# Patient Record
Sex: MALE | Race: WHITE | NOT HISPANIC OR LATINO | Employment: UNEMPLOYED | URBAN - METROPOLITAN AREA
[De-identification: names, ages, dates, MRNs, and addresses within clinical notes are randomized per-mention and may not be internally consistent; named-entity substitution may affect disease eponyms.]

---

## 2018-05-29 ENCOUNTER — APPOINTMENT (OUTPATIENT)
Dept: RADIOLOGY | Facility: MEDICAL CENTER | Age: 48
DRG: 247 | End: 2018-05-29
Attending: EMERGENCY MEDICINE
Payer: COMMERCIAL

## 2018-05-29 ENCOUNTER — HOSPITAL ENCOUNTER (INPATIENT)
Facility: MEDICAL CENTER | Age: 48
LOS: 2 days | DRG: 247 | End: 2018-05-31
Attending: EMERGENCY MEDICINE | Admitting: HOSPITALIST
Payer: COMMERCIAL

## 2018-05-29 DIAGNOSIS — I21.A9 OTHER TYPE OF MYOCARDIAL INFARCTION (HCC): ICD-10-CM

## 2018-05-29 PROBLEM — I10 ESSENTIAL HYPERTENSION: Status: ACTIVE | Noted: 2018-05-29

## 2018-05-29 PROBLEM — E87.6 HYPOKALEMIA: Status: ACTIVE | Noted: 2018-05-29

## 2018-05-29 PROBLEM — E11.8 TYPE 2 DIABETES MELLITUS WITH COMPLICATION, WITHOUT LONG-TERM CURRENT USE OF INSULIN (HCC): Status: ACTIVE | Noted: 2018-05-29

## 2018-05-29 PROBLEM — I21.4 ACUTE NON-ST ELEVATION MYOCARDIAL INFARCTION (NSTEMI) (HCC): Status: ACTIVE | Noted: 2018-05-29

## 2018-05-29 PROBLEM — Z82.3 FAMILY HISTORY OF STROKE OR TRANSIENT ISCHEMIC ATTACK IN MOTHER: Status: ACTIVE | Noted: 2018-05-29

## 2018-05-29 LAB
ALBUMIN SERPL BCP-MCNC: 4.6 G/DL (ref 3.2–4.9)
ALBUMIN/GLOB SERPL: 1.4 G/DL
ALP SERPL-CCNC: 78 U/L (ref 30–99)
ALT SERPL-CCNC: 34 U/L (ref 2–50)
ANION GAP SERPL CALC-SCNC: 15 MMOL/L (ref 0–11.9)
APTT PPP: 25.4 SEC (ref 24.7–36)
AST SERPL-CCNC: 27 U/L (ref 12–45)
BASOPHILS # BLD AUTO: 0.9 % (ref 0–1.8)
BASOPHILS # BLD: 0.09 K/UL (ref 0–0.12)
BILIRUB SERPL-MCNC: 0.7 MG/DL (ref 0.1–1.5)
BNP SERPL-MCNC: 71 PG/ML (ref 0–100)
BUN SERPL-MCNC: 18 MG/DL (ref 8–22)
CALCIUM SERPL-MCNC: 9.7 MG/DL (ref 8.5–10.5)
CHLORIDE SERPL-SCNC: 98 MMOL/L (ref 96–112)
CO2 SERPL-SCNC: 27 MMOL/L (ref 20–33)
CREAT SERPL-MCNC: 1.11 MG/DL (ref 0.5–1.4)
EKG IMPRESSION: NORMAL
EOSINOPHIL # BLD AUTO: 0.4 K/UL (ref 0–0.51)
EOSINOPHIL NFR BLD: 4.1 % (ref 0–6.9)
ERYTHROCYTE [DISTWIDTH] IN BLOOD BY AUTOMATED COUNT: 44 FL (ref 35.9–50)
EST. AVERAGE GLUCOSE BLD GHB EST-MCNC: 163 MG/DL
GLOBULIN SER CALC-MCNC: 3.4 G/DL (ref 1.9–3.5)
GLUCOSE SERPL-MCNC: 137 MG/DL (ref 65–99)
HBA1C MFR BLD: 7.3 % (ref 0–5.6)
HCT VFR BLD AUTO: 37.6 % (ref 42–52)
HGB BLD-MCNC: 12.9 G/DL (ref 14–18)
IMM GRANULOCYTES # BLD AUTO: 0.02 K/UL (ref 0–0.11)
IMM GRANULOCYTES NFR BLD AUTO: 0.2 % (ref 0–0.9)
INR PPP: 1.03 (ref 0.87–1.13)
LIPASE SERPL-CCNC: 56 U/L (ref 11–82)
LYMPHOCYTES # BLD AUTO: 2.7 K/UL (ref 1–4.8)
LYMPHOCYTES NFR BLD: 27.8 % (ref 22–41)
MCH RBC QN AUTO: 31.8 PG (ref 27–33)
MCHC RBC AUTO-ENTMCNC: 34.3 G/DL (ref 33.7–35.3)
MCV RBC AUTO: 92.6 FL (ref 81.4–97.8)
MONOCYTES # BLD AUTO: 0.82 K/UL (ref 0–0.85)
MONOCYTES NFR BLD AUTO: 8.5 % (ref 0–13.4)
NEUTROPHILS # BLD AUTO: 5.67 K/UL (ref 1.82–7.42)
NEUTROPHILS NFR BLD: 58.5 % (ref 44–72)
NRBC # BLD AUTO: 0 K/UL
NRBC BLD-RTO: 0 /100 WBC
PLATELET # BLD AUTO: 243 K/UL (ref 164–446)
PMV BLD AUTO: 10.6 FL (ref 9–12.9)
POTASSIUM SERPL-SCNC: 3.5 MMOL/L (ref 3.6–5.5)
PROT SERPL-MCNC: 8 G/DL (ref 6–8.2)
PROTHROMBIN TIME: 13.2 SEC (ref 12–14.6)
RBC # BLD AUTO: 4.06 M/UL (ref 4.7–6.1)
SODIUM SERPL-SCNC: 140 MMOL/L (ref 135–145)
TROPONIN I SERPL-MCNC: 4.97 NG/ML (ref 0–0.04)
TROPONIN I SERPL-MCNC: 5.04 NG/ML (ref 0–0.04)
TSH SERPL DL<=0.005 MIU/L-ACNC: 2.4 UIU/ML (ref 0.38–5.33)
WBC # BLD AUTO: 9.7 K/UL (ref 4.8–10.8)

## 2018-05-29 PROCEDURE — 84484 ASSAY OF TROPONIN QUANT: CPT | Mod: 91

## 2018-05-29 PROCEDURE — 71045 X-RAY EXAM CHEST 1 VIEW: CPT

## 2018-05-29 PROCEDURE — A9270 NON-COVERED ITEM OR SERVICE: HCPCS | Performed by: EMERGENCY MEDICINE

## 2018-05-29 PROCEDURE — 84443 ASSAY THYROID STIM HORMONE: CPT

## 2018-05-29 PROCEDURE — 700102 HCHG RX REV CODE 250 W/ 637 OVERRIDE(OP): Performed by: EMERGENCY MEDICINE

## 2018-05-29 PROCEDURE — 83036 HEMOGLOBIN GLYCOSYLATED A1C: CPT

## 2018-05-29 PROCEDURE — 99223 1ST HOSP IP/OBS HIGH 75: CPT | Performed by: HOSPITALIST

## 2018-05-29 PROCEDURE — 85730 THROMBOPLASTIN TIME PARTIAL: CPT

## 2018-05-29 PROCEDURE — 96375 TX/PRO/DX INJ NEW DRUG ADDON: CPT

## 2018-05-29 PROCEDURE — 700111 HCHG RX REV CODE 636 W/ 250 OVERRIDE (IP): Performed by: EMERGENCY MEDICINE

## 2018-05-29 PROCEDURE — A9270 NON-COVERED ITEM OR SERVICE: HCPCS | Performed by: INTERNAL MEDICINE

## 2018-05-29 PROCEDURE — 93005 ELECTROCARDIOGRAM TRACING: CPT

## 2018-05-29 PROCEDURE — 99285 EMERGENCY DEPT VISIT HI MDM: CPT

## 2018-05-29 PROCEDURE — 96365 THER/PROPH/DIAG IV INF INIT: CPT

## 2018-05-29 PROCEDURE — 93005 ELECTROCARDIOGRAM TRACING: CPT | Performed by: EMERGENCY MEDICINE

## 2018-05-29 PROCEDURE — 83880 ASSAY OF NATRIURETIC PEPTIDE: CPT

## 2018-05-29 PROCEDURE — 770020 HCHG ROOM/CARE - TELE (206)

## 2018-05-29 PROCEDURE — 700102 HCHG RX REV CODE 250 W/ 637 OVERRIDE(OP): Performed by: INTERNAL MEDICINE

## 2018-05-29 PROCEDURE — 700105 HCHG RX REV CODE 258: Performed by: HOSPITALIST

## 2018-05-29 PROCEDURE — 82962 GLUCOSE BLOOD TEST: CPT

## 2018-05-29 PROCEDURE — 80053 COMPREHEN METABOLIC PANEL: CPT

## 2018-05-29 PROCEDURE — 85610 PROTHROMBIN TIME: CPT

## 2018-05-29 PROCEDURE — 85025 COMPLETE CBC W/AUTO DIFF WBC: CPT

## 2018-05-29 PROCEDURE — 96366 THER/PROPH/DIAG IV INF ADDON: CPT

## 2018-05-29 PROCEDURE — 83690 ASSAY OF LIPASE: CPT

## 2018-05-29 PROCEDURE — 700102 HCHG RX REV CODE 250 W/ 637 OVERRIDE(OP): Performed by: HOSPITALIST

## 2018-05-29 RX ORDER — BISACODYL 10 MG
10 SUPPOSITORY, RECTAL RECTAL
Status: DISCONTINUED | OUTPATIENT
Start: 2018-05-29 | End: 2018-05-31 | Stop reason: HOSPADM

## 2018-05-29 RX ORDER — LABETALOL HYDROCHLORIDE 5 MG/ML
10 INJECTION, SOLUTION INTRAVENOUS
Status: DISCONTINUED | OUTPATIENT
Start: 2018-05-29 | End: 2018-05-31 | Stop reason: HOSPADM

## 2018-05-29 RX ORDER — BUPROPION HYDROCHLORIDE 150 MG/1
150 TABLET, EXTENDED RELEASE ORAL DAILY
Status: DISCONTINUED | OUTPATIENT
Start: 2018-05-30 | End: 2018-05-31 | Stop reason: HOSPADM

## 2018-05-29 RX ORDER — ONDANSETRON 2 MG/ML
4 INJECTION INTRAMUSCULAR; INTRAVENOUS EVERY 4 HOURS PRN
Status: DISCONTINUED | OUTPATIENT
Start: 2018-05-29 | End: 2018-05-29

## 2018-05-29 RX ORDER — POTASSIUM CHLORIDE 20 MEQ/1
20 TABLET, EXTENDED RELEASE ORAL DAILY
Status: DISCONTINUED | OUTPATIENT
Start: 2018-05-30 | End: 2018-05-31 | Stop reason: HOSPADM

## 2018-05-29 RX ORDER — SODIUM CHLORIDE 9 MG/ML
INJECTION, SOLUTION INTRAVENOUS CONTINUOUS
Status: DISCONTINUED | OUTPATIENT
Start: 2018-05-29 | End: 2018-05-30

## 2018-05-29 RX ORDER — ALLOPURINOL 100 MG/1
100 TABLET ORAL DAILY
Status: DISCONTINUED | OUTPATIENT
Start: 2018-05-30 | End: 2018-05-31 | Stop reason: HOSPADM

## 2018-05-29 RX ORDER — NITROGLYCERIN 0.4 MG/1
0.4 TABLET SUBLINGUAL ONCE
Status: DISCONTINUED | OUTPATIENT
Start: 2018-05-29 | End: 2018-05-29

## 2018-05-29 RX ORDER — POLYETHYLENE GLYCOL 3350 17 G/17G
1 POWDER, FOR SOLUTION ORAL
Status: DISCONTINUED | OUTPATIENT
Start: 2018-05-29 | End: 2018-05-31 | Stop reason: HOSPADM

## 2018-05-29 RX ORDER — ENALAPRILAT 1.25 MG/ML
1.25 INJECTION INTRAVENOUS EVERY 4 HOURS PRN
Status: DISCONTINUED | OUTPATIENT
Start: 2018-05-29 | End: 2018-05-31 | Stop reason: HOSPADM

## 2018-05-29 RX ORDER — SODIUM CHLORIDE 9 MG/ML
INJECTION, SOLUTION INTRAVENOUS CONTINUOUS
Status: DISCONTINUED | OUTPATIENT
Start: 2018-05-29 | End: 2018-05-29

## 2018-05-29 RX ORDER — DEXTROSE MONOHYDRATE 25 G/50ML
25 INJECTION, SOLUTION INTRAVENOUS
Status: DISCONTINUED | OUTPATIENT
Start: 2018-05-29 | End: 2018-05-31 | Stop reason: HOSPADM

## 2018-05-29 RX ORDER — AMOXICILLIN 250 MG
2 CAPSULE ORAL 2 TIMES DAILY
Status: DISCONTINUED | OUTPATIENT
Start: 2018-05-29 | End: 2018-05-31 | Stop reason: HOSPADM

## 2018-05-29 RX ORDER — BUPROPION HYDROCHLORIDE 150 MG/1
150 TABLET, EXTENDED RELEASE ORAL DAILY
COMMUNITY
End: 2018-12-22 | Stop reason: CLARIF

## 2018-05-29 RX ORDER — PROMETHAZINE HYDROCHLORIDE 25 MG/1
12.5-25 TABLET ORAL EVERY 4 HOURS PRN
Status: DISCONTINUED | OUTPATIENT
Start: 2018-05-29 | End: 2018-05-31 | Stop reason: HOSPADM

## 2018-05-29 RX ORDER — ACETAMINOPHEN 325 MG/1
650 TABLET ORAL EVERY 4 HOURS PRN
Status: DISCONTINUED | OUTPATIENT
Start: 2018-05-29 | End: 2018-05-29

## 2018-05-29 RX ORDER — ALLOPURINOL 100 MG/1
100 TABLET ORAL DAILY
Status: ON HOLD | COMMUNITY
End: 2020-10-08

## 2018-05-29 RX ORDER — ONDANSETRON 2 MG/ML
4 INJECTION INTRAMUSCULAR; INTRAVENOUS EVERY 4 HOURS PRN
Status: DISCONTINUED | OUTPATIENT
Start: 2018-05-29 | End: 2018-05-31 | Stop reason: HOSPADM

## 2018-05-29 RX ORDER — ACETAMINOPHEN 325 MG/1
650 TABLET ORAL EVERY 6 HOURS PRN
Status: DISCONTINUED | OUTPATIENT
Start: 2018-05-29 | End: 2018-05-31 | Stop reason: HOSPADM

## 2018-05-29 RX ORDER — LOSARTAN POTASSIUM 50 MG/1
50 TABLET ORAL DAILY
Status: ON HOLD | COMMUNITY
End: 2018-05-31

## 2018-05-29 RX ORDER — ASPIRIN 325 MG
325 TABLET ORAL ONCE
Status: COMPLETED | OUTPATIENT
Start: 2018-05-29 | End: 2018-05-29

## 2018-05-29 RX ORDER — ALPRAZOLAM 0.25 MG/1
0.25 TABLET ORAL 3 TIMES DAILY PRN
Status: DISCONTINUED | OUTPATIENT
Start: 2018-05-29 | End: 2018-05-31 | Stop reason: HOSPADM

## 2018-05-29 RX ORDER — ATORVASTATIN CALCIUM 40 MG/1
40 TABLET, FILM COATED ORAL
Status: DISCONTINUED | OUTPATIENT
Start: 2018-05-29 | End: 2018-05-31 | Stop reason: HOSPADM

## 2018-05-29 RX ORDER — ONDANSETRON 4 MG/1
4 TABLET, ORALLY DISINTEGRATING ORAL EVERY 4 HOURS PRN
Status: DISCONTINUED | OUTPATIENT
Start: 2018-05-29 | End: 2018-05-31 | Stop reason: HOSPADM

## 2018-05-29 RX ORDER — HEPARIN SODIUM 1000 [USP'U]/ML
2200 INJECTION, SOLUTION INTRAVENOUS; SUBCUTANEOUS PRN
Status: DISCONTINUED | OUTPATIENT
Start: 2018-05-29 | End: 2018-05-30

## 2018-05-29 RX ORDER — PROMETHAZINE HYDROCHLORIDE 25 MG/1
12.5-25 SUPPOSITORY RECTAL EVERY 4 HOURS PRN
Status: DISCONTINUED | OUTPATIENT
Start: 2018-05-29 | End: 2018-05-31 | Stop reason: HOSPADM

## 2018-05-29 RX ORDER — NITROGLYCERIN 0.1MG/HR
1 PATCH, TRANSDERMAL 24 HOURS TRANSDERMAL DAILY
Status: DISCONTINUED | OUTPATIENT
Start: 2018-05-29 | End: 2018-05-31 | Stop reason: HOSPADM

## 2018-05-29 RX ORDER — NITROGLYCERIN 0.4 MG/1
0.4 TABLET SUBLINGUAL
Status: DISCONTINUED | OUTPATIENT
Start: 2018-05-29 | End: 2018-05-31 | Stop reason: HOSPADM

## 2018-05-29 RX ORDER — HEPARIN SODIUM 1000 [USP'U]/ML
4000 INJECTION, SOLUTION INTRAVENOUS; SUBCUTANEOUS ONCE
Status: COMPLETED | OUTPATIENT
Start: 2018-05-29 | End: 2018-05-29

## 2018-05-29 RX ADMIN — HEPARIN SODIUM 4000 UNITS: 1000 INJECTION, SOLUTION INTRAVENOUS; SUBCUTANEOUS at 18:15

## 2018-05-29 RX ADMIN — ATORVASTATIN CALCIUM 40 MG: 40 TABLET, FILM COATED ORAL at 20:42

## 2018-05-29 RX ADMIN — METOPROLOL TARTRATE 25 MG: 25 TABLET, FILM COATED ORAL at 20:42

## 2018-05-29 RX ADMIN — NITROGLYCERIN 1 PATCH: 0.1 PATCH TRANSDERMAL at 22:40

## 2018-05-29 RX ADMIN — SODIUM CHLORIDE: 9 INJECTION, SOLUTION INTRAVENOUS at 22:49

## 2018-05-29 RX ADMIN — HEPARIN SODIUM 900 UNITS/HR: 5000 INJECTION, SOLUTION INTRAVENOUS; SUBCUTANEOUS at 18:14

## 2018-05-29 RX ADMIN — ASPIRIN 325 MG: 325 TABLET, FILM COATED ORAL at 18:00

## 2018-05-29 ASSESSMENT — LIFESTYLE VARIABLES
DO YOU DRINK ALCOHOL: NO
SUBSTANCE_ABUSE: 0

## 2018-05-29 ASSESSMENT — ENCOUNTER SYMPTOMS
BRUISES/BLEEDS EASILY: 0
NECK PAIN: 0
PALPITATIONS: 0
CLAUDICATION: 0
DIZZINESS: 0
CONSTIPATION: 0
SHORTNESS OF BREATH: 1
HEMOPTYSIS: 0
DEPRESSION: 0
MYALGIAS: 0
WHEEZING: 0
WEAKNESS: 0
DIAPHORESIS: 0
ABDOMINAL PAIN: 0
CHILLS: 0
COUGH: 0
EYE DISCHARGE: 0
LOSS OF CONSCIOUSNESS: 0
FEVER: 0
EYE PAIN: 0
BACK PAIN: 0
SENSORY CHANGE: 0
SORE THROAT: 0
SPUTUM PRODUCTION: 0
FOCAL WEAKNESS: 0
DIARRHEA: 0
SPEECH CHANGE: 0
HEADACHES: 0
VOMITING: 0
NAUSEA: 0

## 2018-05-29 ASSESSMENT — PAIN SCALES - GENERAL
PAINLEVEL_OUTOF10: 0
PAINLEVEL_OUTOF10: 4

## 2018-05-29 ASSESSMENT — PATIENT HEALTH QUESTIONNAIRE - PHQ9
4. FEELING TIRED OR HAVING LITTLE ENERGY: NEARLY EVERY DAY
CLINICAL INTERPRETATION OF PHQ2 SCORE: 0
SUM OF ALL RESPONSES TO PHQ9 QUESTIONS 1 AND 2: 3
5. POOR APPETITE OR OVEREATING: NEARLY EVERY DAY
2. FEELING DOWN, DEPRESSED, IRRITABLE, OR HOPELESS: NEARLY EVERY DAY
1. LITTLE INTEREST OR PLEASURE IN DOING THINGS: NOT AT ALL
3. TROUBLE FALLING OR STAYING ASLEEP OR SLEEPING TOO MUCH: MORE THAN HALF THE DAYS
8. MOVING OR SPEAKING SO SLOWLY THAT OTHER PEOPLE COULD HAVE NOTICED. OR THE OPPOSITE, BEING SO FIGETY OR RESTLESS THAT YOU HAVE BEEN MOVING AROUND A LOT MORE THAN USUAL: NOT AT ALL
6. FEELING BAD ABOUT YOURSELF - OR THAT YOU ARE A FAILURE OR HAVE LET YOURSELF OR YOUR FAMILY DOWN: NEARLY EVERY DAY
7. TROUBLE CONCENTRATING ON THINGS, SUCH AS READING THE NEWSPAPER OR WATCHING TELEVISION: NOT AT ALL
SUM OF ALL RESPONSES TO PHQ QUESTIONS 1-9: 15
9. THOUGHTS THAT YOU WOULD BE BETTER OFF DEAD, OR OF HURTING YOURSELF: SEVERAL DAYS

## 2018-05-29 NOTE — ED PROVIDER NOTES
"ED Provider Note    Scribed for Maurilio Hogan M.D. by Kelvin Romero. 2018, 4:11 PM.    Primary care provider: Pcp Pt States None  Means of arrival: Walk in  History obtained from: Patient  History limited by: None    CHIEF COMPLAINT  Chief Complaint   Patient presents with   • Chest Pain       HPI  Guerrero Ann is a 47 y.o. male who presents to the Emergency Department complaining of burning chest pain onset two weeks ago. The chest pain was initially intermittent. It has been more constant over the last couple of days. The chest pain is exacerbated with laying down and occurs typically at night. He also notes worsened pain when eating. Patient reports having \"burning sensations\" to his armpits. He notes having episodes of left arm numbness as well. Patient also reports shortness of breath, in which he \"hyperventilates.\" He is sometimes short of breath when walking. He denies any nausea or abdominal pain. Patient takes medications for high blood sugar and hypertension. He notes family history of cardiac issues including MI. His grandfather  from a heart attack. His mother has a history of stroke, with first occurrence at age 47. Patient denies smoking cigarettes. He notes history of marijuana use.       REVIEW OF SYSTEMS  Review of Systems   Constitutional: Negative for chills and fever.   Respiratory: Positive for shortness of breath.    Cardiovascular: Positive for chest pain.   Gastrointestinal: Negative for abdominal pain and nausea.   Neurological:        Positive left arm numbness, \"burning sensations\" in armpits   All other systems reviewed and are negative.  C    PAST MEDICAL HISTORY   has a past medical history of Diabetes (HCC); Hypertension; and Kidney stones.    SURGICAL HISTORY   has a past surgical history that includes other orthopedic surgery.    SOCIAL HISTORY  Social History   Substance Use Topics   • Smoking status: Never Smoker   • Smokeless tobacco: Never Used   • Alcohol " "use Yes      History   Drug Use     Comment: norco, vicaten        FAMILY HISTORY  Family History   Problem Relation Age of Onset   • Stroke Mother 47     had 3 devastating strokes,  56yo stroke complications   • No Known Problems Father      does not know his father.   • Stroke Maternal Grandmother 85   • Stroke Maternal Grandfather 54       CURRENT MEDICATIONS  Home Medications     Reviewed by Toby Alatorre (Pharmacy Tech) on 18 at 1839  Med List Status: Complete   Medication Last Dose Status   allopurinol (ZYLOPRIM) 100 MG Tab 2018 Active   amlodipine (NORVASC) 5 MG Tab 2018 Active   buPROPion SR (WELLBUTRIN-SR) 150 MG TABLET SR 12 HR sustained-release tablet 2018 Active   hydrochlorothiazide (HYDRODIURIL) 25 MG Tab 2018 Active   losartan (COZAAR) 50 MG Tab 2018 Active   metFORMIN (GLUCOPHAGE) 500 MG Tab 2018 Active                ALLERGIES  No Known Allergies    PHYSICAL EXAM  VITAL SIGNS: /81   Pulse 84   Temp 36.7 °C (98 °F)   Resp 16   Ht 1.575 m (5' 2\")   Wt 70.3 kg (155 lb)   SpO2 97%   BMI 28.35 kg/m²   Constitutional: Well developed, Well nourished, No acute distress, Non-toxic appearance.   HENT: Normocephalic, Atraumatic, Bilateral external ears normal, oropharynx is dry, No oral exudates, Nose normal.   Eyes:conjunctiva is normal, there are no signs of exudate.   Neck: Supple, no meningeal signs.  Lymphatic: No lymphadenopathy noted.   Cardiovascular: Regular rate and rhythm without murmurs gallops or rubs.   Thorax & Lungs: No respiratory distress. Breathing comfortably. Lungs are clear to auscultation bilaterally, there are no wheezes no rales. Chest wall is nontender.  Abdomen: Soft, nontender, nondistended. Bowel sounds are present.   Skin: Warm, Dry, No erythema,   Back: No tenderness, No CVA tenderness.  Musculoskeletal: Good range of motion in all major joints. No tenderness to palpation or major deformities noted. Intact distal pulses, no " clubbing, no cyanosis, no edema,   Neurologic: Alert & oriented x 3, Moving all extremities. No gross abnormalities.    Psychiatric: Affect normal, Judgment normal, Mood normal.       LABS  Results for orders placed or performed during the hospital encounter of 05/29/18   CBC with Differential   Result Value Ref Range    WBC 9.7 4.8 - 10.8 K/uL    RBC 4.06 (L) 4.70 - 6.10 M/uL    Hemoglobin 12.9 (L) 14.0 - 18.0 g/dL    Hematocrit 37.6 (L) 42.0 - 52.0 %    MCV 92.6 81.4 - 97.8 fL    MCH 31.8 27.0 - 33.0 pg    MCHC 34.3 33.7 - 35.3 g/dL    RDW 44.0 35.9 - 50.0 fL    Platelet Count 243 164 - 446 K/uL    MPV 10.6 9.0 - 12.9 fL    Neutrophils-Polys 58.50 44.00 - 72.00 %    Lymphocytes 27.80 22.00 - 41.00 %    Monocytes 8.50 0.00 - 13.40 %    Eosinophils 4.10 0.00 - 6.90 %    Basophils 0.90 0.00 - 1.80 %    Immature Granulocytes 0.20 0.00 - 0.90 %    Nucleated RBC 0.00 /100 WBC    Neutrophils (Absolute) 5.67 1.82 - 7.42 K/uL    Lymphs (Absolute) 2.70 1.00 - 4.80 K/uL    Monos (Absolute) 0.82 0.00 - 0.85 K/uL    Eos (Absolute) 0.40 0.00 - 0.51 K/uL    Baso (Absolute) 0.09 0.00 - 0.12 K/uL    Immature Granulocytes (abs) 0.02 0.00 - 0.11 K/uL    NRBC (Absolute) 0.00 K/uL   Complete Metabolic Panel (CMP)   Result Value Ref Range    Sodium 140 135 - 145 mmol/L    Potassium 3.5 (L) 3.6 - 5.5 mmol/L    Chloride 98 96 - 112 mmol/L    Co2 27 20 - 33 mmol/L    Anion Gap 15.0 (H) 0.0 - 11.9    Glucose 137 (H) 65 - 99 mg/dL    Bun 18 8 - 22 mg/dL    Creatinine 1.11 0.50 - 1.40 mg/dL    Calcium 9.7 8.5 - 10.5 mg/dL    AST(SGOT) 27 12 - 45 U/L    ALT(SGPT) 34 2 - 50 U/L    Alkaline Phosphatase 78 30 - 99 U/L    Total Bilirubin 0.7 0.1 - 1.5 mg/dL    Albumin 4.6 3.2 - 4.9 g/dL    Total Protein 8.0 6.0 - 8.2 g/dL    Globulin 3.4 1.9 - 3.5 g/dL    A-G Ratio 1.4 g/dL   Btype Natriuretic Peptide (BNP)   Result Value Ref Range    B Natriuretic Peptide 71 0 - 100 pg/mL   Prothrombin Time (PT/INR)   Result Value Ref Range    PT 13.2 12.0 -  14.6 sec    INR 1.03 0.87 - 1.13   APTT   Result Value Ref Range    APTT 25.4 24.7 - 36.0 sec   Lipase   Result Value Ref Range    Lipase 56 11 - 82 U/L   Troponin STAT   Result Value Ref Range    Troponin I 4.97 (H) 0.00 - 0.04 ng/mL   ESTIMATED GFR   Result Value Ref Range    GFR If African American >60 >60 mL/min/1.73 m 2    GFR If Non African American >60 >60 mL/min/1.73 m 2   TSH (Thyroid Stimulating Hormone)   Result Value Ref Range    TSH 2.400 0.380 - 5.330 uIU/mL   Hemoglobin A1c   Result Value Ref Range    Glycohemoglobin 7.3 (H) 0.0 - 5.6 %    Est Avg Glucose 163 mg/dL   Troponin in four (4) hours   Result Value Ref Range    Troponin I 5.04 (H) 0.00 - 0.04 ng/mL   EKG (ER)   Result Value Ref Range    Report       [facility] Emergency Dept.    Test Date:  2018  Pt Name:    PETER TRONCOSO              Department: ER  MRN:        [id number]                      Room:  Gender:     Male                         Technician: [id number]  :        1970                   Requested By:ER TRIAGE PROTOCOL  Order #:    [id number]                    Reading MD: WHITNEY STRINGER MD    Measurements  Intervals                                Axis  Rate:       85                           P:          53  RI:         132                          QRS:        26  QRSD:       86                           T:          111  QT:         376  QTc:        447    Interpretive Statements  SINUS RHYTHM  PROBABLE LVH WITH SECONDARY REPOL ABNRM  INFERIOR INFARCT, OLD  Compared to ECG 2016 11:05:51  Myocardial infarct finding now present  Sinus tachycardia no longer present  ST (T wave) deviation no longer present    Electronically Signed On 2018 16:13:17 PDT by WHITNEY STRINGER MD        All labs reviewed by me.    EKG  See labs. Interpreted by me.     RADIOLOGY  DX-CHEST-PORTABLE (1 VIEW)   Final Result         1. No acute cardiopulmonary abnormalities are identified.      DX-CHEST-2 VIEWS     (Results Pending)     The radiologist's interpretation of all radiological studies have been reviewed by me.    COURSE & MEDICAL DECISION MAKING  Pertinent Labs & Imaging studies reviewed. (See chart for details)    4:11 PM - Patient seen and examined at bedside. Discussed plan of care which includes labs and imaging tests. Patient understands and agrees. Patient will be treated with  mg. Ordered DX chest, CBC, CMP, BNP, PT/INR, APTT, lipase, troponin stat, EKG to evaluate his symptoms. The differential diagnoses include but are not limited to: acute coronary syndrome, chest wall pain     5:23 PM Patient has troponin of 4.97. Paged cardiology    5:42 PM - I discussed the patient's case and the above findings with Dr. Villegas (cardiologist) who will consult patient.     5:44 PM Patient reevaluated at bedside. Discussed diagnostic test results as seen above. Dr. Villegas (cardiology) is also at bedside consulting the patient.     6:00 PM - I discussed the patient's case and the above findings with hospitalist who will admit the patient.       Decision Making:  Patient presents for evaluation. Per clinically the patient has nonspecific chest pain is been intermittent as above, but more consistent of the last 48 hours. EKG was nonspecific. Did have some Q waves in inferior leads with nonspecific ST elevations. Troponin, however, was elevated. Patient initially was given an aspirin and after the troponin was revealed. We started the patient on heparin drip. At this point. I did speak with Dr. Santos who will consult on the patient. At this point, the patient will be admitted to the hospitalist. Further treatment and care of his acute cardiac syndrome and non-ST elevation MI.    DISPOSITION:  Patient will be admitted to hospitalist in guarded condition.      FINAL IMPRESSION  1. Other type of myocardial infarction (HCC)          IKelvin (Scribe), am scribing for, and in the presence of,  Maurilio Hogan M.D..    Electronically signed by: Kelvin Romero (Scribe), 5/29/2018    I, Maurilio Hogan M.D. personally performed the services described in this documentation, as scribed by Kelvin Romero in my presence, and it is both accurate and complete.    The note accurately reflects work and decisions made by me.  Maurilio Hogan  5/29/2018  11:49 PM

## 2018-05-30 ENCOUNTER — PATIENT OUTREACH (OUTPATIENT)
Dept: HEALTH INFORMATION MANAGEMENT | Facility: OTHER | Age: 48
End: 2018-05-30

## 2018-05-30 ENCOUNTER — APPOINTMENT (OUTPATIENT)
Dept: RADIOLOGY | Facility: MEDICAL CENTER | Age: 48
DRG: 247 | End: 2018-05-30
Attending: INTERNAL MEDICINE
Payer: COMMERCIAL

## 2018-05-30 LAB
ACT BLD: 202 SEC (ref 74–137)
ACT BLD: 714 SEC (ref 74–137)
AMPHET UR QL SCN: NEGATIVE
ANION GAP SERPL CALC-SCNC: 14 MMOL/L (ref 0–11.9)
APTT PPP: 36 SEC (ref 24.7–36)
APTT PPP: 52 SEC (ref 24.7–36)
BARBITURATES UR QL SCN: NEGATIVE
BASOPHILS # BLD AUTO: 0.8 % (ref 0–1.8)
BASOPHILS # BLD: 0.09 K/UL (ref 0–0.12)
BENZODIAZ UR QL SCN: POSITIVE
BUN SERPL-MCNC: 21 MG/DL (ref 8–22)
BZE UR QL SCN: NEGATIVE
CALCIUM SERPL-MCNC: 9.5 MG/DL (ref 8.5–10.5)
CANNABINOIDS UR QL SCN: NEGATIVE
CHLORIDE SERPL-SCNC: 97 MMOL/L (ref 96–112)
CHOLEST SERPL-MCNC: 204 MG/DL (ref 100–199)
CO2 SERPL-SCNC: 28 MMOL/L (ref 20–33)
CREAT SERPL-MCNC: 1.25 MG/DL (ref 0.5–1.4)
EKG IMPRESSION: NORMAL
EKG IMPRESSION: NORMAL
EOSINOPHIL # BLD AUTO: 0.47 K/UL (ref 0–0.51)
EOSINOPHIL NFR BLD: 4.4 % (ref 0–6.9)
ERYTHROCYTE [DISTWIDTH] IN BLOOD BY AUTOMATED COUNT: 44.4 FL (ref 35.9–50)
GLUCOSE BLD-MCNC: 123 MG/DL (ref 65–99)
GLUCOSE BLD-MCNC: 174 MG/DL (ref 65–99)
GLUCOSE BLD-MCNC: 194 MG/DL (ref 65–99)
GLUCOSE BLD-MCNC: 229 MG/DL (ref 65–99)
GLUCOSE SERPL-MCNC: 152 MG/DL (ref 65–99)
HCT VFR BLD AUTO: 37 % (ref 42–52)
HDLC SERPL-MCNC: 36 MG/DL
HGB BLD-MCNC: 12.8 G/DL (ref 14–18)
IMM GRANULOCYTES # BLD AUTO: 0.04 K/UL (ref 0–0.11)
IMM GRANULOCYTES NFR BLD AUTO: 0.4 % (ref 0–0.9)
LDLC SERPL CALC-MCNC: ABNORMAL MG/DL
LYMPHOCYTES # BLD AUTO: 3.82 K/UL (ref 1–4.8)
LYMPHOCYTES NFR BLD: 35.8 % (ref 22–41)
MCH RBC QN AUTO: 31.9 PG (ref 27–33)
MCHC RBC AUTO-ENTMCNC: 34.6 G/DL (ref 33.7–35.3)
MCV RBC AUTO: 92.3 FL (ref 81.4–97.8)
METHADONE UR QL SCN: NEGATIVE
MONOCYTES # BLD AUTO: 0.94 K/UL (ref 0–0.85)
MONOCYTES NFR BLD AUTO: 8.8 % (ref 0–13.4)
NEUTROPHILS # BLD AUTO: 5.32 K/UL (ref 1.82–7.42)
NEUTROPHILS NFR BLD: 49.8 % (ref 44–72)
NRBC # BLD AUTO: 0 K/UL
NRBC BLD-RTO: 0 /100 WBC
OPIATES UR QL SCN: NEGATIVE
OXYCODONE UR QL SCN: NEGATIVE
PCP UR QL SCN: NEGATIVE
PLATELET # BLD AUTO: 245 K/UL (ref 164–446)
PMV BLD AUTO: 10.7 FL (ref 9–12.9)
POTASSIUM SERPL-SCNC: 3.2 MMOL/L (ref 3.6–5.5)
PROPOXYPH UR QL SCN: NEGATIVE
RBC # BLD AUTO: 4.01 M/UL (ref 4.7–6.1)
SODIUM SERPL-SCNC: 139 MMOL/L (ref 135–145)
TRIGL SERPL-MCNC: 524 MG/DL (ref 0–149)
WBC # BLD AUTO: 10.7 K/UL (ref 4.8–10.8)

## 2018-05-30 PROCEDURE — C1887 CATHETER, GUIDING: HCPCS

## 2018-05-30 PROCEDURE — C1769 GUIDE WIRE: HCPCS

## 2018-05-30 PROCEDURE — A9270 NON-COVERED ITEM OR SERVICE: HCPCS | Performed by: HOSPITALIST

## 2018-05-30 PROCEDURE — 700102 HCHG RX REV CODE 250 W/ 637 OVERRIDE(OP): Performed by: INTERNAL MEDICINE

## 2018-05-30 PROCEDURE — 80048 BASIC METABOLIC PNL TOTAL CA: CPT

## 2018-05-30 PROCEDURE — 4A023N7 MEASUREMENT OF CARDIAC SAMPLING AND PRESSURE, LEFT HEART, PERCUTANEOUS APPROACH: ICD-10-PCS | Performed by: INTERNAL MEDICINE

## 2018-05-30 PROCEDURE — 93005 ELECTROCARDIOGRAM TRACING: CPT | Performed by: INTERNAL MEDICINE

## 2018-05-30 PROCEDURE — 85730 THROMBOPLASTIN TIME PARTIAL: CPT | Mod: 91

## 2018-05-30 PROCEDURE — C1874 STENT, COATED/COV W/DEL SYS: HCPCS

## 2018-05-30 PROCEDURE — 85347 COAGULATION TIME ACTIVATED: CPT | Mod: 91

## 2018-05-30 PROCEDURE — C1725 CATH, TRANSLUMIN NON-LASER: HCPCS

## 2018-05-30 PROCEDURE — 700102 HCHG RX REV CODE 250 W/ 637 OVERRIDE(OP)

## 2018-05-30 PROCEDURE — 93571 IV DOP VEL&/PRESS C FLO 1ST: CPT

## 2018-05-30 PROCEDURE — 93005 ELECTROCARDIOGRAM TRACING: CPT | Performed by: HOSPITALIST

## 2018-05-30 PROCEDURE — 93010 ELECTROCARDIOGRAM REPORT: CPT | Performed by: INTERNAL MEDICINE

## 2018-05-30 PROCEDURE — 93458 L HRT ARTERY/VENTRICLE ANGIO: CPT

## 2018-05-30 PROCEDURE — 71046 X-RAY EXAM CHEST 2 VIEWS: CPT

## 2018-05-30 PROCEDURE — B2111ZZ FLUOROSCOPY OF MULTIPLE CORONARY ARTERIES USING LOW OSMOLAR CONTRAST: ICD-10-PCS | Performed by: INTERNAL MEDICINE

## 2018-05-30 PROCEDURE — 027034Z DILATION OF CORONARY ARTERY, ONE ARTERY WITH DRUG-ELUTING INTRALUMINAL DEVICE, PERCUTANEOUS APPROACH: ICD-10-PCS | Performed by: INTERNAL MEDICINE

## 2018-05-30 PROCEDURE — C1894 INTRO/SHEATH, NON-LASER: HCPCS

## 2018-05-30 PROCEDURE — 700117 HCHG RX CONTRAST REV CODE 255: Performed by: INTERNAL MEDICINE

## 2018-05-30 PROCEDURE — 85025 COMPLETE CBC W/AUTO DIFF WBC: CPT

## 2018-05-30 PROCEDURE — A9270 NON-COVERED ITEM OR SERVICE: HCPCS | Performed by: INTERNAL MEDICINE

## 2018-05-30 PROCEDURE — 99232 SBSQ HOSP IP/OBS MODERATE 35: CPT | Performed by: HOSPITALIST

## 2018-05-30 PROCEDURE — 80307 DRUG TEST PRSMV CHEM ANLYZR: CPT

## 2018-05-30 PROCEDURE — 99153 MOD SED SAME PHYS/QHP EA: CPT

## 2018-05-30 PROCEDURE — 700101 HCHG RX REV CODE 250

## 2018-05-30 PROCEDURE — 360979 HCHG DIAGNOSTIC CATH

## 2018-05-30 PROCEDURE — A9270 NON-COVERED ITEM OR SERVICE: HCPCS

## 2018-05-30 PROCEDURE — 770020 HCHG ROOM/CARE - TELE (206)

## 2018-05-30 PROCEDURE — 700111 HCHG RX REV CODE 636 W/ 250 OVERRIDE (IP)

## 2018-05-30 PROCEDURE — 307093 HCHG TR BAND RADIAL

## 2018-05-30 PROCEDURE — 700105 HCHG RX REV CODE 258: Performed by: INTERNAL MEDICINE

## 2018-05-30 PROCEDURE — 304952 HCHG R 2 PADS

## 2018-05-30 PROCEDURE — B2151ZZ FLUOROSCOPY OF LEFT HEART USING LOW OSMOLAR CONTRAST: ICD-10-PCS | Performed by: INTERNAL MEDICINE

## 2018-05-30 PROCEDURE — 80061 LIPID PANEL: CPT

## 2018-05-30 PROCEDURE — 36415 COLL VENOUS BLD VENIPUNCTURE: CPT

## 2018-05-30 PROCEDURE — 99152 MOD SED SAME PHYS/QHP 5/>YRS: CPT

## 2018-05-30 PROCEDURE — 700102 HCHG RX REV CODE 250 W/ 637 OVERRIDE(OP): Performed by: HOSPITALIST

## 2018-05-30 PROCEDURE — 82962 GLUCOSE BLOOD TEST: CPT

## 2018-05-30 PROCEDURE — 4A033BC MEASUREMENT OF ARTERIAL PRESSURE, CORONARY, PERCUTANEOUS APPROACH: ICD-10-PCS | Performed by: INTERNAL MEDICINE

## 2018-05-30 PROCEDURE — 700111 HCHG RX REV CODE 636 W/ 250 OVERRIDE (IP): Performed by: EMERGENCY MEDICINE

## 2018-05-30 PROCEDURE — 700105 HCHG RX REV CODE 258: Performed by: HOSPITALIST

## 2018-05-30 PROCEDURE — C9600 PERC DRUG-EL COR STENT SING: HCPCS | Mod: RC

## 2018-05-30 RX ORDER — PRASUGREL 10 MG/1
60 TABLET, FILM COATED ORAL ONCE
Status: ACTIVE | OUTPATIENT
Start: 2018-05-30 | End: 2018-05-31

## 2018-05-30 RX ORDER — PRASUGREL 10 MG/1
10 TABLET, FILM COATED ORAL DAILY
Status: DISCONTINUED | OUTPATIENT
Start: 2018-05-31 | End: 2018-05-31 | Stop reason: HOSPADM

## 2018-05-30 RX ORDER — VERAPAMIL HYDROCHLORIDE 2.5 MG/ML
INJECTION, SOLUTION INTRAVENOUS
Status: COMPLETED
Start: 2018-05-30 | End: 2018-05-30

## 2018-05-30 RX ORDER — LIDOCAINE HYDROCHLORIDE 20 MG/ML
INJECTION, SOLUTION INFILTRATION; PERINEURAL
Status: COMPLETED
Start: 2018-05-30 | End: 2018-05-30

## 2018-05-30 RX ORDER — SPIRONOLACTONE 25 MG/1
12.5 TABLET ORAL
Status: DISCONTINUED | OUTPATIENT
Start: 2018-05-30 | End: 2018-05-31 | Stop reason: HOSPADM

## 2018-05-30 RX ORDER — HEPARIN SODIUM,PORCINE 1000/ML
VIAL (ML) INJECTION
Status: COMPLETED
Start: 2018-05-30 | End: 2018-05-30

## 2018-05-30 RX ORDER — ADENOSINE 3 MG/ML
INJECTION, SOLUTION INTRAVENOUS
Status: COMPLETED
Start: 2018-05-30 | End: 2018-05-30

## 2018-05-30 RX ORDER — MIDAZOLAM HYDROCHLORIDE 1 MG/ML
INJECTION INTRAMUSCULAR; INTRAVENOUS
Status: COMPLETED
Start: 2018-05-30 | End: 2018-05-30

## 2018-05-30 RX ORDER — SODIUM CHLORIDE 9 MG/ML
INJECTION, SOLUTION INTRAVENOUS CONTINUOUS
Status: DISPENSED | OUTPATIENT
Start: 2018-05-30 | End: 2018-05-30

## 2018-05-30 RX ORDER — PRASUGREL 10 MG/1
TABLET, FILM COATED ORAL
Status: COMPLETED
Start: 2018-05-30 | End: 2018-05-30

## 2018-05-30 RX ADMIN — NITROGLYCERIN 1 PATCH: 0.1 PATCH TRANSDERMAL at 08:06

## 2018-05-30 RX ADMIN — LIDOCAINE HYDROCHLORIDE: 20 INJECTION, SOLUTION INFILTRATION; PERINEURAL at 11:56

## 2018-05-30 RX ADMIN — SODIUM CHLORIDE: 9 INJECTION, SOLUTION INTRAVENOUS at 15:19

## 2018-05-30 RX ADMIN — MIDAZOLAM 1.5 MG: 1 INJECTION INTRAMUSCULAR; INTRAVENOUS at 11:56

## 2018-05-30 RX ADMIN — IOHEXOL 123 ML: 350 INJECTION, SOLUTION INTRAVENOUS at 12:13

## 2018-05-30 RX ADMIN — ADENOSINE 90 MG: 3 INJECTION, SOLUTION INTRAVENOUS at 11:57

## 2018-05-30 RX ADMIN — Medication 60 MG: at 12:13

## 2018-05-30 RX ADMIN — HEPARIN SODIUM 1200 UNITS/HR: 5000 INJECTION, SOLUTION INTRAVENOUS; SUBCUTANEOUS at 09:39

## 2018-05-30 RX ADMIN — FENTANYL CITRATE 100 MCG: 50 INJECTION, SOLUTION INTRAMUSCULAR; INTRAVENOUS at 11:56

## 2018-05-30 RX ADMIN — VERAPAMIL HYDROCHLORIDE 2.5 MG: 2.5 INJECTION, SOLUTION INTRAVENOUS at 11:56

## 2018-05-30 RX ADMIN — HEPARIN SODIUM 2000 UNITS: 200 INJECTION, SOLUTION INTRAVENOUS at 11:57

## 2018-05-30 RX ADMIN — SODIUM CHLORIDE: 9 INJECTION, SOLUTION INTRAVENOUS at 08:10

## 2018-05-30 RX ADMIN — ALLOPURINOL 100 MG: 100 TABLET ORAL at 05:12

## 2018-05-30 RX ADMIN — POTASSIUM CHLORIDE 20 MEQ: 1500 TABLET, EXTENDED RELEASE ORAL at 05:12

## 2018-05-30 RX ADMIN — BUPROPION HYDROCHLORIDE 150 MG: 150 TABLET, EXTENDED RELEASE ORAL at 05:13

## 2018-05-30 RX ADMIN — ASPIRIN 81 MG: 81 TABLET, COATED ORAL at 05:13

## 2018-05-30 RX ADMIN — NITROGLYCERIN 10 ML: 20 INJECTION INTRAVENOUS at 11:53

## 2018-05-30 RX ADMIN — HEPARIN SODIUM 2200 UNITS: 1000 INJECTION, SOLUTION INTRAVENOUS; SUBCUTANEOUS at 09:38

## 2018-05-30 RX ADMIN — SPIRONOLACTONE 12.5 MG: 25 TABLET, FILM COATED ORAL at 15:00

## 2018-05-30 RX ADMIN — HEPARIN SODIUM: 1000 INJECTION, SOLUTION INTRAVENOUS; SUBCUTANEOUS at 11:53

## 2018-05-30 RX ADMIN — METOPROLOL TARTRATE 25 MG: 25 TABLET, FILM COATED ORAL at 05:13

## 2018-05-30 RX ADMIN — HEPARIN SODIUM 2200 UNITS: 1000 INJECTION, SOLUTION INTRAVENOUS; SUBCUTANEOUS at 02:15

## 2018-05-30 RX ADMIN — ATORVASTATIN CALCIUM 40 MG: 40 TABLET, FILM COATED ORAL at 20:37

## 2018-05-30 ASSESSMENT — ENCOUNTER SYMPTOMS
WEIGHT LOSS: 0
DEPRESSION: 0
ORTHOPNEA: 0
HEMOPTYSIS: 0
HEADACHES: 0
PHOTOPHOBIA: 0
MYALGIAS: 0

## 2018-05-30 ASSESSMENT — PAIN SCALES - GENERAL
PAINLEVEL_OUTOF10: 0

## 2018-05-30 NOTE — PROGRESS NOTES
Patient back from cath lab, awake, oriented. Vital signs cycling. Cardiac monitor on. Stat EKG ordered. Hemoband to right radial wrist, no signs of hematoma or bleeding. Will remove band per protocol.

## 2018-05-30 NOTE — PROGRESS NOTES
Patient is recovering from angiogram without complication. Vital signs are at baseline. Right radial cath site is uncomplicated. S/P PCI with CHRISTINE to RCA with diffuse non-obstructive CAD. Plan is for aggressive medical management with DAPT for one year. We will continue to follow alongside you in the care of this patient. Please let us know if you have any questions or concerns.      Will have social work look into cost of Effient therapy. Most likely will be stable for discharge from cardiology standpoint tomorrow.

## 2018-05-30 NOTE — ED NOTES
Report received from Shirley COOK. Assumed care of patient. Pt assessed, A&O x 4. Patient's concerns addressed. Fall precautions in place. POC discussed. Call light within reach. Will continue to monitor.

## 2018-05-30 NOTE — PROGRESS NOTES
Received report from Anjel in the ED at 1885. Placed pt on zoll and brought him to the floor. Pt walked to bed, steady, cardiac monitor placed, call light within reach. No complaints of chest pain or sob. pts girlfriend at bedside. Heparin drip in place.

## 2018-05-30 NOTE — H&P
Hospital Medicine History and Physical    Date of Service  5/29/2018    Chief Complaint  Chief Complaint   Patient presents with   • Chest Pain       History of Presenting Illness  47 y.o. male who presented 5/29/2018 with significant family history of maternal early CVAs, hypertension diabetes mellitus type II for the last 2 years presented to the emergency room with several episodes of chest pain. The patient states that 6 days ago he had an episode of chest tightness and burning sensation to his mid chest while at work that radiated to his left arm elbow stating it was numb.  He states at that time he felt like he was unable to breathe. He took over-the-counter Maalox thinking it was gastroesophageal reflux. However 4 nights ago it returned again with numbness to left elbow again and at that time he had not eaten or drank anythinghe foods and felt that it was not reflux disease possibly his heart. He has just moved to [place] from [facility] relocating. He went to an urgent care today who suggested the patient go to the emergency room for evaluation of this chest pain. Of note he has not had any chest tightness pressure for the last 24 hours. He does not take aspirin daily. He did have a prior episode of chest tightness 2 years ago but never had a stress test he states he was seen in the ER at [facility]. He states his cholesterol levels have always been normal. He has remote history of kidney stones and is on allopurinol for this. He has been seen by Dr. Roberts who started patient on a heparin drip, aspirin, topical nitro, plans to do a cardiac catheterization in the a.m.  He lives with his girlfriend.  Currently on leave from his employment at [employer].    Primary Care Physician  Pcp Pt States None    Consultants  Dr. Roberts    Code Status  Full code    Review of Systems  Review of Systems   Constitutional: Negative for chills, diaphoresis, fever and malaise/fatigue.   HENT: Negative for congestion and sore  throat.    Eyes: Negative for pain and discharge.   Respiratory: Positive for shortness of breath. Negative for cough, hemoptysis, sputum production and wheezing.    Cardiovascular: Positive for chest pain. Negative for palpitations, claudication and leg swelling.   Gastrointestinal: Negative for abdominal pain, constipation, diarrhea, melena, nausea and vomiting.   Genitourinary: Negative for dysuria, frequency and urgency.   Musculoskeletal: Negative for back pain, joint pain, myalgias and neck pain.   Skin: Negative for itching and rash.   Neurological: Negative for dizziness, sensory change, speech change, focal weakness, loss of consciousness, weakness and headaches.   Endo/Heme/Allergies: Does not bruise/bleed easily.   Psychiatric/Behavioral: Negative for depression, substance abuse and suicidal ideas.        Past Medical History  Past Medical History:   Diagnosis Date   • Diabetes (HCC)    • Hypertension    • Kidney stones        Surgical History  Past Surgical History:   Procedure Laterality Date   • OTHER ORTHOPEDIC SURGERY      rt wrist fracture with fixation 34 years ago       Medications  No current facility-administered medications on file prior to encounter.      Current Outpatient Prescriptions on File Prior to Encounter   Medication Sig Dispense Refill   • amlodipine (NORVASC) 5 MG Tab Take 5 mg by mouth every day.     • hydrochlorothiazide (HYDRODIURIL) 25 MG Tab Take 25 mg by mouth every day.         Family History  Family History   Problem Relation Age of Onset   • Stroke Mother 47     had 3 devastating strokes,  54yo stroke complications   • No Known Problems Father      does not know his father.   • Stroke Maternal Grandmother 85   • Stroke Maternal Grandfather 54       Social History  Social History   Substance Use Topics   • Smoking status: Never Smoker   • Smokeless tobacco: Never Used   • Alcohol use Yes       Allergies  No Known Allergies     Physical Exam  Laboratory    Hemodynamics  Temp (24hrs), Av.7 °C (98.1 °F), Min:36.7 °C (98 °F), Max:36.8 °C (98.2 °F)   Temperature: 36.8 °C (98.2 °F)  Pulse  Av.8  Min: 81  Max: 97 Heart Rate (Monitored): 96  Blood Pressure: 140/89, NIBP: 115/69      Respiratory      Respiration: 16, Pulse Oximetry: 94 %             Physical Exam   Constitutional: He is oriented to person, place, and time. He appears well-developed and well-nourished. No distress.   HENT:   Head: Normocephalic and atraumatic.   Mouth/Throat: Oropharynx is clear and moist. No oropharyngeal exudate.   Eyes: Conjunctivae and EOM are normal. Pupils are equal, round, and reactive to light. Right eye exhibits no discharge. Left eye exhibits no discharge. No scleral icterus.   Neck: Normal range of motion. Neck supple. No JVD present. No tracheal deviation present. No thyromegaly present.   Cardiovascular: Normal rate, regular rhythm and normal heart sounds.  Exam reveals no gallop and no friction rub.    No murmur heard.  Pulmonary/Chest: Effort normal and breath sounds normal. No respiratory distress. He has no wheezes. He has no rales. He exhibits no tenderness.   Abdominal: Soft. Bowel sounds are normal. He exhibits no distension and no mass. There is no tenderness. There is no rebound and no guarding.   Musculoskeletal: Normal range of motion. He exhibits no edema or tenderness.   Lymphadenopathy:     He has no cervical adenopathy.   Neurological: He is alert and oriented to person, place, and time. No cranial nerve deficit. He exhibits normal muscle tone.   Skin: Skin is warm and dry. No rash noted. He is not diaphoretic. No erythema.   Psychiatric: He has a normal mood and affect. His behavior is normal. Judgment and thought content normal.   Nursing note and vitals reviewed.      Recent Labs      18   1620   WBC  9.7   RBC  4.06*   HEMOGLOBIN  12.9*   HEMATOCRIT  37.6*   MCV  92.6   MCH  31.8   MCHC  34.3   RDW  44.0   PLATELETCT  243   MPV  10.6     Recent  Labs      05/29/18   1620   SODIUM  140   POTASSIUM  3.5*   CHLORIDE  98   CO2  27   GLUCOSE  137*   BUN  18   CREATININE  1.11   CALCIUM  9.7     Recent Labs      05/29/18   1620   ALTSGPT  34   ASTSGOT  27   ALKPHOSPHAT  78   TBILIRUBIN  0.7   LIPASE  56   GLUCOSE  137*     Recent Labs      05/29/18   1620   APTT  25.4   INR  1.03     Recent Labs      05/29/18   1620   BNPBTYPENAT  71         Lab Results   Component Value Date    TROPONINI 5.04 (H) 05/29/2018     Urinalysis:  No results found for: SPECGRAVITY, GLUCOSEUR, KETONES, NITRITE, WBCURINE, RBCURINE, BACTERIA, EPITHELCELL     Imaging  CXR  negative   Assessment/Plan     I anticipate this patient will require at least two midnights for appropriate medical management, necessitating inpatient admission.    Hypokalemia- (present on admission)   Assessment & Plan    Replace with po Kdur.        Family history of stroke or transient ischemic attack in mother- (present on admission)   Assessment & Plan    Significant maternal history of CVAs.        Essential hypertension- (present on admission)   Assessment & Plan    Started on metoprolol 25 mg twice a day.  Due to acute onset of chest pain non-STEMI.  Held home Norvasc 5 mg daily Cozaar and hydrochlorothiazide.  Continue to monitor blood pressure carefully        Type 2 diabetes mellitus with complication, without long-term current use of insulin ([facility])- (present on admission)   Assessment & Plan    Check hemoglobin A1c.  Will hold home metformin.  Diabetic diet, sliding scale insulin with Accu-Cheks.  States diagnosed for 2 years.        Acute non-ST elevation myocardial infarction (NSTEMI) ([facility])- (present on admission)   Assessment & Plan    Elevated troponin,  repeat troponin in a.m.  EKG sinus rhythm rate 85 Q waves seen to 3 in aVF nonspecific ST depressions noted no ST elevations.  Cardiology consult, on heparin IV drip aspirin 81 mg daily Lipitor topical nitro  Cardiac catheterization in a.m.  No prior  history of cardiac disease.            VTE prophylaxis: heparin IV drip.

## 2018-05-30 NOTE — ASSESSMENT & PLAN NOTE
Elevated troponin,  repeat troponin in a.m.  EKG sinus rhythm rate 85 Q waves seen to 3 in aVF nonspecific ST depressions noted no ST elevations.  Cardiology consult, on heparin IV drip aspirin 81 mg daily Lipitor topical nitro  Cardiac catheterization in a.m.  No prior history of cardiac disease.  5/30 s/p cardiac cath, RCA stented.

## 2018-05-30 NOTE — CONSULTS
DATE OF SERVICE:  2018    REQUESTING PHYSICIAN:  Maurilio Hogan MD    REASON FOR CONSULTATION:  Acute coronary syndrome.    INDICATIONS:  The patient is a 47-year-old male with a history of   hypertension, diabetes mellitus who [facility] with   recurrent [facility] and found to have an elevated troponin level.    The patient reports a 2-week history of on and off burning pressure, chest   discomfort with bilateral radiation to both armpits and intermittent dizziness   and shortness of breath which was worse postprandial and minimal exercise.    His symptoms have accelerated getting worse and more frequent over the past 48   hours.  However, he states his last episode of chest discomfort was last   evening.  He contacted his physician today and was told to come to the   emergency room for evaluation.    The patient t states that 2 years ago in 2016, he came [facility] for   an episode of syncope and apparent bloody emesis.  An evaluation was negative   and the patient was discharged home.    The patient denies a history of hyperlipidemia and has never smoked   cigarettes.    Family history is significant for mother suffered 3 strokes starting at age   47,  at age 56.    PAST MEDICAL HISTORY:  ALLERGIES:  No known drug allergies.    MEDICATIONS PRIOR TO ADMISSION:  Enalapril 10 mg daily, HCTZ 25 mg daily,   Zestril 40 mg daily, Norvasc 5 mg daily, promethazine 25 mg suppositories   p.r.n.    PAST SURGICAL HISTORY:  Right wrist volar surface injury 34 years ago.    PAST MEDICAL HISTORY:  See above.  Kidney stones.    SOCIAL HISTORY:  Currently on leave of absence from [employer].  Does not use   illicit drugs or drink alcohol.    FAMILY HISTORY:  As above.    REVIEW OF SYSTEMS:  GENERAL:  No recent fevers, flu, or infection.  RESPIRATORY:  Denies lung disease or asthma, DVT or pulmonary emboli.  CARDIAC:  As above.  GASTROINTESTINAL:  No nausea, vomiting, diarrhea or  gastroesophageal reflux   disease.  GENITOURINARY:  As above.  NEUROLOGIC:  No stroke or seizures.  ENDOCRINE:  No thyroid disease.  MUSCULOSKELETAL:  As above.  EYES:  No acute visual disturbances.  SKIN:  No rashes.    PHYSICAL EXAMINATION:  VITAL SIGNS:  Blood pressure 109/74, pulse 84, temperature 98.  GENERAL:  Alert, pleasant, oriented, no respiratory distress, emotionally   distressed about this current situation.  HEENT:  Extraocular movements are intact.  Nonicteric sclerae.  Oropharynx,   dry mucous membranes.  NECK:  Normal jugular venous pressure.  1+ carotid pulses.  No appreciable   bruits.  CHEST:  Symmetrical.  LUNGS:  No wheezes, rales, or rhonchi.  CARDIAC:  Regular rate and rhythm with no murmurs, gallops, or rubs.  ABDOMEN:  Soft, nontender, no hepatosplenomegaly, audible bruits, or palpable   pulsatile masses.  EXTREMITIES:  No clubbing, cyanosis, or edema.  Pulses 1+ radial, femoral, and   pedal pulses bilaterally symmetrical.  SKIN:  Warm and dry.  NEUROLOGIC:  Cranial nerves grossly intact.  Motor function symmetrical.  No   lateralizing findings.    LABORATORY DATA:  On 05/29/2018, WBC 9700, hemoglobin 12.9, platelets 243,000.    Sodium 140, potassium is 3.5, BUN 18, creatinine 1.1, glucose 137 and   troponin 4.97.  BNP 71.  INR 1.0.      Chest x-ray, 5/29/2018, normal.    ECG, 05/29/2018, sinus rhythm, rate 85, Q waves consistent with   previous inferior myocardial infarction, nonspecific lateral ST-T wave   abnormalities.  Reviewed by myself and compared to the 2016 EKG.    ASSESSMENT:  1.  Non-ST elevation myocardial infarction.  2.  Hypertension.  3.  Diabetes mellitus.    RECOMMENDATION:  1.  Currently, the patient is chest pain free and states he has been chest   pain free since 24 hours ago since yesterday evening.  2.  Comprehensive medical therapy.  The patient will receive aspirin, be started   on intravenous heparin therapy, with the addition of metoprolol, atorvastatin and  topical nitrates.  3. The serious nature of the patient's current cardiac condition has been explained to him in detail.  I recommended proceeding with a cardiac catheterization for post MI risk stratification   and explained the procedure, reason for the procedure, potential therapeutic scenarios and   potential risks.  He understood these issues and is agreeable to proceed.       ____________________________________     MD BECKY CASTELLANOS / JONAH    DD:  05/29/2018 18:18:13  DT:  05/29/2018 19:05:28    D#:  [id number]  Job#:  [id number]

## 2018-05-30 NOTE — ED NOTES
Sherice from Lab called with critical result of Troponin of 4.97 at 1719. Critical lab result read back to Sherice.   Dr. Hogan notified of critical lab result at 1719.  Critical lab result read back by Dr. Hogan.

## 2018-05-30 NOTE — PROGRESS NOTES
Care of patient assumed after report. Patient awake, alert and oriented. Girlfriend at bedside. Patient has been NPO since midnight. Heparin gtt rate verified. Call light in reach, fall precautions in place. Discussed plan of care with patient. Will continue to monitor.

## 2018-05-30 NOTE — CARE PLAN
Problem: Safety  Goal: Will remain free from injury  Patient steady on feet. Treaded slipper socks on. Bed in low/locked position. Call light within patient's reach.

## 2018-05-30 NOTE — PROCEDURES
REFERRING PHYSICIAN: Dr. Santana    PREOPERATIVE DIAGNOSIS:  1.  Non-ST elevation myocardial infarction  2.  Hypertension  3.  Diabetes mellitus    POSTOPERATIVE DIAGNOSIS:  1.  100% occluded RCA with brisk left to right collaterals  2.  Diffuse nonobstructive CAD otherwise.  Several 50% stenoses in small vessels.    3.  LVEF 48% preintervention  4.  FFR LAD 0.91, intervention deferred  5.  Successful PCI RCA with placement of a 3.5 x 20 mm Synergy CHRISTINE postdilated to 3.8 mm with an excellent angiographic result.      PROCEDURE PERFORMED:  Selective coronary angiography of the native vessels  Left heart catheterization  Left ventriculogram  PCI of RCA CHRISTINE  FFR LAD    DESCRIPTION OF PROCEDURE:  The risks and benefits of cardiac catheterization as well as the procedure itself, rationale and appropriateness were discussed with the patient today. Complications including but not limited to death, stroke, MI, urgent bypass surgery, contrast nephropathy, vascular complications, bleeding and infection were explained to the patient. The potential outcomes associated with the procedure (possible PCI, possible CABG, possible medical Rx only) were also discussed at length. The patient agreed to proceed.    The patient was transported to the catheterization laboratory in the fasting state. The right radial area and right groin were prepped and draped in the usual fashion. Right radial area was entered with a single through and through puncture and a 6F glide sheath was placed. An intra-arterial cocktail of heparin, verapamil and nitroglycerine was administered. Over a wire, a 5F Sanrda catheter was passed to the aortic root and used to engage the right and left coronaries without difficulty. Contrast was administered and multiple images obtained. This catheter was then used to cross the aortic valve for LHC and contrast was administered at 8cc/s for 24cc's for left ventriculogram.     FRACTIONAL FLOW RESERVE (LAD)  The  diagnostic catheter was exchanged for an appropriate guiding catheter. Further heparin was administered to achieve an adequate ACT. The pressure wire was zeroed in the group, passed just distal to the tip of the aorta and normalized. Following this it was navigated across the area of interest in the LAD. Contrast was cleared from the catheter, intracoronary nitroglycerin was administered followed by intravenous adenosine at 140 mcg/kg/m for 2 minutes to achieve maximum hyperemia. The FFR of this artery was 0.91 and intervention was deferred.    DESCRIPTION OF PCI:  The decision was made to intervene on the culprit coronary artery.  Further heparin was administered to achieve a therapeutic ACT. The diagnostic catheter was exchanged over a wire for an 6 Martiniquais JR4 guide seated appropriately. A BMW 0.014 floppy tip wire was navigated across the culprit stenosis with the assistance of a 2.5 mm balloon for backup. A 2.5 x 15 mm complaint balloon was used to predilate the lesion, followed by a 3.0 and then 3.5 mm complaint balloons.. A 3.5 x 20 mm Synergy CHRISTINE was then positioned and deployed at high pressure. Following this a 3.75 mm noncompliant balloon was used to post dilate the stent. There was an excellent angiographic result with SONI-3 flow and no residual stenosis in the stented segment. All catheters and guidewires were removed and a TR band was applied to achieve patent hemostasis. Patient left the cath lab in stable condition.      Moderate sedation directly monitored by me during the case while supervising the administration of the sedation medication by an independent trained RN to assist in the monitoring of the patient's level of consciousness and physiological status. I, the supervising physician was present the entire time from beginning of medication administration until the end of the procedure from 11:18 AM until 12:12 PM. For detailed administration records please see the moderate sedation  documentation in the median tab.      FINDINGS:  I. HEMODYNAMICS:    Ao: 99/74 mmHg   LEDP: 24 mmHg   Gradient on LV pullback: No    II. LEFT VENTRICULOGRAM:   LVEF GARCIA PROJECTION: 48 %     III. CORONARY ANGIOGRAPHY:  Left Main: Large bifurcating no CAD.  Left Anterior Descending: Moderate size with 50% proximal stenosis at the takeoff of the sidebranch.  There is diffuse nonobstructive disease distally.  FFR across this lesion is normal at 0.91 and intervention is deferred.  There are brisk distal septal and epicardial apical collaterals to the RCA which is a very large dominant vessel.  Left Circumflex: Small to moderate size giving rise to an obtuse marginal.  Nondominant.  The distal circumflex bifurcates and a less than 2 mm vessel and has a 50% stenosis at the bifurcation.  Right Coronary: 100% occluded in its proximal portion with brisk left to right collaterals.  During the interventional attempt it was easily crossed with excellent dilatation.  Post intervention there is 0% residual stenosis in this arterial territory.    COMPLICATIONS: none apparent    CONCLUSIONS:  1.  100% occluded RCA with brisk left to right collaterals  2.  Diffuse nonobstructive CAD otherwise.  Several 50% stenoses in small vessels.    3.  LVEF 48% preintervention  4.  FFR LAD 0.91, intervention deferred  5.  Successful PCI RCA with placement of a 3.5 x 20 mm Synergy CHRISTINE postdilated to 3.8 mm with an excellent angiographic result.      RECOMMENDATIONS:  DA PT  Aggressive medical therapy

## 2018-05-31 VITALS
SYSTOLIC BLOOD PRESSURE: 107 MMHG | RESPIRATION RATE: 18 BRPM | WEIGHT: 154.98 LBS | OXYGEN SATURATION: 96 % | BODY MASS INDEX: 28.52 KG/M2 | HEIGHT: 62 IN | DIASTOLIC BLOOD PRESSURE: 79 MMHG | TEMPERATURE: 96.7 F | HEART RATE: 80 BPM

## 2018-05-31 LAB
ANION GAP SERPL CALC-SCNC: 5 MMOL/L (ref 0–11.9)
BASOPHILS # BLD AUTO: 0.9 % (ref 0–1.8)
BASOPHILS # BLD: 0.08 K/UL (ref 0–0.12)
BUN SERPL-MCNC: 15 MG/DL (ref 8–22)
CALCIUM SERPL-MCNC: 9.3 MG/DL (ref 8.5–10.5)
CHLORIDE SERPL-SCNC: 107 MMOL/L (ref 96–112)
CO2 SERPL-SCNC: 23 MMOL/L (ref 20–33)
CREAT SERPL-MCNC: 1.13 MG/DL (ref 0.5–1.4)
EOSINOPHIL # BLD AUTO: 0.47 K/UL (ref 0–0.51)
EOSINOPHIL NFR BLD: 5.3 % (ref 0–6.9)
ERYTHROCYTE [DISTWIDTH] IN BLOOD BY AUTOMATED COUNT: 45.1 FL (ref 35.9–50)
GLUCOSE SERPL-MCNC: 160 MG/DL (ref 65–99)
HCT VFR BLD AUTO: 36.2 % (ref 42–52)
HGB BLD-MCNC: 12.1 G/DL (ref 14–18)
IMM GRANULOCYTES # BLD AUTO: 0.01 K/UL (ref 0–0.11)
IMM GRANULOCYTES NFR BLD AUTO: 0.1 % (ref 0–0.9)
LYMPHOCYTES # BLD AUTO: 2.85 K/UL (ref 1–4.8)
LYMPHOCYTES NFR BLD: 32 % (ref 22–41)
MCH RBC QN AUTO: 31.3 PG (ref 27–33)
MCHC RBC AUTO-ENTMCNC: 33.4 G/DL (ref 33.7–35.3)
MCV RBC AUTO: 93.8 FL (ref 81.4–97.8)
MONOCYTES # BLD AUTO: 0.86 K/UL (ref 0–0.85)
MONOCYTES NFR BLD AUTO: 9.6 % (ref 0–13.4)
NEUTROPHILS # BLD AUTO: 4.65 K/UL (ref 1.82–7.42)
NEUTROPHILS NFR BLD: 52.1 % (ref 44–72)
NRBC # BLD AUTO: 0 K/UL
NRBC BLD-RTO: 0 /100 WBC
PLATELET # BLD AUTO: 214 K/UL (ref 164–446)
PMV BLD AUTO: 10.6 FL (ref 9–12.9)
POTASSIUM SERPL-SCNC: 3.7 MMOL/L (ref 3.6–5.5)
RBC # BLD AUTO: 3.86 M/UL (ref 4.7–6.1)
SODIUM SERPL-SCNC: 135 MMOL/L (ref 135–145)
WBC # BLD AUTO: 8.9 K/UL (ref 4.8–10.8)

## 2018-05-31 PROCEDURE — 80048 BASIC METABOLIC PNL TOTAL CA: CPT

## 2018-05-31 PROCEDURE — A9270 NON-COVERED ITEM OR SERVICE: HCPCS | Performed by: INTERNAL MEDICINE

## 2018-05-31 PROCEDURE — A9270 NON-COVERED ITEM OR SERVICE: HCPCS | Performed by: HOSPITALIST

## 2018-05-31 PROCEDURE — G8980 MOBILITY D/C STATUS: HCPCS | Mod: CI

## 2018-05-31 PROCEDURE — 700102 HCHG RX REV CODE 250 W/ 637 OVERRIDE(OP): Performed by: INTERNAL MEDICINE

## 2018-05-31 PROCEDURE — 99239 HOSP IP/OBS DSCHRG MGMT >30: CPT | Performed by: HOSPITALIST

## 2018-05-31 PROCEDURE — 36415 COLL VENOUS BLD VENIPUNCTURE: CPT

## 2018-05-31 PROCEDURE — 97161 PT EVAL LOW COMPLEX 20 MIN: CPT

## 2018-05-31 PROCEDURE — 700102 HCHG RX REV CODE 250 W/ 637 OVERRIDE(OP): Performed by: HOSPITALIST

## 2018-05-31 PROCEDURE — 85025 COMPLETE CBC W/AUTO DIFF WBC: CPT

## 2018-05-31 PROCEDURE — G8979 MOBILITY GOAL STATUS: HCPCS | Mod: CI

## 2018-05-31 PROCEDURE — G8978 MOBILITY CURRENT STATUS: HCPCS | Mod: CI

## 2018-05-31 RX ORDER — ASPIRIN 81 MG/1
81 TABLET ORAL DAILY
Qty: 30 TAB | Refills: 0 | Status: SHIPPED | OUTPATIENT
Start: 2018-06-01 | End: 2018-05-31

## 2018-05-31 RX ORDER — SPIRONOLACTONE 25 MG/1
12.5 TABLET ORAL DAILY
Qty: 30 TAB | Refills: 0 | Status: SHIPPED | OUTPATIENT
Start: 2018-06-01 | End: 2018-05-31

## 2018-05-31 RX ORDER — PRASUGREL 10 MG/1
10 TABLET, FILM COATED ORAL DAILY
Qty: 30 TAB | Refills: 0 | Status: SHIPPED | OUTPATIENT
Start: 2018-06-01 | End: 2018-05-31

## 2018-05-31 RX ORDER — ATORVASTATIN CALCIUM 40 MG/1
40 TABLET, FILM COATED ORAL
Qty: 30 TAB | Refills: 0 | Status: SHIPPED | OUTPATIENT
Start: 2018-05-31 | End: 2018-05-31

## 2018-05-31 RX ORDER — ATORVASTATIN CALCIUM 40 MG/1
40 TABLET, FILM COATED ORAL
Qty: 30 TAB | Refills: 0 | Status: SHIPPED | OUTPATIENT
Start: 2018-05-31 | End: 2018-06-01 | Stop reason: SDUPTHER

## 2018-05-31 RX ORDER — PRASUGREL 10 MG/1
10 TABLET, FILM COATED ORAL DAILY
Qty: 30 TAB | Refills: 0 | Status: SHIPPED | OUTPATIENT
Start: 2018-06-01 | End: 2018-06-01 | Stop reason: SDUPTHER

## 2018-05-31 RX ORDER — ASPIRIN 81 MG/1
81 TABLET ORAL DAILY
Qty: 30 TAB | Refills: 0 | Status: SHIPPED | OUTPATIENT
Start: 2018-06-01 | End: 2018-12-22 | Stop reason: CLARIF

## 2018-05-31 RX ADMIN — SPIRONOLACTONE 12.5 MG: 25 TABLET, FILM COATED ORAL at 05:54

## 2018-05-31 RX ADMIN — POTASSIUM CHLORIDE 20 MEQ: 1500 TABLET, EXTENDED RELEASE ORAL at 05:56

## 2018-05-31 RX ADMIN — NITROGLYCERIN 1 PATCH: 0.1 PATCH TRANSDERMAL at 05:54

## 2018-05-31 RX ADMIN — METOPROLOL TARTRATE 25 MG: 25 TABLET, FILM COATED ORAL at 05:55

## 2018-05-31 RX ADMIN — ASPIRIN 81 MG: 81 TABLET, COATED ORAL at 05:55

## 2018-05-31 RX ADMIN — PRASUGREL 10 MG: 10 TABLET, FILM COATED ORAL at 05:57

## 2018-05-31 RX ADMIN — BUPROPION HYDROCHLORIDE 150 MG: 150 TABLET, EXTENDED RELEASE ORAL at 05:55

## 2018-05-31 RX ADMIN — ALLOPURINOL 100 MG: 100 TABLET ORAL at 05:55

## 2018-05-31 ASSESSMENT — ENCOUNTER SYMPTOMS
PND: 0
PALPITATIONS: 0
WEAKNESS: 0
ORTHOPNEA: 0
CLAUDICATION: 0
SHORTNESS OF BREATH: 0
DIZZINESS: 0
WEIGHT LOSS: 0

## 2018-05-31 ASSESSMENT — COGNITIVE AND FUNCTIONAL STATUS - GENERAL
SUGGESTED CMS G CODE MODIFIER MOBILITY: CH
MOBILITY SCORE: 24

## 2018-05-31 ASSESSMENT — PAIN SCALES - GENERAL
PAINLEVEL_OUTOF10: 0
PAINLEVEL_OUTOF10: 0

## 2018-05-31 ASSESSMENT — GAIT ASSESSMENTS
GAIT LEVEL OF ASSIST: SUPERVISED
DISTANCE (FEET): 300

## 2018-05-31 NOTE — DISCHARGE INSTRUCTIONS
Discharge Instructions    Discharged to home by car with relative. Discharged via wheelchair, hospital escort: Yes.  Special equipment needed: Not Applicable    Be sure to schedule a follow-up appointment with your primary care doctor or any specialists as instructed.     Discharge Plan:   Diet Plan: Discussed  Activity Level: Discussed  Confirmed Follow up Appointment: Appointment Scheduled  Confirmed Symptoms Management: Discussed  Medication Reconciliation Updated: Yes  Influenza Vaccine Indication: Not indicated: Previously immunized this influenza season and > 8 years of age    I understand that a diet low in cholesterol, fat, and sodium is recommended for good health. Unless I have been given specific instructions below for another diet, I accept this instruction as my diet prescription.     Special Instructions: Diagnosis:  Acute Coronary Syndrome (ACS) is a diagnosis that encompasses cardiac-related chest pain and heart attack. ACS occurs when the blood flow to the heart muscle is severely reduced or cut off completely due to a slow process called atherosclerosis.  Atherosclerosis is a disease in which the coronary arteries become narrow from a buildup of fat, cholesterol, and other substances that combine to form plaque. If the plaque breaks, a blood clot will form and block the blood flow to the heart muscle. This lack of blood flow can cause damage or death to the heart muscle which is called a heart attack or Myocardial Infarction (MI). There are two different types of MIs:  ST Elevation Myocardial Infarction or STEMI (the most severe type of heart attack) and Non-ST Elevation Myocardial Infarction or NSTEMI.    Treatment Plan:  · Cardiac Diet  - Low fat, low salt, low cholesterol   · Cardiac Rehab  - Your doctor has ordered you a referral to [facility].  Call [phone] to schedule an appointment.  · Attend my follow-up appointment with my Cardiologist.  · Take my medications as prescribed by my  doctor  · Exercise daily    Medications:  Certain medications are used to treat ACS.  Remember to always take medications as prescribed and never stop talking medications unless told by your doctor.    You have been prescribed the following medicatons:    Aspirin - Aspirin is used as a blood thinning medication and you will require this medication indefinitely.  Anti-platelet/blood thinner - Your Anti-platelet/Blood thinning medication is called Effient, and is used in combination with aspirin to prevent clots from forming in your heart and/or around your stent.  Your doctor will determine how long you need to be on this medicine.  Beta-Blocker - Beta-Blocker Metoprolol is used to lower blood pressure and heart rate, and/or helps your heart heal after a heart attack.  Statin - Statin atorvastatin is used to lower cholesterol.  · Is patient discharged on Warfarin / Coumadin?   No     Radial Site Care  Introduction  Refer to this sheet in the next few weeks. These instructions provide you with information about caring for yourself after your procedure. Your health care provider may also give you more specific instructions. Your treatment has been planned according to current medical practices, but problems sometimes occur. Call your health care provider if you have any problems or questions after your procedure.  What can I expect after the procedure?  After your procedure, it is typical to have the following:  · Bruising at the radial site that usually fades within 1-2 weeks.  · Blood collecting in the tissue (hematoma) that may be painful to the touch. It should usually decrease in size and tenderness within 1-2 weeks.  Follow these instructions at home:  · Take medicines only as directed by your health care provider.  · You may shower 24-48 hours after the procedure or as directed by your health care provider. Remove the bandage (dressing) and gently wash the site with plain soap and water. Pat the area dry with a  clean towel. Do not rub the site, because this may cause bleeding.  · Do not take baths, swim, or use a hot tub until your health care provider approves.  · Check your insertion site every day for redness, swelling, or drainage.  · Do not apply powder or lotion to the site.  · Do not flex or bend the affected arm for 24 hours or as directed by your health care provider.  · Do not push or pull heavy objects with the affected arm for 24 hours or as directed by your health care provider.  · Do not lift over 10 lb (4.5 kg) for 5 days after your procedure or as directed by your health care provider.  · Ask your health care provider when it is okay to:  ¨ Return to work or school.  ¨ Resume usual physical activities or sports.  ¨ Resume sexual activity.  · Do not drive home if you are discharged the same day as the procedure. Have someone else drive you.  · You may drive 24 hours after the procedure unless otherwise instructed by your health care provider.  · Do not operate machinery or power tools for 24 hours after the procedure.  · If your procedure was done as an outpatient procedure, which means that you went home the same day as your procedure, a responsible adult should be with you for the first 24 hours after you arrive home.  · Keep all follow-up visits as directed by your health care provider. This is important.  Contact a health care provider if:  · You have a fever.  · You have chills.  · You have increased bleeding from the radial site. Hold pressure on the site.  Get help right away if:  · You have unusual pain at the radial site.  · You have redness, warmth, or swelling at the radial site.  · You have drainage (other than a small amount of blood on the dressing) from the radial site.  · The radial site is bleeding, and the bleeding does not stop after 30 minutes of holding steady pressure on the site.  · Your arm or hand becomes pale, cool, tingly, or numb.  This information is not intended to replace  advice given to you by your health care provider. Make sure you discuss any questions you have with your health care provider.  Document Released: 01/20/2012 Document Revised: 05/25/2017 Document Reviewed: 07/06/2015  © 2017 [employer]      Depression / Suicide Risk    As you are discharged from this [facility], it is important to learn how to keep safe from harming yourself.    Recognize the warning signs:  · Abrupt changes in personality, positive or negative- including increase in energy   · Giving away possessions  · Change in eating patterns- significant weight changes-  positive or negative  · Change in sleeping patterns- unable to sleep or sleeping all the time   · Unwillingness or inability to communicate  · Depression  · Unusual sadness, discouragement and loneliness  · Talk of wanting to die  · Neglect of personal appearance   · Rebelliousness- reckless behavior  · Withdrawal from people/activities they love  · Confusion- inability to concentrate     If you or a loved one observes any of these behaviors or has concerns about self-harm, here's what you can do:  · Talk about it- your feelings and reasons for harming yourself  · Remove any means that you might use to hurt yourself (examples: pills, rope, extension cords, firearm)  · Get professional help from the community (Mental Health, Substance Abuse, psychological counseling)  · Do not be alone:Call your Safe Contact- someone whom you trust who will be there for you.  · Call your local CRISIS HOTLINE 478-5955 or 366-281-1980  · Call your local Children's Mobile Crisis Response Team [place] (160) 255-2853 or www.[employer]  · Call the toll free National Suicide Prevention Hotlines   · National Suicide Prevention Lifeline 177-635-WRUW (6109)  · National Hope Line Network 800-SUICIDE (417-9071)

## 2018-05-31 NOTE — PROGRESS NOTES
Received report from previous shift RN at bedside. Patient AO x4 with no complaint of pain or discomfort. Will continue to monitor

## 2018-05-31 NOTE — PROGRESS NOTES
[facility] Medicine Daily Progress Note    Date of Service  5/30/2018    Chief Complaint  47 y.o. male admitted 5/29/2018 with chest pain      Interval Problem Update  Patient is s/p cardiac cath, RCA stented with CHRISTINE, no chest pain, continue monitoring if stable will dc in am.     Consultants/Specialty  cardio    Disposition  Home when stable.     Review of Systems  Review of Systems   Constitutional: Negative for weight loss.   HENT: Negative for ear pain.    Eyes: Negative for photophobia.   Respiratory: Negative for hemoptysis.    Cardiovascular: Negative for orthopnea.   Gastrointestinal: Negative for melena.   Genitourinary: Negative for urgency.   Musculoskeletal: Negative for myalgias.   Neurological: Negative for headaches.   Psychiatric/Behavioral: Negative for depression.        Physical Exam  Blood Pressure: (!) 96/72   Temperature: 36.5 °C (97.7 °F)   Pulse: 62   Respiration: 18   Pulse Oximetry: 97 %     Physical Exam   Constitutional: He is oriented to person, place, and time. No distress.   HENT:   Head: Normocephalic.   Mouth/Throat: No oropharyngeal exudate.   Eyes: Conjunctivae are normal. No scleral icterus.   Neck: Normal range of motion. Neck supple. No JVD present.   Cardiovascular: Normal rate, regular rhythm and normal heart sounds.    Pulmonary/Chest: Effort normal and breath sounds normal. No respiratory distress. He has no wheezes.   Abdominal: Soft. Bowel sounds are normal. He exhibits no distension. There is no tenderness.   Musculoskeletal: Normal range of motion. He exhibits no tenderness.   Neurological: He is alert and oriented to person, place, and time. He exhibits normal muscle tone.   Skin: No erythema.   Psychiatric: He has a normal mood and affect.   Nursing note and vitals reviewed.      Fluids    Intake/Output Summary (Last 24 hours) at 05/30/18 6912  Last data filed at 05/30/18 1400   Gross per 24 hour   Intake              240 ml   Output             1400 ml   Net             -1160 ml       Laboratory  Recent Labs      05/29/18   1620  05/30/18   0107   WBC  9.7  10.7   RBC  4.06*  4.01*   HEMOGLOBIN  12.9*  12.8*   HEMATOCRIT  37.6*  37.0*   MCV  92.6  92.3   MCH  31.8  31.9   MCHC  34.3  34.6   RDW  44.0  44.4   PLATELETCT  243  245   MPV  10.6  10.7     Recent Labs      05/29/18   1620  05/30/18   0107   SODIUM  140  139   POTASSIUM  3.5*  3.2*   CHLORIDE  98  97   CO2  27  28   GLUCOSE  137*  152*   BUN  18  21   CREATININE  1.11  1.25   CALCIUM  9.7  9.5     Recent Labs      05/29/18   1620  05/30/18   0107  05/30/18   0800   APTT  25.4  36.0  52.0*   INR  1.03   --    --      Recent Labs      05/29/18   1620   BNPBTYPENAT  71     Recent Labs      05/30/18   0107   TRIGLYCERIDE  524*   HDL  36*   LDL  see below       Imaging  DX-CHEST-2 VIEWS   Final Result      No pulmonary consolidation.      DX-CHEST-PORTABLE (1 VIEW)   Final Result         1. No acute cardiopulmonary abnormalities are identified.           Assessment/Plan  Hypokalemia- (present on admission)   Assessment & Plan    Replacing          Family history of stroke or transient ischemic attack in mother- (present on admission)   Assessment & Plan    Significant maternal history of CVAs        Essential hypertension- (present on admission)   Assessment & Plan    Holding amlodipine and hctz for now due to low bp.         Type 2 diabetes mellitus with complication, without long-term current use of insulin ([facility])- (present on admission)   Assessment & Plan    a1c 7.3, continue ssi        Acute non-ST elevation myocardial infarction (NSTEMI) ([facility])- (present on admission)   Assessment & Plan    Elevated troponin,  repeat troponin in a.m.  EKG sinus rhythm rate 85 Q waves seen to 3 in aVF nonspecific ST depressions noted no ST elevations.  Cardiology consult, on heparin IV drip aspirin 81 mg daily Lipitor topical nitro  Cardiac catheterization in a.m.  No prior history of cardiac disease.  5/30 s/p cardiac cath, RCA stented.

## 2018-05-31 NOTE — DISCHARGE PLANNING
Anticipated Discharge Disposition: Home with prescriptions.    Action: This RN CM called the patient's pharmacy in [facility] to determine if he has a copay for the prasugrel (Effient) and there is no copay.    Barriers to Discharge: None.    Plan: Discharge home and  prescriptions at the [facility] in [place], [place].

## 2018-05-31 NOTE — PROGRESS NOTES
Care of patient assumed after report. Patient resting in bed with eyes closed, no distress noted. Cardiac monitor on. Right radial cath site soft, dressing clean/dry/intact.  Call light in reach, fall precautions in place. Will continue to monitor.

## 2018-05-31 NOTE — DISCHARGE SUMMARY
Discharge Summary    CHIEF COMPLAINT ON ADMISSION  Chief Complaint   Patient presents with   • Chest Pain       Reason for Admission  Chest Pain     Admission Date  5/29/2018    CODE STATUS  Full Code    HPI & HOSPITAL COURSE  Please see original H&P and consult note for specific information,patient was admitted due to NSTEMI, cardio was consulted and patient was started on heparin drip, patient next day went for cardiac cath had stent placed to RCA patient is pain free,, patient will continue on metoprolol, effient/asa, hold amilodipine, losartan and hctz for now since bp is stable and had hypokalemia with hctz patient will f/u with cardio and with PCP, patient expressed understanding of his dc plan and agreed patient is hemodynamically stable, patient alert and oriented tolerating diet.            Therefore, he is discharged in good and stable condition to home with close outpatient follow-up.    The patient met 2-midnight criteria for an inpatient stay at the time of discharge.    Discharge Date      FOLLOW UP ITEMS POST DISCHARGE  PCP  cardio    DISCHARGE DIAGNOSES  Active Problems:    Acute non-ST elevation myocardial infarction (NSTEMI) ([facility]) POA: Yes    Type 2 diabetes mellitus with complication, without long-term current use of insulin ([facility]) POA: Yes    Essential hypertension POA: Yes    Family history of stroke or transient ischemic attack in mother POA: Yes    Hypokalemia POA: Yes  Resolved Problems:    * No resolved hospital problems. *      FOLLOW UP  No future appointments.  MARI OLIVAS  [address], [place], [place] [id number] [phone]  On 6/5/2018  Please check in at 1:30 pm for your appointment thank you     Cardiology  [place], [place]    Please discuss establishing with a Cardiologist with your Primary Care Provider in the [place] area thank you       MEDICATIONS ON DISCHARGE     Medication List      START taking these medications      Instructions   aspirin 81 MG EC tablet  Start taking on:   6/1/2018   Take 1 Tab by mouth every day.  Dose:  81 mg     atorvastatin 40 MG Tabs  Commonly known as:  LIPITOR   Take 1 Tab by mouth every bedtime.  Dose:  40 mg     metoprolol 25 MG Tabs  Commonly known as:  LOPRESSOR   Take 1 Tab by mouth 2 Times a Day.  Dose:  25 mg     prasugrel 10 MG Tabs  Start taking on:  6/1/2018  Commonly known as:  EFFIENT   Take 1 Tab by mouth every day.  Dose:  10 mg        CONTINUE taking these medications      Instructions   allopurinol 100 MG Tabs  Commonly known as:  ZYLOPRIM   Take 100 mg by mouth every day.  Dose:  100 mg     buPROPion  MG Tb12 sustained-release tablet  Commonly known as:  WELLBUTRIN-SR   Take 150 mg by mouth every day.  Dose:  150 mg     metFORMIN 500 MG Tabs  Commonly known as:  GLUCOPHAGE   Take 1,000 mg by mouth 2 times a day, with meals.  Dose:  1000 mg        STOP taking these medications    amLODIPine 5 MG Tabs  Commonly known as:  NORVASC     hydroCHLOROthiazide 25 MG Tabs  Commonly known as:  HYDRODIURIL     losartan 50 MG Tabs  Commonly known as:  COZAAR            Allergies  No Known Allergies    DIET  Orders Placed This Encounter   Procedures   • Diet Order     Standing Status:   Standing     Number of Occurrences:   1     Order Specific Question:   Diet:     Answer:   Cardiac [6]       ACTIVITY  As tolerated.  Weight bearing as tolerated  Per post cath protocol    CONSULTATIONS  cardio    PROCEDURES  Cardiac cath.     LABORATORY  Lab Results   Component Value Date    SODIUM 135 05/31/2018    POTASSIUM 3.7 05/31/2018    CHLORIDE 107 05/31/2018    CO2 23 05/31/2018    GLUCOSE 160 (H) 05/31/2018    BUN 15 05/31/2018    CREATININE 1.13 05/31/2018        Lab Results   Component Value Date    WBC 8.9 05/31/2018    HEMOGLOBIN 12.1 (L) 05/31/2018    HEMATOCRIT 36.2 (L) 05/31/2018    PLATELETCT 214 05/31/2018        Total time of the discharge process exceeds 33 minutes.

## 2018-05-31 NOTE — CARE PLAN
Problem: Safety  Goal: Will remain free from injury  Outcome: PROGRESSING AS EXPECTED  Discussed with patient regarding safety protocol in place

## 2018-05-31 NOTE — THERAPY
Physical Therapy Evaluation completed. Pt presents s/p NSTEMI. Pt was educated re phase I cardiac rehab. Pt was given handouts, and concepts were reviewed including walking program progression, talk test, RPE scale and importance of staying on prescribed meds. Pt was encouraged to participate in phase II cardiac rehab when contacted in 4-6 weeks. HR goal during exercise was reviewed with patient (112). No further inpt PT needs. -Ileana Evans, PT

## 2018-05-31 NOTE — PROGRESS NOTES
Cardiology Progress Note               Author: Meera Hicks Date & Time created: 2018  11:45 AM     Interval History:  47 year old male admitted on 18 with chest pain and elevated troponin levels.    Past medical history significant for DM, HTN and kidney stones.     Chief Complaint:  Chest pain    Consulted for chest pain with elevated troponin level.     : Denies chest pain. No significant issues overnight per nursing. Patient anxious about cost of Effient.     Monitor: SR 67-91 with no ectopy    Labs:  RBC 3.86  Gluc 160    Review of Systems   Constitutional: Negative for malaise/fatigue and weight loss.   Respiratory: Negative for shortness of breath.    Cardiovascular: Negative for chest pain, palpitations, orthopnea, claudication, leg swelling and PND.   Neurological: Negative for dizziness and weakness.   All other systems reviewed and are negative.      Physical Exam   Constitutional: He is oriented to person, place, and time. He appears well-developed and well-nourished.   HENT:   Head: Normocephalic.   Eyes: EOM are normal.   Neck: No JVD present.   Cardiovascular: Normal rate, regular rhythm, normal heart sounds and intact distal pulses.    Pulmonary/Chest: Effort normal and breath sounds normal.   Abdominal: Soft. Bowel sounds are normal.   Musculoskeletal: Normal range of motion. He exhibits no edema.   Neurological: He is alert and oriented to person, place, and time.   Skin: Skin is warm and dry.   Right radial cath site uncomplicated.    Psychiatric: He has a normal mood and affect. His behavior is normal.   Nursing note and vitals reviewed.      Hemodynamics:  Temp (24hrs), Av.4 °C (97.6 °F), Min:35.9 °C (96.7 °F), Max:36.8 °C (98.2 °F)  Temperature: 35.9 °C (96.7 °F)  Pulse  Av.3  Min: 62  Max: 97   Blood Pressure: 107/79     Respiratory:    Respiration: 18, Pulse Oximetry: 96 %           Fluids:     Weight: 70.3 kg (154 lb 15.7 oz)  GI/Nutrition:  Orders Placed  This Encounter   Procedures   • Diet Order     Standing Status:   Standing     Number of Occurrences:   1     Order Specific Question:   Diet:     Answer:   Cardiac [6]     Lab Results:  Recent Labs      05/29/18   1620 05/30/18   0107  05/31/18   0441   WBC  9.7  10.7  8.9   RBC  4.06*  4.01*  3.86*   HEMOGLOBIN  12.9*  12.8*  12.1*   HEMATOCRIT  37.6*  37.0*  36.2*   MCV  92.6  92.3  93.8   MCH  31.8  31.9  31.3   MCHC  34.3  34.6  33.4*   RDW  44.0  44.4  45.1   PLATELETCT  243  245  214   MPV  10.6  10.7  10.6     Recent Labs      05/29/18   1620  05/30/18   0107  05/31/18   0441   SODIUM  140  139  135   POTASSIUM  3.5*  3.2*  3.7   CHLORIDE  98  97  107   CO2  27  28  23   GLUCOSE  137*  152*  160*   BUN  18  21  15   CREATININE  1.11  1.25  1.13   CALCIUM  9.7  9.5  9.3     Recent Labs      05/29/18   1620  05/30/18   0107  05/30/18   0800   APTT  25.4  36.0  52.0*   INR  1.03   --    --      Recent Labs      05/29/18   1620   BNPBTYPENAT  71     Recent Labs      05/29/18   1620  05/29/18   2058   TROPONINI  4.97*  5.04*   BNPBTYPENAT  71   --      Recent Labs      05/30/18   0107   TRIGLYCERIDE  524*   HDL  36*   LDL  see below     Coronary Angiogram 5/30/18:  PREOPERATIVE DIAGNOSIS:  1.  Non-ST elevation myocardial infarction  2.  Hypertension  3.  Diabetes mellitus     POSTOPERATIVE DIAGNOSIS:  1.  100% occluded RCA with brisk left to right collaterals  2.  Diffuse nonobstructive CAD otherwise.  Several 50% stenoses in small vessels.    3.  LVEF 48% preintervention  4.  FFR LAD 0.91, intervention deferred  5.  Successful PCI RCA with placement of a 3.5 x 20 mm Synergy CHRISTINE postdilated to 3.8 mm with an excellent angiographic result.        PROCEDURE PERFORMED:  Selective coronary angiography of the native vessels  Left heart catheterization  Left ventriculogram  PCI of RCA CHRISTINE  FFR LAD       Medical Decision Making, by Problem:  Active Hospital Problems    Diagnosis   • Acute non-ST elevation myocardial  infarction (NSTEMI) (HCC) [I21.4]   • Type 2 diabetes mellitus with complication, without long-term current use of insulin (HCC) [E11.8]   • Essential hypertension [I10]   • Family history of stroke or transient ischemic attack in mother [Z82.3]   • Hypokalemia [E87.6]       Plan:   NSTEMI:   -S/P angiogram on 5/30/18, showing diffuse CAD with PCI to the RCA with CHRISTINE.   -Denies chest pain, no ectopy on monitor  -DAPT (ASA 81 mg daily and Effient 10 mg daily)  -Continue lopressor 25 mg BID.    HTN: Stable.  -Continue Aldactone 12.5 mg daily and BB as above.     HLD:  -Unable to calculate LDL d/t TG of 524.  -Continue Atorvastatin 40 mg daily.     Patient is feeling well. No chest pain or ectopy overnight. Social work looking into the cost of Effient. Okay from cardiology standpoint for patient to be discharged. Patient will follow up with PCP in [place] and work on establishing a cardiologist in [place]. Please let us know if you have any questions or concerns.     Quality-Core Measures   Reviewed items::  EKG reviewed, Labs reviewed, Medications reviewed and Radiology images reviewed

## 2018-05-31 NOTE — CARE PLAN
Problem: Communication  Goal: The ability to communicate needs accurately and effectively will improve  Patient aware of plan of care for today. Whiteboard updated.     Problem: Safety  Goal: Will remain free from injury    Intervention: Provide assistance with mobility  Patient steady on feet, ambulating independently. Denies having any chest pain or shortness of breath at this time.

## 2018-05-31 NOTE — PROGRESS NOTES
Discharge instructions given to patient at bedside, verbalizes understanding and states plans for follow-up with PCP and cardiology. New and home medication review, post-discharge activity level and worsening of symptoms needing follow-up care discussed. Telemetry monitor/IV cathlon removed. Post-cath site care discussed.   All belongings accounted for, all questions answered at this time.   Patient waiting for social work to find out cost of Effient prescription before patient leaves.

## 2018-05-31 NOTE — CARE PLAN
Problem: Fluid Volume:  Goal: Will maintain balanced intake and output  Outcome: PROGRESSING AS EXPECTED  Discuss with patient regarding IV fluid

## 2018-06-01 ENCOUNTER — TELEPHONE (OUTPATIENT)
Dept: CARDIOLOGY | Facility: MEDICAL CENTER | Age: 48
End: 2018-06-01

## 2018-06-01 RX ORDER — PRASUGREL 10 MG/1
10 TABLET, FILM COATED ORAL DAILY
Qty: 30 TAB | Refills: 0 | Status: SHIPPED | OUTPATIENT
Start: 2018-06-01 | End: 2018-06-01 | Stop reason: SDUPTHER

## 2018-06-01 RX ORDER — ATORVASTATIN CALCIUM 40 MG/1
40 TABLET, FILM COATED ORAL
Qty: 30 TAB | Refills: 0 | Status: SHIPPED | OUTPATIENT
Start: 2018-06-01 | End: 2018-06-01 | Stop reason: SDUPTHER

## 2018-06-01 RX ORDER — ATORVASTATIN CALCIUM 40 MG/1
40 TABLET, FILM COATED ORAL
Qty: 30 TAB | Refills: 0 | Status: SHIPPED | OUTPATIENT
Start: 2018-06-01 | End: 2018-12-22 | Stop reason: CLARIF

## 2018-06-01 RX ORDER — PRASUGREL 10 MG/1
10 TABLET, FILM COATED ORAL DAILY
Qty: 30 TAB | Refills: 0 | Status: SHIPPED
Start: 2018-06-01 | End: 2018-06-01 | Stop reason: SDUPTHER

## 2018-06-01 RX ORDER — ATORVASTATIN CALCIUM 40 MG/1
40 TABLET, FILM COATED ORAL
Qty: 30 TAB | Refills: 0 | Status: SHIPPED
Start: 2018-06-01 | End: 2018-06-01 | Stop reason: SDUPTHER

## 2018-06-01 RX ORDER — PRASUGREL 10 MG/1
10 TABLET, FILM COATED ORAL DAILY
Qty: 30 TAB | Refills: 0 | Status: SHIPPED | OUTPATIENT
Start: 2018-06-01 | End: 2018-12-22 | Stop reason: CLARIF

## 2018-06-01 NOTE — TELEPHONE ENCOUNTER
patient calling about prescriptions he received in the hospital   Received: Today   Message Contents   CONNOR Manzo       Patient was last seen by Meera in the hospital. He said prescriptions were called into pharmacy in [facility] but they were out of the medication. He would also like to speak to you about getting samples for Praluent. He can be reached at [phone].      s/w pt who states he has no questions about Praluent (this was an error) but needs his medications sent to [place] in [facility] as they do not have the meds he needs at [facility]. Called [facility] in [place] and they will not have Effient available until Monday. Called pt to discuss and LM to call back.     patient is returning your call   Received: Today   Message Contents   Blanca JCONNOR Duran       Patient is returning your call. He said he can't answer his phone as he is driving to [place] from [place]. He is asking if you can call or fax [facility] [place], and call and leave the information on his phone. Ph. #[phone].      [facility] [phone] [facility] in [facility] and can give pt a 30day supply this evening. Prescriptions sent electronically and faxed as requested.   Called pt and left detailed message notifying him of pharmacy location, address and phone number where prescriptions were sent. .

## 2018-12-10 ENCOUNTER — NON-PROVIDER VISIT (OUTPATIENT)
Dept: URGENT CARE | Facility: PHYSICIAN GROUP | Age: 48
End: 2018-12-10

## 2018-12-10 DIAGNOSIS — Z02.1 PRE-EMPLOYMENT DRUG SCREENING: ICD-10-CM

## 2018-12-10 LAB
AMP AMPHETAMINE: NORMAL
COC COCAINE: NORMAL
INT CON NEG: NORMAL
INT CON POS: NORMAL
MET METHAMPHETAMINES: NORMAL
OPI OPIATES: NORMAL
PCP PHENCYCLIDINE: NORMAL
POC DRUG COMMENT 753798-OCCUPATIONAL HEALTH: NEGATIVE
THC: NORMAL

## 2018-12-10 PROCEDURE — 80305 DRUG TEST PRSMV DIR OPT OBS: CPT | Performed by: FAMILY MEDICINE

## 2018-12-21 ENCOUNTER — HOSPITAL ENCOUNTER (OUTPATIENT)
Facility: MEDICAL CENTER | Age: 48
End: 2018-12-22
Attending: EMERGENCY MEDICINE | Admitting: INTERNAL MEDICINE
Payer: COMMERCIAL

## 2018-12-21 ENCOUNTER — APPOINTMENT (OUTPATIENT)
Dept: RADIOLOGY | Facility: MEDICAL CENTER | Age: 48
End: 2018-12-21
Attending: EMERGENCY MEDICINE
Payer: COMMERCIAL

## 2018-12-21 LAB
ANION GAP SERPL CALC-SCNC: 13 MMOL/L (ref 0–11.9)
BASOPHILS # BLD AUTO: 1.1 % (ref 0–1.8)
BASOPHILS # BLD: 0.15 K/UL (ref 0–0.12)
BNP SERPL-MCNC: 11 PG/ML (ref 0–100)
BUN SERPL-MCNC: 16 MG/DL (ref 8–22)
CALCIUM SERPL-MCNC: 9.5 MG/DL (ref 8.5–10.5)
CHLORIDE SERPL-SCNC: 106 MMOL/L (ref 96–112)
CO2 SERPL-SCNC: 22 MMOL/L (ref 20–33)
CREAT SERPL-MCNC: 1.32 MG/DL (ref 0.5–1.4)
EKG IMPRESSION: NORMAL
EOSINOPHIL # BLD AUTO: 0.51 K/UL (ref 0–0.51)
EOSINOPHIL NFR BLD: 3.9 % (ref 0–6.9)
ERYTHROCYTE [DISTWIDTH] IN BLOOD BY AUTOMATED COUNT: 47.7 FL (ref 35.9–50)
GLUCOSE SERPL-MCNC: 196 MG/DL (ref 65–99)
HCT VFR BLD AUTO: 42.2 % (ref 42–52)
HGB BLD-MCNC: 14.4 G/DL (ref 14–18)
IMM GRANULOCYTES # BLD AUTO: 0.02 K/UL (ref 0–0.11)
IMM GRANULOCYTES NFR BLD AUTO: 0.2 % (ref 0–0.9)
LYMPHOCYTES # BLD AUTO: 3.12 K/UL (ref 1–4.8)
LYMPHOCYTES NFR BLD: 23.6 % (ref 22–41)
MCH RBC QN AUTO: 32 PG (ref 27–33)
MCHC RBC AUTO-ENTMCNC: 34.1 G/DL (ref 33.7–35.3)
MCV RBC AUTO: 93.8 FL (ref 81.4–97.8)
MONOCYTES # BLD AUTO: 0.97 K/UL (ref 0–0.85)
MONOCYTES NFR BLD AUTO: 7.3 % (ref 0–13.4)
NEUTROPHILS # BLD AUTO: 8.43 K/UL (ref 1.82–7.42)
NEUTROPHILS NFR BLD: 63.9 % (ref 44–72)
NRBC # BLD AUTO: 0 K/UL
NRBC BLD-RTO: 0 /100 WBC
PLATELET # BLD AUTO: 256 K/UL (ref 164–446)
PMV BLD AUTO: 10.6 FL (ref 9–12.9)
POTASSIUM SERPL-SCNC: 3.9 MMOL/L (ref 3.6–5.5)
RBC # BLD AUTO: 4.5 M/UL (ref 4.7–6.1)
SODIUM SERPL-SCNC: 141 MMOL/L (ref 135–145)
TROPONIN I SERPL-MCNC: 0.02 NG/ML (ref 0–0.04)
WBC # BLD AUTO: 13.2 K/UL (ref 4.8–10.8)

## 2018-12-21 PROCEDURE — 93005 ELECTROCARDIOGRAM TRACING: CPT | Performed by: EMERGENCY MEDICINE

## 2018-12-21 PROCEDURE — 93005 ELECTROCARDIOGRAM TRACING: CPT

## 2018-12-21 PROCEDURE — 84484 ASSAY OF TROPONIN QUANT: CPT

## 2018-12-21 PROCEDURE — 94760 N-INVAS EAR/PLS OXIMETRY 1: CPT

## 2018-12-21 PROCEDURE — 85025 COMPLETE CBC W/AUTO DIFF WBC: CPT

## 2018-12-21 PROCEDURE — 83880 ASSAY OF NATRIURETIC PEPTIDE: CPT

## 2018-12-21 PROCEDURE — 36415 COLL VENOUS BLD VENIPUNCTURE: CPT

## 2018-12-21 PROCEDURE — A9270 NON-COVERED ITEM OR SERVICE: HCPCS | Performed by: EMERGENCY MEDICINE

## 2018-12-21 PROCEDURE — 71046 X-RAY EXAM CHEST 2 VIEWS: CPT

## 2018-12-21 PROCEDURE — 700102 HCHG RX REV CODE 250 W/ 637 OVERRIDE(OP): Performed by: EMERGENCY MEDICINE

## 2018-12-21 PROCEDURE — 80048 BASIC METABOLIC PNL TOTAL CA: CPT

## 2018-12-21 PROCEDURE — 99285 EMERGENCY DEPT VISIT HI MDM: CPT

## 2018-12-21 RX ORDER — ASPIRIN 81 MG/1
324 TABLET, CHEWABLE ORAL ONCE
Status: COMPLETED | OUTPATIENT
Start: 2018-12-21 | End: 2018-12-21

## 2018-12-21 RX ORDER — OLANZAPINE 2.5 MG/1
2.5 TABLET, FILM COATED ORAL NIGHTLY
COMMUNITY
End: 2019-04-25 | Stop reason: CLARIF

## 2018-12-21 RX ADMIN — ASPIRIN 324 MG: 81 TABLET, CHEWABLE ORAL at 22:33

## 2018-12-21 ASSESSMENT — PAIN DESCRIPTION - DESCRIPTORS: DESCRIPTORS: BURNING;ACHING

## 2018-12-22 ENCOUNTER — APPOINTMENT (OUTPATIENT)
Dept: RADIOLOGY | Facility: MEDICAL CENTER | Age: 48
End: 2018-12-22
Attending: INTERNAL MEDICINE
Payer: COMMERCIAL

## 2018-12-22 ENCOUNTER — PATIENT OUTREACH (OUTPATIENT)
Dept: HEALTH INFORMATION MANAGEMENT | Facility: OTHER | Age: 48
End: 2018-12-22

## 2018-12-22 VITALS
WEIGHT: 149.91 LBS | RESPIRATION RATE: 18 BRPM | TEMPERATURE: 98.8 F | SYSTOLIC BLOOD PRESSURE: 165 MMHG | HEIGHT: 62 IN | HEART RATE: 77 BPM | DIASTOLIC BLOOD PRESSURE: 95 MMHG | BODY MASS INDEX: 27.59 KG/M2 | OXYGEN SATURATION: 97 %

## 2018-12-22 PROBLEM — R07.9 CHEST PAIN: Status: ACTIVE | Noted: 2018-12-22

## 2018-12-22 PROBLEM — I25.10 CAD (CORONARY ARTERY DISEASE): Status: ACTIVE | Noted: 2018-12-22

## 2018-12-22 PROBLEM — D72.823 LEUKEMOID REACTION: Status: ACTIVE | Noted: 2018-12-22

## 2018-12-22 PROBLEM — R07.9 CHEST PAIN: Status: RESOLVED | Noted: 2018-12-22 | Resolved: 2018-12-22

## 2018-12-22 PROBLEM — D72.823 LEUKEMOID REACTION: Status: RESOLVED | Noted: 2018-12-22 | Resolved: 2018-12-22

## 2018-12-22 LAB
ALBUMIN SERPL BCP-MCNC: 4.2 G/DL (ref 3.2–4.9)
ALBUMIN/GLOB SERPL: 1.3 G/DL
ALP SERPL-CCNC: 90 U/L (ref 30–99)
ALT SERPL-CCNC: 20 U/L (ref 2–50)
ANION GAP SERPL CALC-SCNC: 10 MMOL/L (ref 0–11.9)
ANION GAP SERPL CALC-SCNC: 9 MMOL/L (ref 0–11.9)
APAP SERPL-MCNC: <10 UG/ML (ref 10–30)
AST SERPL-CCNC: 21 U/L (ref 12–45)
BASOPHILS # BLD AUTO: 1.3 % (ref 0–1.8)
BASOPHILS # BLD: 0.14 K/UL (ref 0–0.12)
BILIRUB SERPL-MCNC: 0.5 MG/DL (ref 0.1–1.5)
BUN SERPL-MCNC: 15 MG/DL (ref 8–22)
BUN SERPL-MCNC: 17 MG/DL (ref 8–22)
CALCIUM SERPL-MCNC: 9.3 MG/DL (ref 8.5–10.5)
CALCIUM SERPL-MCNC: 9.4 MG/DL (ref 8.5–10.5)
CHLORIDE SERPL-SCNC: 108 MMOL/L (ref 96–112)
CHLORIDE SERPL-SCNC: 108 MMOL/L (ref 96–112)
CHOLEST SERPL-MCNC: 190 MG/DL (ref 100–199)
CO2 SERPL-SCNC: 21 MMOL/L (ref 20–33)
CO2 SERPL-SCNC: 22 MMOL/L (ref 20–33)
CREAT SERPL-MCNC: 1.02 MG/DL (ref 0.5–1.4)
CREAT SERPL-MCNC: 1.15 MG/DL (ref 0.5–1.4)
EKG IMPRESSION: NORMAL
EOSINOPHIL # BLD AUTO: 0.79 K/UL (ref 0–0.51)
EOSINOPHIL NFR BLD: 7.6 % (ref 0–6.9)
ERYTHROCYTE [DISTWIDTH] IN BLOOD BY AUTOMATED COUNT: 47.5 FL (ref 35.9–50)
EST. AVERAGE GLUCOSE BLD GHB EST-MCNC: 169 MG/DL
GLOBULIN SER CALC-MCNC: 3.3 G/DL (ref 1.9–3.5)
GLUCOSE BLD-MCNC: 151 MG/DL (ref 65–99)
GLUCOSE SERPL-MCNC: 148 MG/DL (ref 65–99)
GLUCOSE SERPL-MCNC: 167 MG/DL (ref 65–99)
HBA1C MFR BLD: 7.5 % (ref 0–5.6)
HCT VFR BLD AUTO: 42.6 % (ref 42–52)
HDLC SERPL-MCNC: 36 MG/DL
HGB BLD-MCNC: 14.4 G/DL (ref 14–18)
IMM GRANULOCYTES # BLD AUTO: 0.02 K/UL (ref 0–0.11)
IMM GRANULOCYTES NFR BLD AUTO: 0.2 % (ref 0–0.9)
LDLC SERPL CALC-MCNC: 111 MG/DL
LYMPHOCYTES # BLD AUTO: 3.12 K/UL (ref 1–4.8)
LYMPHOCYTES NFR BLD: 30 % (ref 22–41)
MCH RBC QN AUTO: 31.8 PG (ref 27–33)
MCHC RBC AUTO-ENTMCNC: 33.8 G/DL (ref 33.7–35.3)
MCV RBC AUTO: 94 FL (ref 81.4–97.8)
MONOCYTES # BLD AUTO: 0.91 K/UL (ref 0–0.85)
MONOCYTES NFR BLD AUTO: 8.8 % (ref 0–13.4)
NEUTROPHILS # BLD AUTO: 5.41 K/UL (ref 1.82–7.42)
NEUTROPHILS NFR BLD: 52.1 % (ref 44–72)
NRBC # BLD AUTO: 0 K/UL
NRBC BLD-RTO: 0 /100 WBC
PLATELET # BLD AUTO: 255 K/UL (ref 164–446)
PMV BLD AUTO: 10.5 FL (ref 9–12.9)
POTASSIUM SERPL-SCNC: 3.8 MMOL/L (ref 3.6–5.5)
POTASSIUM SERPL-SCNC: 3.9 MMOL/L (ref 3.6–5.5)
PROT SERPL-MCNC: 7.5 G/DL (ref 6–8.2)
RBC # BLD AUTO: 4.53 M/UL (ref 4.7–6.1)
SODIUM SERPL-SCNC: 138 MMOL/L (ref 135–145)
SODIUM SERPL-SCNC: 140 MMOL/L (ref 135–145)
TRIGL SERPL-MCNC: 217 MG/DL (ref 0–149)
TROPONIN I SERPL-MCNC: 0.02 NG/ML (ref 0–0.04)
TROPONIN I SERPL-MCNC: 0.02 NG/ML (ref 0–0.04)
TSH SERPL DL<=0.005 MIU/L-ACNC: 1.46 UIU/ML (ref 0.38–5.33)
WBC # BLD AUTO: 10.4 K/UL (ref 4.8–10.8)

## 2018-12-22 PROCEDURE — G0378 HOSPITAL OBSERVATION PER HR: HCPCS

## 2018-12-22 PROCEDURE — 80053 COMPREHEN METABOLIC PANEL: CPT

## 2018-12-22 PROCEDURE — 80048 BASIC METABOLIC PNL TOTAL CA: CPT

## 2018-12-22 PROCEDURE — A9270 NON-COVERED ITEM OR SERVICE: HCPCS | Performed by: INTERNAL MEDICINE

## 2018-12-22 PROCEDURE — 80307 DRUG TEST PRSMV CHEM ANLYZR: CPT

## 2018-12-22 PROCEDURE — 700102 HCHG RX REV CODE 250 W/ 637 OVERRIDE(OP): Performed by: INTERNAL MEDICINE

## 2018-12-22 PROCEDURE — 83036 HEMOGLOBIN GLYCOSYLATED A1C: CPT

## 2018-12-22 PROCEDURE — 93005 ELECTROCARDIOGRAM TRACING: CPT | Performed by: INTERNAL MEDICINE

## 2018-12-22 PROCEDURE — 700102 HCHG RX REV CODE 250 W/ 637 OVERRIDE(OP): Performed by: NURSE PRACTITIONER

## 2018-12-22 PROCEDURE — 84443 ASSAY THYROID STIM HORMONE: CPT

## 2018-12-22 PROCEDURE — 96374 THER/PROPH/DIAG INJ IV PUSH: CPT

## 2018-12-22 PROCEDURE — 99236 HOSP IP/OBS SAME DATE HI 85: CPT | Performed by: INTERNAL MEDICINE

## 2018-12-22 PROCEDURE — 82962 GLUCOSE BLOOD TEST: CPT

## 2018-12-22 PROCEDURE — 36415 COLL VENOUS BLD VENIPUNCTURE: CPT

## 2018-12-22 PROCEDURE — A9270 NON-COVERED ITEM OR SERVICE: HCPCS | Performed by: NURSE PRACTITIONER

## 2018-12-22 PROCEDURE — 84484 ASSAY OF TROPONIN QUANT: CPT

## 2018-12-22 PROCEDURE — 700111 HCHG RX REV CODE 636 W/ 250 OVERRIDE (IP): Performed by: HOSPITALIST

## 2018-12-22 PROCEDURE — 80061 LIPID PANEL: CPT

## 2018-12-22 PROCEDURE — 85025 COMPLETE CBC W/AUTO DIFF WBC: CPT

## 2018-12-22 PROCEDURE — A9502 TC99M TETROFOSMIN: HCPCS

## 2018-12-22 PROCEDURE — 700111 HCHG RX REV CODE 636 W/ 250 OVERRIDE (IP)

## 2018-12-22 RX ORDER — DEXTROSE MONOHYDRATE 25 G/50ML
25 INJECTION, SOLUTION INTRAVENOUS
Status: DISCONTINUED | OUTPATIENT
Start: 2018-12-22 | End: 2018-12-22 | Stop reason: HOSPADM

## 2018-12-22 RX ORDER — LISINOPRIL 10 MG/1
10 TABLET ORAL
Status: DISCONTINUED | OUTPATIENT
Start: 2018-12-22 | End: 2018-12-22 | Stop reason: HOSPADM

## 2018-12-22 RX ORDER — BUPROPION HYDROCHLORIDE 150 MG/1
150 TABLET, EXTENDED RELEASE ORAL 2 TIMES DAILY
Status: DISCONTINUED | OUTPATIENT
Start: 2018-12-22 | End: 2018-12-22 | Stop reason: HOSPADM

## 2018-12-22 RX ORDER — REGADENOSON 0.08 MG/ML
INJECTION, SOLUTION INTRAVENOUS
Status: COMPLETED
Start: 2018-12-22 | End: 2018-12-22

## 2018-12-22 RX ORDER — LISINOPRIL 10 MG/1
10 TABLET ORAL DAILY
Qty: 30 TAB | Refills: 1 | Status: SHIPPED | OUTPATIENT
Start: 2018-12-23 | End: 2019-06-26 | Stop reason: SDUPTHER

## 2018-12-22 RX ORDER — PRASUGREL 10 MG/1
10 TABLET, FILM COATED ORAL DAILY
COMMUNITY
End: 2019-06-26

## 2018-12-22 RX ORDER — ATORVASTATIN CALCIUM 40 MG/1
40 TABLET, FILM COATED ORAL NIGHTLY
Status: DISCONTINUED | OUTPATIENT
Start: 2018-12-22 | End: 2018-12-22

## 2018-12-22 RX ORDER — POLYETHYLENE GLYCOL 3350 17 G/17G
1 POWDER, FOR SOLUTION ORAL
Status: DISCONTINUED | OUTPATIENT
Start: 2018-12-22 | End: 2018-12-22 | Stop reason: HOSPADM

## 2018-12-22 RX ORDER — ONDANSETRON 2 MG/ML
4 INJECTION INTRAMUSCULAR; INTRAVENOUS EVERY 4 HOURS PRN
Status: DISCONTINUED | OUTPATIENT
Start: 2018-12-22 | End: 2018-12-22 | Stop reason: HOSPADM

## 2018-12-22 RX ORDER — ATORVASTATIN CALCIUM 40 MG/1
40 TABLET, FILM COATED ORAL NIGHTLY
Status: ON HOLD | COMMUNITY
End: 2018-12-22

## 2018-12-22 RX ORDER — PRASUGREL 10 MG/1
10 TABLET, FILM COATED ORAL DAILY
Status: DISCONTINUED | OUTPATIENT
Start: 2018-12-22 | End: 2018-12-22 | Stop reason: HOSPADM

## 2018-12-22 RX ORDER — ATORVASTATIN CALCIUM 80 MG/1
80 TABLET, FILM COATED ORAL NIGHTLY
Status: DISCONTINUED | OUTPATIENT
Start: 2018-12-22 | End: 2018-12-22 | Stop reason: HOSPADM

## 2018-12-22 RX ORDER — BISACODYL 10 MG
10 SUPPOSITORY, RECTAL RECTAL
Status: DISCONTINUED | OUTPATIENT
Start: 2018-12-22 | End: 2018-12-22 | Stop reason: HOSPADM

## 2018-12-22 RX ORDER — ONDANSETRON 4 MG/1
4 TABLET, ORALLY DISINTEGRATING ORAL EVERY 4 HOURS PRN
Status: DISCONTINUED | OUTPATIENT
Start: 2018-12-22 | End: 2018-12-22 | Stop reason: HOSPADM

## 2018-12-22 RX ORDER — AMOXICILLIN 250 MG
2 CAPSULE ORAL 2 TIMES DAILY
Status: DISCONTINUED | OUTPATIENT
Start: 2018-12-22 | End: 2018-12-22 | Stop reason: HOSPADM

## 2018-12-22 RX ORDER — METOPROLOL SUCCINATE 25 MG/1
25 TABLET, EXTENDED RELEASE ORAL ONCE
Status: COMPLETED | OUTPATIENT
Start: 2018-12-22 | End: 2018-12-22

## 2018-12-22 RX ORDER — NITROGLYCERIN 0.4 MG/1
0.4 TABLET SUBLINGUAL
Status: DISCONTINUED | OUTPATIENT
Start: 2018-12-22 | End: 2018-12-22 | Stop reason: HOSPADM

## 2018-12-22 RX ORDER — HYDRALAZINE HYDROCHLORIDE 20 MG/ML
10 INJECTION INTRAMUSCULAR; INTRAVENOUS ONCE
Status: COMPLETED | OUTPATIENT
Start: 2018-12-22 | End: 2018-12-22

## 2018-12-22 RX ORDER — HYDROCODONE BITARTRATE AND ACETAMINOPHEN 10; 325 MG/1; MG/1
1 TABLET ORAL
COMMUNITY
End: 2019-04-25

## 2018-12-22 RX ORDER — BUPROPION HYDROCHLORIDE 300 MG/1
300 TABLET ORAL DAILY
Status: ON HOLD | COMMUNITY
End: 2020-10-08

## 2018-12-22 RX ORDER — ATORVASTATIN CALCIUM 40 MG/1
80 TABLET, FILM COATED ORAL
Qty: 30 TAB | Refills: 1 | Status: ON HOLD | OUTPATIENT
Start: 2018-12-22 | End: 2020-10-08

## 2018-12-22 RX ORDER — ACETAMINOPHEN 325 MG/1
650 TABLET ORAL EVERY 6 HOURS PRN
Status: DISCONTINUED | OUTPATIENT
Start: 2018-12-22 | End: 2018-12-22 | Stop reason: HOSPADM

## 2018-12-22 RX ORDER — OLANZAPINE 2.5 MG/1
2.5 TABLET, FILM COATED ORAL NIGHTLY
Status: DISCONTINUED | OUTPATIENT
Start: 2018-12-22 | End: 2018-12-22 | Stop reason: HOSPADM

## 2018-12-22 RX ADMIN — METOPROLOL TARTRATE 25 MG: 25 TABLET, FILM COATED ORAL at 05:09

## 2018-12-22 RX ADMIN — METOPROLOL SUCCINATE 25 MG: 25 TABLET, EXTENDED RELEASE ORAL at 12:39

## 2018-12-22 RX ADMIN — REGADENOSON 0.4 MG: 0.08 INJECTION, SOLUTION INTRAVENOUS at 09:57

## 2018-12-22 RX ADMIN — PRASUGREL 10 MG: 10 TABLET, FILM COATED ORAL at 06:48

## 2018-12-22 RX ADMIN — BUPROPION HYDROCHLORIDE 150 MG: 150 TABLET, EXTENDED RELEASE ORAL at 06:48

## 2018-12-22 RX ADMIN — LISINOPRIL 10 MG: 10 TABLET ORAL at 11:15

## 2018-12-22 RX ADMIN — HYDRALAZINE HYDROCHLORIDE 10 MG: 20 INJECTION INTRAMUSCULAR; INTRAVENOUS at 05:43

## 2018-12-22 ASSESSMENT — ENCOUNTER SYMPTOMS
SEIZURES: 0
DIAPHORESIS: 0
VOMITING: 0
COUGH: 0
DIARRHEA: 0
FLANK PAIN: 0
BLURRED VISION: 0
FEVER: 0
BACK PAIN: 0
WHEEZING: 0
HEADACHES: 0
SORE THROAT: 0
NECK PAIN: 0
PALPITATIONS: 0
MYALGIAS: 0
FOCAL WEAKNESS: 0
SHORTNESS OF BREATH: 1
CHILLS: 0
BRUISES/BLEEDS EASILY: 0
NAUSEA: 1
ABDOMINAL PAIN: 0
SPUTUM PRODUCTION: 0
DIZZINESS: 0
BLOOD IN STOOL: 0

## 2018-12-22 ASSESSMENT — COPD QUESTIONNAIRES
COPD SCREENING SCORE: 0
DO YOU EVER COUGH UP ANY MUCUS OR PHLEGM?: NO/ONLY WITH OCCASIONAL COLDS OR INFECTIONS
DURING THE PAST 4 WEEKS HOW MUCH DID YOU FEEL SHORT OF BREATH: NONE/LITTLE OF THE TIME
HAVE YOU SMOKED AT LEAST 100 CIGARETTES IN YOUR ENTIRE LIFE: NO/DON'T KNOW

## 2018-12-22 ASSESSMENT — PAIN SCALES - GENERAL
PAINLEVEL_OUTOF10: 0
PAINLEVEL_OUTOF10: 4

## 2018-12-22 ASSESSMENT — PATIENT HEALTH QUESTIONNAIRE - PHQ9
7. TROUBLE CONCENTRATING ON THINGS, SUCH AS READING THE NEWSPAPER OR WATCHING TELEVISION: MORE THAN HALF THE DAYS
6. FEELING BAD ABOUT YOURSELF - OR THAT YOU ARE A FAILURE OR HAVE LET YOURSELF OR YOUR FAMILY DOWN: MORE THAN HALF THE DAYS
3. TROUBLE FALLING OR STAYING ASLEEP OR SLEEPING TOO MUCH: NEARLY EVERY DAY
5. POOR APPETITE OR OVEREATING: NOT AT ALL
SUM OF ALL RESPONSES TO PHQ9 QUESTIONS 1 AND 2: 6
8. MOVING OR SPEAKING SO SLOWLY THAT OTHER PEOPLE COULD HAVE NOTICED. OR THE OPPOSITE, BEING SO FIGETY OR RESTLESS THAT YOU HAVE BEEN MOVING AROUND A LOT MORE THAN USUAL: MORE THAN HALF THE DAYS
9. THOUGHTS THAT YOU WOULD BE BETTER OFF DEAD, OR OF HURTING YOURSELF: MORE THAN HALF THE DAYS
SUM OF ALL RESPONSES TO PHQ QUESTIONS 1-9: 20
2. FEELING DOWN, DEPRESSED, IRRITABLE, OR HOPELESS: NEARLY EVERY DAY
4. FEELING TIRED OR HAVING LITTLE ENERGY: NEARLY EVERY DAY
1. LITTLE INTEREST OR PLEASURE IN DOING THINGS: NEARLY EVERY DAY

## 2018-12-22 ASSESSMENT — LIFESTYLE VARIABLES
ON A TYPICAL DAY WHEN YOU DRINK ALCOHOL HOW MANY DRINKS DO YOU HAVE: 4
ALCOHOL_USE: YES
EVER_SMOKED: NEVER
EVER HAD A DRINK FIRST THING IN THE MORNING TO STEADY YOUR NERVES TO GET RID OF A HANGOVER: NO
HOW MANY TIMES IN THE PAST YEAR HAVE YOU HAD 5 OR MORE DRINKS IN A DAY: 0
EVER FELT BAD OR GUILTY ABOUT YOUR DRINKING: NO
CONSUMPTION TOTAL: NEGATIVE
AVERAGE NUMBER OF DAYS PER WEEK YOU HAVE A DRINK CONTAINING ALCOHOL: 2
HAVE PEOPLE ANNOYED YOU BY CRITICIZING YOUR DRINKING: NO
HAVE YOU EVER FELT YOU SHOULD CUT DOWN ON YOUR DRINKING: NO
TOTAL SCORE: 0
EVER_SMOKED: NEVER

## 2018-12-22 NOTE — ED PROVIDER NOTES
ED Provider Note    CHIEF COMPLAINT  Chief Complaint   Patient presents with   • Chest Pain       HPI  Guerrero Ann is a 48 y.o. male who presents to the emergency department chief complaint of chest pain.  The patient states he has had central chest pressure as well as shortness of breath over the last 2-3 weeks which has been worsening.  He states that at the first it was with exertion only but now is getting it with rest as well.  He states that he is getting the chest pain more frequently and earlier today around 6 PM he was working and felt severe pressure-like chest discomfort with associated shortness of breath and nausea.  He denies that the pain radiated anywhere.  Patient has a history of diabetes hypertension as well as 2 MIs and a significant family history of early cardiovascular disease.  The patient has not been compliant with his blood thinner which according to our records his Effient because he says it gives him nosebleeds the last time he took it was 3-4 days ago.  He denies any leg swelling he denies any recent histories cough or hemoptysis      Last stent placed by otto in may of this year he was supposed to follow up with marva    REVIEW OF SYSTEMS  Positives as above. Pertinent negatives include vomiting diaphoresis leg swelling and hemoptysis  All other review of systems are negative    PAST MEDICAL HISTORY   has a past medical history of Diabetes (HCC); Hypertension; Kidney stones; and MI, old.    SOCIAL HISTORY  Social History     Social History Main Topics   • Smoking status: Never Smoker   • Smokeless tobacco: Never Used   • Alcohol use Yes      Comment: 1 times per week   • Drug use: No   • Sexual activity: Not on file       SURGICAL HISTORY   has a past surgical history that includes other orthopedic surgery and stent placement (2017).    CURRENT MEDICATIONS  Home Medications     Reviewed by Elida Ornelas R.N. (Registered Nurse) on 12/21/18 at 4837  Med List Status:  Complete   Medication Last Dose Status   allopurinol (ZYLOPRIM) 100 MG Tab 12/20/2018 Active   aspirin 81 MG EC tablet 12/21/2018 Active   atorvastatin (LIPITOR) 40 MG Tab 12/21/2018 Active   buPROPion SR (WELLBUTRIN-SR) 150 MG TABLET SR 12 HR sustained-release tablet 12/21/2018 Active   metFORMIN (GLUCOPHAGE) 500 MG Tab 12/21/2018 Active   metoprolol (LOPRESSOR) 25 MG Tab 12/20/2018 Active   OLANZapine (ZYPREXA) 2.5 MG Tab 12/21/2018 Active   prasugrel (EFFIENT) 10 MG Tab 12/21/2018 Active                ALLERGIES  No Known Allergies    PHYSICAL EXAM  VITAL SIGNS: BP (!) 175/112   Pulse 97   Temp 36.7 °C (98 °F) (Temporal)   Resp 18   Ht 1.524 m (5')   Wt 59.4 kg (130 lb 15.3 oz)   SpO2 98%   BMI 25.58 kg/m²    Pulse ox interpretation: I interpret this pulse ox as normal.  Constitutional: Alert in no apparent distress.  HENT: Normocephalic atraumatic, MMM  Eyes: PER, Conjunctiva normal, Non-icteric.   Neck: Normal range of motion, No tenderness, Supple, No stridor.   Cardiovascular: Regular rate and rhythm, no murmurs.   Thorax & Lungs: Normal breath sounds, No respiratory distress, No wheezing, No chest tenderness.   Abdomen: Bowel sounds normal, Soft, No tenderness, No pulsatile masses. No peritoneal signs.  Skin: Warm, Dry, No erythema, No rash.   Back: No bony tenderness, No CVA tenderness.   Extremities: Intact distal pulses, No edema, No tenderness, No cyanosis  Musculoskeletal: Good range of motion in all major joints. No tenderness to palpation or major deformities noted.   Neurologic: Alert and oriented x3, No focal deficits noted.       DIFFERENTIAL DIAGNOSIS AND WORK UP PLAN    This is a 48 y.o. male who presents with chest pain shortness of breath worsening over the last few weeks likely consistent with unstable angina or at the very least restenosis or clotting of his stents due to noncompliance with his blood thinning medications.  Will evaluate for N STEMI low concern for PE is cardiac  pathology is much more likely.  Will perform laboratory analysis including a troponin chest x-ray treat the patient with a full aspirin and reassess    DIAGNOSTIC STUDIES / PROCEDURES    EKG  12- Lead EKG; interpreted by myself - Joseluis  Normal sinus rhythm with a rate of 98 bpm.   Normal axis.  Normal intervals.   No ST elevation or depression  T wave inversions in the inferior leads as well as Q waves in the inferior leads  No widening of QRS complex   Good R wave progression   Unchanged from prior EKG   Clinical Impression: Prior inferior MI with T wave inversions no ST elevations or ST depression      LABS  Pertinent Lab Findings  CBC with mildly elevated white blood cell count otherwise normal BMP with a glucose 196 without evidence of acidosis troponin negative BNP negative acetaminophen and CMP ordered because the patient's been taking a large amount of Excedrin as well as over-the-counter Norco as per      RADIOLOGY  DX-CHEST-2 VIEWS   Final Result         1.  No acute cardiopulmonary disease.      NM-CARDIAC STRESS TEST    (Results Pending)     The radiologist's interpretation of all radiological studies have been reviewed by me.      COURSE & MEDICAL DECISION MAKING  Pertinent Labs & Imaging studies reviewed. (See chart for details)    11:48 PM  I reassessed patient the bedside he is been resting comfortably we discussed that with his concerning symptoms for unstable angina that she be observed with cardiac function testing and to figure out what type of blood thinner would work best for him.  He understands and agrees with the plan    11:59 PM  Spoke w Dr Harden for admission    Heart score - 5    DISPOSITION:  Patient will be admitted to Dr Harden in guarded condition.      FINAL IMPRESSION  1. Chest pain  2. Dyspnea on exertion       Electronically signed by: Zeinab Huggins, 12/21/2018 10:04 PM    This dictation has been created using voice recognition software and/or scribes. The accuracy of the  dictation is limited by the abilities of the software and the expertise of the scribes. I expect there may be some errors of grammar and possibly content. I made every attempt to manually correct the errors within my dictation. However, errors related to voice recognition software and/or scribes may still exist and should be interpreted within the appropriate context.

## 2018-12-22 NOTE — ED TRIAGE NOTES
Patient to ED with complaints of chest burning pain that radiates into left arm, into back and into neck. Reports aching in back and neck, burning in chest. Started two weeks ago. Worse with activity. +SOB. +Dizziness. Has gotten worse today. Feels similar to previous MI. Rates pain 10/10. EKG completed in triage.

## 2018-12-22 NOTE — ASSESSMENT & PLAN NOTE
Uncontrolled with hyperglycemia  Start on insulin sliding scale with serial Accu-Checks  Check hemoglobin A1c  Hypoglycemic protocol in place

## 2018-12-22 NOTE — DISCHARGE INSTRUCTIONS
Discharge Instructions    Discharged to home by car with self. Discharged via wheelchair, hospital escort: Yes.  Special equipment needed: Not Applicable    Be sure to schedule a follow-up appointment with your primary care doctor or any specialists as instructed.     Discharge Plan:   Diet Plan: Discussed  Activity Level: Discussed  Confirmed Follow up Appointment: Appointment Scheduled  Confirmed Symptoms Management: Discussed  Medication Reconciliation Updated: Yes  Influenza Vaccine Indication: Patient Refuses    I understand that a diet low in cholesterol, fat, and sodium is recommended for good health. Unless I have been given specific instructions below for another diet, I accept this instruction as my diet prescription.   Other diet: heart healthy diet    Special Instructions: None    · Is patient discharged on Warfarin / Coumadin?   No     Chest Pain, Nonspecific  It is often hard to give a specific diagnosis for the cause of chest pain. There is always a chance that your pain could be related to something serious, like a heart attack or a blood clot in the lungs. You need to follow up with your caregiver for further evaluation. More lab tests or other studies such as X-rays, electrocardiography, stress testing, or cardiac imaging may be needed to find the cause of your pain.  Most of the time, nonspecific chest pain improves within 2 to 3 days with rest and mild pain medicine. For the next few days, avoid physical exertion or activities that bring on pain. Do not smoke. Avoid drinking alcohol. Call your caregiver for routine follow-up as advised.   SEEK IMMEDIATE MEDICAL CARE IF:  · You develop increased chest pain or pain that radiates to the arm, neck, jaw, back, or abdomen.   · You develop shortness of breath, increased coughing, or you start coughing up blood.   · You have severe back or abdominal pain, nausea, or vomiting.   · You develop severe weakness, fainting, fever, or chills.   Document  Released: 12/18/2006 Document Revised: 03/11/2013 Document Reviewed: 06/06/2008  ExitCare® Patient Information ©2013 [email or web address].  Depression / Suicide Risk    As you are discharged from this [facility] facility, it is important to learn how to keep safe from harming yourself.    Recognize the warning signs:  · Abrupt changes in personality, positive or negative- including increase in energy   · Giving away possessions  · Change in eating patterns- significant weight changes-  positive or negative  · Change in sleeping patterns- unable to sleep or sleeping all the time   · Unwillingness or inability to communicate  · Depression  · Unusual sadness, discouragement and loneliness  · Talk of wanting to die  · Neglect of personal appearance   · Rebelliousness- reckless behavior  · Withdrawal from people/activities they love  · Confusion- inability to concentrate     If you or a loved one observes any of these behaviors or has concerns about self-harm, here's what you can do:  · Talk about it- your feelings and reasons for harming yourself  · Remove any means that you might use to hurt yourself (examples: pills, rope, extension cords, firearm)  · Get professional help from the community (Mental Health, Substance Abuse, psychological counseling)  · Do not be alone:Call your Safe Contact- someone whom you trust who will be there for you.  · Call your local CRISIS HOTLINE 027-5535 or 657-215-3440  · Call your local Children's Mobile Crisis Response Team [place] (211) 976-5474 or www.[employer]  · Call the toll free National Suicide Prevention Hotlines   · National Suicide Prevention Lifeline 315-910-UQBN (7462)  · National Hope Line Network 800-SUICIDE (713-8864)

## 2018-12-22 NOTE — H&P
Hospital Medicine History & Physical Note    Date of Service  12/22/2018    Primary Care Physician  No primary care provider on file.    Consultants  None    Code Status  Full code    Chief Complaint  Chest pain    History of Presenting Illness  48 y.o. male with a past medical history of hypertension, type 2 diabetes mellitus, CAD status post stent placement who presented 12/21/2018 with chest pain for the past 2 weeks.  Patient reported exertional chest pain with shortness of breath which has been progressively worsening and now is present at rest.  He reports substernal chest pain with no radiation.  He also reports associated nausea.  Denies any fevers, chills, diaphoresis or lightheadedness.    Patient was admitted in June 2018 with an STEMI and had cardiac catheterization with stent placement to the RCA. Patient states that he has been skipping his Effient because of intermittent epistaxis. His last dose of Effient was 4 days ago.    EKG interpreted by me reveals sinus rhythm with Q waves and T wave inversions in inferior leads.  T wave inversions in V6.  Chest x-ray interpreted by me reveals no acute cardiopulmonary process    Review of Systems  Review of Systems   Constitutional: Negative for chills, diaphoresis and fever.   HENT: Negative for hearing loss and sore throat.    Eyes: Negative for blurred vision.   Respiratory: Positive for shortness of breath. Negative for cough, sputum production and wheezing.    Cardiovascular: Positive for chest pain. Negative for palpitations and leg swelling.   Gastrointestinal: Positive for nausea. Negative for abdominal pain, blood in stool, diarrhea and vomiting.   Genitourinary: Negative for dysuria, flank pain and urgency.   Musculoskeletal: Negative for back pain, joint pain, myalgias and neck pain.   Skin: Negative for rash.   Neurological: Negative for dizziness, focal weakness, seizures and headaches.   Endo/Heme/Allergies: Does not bruise/bleed easily.    Psychiatric/Behavioral: Negative for suicidal ideas.   All other systems reviewed and are negative.      Past Medical History   has a past medical history of Diabetes (HCC); Hypertension; Kidney stones; and MI, old.    Surgical History   has a past surgical history that includes other orthopedic surgery and stent placement (2017).     Family History  family history includes No Known Problems in his father; Stroke (age of onset: 47) in his mother; Stroke (age of onset: 54) in his maternal grandfather; Stroke (age of onset: 85) in his maternal grandmother.     Social History   reports that he has never smoked. He has never used smokeless tobacco. He reports that he drinks alcohol. He reports that he does not use drugs.    Allergies  No Known Allergies    Medications  Prior to Admission Medications   Prescriptions Last Dose Informant Patient Reported? Taking?   Aspirin-Acetaminophen-Caffeine 500-325-65 MG Pack 12/22/2018 at AM Patient Yes Yes   Sig: Take 3 Tabs by mouth every 8 hours as needed (Severe Toothache).   HYDROcodone/acetaminophen (NORCO)  MG Tab 12/21/2018 at PM Patient Yes Yes   Sig: Take 1 Tab by mouth at bedtime as needed for Severe Pain (Severe Toothache).   OLANZapine (ZYPREXA) 2.5 MG Tab 12/22/2018 at PM Patient Yes Yes   Sig: Take 2.5 mg by mouth every evening.   allopurinol (ZYLOPRIM) 100 MG Tab 12/22/2018 at AM Patient Yes No   Sig: Take 100 mg by mouth every day.   aspirin EC (ECOTRIN) 81 MG Tablet Delayed Response 12/22/2018 at AM Patient Yes Yes   Sig: Take 81 mg by mouth every day.   atorvastatin (LIPITOR) 40 MG Tab 12/21/2018 at PM Patient Yes Yes   Sig: Take 40 mg by mouth every evening.   buPROPion (WELLBUTRIN XL) 300 MG XL tablet 12/22/2018 at AM Patient Yes Yes   Sig: Take 300 mg by mouth every morning.   metFORMIN (GLUCOPHAGE) 500 MG Tab 12/22/2018 at AM Patient Yes No   Sig: Take 2,000 mg by mouth 2 Times a Day.   metoprolol (LOPRESSOR) 25 MG Tab 12/22/2018 at AM Patient Yes Yes    Sig: Take 25 mg by mouth every day.   prasugrel (EFFIENT) 10 MG Tab 12/22/2018 at AM Patient Yes Yes   Sig: Take 10 mg by mouth every day.      Facility-Administered Medications: None       Physical Exam  Temp:  [36.7 °C (98 °F)] 36.7 °C (98 °F)  Pulse:  [] 88  Resp:  [18] 18  BP: (175)/(112) 175/112    Physical Exam   Constitutional: He is oriented to person, place, and time. He appears well-developed and well-nourished. No distress.   HENT:   Head: Normocephalic and atraumatic.   Mouth/Throat: Oropharynx is clear and moist.   Eyes: Pupils are equal, round, and reactive to light. Conjunctivae are normal. No scleral icterus.   Neck: Normal range of motion. Neck supple.   Cardiovascular: Normal rate, regular rhythm and normal heart sounds.    Pulmonary/Chest: Effort normal and breath sounds normal. No respiratory distress. He has no wheezes. He has no rales.   Abdominal: Soft. Bowel sounds are normal. He exhibits no distension. There is no tenderness. There is no rebound.   Musculoskeletal: Normal range of motion. He exhibits no edema or tenderness.   Lymphadenopathy:     He has no cervical adenopathy.   Neurological: He is alert and oriented to person, place, and time. No cranial nerve deficit. Coordination normal.   Skin: Skin is warm. No rash noted.   Psychiatric: He has a normal mood and affect. His behavior is normal.   Nursing note and vitals reviewed.      Laboratory:  Recent Labs      12/21/18 2205   WBC  13.2*   RBC  4.50*   HEMOGLOBIN  14.4   HEMATOCRIT  42.2   MCV  93.8   MCH  32.0   MCHC  34.1   RDW  47.7   PLATELETCT  256   MPV  10.6     Recent Labs      12/21/18 2205   SODIUM  141   POTASSIUM  3.9   CHLORIDE  106   CO2  22   GLUCOSE  196*   BUN  16   CREATININE  1.32   CALCIUM  9.5     Recent Labs      12/21/18 2205   GLUCOSE  196*         Recent Labs      12/21/18 2205   BNPBTYPENAT  11         Recent Labs      12/21/18 2205   TROPONINI  0.02       Urinalysis:    No results found      Imaging:  DX-CHEST-2 VIEWS   Final Result         1.  No acute cardiopulmonary disease.      NM-CARDIAC STRESS TEST    (Results Pending)         Assessment/Plan:  I anticipate this patient will require at least two midnights for appropriate medical management, necessitating inpatient admission.    Chest pain- (present on admission)   Assessment & Plan    Rule out ACS  Continuous cardiac monitoring with serial EKG and troponin  Nuclear medicine cardiac stress test in the morning if troponin remains negative  Patient has been given full dose of aspirin  Check lipid panel, TSH and hemoglobin A1c  Nitro when necessary for chest pain       Leukemoid reaction- (present on admission)   Assessment & Plan    Likely reactive, does not appear to be septic  Monitor CBC and vital     CAD (coronary artery disease)- (present on admission)   Assessment & Plan    Continue aspirin, Effient and Lipitor     Essential hypertension- (present on admission)   Assessment & Plan    Continue metoprolol     Type 2 diabetes mellitus with complication, without long-term current use of insulin (HCC)- (present on admission)   Assessment & Plan    Uncontrolled with hyperglycemia  Start on insulin sliding scale with serial Accu-Checks  Check hemoglobin A1c  Hypoglycemic protocol in place             VTE prophylaxis: Heparin

## 2018-12-22 NOTE — ED NOTES
Med Rec Updated and Complete per Pt at bedside with list (returned)  Allergies Reviewed  No PO ABX last 30 days    Pt reports he has gone through 3 bottle of Excedrin Max Strength in two weeks due to tooth pain, Pharmacist notified.     Pt reports he takes Norco 10-325mg given to him from a friend/family nightly so he can sleep due to tooth pain.

## 2019-04-25 ENCOUNTER — APPOINTMENT (OUTPATIENT)
Dept: RADIOLOGY | Facility: MEDICAL CENTER | Age: 49
End: 2019-04-25
Attending: EMERGENCY MEDICINE
Payer: COMMERCIAL

## 2019-04-25 ENCOUNTER — HOSPITAL ENCOUNTER (OUTPATIENT)
Facility: MEDICAL CENTER | Age: 49
End: 2019-04-25
Attending: EMERGENCY MEDICINE | Admitting: HOSPITALIST
Payer: COMMERCIAL

## 2019-04-25 VITALS
RESPIRATION RATE: 18 BRPM | HEIGHT: 62 IN | OXYGEN SATURATION: 98 % | HEART RATE: 63 BPM | WEIGHT: 154.76 LBS | DIASTOLIC BLOOD PRESSURE: 94 MMHG | BODY MASS INDEX: 28.48 KG/M2 | SYSTOLIC BLOOD PRESSURE: 152 MMHG | TEMPERATURE: 97.8 F

## 2019-04-25 DIAGNOSIS — I16.1 HYPERTENSIVE EMERGENCY: ICD-10-CM

## 2019-04-25 PROBLEM — R07.9 CHEST PAIN: Status: RESOLVED | Noted: 2018-12-22 | Resolved: 2019-04-25

## 2019-04-25 LAB
ALBUMIN SERPL BCP-MCNC: 4.7 G/DL (ref 3.2–4.9)
ALBUMIN/GLOB SERPL: 1.3 G/DL
ALP SERPL-CCNC: 80 U/L (ref 30–99)
ALT SERPL-CCNC: 23 U/L (ref 2–50)
ANION GAP SERPL CALC-SCNC: 12 MMOL/L (ref 0–11.9)
APTT PPP: 27.5 SEC (ref 24.7–36)
AST SERPL-CCNC: 29 U/L (ref 12–45)
BASOPHILS # BLD AUTO: 1.5 % (ref 0–1.8)
BASOPHILS # BLD: 0.14 K/UL (ref 0–0.12)
BILIRUB SERPL-MCNC: 0.5 MG/DL (ref 0.1–1.5)
BUN SERPL-MCNC: 12 MG/DL (ref 8–22)
CALCIUM SERPL-MCNC: 9.1 MG/DL (ref 8.4–10.2)
CHLORIDE SERPL-SCNC: 104 MMOL/L (ref 96–112)
CO2 SERPL-SCNC: 21 MMOL/L (ref 20–33)
CREAT SERPL-MCNC: 0.92 MG/DL (ref 0.5–1.4)
EKG IMPRESSION: NORMAL
EOSINOPHIL # BLD AUTO: 0.91 K/UL (ref 0–0.51)
EOSINOPHIL NFR BLD: 9.5 % (ref 0–6.9)
ERYTHROCYTE [DISTWIDTH] IN BLOOD BY AUTOMATED COUNT: 42.4 FL (ref 35.9–50)
GLOBULIN SER CALC-MCNC: 3.6 G/DL (ref 1.9–3.5)
GLUCOSE BLD-MCNC: 127 MG/DL (ref 65–99)
GLUCOSE SERPL-MCNC: 146 MG/DL (ref 65–99)
HCT VFR BLD AUTO: 39.8 % (ref 42–52)
HGB BLD-MCNC: 13.8 G/DL (ref 14–18)
IMM GRANULOCYTES # BLD AUTO: 0.02 K/UL (ref 0–0.11)
IMM GRANULOCYTES NFR BLD AUTO: 0.2 % (ref 0–0.9)
INR PPP: 1 (ref 0.87–1.13)
LYMPHOCYTES # BLD AUTO: 2.45 K/UL (ref 1–4.8)
LYMPHOCYTES NFR BLD: 25.5 % (ref 22–41)
MCH RBC QN AUTO: 31.8 PG (ref 27–33)
MCHC RBC AUTO-ENTMCNC: 34.7 G/DL (ref 33.7–35.3)
MCV RBC AUTO: 91.7 FL (ref 81.4–97.8)
MONOCYTES # BLD AUTO: 0.72 K/UL (ref 0–0.85)
MONOCYTES NFR BLD AUTO: 7.5 % (ref 0–13.4)
NEUTROPHILS # BLD AUTO: 5.38 K/UL (ref 1.82–7.42)
NEUTROPHILS NFR BLD: 55.8 % (ref 44–72)
NRBC # BLD AUTO: 0 K/UL
NRBC BLD-RTO: 0 /100 WBC
PLATELET # BLD AUTO: 249 K/UL (ref 164–446)
PMV BLD AUTO: 10.7 FL (ref 9–12.9)
POTASSIUM SERPL-SCNC: 3.4 MMOL/L (ref 3.6–5.5)
PROT SERPL-MCNC: 8.3 G/DL (ref 6–8.2)
PROTHROMBIN TIME: 13.1 SEC (ref 12–14.6)
RBC # BLD AUTO: 4.34 M/UL (ref 4.7–6.1)
SODIUM SERPL-SCNC: 137 MMOL/L (ref 135–145)
TROPONIN I SERPL-MCNC: 0.02 NG/ML (ref 0–0.04)
TROPONIN I SERPL-MCNC: 0.03 NG/ML (ref 0–0.04)
WBC # BLD AUTO: 9.6 K/UL (ref 4.8–10.8)

## 2019-04-25 PROCEDURE — 80053 COMPREHEN METABOLIC PANEL: CPT

## 2019-04-25 PROCEDURE — 84484 ASSAY OF TROPONIN QUANT: CPT | Mod: 91

## 2019-04-25 PROCEDURE — 96374 THER/PROPH/DIAG INJ IV PUSH: CPT

## 2019-04-25 PROCEDURE — 99285 EMERGENCY DEPT VISIT HI MDM: CPT

## 2019-04-25 PROCEDURE — 85025 COMPLETE CBC W/AUTO DIFF WBC: CPT

## 2019-04-25 PROCEDURE — A9270 NON-COVERED ITEM OR SERVICE: HCPCS | Performed by: EMERGENCY MEDICINE

## 2019-04-25 PROCEDURE — 700102 HCHG RX REV CODE 250 W/ 637 OVERRIDE(OP): Performed by: HOSPITALIST

## 2019-04-25 PROCEDURE — 85730 THROMBOPLASTIN TIME PARTIAL: CPT

## 2019-04-25 PROCEDURE — 93005 ELECTROCARDIOGRAM TRACING: CPT

## 2019-04-25 PROCEDURE — 700101 HCHG RX REV CODE 250: Performed by: EMERGENCY MEDICINE

## 2019-04-25 PROCEDURE — A9270 NON-COVERED ITEM OR SERVICE: HCPCS | Performed by: HOSPITALIST

## 2019-04-25 PROCEDURE — 99204 OFFICE O/P NEW MOD 45 MIN: CPT | Performed by: INTERNAL MEDICINE

## 2019-04-25 PROCEDURE — G0378 HOSPITAL OBSERVATION PER HR: HCPCS

## 2019-04-25 PROCEDURE — 304561 HCHG STAT O2

## 2019-04-25 PROCEDURE — 85610 PROTHROMBIN TIME: CPT

## 2019-04-25 PROCEDURE — 93017 CV STRESS TEST TRACING ONLY: CPT | Performed by: HOSPITALIST

## 2019-04-25 PROCEDURE — 99219 PR INITIAL OBSERVATION CARE,LEVL II: CPT | Performed by: HOSPITALIST

## 2019-04-25 PROCEDURE — 700102 HCHG RX REV CODE 250 W/ 637 OVERRIDE(OP): Performed by: EMERGENCY MEDICINE

## 2019-04-25 PROCEDURE — 93005 ELECTROCARDIOGRAM TRACING: CPT | Performed by: EMERGENCY MEDICINE

## 2019-04-25 PROCEDURE — 71045 X-RAY EXAM CHEST 1 VIEW: CPT

## 2019-04-25 PROCEDURE — 82962 GLUCOSE BLOOD TEST: CPT

## 2019-04-25 PROCEDURE — 36415 COLL VENOUS BLD VENIPUNCTURE: CPT

## 2019-04-25 RX ORDER — DEXTROSE MONOHYDRATE 25 G/50ML
25 INJECTION, SOLUTION INTRAVENOUS
Status: DISCONTINUED | OUTPATIENT
Start: 2019-04-25 | End: 2019-04-25 | Stop reason: HOSPADM

## 2019-04-25 RX ORDER — BISACODYL 10 MG
10 SUPPOSITORY, RECTAL RECTAL
Status: DISCONTINUED | OUTPATIENT
Start: 2019-04-25 | End: 2019-04-25 | Stop reason: HOSPADM

## 2019-04-25 RX ORDER — OXYCODONE HYDROCHLORIDE 5 MG/1
5 TABLET ORAL
Status: DISCONTINUED | OUTPATIENT
Start: 2019-04-25 | End: 2019-04-25 | Stop reason: HOSPADM

## 2019-04-25 RX ORDER — LABETALOL HYDROCHLORIDE 5 MG/ML
20 INJECTION, SOLUTION INTRAVENOUS ONCE
Status: COMPLETED | OUTPATIENT
Start: 2019-04-25 | End: 2019-04-25

## 2019-04-25 RX ORDER — ASPIRIN 600 MG/1
300 SUPPOSITORY RECTAL DAILY
Status: DISCONTINUED | OUTPATIENT
Start: 2019-04-26 | End: 2019-04-25 | Stop reason: HOSPADM

## 2019-04-25 RX ORDER — ACETAMINOPHEN 325 MG/1
650 TABLET ORAL EVERY 6 HOURS PRN
Status: DISCONTINUED | OUTPATIENT
Start: 2019-04-25 | End: 2019-04-25 | Stop reason: HOSPADM

## 2019-04-25 RX ORDER — ATORVASTATIN CALCIUM 40 MG/1
80 TABLET, FILM COATED ORAL
Status: DISCONTINUED | OUTPATIENT
Start: 2019-04-25 | End: 2019-04-25 | Stop reason: HOSPADM

## 2019-04-25 RX ORDER — AMOXICILLIN 250 MG
2 CAPSULE ORAL 2 TIMES DAILY
Status: DISCONTINUED | OUTPATIENT
Start: 2019-04-25 | End: 2019-04-25 | Stop reason: HOSPADM

## 2019-04-25 RX ORDER — PRASUGREL 10 MG/1
10 TABLET, FILM COATED ORAL EVERY EVENING
Status: DISCONTINUED | OUTPATIENT
Start: 2019-04-25 | End: 2019-04-25 | Stop reason: HOSPADM

## 2019-04-25 RX ORDER — ASPIRIN 81 MG/1
324 TABLET, CHEWABLE ORAL DAILY
Status: DISCONTINUED | OUTPATIENT
Start: 2019-04-26 | End: 2019-04-25 | Stop reason: HOSPADM

## 2019-04-25 RX ORDER — ACETAMINOPHEN 500 MG
1500 TABLET ORAL EVERY 6 HOURS PRN
Status: ON HOLD | COMMUNITY
End: 2020-10-08

## 2019-04-25 RX ORDER — ALLOPURINOL 100 MG/1
100 TABLET ORAL EVERY EVENING
Status: DISCONTINUED | OUTPATIENT
Start: 2019-04-25 | End: 2019-04-25 | Stop reason: HOSPADM

## 2019-04-25 RX ORDER — LISINOPRIL 5 MG/1
10 TABLET ORAL EVERY EVENING
Status: DISCONTINUED | OUTPATIENT
Start: 2019-04-25 | End: 2019-04-25 | Stop reason: HOSPADM

## 2019-04-25 RX ORDER — ASPIRIN 325 MG
325 TABLET ORAL DAILY
Status: DISCONTINUED | OUTPATIENT
Start: 2019-04-26 | End: 2019-04-25 | Stop reason: HOSPADM

## 2019-04-25 RX ORDER — ASPIRIN 81 MG/1
324 TABLET, CHEWABLE ORAL ONCE
Status: COMPLETED | OUTPATIENT
Start: 2019-04-25 | End: 2019-04-25

## 2019-04-25 RX ORDER — NITROGLYCERIN 0.4 MG/1
0.4 TABLET SUBLINGUAL
Status: DISCONTINUED | OUTPATIENT
Start: 2019-04-25 | End: 2019-04-25 | Stop reason: HOSPADM

## 2019-04-25 RX ORDER — OXYCODONE HYDROCHLORIDE 5 MG/1
2.5 TABLET ORAL
Status: DISCONTINUED | OUTPATIENT
Start: 2019-04-25 | End: 2019-04-25 | Stop reason: HOSPADM

## 2019-04-25 RX ORDER — HYDROMORPHONE HYDROCHLORIDE 1 MG/ML
0.25 INJECTION, SOLUTION INTRAMUSCULAR; INTRAVENOUS; SUBCUTANEOUS
Status: DISCONTINUED | OUTPATIENT
Start: 2019-04-25 | End: 2019-04-25 | Stop reason: HOSPADM

## 2019-04-25 RX ORDER — ASPIRIN 600 MG/1
300 SUPPOSITORY RECTAL ONCE
Status: COMPLETED | OUTPATIENT
Start: 2019-04-25 | End: 2019-04-25

## 2019-04-25 RX ORDER — BUPROPION HYDROCHLORIDE 300 MG/1
300 TABLET ORAL DAILY
Status: DISCONTINUED | OUTPATIENT
Start: 2019-04-25 | End: 2019-04-25

## 2019-04-25 RX ORDER — POLYETHYLENE GLYCOL 3350 17 G/17G
1 POWDER, FOR SOLUTION ORAL
Status: DISCONTINUED | OUTPATIENT
Start: 2019-04-25 | End: 2019-04-25 | Stop reason: HOSPADM

## 2019-04-25 RX ORDER — HYDRALAZINE HYDROCHLORIDE 20 MG/ML
10 INJECTION INTRAMUSCULAR; INTRAVENOUS EVERY 4 HOURS PRN
Status: DISCONTINUED | OUTPATIENT
Start: 2019-04-25 | End: 2019-04-25 | Stop reason: HOSPADM

## 2019-04-25 RX ORDER — BUPROPION HYDROCHLORIDE 150 MG/1
150 TABLET, EXTENDED RELEASE ORAL 2 TIMES DAILY
Status: DISCONTINUED | OUTPATIENT
Start: 2019-04-25 | End: 2019-04-25 | Stop reason: HOSPADM

## 2019-04-25 RX ADMIN — LABETALOL HYDROCHLORIDE 20 MG: 5 INJECTION INTRAVENOUS at 10:13

## 2019-04-25 RX ADMIN — METOPROLOL TARTRATE 25 MG: 25 TABLET ORAL at 15:38

## 2019-04-25 RX ADMIN — NITROGLYCERIN 0.4 MG: 0.4 TABLET, ORALLY DISINTEGRATING SUBLINGUAL at 10:07

## 2019-04-25 RX ADMIN — ASPIRIN 81 MG 324 MG: 81 TABLET ORAL at 10:09

## 2019-04-25 RX ADMIN — LISINOPRIL 10 MG: 5 TABLET ORAL at 15:39

## 2019-04-25 ASSESSMENT — PATIENT HEALTH QUESTIONNAIRE - PHQ9
2. FEELING DOWN, DEPRESSED, IRRITABLE, OR HOPELESS: NOT AT ALL
SUM OF ALL RESPONSES TO PHQ9 QUESTIONS 1 AND 2: 0
1. LITTLE INTEREST OR PLEASURE IN DOING THINGS: NOT AT ALL

## 2019-04-25 ASSESSMENT — COGNITIVE AND FUNCTIONAL STATUS - GENERAL
DAILY ACTIVITIY SCORE: 24
SUGGESTED CMS G CODE MODIFIER DAILY ACTIVITY: CH
MOBILITY SCORE: 24
SUGGESTED CMS G CODE MODIFIER MOBILITY: CH

## 2019-04-25 ASSESSMENT — ENCOUNTER SYMPTOMS
CHILLS: 0
STRIDOR: 0
PHOTOPHOBIA: 0
SPUTUM PRODUCTION: 0
EYE PAIN: 0
MYALGIAS: 0
DOUBLE VISION: 0
MEMORY LOSS: 0
SENSORY CHANGE: 0
CONSTIPATION: 0
NAUSEA: 0
DIZZINESS: 0
TREMORS: 0
DEPRESSION: 0
ORTHOPNEA: 0
HEMOPTYSIS: 0
BLURRED VISION: 0
PALPITATIONS: 0
HEARTBURN: 0
BLOOD IN STOOL: 0
SORE THROAT: 0
NERVOUS/ANXIOUS: 1
SPEECH CHANGE: 0
BACK PAIN: 0
NECK PAIN: 0
SHORTNESS OF BREATH: 0
FEVER: 0
CLAUDICATION: 0
VOMITING: 0
WEAKNESS: 0
TINGLING: 0
COUGH: 0
PND: 0
HEADACHES: 0

## 2019-04-25 ASSESSMENT — LIFESTYLE VARIABLES
ALCOHOL_USE: YES
ON A TYPICAL DAY WHEN YOU DRINK ALCOHOL HOW MANY DRINKS DO YOU HAVE: 0
EVER FELT BAD OR GUILTY ABOUT YOUR DRINKING: NO
EVER_SMOKED: NEVER
AVERAGE NUMBER OF DAYS PER WEEK YOU HAVE A DRINK CONTAINING ALCOHOL: 6
HOW MANY TIMES IN THE PAST YEAR HAVE YOU HAD 5 OR MORE DRINKS IN A DAY: 10
HAVE YOU EVER FELT YOU SHOULD CUT DOWN ON YOUR DRINKING: NO
TOTAL SCORE: 0
HAVE PEOPLE ANNOYED YOU BY CRITICIZING YOUR DRINKING: NO
DO YOU DRINK ALCOHOL: NO
EVER HAD A DRINK FIRST THING IN THE MORNING TO STEADY YOUR NERVES TO GET RID OF A HANGOVER: NO
CONSUMPTION TOTAL: POSITIVE
TOTAL SCORE: 0
TOTAL SCORE: 0

## 2019-04-25 ASSESSMENT — PAIN SCALES - WONG BAKER: WONGBAKER_NUMERICALRESPONSE: HURTS A WHOLE LOT

## 2019-04-25 ASSESSMENT — PAIN DESCRIPTION - DESCRIPTORS: DESCRIPTORS: ACHING

## 2019-04-25 NOTE — ASSESSMENT & PLAN NOTE
Initial EKG shows T wave changes inferior leads, no ST elevations  Initial troponin negative x1 we will continue to follow serial troponins if negative we will proceed with a nuclear stress test.  Continue patient's home dose of aspirin, Effient and statin for cardiac protective measures

## 2019-04-25 NOTE — ED NOTES
Pt resting on gurney, pt in no acute distress, pt provided call light, instructed to call if needing any assistance, instructed not to get up by self, gurney in lowest position.  Pt reports he is pain free at this time  Pt medicated as prescribed

## 2019-04-25 NOTE — H&P
Hospital Medicine History & Physical Note    Date of Service  4/25/2019    Primary Care Physician  No primary care provider on file.    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  Patient said the sensation that he had today was similar to when he had his stent placement done in may.  Currently denies the sensation.  Denies having any shortness of breath currently.  At this point patient will be admitted for chest pain work-up, including a stress test. Patient denies fevers/chills, chest pain, shortness of breath or nausea/vommiting.       Review of Systems  Review of Systems   Constitutional: Negative for chills, fever and malaise/fatigue.   HENT: Negative for congestion, hearing loss, sore throat and tinnitus.    Eyes: Negative for blurred vision, double vision, photophobia and pain.   Respiratory: Negative for cough, hemoptysis, sputum production, shortness of breath and stridor.    Cardiovascular: Positive for chest pain. Negative for palpitations, orthopnea, claudication and PND.   Gastrointestinal: Negative for blood in stool, constipation, heartburn, melena, nausea and vomiting.   Genitourinary: Negative for dysuria, frequency and urgency.   Musculoskeletal: Negative for back pain, myalgias and neck pain.   Neurological: Negative for dizziness, tingling, tremors, sensory change, speech  change, weakness and headaches.   Psychiatric/Behavioral: Negative for depression, memory loss and suicidal ideas. The patient is nervous/anxious.        Past Medical History  Past Medical History:   Diagnosis Date   • Diabetes (HCC)    • Hypertension    • Kidney stones    • MI, old        Surgical History   has a past surgical history that includes other orthopedic surgery and stent placement (2017).    Family History  family history includes No Known Problems in his father; Stroke (age of onset: 47) in his mother; Stroke (age of onset: 54) in his maternal grandfather; Stroke (age of onset: 85) in his maternal grandmother.    Social History   reports that he has never smoked. He has never used smokeless tobacco. He reports that he drinks alcohol. He reports that he does not use drugs.    Allergies  No Known Allergies    Medications  Prior to Admission medications    Medication Sig Start Date End Date Taking? Authorizing Provider   acetaminophen (TYLENOL) 500 MG Tab Take 1,500 mg by mouth every 6 hours as needed.   Yes Physician Outpatient   aspirin EC (ECOTRIN) 81 MG Tablet Delayed Response Take 81 mg by mouth every day.    Physician Outpatient   buPROPion (WELLBUTRIN XL) 300 MG XL tablet Take 300 mg by mouth every day.    Physician Outpatient   prasugrel (EFFIENT) 10 MG Tab Take 10 mg by mouth every day.    Physician Outpatient   atorvastatin (LIPITOR) 40 MG Tab Take 2 Tabs by mouth every bedtime. 12/22/18   Nico Cornell, A.P.N.   lisinopril (PRINIVIL) 10 MG Tab Take 1 Tab by mouth every day. 12/23/18   Nico Cornell, A.P.N.   metoprolol (LOPRESSOR) 25 MG Tab Take 1 Tab by mouth 2 times a day. 12/22/18   Nico Cornell, A.P.N.   allopurinol (ZYLOPRIM) 100 MG Tab Take 100 mg by mouth every day.    Physician Outpatient   metFORMIN (GLUCOPHAGE) 500 MG Tab Take 2,000 mg by mouth 2 Times a Day.    Physician Outpatient       Physical Exam  Temp:  [36.3 °C (97.3 °F)] 36.3 °C (97.3 °F)  Pulse:  [63-74] 63  Resp:  [16-23]  23  BP: (216)/(121) 216/121  SpO2:  [96 %] 96 %  Physical Exam   Constitutional: He is oriented to person, place, and time. He appears well-developed and well-nourished. No distress.   HENT:   Head: Normocephalic and atraumatic.   Mouth/Throat: No oropharyngeal exudate.   Eyes: Pupils are equal, round, and reactive to light. Conjunctivae are normal. Right eye exhibits no discharge. No scleral icterus.   Neck: Neck supple. No JVD present. No thyromegaly present.   Cardiovascular: Normal rate and intact distal pulses.    No murmur heard.  Pulses:       Dorsalis pedis pulses are 2+ on the right side, and 2+ on the left side.   Cap refill < 3 s   Pulmonary/Chest: Effort normal and breath sounds normal. No stridor. No respiratory distress. He has no wheezes. He has no rales.   Abdominal: Soft. Bowel sounds are normal. He exhibits no distension. There is no tenderness. There is no rebound.   Musculoskeletal: Normal range of motion. He exhibits no edema.   Neurological: He is alert and oriented to person, place, and time. No cranial nerve deficit.   Skin: Skin is warm and dry. He is not diaphoretic. No erythema.   Psychiatric: He has a normal mood and affect. His behavior is normal. Thought content normal.   Nursing note and vitals reviewed.      Laboratory:  Recent Labs      04/25/19   0911   WBC  9.6   RBC  4.34*   HEMOGLOBIN  13.8*   HEMATOCRIT  39.8*   MCV  91.7   MCH  31.8   MCHC  34.7   RDW  42.4   PLATELETCT  249   MPV  10.7     Recent Labs      04/25/19   0911   SODIUM  137   POTASSIUM  3.4*   CHLORIDE  104   CO2  21   GLUCOSE  146*   BUN  12   CREATININE  0.92   CALCIUM  9.1     Recent Labs      04/25/19   0911   ALTSGPT  23   ASTSGOT  29   ALKPHOSPHAT  80   TBILIRUBIN  0.5   GLUCOSE  146*     Recent Labs      04/25/19   0911   APTT  27.5   INR  1.00     Recent Labs      04/25/19   0911   TROPONINI  0.02       Urinalysis:          Imaging:  DX-CHEST-PORTABLE (1 VIEW)   Final Result      No acute cardiopulmonary  findings.          Assessment/Plan:  I anticipate this patient is appropriate for observation status at this time.    Chest pain- (present on admission)   Assessment & Plan    Initial EKG shows T wave changes inferior leads, no ST elevations  Initial troponin negative x1 we will continue to follow serial troponins if negative we will proceed with a nuclear stress test.  Continue patient's home dose of aspirin, Effient and statin for cardiac protective measures     Hypokalemia- (present on admission)   Assessment & Plan    Potassium supplementation ordered  Expect increase of 0.1 mEq/L per each 10 mEq given  Will recheck after supplementation  Lab Results   Component Value Date/Time    POTASSIUM 3.4 (L) 04/25/2019 09:11 AM     Recheck bmp in the am for changes       Essential hypertension- (present on admission)   Assessment & Plan    Well-controlled, resume home dose of lisinopril     Type 2 diabetes mellitus with complication, without long-term current use of insulin (HCC)- (present on admission)   Assessment & Plan    Last A1c was 7.5  Dose of metformin, will proceed with insulin sliding scale         VTE prophylaxis: Prophylaxis: lovenox

## 2019-04-25 NOTE — CARE PLAN
Problem: Safety  Goal: Will remain free from injury  Outcome: PROGRESSING AS EXPECTED  Reinforced fall risk precautions. Non-skid socks on feet, call light in reach. Independent to the bathroom.      Problem: Venous Thromboembolism (VTW)/Deep Vein Thrombosis (DVT) Prevention:  Goal: Patient will participate in Venous Thrombosis (VTE)/Deep Vein Thrombosis (DVT)Prevention Measures  Outcome: PROGRESSING AS EXPECTED  Lovenox ordered and will be given when returns from stress test    Problem: Pain Management  Goal: Pain level will decrease to patient's comfort goal  Outcome: PROGRESSING AS EXPECTED  Patient c/o numbness/pain in left elbow. Will continue to monitor.

## 2019-04-25 NOTE — ED PROVIDER NOTES
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 male who presents with left arm pain and shortness of breath.  Started while at rest.  Feels similar to previous heart attack.  No diaphoresis, nausea or discrete chest pain.  No tearing pain or radiation of the extremities.  No weakness numbness or headache.  Denies recent illness.  Pain constant since the event started a few hours ago.    REVIEW OF SYSTEMS  As per HPI, otherwise a 10 point review of systems is negative    PAST MEDICAL HISTORY  Past Medical History:   Diagnosis Date   • Diabetes (HCC)    • Hypertension    • Kidney stones    • MI, old        SOCIAL HISTORY  Social History   Substance Use Topics   • Smoking status: Never Smoker   • Smokeless tobacco: Never Used   • Alcohol use Yes      Comment: 1 times per week       SURGICAL HISTORY  Past Surgical History:   Procedure Laterality Date   • STENT PLACEMENT  2017 1 cardiac stent   • OTHER ORTHOPEDIC SURGERY      rt wrist fracture with fixation 34 years ago       CURRENT MEDICATIONS  Home Medications     Reviewed by Toby Tran (Pharmacy Tech) on 04/25/19 at 0956  Med List Status: Complete   Medication Last Dose Status   acetaminophen (TYLENOL) 500 MG Tab 4/24/2019 Active   allopurinol (ZYLOPRIM) 100 MG Tab 4/24/2019 Active   aspirin EC (ECOTRIN) 81 MG Tablet Delayed Response 4/24/2019 Active   atorvastatin (LIPITOR) 40 MG Tab 4/24/2019 Active   buPROPion (WELLBUTRIN XL) 300 MG XL tablet 4/24/2019 Active   lisinopril (PRINIVIL) 10 MG Tab 4/24/2019 Active   metFORMIN (GLUCOPHAGE) 500 MG Tab 4/24/2019 Active   metoprolol (LOPRESSOR) 25 MG Tab 4/24/2019 Active   prasugrel (EFFIENT) 10 MG Tab 4/24/2019 Active                ALLERGIES  No Known Allergies    PHYSICAL EXAM  VITAL SIGNS: BP (!) 216/121   Pulse 66   Temp 36.3 °C  "(97.3 °F) (Temporal)   Resp 14   Ht 1.575 m (5' 2\")   Wt 71 kg (156 lb 8.4 oz)   SpO2 97%   BMI 28.63 kg/m²    Constitutional: Awake and alert  HENT:  Atraumatic, Normocephalic.Oropharynx dry mucus membranes, Nose normal inspection.   Eyes: Normal inspection  Neck: Supple  Cardiovascular: Normal heart rate, Normal rhythm.  Symmetric peripheral pulses.   Thorax & Lungs: No respiratory distress, No wheezing, No rales, No rhonchi, No chest tenderness.   Abdomen: Bowel sounds normal, soft, non-distended, nontender, no mass  Skin: Warm, Dry, No rash.   Back: No tenderness, No CVA tenderness.   Extremities: No clubbing, cyanosis, edema, no Homans or cords   Neurologic: Grossly normal   Psychiatric: Anxious appearing    RADIOLOGY/PROCEDURES  DX-CHEST-PORTABLE (1 VIEW)   Final Result      No acute cardiopulmonary findings.      NM-CARDIAC STRESS TEST    (Results Pending)        Imaging is interpreted by radiologist    Labs:  Results for orders placed or performed during the hospital encounter of 04/25/19   CBC with Differential   Result Value Ref Range    WBC 9.6 4.8 - 10.8 K/uL    RBC 4.34 (L) 4.70 - 6.10 M/uL    Hemoglobin 13.8 (L) 14.0 - 18.0 g/dL    Hematocrit 39.8 (L) 42.0 - 52.0 %    MCV 91.7 81.4 - 97.8 fL    MCH 31.8 27.0 - 33.0 pg    MCHC 34.7 33.7 - 35.3 g/dL    RDW 42.4 35.9 - 50.0 fL    Platelet Count 249 164 - 446 K/uL    MPV 10.7 9.0 - 12.9 fL    Neutrophils-Polys 55.80 44.00 - 72.00 %    Lymphocytes 25.50 22.00 - 41.00 %    Monocytes 7.50 0.00 - 13.40 %    Eosinophils 9.50 (H) 0.00 - 6.90 %    Basophils 1.50 0.00 - 1.80 %    Immature Granulocytes 0.20 0.00 - 0.90 %    Nucleated RBC 0.00 /100 WBC    Neutrophils (Absolute) 5.38 1.82 - 7.42 K/uL    Lymphs (Absolute) 2.45 1.00 - 4.80 K/uL    Monos (Absolute) 0.72 0.00 - 0.85 K/uL    Eos (Absolute) 0.91 (H) 0.00 - 0.51 K/uL    Baso (Absolute) 0.14 (H) 0.00 - 0.12 K/uL    Immature Granulocytes (abs) 0.02 0.00 - 0.11 K/uL    NRBC (Absolute) 0.00 K/uL   Complete " Metabolic Panel (CMP)   Result Value Ref Range    Sodium 137 135 - 145 mmol/L    Potassium 3.4 (L) 3.6 - 5.5 mmol/L    Chloride 104 96 - 112 mmol/L    Co2 21 20 - 33 mmol/L    Anion Gap 12.0 (H) 0.0 - 11.9    Glucose 146 (H) 65 - 99 mg/dL    Bun 12 8 - 22 mg/dL    Creatinine 0.92 0.50 - 1.40 mg/dL    Calcium 9.1 8.4 - 10.2 mg/dL    AST(SGOT) 29 12 - 45 U/L    ALT(SGPT) 23 2 - 50 U/L    Alkaline Phosphatase 80 30 - 99 U/L    Total Bilirubin 0.5 0.1 - 1.5 mg/dL    Albumin 4.7 3.2 - 4.9 g/dL    Total Protein 8.3 (H) 6.0 - 8.2 g/dL    Globulin 3.6 (H) 1.9 - 3.5 g/dL    A-G Ratio 1.3 g/dL   Troponin   Result Value Ref Range    Troponin I 0.02 0.00 - 0.04 ng/mL   ESTIMATED GFR   Result Value Ref Range    GFR If African American >60 >60 mL/min/1.73 m 2    GFR If Non African American >60 >60 mL/min/1.73 m 2   APTT   Result Value Ref Range    APTT 27.5 24.7 - 36.0 sec   PROTHROMBIN TIME (INR)   Result Value Ref Range    PT 13.1 12.0 - 14.6 sec    INR 1.00 0.87 - 1.13   EKG   Result Value Ref Range    Report       [facility] Emergency Dept.    Test Date:  2019  Pt Name:    PETER TRONCOSO              Department: [facility]  MRN:        [id number]                      [facility]  Gender:     Male                         Technician: [id number]  :        1970                   Requested By:ER TRIAGE PROTOCOL  Order #:    [id number]                    Reading MD: JERAMIE WAY MD    Measurements  Intervals                                Axis  Rate:       79                           P:          22  ND:         140                          QRS:        48  QRSD:       93                           T:          -27  QT:         381  QTc:        437    Interpretive Statements  Sinus rhythm  Probable left ventricular hypertrophy  Nonspecific T abnormalities, inferior leads  Partial missing lead(s): V2  Compared to ECG 2018 07:56:49  No significant changes    Electronically Signed On  4- 10:04:38 PDT by JERAMIE WAY MD             COURSE & MEDICAL DECISION MAKING  Patient presents with chest pain.  He is significantly hypertensive.  No tearing pain or back pain.  Symmetric pulses.  He was given labetalol and nitroglycerin.  Also given aspirin.  His chest pain completely went away.  No suggestion of pulmonary embolism.  His chest x-ray was unremarkable.  Data returned as noted above.  Because of risk factors and history he will be admitted to the hospital for further treatment.  His blood pressure improved with treatment.  I consulted Dr. Wu for admission.      FINAL IMPRESSION  1.  Hypertensive emergency   2.  chest pain, rule out acute coronary syndrome      This dictation was created using voice recognition software. The accuracy of the dictation is limited to the abilities of the software.  The nursing notes were reviewed and certain aspects of this information were incorporated into this note.      Electronically signed by: Jeramie Way, 4/25/2019 11:12 AM

## 2019-04-25 NOTE — PROGRESS NOTES
Treadmill only cardiac stress test performed via [facility] Protocol without complications. Patient denied chest pain or cardiac symptoms with exercise. Patient could only tolerate 7minutes 14 seconds of exercise due to leg fatigue. Baseline EKG SR with inverted t-waves. No significant changes noted with exercise.

## 2019-04-25 NOTE — ASSESSMENT & PLAN NOTE
Potassium supplementation ordered  Expect increase of 0.1 mEq/L per each 10 mEq given  Will recheck after supplementation  Lab Results   Component Value Date/Time    POTASSIUM 3.4 (L) 04/25/2019 09:11 AM     Recheck bmp in the am for changes

## 2019-04-25 NOTE — ED TRIAGE NOTES
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

## 2019-04-25 NOTE — CONSULTS
DATE OF SERVICE:  04/25/2019    CHIEF COMPLAINT:  Left elbow pain.    HISTORY OF PRESENT ILLNESS:  Patient is a 48-year-old gentleman with known   coronary artery disease, seen in consultation at the request of Dr. Stanton   for evaluation of his elbow pain and cardiac status.      The patient has a history of known coronary disease.  He presented with chest   pain and bilateral axillary aching in 05/2018 and was seen in consultation.    Troponin at that time was mildly elevated.  Patient was taken to the cath lab   where he was found to have a chronically occluded RCA that was stented with a   3.5x20 mm Synergy drug-eluting stent which was postdilated to 3.8 mm.  He was   also found to have several stenoses of approximately 50% of small vessels and   he was found to have an LAD lesion of approximately 50% that was examined by   fractional flow reserve and found to not be significant.  He has not been seen   in followup in the office since.  The patient was admitted to [facility]   again in 12/2018 with chest pain described as pressure-like sensation and   shortness of breath.  At that time, troponins are negative and a myocardial   perfusion scan showed no evidence of ischemia or infarction.      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  This concerned him and he presented to   the ER where he was evaluated.  His initial troponin is negative.  Patient's   cardiac risk factor profile is positive for family history of premature   coronary artery disease and hyperlipidemia.  No history of smoking or   diabetes.  He has history of hypertension.      MEDICATIONS ON ADMISSION:  Aspirin 81 mg a day, Zyloprim 100 mg a day,   atorvastatin 40 mg a day, Wellbutrin 300 mg a day, lisinopril 10 mg a day,   metformin 500 mg a day, metoprolol 25 mg twice a day, and Effient 10 mg a day.    His compliance with his medical regimen is a bit uncertain.       ALLERGIES:  No known allergies.      FAMILY HISTORY:  Mother had heart attack at a young age as did maternal   grandfather.      SOCIAL HISTORY:  He is not a smoker, works as a .  He does not   exercise.      REVIEW OF SYSTEMS:  NEUROLOGIC:  No history of stroke or TIA.  No history of headache.  PULMONARY:  Patient felt short of breath today.  No wheezes.  CARDIAC:  As above.  GASTROINTESTINAL:  No melena.  No abdominal pain.  PSYCHIATRIC:  Denies excessive anxiety or nervousness.  All other reviewed and   are negative.    PHYSICAL EXAMINATION:  GENERAL:  Patient is resting comfortably, not at all tachypneic.  He is in   fact sleeping.  On the monitor, he has been in sinus rhythm.  VITAL SIGNS:  Blood pressure was initially high.  HEENT:  Pupils are equal, sclerae nonicteric.  EOM intact.  Gaze is conjugate.  NECK:  There is no JVD, no bruit.  LUNGS:  Clear.    CHEST:  Without deformity or pain to palpation.  HEART:  Regular rate and rhythm without S3, without murmur.  ABDOMEN:  Soft and nontender.  No masses, no pain to palpation.  EXTREMITIES:  No edema.  Posterior tibial pulses are 2+.  SKIN:  Warm and dry.    PERTINENT LABORATORY:  Potassium is 3.4, initial troponin is 0.02, glucose   146, hemoglobin is 13.      IMPRESSION:  Left elbow pain.  Patient has isolated left elbow pain and   shortness of breath.  EKG from admission was personally reviewed and   demonstrates sinus rhythm.  There are nonpathologic inferior Q-waves and   nonspecific inferior ST-T changes and lateral ST-T changes.  The EKG is   unchanged from the prior tracing of last December by comparison.  The patient   has isolated left elbow pain, which is atypical for angina and does not   correlate with the symptoms he had when he had his non-Q-wave MI in 12/2018.    At that time, he had bilateral axillary pain and pressure in his chest.    Recommend plain treadmill test as myocardial perfusion scan was performed   approximately 4  months ago and was normal.  If his treadmill test is normal,   he can be safely discharged today to follow up in our office.  Compliance with   medications was strongly encouraged.       ____________________________________     MD SACHI LEAL / JONAH    DD:  04/25/2019 12:54:32  DT:  04/25/2019 13:28:37    D#:  [id number]  Job#:  [id number]

## 2019-04-26 NOTE — PROGRESS NOTES
Spoke with Steffany, hospitalist nurse regarding patient BP and treadmill results. Hospitalist and Cardiology are ok with patient discharging.

## 2019-04-26 NOTE — DISCHARGE INSTRUCTIONS
Please make an appt with Cardiology. To request an appointment with [facility]. Heart Health, please call [phone].  Call your primary care physician and ask for an appt in 1-2 weeks for follow up.   Please get a BP cuff for at home and check your BP tonight and as needed.           Chest Pain, Nonspecific  It is often hard to give a specific diagnosis for the cause of chest pain. There is always a chance that your pain could be related to something serious, like a heart attack or a blood clot in the lungs. You need to follow up with your caregiver for further evaluation. More lab tests or other studies such as X-rays, electrocardiography, stress testing, or cardiac imaging may be needed to find the cause of your pain.  Most of the time, nonspecific chest pain improves within 2 to 3 days with rest and mild pain medicine. For the next few days, avoid physical exertion or activities that bring on pain. Do not smoke. Avoid drinking alcohol. Call your caregiver for routine follow-up as advised.   SEEK IMMEDIATE MEDICAL CARE IF:  · You develop increased chest pain or pain that radiates to the arm, neck, jaw, back, or abdomen.   · You develop shortness of breath, increased coughing, or you start coughing up blood.   · You have severe back or abdominal pain, nausea, or vomiting.   · You develop severe weakness, fainting, fever, or chills.   Document Released: 12/18/2006 Document Revised: 03/11/2013 Document Reviewed: 06/06/2008  ExitCare® Patient Information ©2013 [employer].    Discharge Instructions    Discharged to home by car with relative. Discharged via walking, hospital escort: Yes.  Special equipment needed: Not Applicable    Be sure to schedule a follow-up appointment with your primary care doctor or any specialists as instructed.     Discharge Plan:   Diet Plan: Discussed  Activity Level: Discussed  Confirmed Follow up Appointment: Patient to Call and Schedule  Appointment  Confirmed Symptoms Management: Discussed  Medication Reconciliation Updated: Yes  Pneumococcal Vaccine Administered/Refused: Not given - Patient refused pneumococcal vaccine  Influenza Vaccine Indication: Patient Refuses    I understand that a diet low in cholesterol, fat, and sodium is recommended for good health. Unless I have been given specific instructions below for another diet, I accept this instruction as my diet prescription.   Other diet: diabetic, cardiac (low sodium, low fat)    Special Instructions: None    · Is patient discharged on Warfarin / Coumadin?   No     Depression / Suicide Risk    As you are discharged from this [facility] facility, it is important to learn how to keep safe from harming yourself.    Recognize the warning signs:  · Abrupt changes in personality, positive or negative- including increase in energy   · Giving away possessions  · Change in eating patterns- significant weight changes-  positive or negative  · Change in sleeping patterns- unable to sleep or sleeping all the time   · Unwillingness or inability to communicate  · Depression  · Unusual sadness, discouragement and loneliness  · Talk of wanting to die  · Neglect of personal appearance   · Rebelliousness- reckless behavior  · Withdrawal from people/activities they love  · Confusion- inability to concentrate     If you or a loved one observes any of these behaviors or has concerns about self-harm, here's what you can do:  · Talk about it- your feelings and reasons for harming yourself  · Remove any means that you might use to hurt yourself (examples: pills, rope, extension cords, firearm)  · Get professional help from the community (Mental Health, Substance Abuse, psychological counseling)  · Do not be alone:Call your Safe Contact- someone whom you trust who will be there for you.  · Call your local CRISIS HOTLINE 701-5273 or 340-539-6326  · Call your local Children's Mobile Crisis Response Team [place] (413) 128-2941 or www.[employer].Mobiotics  · Call the toll free National Suicide Prevention Hotlines   · National Suicide Prevention Lifeline 187-329-USBR (6169)  · National [place] Line Network 800-SUICIDE (480-2552)

## 2019-04-26 NOTE — PROGRESS NOTES
Telemetry Shift Summary    Rhythm SR  HR Range 60s  Ectopy none  Measurements .16/.08/.38      Normal Values  Rhythm SR  HR Range    Measurements 0.12-0.20 / 0.06-0.10  / 0.30-0.52

## 2019-04-26 NOTE — PROGRESS NOTES
Discharge order written. IV removed, patient tolerated well. Tele removed and returned to monitor tech. Belongings gathered. Pt states that all personal belongings are in possession. AVS printed, reviewed and copy signed and placed on the chart. Patient has no further questions. No new prescriptions. Patient was given the phone number to make a follow up appt with cardiology as  had already left for the day. Discharged in satisfactory condition home with fiance. Pt off unit via walking, declined staff escort.

## 2019-04-26 NOTE — DISCHARGE SUMMARY
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 male here with left arm pain which she said felt like before he had his stent last year in may. Hence, On admission patient received an EKG and cardiac serial troponin's were followed which was grossly normal, except for non specific T-wave changes on ekg. Thus since patient had a nuclear stress test in dec of 2018, we consulted cardiology. Recommendations were to perform a treadmill stress test. The stress test was Equivocal exercise treadmill test related to resting ECG changes. Given these results I reviewed case with cardiology who recommended outpatient follow up. No additional imaging was recommended. Patient denies fevers/chills, chest pain, shortness of breath or nausea/vommiting.                 Therefore, he is discharged in good and stable condition to home with close outpatient follow-up.    Discharge Date  4/25/2019    FOLLOW UP ITEMS POST DISCHARGE  Cardiology clinic in 2 weeks  PCP in 1 week    DISCHARGE DIAGNOSES  Active Problems:    Type 2 diabetes mellitus with complication, without long-term current use of insulin (HCC) POA: Yes    Essential hypertension POA: Yes    Hypokalemia POA: Yes  Resolved Problems:    Chest pain POA: Yes      FOLLOW UP  No future appointments.  Primary Care    Schedule an appointment as soon as possible for a visit  For 1-2 weeks from now for follow up      MEDICATIONS ON DISCHARGE     Medication List      CONTINUE taking these medications      Instructions   acetaminophen 500 MG Tabs  Commonly known as:  TYLENOL   Take 1,500 mg by mouth every 6 hours as needed.  Dose:  1500 mg     allopurinol 100 MG Tabs  Commonly known as:  ZYLOPRIM  Notes to  patient:  Take tonight   Take 100 mg by mouth every day.  Dose:  100 mg     aspirin EC 81 MG Tbec  Commonly known as:  ECOTRIN  Notes to patient:  You already had this today. Start taking tomorrow.    Take 81 mg by mouth every day.  Dose:  81 mg     atorvastatin 40 MG Tabs  Commonly known as:  LIPITOR  Notes to patient:  Take tonight   Take 2 Tabs by mouth every bedtime.  Dose:  80 mg     lisinopril 10 MG Tabs  Commonly known as:  PRINIVIL  Notes to patient:  You already took this tonight, take next dose tomorrrow.    Take 1 Tab by mouth every day.  Dose:  10 mg     metFORMIN 500 MG Tabs  Commonly known as:  GLUCOPHAGE  Notes to patient:  Take tonight when you get home   Take 2,000 mg by mouth 2 Times a Day.  Dose:  2000 mg     metoprolol 25 MG Tabs  Commonly known as:  LOPRESSOR  Notes to patient:  Check your BP tonight. If it is above 110 Systolic then take your second dose this evening before bed. If it is below hold it and go back to your regular schedule in the morning.    Take 1 Tab by mouth 2 times a day.  Dose:  25 mg     prasugrel 10 MG Tabs  Commonly known as:  EFFIENT  Notes to patient:  Take tonight   Take 10 mg by mouth every day.  Dose:  10 mg     WELLBUTRIN  MG XL tablet  Generic drug:  buPROPion  Notes to patient:  Take tonight   Take 300 mg by mouth every day.  Dose:  300 mg            Allergies  No Known Allergies    DIET  Orders Placed This Encounter   Procedures   • Diet Order Regular     Standing Status:   Standing     Number of Occurrences:   1     Order Specific Question:   Diet:     Answer:   Regular [1]     Order Specific Question:   Miscellaneous modifications:     Answer:   No Decaf, No Caffeine(for test) [11]     Comments:   No caffeine for 12 hours prior to exam (decaf, coffee, cola, tea, chocolate, Excedrin, and Anacin).   • Discontinue Diet Tray     Standing Status:   Standing     Number of Occurrences:   1       ACTIVITY  As tolerated.  Weight bearing as  tolerated    CONSULTATIONS  Cardiology    PROCEDURES  None    LABORATORY  Lab Results   Component Value Date    SODIUM 137 04/25/2019    POTASSIUM 3.4 (L) 04/25/2019    CHLORIDE 104 04/25/2019    CO2 21 04/25/2019    GLUCOSE 146 (H) 04/25/2019    BUN 12 04/25/2019    CREATININE 0.92 04/25/2019        Lab Results   Component Value Date    WBC 9.6 04/25/2019    HEMOGLOBIN 13.8 (L) 04/25/2019    HEMATOCRIT 39.8 (L) 04/25/2019    PLATELETCT 249 04/25/2019        Total time of the discharge process exceeds 35 minutes.

## 2019-04-26 NOTE — CARE PLAN
Problem: Safety  Goal: Will remain free from falls  Outcome: PROGRESSING AS EXPECTED  Reinforced fall risk precautions. Non-skid socks on feet, call light in reach. Independent to the bathroom.      Problem: Knowledge Deficit  Goal: Knowledge of disease process/condition, treatment plan, diagnostic tests, and medications will improve  Outcome: PROGRESSING AS EXPECTED  Discuss POC with Pt. Assess Pt's knowledge of disease process, treatment plan, diagnostic test, labs, and medications; and explain and give information as needed.     Problem: Pain Management  Goal: Pain level will decrease to patient's comfort goal  Outcome: PROGRESSING AS EXPECTED  Pain tolerable. Minor pain/numbness at left elbow

## 2019-06-14 ENCOUNTER — TELEPHONE (OUTPATIENT)
Dept: CARDIOLOGY | Facility: MEDICAL CENTER | Age: 49
End: 2019-06-14

## 2019-06-14 NOTE — TELEPHONE ENCOUNTER
Dr. Inman would like to see pt within the next couple weeks to w/u for heart cath. Very limited availability. AK gave ok for scheduling on 6/26/19 at 08:00am. Called pt at [phone] and left detailed message at personal voicemail asking if this date/time would work and requesting call back.

## 2019-06-14 NOTE — TELEPHONE ENCOUNTER
----- Message from Lindy Johnson, Med Ass't sent at 6/14/2019 10:04 AM PDT -----  Regarding: MD call for TW  Contact: [phone]  Maia from Dr. Vergara's office @ [facility] he would like to speak specifically with TW to discuss a cath procedure for this pt (last seen by RUBENS in hospital). I did advise he is doing procedures today & Dr. Vergara will await his call when he's available.    Dr. Vergara's ph# [phone]

## 2019-06-15 NOTE — TELEPHONE ENCOUNTER
Left 2nd voicemail with pt informing him that the appointment has been made for 6/26/19 at 8:00am to hold the spot, but that if this doesn't work then to please call us back on Monday so that an alternative plan can be made.

## 2019-06-25 ENCOUNTER — TELEPHONE (OUTPATIENT)
Dept: CARDIOLOGY | Facility: MEDICAL CENTER | Age: 49
End: 2019-06-25

## 2019-06-25 NOTE — TELEPHONE ENCOUNTER
----- Message from Lindy Johnson, Med Ass't sent at 6/25/2019  8:35 AM PDT -----  Regarding: heart attack symptoms advice  Contact: [phone]  MANNY    Pt was last seen by RS for a consult & states he is having the same symptoms from his last heart attack & is asking if he should be going to the ER. He has a new pt appt tomorrow tao FARR

## 2019-06-26 ENCOUNTER — OFFICE VISIT (OUTPATIENT)
Dept: CARDIOLOGY | Facility: MEDICAL CENTER | Age: 49
End: 2019-06-26
Payer: COMMERCIAL

## 2019-06-26 ENCOUNTER — TELEPHONE (OUTPATIENT)
Dept: CARDIOLOGY | Facility: MEDICAL CENTER | Age: 49
End: 2019-06-26

## 2019-06-26 VITALS
HEART RATE: 65 BPM | OXYGEN SATURATION: 97 % | SYSTOLIC BLOOD PRESSURE: 192 MMHG | DIASTOLIC BLOOD PRESSURE: 118 MMHG | WEIGHT: 157.3 LBS | BODY MASS INDEX: 28.95 KG/M2 | HEIGHT: 62 IN

## 2019-06-26 DIAGNOSIS — R07.89 OTHER CHEST PAIN: ICD-10-CM

## 2019-06-26 PROCEDURE — 99215 OFFICE O/P EST HI 40 MIN: CPT | Performed by: INTERNAL MEDICINE

## 2019-06-26 RX ORDER — CLOPIDOGREL BISULFATE 75 MG/1
75 TABLET ORAL DAILY
Qty: 30 TAB | Refills: 11 | Status: ON HOLD | OUTPATIENT
Start: 2019-06-26 | End: 2020-10-08

## 2019-06-26 RX ORDER — AMLODIPINE BESYLATE 10 MG/1
10 TABLET ORAL DAILY
Qty: 30 TAB | Refills: 11 | Status: ON HOLD | OUTPATIENT
Start: 2019-06-26 | End: 2020-10-08

## 2019-06-26 RX ORDER — LISINOPRIL 40 MG/1
40 TABLET ORAL DAILY
Qty: 30 TAB | Refills: 11 | Status: ON HOLD | OUTPATIENT
Start: 2019-06-26 | End: 2020-10-08

## 2019-06-26 NOTE — PATIENT INSTRUCTIONS
Increase lisinopril to 40 mg daily  Start amlodipine 10 mg daily for blood pressure  Start plavix in place of prasugrel for better tolerability  Will schedule coronary angiogram

## 2019-06-26 NOTE — PROGRESS NOTES
Cardiology Follow-up Consultation Note    Date of note:    6/26/2019    Primary Care Provider: Pcp Not In Computer    Name:             Guerrero Ann     YOB: 1970  MRN:               [id number]    CC: Chest pain    Patient ID/HPI:   48-year-old male patient with history of coronary artery disease, prior PCI of RCA, being followed by Dr. Huddleston referred here for coronary angiogram for ongoing angina.  He complains of chest pressure with activity.  Feels like pressure sensation middle of the chest with no radiation, occurs with activity like lifting, relieves with rest.  No shortness of breath syncope nausea vomiting.  His blood pressure is always high, she is compliant with his medications.      ROS  All other systems reviewed and negative    Past Medical History:   Diagnosis Date   • Diabetes (HCC)    • Hypertension    • Kidney stones    • MI, old          Past Surgical History:   Procedure Laterality Date   • STENT PLACEMENT  2017 1 cardiac stent   • OTHER ORTHOPEDIC SURGERY      rt wrist fracture with fixation 34 years ago         Current Outpatient Prescriptions   Medication Sig Dispense Refill   • lisinopril (PRINIVIL, ZESTRIL) 40 MG tablet Take 1 Tab by mouth every day. 30 Tab 11   • amLODIPine (NORVASC) 10 MG Tab Take 1 Tab by mouth every day. 30 Tab 11   • clopidogrel (PLAVIX) 75 MG Tab Take 1 Tab by mouth every day. 30 Tab 11   • acetaminophen (TYLENOL) 500 MG Tab Take 1,500 mg by mouth every 6 hours as needed.     • aspirin EC (ECOTRIN) 81 MG Tablet Delayed Response Take 81 mg by mouth every day.     • buPROPion (WELLBUTRIN XL) 300 MG XL tablet Take 300 mg by mouth every day.     • atorvastatin (LIPITOR) 40 MG Tab Take 2 Tabs by mouth every bedtime. 30 Tab 1   • metoprolol (LOPRESSOR) 25 MG Tab Take 1 Tab by mouth 2 times a day. 60 Tab 0   • allopurinol (ZYLOPRIM) 100 MG Tab Take 100 mg by mouth every day.     • metFORMIN (GLUCOPHAGE) 500 MG Tab Take 2,000 mg by mouth 2 Times  "a Day.       No current facility-administered medications for this visit.          No Known Allergies      Family History   Problem Relation Age of Onset   • Stroke Mother 47        had 3 devastating strokes,  56yo stroke complications   • No Known Problems Father         does not know his father.   • Stroke Maternal Grandmother 85   • Stroke Maternal Grandfather 54         Social History     Social History   • Marital status: Single     Spouse name: N/A   • Number of children: N/A   • Years of education: N/A     Occupational History   • Not on file.     Social History Main Topics   • Smoking status: Never Smoker   • Smokeless tobacco: Never Used   • Alcohol use Yes      Comment: 1 times per week   • Drug use: No   • Sexual activity: Not on file     Other Topics Concern   • Not on file     Social History Narrative   • No narrative on file         Physical Exam:  Ambulatory Vitals  BP (!) 192/118 (BP Location: Right arm, Patient Position: Sitting, BP Cuff Size: Adult)   Pulse 65   Ht 1.575 m (5' 2\")   Wt 71.3 kg (157 lb 4.8 oz)   SpO2 97%    Oxygen Therapy:  Pulse Oximetry: 97 %  BP Readings from Last 4 Encounters:   19 (!) 192/118   19 152/94   18 (!) 165/95   18 107/79       Weight/BMI: Body mass index is 28.77 kg/m².  Wt Readings from Last 4 Encounters:   19 71.3 kg (157 lb 4.8 oz)   19 70.2 kg (154 lb 12.2 oz)   18 68 kg (149 lb 14.6 oz)   18 70.3 kg (154 lb 15.7 oz)     General: Well appearing and in no apparent distress  Head: atrumatic  Eyes: No conjunctival pallor   ENT: normal external appearance of nose and ears  Neck: JVD absent, carotid bruits absent  Lungs: respiratory sounds  normal, additional breath sounds absent  Heart: Regular rhythm,   No palpable thrills on palpation, murmurs absent, no rubs,   Lower extremity edema absent.   Pedal pulses normal  Abdomen: soft, non tender, non distended.  Extremities/MSK: no clubbing, no " cyanosis  Neurological: normal orientation, Gait normal   Psychiatric: Appropriate affect, intact judgement and insight  Skin: Warm extremities      Lab Data Review:  Lab Results   Component Value Date/Time    CHOLSTRLTOT 190 12/22/2018 07:47 AM     (H) 12/22/2018 07:47 AM    HDL 36 (A) 12/22/2018 07:47 AM    TRIGLYCERIDE 217 (H) 12/22/2018 07:47 AM       Lab Results   Component Value Date/Time    SODIUM 137 04/25/2019 09:11 AM    POTASSIUM 3.4 (L) 04/25/2019 09:11 AM    CHLORIDE 104 04/25/2019 09:11 AM    CO2 21 04/25/2019 09:11 AM    GLUCOSE 146 (H) 04/25/2019 09:11 AM    BUN 12 04/25/2019 09:11 AM    CREATININE 0.92 04/25/2019 09:11 AM     Lab Results   Component Value Date/Time    ALKPHOSPHAT 80 04/25/2019 09:11 AM    ASTSGOT 29 04/25/2019 09:11 AM    ALTSGPT 23 04/25/2019 09:11 AM    TBILIRUBIN 0.5 04/25/2019 09:11 AM      Lab Results   Component Value Date/Time    WBC 9.6 04/25/2019 09:11 AM       Impression and Plan:  48-year-old male patient with  1.  Chest pain concerning for unstable angina  2.  Hypertension, uncontrolled  3.  Diabetes mellitus  4.  Non-ST elevation MI, prior RCA stent.    -Given his classic description of chest pains, I would recommend coronary angiogram as suggested by his primary cardiologist.  His blood pressure is uncontrolled, continue metoprolol, increase lisinopril to 40 mg, start amlodipine 10 mg daily.    -He complains of nausea, significant bruising with prasugrel, I will switch him to Plavix.    We will refer him back to  for regular follow-up after coronary angiogram.    Eloy PEREIRA  Interventional cardiologist  [facility], [facility]  [address] [address]  Phone: [phone]  Fax: [phone]

## 2019-06-26 NOTE — TELEPHONE ENCOUNTER
Patient is scheduled on 7-5-19 for a C w/poss with Dr. Weinberg. Patient was told to hold metformin day of procedur and 48hrs after. Patient to check in at 10:00 for a 12:00 procedure.H&P was done on 6-26-19 by Dr. FARR. Pre admit is scheduled on 7-3-19 at 2:45.

## 2019-07-03 ENCOUNTER — TELEPHONE (OUTPATIENT)
Dept: CARDIOLOGY | Facility: MEDICAL CENTER | Age: 49
End: 2019-07-03

## 2019-07-03 DIAGNOSIS — Z01.812 PRE-OPERATIVE LABORATORY EXAMINATION: ICD-10-CM

## 2019-07-03 DIAGNOSIS — Z01.811 PRE-OPERATIVE RESPIRATORY EXAMINATION: ICD-10-CM

## 2019-07-03 DIAGNOSIS — Z01.810 PRE-OPERATIVE CARDIOVASCULAR EXAMINATION: ICD-10-CM

## 2019-07-03 LAB
ALBUMIN SERPL BCP-MCNC: 4.3 G/DL (ref 3.2–4.9)
ALBUMIN/GLOB SERPL: 1.3 G/DL
ALP SERPL-CCNC: 86 U/L (ref 30–99)
ALT SERPL-CCNC: 27 U/L (ref 2–50)
ANION GAP SERPL CALC-SCNC: 10 MMOL/L (ref 0–11.9)
APTT PPP: 27.6 SEC (ref 24.7–36)
AST SERPL-CCNC: 25 U/L (ref 12–45)
BASOPHILS # BLD AUTO: 1.5 % (ref 0–1.8)
BASOPHILS # BLD: 0.15 K/UL (ref 0–0.12)
BILIRUB SERPL-MCNC: 0.4 MG/DL (ref 0.1–1.5)
BUN SERPL-MCNC: 15 MG/DL (ref 8–22)
CALCIUM SERPL-MCNC: 9.5 MG/DL (ref 8.5–10.5)
CHLORIDE SERPL-SCNC: 103 MMOL/L (ref 96–112)
CO2 SERPL-SCNC: 22 MMOL/L (ref 20–33)
CREAT SERPL-MCNC: 0.98 MG/DL (ref 0.5–1.4)
EOSINOPHIL # BLD AUTO: 0.79 K/UL (ref 0–0.51)
EOSINOPHIL NFR BLD: 8 % (ref 0–6.9)
ERYTHROCYTE [DISTWIDTH] IN BLOOD BY AUTOMATED COUNT: 46.8 FL (ref 35.9–50)
GLOBULIN SER CALC-MCNC: 3.4 G/DL (ref 1.9–3.5)
GLUCOSE SERPL-MCNC: 166 MG/DL (ref 65–99)
HCT VFR BLD AUTO: 40.3 % (ref 42–52)
HGB BLD-MCNC: 13.6 G/DL (ref 14–18)
IMM GRANULOCYTES # BLD AUTO: 0.03 K/UL (ref 0–0.11)
IMM GRANULOCYTES NFR BLD AUTO: 0.3 % (ref 0–0.9)
INR PPP: 0.97 (ref 0.87–1.13)
LYMPHOCYTES # BLD AUTO: 3.06 K/UL (ref 1–4.8)
LYMPHOCYTES NFR BLD: 30.9 % (ref 22–41)
MCH RBC QN AUTO: 32.5 PG (ref 27–33)
MCHC RBC AUTO-ENTMCNC: 33.7 G/DL (ref 33.7–35.3)
MCV RBC AUTO: 96.4 FL (ref 81.4–97.8)
MONOCYTES # BLD AUTO: 0.85 K/UL (ref 0–0.85)
MONOCYTES NFR BLD AUTO: 8.6 % (ref 0–13.4)
NEUTROPHILS # BLD AUTO: 5.03 K/UL (ref 1.82–7.42)
NEUTROPHILS NFR BLD: 50.7 % (ref 44–72)
NRBC # BLD AUTO: 0 K/UL
NRBC BLD-RTO: 0 /100 WBC
PLATELET # BLD AUTO: 252 K/UL (ref 164–446)
PMV BLD AUTO: 10.9 FL (ref 9–12.9)
POTASSIUM SERPL-SCNC: 3.7 MMOL/L (ref 3.6–5.5)
PROT SERPL-MCNC: 7.7 G/DL (ref 6–8.2)
PROTHROMBIN TIME: 13.1 SEC (ref 12–14.6)
RBC # BLD AUTO: 4.18 M/UL (ref 4.7–6.1)
SODIUM SERPL-SCNC: 135 MMOL/L (ref 135–145)
WBC # BLD AUTO: 9.9 K/UL (ref 4.8–10.8)

## 2019-07-03 PROCEDURE — 93005 ELECTROCARDIOGRAM TRACING: CPT

## 2019-07-03 PROCEDURE — 85610 PROTHROMBIN TIME: CPT

## 2019-07-03 PROCEDURE — 80053 COMPREHEN METABOLIC PANEL: CPT

## 2019-07-03 PROCEDURE — 85025 COMPLETE CBC W/AUTO DIFF WBC: CPT

## 2019-07-03 PROCEDURE — 36415 COLL VENOUS BLD VENIPUNCTURE: CPT

## 2019-07-03 PROCEDURE — 85730 THROMBOPLASTIN TIME PARTIAL: CPT

## 2019-07-03 NOTE — TELEPHONE ENCOUNTER
Notes recorded by BRAULIO Olivares on 7/3/2019 at 4:19 PM PDT  Blood sugar 166, pre-cath, let patient know. Hx of DM noted. SC     Called and left detailed message at personal voicemail notifying of blood sugar and encouraging him to f/u with PCP regarding DM. Encouraged him to call our office with any additional questions.

## 2019-07-05 ENCOUNTER — HOSPITAL ENCOUNTER (OUTPATIENT)
Facility: MEDICAL CENTER | Age: 49
End: 2019-07-05
Attending: INTERNAL MEDICINE | Admitting: INTERNAL MEDICINE
Payer: COMMERCIAL

## 2019-07-05 ENCOUNTER — APPOINTMENT (OUTPATIENT)
Dept: CARDIOLOGY | Facility: MEDICAL CENTER | Age: 49
End: 2019-07-05
Attending: INTERNAL MEDICINE
Payer: COMMERCIAL

## 2019-07-05 VITALS
SYSTOLIC BLOOD PRESSURE: 195 MMHG | HEART RATE: 69 BPM | OXYGEN SATURATION: 97 % | BODY MASS INDEX: 28.36 KG/M2 | HEIGHT: 62 IN | DIASTOLIC BLOOD PRESSURE: 100 MMHG | TEMPERATURE: 98.1 F | RESPIRATION RATE: 16 BRPM | WEIGHT: 154.1 LBS

## 2019-07-05 DIAGNOSIS — R07.89 OTHER CHEST PAIN: ICD-10-CM

## 2019-07-05 LAB — EKG IMPRESSION: NORMAL

## 2019-07-05 PROCEDURE — 93458 L HRT ARTERY/VENTRICLE ANGIO: CPT

## 2019-07-05 PROCEDURE — 700102 HCHG RX REV CODE 250 W/ 637 OVERRIDE(OP)

## 2019-07-05 PROCEDURE — 93010 ELECTROCARDIOGRAM REPORT: CPT | Performed by: INTERNAL MEDICINE

## 2019-07-05 PROCEDURE — A9270 NON-COVERED ITEM OR SERVICE: HCPCS

## 2019-07-05 PROCEDURE — 160002 HCHG RECOVERY MINUTES (STAT)

## 2019-07-05 PROCEDURE — 700101 HCHG RX REV CODE 250

## 2019-07-05 PROCEDURE — 700105 HCHG RX REV CODE 258: Performed by: INTERNAL MEDICINE

## 2019-07-05 PROCEDURE — 99152 MOD SED SAME PHYS/QHP 5/>YRS: CPT | Performed by: INTERNAL MEDICINE

## 2019-07-05 PROCEDURE — 93458 L HRT ARTERY/VENTRICLE ANGIO: CPT | Mod: 26 | Performed by: INTERNAL MEDICINE

## 2019-07-05 PROCEDURE — 700117 HCHG RX CONTRAST REV CODE 255: Performed by: INTERNAL MEDICINE

## 2019-07-05 PROCEDURE — 700111 HCHG RX REV CODE 636 W/ 250 OVERRIDE (IP)

## 2019-07-05 RX ORDER — METOPROLOL TARTRATE 1 MG/ML
INJECTION, SOLUTION INTRAVENOUS
Status: COMPLETED
Start: 2019-07-05 | End: 2019-07-05

## 2019-07-05 RX ORDER — HEPARIN SODIUM,PORCINE 1000/ML
VIAL (ML) INJECTION
Status: COMPLETED
Start: 2019-07-05 | End: 2019-07-05

## 2019-07-05 RX ORDER — MIDAZOLAM HYDROCHLORIDE 1 MG/ML
INJECTION INTRAMUSCULAR; INTRAVENOUS
Status: COMPLETED
Start: 2019-07-05 | End: 2019-07-05

## 2019-07-05 RX ORDER — ASPIRIN 81 MG/1
TABLET, CHEWABLE ORAL
Status: COMPLETED
Start: 2019-07-05 | End: 2019-07-05

## 2019-07-05 RX ORDER — METOPROLOL TARTRATE 1 MG/ML
5 INJECTION, SOLUTION INTRAVENOUS ONCE
Status: COMPLETED | OUTPATIENT
Start: 2019-07-05 | End: 2019-07-05

## 2019-07-05 RX ORDER — VERAPAMIL HYDROCHLORIDE 2.5 MG/ML
INJECTION, SOLUTION INTRAVENOUS
Status: COMPLETED
Start: 2019-07-05 | End: 2019-07-05

## 2019-07-05 RX ORDER — SODIUM CHLORIDE 9 MG/ML
1000 INJECTION, SOLUTION INTRAVENOUS
Status: DISCONTINUED | OUTPATIENT
Start: 2019-07-05 | End: 2019-07-05 | Stop reason: HOSPADM

## 2019-07-05 RX ORDER — LIDOCAINE HYDROCHLORIDE 20 MG/ML
INJECTION, SOLUTION INFILTRATION; PERINEURAL
Status: COMPLETED
Start: 2019-07-05 | End: 2019-07-05

## 2019-07-05 RX ORDER — HYDRALAZINE HYDROCHLORIDE 20 MG/ML
INJECTION INTRAMUSCULAR; INTRAVENOUS
Status: COMPLETED
Start: 2019-07-05 | End: 2019-07-05

## 2019-07-05 RX ADMIN — METOPROLOL TARTRATE 5 MG: 1 INJECTION, SOLUTION INTRAVENOUS at 12:46

## 2019-07-05 RX ADMIN — MIDAZOLAM HYDROCHLORIDE 2 MG: 1 INJECTION, SOLUTION INTRAMUSCULAR; INTRAVENOUS at 12:46

## 2019-07-05 RX ADMIN — LIDOCAINE HYDROCHLORIDE: 20 INJECTION, SOLUTION INFILTRATION; PERINEURAL at 12:26

## 2019-07-05 RX ADMIN — METOPROLOL TARTRATE 5 MG: 1 INJECTION, SOLUTION INTRAVENOUS at 13:05

## 2019-07-05 RX ADMIN — HYDRALAZINE HYDROCHLORIDE 10 MG: 20 INJECTION INTRAMUSCULAR; INTRAVENOUS at 12:46

## 2019-07-05 RX ADMIN — HEPARIN SODIUM: 1000 INJECTION, SOLUTION INTRAVENOUS; SUBCUTANEOUS at 12:26

## 2019-07-05 RX ADMIN — SODIUM CHLORIDE 1000 ML: 9 INJECTION, SOLUTION INTRAVENOUS at 10:45

## 2019-07-05 RX ADMIN — ASPIRIN 81 MG 324 MG: 81 TABLET ORAL at 12:28

## 2019-07-05 RX ADMIN — VERAPAMIL HYDROCHLORIDE 5 MG: 2.5 INJECTION, SOLUTION INTRAVENOUS at 12:27

## 2019-07-05 RX ADMIN — IOHEXOL 75 ML: 350 INJECTION, SOLUTION INTRAVENOUS at 12:55

## 2019-07-05 RX ADMIN — NITROGLYCERIN 10 ML: 20 INJECTION INTRAVENOUS at 12:27

## 2019-07-05 RX ADMIN — HEPARIN SODIUM 2000 UNITS: 200 INJECTION, SOLUTION INTRAVENOUS at 12:27

## 2019-07-05 RX ADMIN — FENTANYL CITRATE 100 MCG: 0.05 INJECTION, SOLUTION INTRAMUSCULAR; INTRAVENOUS at 12:46

## 2019-07-05 NOTE — PROCEDURES
DATE OF SERVICE:  07/05/2019    REFERRING PHYSICIAN:  Eloy Inman MD    PROCEDURES:  1.  Left heart catheterization.  2.  Coronary angiography.  3.  Left ventriculogram.  4.  Monitor conscious sedation.    PREPROCEDURE DIAGNOSES:  1.  Chest pain.  2.  History of prior right coronary artery stent placement.    POSTPROCEDURE DIAGNOSES:  1.  Two-vessel coronary artery disease with small vessel chronic total   occlusion of the mid left anterior descending artery and patent with a distal   small caliber vessel filling via right to left collaterals, patent stents in   the mid right coronary artery.  2.  Normal left ventricular systolic function with ejection fraction of 65%.  3.  Elevated left ventricular end diastolic pressure.    INDICATION:  The patient is a 48-year-old male with past medical history   significant for coronary artery disease with prior stent placement of his   right coronary artery, hypertension and diabetes.  He has been experiencing   chest pain and was scheduled for cardiac catheterization.    DESCRIPTION OF PROCEDURE:  After informed consent was signed by the patient,   the patient was brought to the cardiac catheterization laboratory.  He was   prepped and draped in the usual sterile manner.  Right wrist area was   anesthetized with 2% Xylocaine.  A 6-Citizen of [place] sheath was inserted into the right   radial artery using the modified Seldinger technique.  Intra-arterial   verapamil and nitroglycerin were given.  IV heparin was given.  A 4-Citizen of [place]   pigtail catheter was positioned in the left ventricle.  Left angiography was   performed.  This was exchanged for a 4-Citizen [place] JL 3.5 catheter, which was   positioned in the left main coronary artery.  Coronary angiography was   performed.  This was exchanged for a 4-Citizen of [place] JR4 catheter, which was   positioned in the right coronary artery.  Coronary angiography was performed.    The patient tolerated the procedure well.  At the end of the procedure, all    catheters and sheaths were removed.  Hemoband was placed in the right wrist.    He was transferred to [facility] in stable condition.    HEMODYNAMIC DATA:  Hemodynamic data shows aortic pressures of 180/100 with   mean of 120 mmHg and /10 with LVEDP of 30 mmHg.    AORTIC VALVE:  There is no significant gradient noted.    LEFT VENTRICULOGRAM:  A 10 mL of contrast was delivered for 3 seconds.    Ejection fraction was estimated to be 65%.  There was no segmental wall motion   abnormalities noted.    ANGIOGRAM:  LEFT MAIN CORONARY ARTERY:  Left main coronary artery is a long   moderate-to-large caliber vessel with diffuse luminal irregularities with   10-20%.  LEFT ANTERIOR DESCENDING ARTERY:  Left anterior descending artery is a long,   small-to-moderate caliber vessel, which wraps around the apex.  At the large   long diagonal branch bifurcation, the small left anterior descending artery is   chronically occluded.  Mid to distal vessel of a small caliber left anterior   descending artery branch filled via right to left collaterals.  There is small   caliber bifurcating first diagonal branch with diffuse luminal irregularities   of 20-30%.  There is a long large caliber diagonal branch with a mid   concentric 30-40% stenosis.  This vessel also wraps the apex.  LEFT CIRCUMFLEX ARTERY:  Left circumflex artery is a nondominant moderate   caliber vessel with diffuse luminal irregularities with 20-30%.  There is some   very small caliber first obtuse marginal branch with luminal irregularities.    There is a moderate caliber second bifurcating obtuse marginal branch with   luminal irregularities of 20-30% eccentric stenosis.  The small inferior   branch contains an ostial 50% stenosis.  There is a bifurcating small caliber   posterolateral branch with warm branch containing a distal concentric 70%   stenosis.  RIGHT CORONARY ARTERY:  Right coronary artery is a dominant moderate caliber   vessel with patent stents in the  proximal portion with eccentric 30-40%   in-stent restenosis.  Distally, there is a long large caliber posterior   descending artery with mid concentric 60% stenosis.  At the apex, there are   right to left collaterals.    IMPRESSION:    1.  Two-vessel coronary artery disease with small vessel chronic total   occlusion of the mid left anterior descending artery and patent with a distal   small caliber vessel filling via right to left collaterals, patent stents in   the mid right coronary artery.  2.  Normal left ventricular systolic function with ejection fraction of 65%.  3.  Elevated left ventricular end diastolic pressure.    RECOMMENDATIONS:  Recommend aggressive medical therapy.  His blood pressure   was severely elevated with systolic blood pressure of 180 mmHg.  He was given   IV metoprolol as well as hydralazine.    SEDATION TIME: The patient's sedation was managed by myself with continuous   face to face time with the patient for 15 minutes from 12:25 to 12:40.           ____________________________________     MD LE MIN / JONAH    DD:  07/05/2019 13:03:15  DT:  07/05/2019 15:31:34    D#:  [id number]  Job#:  [id number]

## 2019-07-05 NOTE — DISCHARGE INSTRUCTIONS
ACTIVITY: Rest and take it easy for the first 24 hours.  A responsible adult is recommended to remain with you during that time.  It is normal to feel sleepy.  We encourage you to not do anything that requires balance, judgment or coordination.    MILD FLU-LIKE SYMPTOMS ARE NORMAL. YOU MAY EXPERIENCE GENERALIZED MUSCLE ACHES, THROAT IRRITATION, HEADACHE AND/OR SOME NAUSEA.    FOR 24 HOURS DO NOT:  Drive, operate machinery or run household appliances.  Drink beer or alcoholic beverages.   Make important decisions or sign legal documents.    DIET: To avoid nausea, slowly advance diet as tolerated, avoiding spicy or greasy foods for the first day.  Add more substantial food to your diet according to your physician's instructions.  Babies can be fed formula or breast milk as soon as they are hungry.  INCREASE FLUIDS AND FIBER TO AVOID CONSTIPATION.    SURGICAL DRESSING/BATHING: leave dressing in place for 24 hours, may shower once removed    FOLLOW-UP APPOINTMENT:  A follow-up appointment should be arranged with Dr. Vergara; call to schedule.    You should CALL YOUR PHYSICIAN if you develop:  Fever greater than 101 degrees F.  Pain not relieved by medication, or persistent nausea or vomiting.  Excessive bleeding (blood soaking through dressing) or unexpected drainage from the wound.  Extreme redness or swelling around the incision site, drainage of pus or foul smelling drainage.  Inability to urinate or empty your bladder within 8 hours.  Problems with breathing or chest pain.    You should call 911 if you develop problems with breathing or chest pain.  If you are unable to contact your doctor or surgical center, you should go to the nearest emergency room or urgent care center.  Physician's telephone #: [phone]    If any questions arise, call your doctor.  If your doctor is not available, please feel free to call the Surgical Center at [phone].  The Center is open Monday through Friday from 7AM to 7PM.   You can also call the HEALTH HOTLINE open 24 hours/day, 7 days/week and speak to a nurse at [phone], or toll free at [phone].    A registered nurse may call you a few days after your surgery to see how you are doing after your procedure.    MEDICATIONS: Resume taking daily medication.  Take prescribed pain medication with food.  If no medication is prescribed, you may take non-aspirin pain medication if needed.  PAIN MEDICATION CAN BE VERY CONSTIPATING.  Take a stool softener or laxative such as senokot, pericolace, or milk of magnesia if needed.      If your physician has prescribed pain medication that includes Acetaminophen (Tylenol), do not take additional Acetaminophen (Tylenol) while taking the prescribed medication.    Depression / Suicide Risk    As you are discharged from this [facility] facility, it is important to learn how to keep safe from harming yourself.    Recognize the warning signs:  · Abrupt changes in personality, positive or negative- including increase in energy   · Giving away possessions  · Change in eating patterns- significant weight changes-  positive or negative  · Change in sleeping patterns- unable to sleep or sleeping all the time   · Unwillingness or inability to communicate  · Depression  · Unusual sadness, discouragement and loneliness  · Talk of wanting to die  · Neglect of personal appearance   · Rebelliousness- reckless behavior  · Withdrawal from people/activities they love  · Confusion- inability to concentrate     If you or a loved one observes any of these behaviors or has concerns about self-harm, here's what you can do:  · Talk about it- your feelings and reasons for harming yourself  · Remove any means that you might use to hurt yourself (examples: pills, rope, extension cords, firearm)  · Get professional help from the community (Mental Health, Substance Abuse, psychological counseling)  · Do not be alone:Call your Safe Contact- someone whom you trust  who will be there for you.  · Call your local CRISIS HOTLINE 344-0363 or 734-715-2664  · Call your local Children's Mobile Crisis Response Team [place] (963) 479-6562 or www.[facility] [place]  · Call the toll free National Suicide Prevention Hotlines   · National Suicide Prevention Lifeline 626-230-HOQV (2352)  [facility] 800-SUICIDE (453-6330)          Radial Catherization Discharge Instructions      · Do not subject hand/arm to any forceful movements for 24 hours    i.e. supporting weight when rising from the chair or bed.   · Do not drive a car for 24 hours  · You may remove the dressing tomorrow  · You may shower on the day following your procedure.  Do not take a tub bath or submerge the puncture site in water for 3 days following the procedure.  · No Lifting more than 3-5 pounds with affected wrist for 5 days  · Follow up with Dr. Inman in  2-4 weeks.  · Increase fluids for 2 days post procedure.  · Continue all previous medications unless otherwise instructed.    If bleeding should occur following discharge:  · Sit down and apply firm pressure to site with your fingers for 10 minutes  · If the bleeding stops, continue to sit quietly, keeping your wrist straight for 2 hours.  Notify physician as soon as possible ( [phone])  · If bleeding does not stop after 10 minutes, or if there is a large amount of bleeding or spurting, call 911 immediately.  Do not drive yourself to the hospital.

## 2019-07-05 NOTE — PROGRESS NOTES
1317 Pt arrived to [facility] from cath lab. Report received from RN. Pt denies any pain. Pt sleeping but arousable on calling and states he woke up at 3am and is tired.  Right radial cath site, CDI. TR band in place.    1353 Tolerating food and water.    1418 3ml of air removed from TR band.     1431  Report given to Norma COOK.

## 2019-07-06 NOTE — OR NURSING
1435 Report received from Emma  1445 3mls of air removed from TR band, site CDI and no signs of bleeding  1500 3mls of air removed from TR band, site CDI and no signs of bleeding  1515 3mls of air removed from TR band, site CDI and no signs of bleeding  1530 3mls of air removed from TR band, site CDI and no signs of bleeding  1545 TR band removed from wrist, gauze and tegaderm placed. Site CDI and soft no signs of bleeding.  1600 Discharge instructions provided to pt and s/o. Discussed diet, activity, follow up, medications, and symptom management. Pt and s/o state understanding. Pt and s/o state all questions have been answered. Copy of discharge provided to pt.   1610 Pt walked off unit with volunteer with all belongings without incident.

## 2019-08-06 PROBLEM — R07.89 OTHER CHEST PAIN: Status: RESOLVED | Noted: 2018-12-22 | Resolved: 2019-08-06

## 2019-08-27 NOTE — ASSESSMENT & PLAN NOTE
Patient does have family history of prostate cancer in first-degree relatives    Check screening PSA a1c 7.3, continue ssi

## 2020-09-21 ENCOUNTER — HOSPITAL ENCOUNTER (INPATIENT)
Facility: MEDICAL CENTER | Age: 50
LOS: 17 days | DRG: 233 | End: 2020-10-09
Attending: EMERGENCY MEDICINE | Admitting: INTERNAL MEDICINE
Payer: COMMERCIAL

## 2020-09-21 ENCOUNTER — APPOINTMENT (OUTPATIENT)
Dept: RADIOLOGY | Facility: MEDICAL CENTER | Age: 50
DRG: 233 | End: 2020-09-21
Attending: EMERGENCY MEDICINE
Payer: COMMERCIAL

## 2020-09-21 DIAGNOSIS — I25.84 CORONARY ARTERY DISEASE DUE TO CALCIFIED CORONARY LESION: ICD-10-CM

## 2020-09-21 DIAGNOSIS — Z82.3 FAMILY HISTORY OF STROKE OR TRANSIENT ISCHEMIC ATTACK IN MOTHER: ICD-10-CM

## 2020-09-21 DIAGNOSIS — I21.4 ACUTE NON-ST ELEVATION MYOCARDIAL INFARCTION (NSTEMI) (HCC): ICD-10-CM

## 2020-09-21 DIAGNOSIS — I24.9 ACUTE CORONARY SYNDROMES (HCC): ICD-10-CM

## 2020-09-21 DIAGNOSIS — I21.4 NSTEMI (NON-ST ELEVATED MYOCARDIAL INFARCTION) (HCC): ICD-10-CM

## 2020-09-21 DIAGNOSIS — E11.8 TYPE 2 DIABETES MELLITUS WITH COMPLICATION, WITHOUT LONG-TERM CURRENT USE OF INSULIN (HCC): ICD-10-CM

## 2020-09-21 DIAGNOSIS — R07.9 ACUTE CHEST PAIN: ICD-10-CM

## 2020-09-21 DIAGNOSIS — Z91.199 MEDICALLY NONCOMPLIANT: ICD-10-CM

## 2020-09-21 DIAGNOSIS — I25.10 CORONARY ARTERY DISEASE DUE TO CALCIFIED CORONARY LESION: ICD-10-CM

## 2020-09-21 DIAGNOSIS — R40.0 SOMNOLENCE: ICD-10-CM

## 2020-09-21 DIAGNOSIS — J69.0 ASPIRATION PNEUMONIA OF RIGHT MIDDLE LOBE DUE TO GASTRIC SECRETIONS (HCC): ICD-10-CM

## 2020-09-21 DIAGNOSIS — I63.81 LACUNAR INFARCT, ACUTE (HCC): ICD-10-CM

## 2020-09-21 DIAGNOSIS — I10 ESSENTIAL HYPERTENSION: ICD-10-CM

## 2020-09-21 DIAGNOSIS — A41.9 SEPTIC SHOCK (HCC): ICD-10-CM

## 2020-09-21 DIAGNOSIS — K56.7 ILEUS (HCC): ICD-10-CM

## 2020-09-21 DIAGNOSIS — Z95.1 S/P CABG X 3: ICD-10-CM

## 2020-09-21 DIAGNOSIS — R65.21 SEPTIC SHOCK (HCC): ICD-10-CM

## 2020-09-21 DIAGNOSIS — R79.89 ELEVATED TROPONIN: ICD-10-CM

## 2020-09-21 LAB
ALBUMIN SERPL BCP-MCNC: 4.9 G/DL (ref 3.2–4.9)
ALBUMIN/GLOB SERPL: 1.4 G/DL
ALP SERPL-CCNC: 99 U/L (ref 30–99)
ALT SERPL-CCNC: 20 U/L (ref 2–50)
ANION GAP SERPL CALC-SCNC: 19 MMOL/L (ref 7–16)
AST SERPL-CCNC: 25 U/L (ref 12–45)
BASOPHILS # BLD AUTO: 1.5 % (ref 0–1.8)
BASOPHILS # BLD: 0.14 K/UL (ref 0–0.12)
BILIRUB SERPL-MCNC: 0.2 MG/DL (ref 0.1–1.5)
BUN SERPL-MCNC: 12 MG/DL (ref 8–22)
CALCIUM SERPL-MCNC: 9.6 MG/DL (ref 8.5–10.5)
CHLORIDE SERPL-SCNC: 101 MMOL/L (ref 96–112)
CO2 SERPL-SCNC: 19 MMOL/L (ref 20–33)
CREAT SERPL-MCNC: 0.96 MG/DL (ref 0.5–1.4)
D DIMER PPP IA.FEU-MCNC: <0.27 UG/ML (FEU) (ref 0–0.5)
EOSINOPHIL # BLD AUTO: 0.65 K/UL (ref 0–0.51)
EOSINOPHIL NFR BLD: 6.9 % (ref 0–6.9)
ERYTHROCYTE [DISTWIDTH] IN BLOOD BY AUTOMATED COUNT: 41.3 FL (ref 35.9–50)
GLOBULIN SER CALC-MCNC: 3.4 G/DL (ref 1.9–3.5)
GLUCOSE SERPL-MCNC: 150 MG/DL (ref 65–99)
HCT VFR BLD AUTO: 40.2 % (ref 42–52)
HGB BLD-MCNC: 13.6 G/DL (ref 14–18)
IMM GRANULOCYTES # BLD AUTO: 0.02 K/UL (ref 0–0.11)
IMM GRANULOCYTES NFR BLD AUTO: 0.2 % (ref 0–0.9)
LYMPHOCYTES # BLD AUTO: 2.52 K/UL (ref 1–4.8)
LYMPHOCYTES NFR BLD: 26.8 % (ref 22–41)
MCH RBC QN AUTO: 32.2 PG (ref 27–33)
MCHC RBC AUTO-ENTMCNC: 33.8 G/DL (ref 33.7–35.3)
MCV RBC AUTO: 95.3 FL (ref 81.4–97.8)
MONOCYTES # BLD AUTO: 0.61 K/UL (ref 0–0.85)
MONOCYTES NFR BLD AUTO: 6.5 % (ref 0–13.4)
NEUTROPHILS # BLD AUTO: 5.45 K/UL (ref 1.82–7.42)
NEUTROPHILS NFR BLD: 58.1 % (ref 44–72)
NRBC # BLD AUTO: 0 K/UL
NRBC BLD-RTO: 0 /100 WBC
PLATELET # BLD AUTO: 213 K/UL (ref 164–446)
PMV BLD AUTO: 10.8 FL (ref 9–12.9)
POTASSIUM SERPL-SCNC: 3.6 MMOL/L (ref 3.6–5.5)
PROT SERPL-MCNC: 8.3 G/DL (ref 6–8.2)
RBC # BLD AUTO: 4.22 M/UL (ref 4.7–6.1)
SODIUM SERPL-SCNC: 139 MMOL/L (ref 135–145)
TROPONIN T SERPL-MCNC: 21 NG/L (ref 6–19)
TROPONIN T SERPL-MCNC: 63 NG/L (ref 6–19)
WBC # BLD AUTO: 9.4 K/UL (ref 4.8–10.8)

## 2020-09-21 PROCEDURE — 36415 COLL VENOUS BLD VENIPUNCTURE: CPT

## 2020-09-21 PROCEDURE — 85025 COMPLETE CBC W/AUTO DIFF WBC: CPT

## 2020-09-21 PROCEDURE — 93005 ELECTROCARDIOGRAM TRACING: CPT

## 2020-09-21 PROCEDURE — 85379 FIBRIN DEGRADATION QUANT: CPT

## 2020-09-21 PROCEDURE — 80053 COMPREHEN METABOLIC PANEL: CPT

## 2020-09-21 PROCEDURE — 99285 EMERGENCY DEPT VISIT HI MDM: CPT

## 2020-09-21 PROCEDURE — 71045 X-RAY EXAM CHEST 1 VIEW: CPT

## 2020-09-21 PROCEDURE — A9270 NON-COVERED ITEM OR SERVICE: HCPCS

## 2020-09-21 PROCEDURE — 84484 ASSAY OF TROPONIN QUANT: CPT

## 2020-09-21 PROCEDURE — 93005 ELECTROCARDIOGRAM TRACING: CPT | Performed by: EMERGENCY MEDICINE

## 2020-09-21 PROCEDURE — 99215 OFFICE O/P EST HI 40 MIN: CPT | Performed by: INTERNAL MEDICINE

## 2020-09-21 PROCEDURE — 700102 HCHG RX REV CODE 250 W/ 637 OVERRIDE(OP)

## 2020-09-21 RX ORDER — ASPIRIN 325 MG
325 TABLET ORAL ONCE
Status: COMPLETED | OUTPATIENT
Start: 2020-09-21 | End: 2020-09-21

## 2020-09-21 RX ORDER — LORAZEPAM 2 MG/ML
1 INJECTION INTRAMUSCULAR ONCE
Status: DISCONTINUED | OUTPATIENT
Start: 2020-09-21 | End: 2020-09-22

## 2020-09-21 RX ADMIN — ASPIRIN 325 MG: 325 TABLET, FILM COATED ORAL at 20:32

## 2020-09-21 ASSESSMENT — FIBROSIS 4 INDEX: FIB4 SCORE: 0.92

## 2020-09-22 ENCOUNTER — APPOINTMENT (OUTPATIENT)
Dept: CARDIOLOGY | Facility: MEDICAL CENTER | Age: 50
DRG: 233 | End: 2020-09-22
Attending: INTERNAL MEDICINE
Payer: COMMERCIAL

## 2020-09-22 ENCOUNTER — APPOINTMENT (OUTPATIENT)
Dept: CARDIOLOGY | Facility: MEDICAL CENTER | Age: 50
DRG: 233 | End: 2020-09-22
Attending: NURSE PRACTITIONER
Payer: COMMERCIAL

## 2020-09-22 PROBLEM — I21.4 NSTEMI (NON-ST ELEVATED MYOCARDIAL INFARCTION) (HCC): Status: ACTIVE | Noted: 2020-09-22

## 2020-09-22 LAB
APTT PPP: 28.1 SEC (ref 24.7–36)
GLUCOSE BLD-MCNC: 156 MG/DL (ref 65–99)
GLUCOSE BLD-MCNC: 174 MG/DL (ref 65–99)
GLUCOSE BLD-MCNC: 237 MG/DL (ref 65–99)
INR PPP: 1.02 (ref 0.87–1.13)
LV EJECT FRACT  99904: 60
LV EJECT FRACT MOD 2C 99903: 76.99
LV EJECT FRACT MOD 4C 99902: 54.2
LV EJECT FRACT MOD BP 99901: 67.22
PROTHROMBIN TIME: 13.7 SEC (ref 12–14.6)
TROPONIN T SERPL-MCNC: 91 NG/L (ref 6–19)
TSH SERPL DL<=0.005 MIU/L-ACNC: 3.48 UIU/ML (ref 0.38–5.33)
UFH PPP CHRO-ACNC: 0.19 IU/ML
UFH PPP CHRO-ACNC: 0.6 IU/ML
UFH PPP CHRO-ACNC: <0.1 IU/ML

## 2020-09-22 PROCEDURE — 96365 THER/PROPH/DIAG IV INF INIT: CPT

## 2020-09-22 PROCEDURE — 700117 HCHG RX CONTRAST REV CODE 255: Performed by: INTERNAL MEDICINE

## 2020-09-22 PROCEDURE — 700102 HCHG RX REV CODE 250 W/ 637 OVERRIDE(OP): Performed by: INTERNAL MEDICINE

## 2020-09-22 PROCEDURE — 84443 ASSAY THYROID STIM HORMONE: CPT

## 2020-09-22 PROCEDURE — 96375 TX/PRO/DX INJ NEW DRUG ADDON: CPT

## 2020-09-22 PROCEDURE — A9270 NON-COVERED ITEM OR SERVICE: HCPCS | Performed by: INTERNAL MEDICINE

## 2020-09-22 PROCEDURE — 84484 ASSAY OF TROPONIN QUANT: CPT

## 2020-09-22 PROCEDURE — 80061 LIPID PANEL: CPT

## 2020-09-22 PROCEDURE — 83735 ASSAY OF MAGNESIUM: CPT

## 2020-09-22 PROCEDURE — C9803 HOPD COVID-19 SPEC COLLECT: HCPCS | Performed by: INTERNAL MEDICINE

## 2020-09-22 PROCEDURE — 700111 HCHG RX REV CODE 636 W/ 250 OVERRIDE (IP): Performed by: INTERNAL MEDICINE

## 2020-09-22 PROCEDURE — 93306 TTE W/DOPPLER COMPLETE: CPT

## 2020-09-22 PROCEDURE — 93005 ELECTROCARDIOGRAM TRACING: CPT | Performed by: EMERGENCY MEDICINE

## 2020-09-22 PROCEDURE — B2151ZZ FLUOROSCOPY OF LEFT HEART USING LOW OSMOLAR CONTRAST: ICD-10-PCS | Performed by: INTERNAL MEDICINE

## 2020-09-22 PROCEDURE — 82962 GLUCOSE BLOOD TEST: CPT | Mod: 91

## 2020-09-22 PROCEDURE — 83036 HEMOGLOBIN GLYCOSYLATED A1C: CPT

## 2020-09-22 PROCEDURE — 99233 SBSQ HOSP IP/OBS HIGH 50: CPT | Mod: 25 | Performed by: INTERNAL MEDICINE

## 2020-09-22 PROCEDURE — 700101 HCHG RX REV CODE 250

## 2020-09-22 PROCEDURE — 770020 HCHG ROOM/CARE - TELE (206)

## 2020-09-22 PROCEDURE — 36415 COLL VENOUS BLD VENIPUNCTURE: CPT

## 2020-09-22 PROCEDURE — C1887 CATHETER, GUIDING: HCPCS

## 2020-09-22 PROCEDURE — 99152 MOD SED SAME PHYS/QHP 5/>YRS: CPT | Performed by: INTERNAL MEDICINE

## 2020-09-22 PROCEDURE — 4A023N7 MEASUREMENT OF CARDIAC SAMPLING AND PRESSURE, LEFT HEART, PERCUTANEOUS APPROACH: ICD-10-PCS | Performed by: INTERNAL MEDICINE

## 2020-09-22 PROCEDURE — 93458 L HRT ARTERY/VENTRICLE ANGIO: CPT | Mod: 26 | Performed by: INTERNAL MEDICINE

## 2020-09-22 PROCEDURE — 700111 HCHG RX REV CODE 636 W/ 250 OVERRIDE (IP)

## 2020-09-22 PROCEDURE — 85025 COMPLETE CBC W/AUTO DIFF WBC: CPT

## 2020-09-22 PROCEDURE — 85730 THROMBOPLASTIN TIME PARTIAL: CPT

## 2020-09-22 PROCEDURE — 80048 BASIC METABOLIC PNL TOTAL CA: CPT

## 2020-09-22 PROCEDURE — B2111ZZ FLUOROSCOPY OF MULTIPLE CORONARY ARTERIES USING LOW OSMOLAR CONTRAST: ICD-10-PCS | Performed by: INTERNAL MEDICINE

## 2020-09-22 PROCEDURE — 93306 TTE W/DOPPLER COMPLETE: CPT | Mod: 26 | Performed by: INTERNAL MEDICINE

## 2020-09-22 PROCEDURE — 85610 PROTHROMBIN TIME: CPT

## 2020-09-22 PROCEDURE — 85520 HEPARIN ASSAY: CPT | Mod: 91

## 2020-09-22 PROCEDURE — 93571 IV DOP VEL&/PRESS C FLO 1ST: CPT | Mod: 26,52,LD | Performed by: INTERNAL MEDICINE

## 2020-09-22 PROCEDURE — 93005 ELECTROCARDIOGRAM TRACING: CPT | Performed by: INTERNAL MEDICINE

## 2020-09-22 PROCEDURE — 700105 HCHG RX REV CODE 258: Performed by: INTERNAL MEDICINE

## 2020-09-22 PROCEDURE — 99223 1ST HOSP IP/OBS HIGH 75: CPT | Performed by: INTERNAL MEDICINE

## 2020-09-22 RX ORDER — AMLODIPINE BESYLATE 10 MG/1
10 TABLET ORAL DAILY
Status: DISCONTINUED | OUTPATIENT
Start: 2020-09-22 | End: 2020-09-25

## 2020-09-22 RX ORDER — NITROGLYCERIN 0.4 MG/1
TABLET SUBLINGUAL
Status: DISPENSED
Start: 2020-09-22 | End: 2020-09-22

## 2020-09-22 RX ORDER — MIDAZOLAM HYDROCHLORIDE 1 MG/ML
INJECTION INTRAMUSCULAR; INTRAVENOUS
Status: COMPLETED
Start: 2020-09-22 | End: 2020-09-22

## 2020-09-22 RX ORDER — HEPARIN SODIUM 5000 [USP'U]/100ML
0-30 INJECTION, SOLUTION INTRAVENOUS CONTINUOUS
Status: DISCONTINUED | OUTPATIENT
Start: 2020-09-22 | End: 2020-09-23

## 2020-09-22 RX ORDER — CLOPIDOGREL BISULFATE 75 MG/1
75 TABLET ORAL DAILY
Status: DISCONTINUED | OUTPATIENT
Start: 2020-09-22 | End: 2020-09-23

## 2020-09-22 RX ORDER — HEPARIN SODIUM 1000 [USP'U]/ML
60 INJECTION, SOLUTION INTRAVENOUS; SUBCUTANEOUS ONCE
Status: COMPLETED | OUTPATIENT
Start: 2020-09-22 | End: 2020-09-22

## 2020-09-22 RX ORDER — ONDANSETRON 4 MG/1
4 TABLET, ORALLY DISINTEGRATING ORAL EVERY 4 HOURS PRN
Status: DISCONTINUED | OUTPATIENT
Start: 2020-09-22 | End: 2020-09-25

## 2020-09-22 RX ORDER — ACETAMINOPHEN 325 MG/1
650 TABLET ORAL EVERY 6 HOURS PRN
Status: DISCONTINUED | OUTPATIENT
Start: 2020-09-22 | End: 2020-09-25

## 2020-09-22 RX ORDER — ONDANSETRON 2 MG/ML
4 INJECTION INTRAMUSCULAR; INTRAVENOUS EVERY 4 HOURS PRN
Status: DISCONTINUED | OUTPATIENT
Start: 2020-09-22 | End: 2020-09-25

## 2020-09-22 RX ORDER — LIDOCAINE HYDROCHLORIDE 20 MG/ML
INJECTION, SOLUTION INFILTRATION; PERINEURAL
Status: COMPLETED
Start: 2020-09-22 | End: 2020-09-22

## 2020-09-22 RX ORDER — LIDOCAINE HYDROCHLORIDE AND EPINEPHRINE 10; 10 MG/ML; UG/ML
INJECTION, SOLUTION INFILTRATION; PERINEURAL
Status: DISPENSED
Start: 2020-09-22 | End: 2020-09-23

## 2020-09-22 RX ORDER — HEPARIN SODIUM 200 [USP'U]/100ML
INJECTION, SOLUTION INTRAVENOUS
Status: COMPLETED
Start: 2020-09-22 | End: 2020-09-22

## 2020-09-22 RX ORDER — CARVEDILOL 12.5 MG/1
12.5 TABLET ORAL 2 TIMES DAILY WITH MEALS
Status: DISCONTINUED | OUTPATIENT
Start: 2020-09-22 | End: 2020-09-25

## 2020-09-22 RX ORDER — BISACODYL 10 MG
10 SUPPOSITORY, RECTAL RECTAL
Status: DISCONTINUED | OUTPATIENT
Start: 2020-09-22 | End: 2020-09-25

## 2020-09-22 RX ORDER — AMOXICILLIN 250 MG
2 CAPSULE ORAL 2 TIMES DAILY
Status: DISCONTINUED | OUTPATIENT
Start: 2020-09-22 | End: 2020-09-25

## 2020-09-22 RX ORDER — HEPARIN SODIUM 1000 [USP'U]/ML
80 INJECTION, SOLUTION INTRAVENOUS; SUBCUTANEOUS ONCE
Status: DISCONTINUED | OUTPATIENT
Start: 2020-09-22 | End: 2020-09-22

## 2020-09-22 RX ORDER — CARVEDILOL 6.25 MG/1
6.25 TABLET ORAL 2 TIMES DAILY WITH MEALS
Status: DISCONTINUED | OUTPATIENT
Start: 2020-09-22 | End: 2020-09-22

## 2020-09-22 RX ORDER — CHLORTHALIDONE 25 MG/1
25 TABLET ORAL
Status: DISCONTINUED | OUTPATIENT
Start: 2020-09-22 | End: 2020-09-23

## 2020-09-22 RX ORDER — SODIUM CHLORIDE 9 MG/ML
INJECTION, SOLUTION INTRAVENOUS CONTINUOUS
OUTPATIENT
Start: 2020-09-24

## 2020-09-22 RX ORDER — MORPHINE SULFATE 4 MG/ML
2-4 INJECTION, SOLUTION INTRAMUSCULAR; INTRAVENOUS
Status: DISCONTINUED | OUTPATIENT
Start: 2020-09-22 | End: 2020-09-25

## 2020-09-22 RX ORDER — HEPARIN SODIUM 5000 [USP'U]/100ML
0-30 INJECTION, SOLUTION INTRAVENOUS CONTINUOUS
Status: DISCONTINUED | OUTPATIENT
Start: 2020-09-22 | End: 2020-09-22

## 2020-09-22 RX ORDER — NITROGLYCERIN 0.4 MG/1
0.4 TABLET SUBLINGUAL
Status: DISCONTINUED | OUTPATIENT
Start: 2020-09-22 | End: 2020-09-25

## 2020-09-22 RX ORDER — DEXTROSE MONOHYDRATE 25 G/50ML
50 INJECTION, SOLUTION INTRAVENOUS
Status: DISCONTINUED | OUTPATIENT
Start: 2020-09-22 | End: 2020-09-25

## 2020-09-22 RX ORDER — SODIUM CHLORIDE 9 MG/ML
INJECTION, SOLUTION INTRAVENOUS CONTINUOUS
Status: DISPENSED | OUTPATIENT
Start: 2020-09-22 | End: 2020-09-23

## 2020-09-22 RX ORDER — HEPARIN SODIUM 1000 [USP'U]/ML
30 INJECTION, SOLUTION INTRAVENOUS; SUBCUTANEOUS PRN
Status: DISCONTINUED | OUTPATIENT
Start: 2020-09-22 | End: 2020-09-23

## 2020-09-22 RX ORDER — HEPARIN SODIUM,PORCINE 1000/ML
VIAL (ML) INJECTION
Status: COMPLETED
Start: 2020-09-22 | End: 2020-09-22

## 2020-09-22 RX ORDER — VERAPAMIL HYDROCHLORIDE 2.5 MG/ML
INJECTION, SOLUTION INTRAVENOUS
Status: COMPLETED
Start: 2020-09-22 | End: 2020-09-22

## 2020-09-22 RX ORDER — POLYETHYLENE GLYCOL 3350 17 G/17G
1 POWDER, FOR SOLUTION ORAL
Status: DISCONTINUED | OUTPATIENT
Start: 2020-09-22 | End: 2020-09-25

## 2020-09-22 RX ORDER — ATORVASTATIN CALCIUM 40 MG/1
40 TABLET, FILM COATED ORAL EVERY EVENING
Status: DISCONTINUED | OUTPATIENT
Start: 2020-09-22 | End: 2020-09-23

## 2020-09-22 RX ORDER — HEPARIN SODIUM 1000 [USP'U]/ML
40 INJECTION, SOLUTION INTRAVENOUS; SUBCUTANEOUS PRN
Status: DISCONTINUED | OUTPATIENT
Start: 2020-09-22 | End: 2020-09-22

## 2020-09-22 RX ORDER — LISINOPRIL 20 MG/1
40 TABLET ORAL DAILY
Status: DISCONTINUED | OUTPATIENT
Start: 2020-09-22 | End: 2020-09-25

## 2020-09-22 RX ADMIN — CHLORTHALIDONE 25 MG: 25 TABLET ORAL at 08:31

## 2020-09-22 RX ADMIN — HEPARIN SODIUM 3700 UNITS: 1000 INJECTION, SOLUTION INTRAVENOUS; SUBCUTANEOUS at 03:12

## 2020-09-22 RX ADMIN — HEPARIN SODIUM 12 UNITS/KG/HR: 5000 INJECTION, SOLUTION INTRAVENOUS at 03:12

## 2020-09-22 RX ADMIN — HEPARIN SODIUM: 200 INJECTION, SOLUTION INTRAVENOUS at 15:04

## 2020-09-22 RX ADMIN — LISINOPRIL 40 MG: 20 TABLET ORAL at 05:03

## 2020-09-22 RX ADMIN — VERAPAMIL HYDROCHLORIDE 5 MG: 2.5 INJECTION, SOLUTION INTRAVENOUS at 14:59

## 2020-09-22 RX ADMIN — FENTANYL CITRATE 100 MCG: 50 INJECTION INTRAMUSCULAR; INTRAVENOUS at 15:05

## 2020-09-22 RX ADMIN — HEPARIN SODIUM: 1000 INJECTION, SOLUTION INTRAVENOUS; SUBCUTANEOUS at 14:59

## 2020-09-22 RX ADMIN — NITROGLYCERIN: 20 INJECTION INTRAVENOUS at 15:06

## 2020-09-22 RX ADMIN — CLOPIDOGREL BISULFATE 75 MG: 75 TABLET ORAL at 05:02

## 2020-09-22 RX ADMIN — CARVEDILOL 12.5 MG: 12.5 TABLET, FILM COATED ORAL at 17:50

## 2020-09-22 RX ADMIN — CARVEDILOL 12.5 MG: 12.5 TABLET, FILM COATED ORAL at 08:31

## 2020-09-22 RX ADMIN — MIDAZOLAM HYDROCHLORIDE 0.5 MG: 1 INJECTION, SOLUTION INTRAMUSCULAR; INTRAVENOUS at 15:10

## 2020-09-22 RX ADMIN — METOPROLOL TARTRATE 25 MG: 25 TABLET, FILM COATED ORAL at 04:41

## 2020-09-22 RX ADMIN — IOHEXOL 210 ML: 350 INJECTION, SOLUTION INTRAVENOUS at 15:10

## 2020-09-22 RX ADMIN — ATORVASTATIN CALCIUM 40 MG: 40 TABLET, FILM COATED ORAL at 17:50

## 2020-09-22 RX ADMIN — MIDAZOLAM HYDROCHLORIDE 2 MG: 1 INJECTION, SOLUTION INTRAMUSCULAR; INTRAVENOUS at 15:05

## 2020-09-22 RX ADMIN — AMLODIPINE BESYLATE 10 MG: 10 TABLET ORAL at 05:03

## 2020-09-22 RX ADMIN — LIDOCAINE HYDROCHLORIDE: 20 INJECTION, SOLUTION INFILTRATION; PERINEURAL at 15:00

## 2020-09-22 RX ADMIN — SODIUM CHLORIDE: 9 INJECTION, SOLUTION INTRAVENOUS at 20:05

## 2020-09-22 RX ADMIN — NITROGLYCERIN 0.4 MG: 0.4 TABLET, ORALLY DISINTEGRATING SUBLINGUAL at 01:26

## 2020-09-22 ASSESSMENT — ENCOUNTER SYMPTOMS
GASTROINTESTINAL NEGATIVE: 1
MYALGIAS: 0
RESPIRATORY NEGATIVE: 1
DIZZINESS: 0
FLANK PAIN: 0
VOMITING: 0
MUSCULOSKELETAL NEGATIVE: 1
PALPITATIONS: 0
CONSTITUTIONAL NEGATIVE: 1
SEIZURES: 0
NAUSEA: 0
BACK PAIN: 0
SHORTNESS OF BREATH: 0
SHORTNESS OF BREATH: 1
NECK PAIN: 0
SORE THROAT: 0
HEADACHES: 0
NERVOUS/ANXIOUS: 0
DIARRHEA: 0
BRUISES/BLEEDS EASILY: 0
PSYCHIATRIC NEGATIVE: 1
SPUTUM PRODUCTION: 0
NEUROLOGICAL NEGATIVE: 1
DIAPHORESIS: 0
BLURRED VISION: 0
WEAKNESS: 0
ABDOMINAL PAIN: 0
EYES NEGATIVE: 1
FOCAL WEAKNESS: 0
WHEEZING: 0
COUGH: 0
CARDIOVASCULAR NEGATIVE: 1
BLOOD IN STOOL: 0
FEVER: 0
CHILLS: 0

## 2020-09-22 ASSESSMENT — LIFESTYLE VARIABLES
EVER FELT BAD OR GUILTY ABOUT YOUR DRINKING: NO
TOTAL SCORE: 0
HAVE YOU EVER FELT YOU SHOULD CUT DOWN ON YOUR DRINKING: NO
AVERAGE NUMBER OF DAYS PER WEEK YOU HAVE A DRINK CONTAINING ALCOHOL: 2
HAVE PEOPLE ANNOYED YOU BY CRITICIZING YOUR DRINKING: NO
ALCOHOL_USE: YES
TOTAL SCORE: 0
EVER_SMOKED: NEVER
HOW MANY TIMES IN THE PAST YEAR HAVE YOU HAD 5 OR MORE DRINKS IN A DAY: 50
HAVE PEOPLE ANNOYED YOU BY CRITICIZING YOUR DRINKING: NO
TOTAL SCORE: 0
ON A TYPICAL DAY WHEN YOU DRINK ALCOHOL HOW MANY DRINKS DO YOU HAVE: 4
TOTAL SCORE: 0
ALCOHOL_USE: YES
CONSUMPTION TOTAL: POSITIVE
EVER HAD A DRINK FIRST THING IN THE MORNING TO STEADY YOUR NERVES TO GET RID OF A HANGOVER: NO
CONSUMPTION TOTAL: INCOMPLETE
EVER FELT BAD OR GUILTY ABOUT YOUR DRINKING: NO
TOTAL SCORE: 0
EVER HAD A DRINK FIRST THING IN THE MORNING TO STEADY YOUR NERVES TO GET RID OF A HANGOVER: NO
HAVE YOU EVER FELT YOU SHOULD CUT DOWN ON YOUR DRINKING: NO
DOES PATIENT WANT TO STOP DRINKING: NO
TOTAL SCORE: 0

## 2020-09-22 ASSESSMENT — COGNITIVE AND FUNCTIONAL STATUS - GENERAL
DRESSING REGULAR LOWER BODY CLOTHING: A LITTLE
SUGGESTED CMS G CODE MODIFIER MOBILITY: CJ
DAILY ACTIVITIY SCORE: 22
STANDING UP FROM CHAIR USING ARMS: A LITTLE
MOBILITY SCORE: 22
TOILETING: A LITTLE
MOVING TO AND FROM BED TO CHAIR: A LITTLE
SUGGESTED CMS G CODE MODIFIER DAILY ACTIVITY: CJ

## 2020-09-22 ASSESSMENT — PATIENT HEALTH QUESTIONNAIRE - PHQ9
7. TROUBLE CONCENTRATING ON THINGS, SUCH AS READING THE NEWSPAPER OR WATCHING TELEVISION: NOT AT ALL
4. FEELING TIRED OR HAVING LITTLE ENERGY: NEARLY EVERY DAY
1. LITTLE INTEREST OR PLEASURE IN DOING THINGS: MORE THAN HALF THE DAYS
SUM OF ALL RESPONSES TO PHQ QUESTIONS 1-9: 7
2. FEELING DOWN, DEPRESSED, IRRITABLE, OR HOPELESS: SEVERAL DAYS
8. MOVING OR SPEAKING SO SLOWLY THAT OTHER PEOPLE COULD HAVE NOTICED. OR THE OPPOSITE, BEING SO FIGETY OR RESTLESS THAT YOU HAVE BEEN MOVING AROUND A LOT MORE THAN USUAL: NOT AT ALL
3. TROUBLE FALLING OR STAYING ASLEEP OR SLEEPING TOO MUCH: NOT AT ALL
SUM OF ALL RESPONSES TO PHQ9 QUESTIONS 1 AND 2: 3
5. POOR APPETITE OR OVEREATING: NOT AT ALL
9. THOUGHTS THAT YOU WOULD BE BETTER OFF DEAD, OR OF HURTING YOURSELF: SEVERAL DAYS
6. FEELING BAD ABOUT YOURSELF - OR THAT YOU ARE A FAILURE OR HAVE LET YOURSELF OR YOUR FAMILY DOWN: NOT AL ALL

## 2020-09-22 ASSESSMENT — COPD QUESTIONNAIRES
COPD SCREENING SCORE: 0
DURING THE PAST 4 WEEKS HOW MUCH DID YOU FEEL SHORT OF BREATH: NONE/LITTLE OF THE TIME
HAVE YOU SMOKED AT LEAST 100 CIGARETTES IN YOUR ENTIRE LIFE: NO/DON'T KNOW
DO YOU EVER COUGH UP ANY MUCUS OR PHLEGM?: NO/ONLY WITH OCCASIONAL COLDS OR INFECTIONS

## 2020-09-22 ASSESSMENT — FIBROSIS 4 INDEX
FIB4 SCORE: 1.26
FIB4 SCORE: 1.26

## 2020-09-22 NOTE — CONSULTS
"Reason for Consult:  Asked by Dr Cesar Perera M.D. to see this patient with NSTEMI    CC:   Chief Complaint   Patient presents with   • Chest Pain       HPI:      49 year old man with PMH HTN, HLD, DM2, CAD/PCI presents with typical angina after shoveling rocks at work found NSTEMI. Describes substernal. No associated symptoms. Improved with rest. Prior to this had prior episodes and went for [facility] but states \"blood vessel was too small.\" Claims compliance with medications. Denies toxic social habits but does use alcohol in moderation about 10 weekly whiskey drinks. Follows Dr Huddleston outpatient.     Medications / Drug list prior to admission:  No current facility-administered medications on file prior to encounter.      Current Outpatient Medications on File Prior to Encounter   Medication Sig Dispense Refill   • lisinopril (PRINIVIL, ZESTRIL) 40 MG tablet Take 1 Tab by mouth every day. 30 Tab 11   • amLODIPine (NORVASC) 10 MG Tab Take 1 Tab by mouth every day. 30 Tab 11   • clopidogrel (PLAVIX) 75 MG Tab Take 1 Tab by mouth every day. 30 Tab 11   • acetaminophen (TYLENOL) 500 MG Tab Take 1,500 mg by mouth every 6 hours as needed.     • aspirin EC (ECOTRIN) 81 MG Tablet Delayed Response Take 81 mg by mouth every day.     • buPROPion (WELLBUTRIN XL) 300 MG XL tablet Take 300 mg by mouth every day.     • atorvastatin (LIPITOR) 40 MG Tab Take 2 Tabs by mouth every bedtime. 30 Tab 1   • metoprolol (LOPRESSOR) 25 MG Tab Take 1 Tab by mouth 2 times a day. 60 Tab 0   • allopurinol (ZYLOPRIM) 100 MG Tab Take 100 mg by mouth every day.     • metFORMIN (GLUCOPHAGE) 500 MG Tab Take 2,000 mg by mouth 2 Times a Day.         Current list of administered Medications:    Current Facility-Administered Medications:   •  senna-docusate (PERICOLACE or SENOKOT S) 8.6-50 MG per tablet 2 Tab, 2 Tab, Oral, BID **AND** polyethylene glycol/lytes (MIRALAX) PACKET 1 Packet, 1 Packet, Oral, QDAY PRN **AND** magnesium hydroxide (MILK OF " MAGNESIA) suspension 30 mL, 30 mL, Oral, QDAY PRN **AND** bisacodyl (DULCOLAX) suppository 10 mg, 10 mg, Rectal, QDAY PRN, Alden Harden M.D.  •  Respiratory Therapy Consult, , Nebulization, Continuous RT, Alden Harden M.D.  •  acetaminophen (TYLENOL) tablet 650 mg, 650 mg, Oral, Q6HRS PRN, Alden Harden M.D.  •  [START ON 9/23/2020] aspirin EC (ECOTRIN) tablet 81 mg, 81 mg, Oral, DAILY, Alden Harden M.D.  •  metoprolol (LOPRESSOR) tablet 25 mg, 25 mg, Oral, TWICE DAILY, Alden Harden M.D., 25 mg at 09/22/20 0441  •  atorvastatin (LIPITOR) tablet 40 mg, 40 mg, Oral, Q EVENING, Alden Harden M.D.  •  nitroglycerin (NITROSTAT) tablet 0.4 mg, 0.4 mg, Sublingual, Q5 MIN PRN, Alden Harden M.D., 0.4 mg at 09/22/20 0126  •  morphine (pf) 4 MG/ML injection 2-4 mg, 2-4 mg, Intravenous, Q3HRS PRN, Alden Harden M.D.  •  ondansetron (ZOFRAN) syringe/vial injection 4 mg, 4 mg, Intravenous, Q4HRS PRN, Alden Harden M.D.  •  ondansetron (ZOFRAN ODT) dispertab 4 mg, 4 mg, Oral, Q4HRS PRN, Alden Harden M.D.  •  insulin regular (HumuLIN R,NovoLIN R) injection, 1-6 Units, Subcutaneous, 4X/DAY ACHS **AND** POC Blood Glucose, , , Q AC AND BEDTIME(S) **AND** NOTIFY MD and PharmD, , , Once **AND** glucose 4 g chewable tablet 16 g, 16 g, Oral, Q15 MIN PRN **AND** dextrose 50% (D50W) injection 50 mL, 50 mL, Intravenous, Q15 MIN PRN, Alden Harden M.D.  •  amLODIPine (NORVASC) tablet 10 mg, 10 mg, Oral, DAILY, Alden Harden M.D., 10 mg at 09/22/20 0503  •  clopidogrel (PLAVIX) tablet 75 mg, 75 mg, Oral, DAILY, Alden Harden M.D., 75 mg at 09/22/20 0502  •  lisinopril (PRINIVIL) tablet 40 mg, 40 mg, Oral, DAILY, Alden Harden M.D., 40 mg at 09/22/20 0503  •  NITROGLYCERIN 0.4 MG SL SUBL, , , ,   •  heparin infusion 25,000 units in 500 mL 0.45% NACL, 0-30 Units/kg/hr (Adjusted), Intravenous, Continuous, Alden Harden M.D., Last Rate: 14.8 mL/hr at 09/22/20 0312, 12 Units/kg/hr at 09/22/20 0312  •  heparin injection 1,900 Units, 30 Units/kg (Adjusted),  KAITLYN Camara Omar Ashraf, M.D.    Past Medical History:   Diagnosis Date   • Diabetes (HCC)     oral meds only   • Hypertension    • Kidney stones    • MI, old    • Psychiatric problem     depression and anxiety       Past Surgical History:   Procedure Laterality Date   • STENT PLACEMENT  2017    1 cardiac stent   • OTHER ORTHOPEDIC SURGERY      rt wrist fracture with fixation 34 years ago       Family History   Problem Relation Age of Onset   • Stroke Mother 47        had 3 devastating strokes,  56yo stroke complications   • No Known Problems Father         does not know his father.   • Stroke Maternal Grandmother 85   • Stroke Maternal Grandfather 54     Patient family history was personally reviewed, no pertinent family history to current presentation    Social History     Socioeconomic History   • Marital status: Single     Spouse name: Not on file   • Number of children: Not on file   • Years of education: Not on file   • Highest education level: Not on file   Occupational History   • Not on file   Social Needs   • Financial resource strain: Not on file   • Food insecurity     Worry: Not on file     Inability: Not on file   • Transportation needs     Medical: Not on file     Non-medical: Not on file   Tobacco Use   • Smoking status: Never Smoker   • Smokeless tobacco: Current User     Types: Chew   Substance and Sexual Activity   • Alcohol use: Yes     Comment: 2 per week   • Drug use: No   • Sexual activity: Not on file   Lifestyle   • Physical activity     Days per week: Not on file     Minutes per session: Not on file   • Stress: Not on file   Relationships   • Social connections     Talks on phone: Not on file     Gets together: Not on file     Attends [employer] service: Not on file     Active member of club or organization: Not on file     Attends meetings of clubs or organizations: Not on file     Relationship status: Not on file   • Intimate partner violence     Fear of current or ex partner:  "Not on file     Emotionally abused: Not on file     Physically abused: Not on file     Forced sexual activity: Not on file   Other Topics Concern   • Not on file   Social History Narrative   • Not on file       ALLERGIES:  No Known Allergies    Review of systems:  A complete review of symptoms was reviewed with patient. This is reviewed in H&P and PMH. ALL OTHERS reviewed and negative    Physical exam:  Patient Vitals for the past 24 hrs:   BP Temp Temp src Pulse Resp SpO2 Height Weight   09/22/20 0400 (!) 192/118 36.3 °C (97.3 °F) Temporal 65 18 98 % 1.575 m (5' 2\") 70.7 kg (155 lb 13.8 oz)   09/22/20 0138 -- -- -- 79 19 96 % -- --   09/22/20 0000 155/73 -- -- 85 (!) 21 97 % -- --   09/21/20 2330 (!) 163/97 -- -- 96 (!) 22 97 % -- --   09/21/20 2300 150/90 -- -- 90 (!) 24 95 % -- --   09/21/20 2245 -- -- -- 92 (!) 23 95 % -- --   09/21/20 2230 143/79 -- -- -- -- -- -- --   09/21/20 2200 142/81 -- -- 92 (!) 23 94 % -- --   09/21/20 2130 135/66 -- -- 92 (!) 23 93 % -- --   09/21/20 2100 157/82 -- -- 96 (!) 24 94 % -- --   09/21/20 2033 -- -- -- 93 (!) 22 96 % -- --   09/21/20 2030 (!) 181/118 -- -- 100 -- 97 % -- --   09/21/20 2004 (!) 169/108 -- -- 97 -- 98 % -- --   09/21/20 1922 -- -- -- -- -- -- 1.575 m (5' 2\") 72.6 kg (160 lb)   09/21/20 1915 130/92 37.1 °C (98.8 °F) Oral (!) 111 16 98 % -- --     General: No acute distress.   EYES: no jaundice  HEENT: OP clear   Neck: No bruits No JVD.   CVS:  RRR. S1 + S2. No M/R/G. No edema.  Resp: CTAB. No wheezing or crackles/rhonchi.  Abdomen: Soft, NT, ND,  Skin: Grossly nothing acute no obvious rashes  Neurological: Alert, Moves all extremities, no cranial nerve defects on limited exam  Extremities: Pulse 2+ in b/l LE. No cyanosis.     Data:  Laboratory studies personally reviewed by me:  Recent Results (from the past 24 hour(s))   EKG    Collection Time: 09/21/20  7:20 PM   Result Value Ref Range    Report       [facility] Emergency Dept.    Test " Date:  2020  Pt Name:    PETER TRONCOSO              Department: ER  MRN:        [id number]                      Room:       [facility]  Gender:     Male                         Technician: [id number]  :        1970                   Requested By:ER TRIAGE PROTOCOL  Order #:    [id number]                    Reading MD:    Measurements  Intervals                                Axis  Rate:       111                          P:          71  DE:         143                          QRS:        63  QRSD:       94                           T:          -77  QT:         296  QTc:        402    Interpretive Statements  Sinus tachycardia  Left ventricular hypertrophy  Abnormal T, consider ischemia, inferior leads  Compared to ECG 2019 15:01:50  Possible ischemia now present  Sinus rhythm no longer present  T-wave abnormality still present     CBC with Differential    Collection Time: 20  7:51 PM   Result Value Ref Range    WBC 9.4 4.8 - 10.8 K/uL    RBC 4.22 (L) 4.70 - 6.10 M/uL    Hemoglobin 13.6 (L) 14.0 - 18.0 g/dL    Hematocrit 40.2 (L) 42.0 - 52.0 %    MCV 95.3 81.4 - 97.8 fL    MCH 32.2 27.0 - 33.0 pg    MCHC 33.8 33.7 - 35.3 g/dL    RDW 41.3 35.9 - 50.0 fL    Platelet Count 213 164 - 446 K/uL    MPV 10.8 9.0 - 12.9 fL    Neutrophils-Polys 58.10 44.00 - 72.00 %    Lymphocytes 26.80 22.00 - 41.00 %    Monocytes 6.50 0.00 - 13.40 %    Eosinophils 6.90 0.00 - 6.90 %    Basophils 1.50 0.00 - 1.80 %    Immature Granulocytes 0.20 0.00 - 0.90 %    Nucleated RBC 0.00 /100 WBC    Neutrophils (Absolute) 5.45 1.82 - 7.42 K/uL    Lymphs (Absolute) 2.52 1.00 - 4.80 K/uL    Monos (Absolute) 0.61 0.00 - 0.85 K/uL    Eos (Absolute) 0.65 (H) 0.00 - 0.51 K/uL    Baso (Absolute) 0.14 (H) 0.00 - 0.12 K/uL    Immature Granulocytes (abs) 0.02 0.00 - 0.11 K/uL    NRBC (Absolute) 0.00 K/uL   Complete Metabolic Panel (CMP)    Collection Time: 20  7:51 PM   Result Value Ref Range    Sodium 139 135 - 145 mmol/L    Potassium  3.6 3.6 - 5.5 mmol/L    Chloride 101 96 - 112 mmol/L    Co2 19 (L) 20 - 33 mmol/L    Anion Gap 19.0 (H) 7.0 - 16.0    Glucose 150 (H) 65 - 99 mg/dL    Bun 12 8 - 22 mg/dL    Creatinine 0.96 0.50 - 1.40 mg/dL    Calcium 9.6 8.5 - 10.5 mg/dL    AST(SGOT) 25 12 - 45 U/L    ALT(SGPT) 20 2 - 50 U/L    Alkaline Phosphatase 99 30 - 99 U/L    Total Bilirubin 0.2 0.1 - 1.5 mg/dL    Albumin 4.9 3.2 - 4.9 g/dL    Total Protein 8.3 (H) 6.0 - 8.2 g/dL    Globulin 3.4 1.9 - 3.5 g/dL    A-G Ratio 1.4 g/dL   Troponin    Collection Time: 20  7:51 PM   Result Value Ref Range    Troponin T 21 (H) 6 - 19 ng/L   D-DIMER    Collection Time: 20  7:51 PM   Result Value Ref Range    D-Dimer Screen <0.27 0.00 - 0.50 ug/mL (FEU)   ESTIMATED GFR    Collection Time: 20  7:51 PM   Result Value Ref Range    GFR If African American >60 >60 mL/min/1.73 m 2    GFR If Non African American >60 >60 mL/min/1.73 m 2   TROPONIN    Collection Time: 20 10:15 PM   Result Value Ref Range    Troponin T 63 (H) 6 - 19 ng/L   EKG in four (4) hours    Collection Time: 20  1:07 AM   Result Value Ref Range    Report       [facility] Emergency Dept[facility]    Test Date:  2020  Pt Name:    PETER TRONCOSO              Department: ER  MRN:        [id number]                      Room:        26  Gender:     Male                         Technician: [id number]  :        1970                   Requested By:GERI AMAYA  Order #:    [id number]                    Reading MD:    Measurements  Intervals                                Axis  Rate:       73                           P:          47  NY:         140                          QRS:        51  QRSD:       92                           T:          -75  QT:         376  QTc:        415    Interpretive Statements  SINUS RHYTHM  PROBABLE LVH WITH SECONDARY REPOL ABNRM  PROBABLE INFERIOR INFARCT, AGE INDETERMINATE  Compared to ECG 2020 19:20:28  Myocardial infarct  finding now present  Sinus tachycardia no longer present  T-wave abnormality no longer present  Possible ischemia no longer present     Troponin in two (2) hours    Collection Time: 20  1:09 AM   Result Value Ref Range    Troponin T 91 (H) 6 - 19 ng/L   EKG    Collection Time: 20  1:34 AM   Result Value Ref Range    Report       [facility] Emergency Dept.    Test Date:  2020  Pt Name:    PETER TRONCOSO              Department: ER  MRN:        [id number]                      Room:       [facility]  Gender:     Male                         Technician: [id number]  :        1970                   Requested By:SLADE AMANDA  Order #:    [id number]                    Reading MD:    Measurements  Intervals                                Axis  Rate:       82                           P:          53  GA:         136                          QRS:        48  QRSD:       86                           T:          257  QT:         356  QTc:        416    Interpretive Statements  SINUS RHYTHM  PROBABLE LVH WITH SECONDARY REPOL ABNRM  PROBABLE INFERIOR INFARCT, AGE INDETERMINATE  Compared to ECG 2020 01:23:45  Myocardial infarct finding now present     TSH WITH REFLEX TO FT4    Collection Time: 20  1:51 AM   Result Value Ref Range    TSH 3.480 0.380 - 5.330 uIU/mL   aPTT    Collection Time: 20  1:51 AM   Result Value Ref Range    APTT 28.1 24.7 - 36.0 sec   Prothrombin Time    Collection Time: 20  1:51 AM   Result Value Ref Range    PT 13.7 12.0 - 14.6 sec    INR 1.02 0.87 - 1.13   Heparin Xa (Unfractionated)    Collection Time: 20  1:51 AM   Result Value Ref Range    Heparin Xa (UFH) <0.10 IU/mL       All pertinent features of laboratory and imaging reviewed including primary images where applicable    [facility] 2019  ANGIOGRAM:  LEFT MAIN CORONARY ARTERY:  Left main coronary artery is a long   moderate-to-large caliber vessel with diffuse luminal irregularities with    10-20%.  LEFT ANTERIOR DESCENDING ARTERY:  Left anterior descending artery is a long,   small-to-moderate caliber vessel, which wraps around the apex.  At the large   long diagonal branch bifurcation, the small left anterior descending artery is   chronically occluded.  Mid to distal vessel of a small caliber left anterior   descending artery branch filled via right to left collaterals.  There is small   caliber bifurcating first diagonal branch with diffuse luminal irregularities   of 20-30%.  There is a long large caliber diagonal branch with a mid   concentric 30-40% stenosis.  This vessel also wraps the apex.  LEFT CIRCUMFLEX ARTERY:  Left circumflex artery is a nondominant moderate   caliber vessel with diffuse luminal irregularities with 20-30%.  There is some   very small caliber first obtuse marginal branch with luminal irregularities.    There is a moderate caliber second bifurcating obtuse marginal branch with   luminal irregularities of 20-30% eccentric stenosis.  The small inferior   branch contains an ostial 50% stenosis.  There is a bifurcating small caliber   posterolateral branch with warm branch containing a distal concentric 70%   stenosis.  RIGHT CORONARY ARTERY:  Right coronary artery is a dominant moderate caliber   vessel with patent stents in the proximal portion with eccentric 30-40%   in-stent restenosis.  Distally, there is a long large caliber posterior   descending artery with mid concentric 60% stenosis.  At the apex, there are   right to left collaterals.     IMPRESSION:    1.  Two-vessel coronary artery disease with small vessel chronic total   occlusion of the mid left anterior descending artery and patent with a distal   small caliber vessel filling via right to left collaterals, patent stents in   the mid right coronary artery.  2.  Normal left ventricular systolic function with ejection fraction of 65%.  3.  Elevated left ventricular end diastolic pressure.    [facility] 05/2018  III.  CORONARY ANGIOGRAPHY:  Left Main: Large bifurcating no CAD.  Left Anterior Descending: Moderate size with 50% proximal stenosis at the takeoff of the sidebranch.  There is diffuse nonobstructive disease distally.  FFR across this lesion is normal at 0.91 and intervention is deferred.  There are brisk distal septal and epicardial apical collaterals to the RCA which is a very large dominant vessel.  Left Circumflex: Small to moderate size giving rise to an obtuse marginal.  Nondominant.  The distal circumflex bifurcates and a less than 2 mm vessel and has a 50% stenosis at the bifurcation.  Right Coronary: 100% occluded in its proximal portion with brisk left to right collaterals.  During the interventional attempt it was easily crossed with excellent dilatation.  Post intervention there is 0% residual stenosis in this arterial territory.     COMPLICATIONS: none apparent     CONCLUSIONS:  1.  100% occluded RCA with brisk left to right collaterals  2.  Diffuse nonobstructive CAD otherwise.  Several 50% stenoses in small vessels.    3.  LVEF 48% preintervention  4.  FFR LAD 0.91, intervention deferred  5.  Successful PCI RCA with placement of a 3.5 x 20 mm Synergy CHRISTINE postdilated to 3.8 mm with an excellent angiographic result.    Active Problems:    NSTEMI (non-ST elevated myocardial infarction) ([facility]) POA: Yes    Type 2 diabetes mellitus with complication, without long-term current use of insulin ([facility]) POA: Yes    Essential hypertension POA: Yes    CAD (coronary artery disease) POA: Yes  Resolved Problems:    * No resolved hospital problems. *      Assessment / Plan:  49 year old man with PMH HTN, HLD, DM2, CAD/PCI presents with typical angina after shoveling rocks at work found NSTEMI.     -admit med tele  -check TTE  -agree with dapt 12 months and hep gtt 48h  -continue high intensity statin  -BP control, convert metoprolol to carvedilol and add chlorthalidone  -if still has angina after BP control, add  antianginal  -consider SGLT2 inhib  -in future, may benefit from cabg if still has anginal symptoms    I personally discussed his case with Dr Perera    It is my pleasure to participate in the care of Mr. Ann.  Please do not hesitate to contact me with questions or concerns.    Chuckie Cantu MD  Cardiologist [facility]

## 2020-09-22 NOTE — H&P
Hospital Medicine History & Physical Note    Date of Service  9/22/2020    Primary Care Physician  Hannah Hernadez P.A.-C.    Consultants  None    Code Status  Full Code    Chief Complaint  Chief Complaint   Patient presents with   • Chest Pain       History of Presenting Illness  49 y.o. male with a past medical history of CAD status post stent placement, hypertension, diabetes who presented 9/21/2020 with chest pain.  The patient states he has been having exertional chest pain for the past month however yesterday became worse and persistent.  He reported substernal chest pressure with radiation down his left arm associated with some numbness and tingling of his left hand.  He reported associated shortness of breath.  He denies any fevers, chills or cough.  Patient states that he has not been taking his medications for the past year since he lost his insurance.  He underwent a cardiac catheterization in 2018 and had a stent placed to his RCA.  He underwent a repeat cardiac catheterization in July 2019 which revealed two-vessel coronary artery disease with small vessel chronic total occlusion of the mid left anterior descending artery and patent stents in the mid right coronary artery.  Normal left ventricular systolic function with EF of 65%.  Patient also reports his blood pressure has been uncontrolled.  At this time his chest pain has resolved.  His initial troponin in the ER was 21 which increased to 63.    EKG interpreted by me reveals sinus rhythm with LVH and repolarization changes.  T wave inversions with minimal ST depression noted in inferior leads    Second EKG sinus rhythm with ST elevation in V2 and V3, does not meet criteria given underlying LVH.  Patient remains asymptomatic at this time.  The ER physician discussed the case with cardiology    Chest x-ray interpreted by me reveals no acute cardiopulmonary process    Review of Systems  Review of Systems   Constitutional: Negative for chills,  diaphoresis and fever.   HENT: Negative for hearing loss and sore throat.    Eyes: Negative for blurred vision.   Respiratory: Positive for shortness of breath. Negative for cough, sputum production and wheezing.    Cardiovascular: Positive for chest pain. Negative for palpitations and leg swelling.   Gastrointestinal: Negative for abdominal pain, blood in stool, diarrhea, nausea and vomiting.   Genitourinary: Negative for dysuria, flank pain and urgency.   Musculoskeletal: Negative for back pain, joint pain, myalgias and neck pain.   Skin: Negative for rash.   Neurological: Negative for dizziness, focal weakness, seizures and headaches.   Endo/Heme/Allergies: Does not bruise/bleed easily.   Psychiatric/Behavioral: Negative for suicidal ideas.   All other systems reviewed and are negative.      Past Medical History   has a past medical history of Diabetes (HCC), Hypertension, Kidney stones, MI, old, and Psychiatric problem.    Surgical History   has a past surgical history that includes other orthopedic surgery and stent placement (2017).     Family History  family history includes No Known Problems in his father; Stroke (age of onset: 47) in his mother; Stroke (age of onset: 54) in his maternal grandfather; Stroke (age of onset: 85) in his maternal grandmother.     Social History   reports that he has never smoked. His smokeless tobacco use includes chew. He reports current alcohol use. He reports that he does not use drugs.    Allergies  No Known Allergies    Medications  Prior to Admission Medications   Prescriptions Last Dose Informant Patient Reported? Taking?   acetaminophen (TYLENOL) 500 MG Tab  Patient Yes No   Sig: Take 1,500 mg by mouth every 6 hours as needed.   allopurinol (ZYLOPRIM) 100 MG Tab  Patient Yes No   Sig: Take 100 mg by mouth every day.   amLODIPine (NORVASC) 10 MG Tab   No No   Sig: Take 1 Tab by mouth every day.   aspirin EC (ECOTRIN) 81 MG Tablet Delayed Response  Patient Yes No   Sig:  Take 81 mg by mouth every day.   atorvastatin (LIPITOR) 40 MG Tab  Patient No No   Sig: Take 2 Tabs by mouth every bedtime.   buPROPion (WELLBUTRIN XL) 300 MG XL tablet  Patient Yes No   Sig: Take 300 mg by mouth every day.   clopidogrel (PLAVIX) 75 MG Tab   No No   Sig: Take 1 Tab by mouth every day.   lisinopril (PRINIVIL, ZESTRIL) 40 MG tablet   No No   Sig: Take 1 Tab by mouth every day.   metFORMIN (GLUCOPHAGE) 500 MG Tab  Patient Yes No   Sig: Take 2,000 mg by mouth 2 Times a Day.   metoprolol (LOPRESSOR) 25 MG Tab  Patient No No   Sig: Take 1 Tab by mouth 2 times a day.      Facility-Administered Medications: None       Physical Exam  Temp:  [37.1 °C (98.8 °F)] 37.1 °C (98.8 °F)  Pulse:  [] 85  Resp:  [16-24] 21  BP: (130-181)/() 155/73  SpO2:  [93 %-98 %] 97 %    Physical Exam  Vitals signs and nursing note reviewed.   Constitutional:       General: He is not in acute distress.     Appearance: Normal appearance.   HENT:      Head: Normocephalic and atraumatic.      Nose: Nose normal.      Mouth/Throat:      Mouth: Mucous membranes are moist.   Eyes:      Extraocular Movements: Extraocular movements intact.      Conjunctiva/sclera: Conjunctivae normal.      Pupils: Pupils are equal, round, and reactive to light.   Neck:      Musculoskeletal: Normal range of motion and neck supple.   Cardiovascular:      Rate and Rhythm: Normal rate and regular rhythm.      Pulses: Normal pulses.      Heart sounds: Normal heart sounds.   Pulmonary:      Effort: Pulmonary effort is normal. No respiratory distress.      Breath sounds: Normal breath sounds. No wheezing, rhonchi or rales.   Abdominal:      General: Bowel sounds are normal. There is no distension.      Palpations: Abdomen is soft.      Tenderness: There is no abdominal tenderness.   Musculoskeletal: Normal range of motion.         General: No swelling or tenderness.   Lymphadenopathy:      Cervical: No cervical adenopathy.   Skin:     General: Skin is  warm.      Coloration: Skin is not jaundiced.      Findings: No rash.   Neurological:      General: No focal deficit present.      Mental Status: He is alert and oriented to person, place, and time.      Cranial Nerves: No cranial nerve deficit.      Motor: No weakness.   Psychiatric:         Mood and Affect: Mood normal.         Behavior: Behavior normal.         Laboratory:  Recent Labs     09/21/20 1951   WBC 9.4   RBC 4.22*   HEMOGLOBIN 13.6*   HEMATOCRIT 40.2*   MCV 95.3   MCH 32.2   MCHC 33.8   RDW 41.3   PLATELETCT 213   MPV 10.8     Recent Labs     09/21/20 1951   SODIUM 139   POTASSIUM 3.6   CHLORIDE 101   CO2 19*   GLUCOSE 150*   BUN 12   CREATININE 0.96   CALCIUM 9.6     Recent Labs     09/21/20 1951   ALTSGPT 20   ASTSGOT 25   ALKPHOSPHAT 99   TBILIRUBIN 0.2   GLUCOSE 150*         No results for input(s): NTPROBNP in the last 72 hours.      Recent Labs     09/21/20 1951 09/21/20  2215   TROPONINT 21* 63*       Imaging:  DX-CHEST-PORTABLE (1 VIEW)   Final Result      No acute cardiopulmonary abnormality.      EC-ECHOCARDIOGRAM COMPLETE W/O CONT    (Results Pending)         Assessment/Plan:  I anticipate this patient will require at least two midnights for appropriate medical management, necessitating inpatient admission.    NSTEMI (non-ST elevated myocardial infarction) (HCC)- (present on admission)  Assessment & Plan  Concern for in-stent thrombosis due to noncompliance with aspirin and Plavix for the past year  Patient will be admitted to the telemetry unit with close cardiac monitoring, serial EKG and troponin  Patient has been given full dose of aspirin and is started on IV heparin, monitor APTT  I will start the patient on metoprolol 25 mg twice daily and atorvastatin 40 mg daily  Check 2D echo, lipid panel, TSH and hemoglobin A1c  Nitro and morphine when necessary for chest pain  Currently chest pain-free.  Cardiology was consulted by the ER physician      CAD (coronary artery disease)- (present  on admission)  Assessment & Plan  Started on aspirin, Plavix and Lipitor    Essential hypertension- (present on admission)  Assessment & Plan  Uncontrolled  Restarted on metoprolol, lisinopril and norvasc    Type 2 diabetes mellitus with complication, without long-term current use of insulin (HCC)- (present on admission)  Assessment & Plan  Start on insulin sliding scale with serial Accu-Checks  Check hemoglobin A1c  Hypoglycemic protocol in place

## 2020-09-22 NOTE — PROGRESS NOTES
Patient has a diagnosis of NSTEMI. Notes say angiogram today. No cath report yet.    Ejection Fraction unknown. No HF diagnosis has been given.      Below are the defect free care measures that must be met should patient continue with an ACS diagnosis after angiogram.     Meds to Beds BEDSIDE NURSING RESPONSIBILITIES:    If not already done, please assess patient for Meds to Beds Opt-In which can be found in the admit navigator. Evidence shows that meds to beds improves medication compliance and patient outcomes.      ACS Measures:    1. ASA prescribed at discharge  2. P2Y12 Inhibitor prescribed at discharge; Please note: for ACS patients who are treated medically without PCI and stenting, DAPT has been demonstrated to reduce recurrent CV events. Source: 2013 ACCF/AHA Guideline for the management of STEMI  3. Beta blockade prescribed at discharge, if patient also has HFrEF (EF less than or equal to 40%), this needs to be one of the three evidence based beta blockers: carvedilol, bisoprolol, Toprol XL  4. High intensity statin prescribed at discharge (atorvastatin 40 mg or rosuvastatin 20 mg)  5. ACE-I or ARB prescribed on discharge for LVEF less than 40%  6. Aldosterone blockade prescribed for patients with EF less than 40% AND history of diabetes mellitus OR history of heart failure, heart failure on presentation or in-hospital event  7. Intensive Cardiac Rehab referral order is placed  8. Use the Acute Coronary Syndrome discharge instructions to document that patient has been provided with the contact information for Intensive Cardiac Rehab  9. Evaluation of LV systolic function can be by angiogram, or echo before discharge, can not be a future plan for LVSF assessment  10. Daily documentation in education tab of ACS education  11. Smoking cessation documented if indicated       What if any of the above ACS Measures are contraindicated?    · Request that the discharging provider document the  medication/intervention and the contraindication specifically in a progress note  · For example: “no ACE-I meds due to hypotension” is not enough. It needs to say: “No ACE-I, ARNI, ARB due to hypotension”; “No Beta Blockade due to bradycardia”…

## 2020-09-22 NOTE — ED PROVIDER NOTES
Med noncompED Provider Note    CHIEF COMPLAINT  Chief Complaint   Patient presents with   • Chest Pain       \[facility]\""    Primary care provider: Hannah Hernadez P.A.-C.  Means of arrival: POV/Walk-in  History obtained from: Patient  History limited by: Nothing    Guerrero Dixon Judah Lauro Ann is a 49 y.o. male who presents with chest pain.  Onset several months ago.  Intermittent.  Associated with exertion.  Worsened when he strains himself, alleviated with rest.  He reportedly has a history of coronary artery disease and stents but no recent follow-up with cardiology.  He is not taking any of his prescribed medications for more than a year.  No other associated symptoms.  He specifically denies any cough or fevers or known COVID contacts.  Many prior episodes over the last several months.  Symptoms have persisted and worsened prompting him to come in for emergent evaluation.    REVIEW OF SYSTEMS  Constitutional: Negative for fever or chills.   Eyes: Negative for vision changes or discharge.   Respiratory: Negative for cough or shortness of breath.    Cardiovascular: Positive for intermittent chest pain.  No syncope.  Gastrointestinal: Negative for nausea, vomiting,or  abdominal pain.   Genitourinary: Negative for dysuria or flank pain.   Musculoskeletal: Negative for back pain or joint pain.   Skin: Negative for itching or rash.   Neurological: Negative for sensory or motor changes.   See HPI for further details. All other systems are negative.     PAST MEDICAL HISTORY   has a past medical history of Diabetes (HCC), Hypertension, Kidney stones, MI, old, and Psychiatric problem.    PAST FAMILY HISTORY  Family History   Problem Relation Age of Onset   • Stroke Mother 47        had 3 devastating strokes,  56yo stroke complications   • No Known Problems Father         does not know his father.   • Stroke Maternal Grandmother 85   • Stroke Maternal Grandfather 54       SOCIAL HISTORY  Social History     Tobacco  "Use   • Smoking status: Never Smoker   • Smokeless tobacco: Current User     Types: Chew   Substance and Sexual Activity   • Alcohol use: Yes     Comment: 2 per week   • Drug use: No   • Sexual activity: Not on file       SURGICAL HISTORY   has a past surgical history that includes other orthopedic surgery and stent placement (2017).    CURRENT MEDICATIONS  Not taking any daily medications.    ALLERGIES  No Known Allergies    PHYSICAL EXAM  VITAL SIGNS: /73   Pulse (!) 59   Temp 36.7 °C (98.1 °F) (Temporal)   Resp 16   Ht 1.575 m (5' 2\")   Wt 73 kg (160 lb 15 oz)   SpO2 99%   BMI 29.44 kg/m²    Pulse ox interpretation: On room air, I interpret this pulse ox as normal.  Constitutional: Well-developed, well-nourished. Sitting up in mild distress.   HEENT: Normocephalic, atraumatic. Posterior pharynx clear, mucous membranes moist.  Eyes:  EOMI. Normal sclerae.  Neck: Supple, nontender.  Chest/Pulmonary: Clear to ausculation bilaterally, no wheezes or rhonchi.  Cardiovascular: Tachycardic rate, regular rhythm, no murmur.   Abdomen: Soft, nontender; no rebound, guarding, or masses.  Back: No CVA or midline tenderness.   Musculoskeletal: No deformity or edema.  Neuro: Clear speech, normal coordination, cranial nerves II-XII grossly intact, no focal asymmetry or sensory deficits.   Psych: Anxious appearing but very pleasant and cooperative.  Skin: No rashes, warm and dry.      DIAGNOSTIC STUDIES / PROCEDURES    LABS & EKG  Results for orders placed or performed during the hospital encounter of 09/21/20   EC-ECHOCARDIOGRAM COMPLETE W/O CONT   Result Value Ref Range    Eject.Frac. MOD BP 67.22     Eject.Frac. MOD 4C 54.2     Eject.Frac. MOD 2C 76.99     Left Ventrical Ejection Fraction 60    CBC with Differential   Result Value Ref Range    WBC 9.4 4.8 - 10.8 K/uL    RBC 4.22 (L) 4.70 - 6.10 M/uL    Hemoglobin 13.6 (L) 14.0 - 18.0 g/dL    Hematocrit 40.2 (L) 42.0 - 52.0 %    MCV 95.3 81.4 - 97.8 fL    MCH 32.2 " 27.0 - 33.0 pg    MCHC 33.8 33.7 - 35.3 g/dL    RDW 41.3 35.9 - 50.0 fL    Platelet Count 213 164 - 446 K/uL    MPV 10.8 9.0 - 12.9 fL    Neutrophils-Polys 58.10 44.00 - 72.00 %    Lymphocytes 26.80 22.00 - 41.00 %    Monocytes 6.50 0.00 - 13.40 %    Eosinophils 6.90 0.00 - 6.90 %    Basophils 1.50 0.00 - 1.80 %    Immature Granulocytes 0.20 0.00 - 0.90 %    Nucleated RBC 0.00 /100 WBC    Neutrophils (Absolute) 5.45 1.82 - 7.42 K/uL    Lymphs (Absolute) 2.52 1.00 - 4.80 K/uL    Monos (Absolute) 0.61 0.00 - 0.85 K/uL    Eos (Absolute) 0.65 (H) 0.00 - 0.51 K/uL    Baso (Absolute) 0.14 (H) 0.00 - 0.12 K/uL    Immature Granulocytes (abs) 0.02 0.00 - 0.11 K/uL    NRBC (Absolute) 0.00 K/uL   Complete Metabolic Panel (CMP)   Result Value Ref Range    Sodium 139 135 - 145 mmol/L    Potassium 3.6 3.6 - 5.5 mmol/L    Chloride 101 96 - 112 mmol/L    Co2 19 (L) 20 - 33 mmol/L    Anion Gap 19.0 (H) 7.0 - 16.0    Glucose 150 (H) 65 - 99 mg/dL    Bun 12 8 - 22 mg/dL    Creatinine 0.96 0.50 - 1.40 mg/dL    Calcium 9.6 8.5 - 10.5 mg/dL    AST(SGOT) 25 12 - 45 U/L    ALT(SGPT) 20 2 - 50 U/L    Alkaline Phosphatase 99 30 - 99 U/L    Total Bilirubin 0.2 0.1 - 1.5 mg/dL    Albumin 4.9 3.2 - 4.9 g/dL    Total Protein 8.3 (H) 6.0 - 8.2 g/dL    Globulin 3.4 1.9 - 3.5 g/dL    A-G Ratio 1.4 g/dL   Troponin   Result Value Ref Range    Troponin T 21 (H) 6 - 19 ng/L   D-DIMER   Result Value Ref Range    D-Dimer Screen <0.27 0.00 - 0.50 ug/mL (FEU)   ESTIMATED GFR   Result Value Ref Range    GFR If African American >60 >60 mL/min/1.73 m 2    GFR If Non African American >60 >60 mL/min/1.73 m 2   TROPONIN   Result Value Ref Range    Troponin T 63 (H) 6 - 19 ng/L   Troponin in two (2) hours   Result Value Ref Range    Troponin T 91 (H) 6 - 19 ng/L   HEMOGLOBIN A1C   Result Value Ref Range    Glycohemoglobin 7.9 (H) 0.0 - 5.6 %    Est Avg Glucose 180 mg/dL   TSH WITH REFLEX TO FT4   Result Value Ref Range    TSH 3.480 0.380 - 5.330 uIU/mL   aPTT    Result Value Ref Range    APTT 28.1 24.7 - 36.0 sec   Prothrombin Time   Result Value Ref Range    PT 13.7 12.0 - 14.6 sec    INR 1.02 0.87 - 1.13   Heparin Xa (Unfractionated)   Result Value Ref Range    Heparin Xa (UFH) <0.10 IU/mL   Heparin Xa (Unfractionated)   Result Value Ref Range    Heparin Xa (UFH) 0.19 IU/mL   Heparin Xa (Unfractionated)   Result Value Ref Range    Heparin Xa (UFH) 0.60 IU/mL   Heparin Xa (Unfractionated)   Result Value Ref Range    Heparin Xa (UFH) 0.15 IU/mL   MAGNESIUM   Result Value Ref Range    Magnesium 1.7 1.5 - 2.5 mg/dL   Basic Metabolic Panel (BMP)   Result Value Ref Range    Sodium 140 135 - 145 mmol/L    Potassium 3.5 (L) 3.6 - 5.5 mmol/L    Chloride 102 96 - 112 mmol/L    Co2 21 20 - 33 mmol/L    Glucose 176 (H) 65 - 99 mg/dL    Bun 18 8 - 22 mg/dL    Creatinine 1.30 0.50 - 1.40 mg/dL    Calcium 9.1 8.5 - 10.5 mg/dL    Anion Gap 17.0 (H) 7.0 - 16.0   CBC with Differential   Result Value Ref Range    WBC 9.6 4.8 - 10.8 K/uL    RBC 3.88 (L) 4.70 - 6.10 M/uL    Hemoglobin 12.7 (L) 14.0 - 18.0 g/dL    Hematocrit 37.2 (L) 42.0 - 52.0 %    MCV 95.9 81.4 - 97.8 fL    MCH 32.7 27.0 - 33.0 pg    MCHC 34.1 33.7 - 35.3 g/dL    RDW 42.2 35.9 - 50.0 fL    Platelet Count 212 164 - 446 K/uL    MPV 11.1 9.0 - 12.9 fL    Neutrophils-Polys 51.50 44.00 - 72.00 %    Lymphocytes 32.00 22.00 - 41.00 %    Monocytes 7.50 0.00 - 13.40 %    Eosinophils 7.40 (H) 0.00 - 6.90 %    Basophils 1.20 0.00 - 1.80 %    Immature Granulocytes 0.40 0.00 - 0.90 %    Nucleated RBC 0.00 /100 WBC    Neutrophils (Absolute) 4.95 1.82 - 7.42 K/uL    Lymphs (Absolute) 3.08 1.00 - 4.80 K/uL    Monos (Absolute) 0.72 0.00 - 0.85 K/uL    Eos (Absolute) 0.71 (H) 0.00 - 0.51 K/uL    Baso (Absolute) 0.12 0.00 - 0.12 K/uL    Immature Granulocytes (abs) 0.04 0.00 - 0.11 K/uL    NRBC (Absolute) 0.00 K/uL   Lipid Profile (Tomorrow AM)   Result Value Ref Range    Cholesterol,Tot 210 (H) 100 - 199 mg/dL    Triglycerides 610 (H) 0 -  149 mg/dL    HDL 37 (A) >=40 mg/dL    LDL see below <100 mg/dL   Heparin Xa (Unfractionated)   Result Value Ref Range    Heparin Xa (UFH) 0.16 IU/mL   ESTIMATED GFR   Result Value Ref Range    GFR If African American >60 >60 mL/min/1.73 m 2    GFR If Non African American 59 (A) >60 mL/min/1.73 m 2   Heparin Xa (Unfractionated)   Result Value Ref Range    Heparin Xa (UFH) 0.32 IU/mL   ACCU-CHEK GLUCOSE   Result Value Ref Range    Glucose - Accu-Ck 156 (H) 65 - 99 mg/dL   ACCU-CHEK GLUCOSE   Result Value Ref Range    Glucose - Accu-Ck 237 (H) 65 - 99 mg/dL   ACCU-CHEK GLUCOSE   Result Value Ref Range    Glucose - Accu-Ck 174 (H) 65 - 99 mg/dL   ACCU-CHEK GLUCOSE   Result Value Ref Range    Glucose - Accu-Ck 217 (H) 65 - 99 mg/dL   ACCU-CHEK GLUCOSE   Result Value Ref Range    Glucose - Accu-Ck 289 (H) 65 - 99 mg/dL   EKG   Result Value Ref Range    Report       [facility] Emergency Dept.    Test Date:  2020  Pt Name:    PETER TRONCOSO              Department: ER  MRN:        [id number]                      Room:       T806  Gender:     Male                         Technician: [id number]  :        1970                   Requested By:ER TRIAGE PROTOCOL  Order #:    [id number]                    Reading MD: Santos Mcqueen MD    Measurements  Intervals                                Axis  Rate:       111                          P:          71  VT:         143                          QRS:        63  QRSD:       94                           T:          -77  QT:         296  QTc:        402    Interpretive Statements  Sinus tachycardia  Left ventricular hypertrophy  Abnormal T, consider ischemia, inferior leads  Compared to ECG 2019 15:01:50  Possible ischemia now present  Sinus rhythm no longer present  T-wave abnormality still present  No STEMI  Electronically Signed On 2020 15:47:05 PDT by Santos Mcqueen MD     EKG in four (4) hours   Result Value Ref Range    Report        [facility] Emergency Dept.    Test Date:  2020  Pt Name:    PETER TRONCOSO              Department: ER  MRN:        [id number]                      Room:       T806  Gender:     Male                         Technician: [id number]  :        1970                   Requested By:GERI AMAYA  Order #:    [id number]                    Reading MD: Santos Amanda MD    Measurements  Intervals                                Axis  Rate:       73                           P:          47  NC:         140                          QRS:        51  QRSD:       92                           T:          -75  QT:         376  QTc:        415    Interpretive Statements  SINUS RHYTHM  PROBABLE LVH WITH SECONDARY REPOL ABNRM  PROBABLE INFERIOR INFARCT, AGE INDETERMINATE  Compared to ECG 2020 19:20:28  strain no stemi  Electronically Signed On 2020 15:47:33 PDT by Santos Amanda MD     EKG   Result Value Ref Range    Report       [facility] Emergency Dept.    Test Date:  2020  Pt Name:    PETER TRONCOSO              Department: ER  MRN:        [id number]                      Room:       [facility]  Gender:     Male                         Technician: [id number]  :        1970                   Requested By:SANTOS AMANDA  Order #:    [id number]                    Reading MD: Santos Amanda MD    Measurements  Intervals                                Axis  Rate:       82                           P:          53  NC:         136                          QRS:        48  QRSD:       86                           T:          257  QT:         356  QTc:        416    Interpretive Statements  SINUS RHYTHM  PROBABLE LVH WITH SECONDARY REPOL ABNRM  Compared to ECG 2020 01:23:45  strain no stemi  Electronically Signed On 2020 15:47:17 PDT by Santos Amanda MD         RADIOLOGY  EC-ECHOCARDIOGRAM COMPLETE W/O CONT   Final Result      DX-CHEST-PORTABLE (1 VIEW)   Final  Result      No acute cardiopulmonary abnormality.      CL-LEFT HEART CATHETERIZATION WITH POSSIBLE INTERVENTION    (Results Pending)         COURSE & MEDICAL DECISION MAKING    This is a 49 y.o. male who presents with intermittent exertional chest pain over the last several months.    Differential Diagnosis includes but is not limited to:  ACS, PE, angina, strain, esophageal disease, pneumothorax, anxiety/stress    ED Course:  This is a very pleasant 49-year-old male coming in with exertional chest pain on and off for the last several months.  Reported history of coronary disease but has not been medically compliant for more than a year.  Not taking any antiplatelet medicines nor any other daily medications.  First EKG shows no obvious STEMI, obviously plan cardiac rule out in this patient with a concerning history.  Based on risk factors and age his heart score is 4, first troponin negative but plan delta troponin.  Interestingly, his troponin went from not elevated to intermediate in severity, consulted with hospitalist and cardiology, hospitalist Dr. Cardenas, repeat EKGs show concerning T wave inversions so this is a dynamic EKG, shared my concerns for possible Wellens syndrome no obvious STEMI meriting emergent catheterization but I think the patient merits urgent catheterization probable left main/LAD disease.  Troponin continues to rise, discussed with hospitalist Dr. Harden, he will kindly admit for further work-up and treatment.  I updated on dynamic EKGs, however on repeat exams they are stable, troponin continues to slowly rise, however patient is chest pain-free.  I think he does need urgent catheterization to evaluate his coronary arteries, but since he is pain-free and hemodynamically stable this seems reasonable to wait until the morning, but I discussed with pharmacist and we will initiate heparin bolus and drip.    Upon my evaluation, this patient had a high probability of imminent or  life-threatening deterioration due to acute coronary syndrome/NSTEMI.     I personally provided 35 minutes of total critical care time outside of time spent on separately billable/documented procedures. This required my direct attention, intervention, and management which included the following:  -review of laboratory data  -review of radiology studies  -discussion with consultants: Cardiology, hospitalist, pharmacist  -monitoring for potential decompensation with serial bedside reassessments  -Initiation of heparin bolus and drip      Medications   senna-docusate (PERICOLACE or SENOKOT S) 8.6-50 MG per tablet 2 Tab (2 Tabs Oral Refused 9/23/20 0600)     And   polyethylene glycol/lytes (MIRALAX) PACKET 1 Packet (has no administration in time range)     And   magnesium hydroxide (MILK OF MAGNESIA) suspension 30 mL (has no administration in time range)     And   bisacodyl (DULCOLAX) suppository 10 mg (has no administration in time range)   Respiratory Therapy Consult (has no administration in time range)   acetaminophen (TYLENOL) tablet 650 mg (has no administration in time range)   aspirin EC (ECOTRIN) tablet 81 mg (81 mg Oral Given 9/23/20 0500)   nitroglycerin (NITROSTAT) tablet 0.4 mg (0.4 mg Sublingual Given 9/22/20 0126)   morphine (pf) 4 MG/ML injection 2-4 mg (has no administration in time range)   ondansetron (ZOFRAN) syringe/vial injection 4 mg (has no administration in time range)   ondansetron (ZOFRAN ODT) dispertab 4 mg (has no administration in time range)   insulin regular (HumuLIN R,NovoLIN R) injection (1 Units Subcutaneous Refused 9/23/20 1230)     And   glucose 4 g chewable tablet 16 g (has no administration in time range)     And   dextrose 50% (D50W) injection 50 mL (has no administration in time range)   amLODIPine (NORVASC) tablet 10 mg (10 mg Oral Given 9/23/20 0500)   lisinopril (PRINIVIL) tablet 40 mg (40 mg Oral Given 9/23/20 0500)   NITROGLYCERIN 0.4 MG SL SUBL (has no administration in  time range)   carvedilol (COREG) tablet 12.5 mg ( Oral Canceled Entry 9/23/20 0730)   LIDOCAINE-EPINEPHRINE 1 %-1:793130 INJ SOLN (  Canceled Entry 9/22/20 1445)   NS infusion ( Intravenous Stopped 9/23/20 0247)   atorvastatin (LIPITOR) tablet 80 mg (has no administration in time range)   NS infusion (has no administration in time range)   enoxaparin (LOVENOX) inj 40 mg (has no administration in time range)   aspirin (ASA) tablet 325 mg (325 mg Oral Given 9/21/20 2032)   heparin injection 3,700 Units (3,700 Units Intravenous Given 9/22/20 0312)   LIDOCAINE HCL 2 % INJ SOLN (  Cath Lab 9/22/20 1500)   HEPARIN 1000 UNITS/ML OR USE ONLY (  Cath Lab 9/22/20 1459)   VERAPAMIL HCL 2.5 MG/ML IV SOLN (5 mg  Cath Lab 9/22/20 1459)   HEPARIN (PORCINE) IN NACL 2000-0.9 UNIT/L-% IV SOLN (  Stopped 9/22/20 1529)   NITROGLYCERIN 2 MG IV SOLN (  Cath Lab 9/22/20 1506)   iohexol (OMNIPAQUE) 350 mg/mL (210 mL Intra-arterial Cath Lab 9/22/20 1510)   MIDAZOLAM HCL 2 MG/2ML INJ SOLN (WRAPPED) (2 mg  Cath Lab 9/22/20 1505)   FENTANYL CITRATE (PF) 0.05 MG/ML INJ SOLN (WRAPPED) (100 mcg  Cath Lab 9/22/20 1505)   MIDAZOLAM HCL 2 MG/2ML INJ SOLN (WRAPPED) (0.5 mg  Cath Lab 9/22/20 1510)         FINAL IMPRESSION  1. Acute chest pain    2. Elevated troponin    3. Acute coronary syndromes (HCC)    4. Essential hypertension    5. Medically noncompliant        -ADMIT-       Pertinent Labs & Imaging studies reviewed and verified by myself, as well as nursing notes and the patient's past medical, family, and social histories (See chart for details).    Portions of this record were made with voice recognition software.  Despite my review, spelling/grammar/context errors may still remain.  Interpretation of this chart should be taken in this context.    Electronically signed by Santos Mcqueen M.D. on 9/23/2020 at 3:53 PM.

## 2020-09-22 NOTE — PROGRESS NOTES
Received report from ER. Patient is A &O x4.   Patient arrived with belongings including pants, shirt, neck wrap mask, wallet, keys, cellphone.   Patient oriented to unit and call light, verbalized understanding. Fall precautions in place.

## 2020-09-22 NOTE — ED NOTES
EKG changes noted, ERP and admitting MD aware.  Serial EKG completed, additional troponin drawn.  Nitro administered.  Heparin gtt ordered.

## 2020-09-22 NOTE — DISCHARGE PLANNING
Anticipated Discharge Disposition: home    Action: Patient pending cardiac cath. Discharge needs pending procedure results.    Barriers to Discharge: cath, medical clearance    Plan: Case coordination to f/u with treatment team for discharge planning needs

## 2020-09-22 NOTE — PROGRESS NOTES
2 RN skin check:    2 RN skin check complete  Devices in place: N/A  Skin assessed under devices: N/A  Confirmed ulcers found on patient: N/A  Wound care consult: no  Following interventions in place: pillows and moisturizer    Bilateral elbows dry and blanching

## 2020-09-22 NOTE — ASSESSMENT & PLAN NOTE
Status post CABG x3.  Continue aspirin, metoprolol and atorvastatin.  Restart lisinopril when Cr improves

## 2020-09-22 NOTE — PROGRESS NOTES
Patient was seen and examined bedside.  Full notes to follow in the morning.  See H&P for details.  Cardiology consult appreciated.  Cardiac cath planned for today.  Will follow the result.

## 2020-09-22 NOTE — ASSESSMENT & PLAN NOTE
Cardiology and cardiothoracic surgery following  Patient is status post CABG x3  Continue aspirin, atorvastatin 80 mg, and metoprolol 25 mg.

## 2020-09-22 NOTE — ED TRIAGE NOTES
Patient reports left sided chest pain that radiated into his left arm.  History of MI and stents.  Sometimes short of breath with this.  Pain 5/10.

## 2020-09-22 NOTE — PROGRESS NOTES
Cardiology Follow Up Progress Note    Date of Service  9/22/2020    Attending Physician  Cesar Perera M.D.    Chief Complaint   Non-STEMI, coronary artery disease with prior stent placement.    [facility] Zack Ann is a 49 y.o. male admitted 9/21/2020 with above.    Interim Events  No significant changes noted from cardiac standpoint within the past 24 hours.    Review of Systems  Review of Systems   Constitutional: Negative.  Negative for chills and fever.   HENT: Negative.  Negative for hearing loss.    Eyes: Negative.    Respiratory: Negative.  Negative for cough and shortness of breath.    Cardiovascular: Negative.  Negative for chest pain, palpitations and leg swelling.   Gastrointestinal: Negative.  Negative for abdominal pain, nausea and vomiting.   Genitourinary: Negative.  Negative for dysuria and urgency.   Musculoskeletal: Negative.  Negative for myalgias.   Skin: Negative.  Negative for rash.   Neurological: Negative.  Negative for dizziness, weakness and headaches.   Hematological: Does not bruise/bleed easily.   Psychiatric/Behavioral: Negative.  The patient is not nervous/anxious.        Vital signs in last 24 hours  Temp:  [36.3 °C (97.3 °F)-37.1 °C (98.8 °F)] 36.8 °C (98.2 °F)  Pulse:  [] 65  Resp:  [16-24] 17  BP: (130-192)/() 148/95  SpO2:  [93 %-98 %] 97 %    Physical Exam  Physical Exam  Constitutional:       Appearance: He is well-developed.   HENT:      Head: Normocephalic and atraumatic.   Eyes:      Conjunctiva/sclera: Conjunctivae normal.      Pupils: Pupils are equal, round, and reactive to light.   Neck:      Musculoskeletal: Normal range of motion and neck supple.   Cardiovascular:      Rate and Rhythm: Normal rate and regular rhythm.   Pulmonary:      Effort: Pulmonary effort is normal.      Breath sounds: Normal breath sounds.   Abdominal:      General: Bowel sounds are normal.      Palpations: Abdomen is soft.   Musculoskeletal: Normal range of motion.    Skin:     General: Skin is warm and dry.   Neurological:      Mental Status: He is alert and oriented to person, place, and time.         Lab Review  Lab Results   Component Value Date/Time    WBC 9.4 09/21/2020 07:51 PM    RBC 4.22 (L) 09/21/2020 07:51 PM    HEMOGLOBIN 13.6 (L) 09/21/2020 07:51 PM    HEMATOCRIT 40.2 (L) 09/21/2020 07:51 PM    MCV 95.3 09/21/2020 07:51 PM    MCH 32.2 09/21/2020 07:51 PM    MCHC 33.8 09/21/2020 07:51 PM    MPV 10.8 09/21/2020 07:51 PM      Lab Results   Component Value Date/Time    SODIUM 139 09/21/2020 07:51 PM    POTASSIUM 3.6 09/21/2020 07:51 PM    CHLORIDE 101 09/21/2020 07:51 PM    CO2 19 (L) 09/21/2020 07:51 PM    GLUCOSE 150 (H) 09/21/2020 07:51 PM    BUN 12 09/21/2020 07:51 PM    CREATININE 0.96 09/21/2020 07:51 PM      Lab Results   Component Value Date/Time    ASTSGOT 25 09/21/2020 07:51 PM    ALTSGPT 20 09/21/2020 07:51 PM     Lab Results   Component Value Date/Time    CHOLSTRLTOT 190 12/22/2018 07:47 AM     (H) 12/22/2018 07:47 AM    HDL 36 (A) 12/22/2018 07:47 AM    TRIGLYCERIDE 217 (H) 12/22/2018 07:47 AM    TROPONINT 91 (H) 09/22/2020 01:09 AM       No results for input(s): NTPROBNP in the last 72 hours.  (Above labs reviewed.)       Current Facility-Administered Medications:   •  senna-docusate (PERICOLACE or SENOKOT S) 8.6-50 MG per tablet 2 Tab, 2 Tab, Oral, BID **AND** polyethylene glycol/lytes (MIRALAX) PACKET 1 Packet, 1 Packet, Oral, QDAY PRN **AND** magnesium hydroxide (MILK OF MAGNESIA) suspension 30 mL, 30 mL, Oral, QDAY PRN **AND** bisacodyl (DULCOLAX) suppository 10 mg, 10 mg, Rectal, QDAY PRN, Alden Harden M.D.  •  Respiratory Therapy Consult, , Nebulization, Continuous RT, Alden Harden M.D.  •  acetaminophen (TYLENOL) tablet 650 mg, 650 mg, Oral, Q6HRS PRN, Alden Harden M.D.  •  [START ON 9/23/2020] aspirin EC (ECOTRIN) tablet 81 mg, 81 mg, Oral, DAILY, Alden Harden M.D.  •  atorvastatin (LIPITOR) tablet 40 mg, 40 mg, Oral, Q EVENING, Alden  SUMI Harden  •  nitroglycerin (NITROSTAT) tablet 0.4 mg, 0.4 mg, Sublingual, Q5 MIN PRN, Alden Harden M.D., 0.4 mg at 09/22/20 0126  •  morphine (pf) 4 MG/ML injection 2-4 mg, 2-4 mg, Intravenous, Q3HRS PRN, Alden Harden M.D.  •  ondansetron (ZOFRAN) syringe/vial injection 4 mg, 4 mg, Intravenous, Q4HRS PRN, Alden Harden M.D.  •  ondansetron (ZOFRAN ODT) dispertab 4 mg, 4 mg, Oral, Q4HRS PRN, Alden Harden M.D.  •  insulin regular (HumuLIN R,NovoLIN R) injection, 1-6 Units, Subcutaneous, 4X/DAY ACHS, Stopped at 09/22/20 0700 **AND** POC Blood Glucose, , , Q AC AND BEDTIME(S) **AND** NOTIFY MD and PharmD, , , Once **AND** glucose 4 g chewable tablet 16 g, 16 g, Oral, Q15 MIN PRN **AND** dextrose 50% (D50W) injection 50 mL, 50 mL, Intravenous, Q15 MIN PRN, Alden Harden M.D.  •  amLODIPine (NORVASC) tablet 10 mg, 10 mg, Oral, DAILY, Alden Harden M.D., 10 mg at 09/22/20 0503  •  clopidogrel (PLAVIX) tablet 75 mg, 75 mg, Oral, DAILY, Alden Harden M.D., 75 mg at 09/22/20 0502  •  lisinopril (PRINIVIL) tablet 40 mg, 40 mg, Oral, DAILY, Alden Harden M.D., 40 mg at 09/22/20 0503  •  NITROGLYCERIN 0.4 MG SL SUBL, , , ,   •  heparin infusion 25,000 units in 500 mL 0.45% NACL, 0-30 Units/kg/hr (Adjusted), Intravenous, Continuous, Alden Harden M.D., Last Rate: 14.8 mL/hr at 09/22/20 0723, 12 Units/kg/hr at 09/22/20 0723  •  heparin injection 1,900 Units, 30 Units/kg (Adjusted), Intravenous, PRN, Alden Harden M.D.  •  chlorthalidone (HYGROTON) tablet 25 mg, 25 mg, Oral, Q DAY, Chuckie Cantu M.D., 25 mg at 09/22/20 0831  •  carvedilol (COREG) tablet 12.5 mg, 12.5 mg, Oral, BID WITH MEALS, Akira Weinberg M.D., 12.5 mg at 09/22/20 0831  (Medications reviewed.)    Cardiac Imaging and Procedures Review  CARDIAC STUDIES/PROCEDURES:    CARDIAC CATHETERIZATION CONCLUSIONS (07/05/19)  1.  Two-vessel coronary artery disease with small vessel chronic total   occlusion of the mid left anterior descending artery and patent with a distal    small caliber vessel filling via right to left collaterals, patent stents in   the mid right coronary artery.  2.  Normal left ventricular systolic function with ejection fraction of 65%.  3.  Elevated left ventricular end diastolic pressure.  (study result reviewed)    CARDIAC CATHETERIZATION CONCLUSIONS by Hebert Dill (05/30/20)  1.  100% occluded RCA with brisk left to right collaterals  2.  Diffuse nonobstructive CAD otherwise.  Several 50% stenoses in small vessels.    3.  LVEF 48% preintervention  4.  FFR LAD 0.91, intervention deferred  5.  Successful PCI RCA with placement of a 3.5 x 20 mm Synergy CHRISTINE postdilated to 3.8 mm with an excellent angiographic result.  (study result reviewed)    Assessment/Plan  1. Chest pain, coronary artery disease with prior stent placement: He is reexperiencing chest pain. We will schedule for cardiac catheterization.  He understands the risks and benefits and agrees with plan.  2. Hypertension: Blood pressure has been high.  We will increase carvedilol and reassess the blood pressure.  3. Hyperlipidemia: He is doing well on statin therapy without myalgia symptoms.    Thank you for allowing me to participate in the care of this patient.  I will continue to follow this patient    Please contact me with any questions.    Akira Weinberg M.D.   Cardiologist, [facility]  [phone]

## 2020-09-22 NOTE — ASSESSMENT & PLAN NOTE
Start on insulin sliding scale with serial Accu-Checks  HbA1C 7.9; c/w ISS for now  Hypoglycemic protocol in place

## 2020-09-22 NOTE — PROCEDURES
REFERRING PHYSICIAN: Dr. Weinberg    PREOPERATIVE DIAGNOSIS:  1.  Non-STEMI  2.  Diabetes mellitus  3.  CAD status post prior PCI    POSTOPERATIVE DIAGNOSIS:  1.  Multivessel coronary artery disease  2.  Severe ISR previously placed RCA stents  3.  IFR LAD 0.86 due to a proximal stenosis  4.  LVEF 55%      PROCEDURE PERFORMED:  Selective coronary angiography of the native vessels  Left heart catheterization  Left ventriculogram  IFR LAD  Supervision moderate sedation    DESCRIPTION OF PROCEDURE:  The risks and benefits of cardiac catheterization as well as the procedure itself, rationale and appropriateness were discussed with the patient today. Complications including but not limited to death, stroke, MI, urgent bypass surgery, contrast nephropathy, vascular complications, bleeding and infection were explained to the patient. The potential outcomes associated with the procedure (possible PCI, possible CABG, possible medical Rx only) were also discussed at length. The patient agreed to proceed.    The patient was transported to the catheterization laboratory in the fasting state. The right radial area and right groin were prepped and draped in the usual fashion. Right radial area was entered with a single through and through puncture under direct ultrasound guidance and a 6F glide sheath was placed. An intra-arterial cocktail of heparin, verapamil and nitroglycerine was administered. Over a wire, a 5F Sandra  catheter was passed to the aortic root and used to engage the right and left coronaries without difficulty. Contrast was administered and multiple images obtained. This catheter was then used to cross the aortic valve for LHC and contrast was administered at 8cc/s for 24cc's for left ventriculogram.     INSTANTANEOUS WAVE-FREE RATIO (iFR):  The diagnostic catheter was exchanged for an appropriate guide catheter seated appropriately. The pressure wire was zeroed in the hoop, advanced just distal to the tip of  guide. After meticulous clearing of the catheters and flushing it was normalized. Subsequently he was navigated across the area of interest in the LAD and intracoronary nitroglycerin was administered. The iFR was subsequently recorded and found to be 0.86.  Final angiographic films were completed showing no change. All catheters and guidewires were removed and a TR band was applied to achieve patent hemostasis. Patient left the cath lab in stable condition.      Moderate sedation directly monitored by me during the case while supervising the administration of the sedation medication by an independent trained RN to assist in the monitoring of the patient's level of consciousness and physiological status. I, the supervising physician was present the entire time from beginning of medication administration until the end of the procedure from 1452 until 1522. For detailed administration records please see the moderate sedation documentation in the median tab.      FINDINGS:  I. HEMODYNAMICS:    Ao: 128/87 mmHg   LEDP: 12 mmHg   Gradient on LV pullback: No    II. LEFT VENTRICULOGRAM:   LVEF GARCIA PROJECTION: 55%     III. CORONARY ANGIOGRAPHY:  Left Main: Large bifurcating no CAD  Left Anterior Descending: Large continues in a mixed LAD diagonal distribution supplying multiple septals.  There is a an 80% proximal eccentric stenosis.  IFR across this lesion is 0.86 which is abnormal.  Left Circumflex: Moderate caliber nondominant supplies a large first branching obtuse marginal.  This obtuse marginals notable for a 90% proximal stenosis.  Right Coronary: Large caliber and dominant with a previously placed stent in its proximal and midportion.  There is severe in-stent restenosis of the ostium of 80% and a tandem moderate in-stent restenosis of 50%.  Gives rise to the RPDA and RPL B.  There are PLB is notable for a focal 80% stenosis and the RPDA is notable for a 98% focal stenosis.    COMPLICATIONS: none  apparent    CONCLUSIONS:  1.  Multivessel coronary artery disease  2.  Severe ISR previously placed RCA stents  3.  IFR LAD 0.86 due to a proximal stenosis  4.  LVEF 55%    RECOMMENDATIONS:  Recommend multivessel CABG due to diffuse three-vessel CAD and diabetes mellitus as well as failure of previously placed stents with severe in-stent restenosis in a relatively short timeframe.

## 2020-09-22 NOTE — CARE PLAN
Problem: Fluid Volume:  Goal: Will maintain balanced intake and output  Outcome: PROGRESSING AS EXPECTED     Problem: Communication  Goal: The ability to communicate needs accurately and effectively will improve  Outcome: PROGRESSING AS EXPECTED     Problem: Safety  Goal: Will remain free from injury  Outcome: PROGRESSING AS EXPECTED     Problem: Infection  Goal: Will remain free from infection  Outcome: PROGRESSING AS EXPECTED     Problem: Knowledge Deficit  Goal: Knowledge of disease process/condition, treatment plan, diagnostic tests, and medications will improve  Outcome: PROGRESSING AS EXPECTED  Goal: Knowledge of the prescribed therapeutic regimen will improve  Outcome: PROGRESSING AS EXPECTED

## 2020-09-23 PROBLEM — N17.9 AKI (ACUTE KIDNEY INJURY) (HCC): Status: ACTIVE | Noted: 2020-09-23

## 2020-09-23 LAB
ANION GAP SERPL CALC-SCNC: 17 MMOL/L (ref 7–16)
BASOPHILS # BLD AUTO: 1.2 % (ref 0–1.8)
BASOPHILS # BLD: 0.12 K/UL (ref 0–0.12)
BUN SERPL-MCNC: 18 MG/DL (ref 8–22)
CALCIUM SERPL-MCNC: 9.1 MG/DL (ref 8.5–10.5)
CFT BLD TEG: 18.8 MIN (ref 5–10)
CFT P HPASE BLD TEG: 7.4 MIN (ref 5–10)
CHLORIDE SERPL-SCNC: 102 MMOL/L (ref 96–112)
CHOLEST SERPL-MCNC: 210 MG/DL (ref 100–199)
CLOT ANGLE BLD TEG: 37.6 DEGREES (ref 53–72)
CLOT ANGLE P HPASE BLD TEG: 60.7 DEGREES (ref 53–72)
CLOT INIT P HPASE BLD TEG: 2.2 MIN (ref 1–3)
CLOT LYSIS 30M P MA LENFR BLD TEG: 0 % (ref 0–8)
CLOT LYSIS 30M P MA LENFR BLD TEG: 0 % (ref 0–8)
CO2 SERPL-SCNC: 21 MMOL/L (ref 20–33)
CREAT SERPL-MCNC: 1.3 MG/DL (ref 0.5–1.4)
CT.EXTRINSIC BLD ROTEM: 5.2 MIN (ref 1–3)
EKG IMPRESSION: NORMAL
EOSINOPHIL # BLD AUTO: 0.71 K/UL (ref 0–0.51)
EOSINOPHIL NFR BLD: 7.4 % (ref 0–6.9)
ERYTHROCYTE [DISTWIDTH] IN BLOOD BY AUTOMATED COUNT: 42.2 FL (ref 35.9–50)
EST. AVERAGE GLUCOSE BLD GHB EST-MCNC: 180 MG/DL
GLUCOSE BLD-MCNC: 131 MG/DL (ref 65–99)
GLUCOSE BLD-MCNC: 180 MG/DL (ref 65–99)
GLUCOSE BLD-MCNC: 217 MG/DL (ref 65–99)
GLUCOSE BLD-MCNC: 289 MG/DL (ref 65–99)
GLUCOSE SERPL-MCNC: 176 MG/DL (ref 65–99)
HBA1C MFR BLD: 7.9 % (ref 0–5.6)
HCT VFR BLD AUTO: 37.2 % (ref 42–52)
HDLC SERPL-MCNC: 37 MG/DL
HGB BLD-MCNC: 12.7 G/DL (ref 14–18)
IMM GRANULOCYTES # BLD AUTO: 0.04 K/UL (ref 0–0.11)
IMM GRANULOCYTES NFR BLD AUTO: 0.4 % (ref 0–0.9)
LDLC SERPL CALC-MCNC: ABNORMAL MG/DL
LYMPHOCYTES # BLD AUTO: 3.08 K/UL (ref 1–4.8)
LYMPHOCYTES NFR BLD: 32 % (ref 22–41)
MAGNESIUM SERPL-MCNC: 1.7 MG/DL (ref 1.5–2.5)
MCF BLD TEG: 54.1 MM (ref 50–70)
MCF P HPASE BLD TEG: 64.8 MM (ref 50–70)
MCH RBC QN AUTO: 32.7 PG (ref 27–33)
MCHC RBC AUTO-ENTMCNC: 34.1 G/DL (ref 33.7–35.3)
MCV RBC AUTO: 95.9 FL (ref 81.4–97.8)
MONOCYTES # BLD AUTO: 0.72 K/UL (ref 0–0.85)
MONOCYTES NFR BLD AUTO: 7.5 % (ref 0–13.4)
NEUTROPHILS # BLD AUTO: 4.95 K/UL (ref 1.82–7.42)
NEUTROPHILS NFR BLD: 51.5 % (ref 44–72)
NRBC # BLD AUTO: 0 K/UL
NRBC BLD-RTO: 0 /100 WBC
PA AA BLD-ACNC: 100 %
PA ADP BLD-ACNC: 22.7 %
PLATELET # BLD AUTO: 212 K/UL (ref 164–446)
PMV BLD AUTO: 11.1 FL (ref 9–12.9)
POTASSIUM SERPL-SCNC: 3.5 MMOL/L (ref 3.6–5.5)
RBC # BLD AUTO: 3.88 M/UL (ref 4.7–6.1)
SODIUM SERPL-SCNC: 140 MMOL/L (ref 135–145)
TEG ALGORITHM TGALG: ABNORMAL
TRIGL SERPL-MCNC: 610 MG/DL (ref 0–149)
UFH PPP CHRO-ACNC: 0.15 IU/ML
UFH PPP CHRO-ACNC: 0.16 IU/ML
UFH PPP CHRO-ACNC: 0.32 IU/ML
WBC # BLD AUTO: 9.6 K/UL (ref 4.8–10.8)

## 2020-09-23 PROCEDURE — 770020 HCHG ROOM/CARE - TELE (206)

## 2020-09-23 PROCEDURE — 700102 HCHG RX REV CODE 250 W/ 637 OVERRIDE(OP): Performed by: INTERNAL MEDICINE

## 2020-09-23 PROCEDURE — 85384 FIBRINOGEN ACTIVITY: CPT

## 2020-09-23 PROCEDURE — A9270 NON-COVERED ITEM OR SERVICE: HCPCS | Performed by: NURSE PRACTITIONER

## 2020-09-23 PROCEDURE — 99232 SBSQ HOSP IP/OBS MODERATE 35: CPT | Performed by: STUDENT IN AN ORGANIZED HEALTH CARE EDUCATION/TRAINING PROGRAM

## 2020-09-23 PROCEDURE — 85520 HEPARIN ASSAY: CPT

## 2020-09-23 PROCEDURE — 700102 HCHG RX REV CODE 250 W/ 637 OVERRIDE(OP): Performed by: NURSE PRACTITIONER

## 2020-09-23 PROCEDURE — 82962 GLUCOSE BLOOD TEST: CPT

## 2020-09-23 PROCEDURE — 700111 HCHG RX REV CODE 636 W/ 250 OVERRIDE (IP): Performed by: INTERNAL MEDICINE

## 2020-09-23 PROCEDURE — 700111 HCHG RX REV CODE 636 W/ 250 OVERRIDE (IP): Performed by: STUDENT IN AN ORGANIZED HEALTH CARE EDUCATION/TRAINING PROGRAM

## 2020-09-23 PROCEDURE — A9270 NON-COVERED ITEM OR SERVICE: HCPCS | Performed by: INTERNAL MEDICINE

## 2020-09-23 PROCEDURE — 36415 COLL VENOUS BLD VENIPUNCTURE: CPT

## 2020-09-23 PROCEDURE — 700105 HCHG RX REV CODE 258: Performed by: STUDENT IN AN ORGANIZED HEALTH CARE EDUCATION/TRAINING PROGRAM

## 2020-09-23 PROCEDURE — 85576 BLOOD PLATELET AGGREGATION: CPT

## 2020-09-23 PROCEDURE — 99232 SBSQ HOSP IP/OBS MODERATE 35: CPT | Performed by: INTERNAL MEDICINE

## 2020-09-23 PROCEDURE — 99223 1ST HOSP IP/OBS HIGH 75: CPT | Performed by: THORACIC SURGERY (CARDIOTHORACIC VASCULAR SURGERY)

## 2020-09-23 PROCEDURE — 85347 COAGULATION TIME ACTIVATED: CPT

## 2020-09-23 RX ORDER — ATORVASTATIN CALCIUM 80 MG/1
80 TABLET, FILM COATED ORAL EVERY EVENING
Status: DISCONTINUED | OUTPATIENT
Start: 2020-09-23 | End: 2020-09-25

## 2020-09-23 RX ORDER — SODIUM CHLORIDE 9 MG/ML
INJECTION, SOLUTION INTRAVENOUS CONTINUOUS
Status: DISCONTINUED | OUTPATIENT
Start: 2020-09-23 | End: 2020-09-24

## 2020-09-23 RX ADMIN — CHLORTHALIDONE 25 MG: 25 TABLET ORAL at 05:00

## 2020-09-23 RX ADMIN — AMLODIPINE BESYLATE 10 MG: 10 TABLET ORAL at 05:00

## 2020-09-23 RX ADMIN — CARVEDILOL 12.5 MG: 12.5 TABLET, FILM COATED ORAL at 05:00

## 2020-09-23 RX ADMIN — ASPIRIN 81 MG: 81 TABLET, COATED ORAL at 05:00

## 2020-09-23 RX ADMIN — SODIUM CHLORIDE: 9 INJECTION, SOLUTION INTRAVENOUS at 17:03

## 2020-09-23 RX ADMIN — ATORVASTATIN CALCIUM 80 MG: 80 TABLET, FILM COATED ORAL at 17:02

## 2020-09-23 RX ADMIN — ENOXAPARIN SODIUM 40 MG: 40 INJECTION SUBCUTANEOUS at 17:03

## 2020-09-23 RX ADMIN — CARVEDILOL 12.5 MG: 12.5 TABLET, FILM COATED ORAL at 17:02

## 2020-09-23 RX ADMIN — HEPARIN SODIUM 16 UNITS/KG/HR: 5000 INJECTION, SOLUTION INTRAVENOUS at 11:04

## 2020-09-23 RX ADMIN — LISINOPRIL 40 MG: 20 TABLET ORAL at 05:00

## 2020-09-23 RX ADMIN — HEPARIN SODIUM 1900 UNITS: 1000 INJECTION, SOLUTION INTRAVENOUS; SUBCUTANEOUS at 07:02

## 2020-09-23 RX ADMIN — HEPARIN SODIUM 1900 UNITS: 1000 INJECTION, SOLUTION INTRAVENOUS; SUBCUTANEOUS at 00:29

## 2020-09-23 RX ADMIN — CLOPIDOGREL BISULFATE 75 MG: 75 TABLET ORAL at 05:01

## 2020-09-23 ASSESSMENT — ENCOUNTER SYMPTOMS
WEAKNESS: 0
NECK PAIN: 0
MYALGIAS: 0
NERVOUS/ANXIOUS: 0
VOMITING: 0
PSYCHIATRIC NEGATIVE: 1
COUGH: 0
EYES NEGATIVE: 1
HEARTBURN: 0
BLURRED VISION: 0
HEADACHES: 0
BRUISES/BLEEDS EASILY: 0
NEUROLOGICAL NEGATIVE: 1
CARDIOVASCULAR NEGATIVE: 1
FEVER: 0
NAUSEA: 0
ABDOMINAL PAIN: 0
SORE THROAT: 0
ORTHOPNEA: 0
MUSCULOSKELETAL NEGATIVE: 1
CONSTITUTIONAL NEGATIVE: 1
PND: 0
DEPRESSION: 0
FOCAL WEAKNESS: 0
RESPIRATORY NEGATIVE: 1
DIZZINESS: 0
SHORTNESS OF BREATH: 0
GASTROINTESTINAL NEGATIVE: 1
DOUBLE VISION: 0
CHILLS: 0
PALPITATIONS: 0
SPUTUM PRODUCTION: 0

## 2020-09-23 ASSESSMENT — FIBROSIS 4 INDEX: FIB4 SCORE: 1.27

## 2020-09-23 NOTE — PROGRESS NOTES
Cardiology Follow Up Progress Note    Date of Service  9/23/2020    Attending Physician  Cesar Perera M.D.    Chief Complaint   Non-STEMI, coronary artery disease with prior stent placement, pending coronary artery bypass graft surgery.    [facility]  Guerrero Ann is a 49 y.o. male admitted 9/21/2020 with above.    Interim Events  He underwent cardiac catheterization as below and was evaluated by cardiothoracic surgery.    Review of Systems  Review of Systems   Constitutional: Negative.  Negative for chills and fever.   HENT: Negative.  Negative for hearing loss.    Eyes: Negative.    Respiratory: Negative.  Negative for cough and shortness of breath.    Cardiovascular: Negative.  Negative for chest pain, palpitations and leg swelling.   Gastrointestinal: Negative.  Negative for abdominal pain, nausea and vomiting.   Genitourinary: Negative.  Negative for dysuria and urgency.   Musculoskeletal: Negative.  Negative for myalgias.   Skin: Negative.  Negative for rash.   Neurological: Negative.  Negative for dizziness, weakness and headaches.   Hematological: Does not bruise/bleed easily.   Psychiatric/Behavioral: Negative.  The patient is not nervous/anxious.        Vital signs in last 24 hours  Temp:  [36 °C (96.8 °F)-36.9 °C (98.4 °F)] 36.6 °C (97.9 °F)  Pulse:  [55-87] 57  Resp:  [16-18] 16  BP: (108-148)/(53-95) 139/86  SpO2:  [92 %-98 %] 98 %    Physical Exam  Physical Exam  Constitutional:       Appearance: He is well-developed.   HENT:      Head: Normocephalic and atraumatic.   Eyes:      Conjunctiva/sclera: Conjunctivae normal.      Pupils: Pupils are equal, round, and reactive to light.   Neck:      Musculoskeletal: Normal range of motion and neck supple.   Cardiovascular:      Rate and Rhythm: Normal rate and regular rhythm.   Pulmonary:      Effort: Pulmonary effort is normal.      Breath sounds: Normal breath sounds.   Abdominal:      General: Bowel sounds are normal.      Palpations:  Abdomen is soft.   Musculoskeletal: Normal range of motion.   Skin:     General: Skin is warm and dry.   Neurological:      Mental Status: He is alert and oriented to person, place, and time.         Lab Review  Lab Results   Component Value Date/Time    WBC 9.6 09/22/2020 11:39 PM    RBC 3.88 (L) 09/22/2020 11:39 PM    HEMOGLOBIN 12.7 (L) 09/22/2020 11:39 PM    HEMATOCRIT 37.2 (L) 09/22/2020 11:39 PM    MCV 95.9 09/22/2020 11:39 PM    MCH 32.7 09/22/2020 11:39 PM    MCHC 34.1 09/22/2020 11:39 PM    MPV 11.1 09/22/2020 11:39 PM      Lab Results   Component Value Date/Time    SODIUM 140 09/22/2020 11:39 PM    POTASSIUM 3.5 (L) 09/22/2020 11:39 PM    CHLORIDE 102 09/22/2020 11:39 PM    CO2 21 09/22/2020 11:39 PM    GLUCOSE 176 (H) 09/22/2020 11:39 PM    BUN 18 09/22/2020 11:39 PM    CREATININE 1.30 09/22/2020 11:39 PM      Lab Results   Component Value Date/Time    ASTSGOT 25 09/21/2020 07:51 PM    ALTSGPT 20 09/21/2020 07:51 PM     Lab Results   Component Value Date/Time    CHOLSTRLTOT 210 (H) 09/22/2020 11:39 PM    LDL see below 09/22/2020 11:39 PM    HDL 37 (A) 09/22/2020 11:39 PM    TRIGLYCERIDE 610 (H) 09/22/2020 11:39 PM    TROPONINT 91 (H) 09/22/2020 01:09 AM       No results for input(s): NTPROBNP in the last 72 hours.  (Above labs reviewed.)       Current Facility-Administered Medications:   •  atorvastatin (LIPITOR) tablet 80 mg, 80 mg, Oral, Q EVENING, Tanisha Corrales, A.P.N.  •  senna-docusate (PERICOLACE or SENOKOT S) 8.6-50 MG per tablet 2 Tab, 2 Tab, Oral, BID **AND** polyethylene glycol/lytes (MIRALAX) PACKET 1 Packet, 1 Packet, Oral, QDAY PRN **AND** magnesium hydroxide (MILK OF MAGNESIA) suspension 30 mL, 30 mL, Oral, QDAY PRN **AND** bisacodyl (DULCOLAX) suppository 10 mg, 10 mg, Rectal, QDAY PRN, Alden Harden M.D.  •  Respiratory Therapy Consult, , Nebulization, Continuous RT, Alden Harden M.D.  •  acetaminophen (TYLENOL) tablet 650 mg, 650 mg, Oral, Q6HRS PRN, Alden Harden M.D.  •  aspirin EC  (ECOTRIN) tablet 81 mg, 81 mg, Oral, DAILY, Alden Harden M.D., 81 mg at 09/23/20 0500  •  nitroglycerin (NITROSTAT) tablet 0.4 mg, 0.4 mg, Sublingual, Q5 MIN PRN, Alden Harden M.D., 0.4 mg at 09/22/20 0126  •  morphine (pf) 4 MG/ML injection 2-4 mg, 2-4 mg, Intravenous, Q3HRS PRN, Alden Harden M.D.  •  ondansetron (ZOFRAN) syringe/vial injection 4 mg, 4 mg, Intravenous, Q4HRS PRN, Alden Harden M.D.  •  ondansetron (ZOFRAN ODT) dispertab 4 mg, 4 mg, Oral, Q4HRS PRN, Alden Harden M.D.  •  insulin regular (HumuLIN R,NovoLIN R) injection, 1-6 Units, Subcutaneous, 4X/DAY ACHS, Stopped at 09/22/20 0700 **AND** POC Blood Glucose, , , Q AC AND BEDTIME(S) **AND** NOTIFY MD and PharmD, , , Once **AND** glucose 4 g chewable tablet 16 g, 16 g, Oral, Q15 MIN PRN **AND** dextrose 50% (D50W) injection 50 mL, 50 mL, Intravenous, Q15 MIN PRN, Alden Harden M.D.  •  amLODIPine (NORVASC) tablet 10 mg, 10 mg, Oral, DAILY, Alden Harden M.D., 10 mg at 09/23/20 0500  •  lisinopril (PRINIVIL) tablet 40 mg, 40 mg, Oral, DAILY, Alden Harden M.D., 40 mg at 09/23/20 0500  •  heparin infusion 25,000 units in 500 mL 0.45% NACL, 0-30 Units/kg/hr (Adjusted), Intravenous, Continuous, Alden Harden M.D., Last Rate: 19.8 mL/hr at 09/23/20 0704, 16 Units/kg/hr at 09/23/20 0704  •  heparin injection 1,900 Units, 30 Units/kg (Adjusted), Intravenous, PRN, Alden Harden M.D., 1,900 Units at 09/23/20 0702  •  chlorthalidone (HYGROTON) tablet 25 mg, 25 mg, Oral, Q DAY, Chuckie Cantu M.D., 25 mg at 09/23/20 0500  •  carvedilol (COREG) tablet 12.5 mg, 12.5 mg, Oral, BID WITH MEALS, Akira Weinberg M.D., 12.5 mg at 09/23/20 0500  (Medications reviewed.)    Cardiac Imaging and Procedures Review  CARDIAC STUDIES/PROCEDURES:    CARDIAC CATHETERIZATION CONCLUSIONS by Hebert Dill (09/22/20)  1.  Multivessel coronary artery disease  2.  Severe ISR previously placed RCA stents  3.  IFR LAD 0.86 due to a proximal stenosis  4.  LVEF 55%  (study result  reviewed)     CARDIAC CATHETERIZATION CONCLUSIONS (07/05/19)  1.  Two-vessel coronary artery disease with small vessel chronic total   occlusion of the mid left anterior descending artery and patent with a distal   small caliber vessel filling via right to left collaterals, patent stents in   the mid right coronary artery.  2.  Normal left ventricular systolic function with ejection fraction of 65%.  3.  Elevated left ventricular end diastolic pressure.     CARDIAC CATHETERIZATION CONCLUSIONS by Hebert Dill (05/30/20)  1.  100% occluded RCA with brisk left to right collaterals  2.  Diffuse nonobstructive CAD otherwise.  Several 50% stenoses in small vessels.    3.  LVEF 48% preintervention  4.  FFR LAD 0.91, intervention deferred  5.  Successful PCI RCA with placement of a 3.5 x 20 mm Synergy CHRISTINE postdilated to 3.8 mm with an excellent angiographic result.    ECHOCARDIOGRAM CONCLUSIONS (09/22/20)  No prior study is available for comparison.   Normal left ventricular systolic function.  Left ventricular ejection fraction is visually estimated to be 60%.  Normal regional wall motion.  Moderate concentric left ventricular hypertrophy.  Enlarged right atrium.  Normal left atrial size.  Trace mitral regurgitation.  The aortic valve is not well visualized.  No aortic stenosis.  Right ventricular systolic pressure is estimated to be 25  mmHg.  Normal pericardium without effusion.  (study result reviewed)    EKG performed on (09/22/20) was reviewed: EKG personally interpreted shows sinus rhythm with left ventricular hypertrophy.    Assessment/Plan  1. Coronary artery disease pending coronary bypass graft: He is clinically doing well. Plans per surgery.  2. Hypertension: Blood pressure is well controlled. We will continue with carvedilol and amlodipine.  3. Hyperlipidemia: He is doing well on statin therapy without myalgia symptoms.    Thank you for allowing me to participate in the care of this patient.  I will  continue to follow this patient    Please contact me with any questions.    Akira Weinberg M.D.   Cardiologist, [facility]  [phone]

## 2020-09-23 NOTE — PROGRESS NOTES
Report received from Deloris COOK. Pt care assumed. Assessment performed. Pt AOx4. Pt laying supine in bed. Pt denies pain and no signs of distress. Bed in low, locked position. Pt educated on calling to ambulate. Call light within reach. Treaded socks on pt.  Hourly rounding in place.

## 2020-09-23 NOTE — CONSULTS
REFERRING PHYSICIAN: Hebert Green MD.     CONSULTING PHYSICIAN: Michael Chicas MD, FACS.    CHIEF COMPLAINT: chest pain    HISTORY OF PRESENT ILLNESS: The patient is a 49 y.o. 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  The day of admission it occurred lasted several minutes was relieved with rest.  It was substernal and radiated to his left arm.  He became short of breath.  He went home.  The pain returned and it felt like a boulder on his chest pain 10/10.  No other associated signs or symptoms.  No other alleviating or aggravating factors.  He then brought himself to the ED here at [facility].  A cath revealed progression of his disease.  He was referred for CABG.    PAST MEDICAL HISTORY:   Past Medical History:   Diagnosis Date   • Diabetes (HCC)     oral meds only   • Hypertension    • Kidney stones    • MI, old    • Psychiatric problem     depression and anxiety       PAST SURGICAL HISTORY:   Past Surgical History:   Procedure Laterality Date   • STENT PLACEMENT  2017 cardiac stent   • OTHER ORTHOPEDIC SURGERY      rt wrist fracture with fixation 34 years ago       FAMILY HISTORY:   Family History   Problem Relation Age of Onset   • Stroke Mother 47        had 3 devastating strokes,  54yo stroke complications   • No Known Problems Father         does not know his father.   • Stroke Maternal Grandmother 85   • Stroke Maternal Grandfather 54        SOCIAL HISTORY:   Social History     Socioeconomic History   • Marital status: Single     Spouse name: Not on file   • Number of children: Not on file   • Years of education: Not on file   • Highest education level: Not on file   Occupational History   • Not on file   Social Needs   • Financial resource strain: Not on file   • Food insecurity     Worry: Not on file     Inability: Not on file   • Transportation needs     Medical: Not on file      Non-medical: Not on file   Tobacco Use   • Smoking status: Never Smoker   • Smokeless tobacco: Current User     Types: Chew   Substance and Sexual Activity   • Alcohol use: Yes     Comment: 2 per week   • Drug use: No   • Sexual activity: Not on file   Lifestyle   • Physical activity     Days per week: Not on file     Minutes per session: Not on file   • Stress: Not on file   Relationships   • Social connections     Talks on phone: Not on file     Gets together: Not on file     Attends Jainism service: Not on file     Active member of club or organization: Not on file     Attends meetings of clubs or organizations: Not on file     Relationship status: Not on file   • Intimate partner violence     Fear of current or ex partner: Not on file     Emotionally abused: Not on file     Physically abused: Not on file     Forced sexual activity: Not on file   Other Topics Concern   • Not on file   Social History Narrative   • Not on file       ALLERGIES:   No Known Allergies     CURRENT MEDICATIONS:     Current Facility-Administered Medications:   •  atorvastatin (LIPITOR) tablet 80 mg, 80 mg, Oral, Q EVENING, Tanisha Corrales, A.P.N.  •  senna-docusate (PERICOLACE or SENOKOT S) 8.6-50 MG per tablet 2 Tab, 2 Tab, Oral, BID **AND** polyethylene glycol/lytes (MIRALAX) PACKET 1 Packet, 1 Packet, Oral, QDAY PRN **AND** magnesium hydroxide (MILK OF MAGNESIA) suspension 30 mL, 30 mL, Oral, QDAY PRN **AND** bisacodyl (DULCOLAX) suppository 10 mg, 10 mg, Rectal, QDAY PRN, Alden Harden M.D.  •  Respiratory Therapy Consult, , Nebulization, Continuous RT, Alden Harden M.D.  •  acetaminophen (TYLENOL) tablet 650 mg, 650 mg, Oral, Q6HRS PRN, Alden Harden M.D.  •  aspirin EC (ECOTRIN) tablet 81 mg, 81 mg, Oral, DAILY, Alden Harden M.D., 81 mg at 09/23/20 0500  •  nitroglycerin (NITROSTAT) tablet 0.4 mg, 0.4 mg, Sublingual, Q5 MIN PRN, Alden Harden M.D., 0.4 mg at 09/22/20 0126  •  morphine (pf) 4 MG/ML injection 2-4 mg, 2-4 mg,  Intravenous, Q3HRS PRN, Alden Harden M.D.  •  ondansetron (ZOFRAN) syringe/vial injection 4 mg, 4 mg, Intravenous, Q4HRS PRN, Alden Harden M.D.  •  ondansetron (ZOFRAN ODT) dispertab 4 mg, 4 mg, Oral, Q4HRS PRN, Alden Harden M.D.  •  insulin regular (HumuLIN R,NovoLIN R) injection, 1-6 Units, Subcutaneous, 4X/DAY ACHS, Stopped at 09/22/20 0700 **AND** POC Blood Glucose, , , Q AC AND BEDTIME(S) **AND** NOTIFY MD and PharmD, , , Once **AND** glucose 4 g chewable tablet 16 g, 16 g, Oral, Q15 MIN PRN **AND** dextrose 50% (D50W) injection 50 mL, 50 mL, Intravenous, Q15 MIN PRN, Alden Harden M.D.  •  amLODIPine (NORVASC) tablet 10 mg, 10 mg, Oral, DAILY, Alden Harden M.D., 10 mg at 09/23/20 0500  •  lisinopril (PRINIVIL) tablet 40 mg, 40 mg, Oral, DAILY, Alden Harden M.D., 40 mg at 09/23/20 0500  •  heparin infusion 25,000 units in 500 mL 0.45% NACL, 0-30 Units/kg/hr (Adjusted), Intravenous, Continuous, Alden Harden M.D., Last Rate: 19.8 mL/hr at 09/23/20 0704, 16 Units/kg/hr at 09/23/20 0704  •  heparin injection 1,900 Units, 30 Units/kg (Adjusted), Intravenous, PRN, Alden Harden M.D., 1,900 Units at 09/23/20 0702  •  chlorthalidone (HYGROTON) tablet 25 mg, 25 mg, Oral, Q DAY, Chuckie Cantu M.D., 25 mg at 09/23/20 0500  •  carvedilol (COREG) tablet 12.5 mg, 12.5 mg, Oral, BID WITH MEALS, Akira Weinberg M.D., 12.5 mg at 09/23/20 0500     LABS REVIEWED:  Lab Results   Component Value Date/Time    SODIUM 140 09/22/2020 11:39 PM    POTASSIUM 3.5 (L) 09/22/2020 11:39 PM    CHLORIDE 102 09/22/2020 11:39 PM    CO2 21 09/22/2020 11:39 PM    GLUCOSE 176 (H) 09/22/2020 11:39 PM    BUN 18 09/22/2020 11:39 PM    CREATININE 1.30 09/22/2020 11:39 PM      Lab Results   Component Value Date/Time    PROTHROMBTM 13.7 09/22/2020 01:51 AM    INR 1.02 09/22/2020 01:51 AM      Lab Results   Component Value Date/Time    WBC 9.6 09/22/2020 11:39 PM    RBC 3.88 (L) 09/22/2020 11:39 PM    HEMOGLOBIN 12.7 (L) 09/22/2020 11:39 PM    HEMATOCRIT  37.2 (L) 09/22/2020 11:39 PM    MCV 95.9 09/22/2020 11:39 PM    MCH 32.7 09/22/2020 11:39 PM    MCHC 34.1 09/22/2020 11:39 PM    MPV 11.1 09/22/2020 11:39 PM    NEUTSPOLYS 51.50 09/22/2020 11:39 PM    LYMPHOCYTES 32.00 09/22/2020 11:39 PM    MONOCYTES 7.50 09/22/2020 11:39 PM    EOSINOPHILS 7.40 (H) 09/22/2020 11:39 PM    BASOPHILS 1.20 09/22/2020 11:39 PM        IMAGING REVIEWED AND INTERPRETED:    ECHOCARDIOGRAM:   No prior study is available for comparison.   Normal left ventricular systolic function.  Left ventricular ejection fraction is visually estimated to be 60%.  Normal regional wall motion.  Moderate concentric left ventricular hypertrophy.  Enlarged right atrium.  Normal left atrial size.  Trace mitral regurgitation.  The aortic valve is not well visualized.  No aortic stenosis.  Right ventricular systolic pressure is estimated to be 25  mmHg.  Normal pericardium without effusion.    ANGIOGRAM:   Left Main: Large bifurcating no CAD  Left Anterior Descending: Large continues in a mixed LAD diagonal distribution supplying multiple septals.  There is a an 80% proximal eccentric stenosis.  IFR across this lesion is 0.86 which is abnormal.  Left Circumflex: Moderate caliber nondominant supplies a large first branching obtuse marginal.  This obtuse marginals notable for a 90% proximal stenosis.  Right Coronary: Large caliber and dominant with a previously placed stent in its proximal and midportion.  There is severe in-stent restenosis of the ostium of 80% and a tandem moderate in-stent restenosis of 50%.  Gives rise to the RPDA and RPL B.  There are PLB is notable for a focal 80% stenosis and the RPDA is notable for a 98% focal stenosis.    REVIEW OF SYSTEMS:   Review of Systems   Constitutional: Negative.    HENT: Negative.    Eyes: Negative.    Respiratory: Negative.    Cardiovascular: Positive for chest pain.   Genitourinary: Negative.    Musculoskeletal: Negative.    Neurological: Negative.   "  Endo/Heme/Allergies: Negative.    Psychiatric/Behavioral: Negative.      /86   Pulse (!) 57   Temp 36.6 °C (97.9 °F) (Temporal)   Resp 16   Ht 1.575 m (5' 2\")   Wt 73 kg (160 lb 15 oz)   SpO2 98%   BMI 29.44 kg/m²    PHYSICAL EXAMINATION:   Physical Exam   Constitutional: He is oriented to person, place, and time. No distress.   HENT:   Head: Normocephalic and atraumatic.   Eyes: Pupils are equal, round, and reactive to light.   Neck: Normal range of motion.   Cardiovascular: Normal rate and regular rhythm.   No murmur heard.  Pulmonary/Chest: Effort normal and breath sounds normal.   Abdominal: Soft. Bowel sounds are normal.   Musculoskeletal: Normal range of motion.         General: No edema.   Neurological: He is alert and oriented to person, place, and time.   Skin: Skin is warm and dry.   Psychiatric: Mood, memory, affect and judgment normal.     IMPRESSION:  3 vessel coronary artery disease not amenable to PCI.  Will get TEG and if positive will plan for surgery on Monday.  If negative will plan for surgery on Friday.    PLAN:  I recommend CABG x 3.     The procedure, its risks, benefits, potential complications and alternative treatments were discussed with the patient in detail including the risks should he decide not to undergo my recommended treatment. All of his questions were answered to his satisfaction and he is willing to proceed with the operation. The risks include death, stroke,  infection: to include a rare bacterial infection related to the use of the heart/lung machine, tyshawn-operative myocardial infarction, dysrhythmias, diaphragmatic paralysis, chest wall paresthesia, tracheostomy, kidney or other organ failure, possible return to the operating room for bleeding, bleeding requiring transfusion with its attendant risks including AIDS or hepatitis, dehiscence of surgical incisions, respiratory complications including the need for prolonged ventilator support, Protamine or other " drug reaction, peripheral neuropathy, loss of limb, and miscount of surgical items. The operative mortality risk is approximately 1-2%. The STS mortality risk score is 1% and the morbidity and mortality risk score is 5%. The scores were discussed with patient.    Sincerely,       Michael Chicas MD, FACS.    Michael SLATER MD, SIMBA performed a substantial portion of the EM visit face-to-face with the same patient on the same date of service with GEORGINA Rodriguez. I was personally involved in reviewing and interpreting the films and conducted elements of the history and physical exam. I performed all of the medical decision making for the patient.

## 2020-09-23 NOTE — PROGRESS NOTES
Hospital Medicine Daily Progress Note    Date of Service  9/23/2020    Chief Complaint  49 y.o. male presented with chest pain admitted 9/21/2020 with NSTEMI    Hospital Course    This is a 49 y.o. male with CAD status post stent placement, hypertension, diabetes who presented 9/21/2020 with chest pain.  The patient states he has been having exertional chest pain for the past month; however, became worse and persistent one day prior. He reported substernal chest pressure with radiation down his left arm associated with some numbness and tingling of his left hand and associated shortness of breath.  He denied any fevers, chills or cough. He has not been taking his medications for the past year since he lost his insurance.  He underwent a cardiac catheterization in 2018 and had a stent placed to his RCA.  He underwent a repeat cardiac catheterization in July 2019 which revealed two-vessel coronary artery disease with small vessel chronic total occlusion of the mid left anterior descending artery and patent stents in the mid right coronary artery.  Normal left ventricular systolic function with EF of 65%.  Patient also reported his blood pressure has been uncontrolled.  At the time of evaluation in ED, his chest pain has resolved.  His initial troponin in the ER was 21 which increased to 63.  Patient was placed on heparin GTT     EKG on arrival revealed sinus rhythm with LVH and repolarization changes.  T wave inversions with minimal ST depression noted in inferior leads  Second EKG sinus rhythm with ST elevation in V2 and V3, does not meet criteria given underlying LVH.  Chest x-ray did not show any acute cardiopulmonary process.     9/22: Underwent cardiac cath which showed multivessel coronary artery disease cerebrovascular previously placed RCA stents.        Interval Problem Update  Vitals within normal parameters except HR in 50s  Labs reviewed  noted a jump in creatinine to 1.3  Patient was seen by cardiology  and cardiothoracic surgery.  HbA1c was noted to be elevated 7.9    At bedside, no acute complaint from patient.  He expresses understanding of the need for the procedure.    Consultants/Specialty  Cardiology  Cardiothoracic surgery    Code Status  Full Code    Disposition  TBD, Pending CABG     Review of Systems  Review of Systems   Constitutional: Negative for chills, fever and malaise/fatigue.   HENT: Negative for congestion, sore throat and tinnitus.    Eyes: Negative for blurred vision and double vision.   Respiratory: Negative for cough, sputum production and shortness of breath.    Cardiovascular: Negative for chest pain, palpitations, orthopnea, leg swelling and PND.   Gastrointestinal: Negative for abdominal pain, heartburn, nausea and vomiting.   Genitourinary: Negative for dysuria, frequency and urgency.   Musculoskeletal: Negative for myalgias and neck pain.   Neurological: Negative for dizziness, focal weakness and headaches.   Psychiatric/Behavioral: Negative for depression.        Physical Exam  Temp:  [36 °C (96.8 °F)-36.9 °C (98.4 °F)] 36.7 °C (98.1 °F)  Pulse:  [55-87] 59  Resp:  [16-18] 16  BP: (108-148)/(53-87) 131/73  SpO2:  [92 %-99 %] 99 %    Physical Exam  Vitals signs and nursing note reviewed.   Constitutional:       General: He is not in acute distress.     Appearance: Normal appearance. He is not ill-appearing.   HENT:      Head: Normocephalic and atraumatic.      Mouth/Throat:      Mouth: Mucous membranes are moist.      Pharynx: Oropharynx is clear.   Eyes:      General: No scleral icterus.     Conjunctiva/sclera: Conjunctivae normal.   Cardiovascular:      Rate and Rhythm: Normal rate and regular rhythm.      Pulses: Normal pulses.      Heart sounds: Normal heart sounds.   Pulmonary:      Effort: Pulmonary effort is normal. No respiratory distress.      Breath sounds: Normal breath sounds. No wheezing.   Abdominal:      General: Bowel sounds are normal. There is no distension.       Palpations: Abdomen is soft.      Tenderness: There is no abdominal tenderness.   Musculoskeletal: Normal range of motion.         General: No swelling or tenderness.   Skin:     General: Skin is warm and dry.      Capillary Refill: Capillary refill takes less than 2 seconds.   Neurological:      General: No focal deficit present.      Mental Status: He is alert and oriented to person, place, and time. Mental status is at baseline.   Psychiatric:         Mood and Affect: Mood normal.         Fluids  No intake or output data in the 24 hours ending 09/23/20 1416    Laboratory  Recent Labs     09/21/20 1951 09/22/20  2339   WBC 9.4 9.6   RBC 4.22* 3.88*   HEMOGLOBIN 13.6* 12.7*   HEMATOCRIT 40.2* 37.2*   MCV 95.3 95.9   MCH 32.2 32.7   MCHC 33.8 34.1   RDW 41.3 42.2   PLATELETCT 213 212   MPV 10.8 11.1     Recent Labs     09/21/20 1951 09/22/20  2339   SODIUM 139 140   POTASSIUM 3.6 3.5*   CHLORIDE 101 102   CO2 19* 21   GLUCOSE 150* 176*   BUN 12 18   CREATININE 0.96 1.30   CALCIUM 9.6 9.1     Recent Labs     09/22/20  0151   APTT 28.1   INR 1.02         Recent Labs     09/22/20  2339   TRIGLYCERIDE 610*   HDL 37*   LDL see below       Imaging  EC-ECHOCARDIOGRAM COMPLETE W/O CONT   Final Result   No prior study is available for comparison.   Normal left ventricular systolic function.  Left ventricular ejection fraction is visually estimated to be 60%.  Normal regional wall motion.  Moderate concentric left ventricular hypertrophy.   DX-CHEST-PORTABLE (1 VIEW)   Final Result      No acute cardiopulmonary abnormality.      CL-LEFT HEART CATHETERIZATION WITH POSSIBLE INTERVENTION      CARDIAC CATHETERIZATION CONCLUSIONS by Hebert Dill (09/22/20)  1.  Multivessel coronary artery disease  2.  Severe ISR previously placed RCA stents  3.  IFR LAD 0.86 due to a proximal stenosis  4.  LVEF 55%  (study result reviewed)        Assessment/Plan  * CAD (coronary artery disease)- (present on admission)  Assessment &  Plan  Multivessel disease - will need CABG  Patient was evaluated by cardiothoracic surgery team today.  Plan for CABG in place    NSTEMI (non-ST elevated myocardial infarction) ([facility])- (present on admission)  Assessment & Plan  Cardiology consult appreciated -currently status post cardiac cath with the patient was noted to have multivessel disease  Current plan is for CABG  Continue with statins and nitroglycerin as needed for chest pain      SONNY (acute kidney injury) ([facility])  Assessment & Plan  Unclear if contrast-induced  Will start gentle hydrations    Essential hypertension- (present on admission)  Assessment & Plan  Uncontrolled on arrival  Restarted on carvedilol, lisinopril and norvasc    Type 2 diabetes mellitus with complication, without long-term current use of insulin ([facility])- (present on admission)  Assessment & Plan  Start on insulin sliding scale with serial Accu-Checks  HbA1C 7.9; c/w ISS for now  Hypoglycemic protocol in place           VTE prophylaxis: stop heparin drip and switch to lovenox ppx dosing

## 2020-09-23 NOTE — ASSESSMENT & PLAN NOTE
Cr had improved yesterday to 1.19 now it is 2.37.   Increase normal saline to 125 mL/Hr  Avoid nephrotoxic agents-toradol dc-ed, lisinopril dc-ed for today.  Repeat bmp in am

## 2020-09-23 NOTE — CARE PLAN
Problem: Communication  Goal: The ability to communicate needs accurately and effectively will improve  Outcome: PROGRESSING AS EXPECTED  Intervention: [place] patient and significant other/support system to call light to alert staff of needs  Flowsheets (Taken 9/22/2020 [id number])  Oriented to:: All of the Following : Location of Bathroom, Visiting Policy, Unit Routine, Call Light and Bedside Controls, Bedside Rail Policy, Smoking Policy, Rights and Responsibilities, Bedside Report, and Patient Education Notebook  Note: Pt updated on plan of care. Patient expresses understanding. All questions answered. Call light within reach and educated on how to use it.      Problem: Infection  Goal: Will remain free from infection  Outcome: PROGRESSING AS EXPECTED  Intervention: Assess signs and symptoms of infection  Note: Pt verbalized understanding of the importance of hand hygiene. Pt assessed for signs and symptoms of infection.

## 2020-09-24 ENCOUNTER — APPOINTMENT (OUTPATIENT)
Dept: RADIOLOGY | Facility: MEDICAL CENTER | Age: 50
DRG: 233 | End: 2020-09-24
Attending: CLINICAL NURSE SPECIALIST
Payer: COMMERCIAL

## 2020-09-24 LAB
ABO + RH BLD: NORMAL
ABO GROUP BLD: NORMAL
ALBUMIN SERPL BCP-MCNC: 4.6 G/DL (ref 3.2–4.9)
ALBUMIN/GLOB SERPL: 1.6 G/DL
ALP SERPL-CCNC: 90 U/L (ref 30–99)
ALT SERPL-CCNC: 19 U/L (ref 2–50)
ANION GAP SERPL CALC-SCNC: 11 MMOL/L (ref 7–16)
ANION GAP SERPL CALC-SCNC: 16 MMOL/L (ref 7–16)
APPEARANCE UR: CLEAR
APTT PPP: 28.8 SEC (ref 24.7–36)
AST SERPL-CCNC: 21 U/L (ref 12–45)
BARCODED ABORH UBTYP: 6200
BARCODED ABORH UBTYP: 6200
BARCODED PRD CODE UBPRD: NORMAL
BARCODED PRD CODE UBPRD: NORMAL
BARCODED UNIT NUM UBUNT: NORMAL
BARCODED UNIT NUM UBUNT: NORMAL
BASOPHILS # BLD AUTO: 1.4 % (ref 0–1.8)
BASOPHILS # BLD: 0.1 K/UL (ref 0–0.12)
BILIRUB SERPL-MCNC: 0.4 MG/DL (ref 0.1–1.5)
BILIRUB UR QL STRIP.AUTO: NEGATIVE
BLD GP AB SCN SERPL QL: NORMAL
BUN SERPL-MCNC: 15 MG/DL (ref 8–22)
BUN SERPL-MCNC: 15 MG/DL (ref 8–22)
CALCIUM SERPL-MCNC: 8.8 MG/DL (ref 8.5–10.5)
CALCIUM SERPL-MCNC: 9.6 MG/DL (ref 8.5–10.5)
CHLORIDE SERPL-SCNC: 101 MMOL/L (ref 96–112)
CHLORIDE SERPL-SCNC: 103 MMOL/L (ref 96–112)
CO2 SERPL-SCNC: 22 MMOL/L (ref 20–33)
CO2 SERPL-SCNC: 23 MMOL/L (ref 20–33)
COLOR UR: YELLOW
COMPONENT R 8504R: NORMAL
COMPONENT R 8504R: NORMAL
COVID ORDER STATUS COVID19: NORMAL
CREAT SERPL-MCNC: 0.93 MG/DL (ref 0.5–1.4)
CREAT SERPL-MCNC: 1.02 MG/DL (ref 0.5–1.4)
EOSINOPHIL # BLD AUTO: 0.54 K/UL (ref 0–0.51)
EOSINOPHIL NFR BLD: 7.5 % (ref 0–6.9)
ERYTHROCYTE [DISTWIDTH] IN BLOOD BY AUTOMATED COUNT: 41.5 FL (ref 35.9–50)
GLOBULIN SER CALC-MCNC: 2.9 G/DL (ref 1.9–3.5)
GLUCOSE BLD-MCNC: 145 MG/DL (ref 65–99)
GLUCOSE BLD-MCNC: 162 MG/DL (ref 65–99)
GLUCOSE BLD-MCNC: 193 MG/DL (ref 65–99)
GLUCOSE SERPL-MCNC: 159 MG/DL (ref 65–99)
GLUCOSE SERPL-MCNC: 199 MG/DL (ref 65–99)
GLUCOSE UR STRIP.AUTO-MCNC: NEGATIVE MG/DL
HCT VFR BLD AUTO: 38.6 % (ref 42–52)
HGB BLD-MCNC: 13.3 G/DL (ref 14–18)
IMM GRANULOCYTES # BLD AUTO: 0.02 K/UL (ref 0–0.11)
IMM GRANULOCYTES NFR BLD AUTO: 0.3 % (ref 0–0.9)
INR PPP: 0.98 (ref 0.87–1.13)
KETONES UR STRIP.AUTO-MCNC: NEGATIVE MG/DL
LEUKOCYTE ESTERASE UR QL STRIP.AUTO: NEGATIVE
LYMPHOCYTES # BLD AUTO: 2.36 K/UL (ref 1–4.8)
LYMPHOCYTES NFR BLD: 32.6 % (ref 22–41)
MAGNESIUM SERPL-MCNC: 1.6 MG/DL (ref 1.5–2.5)
MCH RBC QN AUTO: 33 PG (ref 27–33)
MCHC RBC AUTO-ENTMCNC: 34.5 G/DL (ref 33.7–35.3)
MCV RBC AUTO: 95.8 FL (ref 81.4–97.8)
MICRO URNS: NORMAL
MONOCYTES # BLD AUTO: 0.67 K/UL (ref 0–0.85)
MONOCYTES NFR BLD AUTO: 9.3 % (ref 0–13.4)
NEUTROPHILS # BLD AUTO: 3.54 K/UL (ref 1.82–7.42)
NEUTROPHILS NFR BLD: 48.9 % (ref 44–72)
NITRITE UR QL STRIP.AUTO: NEGATIVE
NRBC # BLD AUTO: 0 K/UL
NRBC BLD-RTO: 0 /100 WBC
PH UR STRIP.AUTO: 7 [PH] (ref 5–8)
PLATELET # BLD AUTO: 203 K/UL (ref 164–446)
PMV BLD AUTO: 10.8 FL (ref 9–12.9)
POTASSIUM SERPL-SCNC: 3.7 MMOL/L (ref 3.6–5.5)
POTASSIUM SERPL-SCNC: 3.9 MMOL/L (ref 3.6–5.5)
PRODUCT TYPE UPROD: NORMAL
PRODUCT TYPE UPROD: NORMAL
PROT SERPL-MCNC: 7.5 G/DL (ref 6–8.2)
PROT UR QL STRIP: NEGATIVE MG/DL
PROTHROMBIN TIME: 13.3 SEC (ref 12–14.6)
RBC # BLD AUTO: 4.03 M/UL (ref 4.7–6.1)
RBC UR QL AUTO: NEGATIVE
RH BLD: NORMAL
SARS-COV-2 RNA RESP QL NAA+PROBE: NOTDETECTED
SODIUM SERPL-SCNC: 137 MMOL/L (ref 135–145)
SODIUM SERPL-SCNC: 139 MMOL/L (ref 135–145)
SP GR UR STRIP.AUTO: 1.01
SPECIMEN SOURCE: NORMAL
UNIT STATUS USTAT: NORMAL
UNIT STATUS USTAT: NORMAL
UROBILINOGEN UR STRIP.AUTO-MCNC: 0.2 MG/DL
WBC # BLD AUTO: 7.2 K/UL (ref 4.8–10.8)

## 2020-09-24 PROCEDURE — C9803 HOPD COVID-19 SPEC COLLECT: HCPCS | Performed by: STUDENT IN AN ORGANIZED HEALTH CARE EDUCATION/TRAINING PROGRAM

## 2020-09-24 PROCEDURE — 85730 THROMBOPLASTIN TIME PARTIAL: CPT

## 2020-09-24 PROCEDURE — 85610 PROTHROMBIN TIME: CPT

## 2020-09-24 PROCEDURE — 700102 HCHG RX REV CODE 250 W/ 637 OVERRIDE(OP): Performed by: INTERNAL MEDICINE

## 2020-09-24 PROCEDURE — 86850 RBC ANTIBODY SCREEN: CPT

## 2020-09-24 PROCEDURE — 93970 EXTREMITY STUDY: CPT

## 2020-09-24 PROCEDURE — 99232 SBSQ HOSP IP/OBS MODERATE 35: CPT | Performed by: STUDENT IN AN ORGANIZED HEALTH CARE EDUCATION/TRAINING PROGRAM

## 2020-09-24 PROCEDURE — 700105 HCHG RX REV CODE 258: Performed by: STUDENT IN AN ORGANIZED HEALTH CARE EDUCATION/TRAINING PROGRAM

## 2020-09-24 PROCEDURE — 82962 GLUCOSE BLOOD TEST: CPT | Mod: 91

## 2020-09-24 PROCEDURE — A9270 NON-COVERED ITEM OR SERVICE: HCPCS | Performed by: INTERNAL MEDICINE

## 2020-09-24 PROCEDURE — 700102 HCHG RX REV CODE 250 W/ 637 OVERRIDE(OP): Performed by: NURSE PRACTITIONER

## 2020-09-24 PROCEDURE — 86901 BLOOD TYPING SEROLOGIC RH(D): CPT

## 2020-09-24 PROCEDURE — A9270 NON-COVERED ITEM OR SERVICE: HCPCS | Performed by: NURSE PRACTITIONER

## 2020-09-24 PROCEDURE — 83735 ASSAY OF MAGNESIUM: CPT

## 2020-09-24 PROCEDURE — 93880 EXTRACRANIAL BILAT STUDY: CPT

## 2020-09-24 PROCEDURE — 86900 BLOOD TYPING SEROLOGIC ABO: CPT

## 2020-09-24 PROCEDURE — 80048 BASIC METABOLIC PNL TOTAL CA: CPT

## 2020-09-24 PROCEDURE — A9270 NON-COVERED ITEM OR SERVICE: HCPCS | Performed by: CLINICAL NURSE SPECIALIST

## 2020-09-24 PROCEDURE — 700111 HCHG RX REV CODE 636 W/ 250 OVERRIDE (IP): Performed by: STUDENT IN AN ORGANIZED HEALTH CARE EDUCATION/TRAINING PROGRAM

## 2020-09-24 PROCEDURE — 700102 HCHG RX REV CODE 250 W/ 637 OVERRIDE(OP): Performed by: CLINICAL NURSE SPECIALIST

## 2020-09-24 PROCEDURE — 99232 SBSQ HOSP IP/OBS MODERATE 35: CPT | Performed by: INTERNAL MEDICINE

## 2020-09-24 PROCEDURE — 80053 COMPREHEN METABOLIC PANEL: CPT

## 2020-09-24 PROCEDURE — 85025 COMPLETE CBC W/AUTO DIFF WBC: CPT

## 2020-09-24 PROCEDURE — U0003 INFECTIOUS AGENT DETECTION BY NUCLEIC ACID (DNA OR RNA); SEVERE ACUTE RESPIRATORY SYNDROME CORONAVIRUS 2 (SARS-COV-2) (CORONAVIRUS DISEASE [COVID-19]), AMPLIFIED PROBE TECHNIQUE, MAKING USE OF HIGH THROUGHPUT TECHNOLOGIES AS DESCRIBED BY CMS-2020-01-R: HCPCS

## 2020-09-24 PROCEDURE — 93970 EXTREMITY STUDY: CPT | Mod: 26 | Performed by: INTERNAL MEDICINE

## 2020-09-24 PROCEDURE — 93880 EXTRACRANIAL BILAT STUDY: CPT | Mod: 26 | Performed by: INTERNAL MEDICINE

## 2020-09-24 PROCEDURE — 700101 HCHG RX REV CODE 250: Performed by: CLINICAL NURSE SPECIALIST

## 2020-09-24 PROCEDURE — 770020 HCHG ROOM/CARE - TELE (206)

## 2020-09-24 PROCEDURE — 81003 URINALYSIS AUTO W/O SCOPE: CPT

## 2020-09-24 RX ORDER — NOREPINEPHRINE BITARTRATE 0.03 MG/ML
0-30 INJECTION, SOLUTION INTRAVENOUS CONTINUOUS
Status: DISCONTINUED | OUTPATIENT
Start: 2020-09-25 | End: 2020-09-25

## 2020-09-24 RX ORDER — GABAPENTIN 300 MG/1
300 CAPSULE ORAL ONCE
Status: COMPLETED | OUTPATIENT
Start: 2020-09-24 | End: 2020-09-24

## 2020-09-24 RX ORDER — ACETAMINOPHEN 500 MG
1000 TABLET ORAL ONCE
Status: COMPLETED | OUTPATIENT
Start: 2020-09-24 | End: 2020-09-24

## 2020-09-24 RX ORDER — DEXMEDETOMIDINE HYDROCHLORIDE 4 UG/ML
0-1.5 INJECTION, SOLUTION INTRAVENOUS CONTINUOUS
Status: DISCONTINUED | OUTPATIENT
Start: 2020-09-25 | End: 2020-09-25

## 2020-09-24 RX ORDER — ALPRAZOLAM 0.5 MG/1
0.5 TABLET ORAL ONCE
Status: COMPLETED | OUTPATIENT
Start: 2020-09-24 | End: 2020-09-24

## 2020-09-24 RX ORDER — EPINEPHRINE HCL IN 0.9 % NACL 4MG/250ML
0-.2 PLASTIC BAG, INJECTION (ML) INTRAVENOUS CONTINUOUS
Status: DISCONTINUED | OUTPATIENT
Start: 2020-09-25 | End: 2020-09-25

## 2020-09-24 RX ADMIN — LISINOPRIL 40 MG: 20 TABLET ORAL at 04:49

## 2020-09-24 RX ADMIN — METOPROLOL TARTRATE 12.5 MG: 25 TABLET ORAL at 17:22

## 2020-09-24 RX ADMIN — ALPRAZOLAM 0.5 MG: 0.5 TABLET ORAL at 23:40

## 2020-09-24 RX ADMIN — SODIUM CHLORIDE: 9 INJECTION, SOLUTION INTRAVENOUS at 03:05

## 2020-09-24 RX ADMIN — ACETAMINOPHEN 1000 MG: 500 TABLET ORAL at 17:22

## 2020-09-24 RX ADMIN — ASPIRIN 81 MG: 81 TABLET, COATED ORAL at 04:48

## 2020-09-24 RX ADMIN — CARVEDILOL 12.5 MG: 12.5 TABLET, FILM COATED ORAL at 17:30

## 2020-09-24 RX ADMIN — CARVEDILOL 12.5 MG: 12.5 TABLET, FILM COATED ORAL at 04:49

## 2020-09-24 RX ADMIN — GABAPENTIN 300 MG: 300 CAPSULE ORAL at 17:22

## 2020-09-24 RX ADMIN — AMLODIPINE BESYLATE 10 MG: 10 TABLET ORAL at 04:49

## 2020-09-24 RX ADMIN — ATORVASTATIN CALCIUM 80 MG: 80 TABLET, FILM COATED ORAL at 17:23

## 2020-09-24 RX ADMIN — ENOXAPARIN SODIUM 40 MG: 40 INJECTION SUBCUTANEOUS at 04:50

## 2020-09-24 ASSESSMENT — ENCOUNTER SYMPTOMS
PSYCHIATRIC NEGATIVE: 1
PND: 0
DIZZINESS: 0
VOMITING: 0
NECK PAIN: 0
NEUROLOGICAL NEGATIVE: 1
CONSTITUTIONAL NEGATIVE: 1
BLURRED VISION: 0
WEAKNESS: 0
HEARTBURN: 0
MUSCULOSKELETAL NEGATIVE: 1
DEPRESSION: 0
HEADACHES: 0
SHORTNESS OF BREATH: 0
SORE THROAT: 0
NAUSEA: 0
PALPITATIONS: 0
COUGH: 0
CARDIOVASCULAR NEGATIVE: 1
DOUBLE VISION: 0
MYALGIAS: 0
ABDOMINAL PAIN: 0
BRUISES/BLEEDS EASILY: 0
CHILLS: 0
FOCAL WEAKNESS: 0
FEVER: 0
SPUTUM PRODUCTION: 0
ORTHOPNEA: 0
GASTROINTESTINAL NEGATIVE: 1
EYES NEGATIVE: 1
RESPIRATORY NEGATIVE: 1
NERVOUS/ANXIOUS: 0

## 2020-09-24 NOTE — PROGRESS NOTES
Hospital Medicine Daily Progress Note    Date of Service  9/24/2020    Chief Complaint  49 y.o. male presented with chest pain admitted 9/21/2020 with NSTEMI    Hospital Course    This is a 49 y.o. male with CAD status post stent placement, hypertension, diabetes who presented 9/21/2020 with chest pain.  The patient states he has been having exertional chest pain for the past month; however, became worse and persistent one day prior. He reported substernal chest pressure with radiation down his left arm associated with some numbness and tingling of his left hand and associated shortness of breath.  He denied any fevers, chills or cough. He has not been taking his medications for the past year since he lost his insurance.  He underwent a cardiac catheterization in 2018 and had a stent placed to his RCA.  He underwent a repeat cardiac catheterization in July 2019 which revealed two-vessel coronary artery disease with small vessel chronic total occlusion of the mid left anterior descending artery and patent stents in the mid right coronary artery.  Normal left ventricular systolic function with EF of 65%.  Patient also reported his blood pressure has been uncontrolled.  At the time of evaluation in ED, his chest pain has resolved.  His initial troponin in the ER was 21 which increased to 63.  Patient was placed on heparin GTT     EKG on arrival revealed sinus rhythm with LVH and repolarization changes.  T wave inversions with minimal ST depression noted in inferior leads  Second EKG sinus rhythm with ST elevation in V2 and V3, does not meet criteria given underlying LVH.  Chest x-ray did not show any acute cardiopulmonary process.     9/22: Underwent cardiac cath which showed multivessel coronary artery disease cerebrovascular previously placed RCA stents.    9/23: Pt was evaluated by cardiothoracic surg team and CABD is planned for later in the week. TEG scan done.         Interval Problem Update  Vitals within normal  parameters with HR in 50-60s    At bedside, no acute complain.  He expresses understanding of the need for this cardiac procedure.  Reports tolerating high IP diet and regular bowel movements.    Previously noted elevated CR to 1.3 has now resolved.     Consultants/Specialty  Cardiology  Cardiothoracic surgery    Code Status  Full Code    Disposition  TBD, Pending CABG     Discussed with patient, patient's nurse and with multidisciplinary team during rounds including , pharmacist and charge nurse.      I have performed the physical examination, and reviewed updated ROS and plan today 9/24/2020.  In review of yesterday's note, there are no new changes except as documented above.    Review of Systems  Review of Systems   Constitutional: Negative for chills, fever and malaise/fatigue.   HENT: Negative for congestion, sore throat and tinnitus.    Eyes: Negative for blurred vision and double vision.   Respiratory: Negative for cough, sputum production and shortness of breath.    Cardiovascular: Negative for chest pain, palpitations, orthopnea, leg swelling and PND.   Gastrointestinal: Negative for abdominal pain, heartburn, nausea and vomiting.   Genitourinary: Negative for dysuria, frequency and urgency.   Musculoskeletal: Negative for myalgias and neck pain.   Neurological: Negative for dizziness, focal weakness and headaches.   Psychiatric/Behavioral: Negative for depression.        Physical Exam  Temp:  [36.5 °C (97.7 °F)-36.7 °C (98.1 °F)] 36.5 °C (97.7 °F)  Pulse:  [57-66] 60  Resp:  [16-18] 16  BP: (130-149)/(73-90) 149/88  SpO2:  [93 %-99 %] 96 %    Physical Exam  Vitals signs and nursing note reviewed.   Constitutional:       General: He is not in acute distress.     Appearance: Normal appearance. He is not ill-appearing.   HENT:      Head: Normocephalic and atraumatic.      Mouth/Throat:      Mouth: Mucous membranes are moist.      Pharynx: Oropharynx is clear.   Eyes:      General: No scleral  icterus.     Conjunctiva/sclera: Conjunctivae normal.   Cardiovascular:      Rate and Rhythm: Normal rate and regular rhythm.      Pulses: Normal pulses.      Heart sounds: Normal heart sounds.   Pulmonary:      Effort: Pulmonary effort is normal. No respiratory distress.      Breath sounds: Normal breath sounds. No wheezing.   Abdominal:      General: Bowel sounds are normal. There is no distension.      Palpations: Abdomen is soft.      Tenderness: There is no abdominal tenderness.   Musculoskeletal: Normal range of motion.         General: No swelling or tenderness.   Skin:     General: Skin is warm and dry.      Capillary Refill: Capillary refill takes less than 2 seconds.   Neurological:      General: No focal deficit present.      Mental Status: He is alert and oriented to person, place, and time. Mental status is at baseline.   Psychiatric:         Mood and Affect: Mood normal.         Fluids  No intake or output data in the 24 hours ending 09/24/20 0552    Laboratory  Recent Labs     09/21/20 1951 09/22/20  2339   WBC 9.4 9.6   RBC 4.22* 3.88*   HEMOGLOBIN 13.6* 12.7*   HEMATOCRIT 40.2* 37.2*   MCV 95.3 95.9   MCH 32.2 32.7   MCHC 33.8 34.1   RDW 41.3 42.2   PLATELETCT 213 212   MPV 10.8 11.1     Recent Labs     09/21/20 1951 09/22/20  2339   SODIUM 139 140   POTASSIUM 3.6 3.5*   CHLORIDE 101 102   CO2 19* 21   GLUCOSE 150* 176*   BUN 12 18   CREATININE 0.96 1.30   CALCIUM 9.6 9.1     Recent Labs     09/22/20  0151   APTT 28.1   INR 1.02         Recent Labs     09/22/20  2339   TRIGLYCERIDE 610*   HDL 37*   LDL see below       Imaging  EC-ECHOCARDIOGRAM COMPLETE W/O CONT   Final Result   No prior study is available for comparison.   Normal left ventricular systolic function.  Left ventricular ejection fraction is visually estimated to be 60%.  Normal regional wall motion.  Moderate concentric left ventricular hypertrophy.   DX-CHEST-PORTABLE (1 VIEW)   Final Result      No acute cardiopulmonary  abnormality.      CL-LEFT HEART CATHETERIZATION WITH POSSIBLE INTERVENTION      CARDIAC CATHETERIZATION CONCLUSIONS by Hebert Dill (09/22/20)  1.  Multivessel coronary artery disease  2.  Severe ISR previously placed RCA stents  3.  IFR LAD 0.86 due to a proximal stenosis  4.  LVEF 55%  (study result reviewed)        Assessment/Plan  * CAD (coronary artery disease)- (present on admission)  Assessment & Plan  Multivessel disease - will need CABG  Patient was evaluated by cardiothoracic surgery team - plan for CABG in place    NSTEMI (non-ST elevated myocardial infarction) ([facility])- (present on admission)  Assessment & Plan  Cardiology consult appreciated -currently status post cardiac cath with the patient noted to have multivessel disease  Current plan is for CABG  Continue with statins and nitroglycerin as needed for chest pain      SONNY (acute kidney injury) ([facility])  Assessment & Plan  Unclear if contrast-induced  Started gentle hydration and now resolved    Essential hypertension- (present on admission)  Assessment & Plan  Uncontrolled on arrival  Restarted on carvedilol, lisinopril and norvasc    Type 2 diabetes mellitus with complication, without long-term current use of insulin ([facility])- (present on admission)  Assessment & Plan  Start on insulin sliding scale with serial Accu-Checks  HbA1C 7.9; c/w ISS for now  Hypoglycemic protocol in place           VTE prophylaxis: lovenox ppx dosing

## 2020-09-24 NOTE — PROGRESS NOTES
Cardiology Follow Up Progress Note    Date of Service  9/24/2020    Attending Physician  Cesar Perera M.D.    Chief Complaint   NSTEMI, coronary artery disease with prior stent placement, pending coronary artery bypass graft surgery.    [facility]  Guerrero Ann is a 49 y.o. male admitted 9/21/2020 with above.    Interim Events  No significant changes noted from cardiac standpoint within the past 24 hours.    Review of Systems  Review of Systems   Constitutional: Negative.  Negative for chills and fever.   HENT: Negative.  Negative for hearing loss.    Eyes: Negative.    Respiratory: Negative.  Negative for cough and shortness of breath.    Cardiovascular: Negative.  Negative for chest pain, palpitations and leg swelling.   Gastrointestinal: Negative.  Negative for abdominal pain, nausea and vomiting.   Genitourinary: Negative.  Negative for dysuria and urgency.   Musculoskeletal: Negative.  Negative for myalgias.   Skin: Negative.  Negative for rash.   Neurological: Negative.  Negative for dizziness, weakness and headaches.   Hematological: Does not bruise/bleed easily.   Psychiatric/Behavioral: Negative.  The patient is not nervous/anxious.        Vital signs in last 24 hours  Temp:  [36.3 °C (97.4 °F)-36.7 °C (98.1 °F)] 36.3 °C (97.4 °F)  Pulse:  [57-84] 84  Resp:  [16-18] 16  BP: (130-149)/(73-90) 149/88  SpO2:  [93 %-99 %] 98 %    Physical Exam  Physical Exam  Constitutional:       Appearance: He is well-developed.   HENT:      Head: Normocephalic and atraumatic.   Eyes:      Conjunctiva/sclera: Conjunctivae normal.      Pupils: Pupils are equal, round, and reactive to light.   Neck:      Musculoskeletal: Normal range of motion and neck supple.   Cardiovascular:      Rate and Rhythm: Normal rate and regular rhythm.   Pulmonary:      Effort: Pulmonary effort is normal.      Breath sounds: Normal breath sounds.   Abdominal:      General: Bowel sounds are normal.      Palpations: Abdomen is soft.    Musculoskeletal: Normal range of motion.   Skin:     General: Skin is warm and dry.   Neurological:      Mental Status: He is alert and oriented to person, place, and time.         Lab Review  Lab Results   Component Value Date/Time    WBC 9.6 09/22/2020 11:39 PM    RBC 3.88 (L) 09/22/2020 11:39 PM    HEMOGLOBIN 12.7 (L) 09/22/2020 11:39 PM    HEMATOCRIT 37.2 (L) 09/22/2020 11:39 PM    MCV 95.9 09/22/2020 11:39 PM    MCH 32.7 09/22/2020 11:39 PM    MCHC 34.1 09/22/2020 11:39 PM    MPV 11.1 09/22/2020 11:39 PM      Lab Results   Component Value Date/Time    SODIUM 140 09/22/2020 11:39 PM    POTASSIUM 3.5 (L) 09/22/2020 11:39 PM    CHLORIDE 102 09/22/2020 11:39 PM    CO2 21 09/22/2020 11:39 PM    GLUCOSE 176 (H) 09/22/2020 11:39 PM    BUN 18 09/22/2020 11:39 PM    CREATININE 1.30 09/22/2020 11:39 PM      Lab Results   Component Value Date/Time    ASTSGOT 25 09/21/2020 07:51 PM    ALTSGPT 20 09/21/2020 07:51 PM     Lab Results   Component Value Date/Time    CHOLSTRLTOT 210 (H) 09/22/2020 11:39 PM    LDL see below 09/22/2020 11:39 PM    HDL 37 (A) 09/22/2020 11:39 PM    TRIGLYCERIDE 610 (H) 09/22/2020 11:39 PM    TROPONINT 91 (H) 09/22/2020 01:09 AM       No results for input(s): NTPROBNP in the last 72 hours.  (Above labs reviewed.)       Current Facility-Administered Medications:   •  atorvastatin (LIPITOR) tablet 80 mg, 80 mg, Oral, Q EVENING, Tanisha Corrales, A.P.N., 80 mg at 09/23/20 1702  •  NS infusion, , Intravenous, Continuous, Cesar Perera M.D., Last Rate: 100 mL/hr at 09/24/20 0305  •  enoxaparin (LOVENOX) inj 40 mg, 40 mg, Subcutaneous, DAILY, Cesar Perera M.D., 40 mg at 09/24/20 0450  •  senna-docusate (PERICOLACE or SENOKOT S) 8.6-50 MG per tablet 2 Tab, 2 Tab, Oral, BID **AND** polyethylene glycol/lytes (MIRALAX) PACKET 1 Packet, 1 Packet, Oral, QDAY PRN **AND** magnesium hydroxide (MILK OF MAGNESIA) suspension 30 mL, 30 mL, Oral, QDAY PRN **AND** bisacodyl (DULCOLAX) suppository 10 mg, 10 mg, Rectal,  QDAY PRN, Alden Harden M.D.  •  Respiratory Therapy Consult, , Nebulization, Continuous RT, Alden Harden M.D.  •  acetaminophen (TYLENOL) tablet 650 mg, 650 mg, Oral, Q6HRS PRN, Alden Harden M.D.  •  aspirin EC (ECOTRIN) tablet 81 mg, 81 mg, Oral, DAILY, Alden Harden M.D., 81 mg at 09/24/20 0448  •  nitroglycerin (NITROSTAT) tablet 0.4 mg, 0.4 mg, Sublingual, Q5 MIN PRN, Alden Harden M.D., 0.4 mg at 09/22/20 0126  •  morphine (pf) 4 MG/ML injection 2-4 mg, 2-4 mg, Intravenous, Q3HRS PRN, Alden Harden M.D.  •  ondansetron (ZOFRAN) syringe/vial injection 4 mg, 4 mg, Intravenous, Q4HRS PRN, Alden Harden M.D.  •  ondansetron (ZOFRAN ODT) dispertab 4 mg, 4 mg, Oral, Q4HRS PRN, Alden Harden M.D.  •  insulin regular (HumuLIN R,NovoLIN R) injection, 1-6 Units, Subcutaneous, 4X/DAY ACHS, Stopped at 09/22/20 0700 **AND** POC Blood Glucose, , , Q AC AND BEDTIME(S) **AND** NOTIFY MD and PharmD, , , Once **AND** glucose 4 g chewable tablet 16 g, 16 g, Oral, Q15 MIN PRN **AND** dextrose 50% (D50W) injection 50 mL, 50 mL, Intravenous, Q15 MIN PRN, Alden Harden M.D.  •  amLODIPine (NORVASC) tablet 10 mg, 10 mg, Oral, DAILY, Alden Harden M.D., 10 mg at 09/24/20 0449  •  lisinopril (PRINIVIL) tablet 40 mg, 40 mg, Oral, DAILY, Alden Harden M.D., 40 mg at 09/24/20 0449  •  carvedilol (COREG) tablet 12.5 mg, 12.5 mg, Oral, BID WITH MEALS, Akira Weinberg M.D., 12.5 mg at 09/24/20 0449  (Medications reviewed.)    Cardiac Imaging and Procedures Review  CARDIAC STUDIES/PROCEDURES:     CARDIAC CATHETERIZATION CONCLUSIONS by Hbeert Dill (09/22/20)  1.  Multivessel coronary artery disease  2.  Severe ISR previously placed RCA stents  3.  IFR LAD 0.86 due to a proximal stenosis  4.  LVEF 55%     CARDIAC CATHETERIZATION CONCLUSIONS (07/05/19)  1.  Two-vessel coronary artery disease with small vessel chronic total   occlusion of the mid left anterior descending artery and patent with a distal   small caliber vessel filling via right to  left collaterals, patent stents in   the mid right coronary artery.  2.  Normal left ventricular systolic function with ejection fraction of 65%.  3.  Elevated left ventricular end diastolic pressure.     CARDIAC CATHETERIZATION CONCLUSIONS by Hebert Dill (05/30/20)  1.  100% occluded RCA with brisk left to right collaterals  2.  Diffuse nonobstructive CAD otherwise.  Several 50% stenoses in small vessels.    3.  LVEF 48% preintervention  4.  FFR LAD 0.91, intervention deferred  5.  Successful PCI RCA with placement of a 3.5 x 20 mm Synergy CHRISTINE postdilated to 3.8 mm with an excellent angiographic result.     ECHOCARDIOGRAM CONCLUSIONS (09/22/20)  No prior study is available for comparison.   Normal left ventricular systolic function.  Left ventricular ejection fraction is visually estimated to be 60%.  Normal regional wall motion.  Moderate concentric left ventricular hypertrophy.  Enlarged right atrium.  Normal left atrial size.  Trace mitral regurgitation.  The aortic valve is not well visualized.  No aortic stenosis.  Right ventricular systolic pressure is estimated to be 25  mmHg.  Normal pericardium without effusion.     EKG performed on (09/22/20) EKG personally interpreted shows sinus rhythm with left ventricular hypertrophy.    Assessment/Plan  1. Coronary artery disease pending coronary bypass graft: He is clinically doing well. Plans per surgery.  2. Hypertension: Blood pressure is well controlled. We will continue with carvedilol, lisinopril and amlodipine.  3. Hyperlipidemia: He is doing well on statin therapy without myalgia symptoms.    Thank you for allowing me to participate in the care of this patient.  I will continue to follow this patient    Please contact me with any questions.    Akira Weinberg M.D.   Cardiologist, [facility]  [phone]

## 2020-09-24 NOTE — PROGRESS NOTES
Report received from Thierry COOK. Pt care assumed. Assessment performed. Pt AOx4. Pt laying supine in bed. Pt denies pain and no signs of distress. Bed in low, locked position. Pt educated on calling to ambulate. Call light within reach. Treaded socks on pt.  Hourly rounding in place. Family at bedside

## 2020-09-24 NOTE — PROGRESS NOTES
Bedside report received, bed locked and in low position. Call light within each, hourly rounding in place. No needs at this time.

## 2020-09-24 NOTE — CARE PLAN
Problem: Communication  Goal: The ability to communicate needs accurately and effectively will improve  Outcome: PROGRESSING AS EXPECTED  Intervention: Ione patient and significant other/support system to call light to alert staff of needs  Flowsheets (Taken 9/23/2020 2017)  Oriented to:: All of the Following : Location of Bathroom, Visiting Policy, Unit Routine, Call Light and Bedside Controls, Bedside Rail Policy, Smoking Policy, Rights and Responsibilities, Bedside Report, and Patient Education Notebook  Note: Pt updated on plan of care. Patient expresses understanding. All questions answered. Call light within reach and educated on how to use it.      Problem: Infection  Goal: Will remain free from infection  Outcome: PROGRESSING AS EXPECTED  Intervention: Assess signs and symptoms of infection  Note: Pt verbalized understanding of the importance of hand hygiene. Pt assessed for signs and symptoms of infection.

## 2020-09-25 ENCOUNTER — APPOINTMENT (OUTPATIENT)
Dept: RADIOLOGY | Facility: MEDICAL CENTER | Age: 50
DRG: 233 | End: 2020-09-25
Attending: THORACIC SURGERY (CARDIOTHORACIC VASCULAR SURGERY)
Payer: COMMERCIAL

## 2020-09-25 ENCOUNTER — APPOINTMENT (OUTPATIENT)
Dept: CARDIOLOGY | Facility: MEDICAL CENTER | Age: 50
DRG: 233 | End: 2020-09-25
Attending: THORACIC SURGERY (CARDIOTHORACIC VASCULAR SURGERY)
Payer: COMMERCIAL

## 2020-09-25 ENCOUNTER — ANESTHESIA EVENT (OUTPATIENT)
Dept: SURGERY | Facility: MEDICAL CENTER | Age: 50
DRG: 233 | End: 2020-09-25
Payer: COMMERCIAL

## 2020-09-25 ENCOUNTER — ANESTHESIA (OUTPATIENT)
Dept: SURGERY | Facility: MEDICAL CENTER | Age: 50
DRG: 233 | End: 2020-09-25
Payer: COMMERCIAL

## 2020-09-25 PROBLEM — Z99.11 ENCOUNTER FOR WEANING FROM VENTILATOR (HCC): Status: ACTIVE | Noted: 2020-09-25

## 2020-09-25 LAB
ACT BLD: 103 SEC (ref 74–137)
ACT BLD: 120 SEC (ref 74–137)
ACT BLD: 340 SEC (ref 74–137)
ACT BLD: 489 SEC (ref 74–137)
ACT BLD: 566 SEC (ref 74–137)
ACT BLD: 577 SEC (ref 74–137)
ACTION RANGE TRIGGERED IACRT: NO
ACTION RANGE TRIGGERED IACRT: YES
ACTION RANGE TRIGGERED IACRT: YES
ANION GAP SERPL CALC-SCNC: 17 MMOL/L (ref 7–16)
APTT PPP: 31 SEC (ref 24.7–36)
BARCODED ABORH UBTYP: 6200
BARCODED ABORH UBTYP: 6200
BARCODED PRD CODE UBPRD: NORMAL
BARCODED PRD CODE UBPRD: NORMAL
BARCODED UNIT NUM UBUNT: NORMAL
BARCODED UNIT NUM UBUNT: NORMAL
BASE EXCESS BLDA CALC-SCNC: -1 MMOL/L (ref -4–3)
BASE EXCESS BLDA CALC-SCNC: -3 MMOL/L (ref -4–3)
BASE EXCESS BLDA CALC-SCNC: -3 MMOL/L (ref -4–3)
BASE EXCESS BLDA CALC-SCNC: -4 MMOL/L (ref -4–3)
BASE EXCESS BLDA CALC-SCNC: -5 MMOL/L (ref -4–3)
BASE EXCESS BLDV CALC-SCNC: -3 MMOL/L (ref -4–3)
BASOPHILS # BLD AUTO: 1.3 % (ref 0–1.8)
BASOPHILS # BLD: 0.12 K/UL (ref 0–0.12)
BODY TEMPERATURE: ABNORMAL DEGREES
BUN SERPL-MCNC: 23 MG/DL (ref 8–22)
CA-I BLD ISE-SCNC: 1.04 MMOL/L (ref 1.1–1.3)
CA-I BLD ISE-SCNC: 1.06 MMOL/L (ref 1.1–1.3)
CA-I BLD ISE-SCNC: 1.06 MMOL/L (ref 1.1–1.3)
CA-I BLD ISE-SCNC: 1.08 MMOL/L (ref 1.1–1.3)
CA-I BLD ISE-SCNC: 1.09 MMOL/L (ref 1.1–1.3)
CA-I BLD ISE-SCNC: 1.09 MMOL/L (ref 1.1–1.3)
CA-I BLD ISE-SCNC: 1.25 MMOL/L (ref 1.1–1.3)
CALCIUM SERPL-MCNC: 9.4 MG/DL (ref 8.5–10.5)
CHLORIDE SERPL-SCNC: 101 MMOL/L (ref 96–112)
CO2 BLDA-SCNC: 21 MMOL/L (ref 20–33)
CO2 BLDA-SCNC: 22 MMOL/L (ref 20–33)
CO2 BLDA-SCNC: 23 MMOL/L (ref 20–33)
CO2 BLDA-SCNC: 23 MMOL/L (ref 20–33)
CO2 BLDA-SCNC: 24 MMOL/L (ref 20–33)
CO2 BLDA-SCNC: 26 MMOL/L (ref 20–33)
CO2 BLDA-SCNC: 26 MMOL/L (ref 20–33)
CO2 BLDV-SCNC: 25 MMOL/L (ref 20–33)
CO2 SERPL-SCNC: 19 MMOL/L (ref 20–33)
COMPONENT CT 8504CT: NORMAL
COMPONENT P 8504P: NORMAL
CREAT SERPL-MCNC: 1 MG/DL (ref 0.5–1.4)
EKG IMPRESSION: NORMAL
EOSINOPHIL # BLD AUTO: 0.78 K/UL (ref 0–0.51)
EOSINOPHIL NFR BLD: 8.6 % (ref 0–6.9)
ERYTHROCYTE [DISTWIDTH] IN BLOOD BY AUTOMATED COUNT: 41.3 FL (ref 35.9–50)
ERYTHROCYTE [DISTWIDTH] IN BLOOD BY AUTOMATED COUNT: 43.4 FL (ref 35.9–50)
GLUCOSE BLD-MCNC: 111 MG/DL (ref 65–99)
GLUCOSE BLD-MCNC: 123 MG/DL (ref 65–99)
GLUCOSE BLD-MCNC: 139 MG/DL (ref 65–99)
GLUCOSE BLD-MCNC: 158 MG/DL (ref 65–99)
GLUCOSE BLD-MCNC: 166 MG/DL (ref 65–99)
GLUCOSE BLD-MCNC: 170 MG/DL (ref 65–99)
GLUCOSE BLD-MCNC: 180 MG/DL (ref 65–99)
GLUCOSE BLD-MCNC: 180 MG/DL (ref 65–99)
GLUCOSE BLD-MCNC: 71 MG/DL (ref 65–99)
GLUCOSE BLD-MCNC: 75 MG/DL (ref 65–99)
GLUCOSE BLD-MCNC: 85 MG/DL (ref 65–99)
GLUCOSE BLD-MCNC: 87 MG/DL (ref 65–99)
GLUCOSE BLD-MCNC: 90 MG/DL (ref 65–99)
GLUCOSE SERPL-MCNC: 157 MG/DL (ref 65–99)
HCO3 BLDA-SCNC: 19.9 MMOL/L (ref 17–25)
HCO3 BLDA-SCNC: 21 MMOL/L (ref 17–25)
HCO3 BLDA-SCNC: 21.1 MMOL/L (ref 17–25)
HCO3 BLDA-SCNC: 21.2 MMOL/L (ref 17–25)
HCO3 BLDA-SCNC: 21.8 MMOL/L (ref 17–25)
HCO3 BLDA-SCNC: 22.2 MMOL/L (ref 17–25)
HCO3 BLDA-SCNC: 22.3 MMOL/L (ref 17–25)
HCO3 BLDA-SCNC: 24.3 MMOL/L (ref 17–25)
HCO3 BLDA-SCNC: 24.4 MMOL/L (ref 17–25)
HCO3 BLDV-SCNC: 23.5 MMOL/L (ref 24–28)
HCT VFR BLD AUTO: 25.5 % (ref 42–52)
HCT VFR BLD AUTO: 37.9 % (ref 42–52)
HCT VFR BLD CALC: 24 % (ref 42–52)
HCT VFR BLD CALC: 25 % (ref 42–52)
HCT VFR BLD CALC: 26 % (ref 42–52)
HCT VFR BLD CALC: 26 % (ref 42–52)
HCT VFR BLD CALC: 27 % (ref 42–52)
HCT VFR BLD CALC: 37 % (ref 42–52)
HGB BLD-MCNC: 12.6 G/DL (ref 14–18)
HGB BLD-MCNC: 13.1 G/DL (ref 14–18)
HGB BLD-MCNC: 8.2 G/DL (ref 14–18)
HGB BLD-MCNC: 8.5 G/DL (ref 14–18)
HGB BLD-MCNC: 8.7 G/DL (ref 14–18)
HGB BLD-MCNC: 8.8 G/DL (ref 14–18)
HGB BLD-MCNC: 8.8 G/DL (ref 14–18)
HGB BLD-MCNC: 9.2 G/DL (ref 14–18)
HOROWITZ INDEX BLDA+IHG-RTO: 132 MM[HG]
HOROWITZ INDEX BLDA+IHG-RTO: 150 MM[HG]
HOROWITZ INDEX BLDA+IHG-RTO: 240 MM[HG]
HOROWITZ INDEX BLDA+IHG-RTO: 62 MM[HG]
HOROWITZ INDEX BLDA+IHG-RTO: 72 MM[HG]
IMM GRANULOCYTES # BLD AUTO: 0.03 K/UL (ref 0–0.11)
IMM GRANULOCYTES NFR BLD AUTO: 0.3 % (ref 0–0.9)
INR PPP: 1.34 (ref 0.87–1.13)
INST. QUALIFIED PATIENT IIQPT: YES
LV EJECT FRACT  99904: 60
LV EJECT FRACT MOD 2C 99903: 54.89
LV EJECT FRACT MOD 4C 99902: 63.24
LV EJECT FRACT MOD BP 99901: 58.22
LYMPHOCYTES # BLD AUTO: 2.82 K/UL (ref 1–4.8)
LYMPHOCYTES NFR BLD: 31.2 % (ref 22–41)
MAGNESIUM SERPL-MCNC: 1.9 MG/DL (ref 1.5–2.5)
MAGNESIUM SERPL-MCNC: 2.9 MG/DL (ref 1.5–2.5)
MCH RBC QN AUTO: 32.8 PG (ref 27–33)
MCH RBC QN AUTO: 33.3 PG (ref 27–33)
MCHC RBC AUTO-ENTMCNC: 34.5 G/DL (ref 33.7–35.3)
MCHC RBC AUTO-ENTMCNC: 34.6 G/DL (ref 33.7–35.3)
MCV RBC AUTO: 94.8 FL (ref 81.4–97.8)
MCV RBC AUTO: 96.6 FL (ref 81.4–97.8)
MONOCYTES # BLD AUTO: 0.69 K/UL (ref 0–0.85)
MONOCYTES NFR BLD AUTO: 7.6 % (ref 0–13.4)
NEUTROPHILS # BLD AUTO: 4.61 K/UL (ref 1.82–7.42)
NEUTROPHILS NFR BLD: 51 % (ref 44–72)
NRBC # BLD AUTO: 0 K/UL
NRBC BLD-RTO: 0 /100 WBC
O2/TOTAL GAS SETTING VFR VENT: 100 %
O2/TOTAL GAS SETTING VFR VENT: 40 %
O2/TOTAL GAS SETTING VFR VENT: 70 %
PCO2 BLDA: 36.3 MMHG (ref 26–37)
PCO2 BLDA: 37.4 MMHG (ref 26–37)
PCO2 BLDA: 39.9 MMHG (ref 26–37)
PCO2 BLDA: 40.6 MMHG (ref 26–37)
PCO2 BLDA: 41.2 MMHG (ref 26–37)
PCO2 BLDA: 41.5 MMHG (ref 26–37)
PCO2 BLDA: 42.6 MMHG (ref 26–37)
PCO2 BLDA: 44.5 MMHG (ref 26–37)
PCO2 BLDA: 50.8 MMHG (ref 26–37)
PCO2 BLDV: 47 MMHG (ref 41–51)
PCO2 TEMP ADJ BLDA: 36 MMHG (ref 26–37)
PCO2 TEMP ADJ BLDA: 36.3 MMHG (ref 26–37)
PCO2 TEMP ADJ BLDA: 39.1 MMHG (ref 26–37)
PCO2 TEMP ADJ BLDA: 39.4 MMHG (ref 26–37)
PCO2 TEMP ADJ BLDA: 41 MMHG (ref 26–37)
PH BLDA: 7.29 [PH] (ref 7.4–7.5)
PH BLDA: 7.32 [PH] (ref 7.4–7.5)
PH BLDA: 7.32 [PH] (ref 7.4–7.5)
PH BLDA: 7.33 [PH] (ref 7.4–7.5)
PH BLDA: 7.34 [PH] (ref 7.4–7.5)
PH BLDA: 7.35 [PH] (ref 7.4–7.5)
PH BLDA: 7.36 [PH] (ref 7.4–7.5)
PH BLDV: 7.31 [PH] (ref 7.31–7.45)
PH TEMP ADJ BLDA: 7.33 [PH] (ref 7.4–7.5)
PH TEMP ADJ BLDA: 7.33 [PH] (ref 7.4–7.5)
PH TEMP ADJ BLDA: 7.34 [PH] (ref 7.4–7.5)
PH TEMP ADJ BLDA: 7.35 [PH] (ref 7.4–7.5)
PH TEMP ADJ BLDA: 7.37 [PH] (ref 7.4–7.5)
PLATELET # BLD AUTO: 125 K/UL (ref 164–446)
PLATELET # BLD AUTO: 209 K/UL (ref 164–446)
PLATELET # BLD AUTO: 231 K/UL (ref 164–446)
PMV BLD AUTO: 10.9 FL (ref 9–12.9)
PMV BLD AUTO: 10.9 FL (ref 9–12.9)
PO2 BLDA: 105 MMHG (ref 64–87)
PO2 BLDA: 120 MMHG (ref 64–87)
PO2 BLDA: 132 MMHG (ref 64–87)
PO2 BLDA: 210 MMHG (ref 64–87)
PO2 BLDA: 229 MMHG (ref 64–87)
PO2 BLDA: 275 MMHG (ref 64–87)
PO2 BLDA: 62 MMHG (ref 64–87)
PO2 BLDA: 72 MMHG (ref 64–87)
PO2 BLDA: 96 MMHG (ref 64–87)
PO2 BLDV: 43 MMHG (ref 25–40)
PO2 TEMP ADJ BLDA: 127 MMHG (ref 64–87)
PO2 TEMP ADJ BLDA: 59 MMHG (ref 64–87)
PO2 TEMP ADJ BLDA: 68 MMHG (ref 64–87)
PO2 TEMP ADJ BLDA: 95 MMHG (ref 64–87)
PO2 TEMP ADJ BLDA: 99 MMHG (ref 64–87)
POTASSIUM BLD-SCNC: 3.7 MMOL/L (ref 3.6–5.5)
POTASSIUM BLD-SCNC: 3.8 MMOL/L (ref 3.6–5.5)
POTASSIUM BLD-SCNC: 3.9 MMOL/L (ref 3.6–5.5)
POTASSIUM BLD-SCNC: 4.2 MMOL/L (ref 3.6–5.5)
POTASSIUM BLD-SCNC: 4.6 MMOL/L (ref 3.6–5.5)
POTASSIUM BLD-SCNC: 4.8 MMOL/L (ref 3.6–5.5)
POTASSIUM BLD-SCNC: 5 MMOL/L (ref 3.6–5.5)
POTASSIUM SERPL-SCNC: 3.5 MMOL/L (ref 3.6–5.5)
POTASSIUM SERPL-SCNC: 3.6 MMOL/L (ref 3.6–5.5)
POTASSIUM SERPL-SCNC: 3.9 MMOL/L (ref 3.6–5.5)
POTASSIUM SERPL-SCNC: 4 MMOL/L (ref 3.6–5.5)
PRODUCT TYPE UPROD: NORMAL
PRODUCT TYPE UPROD: NORMAL
PROTHROMBIN TIME: 17 SEC (ref 12–14.6)
RBC # BLD AUTO: 2.61 M/UL (ref 4.7–6.1)
RBC # BLD AUTO: 4 M/UL (ref 4.7–6.1)
SAO2 % BLDA: 100 % (ref 93–99)
SAO2 % BLDA: 91 % (ref 93–99)
SAO2 % BLDA: 93 % (ref 93–99)
SAO2 % BLDA: 97 % (ref 93–99)
SAO2 % BLDA: 98 % (ref 93–99)
SAO2 % BLDA: 98 % (ref 93–99)
SAO2 % BLDA: 99 % (ref 93–99)
SAO2 % BLDV: 74 %
SODIUM BLD-SCNC: 135 MMOL/L (ref 135–145)
SODIUM BLD-SCNC: 137 MMOL/L (ref 135–145)
SODIUM BLD-SCNC: 138 MMOL/L (ref 135–145)
SODIUM BLD-SCNC: 139 MMOL/L (ref 135–145)
SODIUM BLD-SCNC: 140 MMOL/L (ref 135–145)
SODIUM BLD-SCNC: 140 MMOL/L (ref 135–145)
SODIUM BLD-SCNC: 142 MMOL/L (ref 135–145)
SODIUM BLD-SCNC: 142 MMOL/L (ref 135–145)
SODIUM SERPL-SCNC: 137 MMOL/L (ref 135–145)
SPECIMEN DRAWN FROM PATIENT: ABNORMAL
UNIT STATUS USTAT: NORMAL
UNIT STATUS USTAT: NORMAL
WBC # BLD AUTO: 12.7 K/UL (ref 4.8–10.8)
WBC # BLD AUTO: 9.1 K/UL (ref 4.8–10.8)

## 2020-09-25 PROCEDURE — P9034 PLATELETS, PHERESIS: HCPCS

## 2020-09-25 PROCEDURE — 82962 GLUCOSE BLOOD TEST: CPT | Mod: 91

## 2020-09-25 PROCEDURE — 71045 X-RAY EXAM CHEST 1 VIEW: CPT

## 2020-09-25 PROCEDURE — 500312 HCHG CONNECTOR, BLAKE DRAIN 1-WAY: Performed by: THORACIC SURGERY (CARDIOTHORACIC VASCULAR SURGERY)

## 2020-09-25 PROCEDURE — 37799 UNLISTED PX VASCULAR SURGERY: CPT

## 2020-09-25 PROCEDURE — C1894 INTRO/SHEATH, NON-LASER: HCPCS | Performed by: THORACIC SURGERY (CARDIOTHORACIC VASCULAR SURGERY)

## 2020-09-25 PROCEDURE — 94770 HCHG CO2 EXPIRED GAS DETERMINATION: CPT

## 2020-09-25 PROCEDURE — 503001 HCHG PERFUSION: Performed by: THORACIC SURGERY (CARDIOTHORACIC VASCULAR SURGERY)

## 2020-09-25 PROCEDURE — 700105 HCHG RX REV CODE 258: Performed by: CLINICAL NURSE SPECIALIST

## 2020-09-25 PROCEDURE — 33518 CABG ARTERY-VEIN TWO: CPT | Mod: AS | Performed by: CLINICAL NURSE SPECIALIST

## 2020-09-25 PROCEDURE — 500734 HCHG INSERT, STEALTH: Performed by: THORACIC SURGERY (CARDIOTHORACIC VASCULAR SURGERY)

## 2020-09-25 PROCEDURE — 700111 HCHG RX REV CODE 636 W/ 250 OVERRIDE (IP): Performed by: NURSE PRACTITIONER

## 2020-09-25 PROCEDURE — 06BP4ZZ EXCISION OF RIGHT SAPHENOUS VEIN, PERCUTANEOUS ENDOSCOPIC APPROACH: ICD-10-PCS | Performed by: THORACIC SURGERY (CARDIOTHORACIC VASCULAR SURGERY)

## 2020-09-25 PROCEDURE — 85027 COMPLETE CBC AUTOMATED: CPT

## 2020-09-25 PROCEDURE — 82803 BLOOD GASES ANY COMBINATION: CPT | Mod: 91

## 2020-09-25 PROCEDURE — 83735 ASSAY OF MAGNESIUM: CPT

## 2020-09-25 PROCEDURE — 30233M1 TRANSFUSION OF NONAUTOLOGOUS PLASMA CRYOPRECIPITATE INTO PERIPHERAL VEIN, PERCUTANEOUS APPROACH: ICD-10-PCS | Performed by: THORACIC SURGERY (CARDIOTHORACIC VASCULAR SURGERY)

## 2020-09-25 PROCEDURE — 94150 VITAL CAPACITY TEST: CPT

## 2020-09-25 PROCEDURE — 0211093 BYPASS CORONARY ARTERY, TWO ARTERIES FROM CORONARY ARTERY WITH AUTOLOGOUS VENOUS TISSUE, OPEN APPROACH: ICD-10-PCS | Performed by: THORACIC SURGERY (CARDIOTHORACIC VASCULAR SURGERY)

## 2020-09-25 PROCEDURE — 770022 HCHG ROOM/CARE - ICU (200)

## 2020-09-25 PROCEDURE — 700111 HCHG RX REV CODE 636 W/ 250 OVERRIDE (IP): Performed by: ANESTHESIOLOGY

## 2020-09-25 PROCEDURE — 36430 TRANSFUSION BLD/BLD COMPNT: CPT

## 2020-09-25 PROCEDURE — 84295 ASSAY OF SERUM SODIUM: CPT | Mod: 91

## 2020-09-25 PROCEDURE — C1898 LEAD, PMKR, OTHER THAN TRANS: HCPCS | Performed by: THORACIC SURGERY (CARDIOTHORACIC VASCULAR SURGERY)

## 2020-09-25 PROCEDURE — C1725 CATH, TRANSLUMIN NON-LASER: HCPCS | Performed by: THORACIC SURGERY (CARDIOTHORACIC VASCULAR SURGERY)

## 2020-09-25 PROCEDURE — 700111 HCHG RX REV CODE 636 W/ 250 OVERRIDE (IP): Performed by: THORACIC SURGERY (CARDIOTHORACIC VASCULAR SURGERY)

## 2020-09-25 PROCEDURE — 700111 HCHG RX REV CODE 636 W/ 250 OVERRIDE (IP): Performed by: CLINICAL NURSE SPECIALIST

## 2020-09-25 PROCEDURE — 85610 PROTHROMBIN TIME: CPT

## 2020-09-25 PROCEDURE — 160048 HCHG OR STATISTICAL LEVEL 1-5: Performed by: THORACIC SURGERY (CARDIOTHORACIC VASCULAR SURGERY)

## 2020-09-25 PROCEDURE — 33533 CABG ARTERIAL SINGLE: CPT | Performed by: THORACIC SURGERY (CARDIOTHORACIC VASCULAR SURGERY)

## 2020-09-25 PROCEDURE — 30233R1 TRANSFUSION OF NONAUTOLOGOUS PLATELETS INTO PERIPHERAL VEIN, PERCUTANEOUS APPROACH: ICD-10-PCS | Performed by: THORACIC SURGERY (CARDIOTHORACIC VASCULAR SURGERY)

## 2020-09-25 PROCEDURE — 85347 COAGULATION TIME ACTIVATED: CPT | Mod: 91

## 2020-09-25 PROCEDURE — 502240 HCHG MISC OR SUPPLY RC 0272: Performed by: THORACIC SURGERY (CARDIOTHORACIC VASCULAR SURGERY)

## 2020-09-25 PROCEDURE — A9270 NON-COVERED ITEM OR SERVICE: HCPCS | Performed by: CLINICAL NURSE SPECIALIST

## 2020-09-25 PROCEDURE — 85730 THROMBOPLASTIN TIME PARTIAL: CPT

## 2020-09-25 PROCEDURE — 84132 ASSAY OF SERUM POTASSIUM: CPT | Mod: 91

## 2020-09-25 PROCEDURE — 86923 COMPATIBILITY TEST ELECTRIC: CPT

## 2020-09-25 PROCEDURE — 700101 HCHG RX REV CODE 250: Performed by: CLINICAL NURSE SPECIALIST

## 2020-09-25 PROCEDURE — 99291 CRITICAL CARE FIRST HOUR: CPT | Performed by: INTERNAL MEDICINE

## 2020-09-25 PROCEDURE — 700105 HCHG RX REV CODE 258: Performed by: ANESTHESIOLOGY

## 2020-09-25 PROCEDURE — 160009 HCHG ANES TIME/MIN: Performed by: THORACIC SURGERY (CARDIOTHORACIC VASCULAR SURGERY)

## 2020-09-25 PROCEDURE — 700102 HCHG RX REV CODE 250 W/ 637 OVERRIDE(OP): Performed by: CLINICAL NURSE SPECIALIST

## 2020-09-25 PROCEDURE — 160031 HCHG SURGERY MINUTES - 1ST 30 MINS LEVEL 5: Performed by: THORACIC SURGERY (CARDIOTHORACIC VASCULAR SURGERY)

## 2020-09-25 PROCEDURE — 33508 ENDOSCOPIC VEIN HARVEST: CPT | Mod: AS | Performed by: CLINICAL NURSE SPECIALIST

## 2020-09-25 PROCEDURE — C1729 CATH, DRAINAGE: HCPCS | Performed by: THORACIC SURGERY (CARDIOTHORACIC VASCULAR SURGERY)

## 2020-09-25 PROCEDURE — 85049 AUTOMATED PLATELET COUNT: CPT

## 2020-09-25 PROCEDURE — 33518 CABG ARTERY-VEIN TWO: CPT | Performed by: THORACIC SURGERY (CARDIOTHORACIC VASCULAR SURGERY)

## 2020-09-25 PROCEDURE — 85025 COMPLETE CBC W/AUTO DIFF WBC: CPT

## 2020-09-25 PROCEDURE — 700105 HCHG RX REV CODE 258: Performed by: THORACIC SURGERY (CARDIOTHORACIC VASCULAR SURGERY)

## 2020-09-25 PROCEDURE — 700111 HCHG RX REV CODE 636 W/ 250 OVERRIDE (IP)

## 2020-09-25 PROCEDURE — 93005 ELECTROCARDIOGRAM TRACING: CPT | Performed by: CLINICAL NURSE SPECIALIST

## 2020-09-25 PROCEDURE — 80048 BASIC METABOLIC PNL TOTAL CA: CPT

## 2020-09-25 PROCEDURE — 500767 HCHG KIT, ENDOSCOPIC VEIN HARVEST: Performed by: THORACIC SURGERY (CARDIOTHORACIC VASCULAR SURGERY)

## 2020-09-25 PROCEDURE — 500896 HCHG PACK, VASCULAR (FOR ROOM 10): Performed by: THORACIC SURGERY (CARDIOTHORACIC VASCULAR SURGERY)

## 2020-09-25 PROCEDURE — 700102 HCHG RX REV CODE 250 W/ 637 OVERRIDE(OP): Performed by: INTERNAL MEDICINE

## 2020-09-25 PROCEDURE — 82330 ASSAY OF CALCIUM: CPT | Mod: 91

## 2020-09-25 PROCEDURE — 700101 HCHG RX REV CODE 250

## 2020-09-25 PROCEDURE — C1751 CATH, INF, PER/CENT/MIDLINE: HCPCS | Performed by: THORACIC SURGERY (CARDIOTHORACIC VASCULAR SURGERY)

## 2020-09-25 PROCEDURE — 33533 CABG ARTERIAL SINGLE: CPT | Mod: AS | Performed by: CLINICAL NURSE SPECIALIST

## 2020-09-25 PROCEDURE — 85014 HEMATOCRIT: CPT | Mod: 91

## 2020-09-25 PROCEDURE — 503000 HCHG SUTURE, OHS: Performed by: THORACIC SURGERY (CARDIOTHORACIC VASCULAR SURGERY)

## 2020-09-25 PROCEDURE — 02100Z9 BYPASS CORONARY ARTERY, ONE ARTERY FROM LEFT INTERNAL MAMMARY, OPEN APPROACH: ICD-10-PCS | Performed by: THORACIC SURGERY (CARDIOTHORACIC VASCULAR SURGERY)

## 2020-09-25 PROCEDURE — 700101 HCHG RX REV CODE 250: Performed by: ANESTHESIOLOGY

## 2020-09-25 PROCEDURE — C9248 INJ, CLEVIDIPINE BUTYRATE: HCPCS | Performed by: ANESTHESIOLOGY

## 2020-09-25 PROCEDURE — 30233N1 TRANSFUSION OF NONAUTOLOGOUS RED BLOOD CELLS INTO PERIPHERAL VEIN, PERCUTANEOUS APPROACH: ICD-10-PCS | Performed by: THORACIC SURGERY (CARDIOTHORACIC VASCULAR SURGERY)

## 2020-09-25 PROCEDURE — 160042 HCHG SURGERY MINUTES - EA ADDL 1 MIN LEVEL 5: Performed by: THORACIC SURGERY (CARDIOTHORACIC VASCULAR SURGERY)

## 2020-09-25 PROCEDURE — 93010 ELECTROCARDIOGRAM REPORT: CPT | Performed by: INTERNAL MEDICINE

## 2020-09-25 PROCEDURE — P9047 ALBUMIN (HUMAN), 25%, 50ML: HCPCS

## 2020-09-25 PROCEDURE — 700102 HCHG RX REV CODE 250 W/ 637 OVERRIDE(OP): Performed by: ANESTHESIOLOGY

## 2020-09-25 PROCEDURE — 501745 HCHG WIRE, SURGICAL STEEL: Performed by: THORACIC SURGERY (CARDIOTHORACIC VASCULAR SURGERY)

## 2020-09-25 PROCEDURE — 33508 ENDOSCOPIC VEIN HARVEST: CPT | Performed by: THORACIC SURGERY (CARDIOTHORACIC VASCULAR SURGERY)

## 2020-09-25 PROCEDURE — 500112 HCHG BONE WAX: Performed by: THORACIC SURGERY (CARDIOTHORACIC VASCULAR SURGERY)

## 2020-09-25 PROCEDURE — 94002 VENT MGMT INPAT INIT DAY: CPT

## 2020-09-25 PROCEDURE — 93312 ECHO TRANSESOPHAGEAL: CPT

## 2020-09-25 PROCEDURE — P9012 CRYOPRECIPITATE EACH UNIT: HCPCS

## 2020-09-25 PROCEDURE — P9016 RBC LEUKOCYTES REDUCED: HCPCS

## 2020-09-25 PROCEDURE — 5A1221Z PERFORMANCE OF CARDIAC OUTPUT, CONTINUOUS: ICD-10-PCS | Performed by: THORACIC SURGERY (CARDIOTHORACIC VASCULAR SURGERY)

## 2020-09-25 PROCEDURE — 700105 HCHG RX REV CODE 258: Performed by: NURSE PRACTITIONER

## 2020-09-25 RX ORDER — PROCHLORPERAZINE EDISYLATE 5 MG/ML
10 INJECTION INTRAMUSCULAR; INTRAVENOUS EVERY 6 HOURS PRN
Status: DISCONTINUED | OUTPATIENT
Start: 2020-09-25 | End: 2020-10-09 | Stop reason: HOSPADM

## 2020-09-25 RX ORDER — CARVEDILOL 3.12 MG/1
3.12 TABLET ORAL 2 TIMES DAILY WITH MEALS
Status: DISCONTINUED | OUTPATIENT
Start: 2020-09-26 | End: 2020-09-27

## 2020-09-25 RX ORDER — VECURONIUM BROMIDE 1 MG/ML
INJECTION, POWDER, LYOPHILIZED, FOR SOLUTION INTRAVENOUS PRN
Status: DISCONTINUED | OUTPATIENT
Start: 2020-09-25 | End: 2020-09-25

## 2020-09-25 RX ORDER — HEPARIN SODIUM,PORCINE 1000/ML
VIAL (ML) INJECTION PRN
Status: DISCONTINUED | OUTPATIENT
Start: 2020-09-25 | End: 2020-09-25

## 2020-09-25 RX ORDER — SODIUM CHLORIDE, SODIUM GLUCONATE, SODIUM ACETATE, POTASSIUM CHLORIDE AND MAGNESIUM CHLORIDE 526; 502; 368; 37; 30 MG/100ML; MG/100ML; MG/100ML; MG/100ML; MG/100ML
INJECTION, SOLUTION INTRAVENOUS PRN
Status: DISCONTINUED | OUTPATIENT
Start: 2020-09-25 | End: 2020-09-26

## 2020-09-25 RX ORDER — SODIUM CHLORIDE, SODIUM LACTATE, POTASSIUM CHLORIDE, CALCIUM CHLORIDE 600; 310; 30; 20 MG/100ML; MG/100ML; MG/100ML; MG/100ML
INJECTION, SOLUTION INTRAVENOUS
Status: DISCONTINUED | OUTPATIENT
Start: 2020-09-25 | End: 2020-09-25

## 2020-09-25 RX ORDER — MIDAZOLAM HYDROCHLORIDE 1 MG/ML
2 INJECTION INTRAMUSCULAR; INTRAVENOUS
Status: DISCONTINUED | OUTPATIENT
Start: 2020-09-25 | End: 2020-09-26

## 2020-09-25 RX ORDER — ACETAMINOPHEN 325 MG/1
650 TABLET ORAL EVERY 4 HOURS PRN
Status: DISCONTINUED | OUTPATIENT
Start: 2020-09-25 | End: 2020-09-30

## 2020-09-25 RX ORDER — CLOPIDOGREL BISULFATE 75 MG/1
75 TABLET ORAL DAILY
Status: DISCONTINUED | OUTPATIENT
Start: 2020-09-26 | End: 2020-09-30

## 2020-09-25 RX ORDER — ONDANSETRON 2 MG/ML
8 INJECTION INTRAMUSCULAR; INTRAVENOUS EVERY 6 HOURS PRN
Status: DISCONTINUED | OUTPATIENT
Start: 2020-09-25 | End: 2020-10-09 | Stop reason: HOSPADM

## 2020-09-25 RX ORDER — NOREPINEPHRINE BITARTRATE 0.03 MG/ML
0-30 INJECTION, SOLUTION INTRAVENOUS CONTINUOUS
Status: DISCONTINUED | OUTPATIENT
Start: 2020-09-25 | End: 2020-09-26 | Stop reason: ALTCHOICE

## 2020-09-25 RX ORDER — OMEPRAZOLE 20 MG/1
20 CAPSULE, DELAYED RELEASE ORAL DAILY
Status: DISCONTINUED | OUTPATIENT
Start: 2020-09-26 | End: 2020-09-30

## 2020-09-25 RX ORDER — ACETAMINOPHEN 500 MG
1000 TABLET ORAL EVERY 6 HOURS
Status: DISCONTINUED | OUTPATIENT
Start: 2020-09-25 | End: 2020-09-26

## 2020-09-25 RX ORDER — MORPHINE SULFATE 4 MG/ML
4 INJECTION, SOLUTION INTRAMUSCULAR; INTRAVENOUS
Status: DISCONTINUED | OUTPATIENT
Start: 2020-09-25 | End: 2020-09-26

## 2020-09-25 RX ORDER — SODIUM CHLORIDE 9 MG/ML
INJECTION, SOLUTION INTRAVENOUS CONTINUOUS
Status: DISCONTINUED | OUTPATIENT
Start: 2020-09-25 | End: 2020-09-26 | Stop reason: ALTCHOICE

## 2020-09-25 RX ORDER — LISINOPRIL 20 MG/1
40 TABLET ORAL DAILY
Status: DISCONTINUED | OUTPATIENT
Start: 2020-09-27 | End: 2020-09-26

## 2020-09-25 RX ORDER — POTASSIUM CHLORIDE 7.45 MG/ML
10 INJECTION INTRAVENOUS ONCE
Status: COMPLETED | OUTPATIENT
Start: 2020-09-25 | End: 2020-09-25

## 2020-09-25 RX ORDER — SODIUM CHLORIDE 9 MG/ML
INJECTION, SOLUTION INTRAVENOUS
Status: DISCONTINUED
Start: 2020-09-25 | End: 2020-09-25

## 2020-09-25 RX ORDER — BISACODYL 10 MG
10 SUPPOSITORY, RECTAL RECTAL
Status: DISCONTINUED | OUTPATIENT
Start: 2020-09-25 | End: 2020-09-30

## 2020-09-25 RX ORDER — DEXMEDETOMIDINE HYDROCHLORIDE 4 UG/ML
0-1.5 INJECTION, SOLUTION INTRAVENOUS CONTINUOUS
Status: DISCONTINUED | OUTPATIENT
Start: 2020-09-25 | End: 2020-09-26

## 2020-09-25 RX ORDER — SODIUM CHLORIDE, SODIUM GLUCONATE, SODIUM ACETATE, POTASSIUM CHLORIDE AND MAGNESIUM CHLORIDE 526; 502; 368; 37; 30 MG/100ML; MG/100ML; MG/100ML; MG/100ML; MG/100ML
INJECTION, SOLUTION INTRAVENOUS
Status: DISCONTINUED | OUTPATIENT
Start: 2020-09-25 | End: 2020-09-25

## 2020-09-25 RX ORDER — PROTAMINE SULFATE 10 MG/ML
INJECTION, SOLUTION INTRAVENOUS PRN
Status: DISCONTINUED | OUTPATIENT
Start: 2020-09-25 | End: 2020-09-25

## 2020-09-25 RX ORDER — LIDOCAINE HYDROCHLORIDE 20 MG/ML
INJECTION, SOLUTION EPIDURAL; INFILTRATION; INTRACAUDAL; PERINEURAL PRN
Status: DISCONTINUED | OUTPATIENT
Start: 2020-09-25 | End: 2020-09-25

## 2020-09-25 RX ORDER — DIPHENHYDRAMINE HCL 25 MG
25 TABLET ORAL
Status: DISCONTINUED | OUTPATIENT
Start: 2020-09-25 | End: 2020-09-30

## 2020-09-25 RX ORDER — OXYCODONE HYDROCHLORIDE 10 MG/1
10 TABLET ORAL
Status: DISCONTINUED | OUTPATIENT
Start: 2020-09-25 | End: 2020-09-26

## 2020-09-25 RX ORDER — GABAPENTIN 100 MG/1
100 CAPSULE ORAL 2 TIMES DAILY
Status: DISCONTINUED | OUTPATIENT
Start: 2020-10-01 | End: 2020-09-28

## 2020-09-25 RX ORDER — GABAPENTIN 300 MG/1
300 CAPSULE ORAL 2 TIMES DAILY
Status: DISCONTINUED | OUTPATIENT
Start: 2020-09-25 | End: 2020-09-28

## 2020-09-25 RX ORDER — ACETAMINOPHEN 650 MG/1
650 SUPPOSITORY RECTAL EVERY 4 HOURS PRN
Status: DISCONTINUED | OUTPATIENT
Start: 2020-09-25 | End: 2020-09-30

## 2020-09-25 RX ORDER — MAGNESIUM SULFATE 1 G/100ML
1 INJECTION INTRAVENOUS DAILY
Status: COMPLETED | OUTPATIENT
Start: 2020-09-25 | End: 2020-09-27

## 2020-09-25 RX ORDER — DEXTROSE MONOHYDRATE 25 G/50ML
50 INJECTION, SOLUTION INTRAVENOUS PRN
Status: DISPENSED | OUTPATIENT
Start: 2020-09-25 | End: 2020-09-26

## 2020-09-25 RX ORDER — SODIUM CHLORIDE 9 MG/ML
INJECTION, SOLUTION INTRAVENOUS
Status: ACTIVE
Start: 2020-09-25 | End: 2020-09-26

## 2020-09-25 RX ORDER — POTASSIUM CHLORIDE 14.9 MG/ML
20 INJECTION INTRAVENOUS ONCE
Status: COMPLETED | OUTPATIENT
Start: 2020-09-25 | End: 2020-09-25

## 2020-09-25 RX ORDER — PROMETHAZINE HYDROCHLORIDE 25 MG/1
25 SUPPOSITORY RECTAL EVERY 6 HOURS PRN
Status: DISCONTINUED | OUTPATIENT
Start: 2020-09-25 | End: 2020-10-09 | Stop reason: HOSPADM

## 2020-09-25 RX ORDER — NITROGLYCERIN 20 MG/100ML
0-100 INJECTION INTRAVENOUS CONTINUOUS
Status: DISCONTINUED | OUTPATIENT
Start: 2020-09-25 | End: 2020-09-26 | Stop reason: ALTCHOICE

## 2020-09-25 RX ORDER — ALUMINA, MAGNESIA, AND SIMETHICONE 2400; 2400; 240 MG/30ML; MG/30ML; MG/30ML
30 SUSPENSION ORAL EVERY 4 HOURS PRN
Status: DISCONTINUED | OUTPATIENT
Start: 2020-09-25 | End: 2020-09-30

## 2020-09-25 RX ORDER — ROSUVASTATIN CALCIUM 10 MG/1
20 TABLET, COATED ORAL
Status: DISCONTINUED | OUTPATIENT
Start: 2020-09-25 | End: 2020-09-27

## 2020-09-25 RX ORDER — GLYCOPYRROLATE 0.2 MG/ML
INJECTION INTRAMUSCULAR; INTRAVENOUS PRN
Status: DISCONTINUED | OUTPATIENT
Start: 2020-09-25 | End: 2020-09-25

## 2020-09-25 RX ORDER — AMOXICILLIN 250 MG
2 CAPSULE ORAL 2 TIMES DAILY
Status: DISCONTINUED | OUTPATIENT
Start: 2020-09-25 | End: 2020-09-30

## 2020-09-25 RX ORDER — TRAMADOL HYDROCHLORIDE 50 MG/1
50 TABLET ORAL EVERY 4 HOURS PRN
Status: DISCONTINUED | OUTPATIENT
Start: 2020-09-25 | End: 2020-09-30

## 2020-09-25 RX ORDER — PAPAVERINE HYDROCHLORIDE 30 MG/ML
INJECTION INTRAMUSCULAR; INTRAVENOUS
Status: DISCONTINUED | OUTPATIENT
Start: 2020-09-25 | End: 2020-09-25

## 2020-09-25 RX ORDER — EPINEPHRINE HCL IN 0.9 % NACL 4MG/250ML
0-.2 PLASTIC BAG, INJECTION (ML) INTRAVENOUS CONTINUOUS
Status: DISCONTINUED | OUTPATIENT
Start: 2020-09-25 | End: 2020-09-26 | Stop reason: ALTCHOICE

## 2020-09-25 RX ORDER — POLYETHYLENE GLYCOL 3350 17 G/17G
1 POWDER, FOR SOLUTION ORAL DAILY
Status: DISCONTINUED | OUTPATIENT
Start: 2020-09-26 | End: 2020-09-30

## 2020-09-25 RX ORDER — MIDAZOLAM HYDROCHLORIDE 1 MG/ML
INJECTION INTRAMUSCULAR; INTRAVENOUS PRN
Status: DISCONTINUED | OUTPATIENT
Start: 2020-09-25 | End: 2020-09-25

## 2020-09-25 RX ADMIN — SODIUM CHLORIDE, POTASSIUM CHLORIDE, SODIUM LACTATE AND CALCIUM CHLORIDE: 600; 310; 30; 20 INJECTION, SOLUTION INTRAVENOUS at 11:02

## 2020-09-25 RX ADMIN — VANCOMYCIN HYDROCHLORIDE 1000 MG: 1 INJECTION, POWDER, LYOPHILIZED, FOR SOLUTION INTRAVENOUS at 11:00

## 2020-09-25 RX ADMIN — EPINEPHRINE 0.05 MCG/KG/MIN: 1 INJECTION INTRAMUSCULAR; INTRAVENOUS; SUBCUTANEOUS at 11:16

## 2020-09-25 RX ADMIN — EPHEDRINE SULFATE 20 MG: 50 INJECTION, SOLUTION INTRAVENOUS at 11:51

## 2020-09-25 RX ADMIN — ROSUVASTATIN CALCIUM 20 MG: 10 TABLET, FILM COATED ORAL at 20:40

## 2020-09-25 RX ADMIN — CLEVIPIDINE 200 MCG: 0.5 EMULSION INTRAVENOUS at 13:38

## 2020-09-25 RX ADMIN — DEXMEDETOMIDINE HYDROCHLORIDE 2.5 MCG/KG/HR: 100 INJECTION, SOLUTION INTRAVENOUS at 12:12

## 2020-09-25 RX ADMIN — AMINOCAPROIC ACID 7.34 G: 250 INJECTION, SOLUTION INTRAVENOUS at 12:11

## 2020-09-25 RX ADMIN — AMINOCAPROIC ACID 2 G/HR: 250 INJECTION, SOLUTION INTRAVENOUS at 12:50

## 2020-09-25 RX ADMIN — VECURONIUM BROMIDE 6 MG: 10 INJECTION, POWDER, LYOPHILIZED, FOR SOLUTION INTRAVENOUS at 12:17

## 2020-09-25 RX ADMIN — MORPHINE SULFATE 4 MG: 4 INJECTION INTRAVENOUS at 16:41

## 2020-09-25 RX ADMIN — EPHEDRINE SULFATE 20 MG: 50 INJECTION, SOLUTION INTRAVENOUS at 10:46

## 2020-09-25 RX ADMIN — MIDAZOLAM HYDROCHLORIDE 2 MG: 1 INJECTION, SOLUTION INTRAMUSCULAR; INTRAVENOUS at 10:25

## 2020-09-25 RX ADMIN — COAGULATION FACTOR VIIA (RECOMBINANT) 1000 MCG: KIT at 17:28

## 2020-09-25 RX ADMIN — VECURONIUM BROMIDE 14 MG: 10 INJECTION, POWDER, LYOPHILIZED, FOR SOLUTION INTRAVENOUS at 10:37

## 2020-09-25 RX ADMIN — MAGNESIUM SULFATE 1 G: 1 INJECTION INTRAVENOUS at 14:37

## 2020-09-25 RX ADMIN — CLEVIPIDINE 2 MG/HR: 0.5 EMULSION INTRAVENOUS at 14:14

## 2020-09-25 RX ADMIN — GLYCOPYRROLATE 0.4 MG: 0.2 INJECTION INTRAMUSCULAR; INTRAVENOUS at 11:14

## 2020-09-25 RX ADMIN — SODIUM CHLORIDE, SODIUM GLUCONATE, SODIUM ACETATE, POTASSIUM CHLORIDE AND MAGNESIUM CHLORIDE: 526; 502; 368; 37; 30 INJECTION, SOLUTION INTRAVENOUS at 15:09

## 2020-09-25 RX ADMIN — FENTANYL CITRATE 250 MCG: 50 INJECTION, SOLUTION INTRAMUSCULAR; INTRAVENOUS at 11:33

## 2020-09-25 RX ADMIN — LIDOCAINE HYDROCHLORIDE 50 MG: 20 INJECTION, SOLUTION EPIDURAL; INFILTRATION; INTRACAUDAL at 10:37

## 2020-09-25 RX ADMIN — OXYCODONE HYDROCHLORIDE 10 MG: 10 TABLET ORAL at 20:40

## 2020-09-25 RX ADMIN — FENTANYL CITRATE 150 MCG: 50 INJECTION, SOLUTION INTRAMUSCULAR; INTRAVENOUS at 12:20

## 2020-09-25 RX ADMIN — MUPIROCIN 1 APPLICATION: 20 OINTMENT TOPICAL at 17:15

## 2020-09-25 RX ADMIN — SODIUM CHLORIDE, SODIUM GLUCONATE, SODIUM ACETATE, POTASSIUM CHLORIDE AND MAGNESIUM CHLORIDE: 526; 502; 368; 37; 30 INJECTION, SOLUTION INTRAVENOUS at 22:00

## 2020-09-25 RX ADMIN — CLEVIPIDINE 100 MCG: 0.5 EMULSION INTRAVENOUS at 11:34

## 2020-09-25 RX ADMIN — SODIUM CHLORIDE 2 UNITS/HR: 9 INJECTION, SOLUTION INTRAVENOUS at 11:59

## 2020-09-25 RX ADMIN — PROPOFOL 50 MG: 10 INJECTION, EMULSION INTRAVENOUS at 11:33

## 2020-09-25 RX ADMIN — CLEVIPIDINE 200 MCG: 0.5 EMULSION INTRAVENOUS at 13:31

## 2020-09-25 RX ADMIN — FENTANYL CITRATE 100 MCG: 50 INJECTION, SOLUTION INTRAMUSCULAR; INTRAVENOUS at 10:39

## 2020-09-25 RX ADMIN — HEPARIN SODIUM 30000 UNITS: 1000 INJECTION, SOLUTION INTRAVENOUS; SUBCUTANEOUS at 11:56

## 2020-09-25 RX ADMIN — PROPOFOL 50 MG: 10 INJECTION, EMULSION INTRAVENOUS at 10:39

## 2020-09-25 RX ADMIN — LIDOCAINE HYDROCHLORIDE 25 MG: 20 INJECTION, SOLUTION EPIDURAL; INFILTRATION; INTRACAUDAL at 10:39

## 2020-09-25 RX ADMIN — PROTAMINE SULFATE 300 MG: 10 INJECTION, SOLUTION INTRAVENOUS at 13:21

## 2020-09-25 RX ADMIN — POTASSIUM CHLORIDE 20 MEQ: 14.9 INJECTION, SOLUTION INTRAVENOUS at 18:25

## 2020-09-25 RX ADMIN — SODIUM CHLORIDE, SODIUM GLUCONATE, SODIUM ACETATE, POTASSIUM CHLORIDE AND MAGNESIUM CHLORIDE: 526; 502; 368; 37; 30 INJECTION, SOLUTION INTRAVENOUS at 10:17

## 2020-09-25 RX ADMIN — VANCOMYCIN HYDROCHLORIDE 1 G: 500 INJECTION, POWDER, LYOPHILIZED, FOR SOLUTION INTRAVENOUS at 22:26

## 2020-09-25 RX ADMIN — LIDOCAINE HYDROCHLORIDE 25 MG: 20 INJECTION, SOLUTION EPIDURAL; INFILTRATION; INTRACAUDAL at 11:33

## 2020-09-25 RX ADMIN — PROPOFOL 100 MG: 10 INJECTION, EMULSION INTRAVENOUS at 10:37

## 2020-09-25 RX ADMIN — POTASSIUM CHLORIDE 10 MEQ: 7.46 INJECTION, SOLUTION INTRAVENOUS at 14:45

## 2020-09-25 RX ADMIN — CALCIUM CHLORIDE 1000 MG: 100 INJECTION, SOLUTION INTRAVENOUS at 16:54

## 2020-09-25 ASSESSMENT — COGNITIVE AND FUNCTIONAL STATUS - GENERAL
TOILETING: A LOT
MOVING TO AND FROM BED TO CHAIR: A LOT
HELP NEEDED FOR BATHING: A LOT
PERSONAL GROOMING: A LOT
CLIMB 3 TO 5 STEPS WITH RAILING: A LOT
STANDING UP FROM CHAIR USING ARMS: A LOT
SUGGESTED CMS G CODE MODIFIER DAILY ACTIVITY: CH
EATING MEALS: A LOT
WALKING IN HOSPITAL ROOM: A LOT
SUGGESTED CMS G CODE MODIFIER DAILY ACTIVITY: CL
DRESSING REGULAR UPPER BODY CLOTHING: A LOT
MOBILITY SCORE: 12
MOBILITY SCORE: 24
TURNING FROM BACK TO SIDE WHILE IN FLAT BAD: A LOT
DAILY ACTIVITIY SCORE: 12
DAILY ACTIVITIY SCORE: 24
SUGGESTED CMS G CODE MODIFIER MOBILITY: CL
DRESSING REGULAR LOWER BODY CLOTHING: A LOT
MOVING FROM LYING ON BACK TO SITTING ON SIDE OF FLAT BED: A LOT
SUGGESTED CMS G CODE MODIFIER MOBILITY: CH

## 2020-09-25 ASSESSMENT — COPD QUESTIONNAIRES
DURING THE PAST 4 WEEKS HOW MUCH DID YOU FEEL SHORT OF BREATH: NONE/LITTLE OF THE TIME
COPD SCREENING SCORE: 0
DO YOU EVER COUGH UP ANY MUCUS OR PHLEGM?: NO/ONLY WITH OCCASIONAL COLDS OR INFECTIONS
HAVE YOU SMOKED AT LEAST 100 CIGARETTES IN YOUR ENTIRE LIFE: NO/DON'T KNOW

## 2020-09-25 ASSESSMENT — PAIN DESCRIPTION - PAIN TYPE
TYPE: SURGICAL PAIN

## 2020-09-25 ASSESSMENT — FIBROSIS 4 INDEX: FIB4 SCORE: 1.14

## 2020-09-25 ASSESSMENT — PULMONARY FUNCTION TESTS: FVC: 1

## 2020-09-25 NOTE — ANESTHESIA PROCEDURE NOTES
Central Venous Line  Performed by: Avila Kennedy M.D.  Authorized by: Avila Kennedy M.D.     Start Time:  9/25/2020 10:50 AM  End Time:  9/25/2020 11:02 AM  Patient Location:  OR  Indication: central venous access and hemodynamic monitoring        provider hand hygiene performed prior to central venous catheter insertion, all 5 sterile barriers used (gloves, gown, cap, mask, large sterile drape) during central venous catheter insertion and skin prep agent completely dried prior to procedure    Patient Position:  Trendelenburg  Laterality:  Left  Site:  Subclavian  Prep:  Chlorhexidine  Catheter Size:  7 Fr  Catheter Length (cm):  20  Number of Lumens:  Triple lumen  target vein identified, needle advanced into vein and blood aspirated and guidewire advanced into vein    Seldinger Technique?: Yes    Ultrasound-Guided: ultrasound-guided  Image captured, interpreted and electronically stored.  Sterile Gel and Probe Cover Used for Ultrasound?: Yes    Intravenous Verification: verified by ultrasound, venous blood return and chest x-ray pending    all ports aspirated, all ports flushed easily, guidewire was removed intact, biopatch was applied, line was sutured in place and dressing was applied    Events: patient tolerated procedure well with no complications

## 2020-09-25 NOTE — PROGRESS NOTES
Patient transported up to room 613 from OR. Patient placed on all monitors and assessed, drips verified. Report received Dr. Kennedy. Confirmed chest tube to suction.

## 2020-09-25 NOTE — ASSESSMENT & PLAN NOTE
Goal SBP less than 160, transition to goal of 140 for rehab and 130 long-term  Off nicardipine infusion  IV enalapril as needed  Continue lisinopril 10 mg twice daily, outpatient dose was 40/day  Continue metoprolol to 25 twice daily, may need to increase dose tomorrow for still tachycardic/hypertensive  Continue amlodipine

## 2020-09-25 NOTE — ANESTHESIA PROCEDURE NOTES
Arterial Line  Performed by: Avila Kennedy M.D.  Authorized by: Avila Kennedy M.D.     Start Time:  9/25/2020 10:27 AM  End Time:  9/25/2020 10:30 AM  Localization: ultrasound guidance and surface landmarks    Patient Location:  OR  Indication: continuous blood pressure monitoring        Catheter Size:  20 G  Seldinger Technique?: Yes    Laterality:  Left  Site:  Radial artery  Line Secured:  Antimicrobial disc, tape and transparent dressing  Events: patient tolerated procedure well with no complications

## 2020-09-25 NOTE — CONSULTS
Critical Care Consultation    Date of consult: 9/25/2020    Referring Physician  JOAO Yang.    Reason for Consultation  CAD, perioperative critical care management    History of Presenting Illness  49 y.o. male who presented 9/21/2020 with a past medical history significant for type 2 diabetes, hypertension and left ventricular hypertrophy who presented to the emergency department on 9/21 complaining of left-sided chest pain.  He was found to have acute coronary syndrome and was started on a heparin drip and admitted to the hospital with a cardiology consultation.  He underwent cardiac catheterization on 9/22 finding multivessel coronary arterial disease with severe ISR previously placed RCA stents and an ejection fraction of 55%.  Cardiothoracic surgery was consulted and took him to the operating room today for an uncomplicated three-vessel CABG.  He received 290 mL of Cell Saver and had a good urine output throughout the case.  He did not require any pacing and came off the pump without difficulty.  His ejection fraction was between 50 and 60% per anesthesia.  He initially required an epinephrine drip which is currently off and he is on Cleviprex at 2, dexmedetomidine at 0.5, insulin at 4 and on full mechanical ventilatory support upon arrival to the ICU.    Code Status  Full Code    Review of Systems  Review of Systems   Unable to perform ROS: Intubated     Past Medical History   has a past medical history of Diabetes (HCC), Hypertension, Kidney stones, MI, old, and Psychiatric problem.    Surgical History   has a past surgical history that includes other orthopedic surgery and stent placement (2017).    Family History  family history includes No Known Problems in his father; Stroke (age of onset: 47) in his mother; Stroke (age of onset: 54) in his maternal grandfather; Stroke (age of onset: 85) in his maternal grandmother.    Social History   reports that he has never smoked. His smokeless tobacco  use includes chew. He reports current alcohol use. He reports that he does not use drugs.    Medications  Home Medications     Reviewed by Maura Cruz R.N. (Registered Nurse) on 09/25/20 at 0932  Med List Status: Complete   Medication Last Dose Status   acetaminophen (TYLENOL) 500 MG Tab 2000 Active   acetaminophen (TYLENOL) tablet 650 mg  Active   allopurinol (ZYLOPRIM) 100 MG Tab 9/21/2020 Active   aminocaproic acid (AMICAR) 5 g in  mL Infusion  Active   aminocaproic acid (AMICAR) 7.34 g in  mL IV Bolus  Active   amLODIPine (NORVASC) 10 MG Tab  Active   amLODIPine (NORVASC) tablet 10 mg  Active   aspirin EC (ECOTRIN) 81 MG Tablet Delayed Response  Active   aspirin EC (ECOTRIN) tablet 81 mg  Active   atorvastatin (LIPITOR) 40 MG Tab  Active   atorvastatin (LIPITOR) tablet 80 mg  Active   bisacodyl (DULCOLAX) suppository 10 mg  Active   buPROPion (WELLBUTRIN XL) 300 MG XL tablet  Active   Cardiac Surgery Prophylactic Antibiotics per Pharmacy  Active   carvedilol (COREG) tablet 12.5 mg  Active   clopidogrel (PLAVIX) 75 MG Tab  Active   dexmedetomidine (PRECEDEX) 400 mcg/100mL NS premix infusion  Active   dextrose 50% (D50W) injection 50 mL  Active   enoxaparin (LOVENOX) inj 40 mg  Active   EPINEPHrine (Adrenalin) infusion 4 mg/250 mL (premix)  Active   glucose 4 g chewable tablet 16 g  Active   insulin regular (HumuLIN R,NovoLIN R) injection  Active   insulin regular human (HUMULIN/NOVOLIN R) 62.5 Units in  mL infusion per protocol  Active   lidocaine (XYLOCAINE) 1 % injection 0.5 mL  Active   lisinopril (PRINIVIL) tablet 40 mg  Active   lisinopril (PRINIVIL, ZESTRIL) 40 MG tablet  Active   magnesium hydroxide (MILK OF MAGNESIA) suspension 30 mL  Active   metFORMIN (GLUCOPHAGE) 500 MG Tab  Active   metoprolol (LOPRESSOR) 25 MG Tab  Active   metoprolol (LOPRESSOR) tablet 12.5 mg  Active   morphine (pf) 4 MG/ML injection 2-4 mg  Active   nitroglycerin (NITROSTAT) tablet 0.4 mg  Active    norepinephrine (Levophed) infusion 8 mg/250 mL (premix)  Active   ondansetron (ZOFRAN ODT) dispertab 4 mg  Active   ondansetron (ZOFRAN) syringe/vial injection 4 mg  Active   polyethylene glycol/lytes (MIRALAX) PACKET 1 Packet  Active   Respiratory Therapy Consult  Active   senna-docusate (PERICOLACE or SENOKOT S) 8.6-50 MG per tablet 2 Tab  Active   SODIUM CHLORIDE 0.9 % IV SOLN  Active   vancomycin 1000 mg in 250 mL NS IVPB Premix  Active              Current Facility-Administered Medications   Medication Dose Route Frequency Provider Last Rate Last Dose   • [START ON 9/26/2020] carvedilol (COREG) tablet 3.125 mg  3.125 mg Oral BID WITH MEALS Maia Chavira A.P.N.       • [START ON 9/27/2020] lisinopril (PRINIVIL) tablet 40 mg  40 mg Oral DAILY Maia Chavira, A.P.N.       • Respiratory Therapy Consult   Nebulization Continuous RT Maia Chavira A.P.N.       • NS infusion   Intravenous Continuous Maia Chavira A.P.N.       • calcium CHLORIDE 1,000 mg in  mL IVPB  1,000 mg Intravenous Once PRN Maia Chavira A.P.N.       • magnesium sulfate in D5W IVPB premix 1 g  1 g Intravenous DAILY Maia Chavira, A.P.N.       • K+ Scale: Goal of 4.5  1 Each Intravenous Q6HRS Maia Chavira A.P.N.       • Pharmacy Consult Request ...Pain Management Review 1 Each  1 Each Other PHARMACY TO DOSE Maia Chavira, A.P.N.       • acetaminophen (TYLENOL) tablet 1,000 mg  1,000 mg Oral Q6HRS Maia Chavira A.P.N.       • gabapentin (NEURONTIN) capsule 300 mg  300 mg Oral BID Maia Chavira, A.P.N.        Followed by   • [START ON 10/1/2020] gabapentin (NEURONTIN) capsule 100 mg  100 mg Oral BID Maia Chavira, A.P.N.       • senna-docusate (PERICOLACE or SENOKOT S) 8.6-50 MG per tablet 2 Tab  2 Tab Oral BID Maia Chavira A.P.N.        And   • [START ON 9/26/2020] polyethylene glycol/lytes (MIRALAX) PACKET 1 Packet  1 Packet Oral DAILY Maia Chavira, A.P.N.        And   • [START ON 9/27/2020] magnesium hydroxide (MILK  OF MAGNESIA) suspension 30 mL  30 mL Oral DAILY Maia Chavira, A.P.N.        And   • bisacodyl (DULCOLAX) suppository 10 mg  10 mg Rectal QDAY PRN Maia  Cait, A.P.N.       • [START ON 9/26/2020] omeprazole (PRILOSEC) capsule 20 mg  20 mg Oral DAILY Maia  Cait, A.P.N.       • electrolyte-A (PLASMALYTE-A) infusion   Intravenous PRN Maia  Cait, A.P.N.       • [START ON 9/26/2020] enoxaparin (LOVENOX) inj 40 mg  40 mg Subcutaneous DAILY AT 1800 Maia  Cait A.P.N.       • vancomycin (VANCOCIN) 1 g in  mL IVPB  1 g Intravenous Once Maia  Cait, A.P.N.       • mupirocin (BACTROBAN) 2 % ointment 1 Application  1 Application Topical BID Maia  Cait A.P.N.       • [START ON 9/26/2020] aspirin EC (ECOTRIN) tablet 81 mg  81 mg Oral DAILY Maia  Cait, A.P.N.       • [START ON 9/26/2020] clopidogrel (PLAVIX) tablet 75 mg  75 mg Oral DAILY Maia  Cait, A.P.N.       • rosuvastatin (CRESTOR) tablet 20 mg  20 mg Oral QHS Maia  Cait, A.P.N.       • clevidipine (CLEVIPREX) IV emulsion  1-21 mg/hr Intravenous Continuous Maia Chavira, A.P.N.       • nitroglycerin 50 mg in D5W 250 ml infusion  0-100 mcg/min Intravenous Continuous Maia Chavira, A.P.N.   Stopped at 09/25/20 1430   • oxyCODONE immediate release (ROXICODONE) tablet 10 mg  10 mg Oral Q3HRS PRN Maia Chavira, A.P.N.       • fentaNYL (SUBLIMAZE) injection 50 mcg  50 mcg Intravenous Q3HRS PRN Maia  Cait, A.P.N.       • tramadol (ULTRAM) 50 MG tablet 50 mg  50 mg Oral Q4HRS PRN Maia Chavira, A.P.N.       • midazolam (Versed) 2 MG/2ML injection 2 mg  2 mg Intravenous Q HOUR PRN Maia Chavira A.P.N.       • dexmedetomidine (PRECEDEX) 400 mcg/100mL NS premix infusion  0-1.5 mcg/kg/hr Intravenous Continuous Maia Chavira A.P.N.       • sodium bicarbonate 8.4 % injection 50 mEq  50 mEq Intravenous Q HOUR PRN Maia Chavira A.P.N.       • morphine (pf) 4 mg/mL injection 4 mg  4 mg Intravenous Q HOUR PRN Maia Chavira A.P.N.        • ondansetron (ZOFRAN) syringe/vial injection 8 mg  8 mg Intravenous Q6HRS PRN Maia Chavira, A.P.N.        Or   • prochlorperazine (COMPAZINE) injection 10 mg  10 mg Intravenous Q6HRS PRN Maia Chavira, A.P.N.        Or   • promethazine (PHENERGAN) suppository 25 mg  25 mg Rectal Q6HRS PRN Maia Chavira, A.P.N.       • acetaminophen (TYLENOL) tablet 650 mg  650 mg Oral Q4HRS PRN Maia Chavira, A.P.N.        Or   • acetaminophen (TYLENOL) suppository 650 mg  650 mg Rectal Q4HRS PRN Maia Chavira, A.P.N.       • mag hydrox-al hydrox-simeth (MAALOX PLUS ES or MYLANTA DS) suspension 30 mL  30 mL Oral Q4HRS PRN Maia Chavira, A.P.N.       • diphenhydrAMINE (BENADRYL) tablet/capsule 25 mg  25 mg Oral HS PRN - MR X 1 Maia Chavira, A.P.N.       • potassium chloride (KCL) ivpb 10 mEq  10 mEq Intravenous Once Michael Chicas M.D.       • insulin regular human (HUMULIN/NOVOLIN R) 62.5 Units in  mL infusion per protocol  1-6 Units/hr Intravenous Continuous Maia Chavira, A.P.N. 16 mL/hr at 09/25/20 1500 4 Units/hr at 09/25/20 1500    And   • insulin regular (HumuLIN R,NovoLIN R) injection  0-14 Units Intravenous Once Maia Chavira, A.P.N.        And   • insulin regular (HumuLIN R,NovoLIN R) injection  0-10 Units Intravenous PRN Maia Chavira, A.P.N.        And   • dextrose 50% (D50W) injection 50 mL  50 mL Intravenous PRN Maia Chavira A.P.N.       • insulin lispro (HumaLOG) injection  0-20 Units Subcutaneous PRN Maia Chavira, A.P.N.       • EPINEPHrine (Adrenalin) infusion 4 mg/250 mL (premix)  0-0.2 mcg/kg/min Intravenous Continuous Michael Chicas M.D.       • norepinephrine (Levophed) infusion 8 mg/250 mL (premix)  0-30 mcg/min Intravenous Continuous Michael Chicas M.D.           Allergies  No Known Allergies    Vital Signs last 24 hours  Temp:  [36.2 °C (97.2 °F)-37 °C (98.6 °F)] 36.3 °C (97.3 °F)  Pulse:  [52-63] 55  Resp:  [16-18] 16  BP: (124-148)/(65-95) 140/77  SpO2:  [95 %-99 %] 96  %    Physical Exam  Physical Exam  Vitals signs and nursing note reviewed.   Constitutional:       General: He is in acute distress.      Appearance: He is normal weight. He is not toxic-appearing.      Interventions: He is sedated and intubated.   HENT:      Head: Normocephalic and atraumatic.      Right Ear: External ear normal.      Left Ear: External ear normal.      Nose: Nose normal. No congestion.      Mouth/Throat:      Mouth: Mucous membranes are moist.      Pharynx: Oropharynx is clear.      Comments: Endotracheal tube in position  Eyes:      General: No scleral icterus.     Conjunctiva/sclera: Conjunctivae normal.      Pupils: Pupils are equal, round, and reactive to light.      Comments: Pupils are 2 mm and reactive bilaterally   Neck:      Musculoskeletal: Neck supple. No neck rigidity.   Cardiovascular:      Rate and Rhythm: Regular rhythm. Bradycardia present. Occasional extrasystoles are present.     Chest Wall: PMI is displaced.      Pulses: Normal pulses.      Heart sounds: Heart sounds are distant. No murmur.      Comments: Mediastinal incision site is clean/dry/intact with dressing, mediastinal chest tubes x2, no epicardial pacing wires, left subclavian central venous catheter with mild hematoma  Pulmonary:      Effort: No tachypnea or accessory muscle usage. He is intubated.      Breath sounds: Examination of the right-lower field reveals rales. Examination of the left-lower field reveals rales. Rales present. No decreased breath sounds, wheezing or rhonchi.      Comments: % support, PEEP of 8, 100% FiO2, peak inspiratory pressure 10  Abdominal:      General: Bowel sounds are normal. There is no distension.      Palpations: Abdomen is soft.      Tenderness: There is no abdominal tenderness. There is no guarding.   Genitourinary:     Comments: Quispe catheter in place  Musculoskeletal:         General: No tenderness.      Right lower leg: No edema.      Left lower leg: No edema.       Comments: Right lower extremity dressing is clean/dry/intact from harvest site   Skin:     General: Skin is warm and dry.      Capillary Refill: Capillary refill takes less than 2 seconds.      Findings: No rash.   Neurological:      Comments: Sedated, paralyzed postoperatively   Psychiatric:      Comments: Unable to assess given current clinical condition         Fluids    Intake/Output Summary (Last 24 hours) at 9/25/2020 1436  Last data filed at 9/25/2020 1427  Gross per 24 hour   Intake 2784.78 ml   Output 1150 ml   Net 1634.78 ml       Laboratory  Recent Results (from the past 48 hour(s))   ACCU-CHEK GLUCOSE    Collection Time: 09/23/20  5:07 PM   Result Value Ref Range    Glucose - Accu-Ck 131 (H) 65 - 99 mg/dL   ACCU-CHEK GLUCOSE    Collection Time: 09/23/20  8:33 PM   Result Value Ref Range    Glucose - Accu-Ck 180 (H) 65 - 99 mg/dL   Basic Metabolic Panel    Collection Time: 09/24/20  9:41 AM   Result Value Ref Range    Sodium 137 135 - 145 mmol/L    Potassium 3.9 3.6 - 5.5 mmol/L    Chloride 103 96 - 112 mmol/L    Co2 23 20 - 33 mmol/L    Glucose 199 (H) 65 - 99 mg/dL    Bun 15 8 - 22 mg/dL    Creatinine 0.93 0.50 - 1.40 mg/dL    Calcium 8.8 8.5 - 10.5 mg/dL    Anion Gap 11.0 7.0 - 16.0   MAGNESIUM    Collection Time: 09/24/20  9:41 AM   Result Value Ref Range    Magnesium 1.6 1.5 - 2.5 mg/dL   ESTIMATED GFR    Collection Time: 09/24/20  9:41 AM   Result Value Ref Range    GFR If African American >60 >60 mL/min/1.73 m 2    GFR If Non African American >60 >60 mL/min/1.73 m 2   COD (Adult)    Collection Time: 09/24/20  9:41 AM   Result Value Ref Range    ABO Grouping Only A     Rh Grouping Only POS     Antibody Screen-Cod NEG    ACCU-CHEK GLUCOSE    Collection Time: 09/24/20  2:14 PM   Result Value Ref Range    Glucose - Accu-Ck 193 (H) 65 - 99 mg/dL   Comp Metabolic Panel (CMP)    Collection Time: 09/24/20  4:56 PM   Result Value Ref Range    Sodium 139 135 - 145 mmol/L    Potassium 3.7 3.6 - 5.5 mmol/L     Chloride 101 96 - 112 mmol/L    Co2 22 20 - 33 mmol/L    Anion Gap 16.0 7.0 - 16.0    Glucose 159 (H) 65 - 99 mg/dL    Bun 15 8 - 22 mg/dL    Creatinine 1.02 0.50 - 1.40 mg/dL    Calcium 9.6 8.5 - 10.5 mg/dL    AST(SGOT) 21 12 - 45 U/L    ALT(SGPT) 19 2 - 50 U/L    Alkaline Phosphatase 90 30 - 99 U/L    Total Bilirubin 0.4 0.1 - 1.5 mg/dL    Albumin 4.6 3.2 - 4.9 g/dL    Total Protein 7.5 6.0 - 8.2 g/dL    Globulin 2.9 1.9 - 3.5 g/dL    A-G Ratio 1.6 g/dL   CBC with Differential    Collection Time: 09/24/20  4:56 PM   Result Value Ref Range    WBC 7.2 4.8 - 10.8 K/uL    RBC 4.03 (L) 4.70 - 6.10 M/uL    Hemoglobin 13.3 (L) 14.0 - 18.0 g/dL    Hematocrit 38.6 (L) 42.0 - 52.0 %    MCV 95.8 81.4 - 97.8 fL    MCH 33.0 27.0 - 33.0 pg    MCHC 34.5 33.7 - 35.3 g/dL    RDW 41.5 35.9 - 50.0 fL    Platelet Count 203 164 - 446 K/uL    MPV 10.8 9.0 - 12.9 fL    Neutrophils-Polys 48.90 44.00 - 72.00 %    Lymphocytes 32.60 22.00 - 41.00 %    Monocytes 9.30 0.00 - 13.40 %    Eosinophils 7.50 (H) 0.00 - 6.90 %    Basophils 1.40 0.00 - 1.80 %    Immature Granulocytes 0.30 0.00 - 0.90 %    Nucleated RBC 0.00 /100 WBC    Neutrophils (Absolute) 3.54 1.82 - 7.42 K/uL    Lymphs (Absolute) 2.36 1.00 - 4.80 K/uL    Monos (Absolute) 0.67 0.00 - 0.85 K/uL    Eos (Absolute) 0.54 (H) 0.00 - 0.51 K/uL    Baso (Absolute) 0.10 0.00 - 0.12 K/uL    Immature Granulocytes (abs) 0.02 0.00 - 0.11 K/uL    NRBC (Absolute) 0.00 K/uL   Prothrombin time (INR)    Collection Time: 09/24/20  4:56 PM   Result Value Ref Range    PT 13.3 12.0 - 14.6 sec    INR 0.98 0.87 - 1.13   APTT (PTT)    Collection Time: 09/24/20  4:56 PM   Result Value Ref Range    APTT 28.8 24.7 - 36.0 sec   ESTIMATED GFR    Collection Time: 09/24/20  4:56 PM   Result Value Ref Range    GFR If African American >60 >60 mL/min/1.73 m 2    GFR If Non African American >60 >60 mL/min/1.73 m 2   ACCU-CHEK GLUCOSE    Collection Time: 09/24/20  5:27 PM   Result Value Ref Range    Glucose - Accu-Ck  145 (H) 65 - 99 mg/dL   COVID/SARS CoV-2 PCR    Collection Time: 09/24/20  5:32 PM    Specimen: Nasopharyngeal; Respirate   Result Value Ref Range    COVID Order Status Received    SARS-CoV-2, PCR (In-House)    Collection Time: 09/24/20  5:32 PM   Result Value Ref Range    SARS-CoV-2 Source NP Swab     SARS-CoV-2 by PCR NotDetected    Urinalysis    Collection Time: 09/24/20  6:26 PM    Specimen: Urine, Clean Catch   Result Value Ref Range    Color Yellow     Character Clear     Specific Gravity 1.010 <1.035    Ph 7.0 5.0 - 8.0    Glucose Negative Negative mg/dL    Ketones Negative Negative mg/dL    Protein Negative Negative mg/dL    Bilirubin Negative Negative    Urobilinogen, Urine 0.2 Negative    Nitrite Negative Negative    Leukocyte Esterase Negative Negative    Occult Blood Negative Negative    Micro Urine Req see below    ACCU-CHEK GLUCOSE    Collection Time: 09/24/20  8:41 PM   Result Value Ref Range    Glucose - Accu-Ck 162 (H) 65 - 99 mg/dL   ACCU-CHEK GLUCOSE    Collection Time: 09/25/20  1:06 AM   Result Value Ref Range    Glucose - Accu-Ck 166 (H) 65 - 99 mg/dL   CBC WITH DIFFERENTIAL    Collection Time: 09/25/20  5:55 AM   Result Value Ref Range    WBC 9.1 4.8 - 10.8 K/uL    RBC 4.00 (L) 4.70 - 6.10 M/uL    Hemoglobin 13.1 (L) 14.0 - 18.0 g/dL    Hematocrit 37.9 (L) 42.0 - 52.0 %    MCV 94.8 81.4 - 97.8 fL    MCH 32.8 27.0 - 33.0 pg    MCHC 34.6 33.7 - 35.3 g/dL    RDW 41.3 35.9 - 50.0 fL    Platelet Count 209 164 - 446 K/uL    MPV 10.9 9.0 - 12.9 fL    Neutrophils-Polys 51.00 44.00 - 72.00 %    Lymphocytes 31.20 22.00 - 41.00 %    Monocytes 7.60 0.00 - 13.40 %    Eosinophils 8.60 (H) 0.00 - 6.90 %    Basophils 1.30 0.00 - 1.80 %    Immature Granulocytes 0.30 0.00 - 0.90 %    Nucleated RBC 0.00 /100 WBC    Neutrophils (Absolute) 4.61 1.82 - 7.42 K/uL    Lymphs (Absolute) 2.82 1.00 - 4.80 K/uL    Monos (Absolute) 0.69 0.00 - 0.85 K/uL    Eos (Absolute) 0.78 (H) 0.00 - 0.51 K/uL    Baso (Absolute) 0.12  0.00 - 0.12 K/uL    Immature Granulocytes (abs) 0.03 0.00 - 0.11 K/uL    NRBC (Absolute) 0.00 K/uL   Basic Metabolic Panel    Collection Time: 09/25/20  5:55 AM   Result Value Ref Range    Sodium 137 135 - 145 mmol/L    Potassium 3.6 3.6 - 5.5 mmol/L    Chloride 101 96 - 112 mmol/L    Co2 19 (L) 20 - 33 mmol/L    Glucose 157 (H) 65 - 99 mg/dL    Bun 23 (H) 8 - 22 mg/dL    Creatinine 1.00 0.50 - 1.40 mg/dL    Calcium 9.4 8.5 - 10.5 mg/dL    Anion Gap 17.0 (H) 7.0 - 16.0   MAGNESIUM    Collection Time: 09/25/20  5:55 AM   Result Value Ref Range    Magnesium 1.9 1.5 - 2.5 mg/dL   ESTIMATED GFR    Collection Time: 09/25/20  5:55 AM   Result Value Ref Range    GFR If African American >60 >60 mL/min/1.73 m 2    GFR If Non African American >60 >60 mL/min/1.73 m 2   ACCU-CHEK GLUCOSE    Collection Time: 09/25/20  9:33 AM   Result Value Ref Range    Glucose - Accu-Ck 180 (H) 65 - 99 mg/dL   ACCU-CHEK GLUCOSE    Collection Time: 09/25/20 11:21 AM   Result Value Ref Range    Glucose - Accu-Ck 170 (H) 65 - 99 mg/dL   ISTAT ARTERIAL BLOOD GAS    Collection Time: 09/25/20 11:23 AM   Result Value Ref Range    Ph 7.288 (LL) 7.400 - 7.500    Pco2 50.8 (H) 26.0 - 37.0 mmHg    Po2 229 (H) 64 - 87 mmHg    Tco2 26 20 - 33 mmol/L    S02 100 (H) 93 - 99 %    Hco3 24.3 17.0 - 25.0 mmol/L    BE -3 -4 - 3 mmol/L    Body Temp see below degrees    Specimen Arterial     Action Range Triggered YES     Inst. Qualified Patient YES    ISTAT SODIUM    Collection Time: 09/25/20 11:23 AM   Result Value Ref Range    Istat Sodium 140 135 - 145 mmol/L   ISTAT POTASSIUM    Collection Time: 09/25/20 11:23 AM   Result Value Ref Range    Istat Potassium 3.8 3.6 - 5.5 mmol/L   ISTAT IONIZED CA    Collection Time: 09/25/20 11:23 AM   Result Value Ref Range    Istat Ionized Calcium 1.25 1.10 - 1.30 mmol/L   ISTAT HEMATOCRIT AND HEMOGLOBIN    Collection Time: 09/25/20 11:23 AM   Result Value Ref Range    Istat Hematocrit 37 (L) 42 - 52 %    Istat Hemoglobin 12.6  (L) 14.0 - 18.0 g/dL   POC ACT (resulted by nursing)    Collection Time: 09/25/20 11:23 AM   Result Value Ref Range    Istat Activated Clotting Time 120 74 - 137 sec   POC ACT (resulted by nursing)    Collection Time: 09/25/20 12:03 PM   Result Value Ref Range    Istat Activated Clotting Time 566 (H) 74 - 137 sec   ACCU-CHEK GLUCOSE    Collection Time: 09/25/20 12:32 PM   Result Value Ref Range    Glucose - Accu-Ck 180 (H) 65 - 99 mg/dL   ISTAT ARTERIAL BLOOD GAS    Collection Time: 09/25/20 12:33 PM   Result Value Ref Range    Ph 7.347 (L) 7.400 - 7.500    Pco2 44.5 (H) 26.0 - 37.0 mmHg    Po2 275 (H) 64 - 87 mmHg    Tco2 26 20 - 33 mmol/L    S02 100 (H) 93 - 99 %    Hco3 24.4 17.0 - 25.0 mmol/L    BE -1 -4 - 3 mmol/L    Body Temp see below degrees    Specimen Arterial     Action Range Triggered NO     Inst. Qualified Patient YES    ISTAT SODIUM    Collection Time: 09/25/20 12:33 PM   Result Value Ref Range    Istat Sodium 137 135 - 145 mmol/L   ISTAT POTASSIUM    Collection Time: 09/25/20 12:33 PM   Result Value Ref Range    Istat Potassium 4.8 3.6 - 5.5 mmol/L   ISTAT IONIZED CA    Collection Time: 09/25/20 12:33 PM   Result Value Ref Range    Istat Ionized Calcium 1.04 (L) 1.10 - 1.30 mmol/L   ISTAT HEMATOCRIT AND HEMOGLOBIN    Collection Time: 09/25/20 12:33 PM   Result Value Ref Range    Istat Hematocrit 26 (L) 42 - 52 %    Istat Hemoglobin 8.8 (L) 14.0 - 18.0 g/dL   POC ACT (resulted by nursing)    Collection Time: 09/25/20 12:33 PM   Result Value Ref Range    Istat Activated Clotting Time 577 (H) 74 - 137 sec   ISTAT VENOUS BLOOD GAS    Collection Time: 09/25/20 12:40 PM   Result Value Ref Range    Ph 7.308 (L) 7.310 - 7.450    Pco2 47.0 41.0 - 51.0 mmHg    Po2 43 (H) 25 - 40 mmHg    Tco2 25 20 - 33 mmol/L    SO2 74 %    Hco3 23.5 (L) 24.0 - 28.0 mmol/L    BE -3 -4 - 3 mmol/L    Body Temp see below degrees    Specimen Venous     Action Range Triggered YES     Inst. Qualified Patient YES    ISTAT SODIUM     Collection Time: 09/25/20 12:40 PM   Result Value Ref Range    Istat Sodium 138 135 - 145 mmol/L   ISTAT POTASSIUM    Collection Time: 09/25/20 12:40 PM   Result Value Ref Range    Istat Potassium 4.6 3.6 - 5.5 mmol/L   ISTAT IONIZED CA    Collection Time: 09/25/20 12:40 PM   Result Value Ref Range    Istat Ionized Calcium 1.09 (L) 1.10 - 1.30 mmol/L   ISTAT HEMATOCRIT AND HEMOGLOBIN    Collection Time: 09/25/20 12:40 PM   Result Value Ref Range    Istat Hematocrit 26 (L) 42 - 52 %    Istat Hemoglobin 8.8 (L) 14.0 - 18.0 g/dL   ACCU-CHEK GLUCOSE    Collection Time: 09/25/20  1:03 PM   Result Value Ref Range    Glucose - Accu-Ck 158 (H) 65 - 99 mg/dL   POC ACT (resulted by nursing)    Collection Time: 09/25/20  1:04 PM   Result Value Ref Range    Istat Activated Clotting Time 340 (H) 74 - 137 sec   ISTAT ARTERIAL BLOOD GAS    Collection Time: 09/25/20  1:05 PM   Result Value Ref Range    Ph 7.346 (L) 7.400 - 7.500    Pco2 39.9 (H) 26.0 - 37.0 mmHg    Po2 210 (H) 64 - 87 mmHg    Tco2 23 20 - 33 mmol/L    S02 100 (H) 93 - 99 %    Hco3 21.8 17.0 - 25.0 mmol/L    BE -4 -4 - 3 mmol/L    Body Temp see below degrees    Specimen Arterial     Action Range Triggered NO     Inst. Qualified Patient YES    ISTAT SODIUM    Collection Time: 09/25/20  1:05 PM   Result Value Ref Range    Istat Sodium 135 135 - 145 mmol/L   ISTAT POTASSIUM    Collection Time: 09/25/20  1:05 PM   Result Value Ref Range    Istat Potassium 5.0 3.6 - 5.5 mmol/L   ISTAT IONIZED CA    Collection Time: 09/25/20  1:05 PM   Result Value Ref Range    Istat Ionized Calcium 1.06 (L) 1.10 - 1.30 mmol/L   ISTAT HEMATOCRIT AND HEMOGLOBIN    Collection Time: 09/25/20  1:05 PM   Result Value Ref Range    Istat Hematocrit 25 (L) 42 - 52 %    Istat Hemoglobin 8.5 (L) 14.0 - 18.0 g/dL   POC ACT (resulted by nursing)    Collection Time: 09/25/20  1:15 PM   Result Value Ref Range    Istat Activated Clotting Time 489 (H) 74 - 137 sec   ACCU-CHEK GLUCOSE    Collection Time:  20  1:53 PM   Result Value Ref Range    Glucose - Accu-Ck 139 (H) 65 - 99 mg/dL   ISTAT ARTERIAL BLOOD GAS    Collection Time: 20  1:55 PM   Result Value Ref Range    Ph 7.336 (L) 7.400 - 7.500    Pco2 41.5 (H) 26.0 - 37.0 mmHg    Po2 120 (H) 64 - 87 mmHg    Tco2 23 20 - 33 mmol/L    S02 98 93 - 99 %    Hco3 22.2 17.0 - 25.0 mmol/L    BE -3 -4 - 3 mmol/L    Body Temp see below degrees    Specimen Arterial     Action Range Triggered NO     Inst. Qualified Patient YES    ISTAT SODIUM    Collection Time: 20  1:55 PM   Result Value Ref Range    Istat Sodium 139 135 - 145 mmol/L   ISTAT POTASSIUM    Collection Time: 20  1:55 PM   Result Value Ref Range    Istat Potassium 4.2 3.6 - 5.5 mmol/L   ISTAT IONIZED CA    Collection Time: 20  1:55 PM   Result Value Ref Range    Istat Ionized Calcium 1.08 (L) 1.10 - 1.30 mmol/L   ISTAT HEMATOCRIT AND HEMOGLOBIN    Collection Time: 20  1:55 PM   Result Value Ref Range    Istat Hematocrit 27 (L) 42 - 52 %    Istat Hemoglobin 9.2 (L) 14.0 - 18.0 g/dL   POC ACT (resulted by nursing)    Collection Time: 20  1:55 PM   Result Value Ref Range    Istat Activated Clotting Time 103 74 - 137 sec   EKG on arrival to CSU    Collection Time: 20  2:28 PM   Result Value Ref Range    Report       [facility]    Test Date:  2020  Pt Name:    PETER TRONCOSO              Department: 161  MRN:        [id number]                      Room:       13  Gender:     Male                         Technician: [facility]  :        1970                   Requested By:NITHYA FELTON  Order #:    [id number]                    Benjamin MD:    Measurements  Intervals                                Axis  Rate:       80                           P:          116  RI:         156                          QRS:        26  QRSD:       98                           T:  QT:         404  QTc:        466    Interpretive Statements  SINUS RHYTHM  BORDERLINE REPOLARIZATION  ABNORMALITY  Compared to ECG 09/22/2020 01:34:50  No significant changes         Imaging  DX-CHEST-PORTABLE (1 VIEW)   Final Result      1.  Left basilar opacification likely represents atelectasis.      2.  Endotracheal tube tip projects approximately 1.5 cm above the migel.      3.  Left subclavian catheter tip projects over the superior vena cava. No pneumothorax is identified.      US-CAROTID DOPPLER BILAT   Final Result      US-VEIN MAPPING LOWER EXTREMITY BILAT   Final Result      EC-ECHOCARDIOGRAM COMPLETE W/O CONT   Final Result      DX-CHEST-PORTABLE (1 VIEW)   Final Result      No acute cardiopulmonary abnormality.      CL-LEFT HEART CATHETERIZATION WITH POSSIBLE INTERVENTION    (Results Pending)    *Personally reviewed chest x-ray showing postoperative changes with enlarged cardiac silhouette, mild edema, sternotomy wires, endotracheal tube and left subclavian central venous catheter and mediastinal chest tubes in good position   *Personally reviewed EKG showing normal sinus rhythm with rate 80 with lateral T wave inversions in V5 and V6    Assessment/Plan  * CAD (coronary artery disease)- (present on admission)  Assessment & Plan  Status post three-vessel CABG 9/25  Routine postoperative management, monitor chest tube output  Tight blood pressure and glucose control with Cleviprex and insulin drips  Eventual beta-blocker  Antiplatelets, statin  PRN analgesics    Encounter for weaning from ventilator ([facility])  Assessment & Plan  Intubated in OR 9/25  Continue full mechanical ventilatory support using ASV mode  Titrate per protocol keeping SaO2 greater than 92%  RT/O2 protocol  Titrate Precedex drip PRN anxiety  Early sedation vacation, mobilization, pulmonary toiletry    NSTEMI (non-ST elevated myocardial infarction) ([facility])- (present on admission)  Assessment & Plan  Status post PCI finding multivessel disease now status post CABG  Antiplatelets, statin    Essential hypertension- (present on  admission)  Assessment & Plan  Continue to titrate Cleviprex drip for now  Resume Coreg, ACE inhibitor    Type 2 diabetes mellitus with complication, without long-term current use of insulin (HCC)- (present on admission)  Assessment & Plan  Insulin drip for now for tight glycemic control postoperatively follow transition to long and short acting insulin  Diabetic diet monitoring glucose closely    SONNY (acute kidney injury) ([facility])  Assessment & Plan  Improving  Monitor creatinine, urine output, electrolytes closely  Avoid nephrotoxins      Discussed patient condition and risk of morbidity and/or mortality with RN, RT, Pharmacy, Charge nurse / hot rounds, CVS and Anesthesiology.    The patient remains critically ill.  Critical care time = 34 minutes in directly providing and coordinating critical care and extensive data review.  No time overlap and excludes procedures.

## 2020-09-25 NOTE — PROGRESS NOTES
Assumed care of patient. Bedside report, received from Thierry COOK. Updated POC, call light within reach, and fall precautions in place. Bed locked and and in lowest position. Patient instructed to call for assistance before getting out of bed. All questions answered no further needs at this time.

## 2020-09-25 NOTE — ANESTHESIA QCDR
2019 [facility] Clinical Data Registry (for Quality Improvement)     Postoperative nausea/vomiting risk protocol (Adult = 18 yrs and Pediatric 3-17 yrs)- (430 and 463)  General inhalation anesthetic (NOT TIVA) with PONV risk factors: No  Provision of anti-emetic therapy with at least 2 different classes of agents: N/A  Patient DID NOT receive anti-emetic therapy and reason is documented in Medical Record: N/A    Multimodal Pain Management- (477)  Non-emergent surgery AND patient age >= 18: No  Use of Multimodal Pain Management, two or more drugs and/or interventions, NOT including systemic opioids:   Exception: Documented allergy to multiple classes of analgesics:     Smoking Abstinence (404)  Patient is current smoker (cigarette, pipe, e-cig, marijuanna): No  Elective Surgery:   Abstinence instructions provided prior to day of surgery:   Patient abstained from smoking on day of surgery:     Pre-Op Beta-Blocker in Isolated CABG (44)  Isolated CABG AND patient age >= 18: Yes  Beta-blocker admin within 24 hours of surgical incision: Yes  Exception:of medical reason(s) for not administering beta blocker within 24 hours prior to surgical incision (e.g., not  indicated,other medical reason):     PACU assessment of acute postoperative pain prior to Anesthesia Care End- Applies to Patients Age = 18- (ABG7)  Initial PACU pain score is which of the following: Pain not assessed  Patient unable to report pain score: Yes (Patient Unable to Report)    Post-anesthetic transfer of care checklist/protocol to PACU/ICU- (426 and 427)  Upon conclusion of case, patient transferred to which of the following locations: ICU  Use of transfer checklist/protocol: Yes  Exclusion: Service Performed in Patient Hospital Room (and thus did not require transfer): N/A  Unplanned admission to ICU related to anesthesia service up through end of PACU care- (MD51)  Unplanned admission to ICU (not initially anticipated at anesthesia start time):  No

## 2020-09-25 NOTE — ASSESSMENT & PLAN NOTE
Renal function has returned to normal  Continue IV fluid resuscitation as needed  Monitor renal function and urine output  Avoid nephrotoxins and renal dose medications  Renal function continues to improve, creatinine normal today for the first time

## 2020-09-25 NOTE — ANESTHESIA PREPROCEDURE EVALUATION
Relevant Problems   CARDIAC   (+) Acute non-ST elevation myocardial infarction (NSTEMI) ([facility])   (+) CAD (coronary artery disease)   (+) Essential hypertension   (+) NSTEMI (non-ST elevated myocardial infarction) ([facility])         (+) SONNY (acute kidney injury) ([facility])      ENDO   (+) Type 2 diabetes mellitus with complication, without long-term current use of insulin ([facility])       Physical Exam    Airway   Mallampati: II  TM distance: >3 FB  Neck ROM: full       Cardiovascular - normal exam  Rhythm: regular  Rate: normal  (-) murmur     Dental - normal exam        Facial Hair   Pulmonary - normal exam  Breath sounds clear to auscultation     Abdominal    Neurological - normal exam                 Anesthesia Plan    ASA 4   ASA physical status 4 criteria: ongoing cardiac ischemia or unstable angina    Plan - general       Airway plan will be ETT        Induction: intravenous    Postoperative Plan: Postoperative administration of opioids is intended.    Pertinent diagnostic labs and testing reviewed    Informed Consent:    Anesthetic plan and risks discussed with patient.    Use of blood products discussed with: patient whom consented to blood products.

## 2020-09-25 NOTE — ASSESSMENT & PLAN NOTE
Intubated in OR 9/25, weaned on the same day  RT/O2 Protocols  Titrate supplemental FiO2 to maintain SpO2 >92%  IS, ambulation

## 2020-09-25 NOTE — PROGRESS NOTES
Pt. Received from T8 from RN, brought to T613 in wheelchair and on monitor, pt has no complaints at this time; will assess and continue to monitor.

## 2020-09-25 NOTE — PROGRESS NOTES
Patient's chest tube output in first hour 420 mL. LUKAS, Christi Conway, updated. Orders received for 1 platelet, 1 cryo, and 1 IVPB calcium chloride. Orders to update APN if chest tube continues to have output greater than 150mL/ hr.

## 2020-09-25 NOTE — OP REPORT
DATE OF SERVICE:  09/25/2020    PREOPERATIVE DIAGNOSIS:  Coronary artery disease.    POSTOPERATIVE DIAGNOSIS:  Coronary artery disease.    PROCEDURES PERFORMED:  Coronary artery bypass grafting x3, left internal   mammary to left anterior descending, reverse saphenous vein graft to the major   obtuse marginal branch and the right posterior descending, endoscopic   saphenous vein harvesting, extracorporal circulation.    SURGEON:  Michael Chicas MD    FIRST ASSISTANT:  LUKAS Morris    ANESTHESIOLOGIST:  Avila Kennedy MD    ANESTHESIA:  General endotracheal.    COMPLICATIONS:  None.    FINDINGS:  Quality of the saphenous vein excellent, quality of the internal   mammary artery excellent, normal left ventricular function, ascending aorta   without calcification.  Coronary vessels heavily calcified throughout their   length, right posterior descending 2 mm, major obtuse marginal branch 2 mm,   left anterior descending 2 mm.    SUMMARY OF OPERATION:  The patient was brought to the operating room, placed   on the operating table in supine position.  Central line as well as an art   line and transesophageal echo were placed by the anesthesiologist.  After   adequate endotracheal anesthesia, a Quispe catheter with a temperature probe   was placed.  The patient was prepped with Betadine solution, draped in the   usual sterile fashion.  A 2-team approach was utilized.  Greater saphenous   vein was harvested endoscopically using the system from [employer] from the   patient's right leg.  After it was harvested, it was irrigated out with   heparinized saline.  Side branches ligated with Hemoclips and leg was closed   in standard fashion, wrapped with an Ace wrap.  Maia Chavira then assisted me   at the chest for the remainder of the operation.  Standard median sternotomy   incision was made and carried through the subcutaneous tissue using Bovie   electrocautery.  The sternum was scored in the midline, divided with    reciprocating saw.  The internal mammary artery was taken down as a   skeletonized vessel on the left side.  Side branches ligated with Hemoclips   and divided sharply.  The patient was given 4 mg/kg of heparin.  After   adequate circulation time, it was detached distally, found to have pulsatile   flow.  A Hemoclip was placed on its very end and was soaked in a papaverine   solution.  Next, pericardium was opened in the midline, T'd at the diaphragm   as well as at the aorta, and pericardial stay sutures were placed creating a   pericardial well.  The aorta was cannulated with a Sarns 6.5 mm aortic   cannula, 2-stage atrial caval cannula was positioned in right atrial appendage   and cardioplegia needle placed in the ascending aorta.  All lines were   connected, and the patient was placed on full cardiopulmonary bypass without   difficulty and cooled down to 32 degrees.  The aorta was crossclamped and   delivered cold blood cardioplegia in antegrade fashion to arrest the heart.    Total of 900 mL were given.  The heart was packed in slush ice.  The right   posterior descending was opened and an end-to-side anastomosis was made with   running 7-0 Prolene utilizing reverse saphenous vein graft.  Next, the major   obtuse marginal branch was opened and an end-to-side anastomosis was made with   a second segment reverse saphenous vein graft.  Another dose of cardioplegia   was then given.  The left anterior descending was opened in its distal third.    The left internal mammary artery was spatulated and an end-to-side   anastomosis was made with internal mammary to left anterior descending   utilizing a running 7-0 Prolene.  The bulldog was removed.  Flush of red blood   down the distribution of the LAD indicated a patent anastomosis.  Crossclamp   was removed and replaced with a side-biting clamp.  Two 4.5 mm punch holes   were made.  The vein grafts distended to proper length, spatulated, and my   proximal  anastomosis was performed with running 6-0 Prolene.  I de-aired the   grafts by removing side-biting clamp and radiopaque markers were placed at the   proximal anastomosis.  Heart returned to spontaneous sinus rhythm, remained   in a sinus rhythm during the rewarming process.  He was rewarmed to a bladder   temperature of 37 degrees, at which time we successfully weaned him from   cardiopulmonary bypass without difficulty.  The atrial caval cannula was   removed and oversewn with heavy silk ties as well as a pledgeted 2-0 Ethibond.    The patient was given protamine to reverse the heparin.  When the majority   of the protamine was in the aortic cannula, it was removed and that area was   oversewn with double pledgeted 2-0 Ethibond.  Again, I checked my proximal and   distal anastomosis as well as my cannulation sites, found them to be   hemostatic.  Two chest tubes were positioned, brought it inferiorly and   secured to the skin.  The sternum was approximated with #5 sternal wire.    Linea alba, subcutaneous tissue and skin closed in standard fashion.  Final   sponge, needle and instrument counts reported as correct.  The patient was   returned to intensive care unit in stable condition.  Total aortic clamp time   was 32 minutes.  Total pump time was 59 minutes.       ____________________________________     MD RUBÉN SETHI / JONAH    DD:  09/25/2020 13:58:41  DT:  09/25/2020 15:42:27    D#:  [id number]  Job#:  [id number]

## 2020-09-25 NOTE — CARE PLAN
Problem: Pre Op  Goal: Optimal preparation for CABG/Heart Valve surgery  Outcome: PROGRESSING AS EXPECTED  Intervention: Consents obtained for Surgery, Anesthesia and Transfusion/Bloodless Program Participant  Note: Placed in chart  Intervention: Pre op labs per MD order: CBC, CMP, INR, PTT, COD if not done in past 72 hours.  HbA1C if diabetic.  Note: Results available in Results Review    Intervention: Pre op diagnostics per MD order (EKG, CXR, Bilateral carotid doppler study and vein mapping)  Note: Results in chart  Intervention: Baseline assessment documented to include IS volume, weight, bilateral BP and peripheral pulses.  Note: Patient IS 2500. Patient has 2+ bilateral peripheral pulses.   Intervention: NPO at midnight except cardiac medications. (No ASA, coumadin or Plavix)  Note: NPO at midnight  Intervention: Prep with clippers  Note: Patient prepped with clippers  Intervention: Remove dentures, valuables and jewelry  Note: Belongings in patient belongings  Intervention: Determine if patient is taking Beta Blockers  Note: Patient to take metoprolol in am

## 2020-09-25 NOTE — ANESTHESIA TIME REPORT
Anesthesia Start and Stop Event Times     Date Time Event    9/25/2020 1000 Ready for Procedure     1017 Anesthesia Start     1429 Anesthesia Stop        Responsible Staff  09/25/20    Name Role Begin End    Avila Kennedy M.D. Anesth 1017 1429        Preop Diagnosis (Free Text):  Pre-op Diagnosis     3 vessel coronary artery disease         Preop Diagnosis (Codes):    Post op Diagnosis  CAD (coronary artery disease)      Premium Reason  Non-Premium    Comments:

## 2020-09-25 NOTE — ASSESSMENT & PLAN NOTE
Continue sliding scale insulin  Blood sugar increasing as tube feeds advancing, A1c levels almost 8 recently  May need to add Lantus to his regimen, monitor sliding scale insulin use daily and reassess  Hypoglycemia protocols

## 2020-09-25 NOTE — ANESTHESIA PROCEDURE NOTES
Airway    Date/Time: 9/25/2020 10:39 AM  Performed by: Avila Kennedy M.D.  Authorized by: Avila Kennedy M.D.     Location:  OR  Urgency:  Elective  Difficult Airway: No    Indications for Airway Management:  Anesthesia      Spontaneous Ventilation: absent    Sedation Level:  Deep  Preoxygenated: Yes    Patient Position:  Sniffing  Mask Difficulty Assessment:  1 - vent by mask  Final Airway Type:  Endotracheal airway  Final Endotracheal Airway:  ETT  Cuffed: Yes    Technique Used for Successful ETT Placement:  Direct laryngoscopy  Devices/Methods Used in Placement:  Anterior pressure/BURP    Insertion Site:  Oral  Blade Type:  Dodd  Laryngoscope Blade/Videolaryngoscope Blade Size:  3  ETT Size (mm):  8.0  Measured from:  Teeth  ETT to Teeth (cm):  23  Placement Verified by: auscultation and capnometry    Cormack-Lehane Classification:  Grade IIa - partial view of glottis  Number of Attempts at Approach:  1

## 2020-09-25 NOTE — CARE PLAN
Problem: Pre Op  Goal: Optimal preparation for CABG/Heart Valve surgery  Intervention: Pre Op education to patient/significant other. Provide patient [facility] Patient Guideline for Cardiac Surgery (See Pt. Ed.)  Note: Pre-op education given. All questions answered at this time. No packets currently available.

## 2020-09-26 ENCOUNTER — APPOINTMENT (OUTPATIENT)
Dept: RADIOLOGY | Facility: MEDICAL CENTER | Age: 50
DRG: 233 | End: 2020-09-26
Attending: CLINICAL NURSE SPECIALIST
Payer: COMMERCIAL

## 2020-09-26 LAB
ACTION RANGE TRIGGERED IACRT: NO
ACTION RANGE TRIGGERED IACRT: YES
ANION GAP SERPL CALC-SCNC: 17 MMOL/L (ref 7–16)
BASE EXCESS BLDA CALC-SCNC: -8 MMOL/L (ref -4–3)
BASE EXCESS BLDA CALC-SCNC: -9 MMOL/L (ref -4–3)
BODY TEMPERATURE: ABNORMAL DEGREES
BODY TEMPERATURE: ABNORMAL DEGREES
BUN SERPL-MCNC: 17 MG/DL (ref 8–22)
CA-I BLD ISE-SCNC: 1.16 MMOL/L (ref 1.1–1.3)
CALCIUM SERPL-MCNC: 7.7 MG/DL (ref 8.5–10.5)
CHLORIDE SERPL-SCNC: 104 MMOL/L (ref 96–112)
CO2 BLDA-SCNC: 16 MMOL/L (ref 20–33)
CO2 BLDA-SCNC: 17 MMOL/L (ref 20–33)
CO2 SERPL-SCNC: 16 MMOL/L (ref 20–33)
CREAT SERPL-MCNC: 1.06 MG/DL (ref 0.5–1.4)
EKG IMPRESSION: NORMAL
ERYTHROCYTE [DISTWIDTH] IN BLOOD BY AUTOMATED COUNT: 43.3 FL (ref 35.9–50)
GLUCOSE BLD-MCNC: 110 MG/DL (ref 65–99)
GLUCOSE BLD-MCNC: 110 MG/DL (ref 65–99)
GLUCOSE BLD-MCNC: 111 MG/DL (ref 65–99)
GLUCOSE BLD-MCNC: 114 MG/DL (ref 65–99)
GLUCOSE BLD-MCNC: 115 MG/DL (ref 65–99)
GLUCOSE BLD-MCNC: 122 MG/DL (ref 65–99)
GLUCOSE BLD-MCNC: 126 MG/DL (ref 65–99)
GLUCOSE BLD-MCNC: 129 MG/DL (ref 65–99)
GLUCOSE BLD-MCNC: 129 MG/DL (ref 65–99)
GLUCOSE BLD-MCNC: 142 MG/DL (ref 65–99)
GLUCOSE BLD-MCNC: 146 MG/DL (ref 65–99)
GLUCOSE BLD-MCNC: 170 MG/DL (ref 65–99)
GLUCOSE BLD-MCNC: 72 MG/DL (ref 65–99)
GLUCOSE BLD-MCNC: 79 MG/DL (ref 65–99)
GLUCOSE BLD-MCNC: 95 MG/DL (ref 65–99)
GLUCOSE SERPL-MCNC: 130 MG/DL (ref 65–99)
HCO3 BLDA-SCNC: 15.2 MMOL/L (ref 17–25)
HCO3 BLDA-SCNC: 16.3 MMOL/L (ref 17–25)
HCT VFR BLD AUTO: 24.6 % (ref 42–52)
HCT VFR BLD CALC: 25 % (ref 42–52)
HGB BLD-MCNC: 8.5 G/DL (ref 14–18)
HGB BLD-MCNC: 8.5 G/DL (ref 14–18)
HOROWITZ INDEX BLDA+IHG-RTO: 173 MM[HG]
HOROWITZ INDEX BLDA+IHG-RTO: 196 MM[HG]
INST. QUALIFIED PATIENT IIQPT: YES
INST. QUALIFIED PATIENT IIQPT: YES
MAGNESIUM SERPL-MCNC: 2.3 MG/DL (ref 1.5–2.5)
MCH RBC QN AUTO: 33.3 PG (ref 27–33)
MCHC RBC AUTO-ENTMCNC: 34.6 G/DL (ref 33.7–35.3)
MCV RBC AUTO: 96.5 FL (ref 81.4–97.8)
O2/TOTAL GAS SETTING VFR VENT: 25 %
O2/TOTAL GAS SETTING VFR VENT: 45 %
PCO2 BLDA: 26.6 MMHG (ref 26–37)
PCO2 BLDA: 27.6 MMHG (ref 26–37)
PCO2 TEMP ADJ BLDA: 26.9 MMHG (ref 26–37)
PCO2 TEMP ADJ BLDA: 27.8 MMHG (ref 26–37)
PH BLDA: 7.37 [PH] (ref 7.4–7.5)
PH BLDA: 7.38 [PH] (ref 7.4–7.5)
PH TEMP ADJ BLDA: 7.36 [PH] (ref 7.4–7.5)
PH TEMP ADJ BLDA: 7.38 [PH] (ref 7.4–7.5)
PLATELET # BLD AUTO: 191 K/UL (ref 164–446)
PMV BLD AUTO: 11.1 FL (ref 9–12.9)
PO2 BLDA: 49 MMHG (ref 64–87)
PO2 BLDA: 78 MMHG (ref 64–87)
PO2 TEMP ADJ BLDA: 50 MMHG (ref 64–87)
PO2 TEMP ADJ BLDA: 80 MMHG (ref 64–87)
POTASSIUM BLD-SCNC: 4.3 MMOL/L (ref 3.6–5.5)
POTASSIUM SERPL-SCNC: 3.8 MMOL/L (ref 3.6–5.5)
POTASSIUM SERPL-SCNC: 4.5 MMOL/L (ref 3.6–5.5)
RBC # BLD AUTO: 2.55 M/UL (ref 4.7–6.1)
SAO2 % BLDA: 85 % (ref 93–99)
SAO2 % BLDA: 95 % (ref 93–99)
SODIUM BLD-SCNC: 139 MMOL/L (ref 135–145)
SODIUM SERPL-SCNC: 137 MMOL/L (ref 135–145)
SPECIMEN DRAWN FROM PATIENT: ABNORMAL
SPECIMEN DRAWN FROM PATIENT: ABNORMAL
WBC # BLD AUTO: 12.1 K/UL (ref 4.8–10.8)

## 2020-09-26 PROCEDURE — 700102 HCHG RX REV CODE 250 W/ 637 OVERRIDE(OP): Performed by: CLINICAL NURSE SPECIALIST

## 2020-09-26 PROCEDURE — 86923 COMPATIBILITY TEST ELECTRIC: CPT

## 2020-09-26 PROCEDURE — 84132 ASSAY OF SERUM POTASSIUM: CPT | Mod: 91

## 2020-09-26 PROCEDURE — 37799 UNLISTED PX VASCULAR SURGERY: CPT

## 2020-09-26 PROCEDURE — 84295 ASSAY OF SERUM SODIUM: CPT

## 2020-09-26 PROCEDURE — 700105 HCHG RX REV CODE 258: Performed by: NURSE PRACTITIONER

## 2020-09-26 PROCEDURE — 82330 ASSAY OF CALCIUM: CPT

## 2020-09-26 PROCEDURE — 71045 X-RAY EXAM CHEST 1 VIEW: CPT

## 2020-09-26 PROCEDURE — 82962 GLUCOSE BLOOD TEST: CPT

## 2020-09-26 PROCEDURE — 93005 ELECTROCARDIOGRAM TRACING: CPT | Performed by: CLINICAL NURSE SPECIALIST

## 2020-09-26 PROCEDURE — 700111 HCHG RX REV CODE 636 W/ 250 OVERRIDE (IP): Performed by: NURSE PRACTITIONER

## 2020-09-26 PROCEDURE — A9270 NON-COVERED ITEM OR SERVICE: HCPCS | Performed by: NURSE PRACTITIONER

## 2020-09-26 PROCEDURE — 94669 MECHANICAL CHEST WALL OSCILL: CPT

## 2020-09-26 PROCEDURE — 36430 TRANSFUSION BLD/BLD COMPNT: CPT

## 2020-09-26 PROCEDURE — 700101 HCHG RX REV CODE 250: Performed by: CLINICAL NURSE SPECIALIST

## 2020-09-26 PROCEDURE — 700102 HCHG RX REV CODE 250 W/ 637 OVERRIDE(OP): Performed by: NURSE PRACTITIONER

## 2020-09-26 PROCEDURE — A9270 NON-COVERED ITEM OR SERVICE: HCPCS | Performed by: CLINICAL NURSE SPECIALIST

## 2020-09-26 PROCEDURE — 85027 COMPLETE CBC AUTOMATED: CPT

## 2020-09-26 PROCEDURE — 51798 US URINE CAPACITY MEASURE: CPT

## 2020-09-26 PROCEDURE — 93010 ELECTROCARDIOGRAM REPORT: CPT | Performed by: INTERNAL MEDICINE

## 2020-09-26 PROCEDURE — 80048 BASIC METABOLIC PNL TOTAL CA: CPT

## 2020-09-26 PROCEDURE — 99024 POSTOP FOLLOW-UP VISIT: CPT | Performed by: THORACIC SURGERY (CARDIOTHORACIC VASCULAR SURGERY)

## 2020-09-26 PROCEDURE — 700111 HCHG RX REV CODE 636 W/ 250 OVERRIDE (IP): Performed by: CLINICAL NURSE SPECIALIST

## 2020-09-26 PROCEDURE — 83735 ASSAY OF MAGNESIUM: CPT

## 2020-09-26 PROCEDURE — 82803 BLOOD GASES ANY COMBINATION: CPT

## 2020-09-26 PROCEDURE — 85014 HEMATOCRIT: CPT

## 2020-09-26 PROCEDURE — 770020 HCHG ROOM/CARE - TELE (206)

## 2020-09-26 PROCEDURE — P9016 RBC LEUKOCYTES REDUCED: HCPCS

## 2020-09-26 PROCEDURE — 99291 CRITICAL CARE FIRST HOUR: CPT | Performed by: INTERNAL MEDICINE

## 2020-09-26 RX ORDER — HYDROCODONE BITARTRATE AND ACETAMINOPHEN 10; 325 MG/1; MG/1
.5-1 TABLET ORAL EVERY 6 HOURS PRN
Status: DISCONTINUED | OUTPATIENT
Start: 2020-09-26 | End: 2020-09-26

## 2020-09-26 RX ORDER — FUROSEMIDE 10 MG/ML
40 INJECTION INTRAMUSCULAR; INTRAVENOUS DAILY
Status: DISCONTINUED | OUTPATIENT
Start: 2020-09-26 | End: 2020-10-02

## 2020-09-26 RX ORDER — SODIUM CHLORIDE 9 MG/ML
INJECTION, SOLUTION INTRAVENOUS
Status: ACTIVE
Start: 2020-09-26 | End: 2020-09-26

## 2020-09-26 RX ORDER — OXYCODONE HYDROCHLORIDE 5 MG/1
5 TABLET ORAL
Status: DISCONTINUED | OUTPATIENT
Start: 2020-09-26 | End: 2020-09-26

## 2020-09-26 RX ORDER — LISINOPRIL 5 MG/1
5 TABLET ORAL DAILY
Status: DISCONTINUED | OUTPATIENT
Start: 2020-09-27 | End: 2020-09-27

## 2020-09-26 RX ORDER — POTASSIUM CHLORIDE 20 MEQ/1
20 TABLET, EXTENDED RELEASE ORAL DAILY
Status: DISCONTINUED | OUTPATIENT
Start: 2020-09-26 | End: 2020-09-30

## 2020-09-26 RX ORDER — ACETAMINOPHEN 500 MG
1000 TABLET ORAL EVERY 6 HOURS
Status: DISCONTINUED | OUTPATIENT
Start: 2020-09-26 | End: 2020-09-30

## 2020-09-26 RX ORDER — POTASSIUM CHLORIDE 7.45 MG/ML
10 INJECTION INTRAVENOUS ONCE
Status: COMPLETED | OUTPATIENT
Start: 2020-09-26 | End: 2020-09-26

## 2020-09-26 RX ORDER — HYDROCODONE BITARTRATE AND ACETAMINOPHEN 10; 325 MG/1; MG/1
1 TABLET ORAL EVERY 6 HOURS PRN
Status: DISCONTINUED | OUTPATIENT
Start: 2020-09-26 | End: 2020-09-26

## 2020-09-26 RX ORDER — CALCIUM CHLORIDE 100 MG/ML
1 INJECTION INTRAVENOUS; INTRAVENTRICULAR ONCE
Status: DISCONTINUED | OUTPATIENT
Start: 2020-09-26 | End: 2020-09-26

## 2020-09-26 RX ORDER — ACETAMINOPHEN 500 MG
1000 TABLET ORAL EVERY 6 HOURS PRN
Status: DISCONTINUED | OUTPATIENT
Start: 2020-09-26 | End: 2020-09-26

## 2020-09-26 RX ADMIN — ASPIRIN 81 MG: 81 TABLET, COATED ORAL at 05:44

## 2020-09-26 RX ADMIN — OXYCODONE 5 MG: 5 TABLET ORAL at 04:36

## 2020-09-26 RX ADMIN — ACETAMINOPHEN 1000 MG: 500 TABLET ORAL at 05:44

## 2020-09-26 RX ADMIN — DOCUSATE SODIUM 50 MG AND SENNOSIDES 8.6 MG 2 TABLET: 8.6; 5 TABLET, FILM COATED ORAL at 05:45

## 2020-09-26 RX ADMIN — GABAPENTIN 300 MG: 300 CAPSULE ORAL at 05:45

## 2020-09-26 RX ADMIN — CALCIUM CHLORIDE 1000 MG: 100 INJECTION, SOLUTION INTRAVENOUS at 12:31

## 2020-09-26 RX ADMIN — ENOXAPARIN SODIUM 40 MG: 40 INJECTION SUBCUTANEOUS at 18:21

## 2020-09-26 RX ADMIN — ALUMINUM HYDROXIDE, MAGNESIUM HYDROXIDE, AND DIMETHICONE 30 ML: 400; 400; 40 SUSPENSION ORAL at 06:46

## 2020-09-26 RX ADMIN — MUPIROCIN 1 APPLICATION: 20 OINTMENT TOPICAL at 18:21

## 2020-09-26 RX ADMIN — INSULIN HUMAN 6 UNITS: 100 INJECTION, SUSPENSION SUBCUTANEOUS at 22:15

## 2020-09-26 RX ADMIN — TRAMADOL HYDROCHLORIDE 50 MG: 50 TABLET, FILM COATED ORAL at 12:51

## 2020-09-26 RX ADMIN — POLYETHYLENE GLYCOL 3350 1 PACKET: 17 POWDER, FOR SOLUTION ORAL at 05:44

## 2020-09-26 RX ADMIN — MUPIROCIN 1 APPLICATION: 20 OINTMENT TOPICAL at 06:04

## 2020-09-26 RX ADMIN — POTASSIUM CHLORIDE 20 MEQ: 1500 TABLET, EXTENDED RELEASE ORAL at 06:04

## 2020-09-26 RX ADMIN — ACETAMINOPHEN 1000 MG: 500 TABLET ORAL at 00:08

## 2020-09-26 RX ADMIN — OMEPRAZOLE 20 MG: 20 CAPSULE, DELAYED RELEASE ORAL at 05:45

## 2020-09-26 RX ADMIN — DOCUSATE SODIUM 50 MG AND SENNOSIDES 8.6 MG 2 TABLET: 8.6; 5 TABLET, FILM COATED ORAL at 18:00

## 2020-09-26 RX ADMIN — INSULIN HUMAN 6 UNITS: 100 INJECTION, SUSPENSION SUBCUTANEOUS at 10:34

## 2020-09-26 RX ADMIN — MAGNESIUM SULFATE 1 G: 1 INJECTION INTRAVENOUS at 05:45

## 2020-09-26 RX ADMIN — FUROSEMIDE 40 MG: 10 INJECTION, SOLUTION INTRAMUSCULAR; INTRAVENOUS at 06:04

## 2020-09-26 RX ADMIN — POTASSIUM CHLORIDE 10 MEQ: 7.46 INJECTION, SOLUTION INTRAVENOUS at 06:04

## 2020-09-26 RX ADMIN — POTASSIUM CHLORIDE 10 MEQ: 7.46 INJECTION, SOLUTION INTRAVENOUS at 01:06

## 2020-09-26 RX ADMIN — CARVEDILOL 3.12 MG: 3.12 TABLET, FILM COATED ORAL at 18:19

## 2020-09-26 RX ADMIN — GABAPENTIN 300 MG: 300 CAPSULE ORAL at 18:18

## 2020-09-26 RX ADMIN — ROSUVASTATIN CALCIUM 20 MG: 10 TABLET, FILM COATED ORAL at 22:09

## 2020-09-26 RX ADMIN — ACETAMINOPHEN 1000 MG: 500 TABLET ORAL at 22:52

## 2020-09-26 RX ADMIN — CLOPIDOGREL BISULFATE 75 MG: 75 TABLET ORAL at 05:44

## 2020-09-26 RX ADMIN — ACETAMINOPHEN 1000 MG: 500 TABLET ORAL at 18:19

## 2020-09-26 ASSESSMENT — ENCOUNTER SYMPTOMS
SHORTNESS OF BREATH: 1
VOMITING: 0
RESPIRATORY NEGATIVE: 1
EYES NEGATIVE: 1
ABDOMINAL PAIN: 0
DIZZINESS: 0
NEUROLOGICAL NEGATIVE: 1
STRIDOR: 0
SPUTUM PRODUCTION: 0
BLURRED VISION: 0
FOCAL WEAKNESS: 0
CARDIOVASCULAR NEGATIVE: 1
PSYCHIATRIC NEGATIVE: 1
NAUSEA: 0
MUSCULOSKELETAL NEGATIVE: 1
COUGH: 0
CHILLS: 0
FEVER: 0
MYALGIAS: 1
GASTROINTESTINAL NEGATIVE: 1
SENSORY CHANGE: 0

## 2020-09-26 ASSESSMENT — PAIN DESCRIPTION - PAIN TYPE
TYPE: SURGICAL PAIN

## 2020-09-26 ASSESSMENT — FIBROSIS 4 INDEX: FIB4 SCORE: 1.21

## 2020-09-26 NOTE — PROGRESS NOTES
1700- Patient chest tube output 370 mL in one hour. Christi GAYTAN, updated. Orders received for 1 mg Factor VII and Dr. Chicas to come to bedside.    1745- Dr. Chicas at bedside. Updated on iStat H&H- Hgb 8.2 and Hct 24. Orders received for 1 unit RBC's.    1830- Dr. Chicas at bedside. Chest tube had multiple clots. Dr. Chicas suctioned chest tubes.     1845- Patient's leg began to look mottled. Bilateral legs had 1+ pedal pulses, warm, and patient able to feel and wiggle toes. Dr. Gonda at bedside assessing patient. Christi GAYTAN, paged.    1900- Christi GAYTAN, paged back and updated on patient's legs. Will continue to monitor bilateral legs. Night shift nurse aware. APN gave orders okay to extubate patient once parameters have been met. APN verified plasmalyte order is  2 L total.

## 2020-09-26 NOTE — CARE PLAN
Problem: Post Op Day 1 CABG/Heart Valve Replacement  Goal: Optimal care of the post op CABG/heart valve replacement Post Op Day 1  Outcome: PROGRESSING AS EXPECTED  Intervention: Up in chair for all meals  Note: Patient up in chair for breakfast, lunch, and dinner.  Intervention: Ambulate in am if stable. First ambulation is 25 feet. Repeat x 3 as tolerated.  Ambulate again before bed.  Note: Patient only able to stand and march in place x3.  Intervention: Discontinue gomez catheter unless documented reason for continuation  Note: Gomez catheter discontinued per orders.  Intervention: Remove original surgical dressing after 24 hrs, leave open to air unless otherwise specified by physician  Note: ACE wrap removed from EVH site. Prevena in place on midline incision.  Intervention: Graduated elastic stockings  Note: JOSE hose placed on patient.  Intervention: Saline lock IV  Note: Saline lock IV.  Intervention: Transfer to tele status, begin VS q 4 hours  Note: Patient transferred to U-Tele status. Q 4hr VS.  Intervention: After 24th hour post-anesthesia end time, transition patient to Cardiac Surgery SQ Insulin Protocol  Note: Cardiac SQ Insulin Protocol initiated.

## 2020-09-26 NOTE — PROGRESS NOTES
At midnight assessment, patient noted to be more lethargic, upper extremities very rigid, decreased movement in lower extremities from previous assessment. Pt oriented x4, but falling back to sleep between questions. ABG obtained, pa02 low at 49. Updated LUKAS Conway on  hemodynamics and neuro status. Updated that pt had received pain medication at 2040. Plan to recheck ABG in one hour. New orders received.

## 2020-09-26 NOTE — PROGRESS NOTES
"Critical Care Progress Note    Date of admission  9/21/2020    Chief Complaint  49 y.o. male admitted 9/21/2020 with ACS    Hospital Course    \"49 y.o. male who presented 9/21/2020 with a past medical history significant for type 2 diabetes, hypertension and left ventricular hypertrophy who presented to the emergency department on 9/21 complaining of left-sided chest pain.  He was found to have acute coronary syndrome and was started on a heparin drip and admitted to the hospital with a cardiology consultation.  He underwent cardiac catheterization on 9/22 finding multivessel coronary arterial disease with severe ISR previously placed RCA stents and an ejection fraction of 55%.  Cardiothoracic surgery was consulted and took him to the operating room today for an uncomplicated three-vessel CABG.  He received 290 mL of Cell Saver and had a good urine output throughout the case.  He did not require any pacing and came off the pump without difficulty.  His ejection fraction was between 50 and 60% per anesthesia.  He initially required an epinephrine drip which is currently off and he is on Cleviprex at 2, dexmedetomidine at 0.5, insulin at 4 and on full mechanical ventilatory support upon arrival to the ICU.\"      Interval Problem Update  Reviewed last 24 hour events:   - Extubate on POD#0   - Patient refusing to ambulate or do IS   - Neuro: AOx4   - HR: 70s-80s   - SBP: 90s-100s   - GI: beginning to eat today   - UOP: 2.6L/24 hrs   - Quispe: yes   - Tm: 37.6   - Lines: CVC, Art   - PPx: GI PPI, DVT SCDs   -  mL, 4L NC   - CXR (personally reviewed and compared to prior): no new    Review of Systems  Review of Systems   Constitutional: Positive for malaise/fatigue. Negative for chills and fever.   Eyes: Negative for blurred vision.   Respiratory: Positive for shortness of breath. Negative for cough, sputum production and stridor.    Cardiovascular: Positive for chest pain.   Gastrointestinal: Negative for abdominal " pain, nausea and vomiting.   Genitourinary: Negative for dysuria.   Musculoskeletal: Positive for myalgias (left leg).   Skin: Negative for rash.   Neurological: Negative for dizziness, sensory change and focal weakness.        Vital Signs for last 24 hours   Temp:  [36 °C (96.8 °F)-37.5 °C (99.5 °F)] 37 °C (98.6 °F)  Pulse:  [] 80  Resp:  [13-31] 19  BP: ()/(47-82) 90/47  SpO2:  [89 %-100 %] 97 %    Hemodynamic parameters for last 24 hours  CVP:  [0 MM HG-300 MM HG] 10 MM HG    Respiratory Information for the last 24 hours  Vent Mode: ASV  PEEP/CPAP: 8  MAP: 16  Control VTE (exp VT): 470    Physical Exam   Physical Exam  Vitals signs and nursing note reviewed.   Constitutional:       General: He is in acute distress.      Appearance: He is normal weight. He is not toxic-appearing.      Interventions: He is sedated and intubated.   HENT:      Head: Normocephalic and atraumatic.      Right Ear: External ear normal.      Left Ear: External ear normal.      Nose: Nose normal. No congestion.      Mouth/Throat:      Mouth: Mucous membranes are moist.      Pharynx: Oropharynx is clear.   Eyes:      General: No scleral icterus.     Conjunctiva/sclera: Conjunctivae normal.      Pupils: Pupils are equal, round, and reactive to light.      Comments: Pupils are 2 mm and reactive bilaterally   Neck:      Musculoskeletal: Neck supple. No neck rigidity.   Cardiovascular:      Rate and Rhythm: Regular rhythm. Bradycardia present. Occasional extrasystoles are present.     Chest Wall: PMI is displaced.      Pulses: Normal pulses.      Heart sounds: Heart sounds are distant. No murmur.      Comments: Mediastinal incision site is clean/dry/intact with dressing, mediastinal chest tubes x2, no epicardial pacing wires, left subclavian central venous catheter with mild hematoma  Pulmonary:      Effort: No tachypnea or accessory muscle usage. He is intubated.      Breath sounds: Examination of the right-lower field reveals  rales. Examination of the left-lower field reveals rales. Rales present. No decreased breath sounds, wheezing or rhonchi.   Abdominal:      General: Bowel sounds are normal. There is no distension.      Palpations: Abdomen is soft.      Tenderness: There is no abdominal tenderness. There is no guarding.   Genitourinary:     Comments: Quispe catheter in place  Musculoskeletal:         General: No tenderness.      Right lower leg: No edema.      Left lower leg: No edema.      Comments: Right lower extremity dressing is clean/dry/intact from harvest site   Skin:     General: Skin is warm and dry.      Capillary Refill: Capillary refill takes less than 2 seconds.      Findings: No rash.   Neurological:      General: No focal deficit present.      Cranial Nerves: No cranial nerve deficit.      Sensory: No sensory deficit.      Motor: No weakness.   Psychiatric:         Mood and Affect: Affect is blunt.         Cognition and Memory: Cognition and memory normal.         Medications  Current Facility-Administered Medications   Medication Dose Route Frequency Provider Last Rate Last Dose   • SODIUM CHLORIDE 0.9 % IV SOLN            • [START ON 9/27/2020] lisinopril (PRINIVIL) tablet 5 mg  5 mg Oral DAILY Christi Conway, A.P.N.       • potassium chloride SA (Kdur) tablet 20 mEq  20 mEq Oral DAILY Christi Conway A.P.N.   20 mEq at 09/26/20 0604   • furosemide (LASIX) injection 40 mg  40 mg Intravenous DAILY Christi Conway, A.P.N.   40 mg at 09/26/20 0604   • insulin lispro (HumaLOG) injection  2 Units Subcutaneous PRN Christi Conway A.P.N.       • insulin NPH (HumuLIN N,NovoLIN N) injection  6 Units Subcutaneous Q8HRS Christi Conway, A.P.N.        And   • insulin lispro (HumaLOG) injection  4 Units Subcutaneous TID AC Christi Conway, A.P.N.       • insulin lispro (HumaLOG) injection  0-15 Units Subcutaneous ACHS & 0200 Christi Conway, A.P.N.       • HYDROcodone/acetaminophen (NORCO)  MG per tablet 1 Tab  1 Tab  Oral Q6HRS PRN Christi Conway A.P.N.       • calcium CHLORIDE injection 1 g  1 g Intravenous Once Christi Conway A.P.N.       • carvedilol (COREG) tablet 3.125 mg  3.125 mg Oral BID WITH MEALS Maia Chavira A.P.N.   Stopped at 09/26/20 0730   • Respiratory Therapy Consult   Nebulization Continuous RT Maia Chavira A.P.N.       • NS infusion   Intravenous Continuous Maia Chavira A.P.N. 10 mL/hr at 09/25/20 1430     • calcium CHLORIDE 1,000 mg in  mL IVPB  1,000 mg Intravenous Once PRN Maia Chavira A.P.N.       • magnesium sulfate in D5W IVPB premix 1 g  1 g Intravenous DAILY Maia Chavira A.P.N.   Stopped at 09/26/20 0645   • Pharmacy Consult Request ...Pain Management Review 1 Each  1 Each Other PHARMACY TO DOSE Maia Chavira A.P.N.       • gabapentin (NEURONTIN) capsule 300 mg  300 mg Oral BID Maia Chavira A.P.N.   300 mg at 09/26/20 0545    Followed by   • [START ON 10/1/2020] gabapentin (NEURONTIN) capsule 100 mg  100 mg Oral BID Maia Chavira A.P.N.       • senna-docusate (PERICOLACE or SENOKOT S) 8.6-50 MG per tablet 2 Tab  2 Tab Oral BID Maia Chavira A.P.N.   2 Tab at 09/26/20 0545    And   • polyethylene glycol/lytes (MIRALAX) PACKET 1 Packet  1 Packet Oral DAILY Maia Chavira A.P.N.   1 Packet at 09/26/20 0544    And   • [START ON 9/27/2020] magnesium hydroxide (MILK OF MAGNESIA) suspension 30 mL  30 mL Oral DAILY Maia Chavira A.P.N.        And   • bisacodyl (DULCOLAX) suppository 10 mg  10 mg Rectal QDAY PRN Maia Chavira A.P.N.       • omeprazole (PRILOSEC) capsule 20 mg  20 mg Oral DAILY Maia Chavira A.P.N.   20 mg at 09/26/20 0545   • enoxaparin (LOVENOX) inj 40 mg  40 mg Subcutaneous DAILY AT 1800 Maia Chavira, A.P.N.       • mupirocin (BACTROBAN) 2 % ointment 1 Application  1 Application Topical BID Maia Chavira, A.P.N.   1 Application at 09/26/20 0604   • aspirin EC (ECOTRIN) tablet 81 mg  81 mg Oral DAILY Maia Chavira A.P.N.   81 mg at 09/26/20 0544    • clopidogrel (PLAVIX) tablet 75 mg  75 mg Oral DAILY Maia Chavira, A.P.N.   75 mg at 09/26/20 0544   • rosuvastatin (CRESTOR) tablet 20 mg  20 mg Oral QHS Maia Chavira, A.P.N.   20 mg at 09/25/20 2040   • clevidipine (CLEVIPREX) IV emulsion  1-21 mg/hr Intravenous Continuous Maia Chavira, A.P.N.   Stopped at 09/26/20 0514   • nitroglycerin 50 mg in D5W 250 ml infusion  0-100 mcg/min Intravenous Continuous Maia Chavira, A.P.N.   Stopped at 09/25/20 1430   • tramadol (ULTRAM) 50 MG tablet 50 mg  50 mg Oral Q4HRS PRN Maia Chavira, A.P.N.       • ondansetron (ZOFRAN) syringe/vial injection 8 mg  8 mg Intravenous Q6HRS PRN Maia Chavira, A.P.N.        Or   • prochlorperazine (COMPAZINE) injection 10 mg  10 mg Intravenous Q6HRS PRN Maia Chavira, A.P.N.        Or   • promethazine (PHENERGAN) suppository 25 mg  25 mg Rectal Q6HRS PRN Maia Chavira, A.P.N.       • acetaminophen (TYLENOL) tablet 650 mg  650 mg Oral Q4HRS PRN Maia  Cait, A.P.N.        Or   • acetaminophen (TYLENOL) suppository 650 mg  650 mg Rectal Q4HRS PRN Maia Chavira, A.P.N.       • mag hydrox-al hydrox-simeth (MAALOX PLUS ES or MYLANTA DS) suspension 30 mL  30 mL Oral Q4HRS PRN Maia Chavira, A.P.N.   30 mL at 09/26/20 0646   • diphenhydrAMINE (BENADRYL) tablet/capsule 25 mg  25 mg Oral HS PRN - MR X 1 Maia Chavira, A.P.N.       • insulin regular human (HUMULIN/NOVOLIN R) 62.5 Units in  mL infusion per protocol  1-6 Units/hr Intravenous Continuous Maia Chavira, A.P.N. 9 mL/hr at 09/26/20 0924 2.25 Units/hr at 09/26/20 0924    And   • insulin regular (HumuLIN R,NovoLIN R) injection  0-14 Units Intravenous Once Maia Chavira, A.P.N.   Stopped at 09/25/20 1500    And   • insulin regular (HumuLIN R,NovoLIN R) injection  0-10 Units Intravenous PRN Maia Chavira, A.P.N.   1 Units at 09/26/20 0826    And   • dextrose 50% (D50W) injection 50 mL  50 mL Intravenous PRN Maia Chavira, A.P.N.       • EPINEPHrine (Adrenalin)  infusion 4 mg/250 mL (premix)  0-0.2 mcg/kg/min Intravenous Continuous Michael Chicas M.D.   Stopped at 09/25/20 1904   • norepinephrine (Levophed) infusion 8 mg/250 mL (premix)  0-30 mcg/min Intravenous Continuous Michael Chicas M.D.   Stopped at 09/25/20 1500       Fluids    Intake/Output Summary (Last 24 hours) at 9/26/2020 0944  Last data filed at 9/26/2020 0824  Gross per 24 hour   Intake 5609.13 ml   Output 4727 ml   Net 882.13 ml       Laboratory  Recent Labs     09/25/20  1901 09/26/20  0036 09/26/20  0206   ISTATAPH 7.347* 7.381* 7.365*   ISTATAPCO2 36.3 27.6 26.6   ISTATAPO2 96* 49* 78   ISTATATCO2 21 17* 16*   QOARWAR0HYH 97 85* 95   ISTATARTHCO3 19.9 16.3* 15.2*   ISTATARTBE -5* -8* -9*   ISTATTEMP 36.8 C 37.2 C 37.3 C   ISTATFIO2 40 25 45   ISTATSPEC Arterial Arterial Arterial   ISTATAPHTC 7.350* 7.378* 7.360*   DZWGYFVE3LT 95* 50* 80         Recent Labs     09/24/20 1656 09/25/20 0555 09/25/20  1421  09/25/20  2302 09/26/20  0039 09/26/20  0517   SODIUM 139 137  --   --   --  137  --    POTASSIUM 3.7 3.6 3.9   < > 4.0 4.5 3.8   CHLORIDE 101 101  --   --   --  104  --    CO2 22 19*  --   --   --  16*  --    BUN 15 23*  --   --   --  17  --    CREATININE 1.02 1.00  --   --   --  1.06  --    MAGNESIUM  --  1.9 2.9*  --   --  2.3  --    CALCIUM 9.6 9.4  --   --   --  7.7*  --     < > = values in this interval not displayed.     Recent Labs     09/24/20 1656 09/25/20  0555 09/26/20  0039   ALTSGPT 19  --   --    ASTSGOT 21  --   --    ALKPHOSPHAT 90  --   --    TBILIRUBIN 0.4  --   --    GLUCOSE 159* 157* 130*     Recent Labs     09/24/20  1656 09/25/20  0555 09/25/20  2151 09/26/20  0206   WBC 7.2 9.1 12.7* 12.1*   NEUTSPOLYS 48.90 51.00  --   --    LYMPHOCYTES 32.60 31.20  --   --    MONOCYTES 9.30 7.60  --   --    EOSINOPHILS 7.50* 8.60*  --   --    BASOPHILS 1.40 1.30  --   --    ASTSGOT 21  --   --   --    ALTSGPT 19  --   --   --    ALKPHOSPHAT 90  --   --   --    TBILIRUBIN 0.4  --   --   --       Recent Labs     09/24/20  1656 09/25/20  0555 09/25/20  1421 09/25/20  2151 09/26/20  0206   RBC 4.03* 4.00*  --  2.61* 2.55*   HEMOGLOBIN 13.3* 13.1*  --  8.7* 8.5*   HEMATOCRIT 38.6* 37.9*  --  25.5* 24.6*   PLATELETCT 203 209 125* 231 191   PROTHROMBTM 13.3  --  17.0*  --   --    APTT 28.8  --  31.0  --   --    INR 0.98  --  1.34*  --   --        Imaging  X-Ray:  I have personally reviewed the images and compared with prior images.    Assessment/Plan  * CAD (coronary artery disease)- (present on admission)  Assessment & Plan  Status post three-vessel CABG 9/25  Routine postoperative management, monitor chest tube output  Tight blood pressure and glucose control  Restart ACE-I  Antiplatelets, statin  PRN analgesics    Encounter for weaning from ventilator ([facility])  Assessment & Plan  Intubated in OR 9/25, weaned on the same day  RT/O2 Protocols  Titrate supplemental FiO2 to maintain SpO2 >92%  IS, ambulation    NSTEMI (non-ST elevated myocardial infarction) ([facility])- (present on admission)  Assessment & Plan  Status post PCI finding multivessel disease now status post CABG  Antiplatelets, statin    Essential hypertension- (present on admission)  Assessment & Plan  Continue to titrate Cleviprex drip for now  Resume Coreg, ACE inhibitor    Type 2 diabetes mellitus with complication, without long-term current use of insulin ([facility])- (present on admission)  Assessment & Plan  Goal blood glucose 120-180  NPH, sliding scale insulin, accuchecks  hypoglycemia protocol    SONNY (acute kidney injury) ([facility])- (present on admission)  Assessment & Plan  Improving  Monitor creatinine, urine output, electrolytes closely  Avoid nephrotoxins       VTE:  when Ok with surgery  Ulcer: Not Indicated  Lines: Central Line  Ongoing indication addressed and Quispe Catheter  Ongoing indication addressed    I have performed a physical exam and reviewed and updated ROS and Plan today (9/26/2020). In review of yesterday's note (9/25/2020), there are  no changes except as documented above.     Discussed patient condition and risk of morbidity and/or mortality with Family, RN, RT, Pharmacy, Charge nurse / hot rounds, Patient and CVS     The patient remains critically ill.  Critical care time = 31 minutes in directly providing and coordinating critical care and extensive data review.  No time overlap and excludes procedures.

## 2020-09-26 NOTE — CARE PLAN
Problem: Day of surgery post CABG/Heart valve replacement  Goal: Stabilization in immediate post op period  Outcome: PROGRESSING AS EXPECTED  Intervention: VS q 15 min x 4 hours, then q 1 hour. Include temperature immediately upon arrival. Check CO/CI q 2-4 hours and PRN  Note: Q 15 min VS. Temperature 36.1 on arrival to unit.  Intervention: If radial artery used, elevate arm, no BP checks or needle sticks from affected arm, monitor ulnar pulse and capillary refill  Note: No BP or needle sticks on affected art line arm  Intervention: First post op hour labs and diagnostics per MD order  Note: Post op labs completed  Intervention: Serum K q 6 hours x 24 hours.  ABG and CBC prn.  Note: K scale completed Q 6 and replaced per protocol.  Intervention: For FSBS greater than 130, start Post Cardiac Surgery Insulin Drip Protocol  Note: Post cardiac surgery insulin drip protocol initiated  Intervention: FSBS frequency as per Cardiac Surgery Insulin Drip Protocol  Note: Q1 hr FSBS  Intervention: Chest tube to 20 cm suction, record CT drainage with VS  Note: CT to suction. CT drainage recorded  Intervention: For CT drainage > 300 cc in first post op hour and/or 150 cc in subsequent hours: platelets, coag screen, fibrinogen, H&H per order  Note: CT output greater than 300 mL in first hour. APN updated.   Intervention: VAP protocol in place  Note: VAP protocol in place

## 2020-09-26 NOTE — PROGRESS NOTES
Cardiac surgery NP Blanca paged regarding patient's drowsiness this AM after 5mg Oxycodone given, numbness and tingling in right leg, and inability to ambulate. Per PA will DC Ace wrap to right leg and leave open to air.

## 2020-09-26 NOTE — ANESTHESIA POSTPROCEDURE EVALUATION
Patient: Guerrero Ann    Procedure Summary     Date: 09/25/20 Room / Location: [facility] OR 03 / SURGERY [facility]    Anesthesia Start: 1017 Anesthesia Stop: 1429    Procedures:       CABG, WITH ENDOSCOPIC VEIN PROCUREMENT- X3 (Chest)      ECHOCARDIOGRAM, TRANSESOPHAGEAL (Mouth) Diagnosis: (3 vessel coronary artery disease )    Surgeon: Michael Chicas M.D. Responsible Provider: Avila Kennedy M.D.    Anesthesia Type: general ASA Status: 4          Final Anesthesia Type: general  Last vitals  BP   Blood Pressure: 102/63, Arterial BP: 104/54    Temp   37.5 °C (99.5 °F)    Pulse   Pulse: 86   Resp   (!) 23    SpO2   100 %      Anesthesia Post Evaluation    Patient location during evaluation: ICU  Patient participation: complete - patient participated  Level of consciousness: awake and alert    Airway patency: patent  Anesthetic complications: no  Cardiovascular status: hemodynamically stable  Respiratory status: acceptable  Hydration status: euvolemic    PONV: none           Nurse Pain Score: 4 (NPRS)

## 2020-09-26 NOTE — CARE PLAN
Problem: Day of surgery post CABG/Heart valve replacement  Goal: Stabilization in immediate post op period  Outcome: PROGRESSING SLOWER THAN EXPECTED  Intervention: VS q 15 min x 4 hours, then q 1 hour. Include temperature immediately upon arrival. Check CO/CI q 2-4 hours and PRN  Note: VS taken per protocol  Intervention: If radial artery used, elevate arm, no BP checks or needle sticks from affected arm, monitor ulnar pulse and capillary refill  Note: N/A   Intervention: Serum K q 6 hours x 24 hours.  ABG and CBC prn.  Note: K scale in place. PRN ABG and CBC utilized   Intervention: For FSBS greater than 130, start Post Cardiac Surgery Insulin Drip Protocol  Note: Insulin gtt per protocol   Intervention: FSBS frequency as per Cardiac Surgery Insulin Drip Protocol  Note: Taken per protocol   Intervention: Chest tube to 20 cm suction, record CT drainage with VS  Note: Total 1460 from chest tubes, drainage within parameters  Intervention: For CT drainage > 300 cc in first post op hour and/or 150 cc in subsequent hours: platelets, coag screen, fibrinogen, H&H per order  Note: N/A, bleeding resolved   Intervention: Titrate and wean off vasoactive drips per patient's condition and per MD order while maintaining SBP  mmHg per MD order  Note: Weaned off vasoactive drips   Intervention: VAP protocol in place  Note: Extubated per protocol   Intervention: Wean from vent per protocol (see protocol), extubation goal with 2-6 hours post op.  Note: Extubated before six hour eloy   Intervention: IS q 1 hour while awake post extubation  Note: 500-1000  Intervention: Bedrest until extubated and groin lines out  Note: No groin lines, edge of bed, up to cardiac chair   Intervention: Out of bed, dangle 4 hours post extubation  Note: Dangled two hours post extubation   Intervention: Up in chair 4 hours, day of extubation  Note: Up to cardiac chair   Intervention: Maintain all original surgical dressings for 24 hours  Note: ACE  wrap removed from leg, OK per APRN. Prevena in place   Intervention: Clear liquids post extubation, advance as tolerated  Note: Diet ordered   Intervention: Discontinue [place] aicha and arterial line 12-18 hours post op if hemodynamically stable and off vasoactive drips  Note: Not yet tele status   Intervention: A-Fib and DVT prophylaxis per MD order or contraindications documented (refer to DVT/VTE problem on Care Plan)  Note: DVT prophylaxis in place   Intervention: Amiodarone protocol per MD order  Note: N/A

## 2020-09-26 NOTE — PROGRESS NOTES
"Patient is requesting to not have an opiates because he stated he \"was addicted.\" Christi GAYTAN, updated. Orders to d/c narcotics ([place]) and add scheduled Tylenol 1000 mg Q6 hrs.  "

## 2020-09-26 NOTE — CARE PLAN
Problem: Post Op Day 1 CABG/Heart Valve Replacement  Goal: Optimal care of the post op CABG/heart valve replacement Post Op Day 1  Outcome: PROGRESSING AS EXPECTED  Intervention: EKG and CXR completed  Note: AM CXR and EKG complete  Intervention: Daily Weights  Note: Weighed with bed scale as patient was unable to tolerate ambulation to scale.   Intervention: IS q 1 hour while awake and record best IS volume  Note: Instructions and teaching given with IS. Patient pulls 500-1000. Encouragement provided.   Intervention: Graduated elastic stockings  Note: JOSE hose applied.   Intervention: Saline lock IV  Note: Patient has bilateral IVS.   Intervention: If patient is CABG or on home beta-blocker, start Beta-Blocker on POD 1 or POD 2 or contraindication documented  Note: Coreg scheduled at breakfast.

## 2020-09-26 NOTE — PROGRESS NOTES
Pt in normal sinus rhythm, HR 70-90. SBP , off epi gtt. Pt following commands, moving all extremities.     RR: 20  Pinsp:5  PEEP:8  Fi02: 40%  NIF:-34  VC:1000  pH: 7.35  Sp02: 100  PAC02: 36      Total Intubation time: 5 hours 36 minutes  Extubated per protocol to 4L NC at 1950

## 2020-09-26 NOTE — PROGRESS NOTES
Monitor Summary-    Rhythm: SR  Rate: 70-80's  Ectopy: Occasional PVC's  .16/.10/.38    No pacer wires.        12 hour chart check

## 2020-09-27 ENCOUNTER — APPOINTMENT (OUTPATIENT)
Dept: RADIOLOGY | Facility: MEDICAL CENTER | Age: 50
DRG: 233 | End: 2020-09-27
Attending: INTERNAL MEDICINE
Payer: COMMERCIAL

## 2020-09-27 LAB
AMMONIA PLAS-SCNC: 16 UMOL/L (ref 11–45)
AMPHET UR QL SCN: NEGATIVE
ANION GAP SERPL CALC-SCNC: 13 MMOL/L (ref 7–16)
BARBITURATES UR QL SCN: NEGATIVE
BENZODIAZ UR QL SCN: NEGATIVE
BUN SERPL-MCNC: 19 MG/DL (ref 8–22)
BZE UR QL SCN: NEGATIVE
CALCIUM SERPL-MCNC: 8.5 MG/DL (ref 8.5–10.5)
CANNABINOIDS UR QL SCN: NEGATIVE
CHLORIDE SERPL-SCNC: 101 MMOL/L (ref 96–112)
CO2 SERPL-SCNC: 21 MMOL/L (ref 20–33)
CREAT SERPL-MCNC: 1.17 MG/DL (ref 0.5–1.4)
ERYTHROCYTE [DISTWIDTH] IN BLOOD BY AUTOMATED COUNT: 52.2 FL (ref 35.9–50)
FOLATE SERPL-MCNC: 5.7 NG/ML
GLUCOSE BLD-MCNC: 144 MG/DL (ref 65–99)
GLUCOSE BLD-MCNC: 150 MG/DL (ref 65–99)
GLUCOSE BLD-MCNC: 167 MG/DL (ref 65–99)
GLUCOSE BLD-MCNC: 169 MG/DL (ref 65–99)
GLUCOSE BLD-MCNC: 185 MG/DL (ref 65–99)
GLUCOSE BLD-MCNC: 188 MG/DL (ref 65–99)
GLUCOSE BLD-MCNC: 192 MG/DL (ref 65–99)
GLUCOSE BLD-MCNC: 228 MG/DL (ref 65–99)
GLUCOSE SERPL-MCNC: 179 MG/DL (ref 65–99)
HCT VFR BLD AUTO: 23.6 % (ref 42–52)
HGB BLD-MCNC: 8.2 G/DL (ref 14–18)
HIV 1+2 AB+HIV1 P24 AG SERPL QL IA: NORMAL
MCH RBC QN AUTO: 32.9 PG (ref 27–33)
MCHC RBC AUTO-ENTMCNC: 34.7 G/DL (ref 33.7–35.3)
MCV RBC AUTO: 94.8 FL (ref 81.4–97.8)
METHADONE UR QL SCN: NEGATIVE
OPIATES UR QL SCN: NEGATIVE
OXYCODONE UR QL SCN: POSITIVE
PCP UR QL SCN: NEGATIVE
PLATELET # BLD AUTO: 143 K/UL (ref 164–446)
PMV BLD AUTO: 11.2 FL (ref 9–12.9)
POTASSIUM SERPL-SCNC: 4 MMOL/L (ref 3.6–5.5)
PROPOXYPH UR QL SCN: NEGATIVE
RBC # BLD AUTO: 2.49 M/UL (ref 4.7–6.1)
SODIUM SERPL-SCNC: 135 MMOL/L (ref 135–145)
TREPONEMA PALLIDUM IGG+IGM AB [PRESENCE] IN SERUM OR PLASMA BY IMMUNOASSAY: NORMAL
TSH SERPL DL<=0.005 MIU/L-ACNC: 4.9 UIU/ML (ref 0.38–5.33)
VIT B12 SERPL-MCNC: 539 PG/ML (ref 211–911)
WBC # BLD AUTO: 10.2 K/UL (ref 4.8–10.8)

## 2020-09-27 PROCEDURE — 80048 BASIC METABOLIC PNL TOTAL CA: CPT

## 2020-09-27 PROCEDURE — 70498 CT ANGIOGRAPHY NECK: CPT

## 2020-09-27 PROCEDURE — A9270 NON-COVERED ITEM OR SERVICE: HCPCS | Performed by: NURSE PRACTITIONER

## 2020-09-27 PROCEDURE — 82746 ASSAY OF FOLIC ACID SERUM: CPT

## 2020-09-27 PROCEDURE — 700102 HCHG RX REV CODE 250 W/ 637 OVERRIDE(OP): Performed by: NURSE PRACTITIONER

## 2020-09-27 PROCEDURE — 770020 HCHG ROOM/CARE - TELE (206)

## 2020-09-27 PROCEDURE — 81001 URINALYSIS AUTO W/SCOPE: CPT

## 2020-09-27 PROCEDURE — 82962 GLUCOSE BLOOD TEST: CPT | Mod: 91

## 2020-09-27 PROCEDURE — 84425 ASSAY OF VITAMIN B-1: CPT

## 2020-09-27 PROCEDURE — 94669 MECHANICAL CHEST WALL OSCILL: CPT

## 2020-09-27 PROCEDURE — 51798 US URINE CAPACITY MEASURE: CPT

## 2020-09-27 PROCEDURE — G0475 HIV COMBINATION ASSAY: HCPCS

## 2020-09-27 PROCEDURE — 80307 DRUG TEST PRSMV CHEM ANLYZR: CPT

## 2020-09-27 PROCEDURE — 700102 HCHG RX REV CODE 250 W/ 637 OVERRIDE(OP): Performed by: CLINICAL NURSE SPECIALIST

## 2020-09-27 PROCEDURE — 99291 CRITICAL CARE FIRST HOUR: CPT | Performed by: INTERNAL MEDICINE

## 2020-09-27 PROCEDURE — 82140 ASSAY OF AMMONIA: CPT

## 2020-09-27 PROCEDURE — 70496 CT ANGIOGRAPHY HEAD: CPT

## 2020-09-27 PROCEDURE — 99024 POSTOP FOLLOW-UP VISIT: CPT | Performed by: THORACIC SURGERY (CARDIOTHORACIC VASCULAR SURGERY)

## 2020-09-27 PROCEDURE — 700101 HCHG RX REV CODE 250: Performed by: CLINICAL NURSE SPECIALIST

## 2020-09-27 PROCEDURE — 82607 VITAMIN B-12: CPT

## 2020-09-27 PROCEDURE — 99223 1ST HOSP IP/OBS HIGH 75: CPT | Performed by: PSYCHIATRY & NEUROLOGY

## 2020-09-27 PROCEDURE — A9270 NON-COVERED ITEM OR SERVICE: HCPCS | Performed by: CLINICAL NURSE SPECIALIST

## 2020-09-27 PROCEDURE — 97163 PT EVAL HIGH COMPLEX 45 MIN: CPT

## 2020-09-27 PROCEDURE — 700111 HCHG RX REV CODE 636 W/ 250 OVERRIDE (IP): Performed by: NURSE PRACTITIONER

## 2020-09-27 PROCEDURE — 700117 HCHG RX CONTRAST REV CODE 255: Performed by: INTERNAL MEDICINE

## 2020-09-27 PROCEDURE — 85027 COMPLETE CBC AUTOMATED: CPT

## 2020-09-27 PROCEDURE — 700111 HCHG RX REV CODE 636 W/ 250 OVERRIDE (IP): Performed by: CLINICAL NURSE SPECIALIST

## 2020-09-27 PROCEDURE — 86780 TREPONEMA PALLIDUM: CPT

## 2020-09-27 PROCEDURE — 84443 ASSAY THYROID STIM HORMONE: CPT

## 2020-09-27 RX ORDER — ALLOPURINOL 100 MG/1
100 TABLET ORAL DAILY
Status: DISCONTINUED | OUTPATIENT
Start: 2020-09-27 | End: 2020-09-30

## 2020-09-27 RX ORDER — ATORVASTATIN CALCIUM 80 MG/1
80 TABLET, FILM COATED ORAL
Status: DISCONTINUED | OUTPATIENT
Start: 2020-09-27 | End: 2020-09-30

## 2020-09-27 RX ORDER — BUPROPION HYDROCHLORIDE 150 MG/1
150 TABLET, EXTENDED RELEASE ORAL 2 TIMES DAILY
Status: DISCONTINUED | OUTPATIENT
Start: 2020-09-27 | End: 2020-09-30

## 2020-09-27 RX ORDER — TAMSULOSIN HYDROCHLORIDE 0.4 MG/1
0.4 CAPSULE ORAL
Status: DISCONTINUED | OUTPATIENT
Start: 2020-09-27 | End: 2020-09-30

## 2020-09-27 RX ORDER — LISINOPRIL 10 MG/1
10 TABLET ORAL DAILY
Status: DISCONTINUED | OUTPATIENT
Start: 2020-09-27 | End: 2020-09-28

## 2020-09-27 RX ADMIN — MUPIROCIN 1 APPLICATION: 20 OINTMENT TOPICAL at 17:19

## 2020-09-27 RX ADMIN — DOCUSATE SODIUM 50 MG AND SENNOSIDES 8.6 MG 2 TABLET: 8.6; 5 TABLET, FILM COATED ORAL at 06:34

## 2020-09-27 RX ADMIN — ALLOPURINOL 100 MG: 100 TABLET ORAL at 06:34

## 2020-09-27 RX ADMIN — ACETAMINOPHEN 1000 MG: 500 TABLET ORAL at 06:33

## 2020-09-27 RX ADMIN — TAMSULOSIN HYDROCHLORIDE 0.4 MG: 0.4 CAPSULE ORAL at 10:08

## 2020-09-27 RX ADMIN — GABAPENTIN 300 MG: 300 CAPSULE ORAL at 06:33

## 2020-09-27 RX ADMIN — MAGNESIUM SULFATE 1 G: 1 INJECTION INTRAVENOUS at 06:43

## 2020-09-27 RX ADMIN — INSULIN HUMAN 6 UNITS: 100 INJECTION, SUSPENSION SUBCUTANEOUS at 06:35

## 2020-09-27 RX ADMIN — POTASSIUM CHLORIDE 20 MEQ: 1500 TABLET, EXTENDED RELEASE ORAL at 06:34

## 2020-09-27 RX ADMIN — OMEPRAZOLE 20 MG: 20 CAPSULE, DELAYED RELEASE ORAL at 06:34

## 2020-09-27 RX ADMIN — MUPIROCIN 1 APPLICATION: 20 OINTMENT TOPICAL at 06:33

## 2020-09-27 RX ADMIN — LISINOPRIL 10 MG: 10 TABLET ORAL at 06:34

## 2020-09-27 RX ADMIN — MAGNESIUM HYDROXIDE 30 ML: 400 SUSPENSION ORAL at 14:05

## 2020-09-27 RX ADMIN — FUROSEMIDE 40 MG: 10 INJECTION, SOLUTION INTRAMUSCULAR; INTRAVENOUS at 06:33

## 2020-09-27 RX ADMIN — ENOXAPARIN SODIUM 40 MG: 40 INJECTION SUBCUTANEOUS at 17:19

## 2020-09-27 RX ADMIN — ATORVASTATIN CALCIUM 80 MG: 80 TABLET, FILM COATED ORAL at 22:09

## 2020-09-27 RX ADMIN — POLYETHYLENE GLYCOL 3350 1 PACKET: 17 POWDER, FOR SOLUTION ORAL at 06:35

## 2020-09-27 RX ADMIN — METOPROLOL TARTRATE 25 MG: 25 TABLET, FILM COATED ORAL at 06:34

## 2020-09-27 RX ADMIN — ACETAMINOPHEN 1000 MG: 500 TABLET ORAL at 22:35

## 2020-09-27 RX ADMIN — IOHEXOL 80 ML: 350 INJECTION, SOLUTION INTRAVENOUS at 16:13

## 2020-09-27 RX ADMIN — ACETAMINOPHEN 1000 MG: 500 TABLET ORAL at 12:03

## 2020-09-27 RX ADMIN — INSULIN HUMAN 6 UNITS: 100 INJECTION, SUSPENSION SUBCUTANEOUS at 14:08

## 2020-09-27 RX ADMIN — BUPROPION HYDROCHLORIDE 150 MG: 150 TABLET, EXTENDED RELEASE ORAL at 06:34

## 2020-09-27 RX ADMIN — ASPIRIN 81 MG: 81 TABLET, COATED ORAL at 06:34

## 2020-09-27 RX ADMIN — TRAMADOL HYDROCHLORIDE 50 MG: 50 TABLET, FILM COATED ORAL at 07:47

## 2020-09-27 RX ADMIN — METFORMIN HYDROCHLORIDE 2000 MG: 500 TABLET ORAL at 08:33

## 2020-09-27 RX ADMIN — CLOPIDOGREL BISULFATE 75 MG: 75 TABLET ORAL at 06:33

## 2020-09-27 ASSESSMENT — ENCOUNTER SYMPTOMS
RESPIRATORY NEGATIVE: 1
SHORTNESS OF BREATH: 1
SENSORY CHANGE: 0
PSYCHIATRIC NEGATIVE: 1
DIZZINESS: 0
NEUROLOGICAL NEGATIVE: 1
NAUSEA: 0
ABDOMINAL PAIN: 0
BLURRED VISION: 0
SPUTUM PRODUCTION: 0
VOMITING: 0
MYALGIAS: 1
MUSCULOSKELETAL NEGATIVE: 1
FOCAL WEAKNESS: 0
CARDIOVASCULAR NEGATIVE: 1
CHILLS: 0
STRIDOR: 0
COUGH: 0
GASTROINTESTINAL NEGATIVE: 1
EYES NEGATIVE: 1
FEVER: 0

## 2020-09-27 ASSESSMENT — GAIT ASSESSMENTS
ASSISTIVE DEVICE: FRONT WHEEL WALKER
DEVIATION: DECREASED HEEL STRIKE;DECREASED TOE OFF;OTHER (COMMENT)
DISTANCE (FEET): 5
GAIT LEVEL OF ASSIST: MODERATE ASSIST

## 2020-09-27 ASSESSMENT — PAIN DESCRIPTION - PAIN TYPE
TYPE: SURGICAL PAIN

## 2020-09-27 ASSESSMENT — COGNITIVE AND FUNCTIONAL STATUS - GENERAL
SUGGESTED CMS G CODE MODIFIER MOBILITY: CM
MOVING FROM LYING ON BACK TO SITTING ON SIDE OF FLAT BED: UNABLE
MOBILITY SCORE: 8
TURNING FROM BACK TO SIDE WHILE IN FLAT BAD: UNABLE
WALKING IN HOSPITAL ROOM: A LOT
MOVING TO AND FROM BED TO CHAIR: UNABLE
STANDING UP FROM CHAIR USING ARMS: A LOT
CLIMB 3 TO 5 STEPS WITH RAILING: TOTAL

## 2020-09-27 ASSESSMENT — FIBROSIS 4 INDEX: FIB4 SCORE: 1.62

## 2020-09-27 NOTE — PROGRESS NOTES
GEORGINA Delgado paged regarding NIH stroke scale score of 16. Dr. Marcelino already at bedside and orders for STAT CT ordered. Christi returned page and updated on patient status.

## 2020-09-27 NOTE — PROGRESS NOTES
At about 1540 in attempt to mobilize the patient, the pt appeared increasingly withdrawn with right sided weakness and right sided gaze preference. Pt was able to answer orientation questions. Pt was unable to sit up or stand up. S/O stated concern.  Stat ct performed per LUKAS Brito notified. Per neurology negative for stroke. MRI, EEG, and labs ordered.

## 2020-09-27 NOTE — THERAPY
Physical Therapy   Initial Evaluation     Patient Name: Guerrero Ann  Age:  49 y.o., Sex:  male  Medical Record #: [id number]  Today's Date: 9/27/2020     Precautions: Fall Risk, Cardiac Precautions (See Comments), Sternal Precautions (See Comments)(s/p CABG x3)    Assessment  Patient is 49 y.o. male that presented to acute with complaints of L sided chest pain, he underwent cardiac cath which revealed multivessel CAD and he is now s/p 3 vessel CABG. PMHx significant for DM2, HTN, and L ventricular hypertrophy. He presented to PT with impaired arousal and attention, impaired balance and coordination, impaired motor control, functional weakness, and decreased activity tolerance which are limiting his ability to safely perform mobility at [facility]. He required mod to max A for bed mobility and mod A for stand step transfer EOB to chair with FWW. He demonstrated difficulty sequencing and performing motor tasks and required significant verbal and tactile cueing for limb advancement during ambulation. Discussed lethargy with RN, question if medication may be contributing? Will follow while in house.    Plan    Recommend Physical Therapy 3 times per week until therapy goals are met, increase frequency as ability to attend/participate improves for the following treatments:  Bed Mobility, Community Re-integration, Equipment, Gait Training, Manual Therapy, Neuro Re-Education / Balance, Self Care/Home Evaluation, Stair Training, Therapeutic Activities and Therapeutic Exercises    DC Equipment Recommendations: Unable to determine at this time  Discharge Recommendations: Recommend post-acute placement for additional physical therapy services prior to discharge home       Subjective    sleepy     Objective       09/27/20 1150   Prior Living Situation   Prior Services None   Housing / Facility 1 Story House   Steps Into Home 1   Steps In Home 0   Equipment Owned None   Lives with - Patient's Self Care Capacity  Significant Other;Child Less than 18 Years of Age   Comments Patient reported he lives with his girlfriend Miriam and her kids (9 and 12 YO)   Prior Level of Functional Mobility   Bed Mobility Independent   Transfer Status Independent   Ambulation Independent   Distance Ambulation (Feet)   (community)   Assistive Devices Used None   Stairs Independent   Comments Patient worked as  PTA   History of Falls   History of Falls No   Cognition    Cognition / Consciousness X   Speech/ Communication Delayed Responses   Level of Consciousness Lethargic   Ability To Follow Commands 1 Step  (50% of the time and with significant delay)   Safety Awareness Impaired   New Learning Impaired   Attention Impaired   Sequencing Impaired   Initiation Impaired   Comments general delay and flat affect. lethargic   Balance Assessment   Sitting Balance (Static) Trace +   Sitting Balance (Dynamic) Trace   Standing Balance (Static) Poor -   Standing Balance (Dynamic) Trace +   Comments with FWW, required hands on assist for safety, posterior and lateral LOB in standing. posterior and anterior LOB sitting   Gait Analysis   Gait Level Of Assist Moderate Assist   Assistive Device Front Wheel Walker   Distance (Feet) 5   # of Times Distance was Traveled 1   Deviation Decreased Heel Strike;Decreased Toe Off;Other (Comment)  (decreased jessica, step length, poor motor control)   # of Stairs Climbed 0   Bed Mobility    Supine to Sit Moderate Assist   Sit to Supine   (NT, left in chair)   Scooting Maximal Assist   Functional Mobility   Sit to Stand Minimal Assist   Bed, Chair, Wheelchair Transfer Moderate Assist   Transfer Method Stand Step   Patient / Family Goals    Patient / Family Goal #1 none stated   Short Term Goals    Short Term Goal # 1 Patient will move supine<>sitting EOB without bed features with supervision within 6tx in order to get in/out of bed   Short Term Goal # 2 Patient will move sitting<>standing with supervision  within 6tx in order to initiate gait and transfers   Short Term Goal # 3 Patient will ambulate 500ft with supervision within 6tx in order to access environment   Short Term Goal # 4 Patient will verbalize cardiac rehab education including sternal precautions, home walking program, and techniques for activity pacing within 6tx in order to demonstrate understanding.   Anticipated Discharge Equipment and Recommendations   DC Equipment Recommendations Unable to determine at this time   Discharge Recommendations Recommend post-acute placement for additional physical therapy services prior to discharge home

## 2020-09-27 NOTE — PROGRESS NOTES
APRLEO Conway at bed site for assessment. Discussed bladder scan result of > 1 liter, patient voided 200ml, ok to reinsert urinary catheter for retention. Per Man, Ok to DC mediastinal chest tubes. No other needs at this time.

## 2020-09-27 NOTE — CONSULTS
Neurology STROKE CODE H&P  Neurohospitalist Service, [facility]    Referring Physician: SUJATHA Yang    STROKE CODE:   Chief Complaint   Patient presents with   • Chest Pain       To obtain the most accurate data regarding the time called, and time patient seen, refer to the stroke run-sheet and chart.  For time of CT, refer to the radiology report. See A&P below for TPA Decision and door to needle time if and when applicable.    HPI: Guerrero Ann is a 49 y.o. male with history of CAD with stent placement (2018), DM type II, HTN, MI, depression, and anxiety presenting to the hospital on 9/21/2020 for chest pain. Neurology was consulted today via Code Stroke at 15:51 for acute onset expressive aphasia, worsening of right sided weakness, and right gaze preference with concerns of possible stroke. Patient is unable to provide history, so HPI comes from medical staff and family at bedside. Patient is POD 2 after CABG x3 on 9/25/20, and family and RN state he had some right arm weakness since yesterday. Last known well reported 13:00 today, and had acute onset of decreased LOC, right gaze preference, worsening right sided weakness, and aphasia at approximately 15:00. /66 at 15:00 and BG last 192 at 12:10. Currently on ASA, Plavix, and Atorvastatin, but no anticoagulation given recent surgery. Of note, patient has received tramadol 50 mg PRN q4hr over the past two days. Patient taken for STAT CT head wo, CTA head and neck w/wo, and CTP with results and interpretation below.     Review of systems: In addition to what is detailed in the HPI above, (and scanned into the chart if and when applicable), all other systems reviewed and are negative.    Past Medical History:    has a past medical history of Diabetes (HCC), Hypertension, Kidney stones, MI, old, and Psychiatric problem.    FHx:  family history includes No Known Problems in his father; Stroke (age of  onset: 47) in his mother; Stroke (age of onset: 54) in his maternal grandfather; Stroke (age of onset: 85) in his maternal grandmother.    SHx:   reports that he has never smoked. His smokeless tobacco use includes chew. He reports current alcohol use. He reports that he does not use drugs.    Allergies:  No Known Allergies    Medications:    Current Facility-Administered Medications:   •  allopurinol (ZYLOPRIM) tablet 100 mg, 100 mg, Oral, DAILY, Christi Conway, A.P.N., 100 mg at 09/27/20 0634  •  metoprolol (LOPRESSOR) tablet 25 mg, 25 mg, Oral, TWICE DAILY, Christi Conway, A.P.N., Stopped at 09/27/20 1800  •  lisinopril (PRINIVIL) tablet 10 mg, 10 mg, Oral, DAILY, Christi Conway, A.P.N., 10 mg at 09/27/20 0634  •  atorvastatin (LIPITOR) tablet 80 mg, 80 mg, Oral, QHS, Christi Conway, A.P.N.  •  buPROPion SR (WELLBUTRIN-SR) tablet 150 mg, 150 mg, Oral, BID, Christi Conway, A.P.N., Stopped at 09/27/20 1800  •  tamsulosin (FLOMAX) capsule 0.4 mg, 0.4 mg, Oral, AFTER BREAKFAST, Christi Conway, A.P.N., 0.4 mg at 09/27/20 1008  •  potassium chloride SA (Kdur) tablet 20 mEq, 20 mEq, Oral, DAILY, Christi Conway, A.P.N., 20 mEq at 09/27/20 0634  •  furosemide (LASIX) injection 40 mg, 40 mg, Intravenous, DAILY, Christi Cnoway, A.P.N., 40 mg at 09/27/20 0633  •  [DISCONTINUED] insulin NPH (HumuLIN N,NovoLIN N) injection, 6 Units, Subcutaneous, Q8HRS, 6 Units at 09/27/20 1408 **AND** insulin lispro (HumaLOG) injection, 4 Units, Subcutaneous, TID AC, Christi Conway, A.P.N., Stopped at 09/27/20 1700  •  insulin lispro (HumaLOG) injection, 0-15 Units, Subcutaneous, ACHS & 0200, Christi Conway A.P.N., Stopped at 09/27/20 1700  •  acetaminophen (TYLENOL) tablet 1,000 mg, 1,000 mg, Oral, Q6HRS, SHALOM Yang.P.N., Stopped at 09/27/20 1800  •  Respiratory Therapy Consult, , Nebulization, Continuous RT, SHALOM Camacho.P.N.  •  Pharmacy Consult Request ...Pain Management Review 1 Each, 1 Each, Other, PHARMACY TO  DOSE, Maia Chavira, A.P.N.  •  gabapentin (NEURONTIN) capsule 300 mg, 300 mg, Oral, BID, Stopped at 09/27/20 1800 **FOLLOWED BY** [START ON 10/1/2020] gabapentin (NEURONTIN) capsule 100 mg, 100 mg, Oral, BID, Maia Chavira, A.P.N.  •  senna-docusate (PERICOLACE or SENOKOT S) 8.6-50 MG per tablet 2 Tab, 2 Tab, Oral, BID, Stopped at 09/27/20 1800 **AND** polyethylene glycol/lytes (MIRALAX) PACKET 1 Packet, 1 Packet, Oral, DAILY, 1 Packet at 09/27/20 0635 **AND** magnesium hydroxide (MILK OF MAGNESIA) suspension 30 mL, 30 mL, Oral, DAILY, 30 mL at 09/27/20 1405 **AND** bisacodyl (DULCOLAX) suppository 10 mg, 10 mg, Rectal, QDAY PRN, Maia Chavira A.P.N.  •  omeprazole (PRILOSEC) capsule 20 mg, 20 mg, Oral, DAILY, Maia Chavira A.P.N., 20 mg at 09/27/20 0634  •  enoxaparin (LOVENOX) inj 40 mg, 40 mg, Subcutaneous, DAILY AT 1800, Maia Chavira, A.P.N., 40 mg at 09/27/20 1719  •  mupirocin (BACTROBAN) 2 % ointment 1 Application, 1 Application, Topical, BID, Maia Chavira A.P.N., 1 Application at 09/27/20 1719  •  aspirin EC (ECOTRIN) tablet 81 mg, 81 mg, Oral, DAILY, Maia Chavira, A.P.N., 81 mg at 09/27/20 0634  •  clopidogrel (PLAVIX) tablet 75 mg, 75 mg, Oral, DAILY, Maia Chavira, A.P.N., 75 mg at 09/27/20 0633  •  tramadol (ULTRAM) 50 MG tablet 50 mg, 50 mg, Oral, Q4HRS PRN, Maia Chavira A.P.N., 50 mg at 09/27/20 0747  •  ondansetron (ZOFRAN) syringe/vial injection 8 mg, 8 mg, Intravenous, Q6HRS PRN **OR** prochlorperazine (COMPAZINE) injection 10 mg, 10 mg, Intravenous, Q6HRS PRN **OR** promethazine (PHENERGAN) suppository 25 mg, 25 mg, Rectal, Q6HRS PRN, Maia Chavira, A.P.N.  •  acetaminophen (TYLENOL) tablet 650 mg, 650 mg, Oral, Q4HRS PRN **OR** acetaminophen (TYLENOL) suppository 650 mg, 650 mg, Rectal, Q4HRS PRN, Maia Chavira, A.P.N.  •  mag hydrox-al hydrox-simeth (MAALOX PLUS ES or MYLANTA DS) suspension 30 mL, 30 mL, Oral, Q4HRS PRN, Maia Chavira, A.P.N., 30 mL at 09/26/20 0646  •   diphenhydrAMINE (BENADRYL) tablet/capsule 25 mg, 25 mg, Oral, HS PRN - MR X 1, Maia Chavira, A.P.N.    Physical Examination:    Vitals:    09/27/20 1400 09/27/20 1500 09/27/20 1600 09/27/20 1700   BP:  102/66  (!) 93/62   Pulse: 74 73 84 75   Resp: 20 19 15 (!) 24   Temp:       TempSrc:       SpO2: 94% 95% 93% 92%   Weight:       Height:           General: Patient is drowsy and in no acute distress  Eyes: Examination of optic disks not indicated at this time  CV: RRR    NEUROLOGICAL EXAM:     Mental status: Lethargic, awakens to repeated verbal stimuli to attend, oriented to self only, and follows some simple commands with repeated requests.  Speech and language: Speech is mildly dysarthric with delayed responses, no aphasia. The patient is able to name, repeat 1 to 2 words requiring redirection and multiple attempts to attend, and comprehend.  Cranial nerve exam: Pupils are equal, round and reactive to light bilaterally. Visual fields are intact to confrontation. Extraocular muscles are intact with right gaze preference that overcomes midline but cannot bury sclera to left. Face is symmetric. Unable to assess sensation in the face, hearing, palate, tongue, or shoulder shrug, as patient non-contributory.    Motor exam: Strength is 4+/5 LUE, 3/5 to RUE with pronator drift, and 2/5 BLE both distally and proximally. Tone is normal. No abnormal movements were seen on exam.  Sensory exam: Withdraws to noxious stimuli in all 4 extremities.    Deep tendon reflexes:  2+ and symmetric. Toes up-going bilaterally.  Coordination: Normal finger-nose-finger to LUE, unable to complete with RUE or BLE secondary to weakness.   Gait: deferred as patient emergently transported to CT scanner.    NIH Stroke Scale:    1a. Level of Consciousness (Alert, drowsy, etc): 1= Drowsy    1b. LOC Questions (Month, age): 1= Answers one correctly    1c. LOC Commands (Open/close eyes make fist/let go): 0= Obeys both correctly    2.   Best Gaze  (Eyes open - patient follows examiner's finger on face): 1= Partial gaze palay    3.   Visual Fields (introduce visual stimulus/threat to patient's field quadrants): 0= No visual loss    4.   Facial Paresis (Show teeth, raise eyebrows and squeeze eyes shut): 0= Normal     5a. Motor Arm - Left (Elevate arm to 90 degrees if patient is sitting, 45 degrees if  supine): 0= No drift    5b. Motor Arm - Right (Elevate arm to 90 degrees if patient is sitting, 45 degrees if supine): 2= Can't resist gravity    6a. Motor Leg - Left (Elevate leg 30 degrees with patient supine): 2= Can't resist gravity    6b. Motor Leg - Right  (Elevate leg 30 degrees with patient supine): 3= No effort against gravity    7.   Limb Ataxia (Finger-nose, heel down shin): 0= No ataxia    8.   Sensory (Pin prick to face, arm, trunk and leg - compare side to side): 0= Normal    9.  Best Language (Name item, describe a picture and read sentences): 0= No aphasia    10. Dysarthria (Evaluate speech clarity by patient repeating listed words): 1= Mild to moderate slurring    11. Extinction and Inattention (Use information from prior testing to identify neglect or  double simultaneous stimuli testing): 0= No neglect    Total NIH Score: 11    Modified [place] Scale (MRS): 4 = Moderately severe disability; unable to walk without assistance and unable to attend to own bodily needs without assistance      Objective Data:    Labs:  Lab Results   Component Value Date/Time    PROTHROMBTM 17.0 (H) 09/25/2020 02:21 PM    INR 1.34 (H) 09/25/2020 02:21 PM      Lab Results   Component Value Date/Time    WBC 10.2 09/27/2020 02:30 AM    RBC 2.49 (L) 09/27/2020 02:30 AM    HEMOGLOBIN 8.2 (L) 09/27/2020 02:30 AM    HEMATOCRIT 23.6 (L) 09/27/2020 02:30 AM    MCV 94.8 09/27/2020 02:30 AM    MCH 32.9 09/27/2020 02:30 AM    MCHC 34.7 09/27/2020 02:30 AM    MPV 11.2 09/27/2020 02:30 AM    NEUTSPOLYS 51.00 09/25/2020 05:55 AM    LYMPHOCYTES 31.20 09/25/2020 05:55 AM    MONOCYTES  7.60 09/25/2020 05:55 AM    EOSINOPHILS 8.60 (H) 09/25/2020 05:55 AM    BASOPHILS 1.30 09/25/2020 05:55 AM      Lab Results   Component Value Date/Time    SODIUM 135 09/27/2020 02:30 AM    POTASSIUM 4.0 09/27/2020 02:30 AM    CHLORIDE 101 09/27/2020 02:30 AM    CO2 21 09/27/2020 02:30 AM    GLUCOSE 179 (H) 09/27/2020 02:30 AM    BUN 19 09/27/2020 02:30 AM    CREATININE 1.17 09/27/2020 02:30 AM      Lab Results   Component Value Date/Time    CHOLSTRLTOT 210 (H) 09/22/2020 11:39 PM    LDL see below 09/22/2020 11:39 PM    HDL 37 (A) 09/22/2020 11:39 PM    TRIGLYCERIDE 610 (H) 09/22/2020 11:39 PM       Lab Results   Component Value Date/Time    ALKPHOSPHAT 90 09/24/2020 04:56 PM    ASTSGOT 21 09/24/2020 04:56 PM    ALTSGPT 19 09/24/2020 04:56 PM    TBILIRUBIN 0.4 09/24/2020 04:56 PM        Imaging/Testing:    I interpreted and/or reviewed the patient's neuroimaging    CT-CTA NECK WITH & W/O-POST PROCESSING   Final Result      1.  CT angiogram of the neck within normal limits.      2.  Mild bilateral internal carotid artery atherosclerotic plaque without stenosis      3.  Small right pleural effusion      4.  Small left apical pneumothorax      CT-CTA HEAD WITH & W/O-POST PROCESS   Final Result      No thrombosis is seen within the [place].      Bilateral white matter hypodensities can be seen in the setting of bilateral lacunar infarcts, chronic microangiopathic change, demyelination or gliosis. MRI would be more sensitive for further evaluation.      Paranasal sinus disease.      DX-CHEST-PORTABLE (1 VIEW)   Final Result         Interval extubation. No other significant interval change.      EC-MARGOTH W/O CONT   Final Result      DX-CHEST-PORTABLE (1 VIEW)   Final Result      1.  Left basilar opacification likely represents atelectasis.      2.  Endotracheal tube tip projects approximately 1.5 cm above the migel.      3.  Left subclavian catheter tip projects over the superior vena cava. No pneumothorax is  identified.      US-CAROTID DOPPLER BILAT   Final Result      US-VEIN MAPPING LOWER EXTREMITY BILAT   Final Result      EC-ECHOCARDIOGRAM COMPLETE W/O CONT   Final Result      DX-CHEST-PORTABLE (1 VIEW)   Final Result      No acute cardiopulmonary abnormality.      CL-LEFT HEART CATHETERIZATION WITH POSSIBLE INTERVENTION    (Results Pending)   MR-BRAIN-W/O    (Results Pending)       Assessment:    Guerrero Ann is a 49 y.o. male with relevant history of CAD with stent placement (2018), DM type II, HTN, MI, depression, and anxiety presenting to the hospital on 9/21/2020 for chest pain. Neurology was consulted today via Code Stroke at 15:51 for acute onset expressive aphasia, worsening of right sided weakness, and right gaze preference with concerns of possible stroke.  CTA head and neck revealed no large vessel occlusion, mild bilateral ICA atherosclerotic plaque without stenosis, and nonspecific bilateral white matter hypodensities suggestive for possible bilateral lacunar infarcts. NIHSS currently 11 with nonfocal findings of drowsiness, partial gaze palsy, right upper extremity weakness, bilateral lower extremity weakness, and mild dysarthria. As patient is POD 2 after CABG x3, and family and RN state he had some right arm weakness since yesterday he is not a candidate for tPA.  Patient is also not a thrombectomy candidate has no LVO was seen on vessel imaging. Given right gaze preference and right greater than left sided weakness and sparing of the face for deficits, infarction is less likely, but still on differential given vascular risk factors and recent cardiovascular surgery. Differential diagnoses include toxic/metabolic encephalopathy versus seizure versus acute ischemic stroke.    Plan:    -q2h and PRN neuro assessment. VS per nursing/unit protocol. Permissive HTN ok for 24-48 hours, not to exceed SBP > 220, DBP > 105. Then, BP goal < 140/90. Antihypertensives per primary team.    -Obtain MRI Brain wo contrast.   -Obtain routine video EEG  -Toxic/metabolic work-up including UA, UDS, ammonia, thiamine, folate, B12, TSH with reflex to T4, HIV. RPR,  -Telemetry; currently SR. Screen for Afib/arrhythmia.  -TTE 9/25/2020 with EF 60% post bypass LV concentric hypertrophy with severe LVH, but normal left atrium size.    -Continue ASA 81 mg PO q day and Plavix 75mg PO q day  -Agree with Atorvastatin 80 mg PO q HS. Note lipid panel 9/22/20 unable to determine LDL given triglycerides 610.   -Recommend aggressive BG management per primary team. Avoid IVF with Dextrose. BG goal 140-180. Note hemoglobin A1c 7.9 (goal <7).   -PT/OT/SLP eval and treat.  -Discussed plan of care and ddx with family with all questions answered as able.   -All other medical management per ICU/primary team.   -DVT PPX: SCDs.     The evaluation of the patient, and recommended management, was discussed with Dr. Le, Dr. Marcelino, and bedside RNs.     Tim Greene, MSN [facility]  Nurse Practitioner, Neurohospitalist  [facility]  (t) [phone]

## 2020-09-27 NOTE — PROGRESS NOTES
Cardiovascular Surgery Progress Note    Name: Guerrero Ann  MRN: [id number]  : 1970  Admit Date: 2020  7:37 PM  Procedure:  Procedure(s) and Anesthesia Type:     * CABG, WITH ENDOSCOPIC VEIN PROCUREMENT- X3 - General     * ECHOCARDIOGRAM, TRANSESOPHAGEAL - General  2 Day Post-Op    Vitals:  Patient Vitals for the past 8 hrs:   Temp SpO2 O2 Delivery Device O2 (LPM) Pulse Resp BP   20 0000 36.3 °C (97.4 °F) 93 % Silicone Nasal Cannula 4 (!) 104 (!) 27 144/78     Temp (24hrs), Av.9 °C (98.5 °F), Min:36.3 °C (97.3 °F), Max:37.6 °C (99.7 °F)      Respiratory:    Respiration: (!) 27, Pulse Oximetry: 93 %     Chest Tube Drains:          Fluids:    Intake/Output Summary (Last 24 hours) at 2020 0540  Last data filed at 2020 2300  Gross per 24 hour   Intake 1295.57 ml   Output 2105 ml   Net -809.43 ml     Admit weight: Weight: 72.6 kg (160 lb)  Current weight: Weight: 75.1 kg (165 lb 9.1 oz) (20 0500)    Labs:  Recent Labs     20  2151 20  0206 20  0230   WBC 12.7* 12.1* 10.2   RBC 2.61* 2.55* 2.49*   HEMOGLOBIN 8.7* 8.5* 8.2*   HEMATOCRIT 25.5* 24.6* 23.6*   MCV 96.6 96.5 94.8   MCH 33.3* 33.3* 32.9   MCHC 34.5 34.6 34.7   RDW 43.4 43.3 52.2*   PLATELETCT 231 191 143*   MPV 10.9 11.1 11.2     Recent Labs     20  1656 20  0555   NEUTSPOLYS 48.90 51.00   LYMPHOCYTES 32.60 31.20   MONOCYTES 9.30 7.60   EOSINOPHILS 7.50* 8.60*   BASOPHILS 1.40 1.30     Recent Labs     20  0555  20  0039 20  0517 20  0230   SODIUM 137  --  137  --  135   POTASSIUM 3.6   < > 4.5 3.8 4.0   CHLORIDE 101  --  104  --  101   CO2 19*  --  16*  --  21   GLUCOSE 157*  --  130*  --  179*   BUN 23*  --  17  --  19   CREATININE 1.00  --  1.06  --  1.17   CALCIUM 9.4  --  7.7*  --  8.5    < > = values in this interval not displayed.     Recent Labs     20  1656 20  1421   APTT 28.8 31.0   INR 0.98 1.34*       Medications:  •  lisinopril  5 mg     • potassium chloride SA  20 mEq     • furosemide  40 mg     • insulin NPH  6 Units      And   • insulin lispro  4 Units     • insulin lispro  0-15 Units     • acetaminophen  1,000 mg     • carvedilol  3.125 mg     • magnesium sulfate  1 g Stopped (09/26/20 0645)   • Pharmacy Consult Request  1 Each     • gabapentin  300 mg      Followed by   • [START ON 10/1/2020] gabapentin  100 mg     • senna-docusate  2 Tab      And   • polyethylene glycol/lytes  1 Packet      And   • magnesium hydroxide  30 mL     • omeprazole  20 mg     • enoxaparin  40 mg     • mupirocin  1 Application     • aspirin EC  81 mg     • clopidogrel  75 mg     • rosuvastatin  20 mg          Ordered Medications:    ASA - Yes    Plavix - Yes    Post-operative Beta Blockers - Yes    Ace Inhibitor - Yes    Statin - Yes         Exam:   Review of Systems   Constitutional: Positive for malaise/fatigue.   HENT: Negative.    Eyes: Negative.    Respiratory: Negative.    Cardiovascular: Negative.    Gastrointestinal: Negative.    Genitourinary: Negative.    Musculoskeletal: Negative.    Skin: Negative.    Neurological: Negative.    Endo/Heme/Allergies: Negative.    Psychiatric/Behavioral: Negative.        Physical Exam  Constitutional:       General: He is not in acute distress.     Appearance: He is well-developed. He is not diaphoretic.   Neck:      Musculoskeletal: Neck supple.   Cardiovascular:      Rate and Rhythm: Normal rate and regular rhythm.      Heart sounds: Normal heart sounds. No murmur. No friction rub. No gallop.    Pulmonary:      Effort: Pulmonary effort is normal. No respiratory distress.      Breath sounds: Examination of the right-lower field reveals decreased breath sounds. Examination of the left-lower field reveals decreased breath sounds. Decreased breath sounds present. No wheezing or rales.   Abdominal:      General: There is no distension.      Palpations: Abdomen is soft.      Tenderness: There is no abdominal  tenderness.   Skin:     General: Skin is warm and dry.      Comments: Sternum- prevena dressing  SVG sites- c/d/i   Neurological:      Mental Status: He is alert and oriented to person, place, and time.         Quality Measures:   Quality-Core Measures   Reviewed items::  EKG reviewed, Labs reviewed, Medications reviewed and Radiology images reviewed      Assessment/Plan:  POD 1 HDS, SR, Acute blood loss anemia- s/p transfusion- keep mediastinal tubes, gently diurese, d/c gomez  POD 2 HTN- inc lisinopril, SR/ST- change to metoprolol, acute blood loss anemia s/p transfusion- watch, d/c mediastinal tubes, urine retention- replace gomez/start flomax, cont diuresis    Active Hospital Problems    Diagnosis   • Encounter for weaning from ventilator ([facility]) [Z99.11]     Priority: High   • NSTEMI (non-ST elevated myocardial infarction) ([facility]) [I21.4]     Priority: High   • CAD (coronary artery disease) [I25.10]     Priority: High   • Type 2 diabetes mellitus with complication, without long-term current use of insulin ([facility]) [E11.8]     Priority: Medium   • Essential hypertension [I10]     Priority: Medium   • SONNY (acute kidney injury) ([facility]) [N17.9]

## 2020-09-27 NOTE — PROGRESS NOTES
"Critical Care Progress Note    Date of admission  9/21/2020    Chief Complaint  49 y.o. male admitted 9/21/2020 with ACS    Hospital Course    \"49 y.o. male who presented 9/21/2020 with a past medical history significant for type 2 diabetes, hypertension and left ventricular hypertrophy who presented to the emergency department on 9/21 complaining of left-sided chest pain.  He was found to have acute coronary syndrome and was started on a heparin drip and admitted to the hospital with a cardiology consultation.  He underwent cardiac catheterization on 9/22 finding multivessel coronary arterial disease with severe ISR previously placed RCA stents and an ejection fraction of 55%.  Cardiothoracic surgery was consulted and took him to the operating room today for an uncomplicated three-vessel CABG.  He received 290 mL of Cell Saver and had a good urine output throughout the case.  He did not require any pacing and came off the pump without difficulty.  His ejection fraction was between 50 and 60% per anesthesia.  He initially required an epinephrine drip which is currently off and he is on Cleviprex at 2, dexmedetomidine at 0.5, insulin at 4 and on full mechanical ventilatory support upon arrival to the ICU.\"      Interval Problem Update  Reviewed last 24 hour events:   - Did well ovenight   - Neuro: AOx4   - HR: 70s-110s   - SBP: 100s-140s   - GI: tolerating diet   - UOP: 1.8L/24 hrs   - Quispe: yes -retention   - Tm: 37.5   - Lines: CVC   - PPx: GI PPI, DVT SCDs   - 4L NC, 800 mL IS   - CXR (personally reviewed and compared to prior): No new    Updates: call emergently to bedside at 15:40 for right-side weakness and right gaze preference. Initially patient was mute but intermittently following commands appropriately. CT/CTA ordered. Upon return to the patient's room he was conversant and weakness had improved. 1 hour later the patient was talking to S/O and eating dinner with both hands.     Yesterday   - Extubated on " POD#0   - Patient refusing to ambulate or do IS   - Neuro: AOx4   - HR: 70s-80s   - SBP: 90s-100s   - GI: beginning to eat today   - UOP: 2.6L/24 hrs   - Quispe: yes   - Tm: 37.6   - Lines: CVC, Art   - PPx: GI PPI, DVT SCDs   -  mL, 4L NC   - CXR (personally reviewed and compared to prior): no new    Review of Systems  Review of Systems   Constitutional: Positive for malaise/fatigue. Negative for chills and fever.   Eyes: Negative for blurred vision.   Respiratory: Positive for shortness of breath. Negative for cough, sputum production and stridor.    Cardiovascular: Positive for chest pain.   Gastrointestinal: Negative for abdominal pain, nausea and vomiting.   Genitourinary: Negative for dysuria.   Musculoskeletal: Positive for myalgias (left leg).   Skin: Negative for rash.   Neurological: Negative for dizziness, sensory change and focal weakness.        Vital Signs for last 24 hours   Temp:  [35.9 °C (96.6 °F)-37.6 °C (99.7 °F)] (P) 35.9 °C (96.6 °F)  Pulse:  [] (P) 97  Resp:  [16-34] (P) 32  BP: ()/(52-78) (P) 106/66  SpO2:  [91 %-100 %] (P) 96 %    Hemodynamic parameters for last 24 hours       Respiratory Information for the last 24 hours       Physical Exam   Physical Exam  Vitals signs and nursing note reviewed.   Constitutional:       General: He is in acute distress.      Appearance: He is normal weight. He is not toxic-appearing.      Interventions: He is sedated and intubated.   HENT:      Head: Normocephalic and atraumatic.      Right Ear: External ear normal.      Left Ear: External ear normal.      Nose: Nose normal. No congestion.      Mouth/Throat:      Mouth: Mucous membranes are moist.      Pharynx: Oropharynx is clear.   Eyes:      General: No scleral icterus.     Conjunctiva/sclera: Conjunctivae normal.      Pupils: Pupils are equal, round, and reactive to light.      Comments: Pupils are 2 mm and reactive bilaterally   Neck:      Musculoskeletal: Neck supple. No neck  rigidity.   Cardiovascular:      Rate and Rhythm: Regular rhythm. Bradycardia present. Occasional extrasystoles are present.     Chest Wall: PMI is displaced.      Pulses: Normal pulses.      Heart sounds: Heart sounds are distant. No murmur.      Comments: Mediastinal incision site is clean/dry/intact with dressing, mediastinal chest tubes x2, no epicardial pacing wires, left subclavian central venous catheter with mild hematoma  Pulmonary:      Effort: No tachypnea or accessory muscle usage. He is intubated.      Breath sounds: Examination of the right-lower field reveals rales. Examination of the left-lower field reveals rales. Rales present. No decreased breath sounds, wheezing or rhonchi.   Abdominal:      General: Bowel sounds are normal. There is no distension.      Palpations: Abdomen is soft.      Tenderness: There is no abdominal tenderness. There is no guarding.   Genitourinary:     Comments: Quispe catheter in place  Musculoskeletal:         General: No tenderness.      Right lower leg: No edema.      Left lower leg: No edema.      Comments: Right lower extremity dressing is clean/dry/intact from harvest site   Skin:     General: Skin is warm and dry.      Capillary Refill: Capillary refill takes less than 2 seconds.      Findings: No rash.   Neurological:      Motor: Weakness present.      Comments: Right-sided weakness, mute. Right gaze preference         Medications  Current Facility-Administered Medications   Medication Dose Route Frequency Provider Last Rate Last Dose   • allopurinol (ZYLOPRIM) tablet 100 mg  100 mg Oral DAILY Christi Conway, A.P.N.   100 mg at 09/27/20 0634   • metFORMIN (GLUCOPHAGE) tablet 2,000 mg  2,000 mg Oral BID Christi Conway, A.P.N.       • metoprolol (LOPRESSOR) tablet 25 mg  25 mg Oral TWICE DAILY Christi Conway, A.P.N.   25 mg at 09/27/20 0634   • lisinopril (PRINIVIL) tablet 10 mg  10 mg Oral DAILY Christi Conway, A.P.N.   10 mg at 09/27/20 0634   • atorvastatin  (LIPITOR) tablet 80 mg  80 mg Oral QHS Christi Conway A.P.N.       • buPROPion SR (WELLBUTRIN-SR) tablet 150 mg  150 mg Oral BID Christi Conway A.P.N.   150 mg at 09/27/20 0634   • tamsulosin (FLOMAX) capsule 0.4 mg  0.4 mg Oral AFTER BREAKFAST Christi Conway A.P.N.       • potassium chloride SA (Kdur) tablet 20 mEq  20 mEq Oral DAILY Christi Conway A.P.N.   20 mEq at 09/27/20 0634   • furosemide (LASIX) injection 40 mg  40 mg Intravenous DAILY Christi Conway A.P.N.   40 mg at 09/27/20 0633   • insulin NPH (HumuLIN N,NovoLIN N) injection  6 Units Subcutaneous Q8HRS Christi Conway A.P.N.   6 Units at 09/27/20 0635    And   • insulin lispro (HumaLOG) injection  4 Units Subcutaneous TID AC Christi Conway A.P.N.   Stopped at 09/26/20 1200   • insulin lispro (HumaLOG) injection  0-15 Units Subcutaneous ACHS & 0200 Christi Conway A.P.N.   6 Units at 09/27/20 0831   • acetaminophen (TYLENOL) tablet 1,000 mg  1,000 mg Oral Q6HRS Christi Conway A.P.N.   1,000 mg at 09/27/20 0633   • Respiratory Therapy Consult   Nebulization Continuous RT ROSIO CamachoP.N.       • Pharmacy Consult Request ...Pain Management Review 1 Each  1 Each Other PHARMACY TO DOSE SHALOM Camacho.P.N.       • gabapentin (NEURONTIN) capsule 300 mg  300 mg Oral BID Maia Chavira A.P.N.   300 mg at 09/27/20 0633    Followed by   • [START ON 10/1/2020] gabapentin (NEURONTIN) capsule 100 mg  100 mg Oral BID Maia Chavira A.P.N.       • senna-docusate (PERICOLACE or SENOKOT S) 8.6-50 MG per tablet 2 Tab  2 Tab Oral BID Maia Chavira A.P.N.   2 Tab at 09/27/20 0634    And   • polyethylene glycol/lytes (MIRALAX) PACKET 1 Packet  1 Packet Oral DAILY Maia Chavira, A.P.N.   1 Packet at 09/27/20 0635    And   • magnesium hydroxide (MILK OF MAGNESIA) suspension 30 mL  30 mL Oral DAILY Maia Chavira, A.P.N.        And   • bisacodyl (DULCOLAX) suppository 10 mg  10 mg Rectal QDAY PRN Maia Chavira, A.P.N.       • omeprazole (PRILOSEC)  capsule 20 mg  20 mg Oral DAILY Maia Chavira, A.P.N.   20 mg at 09/27/20 0634   • enoxaparin (LOVENOX) inj 40 mg  40 mg Subcutaneous DAILY AT 1800 Maia  Cait, A.P.N.   40 mg at 09/26/20 1821   • mupirocin (BACTROBAN) 2 % ointment 1 Application  1 Application Topical BID Maia  Cait, A.P.N.   1 Application at 09/27/20 0633   • aspirin EC (ECOTRIN) tablet 81 mg  81 mg Oral DAILY Maia  Cait, A.P.N.   81 mg at 09/27/20 0634   • clopidogrel (PLAVIX) tablet 75 mg  75 mg Oral DAILY Maia  Cait, A.P.N.   75 mg at 09/27/20 0633   • tramadol (ULTRAM) 50 MG tablet 50 mg  50 mg Oral Q4HRS PRN Maia  Cait, A.P.N.   50 mg at 09/27/20 0747   • ondansetron (ZOFRAN) syringe/vial injection 8 mg  8 mg Intravenous Q6HRS PRN Maia Chavira, A.P.N.        Or   • prochlorperazine (COMPAZINE) injection 10 mg  10 mg Intravenous Q6HRS PRN Maia  aCit, A.P.N.        Or   • promethazine (PHENERGAN) suppository 25 mg  25 mg Rectal Q6HRS PRN Maia  Cait, A.P.N.       • acetaminophen (TYLENOL) tablet 650 mg  650 mg Oral Q4HRS PRN Maia  Cait, A.P.N.        Or   • acetaminophen (TYLENOL) suppository 650 mg  650 mg Rectal Q4HRS PRN Maia  Cait, A.P.N.       • mag hydrox-al hydrox-simeth (MAALOX PLUS ES or MYLANTA DS) suspension 30 mL  30 mL Oral Q4HRS PRN Maia Chavira, A.P.N.   30 mL at 09/26/20 0646   • diphenhydrAMINE (BENADRYL) tablet/capsule 25 mg  25 mg Oral HS PRN - MR X 1 Maia Chavira, A.P.N.           Fluids    Intake/Output Summary (Last 24 hours) at 9/27/2020 0833  Last data filed at 9/27/2020 0600  Gross per 24 hour   Intake 1216.4 ml   Output 1705 ml   Net -488.6 ml       Laboratory  Recent Labs     09/25/20  1901 09/26/20  0036 09/26/20  0206   ISTATAPH 7.347* 7.381* 7.365*   ISTATAPCO2 36.3 27.6 26.6   ISTATAPO2 96* 49* 78   ISTATATCO2 21 17* 16*   YKALAKY7HUD 97 85* 95   ISTATARTHCO3 19.9 16.3* 15.2*   ISTATARTBE -5* -8* -9*   ISTATTEMP 36.8 C 37.2 C 37.3 C   ISTATFIO2 40 25 45   ISTATSPEC Arterial  Arterial Arterial   ISTATAPHTC 7.350* 7.378* 7.360*   [id number] 95* 50* 80         Recent Labs     09/25/20 0555 09/25/20 1421 09/26/20 0039 09/26/20 0517 09/27/20  0230   SODIUM 137  --   --  137  --  135   POTASSIUM 3.6 3.9   < > 4.5 3.8 4.0   CHLORIDE 101  --   --  104  --  101   CO2 19*  --   --  16*  --  21   BUN 23*  --   --  17  --  19   CREATININE 1.00  --   --  1.06  --  1.17   MAGNESIUM 1.9 2.9*  --  2.3  --   --    CALCIUM 9.4  --   --  7.7*  --  8.5    < > = values in this interval not displayed.     Recent Labs     09/24/20 1656 09/25/20 0555 09/26/20 0039 09/27/20 0230   ALTSGPT 19  --   --   --    ASTSGOT 21  --   --   --    ALKPHOSPHAT 90  --   --   --    TBILIRUBIN 0.4  --   --   --    GLUCOSE 159* 157* 130* 179*     Recent Labs     09/24/20 1656 09/25/20 0555 09/25/20 2151 09/26/20 0206 09/27/20  0230   WBC 7.2 9.1 12.7* 12.1* 10.2   NEUTSPOLYS 48.90 51.00  --   --   --    LYMPHOCYTES 32.60 31.20  --   --   --    MONOCYTES 9.30 7.60  --   --   --    EOSINOPHILS 7.50* 8.60*  --   --   --    BASOPHILS 1.40 1.30  --   --   --    ASTSGOT 21  --   --   --   --    ALTSGPT 19  --   --   --   --    ALKPHOSPHAT 90  --   --   --   --    TBILIRUBIN 0.4  --   --   --   --      Recent Labs     09/24/20 1656 09/25/20 1421 09/25/20 2151 09/26/20 0206 09/27/20  0230   RBC 4.03*   < >  --  2.61* 2.55* 2.49*   HEMOGLOBIN 13.3*   < >  --  8.7* 8.5* 8.2*   HEMATOCRIT 38.6*   < >  --  25.5* 24.6* 23.6*   PLATELETCT 203   < > 125* 231 191 143*   PROTHROMBTM 13.3  --  17.0*  --   --   --    APTT 28.8  --  31.0  --   --   --    INR 0.98  --  1.34*  --   --   --     < > = values in this interval not displayed.       Imaging  X-Ray:  I have personally reviewed the images and compared with prior images.    Assessment/Plan  * CAD (coronary artery disease)- (present on admission)  Assessment & Plan  Status post three-vessel CABG 9/25  Routine postoperative management  Tight blood pressure and glucose  control  Restart ACE-I  Antiplatelets, statin  PRN analgesics  Lasix for forced diuresis    Encounter for weaning from ventilator ([facility])  Assessment & Plan  Intubated in OR 9/25, weaned on the same day  RT/O2 Protocols  Titrate supplemental FiO2 to maintain SpO2 >92%  IS, ambulation    NSTEMI (non-ST elevated myocardial infarction) ([facility])- (present on admission)  Assessment & Plan  Status post PCI finding multivessel disease now status post CABG  Antiplatelets, statin    Essential hypertension- (present on admission)  Assessment & Plan  metoprolol, ACE inhibitor    Type 2 diabetes mellitus with complication, without long-term current use of insulin ([facility])- (present on admission)  Assessment & Plan  Goal blood glucose 120-180  NPH, sliding scale insulin, accuchecks  hypoglycemia protocol    Altered mental status- (present on admission)  Assessment & Plan  CVA seems unlikely culprit given right gaze and right weakness localize to opposite cerebral hemispheres  EEG, MRI ordered  Metabolic eval sent  Neuro checks    SONNY (acute kidney injury) ([facility])- (present on admission)  Assessment & Plan  Improving  Monitor creatinine, urine output, electrolytes closely  Avoid nephrotoxins       VTE:  Lovenox  Ulcer: PPI  Lines: Central Line  Ongoing indication addressed and Quispe Catheter  Ongoing indication addressed    I have performed a physical exam and reviewed and updated ROS and Plan today (9/27/2020). In review of yesterday's note (9/26/2020), there are no changes except as documented above.     Evaluation of acute neuro changes concerning for CVA. This patient is critically ill, at high risk for decompensation leading to worsening vital organ dysfunction and death without critical care interventions.    Discussed patient condition and risk of morbidity and/or mortality with Family, RN, RT, Pharmacy, Charge nurse / hot rounds, Patient and CVS and neurology     The patient remains critically ill.  Critical care time = 38 minutes  in directly providing and coordinating critical care and extensive data review.  No time overlap and excludes procedures.

## 2020-09-28 ENCOUNTER — APPOINTMENT (OUTPATIENT)
Dept: RADIOLOGY | Facility: MEDICAL CENTER | Age: 50
DRG: 233 | End: 2020-09-28
Attending: INTERNAL MEDICINE
Payer: COMMERCIAL

## 2020-09-28 ENCOUNTER — APPOINTMENT (OUTPATIENT)
Dept: RADIOLOGY | Facility: MEDICAL CENTER | Age: 50
DRG: 233 | End: 2020-09-28
Attending: NURSE PRACTITIONER
Payer: COMMERCIAL

## 2020-09-28 PROBLEM — D64.9 NORMOCHROMIC NORMOCYTIC ANEMIA: Status: ACTIVE | Noted: 2020-09-28

## 2020-09-28 PROBLEM — R41.82 ALTERED MENTAL STATUS: Status: ACTIVE | Noted: 2020-09-28

## 2020-09-28 LAB
ANION GAP SERPL CALC-SCNC: 15 MMOL/L (ref 7–16)
APPEARANCE UR: CLEAR
BACTERIA #/AREA URNS HPF: NEGATIVE /HPF
BILIRUB UR QL STRIP.AUTO: NEGATIVE
BUN SERPL-MCNC: 20 MG/DL (ref 8–22)
CA-I BLD ISE-SCNC: 1.2 MMOL/L (ref 1.1–1.3)
CALCIUM SERPL-MCNC: 8.4 MG/DL (ref 8.5–10.5)
CHLORIDE SERPL-SCNC: 98 MMOL/L (ref 96–112)
CO2 SERPL-SCNC: 22 MMOL/L (ref 20–33)
COLOR UR: YELLOW
CREAT SERPL-MCNC: 1.16 MG/DL (ref 0.5–1.4)
EPI CELLS #/AREA URNS HPF: NEGATIVE /HPF
ERYTHROCYTE [DISTWIDTH] IN BLOOD BY AUTOMATED COUNT: 50 FL (ref 35.9–50)
GLUCOSE BLD-MCNC: 172 MG/DL (ref 65–99)
GLUCOSE BLD-MCNC: 186 MG/DL (ref 65–99)
GLUCOSE BLD-MCNC: 192 MG/DL (ref 65–99)
GLUCOSE BLD-MCNC: 198 MG/DL (ref 65–99)
GLUCOSE BLD-MCNC: 223 MG/DL (ref 65–99)
GLUCOSE SERPL-MCNC: 181 MG/DL (ref 65–99)
GLUCOSE UR STRIP.AUTO-MCNC: NEGATIVE MG/DL
HCT VFR BLD AUTO: 22.5 % (ref 42–52)
HGB BLD-MCNC: 7.8 G/DL (ref 14–18)
KETONES UR STRIP.AUTO-MCNC: ABNORMAL MG/DL
LEUKOCYTE ESTERASE UR QL STRIP.AUTO: NEGATIVE
MCH RBC QN AUTO: 33.1 PG (ref 27–33)
MCHC RBC AUTO-ENTMCNC: 34.7 G/DL (ref 33.7–35.3)
MCV RBC AUTO: 95.3 FL (ref 81.4–97.8)
MICRO URNS: ABNORMAL
NITRITE UR QL STRIP.AUTO: NEGATIVE
PH UR STRIP.AUTO: 5 [PH] (ref 5–8)
PLATELET # BLD AUTO: 160 K/UL (ref 164–446)
PMV BLD AUTO: 11.1 FL (ref 9–12.9)
POTASSIUM BLD-SCNC: 3.5 MMOL/L (ref 3.6–5.5)
POTASSIUM SERPL-SCNC: 3.5 MMOL/L (ref 3.6–5.5)
PROT UR QL STRIP: NEGATIVE MG/DL
RBC # BLD AUTO: 2.36 M/UL (ref 4.7–6.1)
RBC # URNS HPF: ABNORMAL /HPF
RBC UR QL AUTO: NEGATIVE
SODIUM SERPL-SCNC: 135 MMOL/L (ref 135–145)
SP GR UR REFRACTOMETRY: >1.05
URATE CRY #/AREA URNS HPF: POSITIVE /HPF
UROBILINOGEN UR STRIP.AUTO-MCNC: 0.2 MG/DL
WBC # BLD AUTO: 12.3 K/UL (ref 4.8–10.8)
WBC #/AREA URNS HPF: ABNORMAL /HPF

## 2020-09-28 PROCEDURE — 70551 MRI BRAIN STEM W/O DYE: CPT

## 2020-09-28 PROCEDURE — 770022 HCHG ROOM/CARE - ICU (200)

## 2020-09-28 PROCEDURE — 80048 BASIC METABOLIC PNL TOTAL CA: CPT

## 2020-09-28 PROCEDURE — 95816 EEG AWAKE AND DROWSY: CPT | Mod: 26 | Performed by: PSYCHIATRY & NEUROLOGY

## 2020-09-28 PROCEDURE — 95816 EEG AWAKE AND DROWSY: CPT | Performed by: PSYCHIATRY & NEUROLOGY

## 2020-09-28 PROCEDURE — 82962 GLUCOSE BLOOD TEST: CPT | Mod: 91

## 2020-09-28 PROCEDURE — 99024 POSTOP FOLLOW-UP VISIT: CPT | Performed by: THORACIC SURGERY (CARDIOTHORACIC VASCULAR SURGERY)

## 2020-09-28 PROCEDURE — 700101 HCHG RX REV CODE 250: Performed by: CLINICAL NURSE SPECIALIST

## 2020-09-28 PROCEDURE — A9270 NON-COVERED ITEM OR SERVICE: HCPCS | Performed by: NURSE PRACTITIONER

## 2020-09-28 PROCEDURE — 99232 SBSQ HOSP IP/OBS MODERATE 35: CPT | Performed by: STUDENT IN AN ORGANIZED HEALTH CARE EDUCATION/TRAINING PROGRAM

## 2020-09-28 PROCEDURE — 94669 MECHANICAL CHEST WALL OSCILL: CPT

## 2020-09-28 PROCEDURE — 97166 OT EVAL MOD COMPLEX 45 MIN: CPT

## 2020-09-28 PROCEDURE — A9270 NON-COVERED ITEM OR SERVICE: HCPCS | Performed by: CLINICAL NURSE SPECIALIST

## 2020-09-28 PROCEDURE — 71045 X-RAY EXAM CHEST 1 VIEW: CPT

## 2020-09-28 PROCEDURE — 700111 HCHG RX REV CODE 636 W/ 250 OVERRIDE (IP): Performed by: CLINICAL NURSE SPECIALIST

## 2020-09-28 PROCEDURE — 4A00X4Z MEASUREMENT OF CENTRAL NERVOUS ELECTRICAL ACTIVITY, EXTERNAL APPROACH: ICD-10-PCS | Performed by: PSYCHIATRY & NEUROLOGY

## 2020-09-28 PROCEDURE — 700102 HCHG RX REV CODE 250 W/ 637 OVERRIDE(OP): Performed by: NURSE PRACTITIONER

## 2020-09-28 PROCEDURE — 700111 HCHG RX REV CODE 636 W/ 250 OVERRIDE (IP): Performed by: INTERNAL MEDICINE

## 2020-09-28 PROCEDURE — 700111 HCHG RX REV CODE 636 W/ 250 OVERRIDE (IP): Performed by: NURSE PRACTITIONER

## 2020-09-28 PROCEDURE — 700102 HCHG RX REV CODE 250 W/ 637 OVERRIDE(OP): Performed by: CLINICAL NURSE SPECIALIST

## 2020-09-28 PROCEDURE — 92610 EVALUATE SWALLOWING FUNCTION: CPT

## 2020-09-28 PROCEDURE — 85027 COMPLETE CBC AUTOMATED: CPT

## 2020-09-28 RX ORDER — LISINOPRIL 5 MG/1
5 TABLET ORAL DAILY
Status: DISCONTINUED | OUTPATIENT
Start: 2020-09-29 | End: 2020-09-29

## 2020-09-28 RX ORDER — POTASSIUM CHLORIDE 14.9 MG/ML
20 INJECTION INTRAVENOUS ONCE
Status: COMPLETED | OUTPATIENT
Start: 2020-09-28 | End: 2020-09-28

## 2020-09-28 RX ORDER — LIDOCAINE 50 MG/G
1 PATCH TOPICAL EVERY 24 HOURS
Status: DISCONTINUED | OUTPATIENT
Start: 2020-09-28 | End: 2020-10-09 | Stop reason: HOSPADM

## 2020-09-28 RX ADMIN — ASPIRIN 81 MG: 81 TABLET, COATED ORAL at 06:33

## 2020-09-28 RX ADMIN — MUPIROCIN 1 APPLICATION: 20 OINTMENT TOPICAL at 06:26

## 2020-09-28 RX ADMIN — MAGNESIUM HYDROXIDE 30 ML: 400 SUSPENSION ORAL at 11:21

## 2020-09-28 RX ADMIN — BUPROPION HYDROCHLORIDE 150 MG: 150 TABLET, EXTENDED RELEASE ORAL at 17:07

## 2020-09-28 RX ADMIN — ACETAMINOPHEN 1000 MG: 500 TABLET ORAL at 06:31

## 2020-09-28 RX ADMIN — ALLOPURINOL 100 MG: 100 TABLET ORAL at 06:31

## 2020-09-28 RX ADMIN — POTASSIUM CHLORIDE 20 MEQ: 14.9 INJECTION, SOLUTION INTRAVENOUS at 08:09

## 2020-09-28 RX ADMIN — MUPIROCIN 1 APPLICATION: 20 OINTMENT TOPICAL at 17:07

## 2020-09-28 RX ADMIN — TRAMADOL HYDROCHLORIDE 50 MG: 50 TABLET, FILM COATED ORAL at 15:36

## 2020-09-28 RX ADMIN — TRAMADOL HYDROCHLORIDE 50 MG: 50 TABLET, FILM COATED ORAL at 01:06

## 2020-09-28 RX ADMIN — CLOPIDOGREL BISULFATE 75 MG: 75 TABLET ORAL at 06:31

## 2020-09-28 RX ADMIN — ATORVASTATIN CALCIUM 80 MG: 80 TABLET, FILM COATED ORAL at 20:51

## 2020-09-28 RX ADMIN — METOPROLOL TARTRATE 25 MG: 25 TABLET, FILM COATED ORAL at 17:07

## 2020-09-28 RX ADMIN — ENOXAPARIN SODIUM 40 MG: 40 INJECTION SUBCUTANEOUS at 17:07

## 2020-09-28 RX ADMIN — TRAMADOL HYDROCHLORIDE 50 MG: 50 TABLET, FILM COATED ORAL at 21:06

## 2020-09-28 RX ADMIN — ACETAMINOPHEN 1000 MG: 500 TABLET ORAL at 11:21

## 2020-09-28 RX ADMIN — LIDOCAINE 1 PATCH: 50 PATCH TOPICAL at 10:08

## 2020-09-28 RX ADMIN — METOPROLOL TARTRATE 25 MG: 25 TABLET, FILM COATED ORAL at 06:31

## 2020-09-28 RX ADMIN — ACETAMINOPHEN 1000 MG: 500 TABLET ORAL at 17:06

## 2020-09-28 RX ADMIN — LISINOPRIL 10 MG: 10 TABLET ORAL at 06:31

## 2020-09-28 RX ADMIN — DOCUSATE SODIUM 50 MG AND SENNOSIDES 8.6 MG 2 TABLET: 8.6; 5 TABLET, FILM COATED ORAL at 17:07

## 2020-09-28 RX ADMIN — FUROSEMIDE 40 MG: 10 INJECTION, SOLUTION INTRAMUSCULAR; INTRAVENOUS at 06:26

## 2020-09-28 ASSESSMENT — ENCOUNTER SYMPTOMS
NEUROLOGICAL NEGATIVE: 1
PSYCHIATRIC NEGATIVE: 1
EYES NEGATIVE: 1
MUSCULOSKELETAL NEGATIVE: 1
CARDIOVASCULAR NEGATIVE: 1
GASTROINTESTINAL NEGATIVE: 1
RESPIRATORY NEGATIVE: 1

## 2020-09-28 ASSESSMENT — PAIN DESCRIPTION - PAIN TYPE
TYPE: SURGICAL PAIN

## 2020-09-28 ASSESSMENT — FIBROSIS 4 INDEX: FIB4 SCORE: 1.445319123384539125

## 2020-09-28 ASSESSMENT — COGNITIVE AND FUNCTIONAL STATUS - GENERAL
HELP NEEDED FOR BATHING: A LOT
DRESSING REGULAR UPPER BODY CLOTHING: A LOT
SUGGESTED CMS G CODE MODIFIER DAILY ACTIVITY: CL
DAILY ACTIVITIY SCORE: 12
EATING MEALS: A LOT
TOILETING: A LOT
PERSONAL GROOMING: A LOT
DRESSING REGULAR LOWER BODY CLOTHING: A LOT

## 2020-09-28 ASSESSMENT — ACTIVITIES OF DAILY LIVING (ADL): TOILETING: UNABLE TO DETERMINE AT THIS TIME

## 2020-09-28 NOTE — CARE PLAN
Problem: Communication  Goal: The ability to communicate needs accurately and effectively will improve  Outcome: PROGRESSING SLOWER THAN EXPECTED  Pt slow to respond  CT negative  MRI and EEG ordered    Problem: Bowel/Gastric:  Goal: Normal bowel function is maintained or improved  Outcome: PROGRESSING SLOWER THAN EXPECTED  Pt abdomen is firm and distended  Bowel protocol followed and medications given as ordered

## 2020-09-28 NOTE — PROCEDURES
ROUTINE ELECTROENCEPHALOGRAM REPORT      Referring provider: Dr. Marcelino.     DOS: 9/28/2020 (total recording of 33 minutes)    INDICATION:  Guerrero Ann 49 y.o. male presenting with seizure.    CURRENT ANTIEPILEPTIC REGIMEN: None.    TECHNIQUE: 30 channel routine electroencephalogram (EEG) was performed in accordance with the international 10-20 system. The study was reviewed in bipolar and referential montages. The recording examined the patient during wakeful and drowsy state(s).     DESCRIPTION OF THE RECORD:  During the wakefulness, the background showed a symmetrical 8 hz alpha activity posteriorly with amplitude of 70 mV.  There was reactivity to eye closure/opening.  A normal anterior-posterior gradient was noted with faster beta frequencies seen anteriorly.  During drowsiness, theta/delta frequencies were seen.    ACTIVATION PROCEDURES:   Intermittent Photic stimulation was performed in a stepwise fashion from 1 to 30 Hz and elicited a normal response (photic driving), most noticeable in the posterior leads.      ICTAL AND/OR INTERICTAL FINDINGS:   No focal or generalized epileptiform activity noted. No regional slowing was seen during this routine study.  No clinical events or seizures were reported or recorded during the study.     EKG: sampling of the EKG recording demonstrated sinus rhythm.       INTERPRETATION:  This is a normal routine EEG recording in the awake and drowsy state(s).  Clinical correlation is recommended.    Note: A normal EEG does not rule out epilepsy.  If the clinical suspicion remains high for seizures, a prolonged recording to capture clinical or subclinical events may be helpful.        Brendan Vazquez MD   Epilepsy and Neurodiagnostics.   Clinical  of Neurology [facility].   Diplomate in Neurology, Epilepsy, and Electrodiagnostic Medicine.   Office: [phone]  Fax: [phone]

## 2020-09-28 NOTE — THERAPY
Occupational Therapy   Initial Evaluation     Patient Name: Guerrero Ann  Age:  49 y.o., Sex:  male  Medical Record #: [id number]  Today's Date: 9/28/2020     Precautions  Precautions: Fall Risk, Swallow Precautions ( See Comments), Cardiac Precautions (See Comments), Sternal Precautions (See Comments)    Assessment  Patient is 49 y.o. male admitted 9/21/20 with chest pain, s/p CABG x3 9/25, code stroke called 9/27. PMHx includes diabetes, HTN, kidney stones, MI, psychiatric problem. Awaiting neurology consult. Pt presents to OT eval with impaired cognition, impaired seated and standing balance, BUE gross/fine motor deficits, R hand  strength deficits, possible AROM deficits in BUE, and noted preferred eye gaze towards right upper quadrant. Pt currently requiring maxA for ADLs, maxA for bed mobility, and modA x2 to stand. Pt also appears anxious/tense at times, however unable to verbalize cause. Pt with very little verbal output throughout eval. Pt follows 1-step simple commands 50-75% of time, and often requires tactile cues for initiation. Pt unable to provide PLOF/home setup. Will continue to work with pt while here to address seated ADLs, ADL transfers, BUE strength/coordination/AROM, balance, and activity tolerance.     Plan    Recommend Occupational Therapy 3 times per week until therapy goals are met for the following treatments:  Adaptive Equipment, Cognitive Skill Development, Manual Therapy Techniques, Neuro Re-Education / Balance, Self Care/Activities of Daily Living, Therapeutic Activities and Therapeutic Exercises.    DC Equipment Recommendations: Unable to determine at this time  Discharge Recommendations: Recommend post-acute placement for additional occupational therapy services prior to discharge home     Subjective    Pt cooperative with OT eval, however had minimal verbal output during session.      Objective       09/28/20 1121   Prior Living Situation   Prior Services  None   Housing / Facility 1 Story House   Steps Into Home 1   Steps In Home 0   Equipment Owned None   Lives with - Patient's Self Care Capacity Significant Other;Child Less than 18 Years of Age   Comments Per PT note/chart review pt lives with SO and children ages 9 & 11. Pt with minimal verbal output and not answering questions regarding home setup/PLOF.   Prior Level of ADL Function   Self Feeding Unable To Determine At This Time   Grooming / Hygiene Unable To Determine At This Time   Bathing Unable To Determine At This Time   Dressing Unable To Determine At This Time   Toileting Unable To Determine At This Time   Prior Level of IADL Function   Medication Management Unable To Determine At This Time   Laundry Unable To Determine At This Time   Kitchen Mobility Unable To Determine At This Time   Finances Unable To Determine At This Time   Home Management Unable To Determine At This Time   Shopping Unable To Determine At This Time   Prior Level Of Mobility Unable to Determine At This Time   Driving / Transportation Unable To Determine At This Time   Occupation (Pre-Hospital Vocational) Unable To Determine At This Time   Leisure Interests Unable To Determine At This Time   Cognition    Cognition / Consciousness X   Speech/ Communication Delayed Responses   Level of Consciousness Alert   Ability To Follow Commands 1 Step  (50-75% of time with significant delay)   Safety Awareness Impaired   New Learning Impaired   Attention Impaired   Sequencing Impaired   Initiation Impaired   Comments flat affect, minimal verbal output, gazing toward upper right corner most of session, follows simple 1-step commands ~75% of time   Active ROM Upper Body   Active ROM Upper Body  X   Comments Unclear due to pt not following commands at all times, able to give thumbs up R hand and make full fist, no thumbs up on left, initiates reaching forward with BUE with tactile cues   Strength Upper Body   Upper Body Strength  X   Right   Impaired   Comments 3/5 R hand , 5/5 left , difficult to complete full MMT due to impaired cognition/command following   Neurological Concerns   Neurological Concerns Yes   Sitting Posture During ADL's Posterior Lean   Standing Posture During ADL's Posterior Lean   Coordination Upper Body   Coordination X   Fine Motor Coordination significant impairment BUE, L hand coordination > R hand   Gross Motor Coordination significant impairment BUE   Comments Able to oppose to MF on L hand, able to oppose to IF on R hand   Balance Assessment   Comments unable to maintain upright seated position EOB without max physical assistance, posterior lean in sitting and standing, maxA x2 for sit to stand at EOB   Bed Mobility    Supine to Sit Maximal Assist   Comments able to initiate bed mobility with L leg with verbal cues   ADL Assessment   Grooming   (declined due to fatigue)   Lower Body Dressing Maximal Assist  (socks)   Toileting   (unable to get to BSC)   Functional Mobility   Sit to Stand Moderate Assist  (x2)   Toilet Transfers Unable to Participate   Mobility supine to EOB, STS x1 with modA x2   Visual Perception   Comments appears to maintain eye gaze towards upper right, difficulty tracking   Activity Tolerance   Comments limited by balance, cognition   Patient / Family Goals   Patient / Family Goal #1 Unable to state   Short Term Goals   Short Term Goal # 1 Pt will complete seated grooming/hygiene with Donny by discharge.   Short Term Goal # 2 Pt will complete UE dressing with Donny by discharge.   Short Term Goal # 3 Pt will complete toilet transfer with modA by discharge.

## 2020-09-28 NOTE — ASSESSMENT & PLAN NOTE
CVA seems unlikely culprit given right gaze and right weakness localize to opposite cerebral hemispheres  EEG, MRI ordered  Metabolic eval sent  Neuro checks

## 2020-09-28 NOTE — PROGRESS NOTES
Hospital Medicine Daily Progress Note    Date of Service  9/28/2020    Chief Complaint  49 y.o. male admitted 9/21/2020 with NSTEMI    Hospital Course    This is a 49 y.o. male with CAD status post stent placement, hypertension, diabetes who presented 9/21/2020 with chest pain.  The patient states he has been having exertional chest pain for the past month; however, became worse and persistent one day prior. He reported substernal chest pressure with radiation down his left arm associated with some numbness and tingling of his left hand and associated shortness of breath.  He denied any fevers, chills or cough. He has not been taking his medications for the past year since he lost his insurance.  He underwent a cardiac catheterization in 2018 and had a stent placed to his RCA.  He underwent a repeat cardiac catheterization in July 2019 which revealed two-vessel coronary artery disease with small vessel chronic total occlusion of the mid left anterior descending artery and patent stents in the mid right coronary artery.  Normal left ventricular systolic function with EF of 65%.  Patient also reported his blood pressure has been uncontrolled.  At the time of evaluation in ED, his chest pain has resolved.  His initial troponin in the ER was 21 which increased to 63.  Patient was placed on heparin GTT     EKG on arrival revealed sinus rhythm with LVH and repolarization changes.  T wave inversions with minimal ST depression noted in inferior leads  Second EKG sinus rhythm with ST elevation in V2 and V3, does not meet criteria given underlying LVH.  Chest x-ray did not show any acute cardiopulmonary process.      9/22: Underwent cardiac cath which showed multivessel coronary artery disease cerebrovascular previously placed RCA stents.     9/25: CABG x3     9/28:  EEG normal      Interval Problem Update  Patient is postop day #3 for CABG x3.  Yesterday patient was found to have acute mental status changes.  Concern for CVA  a code stroke was called however the CT of the brain did not show acute ischemia.  MRI is now pending.  EEG was negative for any seizure activity.  Unsure of the etiology of the patient's neuro changes.  Patient does appear altered on physical examination and only answers a couple questions with yes now and his girlfriend's name but did not participate in for a review of systems to be performed.  T-max 99.8, heart rate 91, respiratory rate 25, BP 97/62.  WBC 12.3.    Consultants/Specialty  Cardiology- Dr Cantu  Cardiothoracic surgery- DR Chicas  Neurology- Dr Le  Critical Care- Dr Gonda    Code Status  Full Code    Disposition  Awaiting clinical course.    Review of Systems  Review of Systems   Unable to perform ROS: Mental status change        Physical Exam  Temp:  [36.2 °C (97.1 °F)-37.7 °C (99.8 °F)] (P) 36.2 °C (97.1 °F)  Pulse:  [] (P) 91  Resp:  [15-34] (P) 25  BP: ()/(62-97) (P) 97/62  SpO2:  [91 %-98 %] 95 %    Physical Exam  Constitutional:       General: He is not in acute distress.     Appearance: Normal appearance.   HENT:      Head: Normocephalic and atraumatic.      Nose: Nose normal.      Mouth/Throat:      Mouth: Mucous membranes are moist.      Pharynx: Oropharynx is clear.   Eyes:      General: No scleral icterus.     Extraocular Movements: Extraocular movements intact.      Pupils: Pupils are equal, round, and reactive to light.   Neck:      Musculoskeletal: Normal range of motion and neck supple. No muscular tenderness.   Cardiovascular:      Rate and Rhythm: Normal rate and regular rhythm.      Pulses: Normal pulses.      Heart sounds: Normal heart sounds. No murmur.   Pulmonary:      Effort: Pulmonary effort is normal. No respiratory distress.      Breath sounds: Normal breath sounds. No wheezing or rales.   Chest:      Comments: Mid chest incision clean dry and intact.  Left subclavian clean dry and intact.  Abdominal:      General: Abdomen is flat. Bowel sounds are normal.  There is no distension.      Palpations: Abdomen is soft.      Tenderness: There is no abdominal tenderness.   Musculoskeletal: Normal range of motion.         General: No swelling or tenderness.   Skin:     Coloration: Skin is not jaundiced.      Findings: No bruising.   Neurological:      General: No focal deficit present.      Mental Status: He is alert. Mental status is at baseline. He is disoriented and confused.      Cranial Nerves: No cranial nerve deficit.   Psychiatric:      Comments: Unable to assess due to patient condition.         Fluids    Intake/Output Summary (Last 24 hours) at 9/28/2020 1539  Last data filed at 9/28/2020 1200  Gross per 24 hour   Intake 800 ml   Output 2425 ml   Net -1625 ml       Laboratory  Recent Labs     09/26/20  0206 09/27/20  0230 09/28/20  0200   WBC 12.1* 10.2 12.3*   RBC 2.55* 2.49* 2.36*   HEMOGLOBIN 8.5* 8.2* 7.8*   HEMATOCRIT 24.6* 23.6* 22.5*   MCV 96.5 94.8 95.3   MCH 33.3* 32.9 33.1*   MCHC 34.6 34.7 34.7   RDW 43.3 52.2* 50.0   PLATELETCT 191 143* 160*   MPV 11.1 11.2 11.1     Recent Labs     09/26/20  0039 09/26/20  0517 09/27/20  0230 09/28/20  0200   SODIUM 137  --  135 135   POTASSIUM 4.5 3.8 4.0 3.5*   CHLORIDE 104  --  101 98   CO2 16*  --  21 22   GLUCOSE 130*  --  179* 181*   BUN 17  --  19 20   CREATININE 1.06  --  1.17 1.16   CALCIUM 7.7*  --  8.5 8.4*                   Imaging  DX-CHEST-PORTABLE (1 VIEW)   Final Result         1.  Pulmonary edema and/or infiltrates are identified, which are stable since the prior exam.   2.  Cardiomegaly      CT-CTA NECK WITH & W/O-POST PROCESSING   Final Result      1.  CT angiogram of the neck within normal limits.      2.  Mild bilateral internal carotid artery atherosclerotic plaque without stenosis      3.  Small right pleural effusion      4.  Small left apical pneumothorax      CT-CTA HEAD WITH & W/O-POST PROCESS   Final Result      No thrombosis is seen within the [place].      Bilateral white matter  hypodensities can be seen in the setting of bilateral lacunar infarcts, chronic microangiopathic change, demyelination or gliosis. MRI would be more sensitive for further evaluation.      Paranasal sinus disease.      DX-CHEST-PORTABLE (1 VIEW)   Final Result         Interval extubation. No other significant interval change.      EC-MARGOTH W/O CONT   Final Result      DX-CHEST-PORTABLE (1 VIEW)   Final Result      1.  Left basilar opacification likely represents atelectasis.      2.  Endotracheal tube tip projects approximately 1.5 cm above the migel.      3.  Left subclavian catheter tip projects over the superior vena cava. No pneumothorax is identified.      US-CAROTID DOPPLER BILAT   Final Result      US-VEIN MAPPING LOWER EXTREMITY BILAT   Final Result      EC-ECHOCARDIOGRAM COMPLETE W/O CONT   Final Result      DX-CHEST-PORTABLE (1 VIEW)   Final Result      No acute cardiopulmonary abnormality.      CL-LEFT HEART CATHETERIZATION WITH POSSIBLE INTERVENTION    (Results Pending)   MR-BRAIN-W/O    (Results Pending)        Assessment/Plan  * NSTEMI (non-ST elevated myocardial infarction) (HCC)- (present on admission)  Assessment & Plan  Cardiology and cardiothoracic surgery following  Patient is status post CABG x3  Continue aspirin, atorvastatin 80 mg, lisinopril 5 mg and metoprolol 25 mg.        Altered mental status- (present on admission)  Assessment & Plan  Postoperatively patient had colopathy metabolic versus seizure versus CVA.    CT head did not show any thrombi.  TSH was normal, HIV and syphilis were negative.  EEG was normal.  Neurology following.  Pending MRI.    CAD (coronary artery disease)- (present on admission)  Assessment & Plan  Status post CABG x3.  Continue aspirin, lisinopril, metoprolol and atorvastatin.    Essential hypertension- (present on admission)  Assessment & Plan  Uncontrolled on arrival  Restarted on carvedilol, lisinopril and norvasc    Type 2 diabetes mellitus with complication,  without long-term current use of insulin (HCC)- (present on admission)  Assessment & Plan  Start on insulin sliding scale with serial Accu-Checks  HbA1C 7.9; c/w ISS for now  Hypoglycemic protocol in place        SONNY (acute kidney injury) (HCC)- (present on admission)  Assessment & Plan  Unclear if contrast-induced  Started gentle hydration and now resolved    Normochromic normocytic anemia  Assessment & Plan  Likely secondary to postoperative blood losses.  Monitor with daily CBC.         VTE prophylaxis: Lovenox 40mg

## 2020-09-28 NOTE — CARE PLAN
Problem: Post Op Day 3 CABG/Heart Valve replacement  Goal: Optimal care of the post op CABG/Heart Valve replacement post op day 3  Outcome: PROGRESSING SLOWER THAN EXPECTED  Intervention: Daily Weights  Note: Daily weight completed on NOC shift   Intervention: Ambulate, increasing the distance each time x 3 and before bed  Note: Patient slow to respond, not following commands appropriately, right sided weakness. Not safe to ambulate at this time.  Intervention: IS q 1 hour while awake and record best IS volume  Note: Attempted to have patient use IS, but d/t neuro status unable to follow commands and use appropriately.   Intervention: Consider removal of gomez, chest tube and pacer wire if not already done  Note: Patient required Gomez reinsertion 9/27 d/t urinary retention. Reassess need on dayshift.

## 2020-09-28 NOTE — PROGRESS NOTES
Cardiac Surgery RN Navigator Daily Rounding Note  Procedure Performed: Procedure(s):  CABG, WITH ENDOSCOPIC VEIN PROCUREMENT- X3  ECHOCARDIOGRAM, TRANSESOPHAGEAL     By  Dr. Chicas  3 Days Post-Op    Pertinent Overnight Events:    [place] in for urinary retention    Neuro consulted-metabolic encephalopathy vs seizure vs CVA.  Permissive HTN    Pending EEG    s/p transfusion H&H down again      Pertinent neuro findings/needs: CT negative; slow to respond, not ambulating. Neuro consulted    Cardiac/hemodynamics:  Temp (24hrs), Av.7 °C (98 °F), Min:35.9 °C (96.6 °F), Max:37.7 °C (99.8 °F)    Heart rhythm: SR to ST  Temp:  [35.9 °C (96.6 °F)-37.7 °C (99.8 °F)] 37.7 °C (99.8 °F)  Pulse:  [] 94  Resp:  [15-34] 28  BP: ()/(51-97) 137/97  SpO2:  [91 %-98 %] 92 %        /Weights:  Admit weight: Weight: 72.6 kg (160 lb)  Current Weight: Weight: 74.7 kg (164 lb 10.9 oz) (20 0600)  Weight change: 1.4 kg (3 lb 1.4 oz)    Intake/Output Summary (Last 24 hours) at 2020 0719  Last data filed at 2020 0600  Gross per 24 hour   Intake 1236.67 ml   Output 3360 ml   Net -2123.33 ml       Adequate urine output: yes    Respiratory:        Pertinent respiratory findings/needs: 2 L NC    GI findings/needs:     Labs:  Recent Labs     20  0206 20  0230 20  0200   WBC 12.1* 10.2 12.3*   RBC 2.55* 2.49* 2.36*   HEMOGLOBIN 8.5* 8.2* 7.8*   HEMATOCRIT 24.6* 23.6* 22.5*   MCV 96.5 94.8 95.3   MCH 33.3* 32.9 33.1*   MCHC 34.6 34.7 34.7   RDW 43.3 52.2* 50.0   PLATELETCT 191 143* 160*   MPV 11.1 11.2 11.1     Recent Labs     20  0039 20  0517 20  0230 20  0200   SODIUM 137  --  135 135   POTASSIUM 4.5 3.8 4.0 3.5*   CHLORIDE 104  --  101 98   CO2 16*  --  21 22   GLUCOSE 130*  --  179* 181*   BUN 17  --  19 20   CREATININE 1.06  --  1.17 1.16   CALCIUM 7.7*  --  8.5 8.4*     INR:   Recent Labs     20  1656 20  1421   INR 0.98 1.34*       Required Cardiac  medications review:  ASA:  Yes  Plavix: Yes  BB: Yes  ACE/ARB: Yes  Statin: yes  Diuretic: yes    LDA's necessity reviewed: YES-gomez in place for retention    Thoughts and considerations for team rounding:    Neurology wanted permissive HTN; lisinopril was increased by Christi yesterday am from 5 to 10 mg, BP's range from low 100's to 140.    Potassium replace-  3.5    SCD's for legs as patient not mobilizing    Speech therapy as bedside RN is holding non essential meds due to neuro fluctuations.  If fails, will need cortrak for food and meds.    Cannot get 2 view CXR as not able to mobilize    Discharge plan:    TBD needs all f/u appointments

## 2020-09-28 NOTE — PROGRESS NOTES
Cardiovascular Surgery Progress Note    Name: Guerrero Ann  MRN: [id number]  : 1970  Admit Date: 2020  7:37 PM  Procedure:  Procedure(s) and Anesthesia Type:     * CABG, WITH ENDOSCOPIC VEIN PROCUREMENT- X3 - General     * ECHOCARDIOGRAM, TRANSESOPHAGEAL - General  3 Day Post-Op    Vitals:  Patient Vitals for the past 8 hrs:   SpO2 O2 Delivery Device O2 (LPM) Pulse Resp BP Weight   20 0631 92 % Room air w/o2 available -- 94 (!) 28 137/97 --   20 0625 91 % Room air w/o2 available -- -- -- -- --   20 0600 -- -- -- -- -- -- 74.7 kg (164 lb 10.9 oz)   20 0400 98 % Silicone Nasal Cannula 2 85 (!)  113/67 --     Temp (24hrs), Av.8 °C (98.3 °F), Min:35.9 °C (96.7 °F), Max:37.7 °C (99.8 °F)      Respiratory:    Respiration: (!) 28, Pulse Oximetry: 92 %     Chest Tube Drains:          Fluids:    Intake/Output Summary (Last 24 hours) at 2020 0852  Last data filed at 2020 0600  Gross per 24 hour   Intake 940 ml   Output 1460 ml   Net -520 ml     Admit weight: Weight: 72.6 kg (160 lb)  Current weight: Weight: 74.7 kg (164 lb 10.9 oz) (20 0600)    Labs:  Recent Labs     20  0206 20  0230 20  0200   WBC 12.1* 10.2 12.3*   RBC 2.55* 2.49* 2.36*   HEMOGLOBIN 8.5* 8.2* 7.8*   HEMATOCRIT 24.6* 23.6* 22.5*   MCV 96.5 94.8 95.3   MCH 33.3* 32.9 33.1*   MCHC 34.6 34.7 34.7   RDW 43.3 52.2* 50.0   PLATELETCT 191 143* 160*   MPV 11.1 11.2 11.1         Recent Labs     20  0039 20  0517 20  0230 20  0200   SODIUM 137  --  135 135   POTASSIUM 4.5 3.8 4.0 3.5*   CHLORIDE 104  --  101 98   CO2 16*  --  21 22   GLUCOSE 130*  --  179* 181*   BUN 17  --  19 20   CREATININE 1.06  --  1.17 1.16   CALCIUM 7.7*  --  8.5 8.4*     Recent Labs     20  1421   APTT 31.0   INR 1.34*       Medications:  • MD Alert...Adult ICU Electrolyte Replacement per Pharmacy       • potassium chloride (KCL-CENTRAL) IV *Administer in ICU only*   20 mEq 20 mEq (09/28/20 0809)   • [START ON 9/29/2020] lisinopril  5 mg     • allopurinol  100 mg     • metoprolol  25 mg     • atorvastatin  80 mg     • buPROPion SR  150 mg     • tamsulosin  0.4 mg     • potassium chloride SA  20 mEq     • furosemide  40 mg     • insulin lispro  4 Units     • insulin lispro  0-15 Units     • acetaminophen  1,000 mg     • Pharmacy Consult Request  1 Each     • senna-docusate  2 Tab      And   • polyethylene glycol/lytes  1 Packet      And   • magnesium hydroxide  30 mL     • omeprazole  20 mg     • enoxaparin  40 mg     • mupirocin  1 Application     • aspirin EC  81 mg     • clopidogrel  75 mg          Ordered Medications:    ASA - Yes    Plavix - Yes    Post-operative Beta Blockers - Yes    Ace Inhibitor - Yes    Statin - Yes         Exam:   Review of Systems   Constitutional: Positive for malaise/fatigue.   HENT: Negative.    Eyes: Negative.    Respiratory: Negative.    Cardiovascular: Negative.    Gastrointestinal: Negative.    Genitourinary: Negative.    Musculoskeletal: Negative.    Skin: Negative.    Neurological: Negative.    Endo/Heme/Allergies: Negative.    Psychiatric/Behavioral: Negative.        Physical Exam  Constitutional:       General: He is not in acute distress.     Appearance: He is well-developed. He is not diaphoretic.   Neck:      Musculoskeletal: Neck supple.   Cardiovascular:      Rate and Rhythm: Normal rate and regular rhythm.      Heart sounds: Normal heart sounds. No murmur. No friction rub. No gallop.    Pulmonary:      Effort: Pulmonary effort is normal. No respiratory distress.      Breath sounds: Examination of the right-lower field reveals decreased breath sounds. Examination of the left-lower field reveals decreased breath sounds. Decreased breath sounds present. No wheezing or rales.   Abdominal:      General: There is no distension.      Palpations: Abdomen is soft.      Tenderness: There is no abdominal tenderness.   Skin:     General: Skin is  warm and dry.      Comments: Sternum- prevena dressing  SVG sites- c/d/i   Neurological:      Mental Status: He is alert and oriented to person, place, and time.         Quality Measures:   Quality-Core Measures   Reviewed items::  EKG reviewed, Labs reviewed, Medications reviewed and Radiology images reviewed      Assessment/Plan:  POD 1 HDS, SR, Acute blood loss anemia- s/p transfusion- keep mediastinal tubes, gently diurese, d/c gomez  POD 2 HTN- inc lisinopril, SR/ST- change to metoprolol, acute blood loss anemia s/p transfusion- watch, d/c mediastinal tubes, urine retention- replace gomez/start flomax, cont diuresis  POD 3  HDS, SR, neuro right  weaker than left gazes to right, wounds CDI, abdomen S/NT, fluid balance negative, wt still up from admit.  Plan:  MRI today, decrease ace for permissive hypertension, dc gabapentin, BB, Statin, IS/ambulate. CPM.    Active Hospital Problems    Diagnosis   • Encounter for weaning from ventilator ([facility]) [Z99.11]     Priority: High   • NSTEMI (non-ST elevated myocardial infarction) ([facility]) [I21.4]     Priority: High   • CAD (coronary artery disease) [I25.10]     Priority: High   • Type 2 diabetes mellitus with complication, without long-term current use of insulin ([facility]) [E11.8]     Priority: Medium   • Essential hypertension [I10]     Priority: Medium   • Altered mental status [R41.82]   • SONNY (acute kidney injury) ([facility]) [N17.9]

## 2020-09-28 NOTE — CARE PLAN
Problem: Post Op Day 3 CABG/Heart Valve replacement  Goal: Optimal care of the post op CABG/Heart Valve replacement post op day 3  Outcome: PROGRESSING SLOWER THAN EXPECTED  Intervention: Shower daily and clean incisions twice daily with soap and water  Note: Pt transferred to shower chair and showered, full assist required  Intervention: Up in chair for all meals  Note: Not completed, pt mobilized full assist to edge of bed x3   Intervention: Ambulate, increasing the distance each time x 3 and before bed  Note: Not completed, pt mobilized full assist to edge of bed x3   Intervention: IS q 1 hour while awake and record best IS volume  Note: , pt not following commands  Intervention: Consider removal of gomez, chest tube and pacer wire if not already done  Note: Gomez in place for retention   Intervention: Case management and  for discharge needs  Note: Pt not discharging at this time, social work following   Intervention: Discharge planning (See discharge barriers/planning problem on care plan)  Note: Pt not appropriate for discharge planning at this time.

## 2020-09-28 NOTE — ASSESSMENT & PLAN NOTE
Patient was found to have a CVA on MRI.    TSH was normal, HIV and syphilis were negative.  EEG was normal.    Neurology following.

## 2020-09-28 NOTE — CARE PLAN
Problem: Post op day 2 CABG/Heart Valve Replacement  Goal: Optimal care of the post op CABG/heart valve replacement post op day 2  9/27/2020 1731 by Betty Hamilton, R.N.  Outcome: PROGRESSING SLOWER THAN EXPECTED  9/27/2020 1731 by Betty Hamilton, R.N.  Outcome: PROGRESSING SLOWER THAN EXPECTED  Intervention: FSBS: when 2 consecutive BS < 130 after post op day 2, discontinue FSBS unless patient is insulin dependent diabetic  Note: Pt diabetic, NPH D/Wes per APRN, sliding scale continued   Intervention: Up in chair for all meals  Note: Pt up to chair for breakfast and lunch  Intervention: Ambulate, increasing the distance each time x 3 and before bed  Note: Pt not ambulating, pt slow to respond and not following commands  Intervention: Stand at sink and wash up with assistance.  Clean incisions twice daily with soap and water.  Note: Prevena in place, EVH sites cleansed with soap and water  Intervention: IS q 1 hour while awake and record best IS volume  Note:   Intervention: Consider pacer wire removal by MD  Note: N/A  Intervention: Consider removal of gomze and chest tube if not already done  Note: Gomez inserted for retention, CTs removed

## 2020-09-28 NOTE — THERAPY
Speech Language Pathology   Initial Assessment     Patient Name: Guerrero Ann  AGE:  49 y.o., SEX:  male  Medical Record #: [id number]  Today's Date: 9/28/2020     Precautions  Precautions: Fall Risk, Cardiac Precautions (See Comments), Sternal Precautions (See Comments), Swallow Precautions ( See Comments)    Assessment    Patient is a 48 y/o M admitted 9/21/20 with chest pain, s/p CABG x3 9/25, code stroke called 9/27. PMHx includes diabetes, HTN, kidney stones, MI, psychiatric problem. CTA head and neck revealed no large vessel occlusion, mild bilateral ICA atherosclerotic plaque without stenosis, and nonspecific bilateral white matter hypodensities suggestive for possible bilateral lacunar infarcts. Differential diagnoses include toxic/metabolic encephalopathy versus seizure versus acute ischemic stroke.    Clinical swallow evaluation was completed at bedside. Pt was alert with delayed or absent responsiveness to questions and commands. R gaze preference noted. Oral mech exam was not formally completed due to impaired cognition. Oral care provided prior to administration of PO. Pt was noted to have intact dentition and tongue did protrude midline. No overt facial weakness/droop identified. Pt was noted to move BUEs up towards face with fists clenched intermittently. Shallow, rapid breathing pattern noted at baseline. RR increased intermittently with pt appearing anxious at times, though he was not able to state why. Pt was presented with thins via tsp (x5), mildly thicks via tsp/cup (2 oz), liquidized (~2 oz) and soft/bite sized solids (~2 oz). Pt did not attempt to feed himself despite encouragement. Oral phase was slow with vertical mastication pattern noted with soft solids. However, given pt's intermittently increasing RR and shallow breathing pattern, he is at risk for choking/aspiration. Pharyngeal phase was notable for timely swallow response, adequate hyolaryngeal excursion to  "palpation, double swallows across intake. Delayed reflexive cough occurred after 5 tsps of thins with facial reddening, drop in O2 sats and increased WOB/RR noted, concerning for aspiration. Vitals and breathing pattern returned to baseline. No other overt s/sx of aspiration occurred. Recommend modifying diet to Pureed Solids (PU4) and Mildly Thick Liquids (MT2) with 1:1 feeding due to impaired cognition, abnormal breathing pattern, and overt s/sx of aspiration with thins. Crush medications in puree. Please hold PO with any overt s/sx of aspiration and/or change in status.     Plan    Pureed Solids (PU4) and Mildly Thick Liquids (MT2) with 1:1 feeding.  Crush medications in puree.   Please hold PO with any overt s/sx of aspiration and/or change in status.     Recommend Speech Therapy 3 times per week until therapy goals are met for the following treatments:  Dysphagia Training and Patient / Family / Caregiver Education.    Discharge Recommendations: (P) Recommend post-acute placement for additional speech therapy services prior to discharge home    Subjective    \"Wrong pipe probably.\"  \"I want to lay back down.\"     Objective       09/28/20 1002   Oral Motor Eval    Is Patient Able to Complete Oral Motor Eval No   Barriers To Oral Feeding   Barriers To Oral Feeding Impaired Cognition / Attention   Pre-Feeding Oral Stimulation Trial Intact   Laryngeal Function   Voice Quality Within Functional Limits   Excursion Upon Swallow Complete   Oral Food Presentation   Single Swallow Mildly Thick (2) - (Nectar Thick)  Within Functional Limits   Serial Swallow Mildly Thick (2) - (Nectar Thick) Within Functional Limits   Single Swallow Thin (0) Moderate  (delayed reflexive coughing, facial reddening)   Liquidised (3) Within Functional Limits   Soft & Bite-Sized (6) - (Dysphagia III) Minimal  (slow oral phase, shallow rapid breathing pattern)   Self Feeding Needs Total Assistance  (pt did not attempt to self feed)   Dysphagia " Strategies / Recommendations   Compensatory Strategies Strict 1:1 Feeding;Head of Bed 90 Degrees During Eating / Drinking;Multiple Swallows;No Straws;Single Sips / Bites   Diet / Liquid Recommendation Puree (4);Mildly Thick (2) - (Nectar Thick)   Medication Administration  Crush all Medications in Puree   Therapy Interventions Dysphagia Therapy By Speech Language Pathologist;One To One Supervision With Nursing   Short Term Goals   Short Term Goal # 1 Patient will consume meals of PU4/MT2 with no s/sx of aspiration given 1:1 feeding.   Session Information   Priority 3  (3x/wk. CABG, code stroke, check MRI EEG. PU4/MT2 1:1)

## 2020-09-28 NOTE — PROGRESS NOTES
Discussed with interdisciplinary team pt's plan of care during rounds. Pt is alert and oriented x 4, however fluctuates ans intermittently follows commands. Pt refusing to participate in mobility. Neuro following, EEG and MRI ordered. No needs at this time.

## 2020-09-28 NOTE — PROGRESS NOTES
This gentleman has no critical care needs.  S/P CABG.  Neurology is following for neuro symptoms.  MRI today.    [facility] will sign off.  Please call if you have any questions.    Karan Norton MD  Pulmonary and Critical Care Medicine

## 2020-09-28 NOTE — PROGRESS NOTES
Discussed plan of care with interdisciplinary team during rounds. Pt is alert and oriented x4, however fluctuates and intermittently follows commands. Pt is refusing to participate to mobility. Neuro following, EEG and MRI ordered. No needs at this time.

## 2020-09-29 ENCOUNTER — APPOINTMENT (OUTPATIENT)
Dept: RADIOLOGY | Facility: MEDICAL CENTER | Age: 50
DRG: 233 | End: 2020-09-29
Attending: STUDENT IN AN ORGANIZED HEALTH CARE EDUCATION/TRAINING PROGRAM
Payer: COMMERCIAL

## 2020-09-29 PROBLEM — I63.9 CARDIOEMBOLIC STROKE (HCC): Status: ACTIVE | Noted: 2020-09-29

## 2020-09-29 PROBLEM — I63.81 LACUNAR INFARCT, ACUTE (HCC): Status: ACTIVE | Noted: 2020-09-29

## 2020-09-29 PROBLEM — R10.9 ABDOMINAL PAIN: Status: ACTIVE | Noted: 2020-09-29

## 2020-09-29 LAB
ANION GAP SERPL CALC-SCNC: 17 MMOL/L (ref 7–16)
BASOPHILS # BLD AUTO: 0.3 % (ref 0–1.8)
BASOPHILS # BLD: 0.04 K/UL (ref 0–0.12)
BUN SERPL-MCNC: 19 MG/DL (ref 8–22)
CALCIUM SERPL-MCNC: 8.8 MG/DL (ref 8.5–10.5)
CHLORIDE SERPL-SCNC: 97 MMOL/L (ref 96–112)
CO2 SERPL-SCNC: 22 MMOL/L (ref 20–33)
CREAT SERPL-MCNC: 0.95 MG/DL (ref 0.5–1.4)
EOSINOPHIL # BLD AUTO: 0.08 K/UL (ref 0–0.51)
EOSINOPHIL NFR BLD: 0.6 % (ref 0–6.9)
ERYTHROCYTE [DISTWIDTH] IN BLOOD BY AUTOMATED COUNT: 47.5 FL (ref 35.9–50)
GLUCOSE BLD-MCNC: 162 MG/DL (ref 65–99)
GLUCOSE BLD-MCNC: 170 MG/DL (ref 65–99)
GLUCOSE BLD-MCNC: 170 MG/DL (ref 65–99)
GLUCOSE BLD-MCNC: 176 MG/DL (ref 65–99)
GLUCOSE BLD-MCNC: 229 MG/DL (ref 65–99)
GLUCOSE SERPL-MCNC: 182 MG/DL (ref 65–99)
HCT VFR BLD AUTO: 24.4 % (ref 42–52)
HGB BLD-MCNC: 8.2 G/DL (ref 14–18)
IMM GRANULOCYTES # BLD AUTO: 0.09 K/UL (ref 0–0.11)
IMM GRANULOCYTES NFR BLD AUTO: 0.6 % (ref 0–0.9)
LYMPHOCYTES # BLD AUTO: 2.04 K/UL (ref 1–4.8)
LYMPHOCYTES NFR BLD: 14.6 % (ref 22–41)
MCH RBC QN AUTO: 32.2 PG (ref 27–33)
MCHC RBC AUTO-ENTMCNC: 33.6 G/DL (ref 33.7–35.3)
MCV RBC AUTO: 95.7 FL (ref 81.4–97.8)
MONOCYTES # BLD AUTO: 1.6 K/UL (ref 0–0.85)
MONOCYTES NFR BLD AUTO: 11.5 % (ref 0–13.4)
NEUTROPHILS # BLD AUTO: 10.1 K/UL (ref 1.82–7.42)
NEUTROPHILS NFR BLD: 72.4 % (ref 44–72)
NRBC # BLD AUTO: 0.03 K/UL
NRBC BLD-RTO: 0.2 /100 WBC
PLATELET # BLD AUTO: 226 K/UL (ref 164–446)
PMV BLD AUTO: 11.2 FL (ref 9–12.9)
POTASSIUM SERPL-SCNC: 3.4 MMOL/L (ref 3.6–5.5)
RBC # BLD AUTO: 2.55 M/UL (ref 4.7–6.1)
SODIUM SERPL-SCNC: 136 MMOL/L (ref 135–145)
WBC # BLD AUTO: 14 K/UL (ref 4.8–10.8)

## 2020-09-29 PROCEDURE — 99232 SBSQ HOSP IP/OBS MODERATE 35: CPT | Performed by: NURSE PRACTITIONER

## 2020-09-29 PROCEDURE — 700102 HCHG RX REV CODE 250 W/ 637 OVERRIDE(OP): Performed by: CLINICAL NURSE SPECIALIST

## 2020-09-29 PROCEDURE — 770020 HCHG ROOM/CARE - TELE (206)

## 2020-09-29 PROCEDURE — 99232 SBSQ HOSP IP/OBS MODERATE 35: CPT | Performed by: STUDENT IN AN ORGANIZED HEALTH CARE EDUCATION/TRAINING PROGRAM

## 2020-09-29 PROCEDURE — 700101 HCHG RX REV CODE 250: Performed by: PHYSICIAN ASSISTANT

## 2020-09-29 PROCEDURE — 700101 HCHG RX REV CODE 250: Performed by: CLINICAL NURSE SPECIALIST

## 2020-09-29 PROCEDURE — A9270 NON-COVERED ITEM OR SERVICE: HCPCS | Performed by: PHYSICIAN ASSISTANT

## 2020-09-29 PROCEDURE — 74018 RADEX ABDOMEN 1 VIEW: CPT

## 2020-09-29 PROCEDURE — 80048 BASIC METABOLIC PNL TOTAL CA: CPT

## 2020-09-29 PROCEDURE — 700111 HCHG RX REV CODE 636 W/ 250 OVERRIDE (IP): Performed by: NURSE PRACTITIONER

## 2020-09-29 PROCEDURE — 700111 HCHG RX REV CODE 636 W/ 250 OVERRIDE (IP): Performed by: PHYSICIAN ASSISTANT

## 2020-09-29 PROCEDURE — 94669 MECHANICAL CHEST WALL OSCILL: CPT

## 2020-09-29 PROCEDURE — 99024 POSTOP FOLLOW-UP VISIT: CPT | Performed by: THORACIC SURGERY (CARDIOTHORACIC VASCULAR SURGERY)

## 2020-09-29 PROCEDURE — 700102 HCHG RX REV CODE 250 W/ 637 OVERRIDE(OP): Performed by: NURSE PRACTITIONER

## 2020-09-29 PROCEDURE — A9270 NON-COVERED ITEM OR SERVICE: HCPCS | Performed by: CLINICAL NURSE SPECIALIST

## 2020-09-29 PROCEDURE — 82962 GLUCOSE BLOOD TEST: CPT | Mod: 91

## 2020-09-29 PROCEDURE — 85025 COMPLETE CBC W/AUTO DIFF WBC: CPT

## 2020-09-29 PROCEDURE — 700111 HCHG RX REV CODE 636 W/ 250 OVERRIDE (IP): Performed by: CLINICAL NURSE SPECIALIST

## 2020-09-29 PROCEDURE — 700102 HCHG RX REV CODE 250 W/ 637 OVERRIDE(OP): Performed by: PHYSICIAN ASSISTANT

## 2020-09-29 PROCEDURE — A9270 NON-COVERED ITEM OR SERVICE: HCPCS | Performed by: NURSE PRACTITIONER

## 2020-09-29 PROCEDURE — L4398 FOOT DROP SPLINT PRE OTS: HCPCS

## 2020-09-29 RX ORDER — KETOROLAC TROMETHAMINE 30 MG/ML
15 INJECTION, SOLUTION INTRAMUSCULAR; INTRAVENOUS EVERY 6 HOURS
Status: DISCONTINUED | OUTPATIENT
Start: 2020-09-29 | End: 2020-10-02

## 2020-09-29 RX ORDER — POTASSIUM CHLORIDE 20 MEQ/1
60 TABLET, EXTENDED RELEASE ORAL ONCE
Status: COMPLETED | OUTPATIENT
Start: 2020-09-29 | End: 2020-09-29

## 2020-09-29 RX ORDER — LABETALOL HYDROCHLORIDE 5 MG/ML
5 INJECTION, SOLUTION INTRAVENOUS ONCE
Status: ACTIVE | OUTPATIENT
Start: 2020-09-29 | End: 2020-09-30

## 2020-09-29 RX ORDER — LISINOPRIL 5 MG/1
5 TABLET ORAL ONCE
Status: ACTIVE | OUTPATIENT
Start: 2020-09-29 | End: 2020-09-30

## 2020-09-29 RX ORDER — LORAZEPAM 2 MG/ML
0.25 INJECTION INTRAMUSCULAR ONCE
Status: COMPLETED | OUTPATIENT
Start: 2020-09-29 | End: 2020-09-29

## 2020-09-29 RX ORDER — LISINOPRIL 10 MG/1
10 TABLET ORAL
Status: DISCONTINUED | OUTPATIENT
Start: 2020-09-23 | End: 2020-09-30

## 2020-09-29 RX ADMIN — KETOROLAC TROMETHAMINE 15 MG: 30 INJECTION, SOLUTION INTRAMUSCULAR at 17:18

## 2020-09-29 RX ADMIN — METOPROLOL TARTRATE 25 MG: 25 TABLET, FILM COATED ORAL at 01:21

## 2020-09-29 RX ADMIN — BISACODYL 10 MG: 10 SUPPOSITORY RECTAL at 17:19

## 2020-09-29 RX ADMIN — POTASSIUM CHLORIDE 60 MEQ: 1500 TABLET, EXTENDED RELEASE ORAL at 11:31

## 2020-09-29 RX ADMIN — ASPIRIN 81 MG: 81 TABLET, COATED ORAL at 06:07

## 2020-09-29 RX ADMIN — ATORVASTATIN CALCIUM 80 MG: 80 TABLET, FILM COATED ORAL at 19:49

## 2020-09-29 RX ADMIN — BUPROPION HYDROCHLORIDE 150 MG: 150 TABLET, EXTENDED RELEASE ORAL at 06:01

## 2020-09-29 RX ADMIN — LISINOPRIL 5 MG: 5 TABLET ORAL at 06:07

## 2020-09-29 RX ADMIN — TAMSULOSIN HYDROCHLORIDE 0.4 MG: 0.4 CAPSULE ORAL at 08:15

## 2020-09-29 RX ADMIN — DOCUSATE SODIUM 50 MG AND SENNOSIDES 8.6 MG 2 TABLET: 8.6; 5 TABLET, FILM COATED ORAL at 17:19

## 2020-09-29 RX ADMIN — ACETAMINOPHEN 1000 MG: 500 TABLET ORAL at 23:04

## 2020-09-29 RX ADMIN — OMEPRAZOLE 20 MG: 20 CAPSULE, DELAYED RELEASE ORAL at 06:07

## 2020-09-29 RX ADMIN — LORAZEPAM 0.26 MG: 2 INJECTION INTRAMUSCULAR; INTRAVENOUS at 02:16

## 2020-09-29 RX ADMIN — TRAMADOL HYDROCHLORIDE 50 MG: 50 TABLET, FILM COATED ORAL at 08:15

## 2020-09-29 RX ADMIN — CLOPIDOGREL BISULFATE 75 MG: 75 TABLET ORAL at 06:07

## 2020-09-29 RX ADMIN — MUPIROCIN 1 APPLICATION: 20 OINTMENT TOPICAL at 17:20

## 2020-09-29 RX ADMIN — MUPIROCIN 1 APPLICATION: 20 OINTMENT TOPICAL at 06:00

## 2020-09-29 RX ADMIN — TRAMADOL HYDROCHLORIDE 50 MG: 50 TABLET, FILM COATED ORAL at 19:49

## 2020-09-29 RX ADMIN — KETOROLAC TROMETHAMINE 15 MG: 30 INJECTION, SOLUTION INTRAMUSCULAR at 11:31

## 2020-09-29 RX ADMIN — POTASSIUM CHLORIDE 20 MEQ: 1500 TABLET, EXTENDED RELEASE ORAL at 06:07

## 2020-09-29 RX ADMIN — ACETAMINOPHEN 1000 MG: 500 TABLET ORAL at 01:07

## 2020-09-29 RX ADMIN — LIDOCAINE 1 PATCH: 50 PATCH TOPICAL at 08:16

## 2020-09-29 RX ADMIN — FUROSEMIDE 40 MG: 10 INJECTION, SOLUTION INTRAMUSCULAR; INTRAVENOUS at 06:00

## 2020-09-29 RX ADMIN — METOPROLOL TARTRATE 25 MG: 25 TABLET, FILM COATED ORAL at 17:19

## 2020-09-29 RX ADMIN — TRAMADOL HYDROCHLORIDE 50 MG: 50 TABLET, FILM COATED ORAL at 02:16

## 2020-09-29 RX ADMIN — DOCUSATE SODIUM 50 MG AND SENNOSIDES 8.6 MG 2 TABLET: 8.6; 5 TABLET, FILM COATED ORAL at 06:07

## 2020-09-29 RX ADMIN — ACETAMINOPHEN 1000 MG: 500 TABLET ORAL at 11:31

## 2020-09-29 RX ADMIN — ACETAMINOPHEN 1000 MG: 500 TABLET ORAL at 17:19

## 2020-09-29 RX ADMIN — METOPROLOL TARTRATE 25 MG: 25 TABLET, FILM COATED ORAL at 06:07

## 2020-09-29 RX ADMIN — ENOXAPARIN SODIUM 40 MG: 40 INJECTION SUBCUTANEOUS at 17:19

## 2020-09-29 RX ADMIN — ACETAMINOPHEN 1000 MG: 500 TABLET ORAL at 06:07

## 2020-09-29 RX ADMIN — ALLOPURINOL 100 MG: 100 TABLET ORAL at 06:07

## 2020-09-29 RX ADMIN — BUPROPION HYDROCHLORIDE 150 MG: 150 TABLET, EXTENDED RELEASE ORAL at 19:49

## 2020-09-29 RX ADMIN — TRAMADOL HYDROCHLORIDE 50 MG: 50 TABLET, FILM COATED ORAL at 15:27

## 2020-09-29 RX ADMIN — KETOROLAC TROMETHAMINE 15 MG: 30 INJECTION, SOLUTION INTRAMUSCULAR at 23:03

## 2020-09-29 ASSESSMENT — PAIN DESCRIPTION - PAIN TYPE
TYPE: SURGICAL PAIN

## 2020-09-29 ASSESSMENT — ENCOUNTER SYMPTOMS
CARDIOVASCULAR NEGATIVE: 1
NEUROLOGICAL NEGATIVE: 1
EYES NEGATIVE: 1
PSYCHIATRIC NEGATIVE: 1
RESPIRATORY NEGATIVE: 1
GASTROINTESTINAL NEGATIVE: 1
MUSCULOSKELETAL NEGATIVE: 1

## 2020-09-29 ASSESSMENT — FIBROSIS 4 INDEX: FIB4 SCORE: 1.02

## 2020-09-29 NOTE — ASSESSMENT & PLAN NOTE
Overnight the patient had an additional episode of abdominal pain.  He was experiencing nausea and vomiting.  He was found to have 2 L residual on the core track was suction.  Abdominal exam this morning is benign.  Though from the chest x-ray can see dilated bowel loops.  CT abdomen with oral contrast ordered  Fleet enema.  N.p.o.

## 2020-09-29 NOTE — CARE PLAN
Problem: Post Op Day 4 CABG/Heart Valve Replacement  Goal: Optimal care of the Post Op CABG/Heart Valve replacement Post Op Day 4  Outcome: PROGRESSING SLOWER THAN EXPECTED  Intervention: Daily Weights  Note: Completed this am.  Intervention: Ambulate, increasing the distance each time x 3 and before bed  Note: Patient not participating in care, and unable to ambulate.   Intervention: IS q 1 hour while awake and record best IS volume  Note: Attempted IS use several times with patient, not able to follow commands or directions for using IS.   Intervention: Consider removal of gomez, chest tube and pacer wire if not already done  Note: Patient still requiring Gomez catheter. Day shift to reassess need.

## 2020-09-29 NOTE — DISCHARGE PLANNING
[facility] Transitional [facility] Coordination     Referral from:  LUKAS Chavira   Facesheet indicates: [facility]   Potential Rehab Diagnosis: Numerous acute/subacute supratentorial and infratentorial infarcts most consistent with cardioembolic etiology. S/p CABGx3       Chart review indicates patient has on going medical management and  therapy needs to possibly meet inpatient rehab facility criteria with the goal of returning to community.    D/C support: Face sheet indicates friend. Will need to verify support to return to the community.      Physiatry consultation per protocol.            Thank you for the referral.

## 2020-09-29 NOTE — PROGRESS NOTES
Cardiac Surgery RN Navigator Daily Rounding Note  Procedure Performed: Procedure(s):  CABG, WITH ENDOSCOPIC VEIN PROCUREMENT- X3  ECHOCARDIOGRAM, TRANSESOPHAGEAL     By  Dr. Chicas  4 Days Post-Op    Pertinent Overnight Events:    EEG was normal, MRI complete-results not available yet    Okay UOP overnight, weight down    Hypertension overnight, 170's to 200's.  Lisinopril dose was decreased yesterday from 10 mg to 5 mg as neurology wrote for permissive HTN    Speech therapy eval done-no TF needed, pureed solids, thick liquids, 1:1 feeding supervision, crush meds    Pertinent neuro findings/needs:A&O intermittent following commands    Cardiac/hemodynamics:  Temp (24hrs), Av.6 °C (97.8 °F), Min:36.1 °C (96.9 °F), Max:37.4 °C (99.3 °F)    Heart rhythm: SR/ST  Temp:  [36.1 °C (96.9 °F)-37.4 °C (99.3 °F)] 36.1 °C (97 °F)  Pulse:  [] 89  Resp:  [21-34] 30  BP: ()/() 139/99  SpO2:  [88 %-99 %] 99 %        /Weights:  Admit weight: Weight: 72.6 kg (160 lb)  Current Weight: Weight: 72.2 kg (159 lb 2.8 oz) (20 0500)  Weight change: -2.5 kg (-5 lb 8.2 oz)    Intake/Output Summary (Last 24 hours) at 2020 0720  Last data filed at 2020 0600  Gross per 24 hour   Intake 650 ml   Output 2180 ml   Net -1530 ml       Adequate urine output: 480 cc    Respiratory:     Pertinent respiratory findings/needs: 2 L NC     GI findings/needs: No BM since the     Labs:  Recent Labs     20  0230 20  0200 20  0230   WBC 10.2 12.3* 14.0*   RBC 2.49* 2.36* 2.55*   HEMOGLOBIN 8.2* 7.8* 8.2*   HEMATOCRIT 23.6* 22.5* 24.4*   MCV 94.8 95.3 95.7   MCH 32.9 33.1* 32.2   MCHC 34.7 34.7 33.6*   RDW 52.2* 50.0 47.5   PLATELETCT 143* 160* 226   MPV 11.2 11.1 11.2     Recent Labs     20  0230 20  0200 20  0230   SODIUM 135 135 136   POTASSIUM 4.0 3.5* 3.4*   CHLORIDE 101 98 97   CO2 21 22 22   GLUCOSE 179* 181* 182*   BUN 19 20 19   CREATININE 1.17 1.16 0.95   CALCIUM 8.5 8.4*  8.8     INR:   Recent Labs     09/25/20  1421   INR 1.34*       Required Cardiac medications review:  ASA:  Yes  Plavix: Yes  BB: Yes  ACE/ARB: Yes  Statin: yes  Diuretic: yes    LDA's necessity reviewed: YES    Thoughts and considerations for team rounding:    Needs bowel protocol escalated-no BM and POD # 4    Extra potassium, K 3.4    Await MRI results    Address HTN meds? (see overnight events)    Discharge plan:    Will need post acute placement per PT/OT/ST

## 2020-09-29 NOTE — PROGRESS NOTES
Hospital Medicine Daily Progress Note    Date of Service  9/29/2020    Chief Complaint  49 y.o. male admitted 9/21/2020 with NSTEMI    Hospital Course    This is a 49 y.o. male with CAD status post stent placement, hypertension, diabetes who presented 9/21/2020 with chest pain.  The patient states he has been having exertional chest pain for the past month; however, became worse and persistent one day prior. He reported substernal chest pressure with radiation down his left arm associated with some numbness and tingling of his left hand and associated shortness of breath.  He denied any fevers, chills or cough. He has not been taking his medications for the past year since he lost his insurance.  He underwent a cardiac catheterization in 2018 and had a stent placed to his RCA.  He underwent a repeat cardiac catheterization in July 2019 which revealed two-vessel coronary artery disease with small vessel chronic total occlusion of the mid left anterior descending artery and patent stents in the mid right coronary artery.  Normal left ventricular systolic function with EF of 65%.  Patient also reported his blood pressure has been uncontrolled.  At the time of evaluation in ED, his chest pain has resolved.  His initial troponin in the ER was 21 which increased to 63.  Patient was placed on heparin GTT     EKG on arrival revealed sinus rhythm with LVH and repolarization changes.  T wave inversions with minimal ST depression noted in inferior leads  Second EKG sinus rhythm with ST elevation in V2 and V3, does not meet criteria given underlying LVH.  Chest x-ray did not show any acute cardiopulmonary process.      9/22: Underwent cardiac cath which showed multivessel coronary artery disease cerebrovascular previously placed RCA stents.     9/25: CABG x3     9/28:  EEG normal      Interval Problem Update  Patient is postop day #4 for CABG x3.  Acute mental status has not changed.  MRI did return showing numerous  acute/subacute infarcts likely cardioembolic in nature.  Neurology has reviewed the MRI they are recommending continuing the aspirin, Plavix and statin.  Physiatry, PT/OT and speech therapy were all consulted.  Awaiting further recommendations.  T-max 99.3, /74.  Goal BP less than 140/90.  Patient this morning showing grimacing and guarding of the abdomen therefore a x-ray of the abdomen was done which shows likely ileus from constipation.  Increasingly laxatives.  His last bowel movement was 4 days ago.    Consultants/Specialty  Cardiology- Dr Cantu  Cardiothoracic surgery- DR Chicas  Neurology- Dr Le  Critical Care- Dr Gonda    Code Status  Full Code    Disposition  Awaiting clinical course.  Anticipate that the patient will likely required postacute care.    Review of Systems  Review of Systems   Unable to perform ROS: Mental status change        Physical Exam  Temp:  [36.1 °C (96.9 °F)-37.4 °C (99.3 °F)] 36.4 °C (97.6 °F)  Pulse:  [] 82  Resp:  [15-38] 38  BP: ()/() 125/74  SpO2:  [90 %-99 %] 91 %    Physical Exam  Constitutional:       General: He is not in acute distress.     Appearance: Normal appearance.   HENT:      Head: Normocephalic and atraumatic.      Nose: Nose normal.      Mouth/Throat:      Mouth: Mucous membranes are moist.      Pharynx: Oropharynx is clear.   Eyes:      General: No scleral icterus.     Extraocular Movements: Extraocular movements intact.      Pupils: Pupils are equal, round, and reactive to light.   Neck:      Musculoskeletal: Normal range of motion and neck supple. No muscular tenderness.   Cardiovascular:      Rate and Rhythm: Normal rate and regular rhythm.      Pulses: Normal pulses.      Heart sounds: Normal heart sounds. No murmur.   Pulmonary:      Effort: Pulmonary effort is normal. No respiratory distress.      Breath sounds: Normal breath sounds. No wheezing or rales.   Chest:      Comments: Mid chest incision clean dry and intact.  Left  subclavian clean dry and intact.  Abdominal:      General: There is distension.      Tenderness: There is abdominal tenderness. There is guarding.   Musculoskeletal: Normal range of motion.         General: No swelling or tenderness.   Skin:     Coloration: Skin is not jaundiced.      Findings: No bruising.   Neurological:      General: No focal deficit present.      Mental Status: He is alert. He is disoriented and confused.      Cranial Nerves: No cranial nerve deficit.      Motor: Weakness present.   Psychiatric:      Comments: Unable to assess due to patient condition.         Fluids    Intake/Output Summary (Last 24 hours) at 9/29/2020 1436  Last data filed at 9/29/2020 0800  Gross per 24 hour   Intake 550 ml   Output 1480 ml   Net -930 ml       Laboratory  Recent Labs     09/27/20 0230 09/28/20  0200 09/29/20  0230   WBC 10.2 12.3* 14.0*   RBC 2.49* 2.36* 2.55*   HEMOGLOBIN 8.2* 7.8* 8.2*   HEMATOCRIT 23.6* 22.5* 24.4*   MCV 94.8 95.3 95.7   MCH 32.9 33.1* 32.2   MCHC 34.7 34.7 33.6*   RDW 52.2* 50.0 47.5   PLATELETCT 143* 160* 226   MPV 11.2 11.1 11.2     Recent Labs     09/27/20  0230 09/28/20  0200 09/29/20  0230   SODIUM 135 135 136   POTASSIUM 4.0 3.5* 3.4*   CHLORIDE 101 98 97   CO2 21 22 22   GLUCOSE 179* 181* 182*   BUN 19 20 19   CREATININE 1.17 1.16 0.95   CALCIUM 8.5 8.4* 8.8                   Imaging  [id number] VIEW   Final Result      Mild to moderate colonic distention may represent ileus.      Moderate amount of colonic stool in the ascending colon.         MR-BRAIN-W/O   Final Result      Numerous acute/subacute supratentorial and infratentorial infarcts most consistent with cardioembolic etiology.      Several chronic lacunar infarcts.      No acute intracranial hemorrhage.      DX-CHEST-PORTABLE (1 VIEW)   Final Result         1.  Pulmonary edema and/or infiltrates are identified, which are stable since the prior exam.   2.  Cardiomegaly      CT-CTA NECK WITH & W/O-POST PROCESSING   Final  Result      1.  CT angiogram of the neck within normal limits.      2.  Mild bilateral internal carotid artery atherosclerotic plaque without stenosis      3.  Small right pleural effusion      4.  Small left apical pneumothorax      CT-CTA HEAD WITH & W/O-POST PROCESS   Final Result      No thrombosis is seen within the [place].      Bilateral white matter hypodensities can be seen in the setting of bilateral lacunar infarcts, chronic microangiopathic change, demyelination or gliosis. MRI would be more sensitive for further evaluation.      Paranasal sinus disease.      DX-CHEST-PORTABLE (1 VIEW)   Final Result         Interval extubation. No other significant interval change.      EC-MARGOTH W/O CONT   Final Result      DX-CHEST-PORTABLE (1 VIEW)   Final Result      1.  Left basilar opacification likely represents atelectasis.      2.  Endotracheal tube tip projects approximately 1.5 cm above the migel.      3.  Left subclavian catheter tip projects over the superior vena cava. No pneumothorax is identified.      US-CAROTID DOPPLER BILAT   Final Result      US-VEIN MAPPING LOWER EXTREMITY BILAT   Final Result      EC-ECHOCARDIOGRAM COMPLETE W/O CONT   Final Result      DX-CHEST-PORTABLE (1 VIEW)   Final Result      No acute cardiopulmonary abnormality.      CL-LEFT HEART CATHETERIZATION WITH POSSIBLE INTERVENTION    (Results Pending)        Assessment/Plan  * NSTEMI (non-ST elevated myocardial infarction) (HCC)- (present on admission)  Assessment & Plan  Cardiology and cardiothoracic surgery following  Patient is status post CABG x3  Continue aspirin, atorvastatin 80 mg, lisinopril 5 mg and metoprolol 25 mg.        Abdominal pain  Assessment & Plan  Patient endorsing abdominal pain this morning via quarantine and guarding on physical examination.  A x-ray of the abdomen was ordered and shows ileus likely secondary to constipation.  Increase PRN laxatives for constipation.    Altered mental status- (present  on admission)  Assessment & Plan  Postoperatively patient had colopathy metabolic versus seizure versus CVA.    CT head did not show any thrombi.  TSH was normal, HIV and syphilis were negative.  EEG was normal.  Neurology following.  Pending MRI.    CAD (coronary artery disease)- (present on admission)  Assessment & Plan  Status post CABG x3.  Continue aspirin, lisinopril, metoprolol and atorvastatin.    Essential hypertension- (present on admission)  Assessment & Plan  Blood pressure goal less than 140/90.  Restarted on carvedilol, lisinopril and norvasc    Type 2 diabetes mellitus with complication, without long-term current use of insulin (HCC)- (present on admission)  Assessment & Plan  Start on insulin sliding scale with serial Accu-Checks  HbA1C 7.9; c/w ISS for now  Hypoglycemic protocol in place        Cardioembolic stroke (HCC)  Assessment & Plan  Postoperatively patient had an acute change in mental status.  A code stroke was called on 9/27.  CTA of the head did not show any obvious lesions.  MRI was done which showed numerous acute/subacute supratentorial and infratentorial infarcts likely cardioembolic also multiple chronic lacunar infarcts noted.    Neurology following  Maintain BP less than 140/90   Continue atorvastatin, aspirin and Plavix.  Physiatry, OT/PT consulted.    SONNY (acute kidney injury) (HCC)- (present on admission)  Assessment & Plan  Unclear if contrast-induced  Started gentle hydration and now resolved    Normochromic normocytic anemia  Assessment & Plan  Likely secondary to postoperative blood losses.  Monitor with daily CBC.       VTE prophylaxis: Lovenox 40mg

## 2020-09-29 NOTE — CONSULTS
Medical chart review completed.     Patient is a 49 y.o. male  with a past medical history of coronary artery disease s/p stents, diabetes, hypertension, not taking any medications, admitted to [facility] on 9/21, with chest pain. He was admitted for acute coronary syndrome/NSTEMI, went to cath lab, found to have multivessel coronary artery disease and in-stent stenosis. He is now s/p CABG x 3 with Dr. Chicas on 9/25. Patient with acute blood loss anemia, s/p transfusion, urinary retention, right sided weakness and gaze preference, NIHSS 11, neurology consulted. CTA negative. Negative EEG. MRI with numerous acute/subacute supratentorial and infratentorial infarctions consistent with cardio-embolic etiology. HgbA1c 7.9, LDL unable to be determined due to elevated Tgs at 610, ECHO EF 60 % s/p CABG, with severe LVH. COVID negative.       Patient with multiple co-morbidities(including but not limited to diabetes with hyperglycemia, hypertension, leukocytosis, anemia, hypokalemia, urinary retention, ); with cognitive deficits and functional deficits in mobility/self-cares, and severe de-conditioning.     Pre-morbidly, this patient lived in a one level home with one step to enter, with his girlfriend and her 2 adolescent children. The patient was evaluated by acute care PT, OT, and SLP and is currently requiring min to max assist for mobility, max assist for ADLs, with significant cognitive and language deficits.     If patient has 24/7 support at discharge, then he would be an excellent candidate for an acute inpatient rehabilitation program with a coordinated program of care at an intensity and frequency not available at a lower level of care.     Note: if the patient continues to progress while waiting for medical clearance, and no longer requires 2 out of 3 therapy services (PT/OT/SLP), then the patient would no longer meet the criteria for acute inpatient rehabilitation.    This recommendation is  substantiated by the patient's current medical condition with intervention and assessment of medical issues requiring an acute level of care for patient's safety and maximum outcome. A coordinated program of care will be provided by an interdisciplinary team including physiatry, hospitalist, PT, OT, SLP, nursing. Rehab goals include improved cognition, language, mobility, self-care management, strength and conditioning/endurance, pain management, bowel and bladder management, mood and affect, and safety with independent home management including caregiver training. Estimated length of stay is approximately 21-25 days. Rehab potential: excellent. Disposition: to pre-morbid independent living setting with supportive care of patient's significant other. We will continue to follow with you in anticipation of discharge to acute inpatient rehabilitation when medically stable to do so at the discretion of his attending physician.     Thank you for allowing us to participate in his care.    Ankita Ng M.D.  Physical Medicine and Rehabilitation

## 2020-09-29 NOTE — ASSESSMENT & PLAN NOTE
Postoperatively patient had an acute change in mental status.  A code stroke was called on 9/27.  CTA of the head did not show any obvious lesions.  MRI was done which showed numerous acute/subacute supratentorial and infratentorial infarcts likely cardioembolic also multiple chronic lacunar infarcts noted.    Neurology following  Maintain BP less than 140/90   Continue atorvastatin, aspirin and Plavix.  Physiatry, OT/PT-recommend postacute placement

## 2020-09-29 NOTE — PROGRESS NOTES
Updated cardiac surgery regarding patient -200s. Per Blanca PIERRE, give 25mg metoprolol once, if SBP remains >150 after metoprolol dose, give 10mg IV hydralazine.

## 2020-09-29 NOTE — PROGRESS NOTES
Cardiovascular Surgery Progress Note    Name: Guerrero Ann  MRN: [id number]  : 1970  Admit Date: 2020  7:37 PM  Procedure:  Procedure(s) and Anesthesia Type:     * CABG, WITH ENDOSCOPIC VEIN PROCUREMENT- X3 - General     * ECHOCARDIOGRAM, TRANSESOPHAGEAL - General  4 Day Post-Op    Vitals:  Patient Vitals for the past 8 hrs:   Temp SpO2 O2 Delivery Device O2 (LPM) Pulse Resp BP Weight   20 0900 -- 97 % -- -- 97 15 (!) 183/78 --   20 0800 36.4 °C (97.6 °F) 94 % Room air w/o2 available -- 91 (!) 22 129/77 --   20 0718 -- 98 % Room air w/o2 available -- 97 18 -- --   20 0602 -- 99 % Room air w/o2 available -- -- -- -- --   20 0600 36.1 °C (97 °F) 98 % Silicone Nasal Cannula 2 89 (!) 30 139/99 --   20 0500 -- -- -- -- -- -- -- 72.2 kg (159 lb 2.8 oz)   20 0300 -- -- -- -- 88 (!) 31 131/75 --   20 0230 -- -- -- -- (!) 101 (!) 34 145/81 --   20 0210 -- -- -- -- -- -- (!) 180/130 --   20 0155 -- -- -- -- -- -- (!) 176/126 --   20 0115 -- -- -- -- -- -- (!) 187/105 --     Temp (24hrs), Av.6 °C (97.8 °F), Min:36.1 °C (96.9 °F), Max:37.4 °C (99.3 °F)      Respiratory:    Respiration: 15, Pulse Oximetry: 97 %     Chest Tube Drains:          Fluids:    Intake/Output Summary (Last 24 hours) at 2020 0907  Last data filed at 2020 0800  Gross per 24 hour   Intake 650 ml   Output 2580 ml   Net -1930 ml     Admit weight: Weight: 72.6 kg (160 lb)  Current weight: Weight: 72.2 kg (159 lb 2.8 oz) (20 0500)    Labs:  Recent Labs     20  0230 20  0200 20  0230   WBC 10.2 12.3* 14.0*   RBC 2.49* 2.36* 2.55*   HEMOGLOBIN 8.2* 7.8* 8.2*   HEMATOCRIT 23.6* 22.5* 24.4*   MCV 94.8 95.3 95.7   MCH 32.9 33.1* 32.2   MCHC 34.7 34.7 33.6*   RDW 52.2* 50.0 47.5   PLATELETCT 143* 160* 226   MPV 11.2 11.1 11.2     Recent Labs     20  0230   NEUTSPOLYS 72.40*   LYMPHOCYTES 14.60*   MONOCYTES 11.50    EOSINOPHILS 0.60   BASOPHILS 0.30     Recent Labs     09/27/20  0230 09/28/20  0200 09/29/20  0230   SODIUM 135 135 136   POTASSIUM 4.0 3.5* 3.4*   CHLORIDE 101 98 97   CO2 21 22 22   GLUCOSE 179* 181* 182*   BUN 19 20 19   CREATININE 1.17 1.16 0.95   CALCIUM 8.5 8.4* 8.8           Medications:  • labetalol  5 mg     • potassium chloride SA  60 mEq     • lisinopril  10 mg     • ketorolac  15 mg     • MD Alert...Adult ICU Electrolyte Replacement per Pharmacy       • lisinopril  5 mg     • lidocaine  1 Patch     • allopurinol  100 mg     • metoprolol  25 mg     • atorvastatin  80 mg     • buPROPion SR  150 mg     • tamsulosin  0.4 mg     • potassium chloride SA  20 mEq     • furosemide  40 mg     • insulin lispro  4 Units     • insulin lispro  0-15 Units     • acetaminophen  1,000 mg     • Pharmacy Consult Request  1 Each     • senna-docusate  2 Tab      And   • polyethylene glycol/lytes  1 Packet      And   • magnesium hydroxide  30 mL     • omeprazole  20 mg     • enoxaparin  40 mg     • mupirocin  1 Application     • aspirin EC  81 mg     • clopidogrel  75 mg          Ordered Medications:    ASA - Yes    Plavix - Yes    Post-operative Beta Blockers - Yes    Ace Inhibitor - Yes    Statin - Yes         Exam:   Review of Systems   Constitutional: Positive for malaise/fatigue.   HENT: Negative.    Eyes: Negative.    Respiratory: Negative.    Cardiovascular: Negative.    Gastrointestinal: Negative.    Genitourinary: Negative.    Musculoskeletal: Negative.    Skin: Negative.    Neurological: Negative.    Endo/Heme/Allergies: Negative.    Psychiatric/Behavioral: Negative.        Physical Exam  Constitutional:       General: He is not in acute distress.     Appearance: He is well-developed. He is not diaphoretic.   Neck:      Musculoskeletal: Neck supple.   Cardiovascular:      Rate and Rhythm: Normal rate and regular rhythm.      Heart sounds: Normal heart sounds. No murmur. No friction rub. No gallop.    Pulmonary:       Effort: Pulmonary effort is normal. No respiratory distress.      Breath sounds: Examination of the right-lower field reveals decreased breath sounds. Examination of the left-lower field reveals decreased breath sounds. Decreased breath sounds present. No wheezing or rales.   Abdominal:      General: There is no distension.      Palpations: Abdomen is soft.      Tenderness: There is no abdominal tenderness.   Skin:     General: Skin is warm and dry.      Comments: Sternum- prevena dressing  SVG sites- c/d/i   Neurological:      Mental Status: He is alert and oriented to person, place, and time.         Quality Measures:   Quality-Core Measures   Reviewed items::  EKG reviewed, Labs reviewed, Medications reviewed and Radiology images reviewed      Assessment/Plan:  POD 1 HDS, SR, Acute blood loss anemia- s/p transfusion- keep mediastinal tubes, gently diurese, d/c gomez  POD 2 HTN- inc lisinopril, SR/ST- change to metoprolol, acute blood loss anemia s/p transfusion- watch, d/c mediastinal tubes, urine retention- replace gomez/start flomax, cont diuresis  POD 3  HDS, SR, neuro right  weaker than left gazes to right, wounds CDI, abdomen S/NT, fluid balance negative, wt still up from admit.  Plan:  MRI today, decrease ace for permissive hypertension, dc gabapentin, BB, Statin, IS/ambulate. CPM.  POD 4  HDS but hypertensive, SR, neuro answers questions but delayed.  Gaze more center.  EEG normal, MRI pending, wounds CDI, abdomen distended, fluid balance negative, wt down.  Plan:  Restart ace, IM getting abdominal study, BB, Statin, IS/ambulate. CPM.    Active Hospital Problems    Diagnosis   • Altered mental status [R41.82]     Priority: High   • Encounter for weaning from ventilator ([facility]) [Z99.11]     Priority: High   • NSTEMI (non-ST elevated myocardial infarction) ([facility]) [I21.4]     Priority: High   • CAD (coronary artery disease) [I25.10]     Priority: Medium   • Type 2 diabetes mellitus with complication,  without long-term current use of insulin (HCC) [E11.8]     Priority: Medium   • Essential hypertension [I10]     Priority: Medium   • Normochromic normocytic anemia [D64.9]     Priority: Low   • SONNY (acute kidney injury) (HCC) [N17.9]

## 2020-09-29 NOTE — PROGRESS NOTES
Chief Complaint   Patient presents with   • Chest Pain     Neurology Progress Note    Brief History of present illness:  49-year old male with PMhx significant for type II DM, Hypertension, CAD/MI s/p stenting (2018), non-compliance with med regimen, who presented to [facility] on [facility]/21/20 for a chief complaint of chest pain; found to have elevated troponin/NSTEMI; had Left heart cath on 9/22; found to have 3 vessel disease; was evaluated by CT surgery and determined to be an appropriate candidate for CABG, which he underwent on 9/25/20.  On POD#2, patient was noted to have RUE/RLE weakness, was poorly interactive. Stroke Alert called; patient not a candidate for IV tPA given recent surgery. Differential diagnoses at time of initial evaluation a toxic/metabolic encephalopathy vs evolving stroke given recent surgery and vascular risk factors.     Interval, 9/29/20:  Patient sitting up in bed; awaken and alert. Follows simple commands bilaterally with repeated stimulation; more briskly on the Left. Minimal verbal output. No events overnight per nursing.     No changes to HPI as was previously documented.     Past medical history:   Past Medical History:   Diagnosis Date   • Diabetes (HCC)     oral meds only   • Hypertension    • Kidney stones    • MI, old    • Psychiatric problem     depression and anxiety       Past surgical history:   Past Surgical History:   Procedure Laterality Date   • MULTIPLE CORONARY ARTERY BYPASS ENDO VEIN HARVEST  9/25/2020    Procedure: CABG, WITH ENDOSCOPIC VEIN PROCUREMENT- X3;  Surgeon: Michael Chicas M.D.;  Location: [facility];  Service: Cardiothoracic   • MARGOTH  9/25/2020    Procedure: ECHOCARDIOGRAM, TRANSESOPHAGEAL;  Surgeon: Michael Chicas M.D.;  Location: [facility];  Service: Cardiothoracic   • STENT PLACEMENT  2017 1 cardiac stent   • OTHER ORTHOPEDIC SURGERY      rt wrist fracture with fixation 34 years ago       Family history:   Family History    Problem Relation Age of Onset   • Stroke Mother 47        had 3 devastating strokes,  54yo stroke complications   • No Known Problems Father         does not know his father.   • Stroke Maternal Grandmother 85   • Stroke Maternal Grandfather 54       Social history:   Social History     Socioeconomic History   • Marital status: Single     Spouse name: Not on file   • Number of children: Not on file   • Years of education: Not on file   • Highest education level: Not on file   Occupational History   • Not on file   Social Needs   • Financial resource strain: Not on file   • Food insecurity     Worry: Not on file     Inability: Not on file   • Transportation needs     Medical: Not on file     Non-medical: Not on file   Tobacco Use   • Smoking status: Never Smoker   • Smokeless tobacco: Current User     Types: Chew   Substance and Sexual Activity   • Alcohol use: Yes     Comment: 2 per week   • Drug use: No   • Sexual activity: Not on file   Lifestyle   • Physical activity     Days per week: Not on file     Minutes per session: Not on file   • Stress: Not on file   Relationships   • Social connections     Talks on phone: Not on file     Gets together: Not on file     Attends Buddhist service: Not on file     Active member of club or organization: Not on file     Attends meetings of clubs or organizations: Not on file     Relationship status: Not on file   • Intimate partner violence     Fear of current or ex partner: Not on file     Emotionally abused: Not on file     Physically abused: Not on file     Forced sexual activity: Not on file   Other Topics Concern   • Not on file   Social History Narrative   • Not on file       Current medications:   Current Facility-Administered Medications   Medication Dose   • labetalol (NORMODYNE/TRANDATE) injection 5 mg  5 mg   • potassium chloride SA (Kdur) tablet 60 mEq  60 mEq   • lisinopril (PRINIVIL) tablet 10 mg  10 mg   • ketorolac (TORADOL) injection 15 mg  15 mg   •  lisinopril (PRINIVIL) tablet 5 mg  5 mg   • MD Alert...ICU Electrolyte Replacement per Pharmacy     • lidocaine (LIDODERM) 5 % 1 Patch  1 Patch   • allopurinol (ZYLOPRIM) tablet 100 mg  100 mg   • metoprolol (LOPRESSOR) tablet 25 mg  25 mg   • atorvastatin (LIPITOR) tablet 80 mg  80 mg   • buPROPion SR (WELLBUTRIN-SR) tablet 150 mg  150 mg   • tamsulosin (FLOMAX) capsule 0.4 mg  0.4 mg   • potassium chloride SA (Kdur) tablet 20 mEq  20 mEq   • furosemide (LASIX) injection 40 mg  40 mg   • insulin lispro (HumaLOG) injection  4 Units   • insulin lispro (HumaLOG) injection  0-15 Units   • acetaminophen (TYLENOL) tablet 1,000 mg  1,000 mg   • Respiratory Therapy Consult     • Pharmacy Consult Request ...Pain Management Review 1 Each  1 Each   • senna-docusate (PERICOLACE or SENOKOT S) 8.6-50 MG per tablet 2 Tab  2 Tab    And   • polyethylene glycol/lytes (MIRALAX) PACKET 1 Packet  1 Packet    And   • magnesium hydroxide (MILK OF MAGNESIA) suspension 30 mL  30 mL    And   • bisacodyl (DULCOLAX) suppository 10 mg  10 mg   • omeprazole (PRILOSEC) capsule 20 mg  20 mg   • enoxaparin (LOVENOX) inj 40 mg  40 mg   • mupirocin (BACTROBAN) 2 % ointment 1 Application  1 Application   • aspirin EC (ECOTRIN) tablet 81 mg  81 mg   • clopidogrel (PLAVIX) tablet 75 mg  75 mg   • tramadol (ULTRAM) 50 MG tablet 50 mg  50 mg   • ondansetron (ZOFRAN) syringe/vial injection 8 mg  8 mg    Or   • prochlorperazine (COMPAZINE) injection 10 mg  10 mg    Or   • promethazine (PHENERGAN) suppository 25 mg  25 mg   • acetaminophen (TYLENOL) tablet 650 mg  650 mg    Or   • acetaminophen (TYLENOL) suppository 650 mg  650 mg   • mag hydrox-al hydrox-simeth (MAALOX PLUS ES or MYLANTA DS) suspension 30 mL  30 mL   • diphenhydrAMINE (BENADRYL) tablet/capsule 25 mg  25 mg       Medication Allergy:  No Known Allergies    Review of systems:   As noted above; otherwise limited       Physical examination:   Vitals:    09/29/20 0718 09/29/20 0800 09/29/20 0900  09/29/20 1005   BP:  129/77 (!) 183/78 (!) 96/66   Pulse: 97 91 97    Resp: 18 (!) 22 15    Temp:  36.4 °C (97.6 °F)     TempSrc:  Temporal     SpO2: 98% 94% 97%    Weight:       Height:         General: Patient in no acute distress, pleasant and cooperative.  HEENT: Normocephalic, no signs of acute trauma.   Neck: supple, no meningeal signs or carotid bruits. There is normal range of motion. No tenderness on exam.   Chest: clear to auscultation. No cough.   CV: RRR, no murmurs.   Skin: no signs of acute rashes or trauma.   Musculoskeletal: joints exhibit full range of motion, without any pain to palpation. There are no signs of joint or muscle swelling. There is no tenderness to deep palpation of muscles.   Psychiatric: No hallucinatory behavior.     NEUROLOGICAL EXAM:   Mental status, orientation: Arousable; poorly interactive.   Speech and language: Minimal verbal output; opens eyes to name, regards examiner, does not name or repeat. Follows simple commands bilaterally, more briskly on the Left.   Cranial nerve exam: Pupils are 3-4 mm bilaterally and equally reactive to light. Visual fields are intact by confrontation/blinks to visual threat bilaterally. 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 There is no nystagmus on primary or secondary gaze. Face appears symmetric. Corneal reflexes intact bilaterally. Tongue is midline and without any signs of tongue biting or fasciculations.  Motor exam: Strength is 5/5 in LUE, 4/5 to RUE. 4+/5 to LLE, 2+/5 to RLE (with some/poor antigravity). Tone is normal. No abnormal movements were seen on exam.   Sensory exam Delayed response to nox stim on the Right.   Deep tendon reflexes:  Plantar responses are flexor. There is no clonus.   Coordination: deferred, patient does not participate.  Gait: Not assessed at this time as patient is currently unable.       NIH Stroke Scale    1a Level of Consciousness 1  1b Orientation Questions 2  1c Response to Commands   2 Gaze  1  3 Visual Fields   4 Facial Movement   5 Motor Function (arm)   a Left   b Right 1  6 Motor Function (leg)   a Left 1  b Right 2  7 Limb Ataxia   8 Sensory 1  9 Language 2  10 Articulation   11 Extinction/Inattention     Score: 11      ANCILLARY DATA REVIEWED:     Lab Data Review:  Recent Results (from the past 24 hour(s))   ACCU-CHEK GLUCOSE    Collection Time: 09/28/20 11:10 AM   Result Value Ref Range    Glucose - Accu-Ck 192 (H) 65 - 99 mg/dL   ACCU-CHEK GLUCOSE    Collection Time: 09/28/20  5:09 PM   Result Value Ref Range    Glucose - Accu-Ck 223 (H) 65 - 99 mg/dL   ACCU-CHEK GLUCOSE    Collection Time: 09/28/20  9:02 PM   Result Value Ref Range    Glucose - Accu-Ck 198 (H) 65 - 99 mg/dL   ACCU-CHEK GLUCOSE    Collection Time: 09/29/20  1:26 AM   Result Value Ref Range    Glucose - Accu-Ck 170 (H) 65 - 99 mg/dL   Basic Metabolic Panel (BMP) Critical Care 0130    Collection Time: 09/29/20  2:30 AM   Result Value Ref Range    Sodium 136 135 - 145 mmol/L    Potassium 3.4 (L) 3.6 - 5.5 mmol/L    Chloride 97 96 - 112 mmol/L    Co2 22 20 - 33 mmol/L    Glucose 182 (H) 65 - 99 mg/dL    Bun 19 8 - 22 mg/dL    Creatinine 0.95 0.50 - 1.40 mg/dL    Calcium 8.8 8.5 - 10.5 mg/dL    Anion Gap 17.0 (H) 7.0 - 16.0   CBC WITH DIFFERENTIAL    Collection Time: 09/29/20  2:30 AM   Result Value Ref Range    WBC 14.0 (H) 4.8 - 10.8 K/uL    RBC 2.55 (L) 4.70 - 6.10 M/uL    Hemoglobin 8.2 (L) 14.0 - 18.0 g/dL    Hematocrit 24.4 (L) 42.0 - 52.0 %    MCV 95.7 81.4 - 97.8 fL    MCH 32.2 27.0 - 33.0 pg    MCHC 33.6 (L) 33.7 - 35.3 g/dL    RDW 47.5 35.9 - 50.0 fL    Platelet Count 226 164 - 446 K/uL    MPV 11.2 9.0 - 12.9 fL    Neutrophils-Polys 72.40 (H) 44.00 - 72.00 %    Lymphocytes 14.60 (L) 22.00 - 41.00 %    Monocytes 11.50 0.00 - 13.40 %    Eosinophils 0.60 0.00 - 6.90 %    Basophils 0.30 0.00 - 1.80 %    Immature Granulocytes 0.60 0.00 - 0.90 %    Nucleated RBC 0.20 /100 WBC    Neutrophils (Absolute) 10.10 (H) 1.82 - 7.42 K/uL     Lymphs (Absolute) 2.04 1.00 - 4.80 K/uL    Monos (Absolute) 1.60 (H) 0.00 - 0.85 K/uL    Eos (Absolute) 0.08 0.00 - 0.51 K/uL    Baso (Absolute) 0.04 0.00 - 0.12 K/uL    Immature Granulocytes (abs) 0.09 0.00 - 0.11 K/uL    NRBC (Absolute) 0.03 K/uL   ESTIMATED GFR    Collection Time: 09/29/20  2:30 AM   Result Value Ref Range    GFR If African American >60 >60 mL/min/1.73 m 2    GFR If Non African American >60 >60 mL/min/1.73 m 2   ACCU-CHEK GLUCOSE    Collection Time: 09/29/20  8:13 AM   Result Value Ref Range    Glucose - Accu-Ck 229 (H) 65 - 99 mg/dL       Labs reviewed by me.       Imaging reviewed by me:     MR-BRAIN-W/O   Final Result      Numerous acute/subacute supratentorial and infratentorial infarcts most consistent with cardioembolic etiology.      Several chronic lacunar infarcts.      No acute intracranial hemorrhage.      DX-CHEST-PORTABLE (1 VIEW)   Final Result         1.  Pulmonary edema and/or infiltrates are identified, which are stable since the prior exam.   2.  Cardiomegaly      CT-CTA NECK WITH & W/O-POST PROCESSING   Final Result      1.  CT angiogram of the neck within normal limits.      2.  Mild bilateral internal carotid artery atherosclerotic plaque without stenosis      3.  Small right pleural effusion      4.  Small left apical pneumothorax      CT-CTA HEAD WITH & W/O-POST PROCESS   Final Result      No thrombosis is seen within the [place].      Bilateral white matter hypodensities can be seen in the setting of bilateral lacunar infarcts, chronic microangiopathic change, demyelination or gliosis. MRI would be more sensitive for further evaluation.      Paranasal sinus disease.      DX-CHEST-PORTABLE (1 VIEW)   Final Result         Interval extubation. No other significant interval change.      EC-MARGOTH W/O CONT   Final Result      DX-CHEST-PORTABLE (1 VIEW)   Final Result      1.  Left basilar opacification likely represents atelectasis.      2.  Endotracheal tube tip  projects approximately 1.5 cm above the migel.      3.  Left subclavian catheter tip projects over the superior vena cava. No pneumothorax is identified.      US-CAROTID DOPPLER BILAT   Final Result      US-VEIN MAPPING LOWER EXTREMITY BILAT   Final Result      EC-ECHOCARDIOGRAM COMPLETE W/O CONT   Final Result      DX-CHEST-PORTABLE (1 VIEW)   Final Result      No acute cardiopulmonary abnormality.      CL-LEFT HEART CATHETERIZATION WITH POSSIBLE INTERVENTION    (Results Pending)   DS-[id number] VIEW    (Results Pending)         Presumed mechanism by TOAST:  __Large Artery Atherosclerosis  __Small Vessel (Lacunar)  _X_Cardioembolic  __Other (Sickle Cell, Vasculitis, Hypercoagulable)  __Unknown      Modified Edison Scale (MRS): 1 = No significant disability, despite symptoms; able to perform all usual duties and activities      ASSESSMENT AND PLAN:  49-year old male with PMhx significant for type II DM, Hypertension, CAD/MI s/p stenting (2018), non-compliance with med regimen, who presented to [facility] on 9/[facility]/20 for a chief complaint of chest pain; found to have elevated troponin/NSTEMI; Left heart cath on 9/22 revealed 3 vessel disease; patient subsequently underwent CABG x 3 on 9/25/20 per Dr. Chicas. On POD#2, patient developed Right sided weakness; Stroke Alert called; not a candidate for tPA given recent surgery. CTA with no LVO.  MRI Brain has revealed numerous areas of acute ischemic infarct about the brain bilaterally, the largest of which involve the Left midbrain/cerebral peduncle and Right frontal lobe. Etiology likely embolic/cardioembolic; less likely/may also consider watershed etiology (as is seen with hypotensive events/hypoperfusion). NIHSS is currently 11.     Recommendations/Plan:     -q4h and PRN neuro assessment. VS per nursing/unit protocol. BP goal < 140/90-- normotension OK given NSTEMI/CABG on 9/25; patient now also >48 hours from time of symptom onset. Antihypertensives per  primary team.   -Telemetry; currently SR. Screen for Afib/arrhythmia (thus far no report of arrhythmia since time of hospital presentation). Note, TTE on arrival here with EF 55%; intraoperative MARGOTH completed as well; EF 60%. Though have relatively low suspicion for etiology/contributing factor such a mural thrombus, could consider follow-up TTE prior to discharge.   -Continue ASA 81 mg PO q day and Plavix 75 mg PO q day (DAPT).   -Continue Atorvastatin 80 mg PO q HS. Note ; LDL unable to be calculated.   -Recommend aggressive BG management per primary team. Avoid IVF with Dextrose. BG goal 140-180. hemoglobin A1c is 7.9. Further diabetes management per primary team.    -PT/OT/SLP eval and treat. Physiatry consult.   -All other medical management per primary team.   -DVT PPX: SCDs.      The plan of care above has been discussed with Dr. Gardner.     KIKE Kaye.  [facility]

## 2020-09-30 ENCOUNTER — APPOINTMENT (OUTPATIENT)
Dept: RADIOLOGY | Facility: MEDICAL CENTER | Age: 50
DRG: 233 | End: 2020-09-30
Attending: CLINICAL NURSE SPECIALIST
Payer: COMMERCIAL

## 2020-09-30 LAB
ANION GAP SERPL CALC-SCNC: 20 MMOL/L (ref 7–16)
BASOPHILS # BLD AUTO: 0.6 % (ref 0–1.8)
BASOPHILS # BLD: 0.07 K/UL (ref 0–0.12)
BUN SERPL-MCNC: 23 MG/DL (ref 8–22)
CALCIUM SERPL-MCNC: 8.7 MG/DL (ref 8.5–10.5)
CHLORIDE SERPL-SCNC: 96 MMOL/L (ref 96–112)
CO2 SERPL-SCNC: 19 MMOL/L (ref 20–33)
CREAT SERPL-MCNC: 1.19 MG/DL (ref 0.5–1.4)
EOSINOPHIL # BLD AUTO: 0.24 K/UL (ref 0–0.51)
EOSINOPHIL NFR BLD: 1.9 % (ref 0–6.9)
ERYTHROCYTE [DISTWIDTH] IN BLOOD BY AUTOMATED COUNT: 49.1 FL (ref 35.9–50)
GLUCOSE BLD-MCNC: 148 MG/DL (ref 65–99)
GLUCOSE BLD-MCNC: 168 MG/DL (ref 65–99)
GLUCOSE BLD-MCNC: 180 MG/DL (ref 65–99)
GLUCOSE BLD-MCNC: 203 MG/DL (ref 65–99)
GLUCOSE SERPL-MCNC: 178 MG/DL (ref 65–99)
HCT VFR BLD AUTO: 24.2 % (ref 42–52)
HGB BLD-MCNC: 8.1 G/DL (ref 14–18)
IMM GRANULOCYTES # BLD AUTO: 0.06 K/UL (ref 0–0.11)
IMM GRANULOCYTES NFR BLD AUTO: 0.5 % (ref 0–0.9)
LYMPHOCYTES # BLD AUTO: 1.98 K/UL (ref 1–4.8)
LYMPHOCYTES NFR BLD: 15.7 % (ref 22–41)
MCH RBC QN AUTO: 32.5 PG (ref 27–33)
MCHC RBC AUTO-ENTMCNC: 33.5 G/DL (ref 33.7–35.3)
MCV RBC AUTO: 97.2 FL (ref 81.4–97.8)
MONOCYTES # BLD AUTO: 1.41 K/UL (ref 0–0.85)
MONOCYTES NFR BLD AUTO: 11.2 % (ref 0–13.4)
NEUTROPHILS # BLD AUTO: 8.83 K/UL (ref 1.82–7.42)
NEUTROPHILS NFR BLD: 70.1 % (ref 44–72)
NRBC # BLD AUTO: 0.03 K/UL
NRBC BLD-RTO: 0.2 /100 WBC
PLATELET # BLD AUTO: 247 K/UL (ref 164–446)
PMV BLD AUTO: 10.7 FL (ref 9–12.9)
POTASSIUM SERPL-SCNC: 3.3 MMOL/L (ref 3.6–5.5)
POTASSIUM SERPL-SCNC: 3.8 MMOL/L (ref 3.6–5.5)
RBC # BLD AUTO: 2.49 M/UL (ref 4.7–6.1)
SODIUM SERPL-SCNC: 135 MMOL/L (ref 135–145)
WBC # BLD AUTO: 12.6 K/UL (ref 4.8–10.8)

## 2020-09-30 PROCEDURE — 700111 HCHG RX REV CODE 636 W/ 250 OVERRIDE (IP): Performed by: CLINICAL NURSE SPECIALIST

## 2020-09-30 PROCEDURE — 92526 ORAL FUNCTION THERAPY: CPT

## 2020-09-30 PROCEDURE — 85025 COMPLETE CBC W/AUTO DIFF WBC: CPT

## 2020-09-30 PROCEDURE — 700102 HCHG RX REV CODE 250 W/ 637 OVERRIDE(OP): Performed by: NURSE PRACTITIONER

## 2020-09-30 PROCEDURE — 770020 HCHG ROOM/CARE - TELE (206)

## 2020-09-30 PROCEDURE — 80048 BASIC METABOLIC PNL TOTAL CA: CPT

## 2020-09-30 PROCEDURE — A9270 NON-COVERED ITEM OR SERVICE: HCPCS | Performed by: CLINICAL NURSE SPECIALIST

## 2020-09-30 PROCEDURE — 700111 HCHG RX REV CODE 636 W/ 250 OVERRIDE (IP): Performed by: PHYSICIAN ASSISTANT

## 2020-09-30 PROCEDURE — A9270 NON-COVERED ITEM OR SERVICE: HCPCS | Performed by: NURSE PRACTITIONER

## 2020-09-30 PROCEDURE — 82962 GLUCOSE BLOOD TEST: CPT | Mod: 91

## 2020-09-30 PROCEDURE — 700101 HCHG RX REV CODE 250: Performed by: CLINICAL NURSE SPECIALIST

## 2020-09-30 PROCEDURE — 700102 HCHG RX REV CODE 250 W/ 637 OVERRIDE(OP): Performed by: CLINICAL NURSE SPECIALIST

## 2020-09-30 PROCEDURE — A9270 NON-COVERED ITEM OR SERVICE: HCPCS | Performed by: STUDENT IN AN ORGANIZED HEALTH CARE EDUCATION/TRAINING PROGRAM

## 2020-09-30 PROCEDURE — 97530 THERAPEUTIC ACTIVITIES: CPT

## 2020-09-30 PROCEDURE — 700111 HCHG RX REV CODE 636 W/ 250 OVERRIDE (IP): Performed by: NURSE PRACTITIONER

## 2020-09-30 PROCEDURE — 84132 ASSAY OF SERUM POTASSIUM: CPT

## 2020-09-30 PROCEDURE — 97112 NEUROMUSCULAR REEDUCATION: CPT

## 2020-09-30 PROCEDURE — 700102 HCHG RX REV CODE 250 W/ 637 OVERRIDE(OP): Performed by: STUDENT IN AN ORGANIZED HEALTH CARE EDUCATION/TRAINING PROGRAM

## 2020-09-30 PROCEDURE — 99232 SBSQ HOSP IP/OBS MODERATE 35: CPT | Performed by: STUDENT IN AN ORGANIZED HEALTH CARE EDUCATION/TRAINING PROGRAM

## 2020-09-30 RX ORDER — POTASSIUM CHLORIDE 20 MEQ/1
40 TABLET, EXTENDED RELEASE ORAL DAILY
Status: DISCONTINUED | OUTPATIENT
Start: 2020-10-01 | End: 2020-10-02

## 2020-09-30 RX ORDER — CLOPIDOGREL BISULFATE 75 MG/1
75 TABLET ORAL DAILY
Status: DISCONTINUED | OUTPATIENT
Start: 2020-10-01 | End: 2020-10-09 | Stop reason: HOSPADM

## 2020-09-30 RX ORDER — POTASSIUM CHLORIDE 20 MEQ/1
40 TABLET, EXTENDED RELEASE ORAL EVERY 6 HOURS
Status: DISCONTINUED | OUTPATIENT
Start: 2020-09-30 | End: 2020-09-30

## 2020-09-30 RX ORDER — CARVEDILOL 12.5 MG/1
12.5 TABLET ORAL 2 TIMES DAILY WITH MEALS
Status: DISCONTINUED | OUTPATIENT
Start: 2020-09-30 | End: 2020-09-30

## 2020-09-30 RX ORDER — POTASSIUM CHLORIDE 29.8 MG/ML
40 INJECTION INTRAVENOUS ONCE
Status: COMPLETED | OUTPATIENT
Start: 2020-09-30 | End: 2020-09-30

## 2020-09-30 RX ORDER — ACETAMINOPHEN 325 MG/1
650 TABLET ORAL EVERY 4 HOURS PRN
Status: DISCONTINUED | OUTPATIENT
Start: 2020-09-30 | End: 2020-10-02

## 2020-09-30 RX ORDER — LISINOPRIL 10 MG/1
10 TABLET ORAL
Status: DISCONTINUED | OUTPATIENT
Start: 2020-10-01 | End: 2020-10-01

## 2020-09-30 RX ORDER — POLYETHYLENE GLYCOL 3350 17 G/17G
1 POWDER, FOR SOLUTION ORAL DAILY
Status: DISCONTINUED | OUTPATIENT
Start: 2020-10-01 | End: 2020-10-09 | Stop reason: HOSPADM

## 2020-09-30 RX ORDER — TRAMADOL HYDROCHLORIDE 50 MG/1
50 TABLET ORAL EVERY 4 HOURS PRN
Status: DISCONTINUED | OUTPATIENT
Start: 2020-09-30 | End: 2020-10-09 | Stop reason: HOSPADM

## 2020-09-30 RX ORDER — DIPHENHYDRAMINE HCL 25 MG
25 TABLET ORAL
Status: DISCONTINUED | OUTPATIENT
Start: 2020-09-30 | End: 2020-10-09 | Stop reason: HOSPADM

## 2020-09-30 RX ORDER — ASPIRIN 81 MG/1
81 TABLET, CHEWABLE ORAL DAILY
Status: DISCONTINUED | OUTPATIENT
Start: 2020-10-01 | End: 2020-10-04

## 2020-09-30 RX ORDER — ALUMINA, MAGNESIA, AND SIMETHICONE 2400; 2400; 240 MG/30ML; MG/30ML; MG/30ML
30 SUSPENSION ORAL EVERY 4 HOURS PRN
Status: DISCONTINUED | OUTPATIENT
Start: 2020-09-30 | End: 2020-10-09 | Stop reason: HOSPADM

## 2020-09-30 RX ORDER — ACETAMINOPHEN 500 MG
1000 TABLET ORAL EVERY 6 HOURS
Status: DISCONTINUED | OUTPATIENT
Start: 2020-09-30 | End: 2020-10-02

## 2020-09-30 RX ORDER — POTASSIUM CHLORIDE 20 MEQ/1
40 TABLET, EXTENDED RELEASE ORAL DAILY
Status: DISCONTINUED | OUTPATIENT
Start: 2020-10-01 | End: 2020-09-30

## 2020-09-30 RX ORDER — ALLOPURINOL 100 MG/1
100 TABLET ORAL DAILY
Status: DISCONTINUED | OUTPATIENT
Start: 2020-10-01 | End: 2020-10-02

## 2020-09-30 RX ORDER — BUPROPION HYDROCHLORIDE 100 MG/1
100 TABLET ORAL 3 TIMES DAILY
Status: DISCONTINUED | OUTPATIENT
Start: 2020-09-30 | End: 2020-10-02

## 2020-09-30 RX ORDER — HYDROCODONE BITARTRATE AND ACETAMINOPHEN 5; 325 MG/1; MG/1
1-2 TABLET ORAL EVERY 6 HOURS PRN
Status: DISCONTINUED | OUTPATIENT
Start: 2020-09-30 | End: 2020-10-09 | Stop reason: HOSPADM

## 2020-09-30 RX ORDER — ATORVASTATIN CALCIUM 80 MG/1
80 TABLET, FILM COATED ORAL
Status: DISCONTINUED | OUTPATIENT
Start: 2020-09-30 | End: 2020-10-09 | Stop reason: HOSPADM

## 2020-09-30 RX ORDER — BISACODYL 10 MG
10 SUPPOSITORY, RECTAL RECTAL
Status: DISCONTINUED | OUTPATIENT
Start: 2020-09-30 | End: 2020-10-09 | Stop reason: HOSPADM

## 2020-09-30 RX ORDER — AMOXICILLIN 250 MG
2 CAPSULE ORAL 2 TIMES DAILY
Status: DISCONTINUED | OUTPATIENT
Start: 2020-09-30 | End: 2020-10-09 | Stop reason: HOSPADM

## 2020-09-30 RX ORDER — CARVEDILOL 12.5 MG/1
12.5 TABLET ORAL 2 TIMES DAILY WITH MEALS
Status: DISCONTINUED | OUTPATIENT
Start: 2020-09-30 | End: 2020-10-01

## 2020-09-30 RX ORDER — ACETAMINOPHEN 650 MG/1
650 SUPPOSITORY RECTAL EVERY 4 HOURS PRN
Status: DISCONTINUED | OUTPATIENT
Start: 2020-09-30 | End: 2020-10-02

## 2020-09-30 RX ORDER — LORAZEPAM 2 MG/ML
0.5 INJECTION INTRAMUSCULAR ONCE
Status: COMPLETED | OUTPATIENT
Start: 2020-09-30 | End: 2020-09-30

## 2020-09-30 RX ADMIN — MUPIROCIN 1 APPLICATION: 20 OINTMENT TOPICAL at 06:12

## 2020-09-30 RX ADMIN — FUROSEMIDE 40 MG: 10 INJECTION, SOLUTION INTRAMUSCULAR; INTRAVENOUS at 06:12

## 2020-09-30 RX ADMIN — LISINOPRIL 10 MG: 10 TABLET ORAL at 06:13

## 2020-09-30 RX ADMIN — ASPIRIN 81 MG: 81 TABLET, COATED ORAL at 06:13

## 2020-09-30 RX ADMIN — ACETAMINOPHEN 1000 MG: 500 TABLET ORAL at 06:12

## 2020-09-30 RX ADMIN — INSULIN LISPRO 2 UNITS: 100 INJECTION, SOLUTION INTRAVENOUS; SUBCUTANEOUS at 23:48

## 2020-09-30 RX ADMIN — FENTANYL CITRATE 25 MCG: 50 INJECTION INTRAMUSCULAR; INTRAVENOUS at 10:13

## 2020-09-30 RX ADMIN — ACETAMINOPHEN 1000 MG: 500 TABLET ORAL at 16:58

## 2020-09-30 RX ADMIN — PROCHLORPERAZINE EDISYLATE 10 MG: 5 INJECTION INTRAMUSCULAR; INTRAVENOUS at 23:15

## 2020-09-30 RX ADMIN — CARVEDILOL 12.5 MG: 12.5 TABLET, FILM COATED ORAL at 16:58

## 2020-09-30 RX ADMIN — KETOROLAC TROMETHAMINE 15 MG: 30 INJECTION, SOLUTION INTRAMUSCULAR at 11:21

## 2020-09-30 RX ADMIN — BUPROPION HYDROCHLORIDE 100 MG: 100 TABLET, FILM COATED ORAL at 16:58

## 2020-09-30 RX ADMIN — POTASSIUM CHLORIDE 20 MEQ: 1500 TABLET, EXTENDED RELEASE ORAL at 06:13

## 2020-09-30 RX ADMIN — TRAMADOL HYDROCHLORIDE 50 MG: 50 TABLET, FILM COATED ORAL at 16:58

## 2020-09-30 RX ADMIN — LIDOCAINE 1 PATCH: 50 PATCH TOPICAL at 08:54

## 2020-09-30 RX ADMIN — ACETAMINOPHEN 1000 MG: 500 TABLET ORAL at 23:15

## 2020-09-30 RX ADMIN — ALLOPURINOL 100 MG: 100 TABLET ORAL at 06:13

## 2020-09-30 RX ADMIN — METOPROLOL TARTRATE 25 MG: 25 TABLET, FILM COATED ORAL at 06:13

## 2020-09-30 RX ADMIN — OMEPRAZOLE 20 MG: 20 CAPSULE, DELAYED RELEASE ORAL at 06:13

## 2020-09-30 RX ADMIN — BUPROPION HYDROCHLORIDE 150 MG: 150 TABLET, EXTENDED RELEASE ORAL at 06:13

## 2020-09-30 RX ADMIN — ENOXAPARIN SODIUM 40 MG: 40 INJECTION SUBCUTANEOUS at 16:55

## 2020-09-30 RX ADMIN — TRAMADOL HYDROCHLORIDE 50 MG: 50 TABLET, FILM COATED ORAL at 02:20

## 2020-09-30 RX ADMIN — INSULIN LISPRO 2 UNITS: 100 INJECTION, SOLUTION INTRAVENOUS; SUBCUTANEOUS at 16:50

## 2020-09-30 RX ADMIN — KETOROLAC TROMETHAMINE 15 MG: 30 INJECTION, SOLUTION INTRAMUSCULAR at 06:12

## 2020-09-30 RX ADMIN — KETOROLAC TROMETHAMINE 15 MG: 30 INJECTION, SOLUTION INTRAMUSCULAR at 16:55

## 2020-09-30 RX ADMIN — DIPHENHYDRAMINE HYDROCHLORIDE 25 MG: 25 TABLET ORAL at 00:12

## 2020-09-30 RX ADMIN — CLOPIDOGREL BISULFATE 75 MG: 75 TABLET ORAL at 06:12

## 2020-09-30 RX ADMIN — DIPHENHYDRAMINE HYDROCHLORIDE 25 MG: 25 TABLET ORAL at 23:15

## 2020-09-30 RX ADMIN — KETOROLAC TROMETHAMINE 15 MG: 30 INJECTION, SOLUTION INTRAMUSCULAR at 23:15

## 2020-09-30 RX ADMIN — ACETAMINOPHEN 1000 MG: 500 TABLET ORAL at 11:18

## 2020-09-30 RX ADMIN — POTASSIUM CHLORIDE 40 MEQ: 29.8 INJECTION, SOLUTION INTRAVENOUS at 08:53

## 2020-09-30 RX ADMIN — LORAZEPAM 0.5 MG: 2 INJECTION INTRAMUSCULAR; INTRAVENOUS at 03:43

## 2020-09-30 RX ADMIN — DOCUSATE SODIUM 50 MG AND SENNOSIDES 8.6 MG 2 TABLET: 8.6; 5 TABLET, FILM COATED ORAL at 16:58

## 2020-09-30 RX ADMIN — DOCUSATE SODIUM 50 MG AND SENNOSIDES 8.6 MG 2 TABLET: 8.6; 5 TABLET, FILM COATED ORAL at 06:35

## 2020-09-30 ASSESSMENT — ENCOUNTER SYMPTOMS
PALPITATIONS: 0
PSYCHIATRIC NEGATIVE: 1
COUGH: 0
BRUISES/BLEEDS EASILY: 0
NAUSEA: 0
RESPIRATORY NEGATIVE: 1
CARDIOVASCULAR NEGATIVE: 1
DIZZINESS: 0
NEUROLOGICAL NEGATIVE: 1
CHILLS: 0
EYE PAIN: 0
FEVER: 0
POLYDIPSIA: 0
NERVOUS/ANXIOUS: 0
WEAKNESS: 1
SORE THROAT: 0
DIARRHEA: 0
MUSCULOSKELETAL NEGATIVE: 1
HEADACHES: 0
WEIGHT LOSS: 0
SHORTNESS OF BREATH: 0
WHEEZING: 0
ABDOMINAL PAIN: 0
BLURRED VISION: 0
SPEECH CHANGE: 1
BACK PAIN: 0
FOCAL WEAKNESS: 1
EYES NEGATIVE: 1
GASTROINTESTINAL NEGATIVE: 1
VOMITING: 0
TINGLING: 0
MYALGIAS: 0
BLOOD IN STOOL: 0
DEPRESSION: 0

## 2020-09-30 ASSESSMENT — PAIN DESCRIPTION - PAIN TYPE
TYPE: SURGICAL PAIN
TYPE: SURGICAL PAIN

## 2020-09-30 ASSESSMENT — COGNITIVE AND FUNCTIONAL STATUS - GENERAL
TURNING FROM BACK TO SIDE WHILE IN FLAT BAD: UNABLE
CLIMB 3 TO 5 STEPS WITH RAILING: TOTAL
MOVING TO AND FROM BED TO CHAIR: UNABLE
STANDING UP FROM CHAIR USING ARMS: TOTAL
MOVING FROM LYING ON BACK TO SITTING ON SIDE OF FLAT BED: UNABLE
SUGGESTED CMS G CODE MODIFIER MOBILITY: CN
WALKING IN HOSPITAL ROOM: TOTAL
MOBILITY SCORE: 6

## 2020-09-30 ASSESSMENT — GAIT ASSESSMENTS: GAIT LEVEL OF ASSIST: UNABLE TO PARTICIPATE

## 2020-09-30 ASSESSMENT — FIBROSIS 4 INDEX: FIB4 SCORE: 0.94

## 2020-09-30 NOTE — PROGRESS NOTES
Paged cardiac surgeons regarding uncontrolled pain, HR reaching 120 -130. Telephone orders received for 0.5mg ativan one time dose.

## 2020-09-30 NOTE — CARE PLAN
Problem: Post Op Day 4 CABG/Heart Valve Replacement  Goal: Optimal care of the Post Op CABG/Heart Valve replacement Post Op Day 4  Outcome: PROGRESSING SLOWER THAN EXPECTED  Intervention: Up in chair for all meals  Note: Pt mobilized to EOB with PT. Pt later mobilized EOB and to cardiac chair x3.  Intervention: Ambulate, increasing the distance each time x 3 and before bed  Note: Pt unable to ambulate, pt max assist to stand  Intervention: IS q 1 hour while awake and record best IS volume  Note: Coaching required  Intervention: Consider removal of gomez, chest tube and pacer wire if not already done  Note: Gomez in place for retention. Chest tube and pacer wires previously removed.  Intervention: Discharge Education  Note: Not applicable at this time. Place to DC to rehab facility

## 2020-09-30 NOTE — PROGRESS NOTES
Hospital Medicine Daily Progress Note    Date of Service  9/30/2020    Chief Complaint  49 y.o. male admitted 9/21/2020 with NSTEMI    Hospital Course    This is a 49 y.o. male with CAD status post stent placement, hypertension, diabetes who presented 9/21/2020 with chest pain.  The patient states he has been having exertional chest pain for the past month; however, became worse and persistent one day prior. He reported substernal chest pressure with radiation down his left arm associated with some numbness and tingling of his left hand and associated shortness of breath.  He denied any fevers, chills or cough. He has not been taking his medications for the past year since he lost his insurance.  He underwent a cardiac catheterization in 2018 and had a stent placed to his RCA.  He underwent a repeat cardiac catheterization in July 2019 which revealed two-vessel coronary artery disease with small vessel chronic total occlusion of the mid left anterior descending artery and patent stents in the mid right coronary artery.  Normal left ventricular systolic function with EF of 65%.  Patient also reported his blood pressure has been uncontrolled.  At the time of evaluation in ED, his chest pain has resolved.  His initial troponin in the ER was 21 which increased to 63.  Patient was placed on heparin GTT     EKG on arrival revealed sinus rhythm with LVH and repolarization changes.  T wave inversions with minimal ST depression noted in inferior leads  Second EKG sinus rhythm with ST elevation in V2 and V3, does not meet criteria given underlying LVH.  Chest x-ray did not show any acute cardiopulmonary process.      9/22: Underwent cardiac cath which showed multivessel coronary artery disease cerebrovascular previously placed RCA stents.     9/25: CABG x3     9/28:  EEG normal    9/29 MRI show numerous infarcts likely cardiogenic with old CVAs      Interval Problem Update  Postop day #5 CABG x3.  Patient is more verbal  today.  He reports he has no complaints today.  He denies fevers, chills, chest pain, abdominal pain, shortness of breath.  PT and OT and speech therapy are recommending postacute placement.  Case management is assisting.   Overnight patient had 2 bowel movements and now has abdominal examination is improved.  Consultants/Specialty  Cardiology- Dr Cantu  Cardiothoracic surgery- DR Chicas  Neurology- Dr Le  Critical Care- Dr Gonda    Code Status  Full Code    Disposition  Awaiting clinical course.  Anticipate that the patient will likely required postacute care.    Review of Systems  Review of Systems   Constitutional: Negative for chills, fever and weight loss.   HENT: Negative for hearing loss, sore throat and tinnitus.    Eyes: Negative for blurred vision and pain.   Respiratory: Negative for cough, shortness of breath and wheezing.    Cardiovascular: Positive for chest pain (incisional). Negative for palpitations and leg swelling.   Gastrointestinal: Negative for abdominal pain, blood in stool, diarrhea, nausea and vomiting.   Genitourinary: Negative for dysuria, frequency and hematuria.   Musculoskeletal: Negative for back pain, joint pain and myalgias.   Skin: Negative for itching and rash.   Neurological: Positive for speech change, focal weakness and weakness. Negative for dizziness, tingling and headaches.   Endo/Heme/Allergies: Negative for polydipsia. Does not bruise/bleed easily.   Psychiatric/Behavioral: Negative for depression. The patient is not nervous/anxious.    All other systems reviewed and are negative.       Physical Exam  Temp:  [36 °C (96.8 °F)-36.3 °C (97.3 °F)] 36 °C (96.8 °F)  Pulse:  [] 104  Resp:  [16-42] 34  BP: (118-187)/() 118/78  SpO2:  [51 %-100 %] 95 %    Physical Exam  Constitutional:       General: He is not in acute distress.     Appearance: Normal appearance.   HENT:      Head: Normocephalic and atraumatic.      Nose: Nose normal.      Mouth/Throat:       Mouth: Mucous membranes are moist.      Pharynx: Oropharynx is clear.   Eyes:      General: No scleral icterus.     Extraocular Movements: Extraocular movements intact.      Pupils: Pupils are equal, round, and reactive to light.   Neck:      Musculoskeletal: Normal range of motion and neck supple. No muscular tenderness.   Cardiovascular:      Rate and Rhythm: Normal rate and regular rhythm.      Pulses: Normal pulses.      Heart sounds: Normal heart sounds. No murmur.   Pulmonary:      Effort: Pulmonary effort is normal. No respiratory distress.      Breath sounds: Normal breath sounds. No wheezing or rales.   Chest:      Comments: Mid chest incision clean dry and intact.  Left subclavian clean dry and intact.  Abdominal:      General: There is distension.      Tenderness: There is no abdominal tenderness. There is no guarding.   Musculoskeletal: Normal range of motion.         General: No swelling or tenderness.   Skin:     Coloration: Skin is not jaundiced.      Findings: No bruising.   Neurological:      General: No focal deficit present.      Mental Status: He is alert. He is disoriented and confused.      Cranial Nerves: No cranial nerve deficit.      Motor: Weakness present.      Comments: RUE 4+/5, LUE 5/5, RLE 2+/5, LLE 4+/5   Psychiatric:      Comments: Unable to assess due to patient condition.         Fluids    Intake/Output Summary (Last 24 hours) at 9/30/2020 1514  Last data filed at 9/30/2020 1400  Gross per 24 hour   Intake 890 ml   Output 1735 ml   Net -845 ml       Laboratory  Recent Labs     09/28/20  0200 09/29/20  0230 09/30/20  0230   WBC 12.3* 14.0* 12.6*   RBC 2.36* 2.55* 2.49*   HEMOGLOBIN 7.8* 8.2* 8.1*   HEMATOCRIT 22.5* 24.4* 24.2*   MCV 95.3 95.7 97.2   MCH 33.1* 32.2 32.5   MCHC 34.7 33.6* 33.5*   RDW 50.0 47.5 49.1   PLATELETCT 160* 226 247   MPV 11.1 11.2 10.7     Recent Labs     09/28/20  0200 09/29/20  0230 09/30/20  0230   SODIUM 135 136 135   POTASSIUM 3.5* 3.4* 3.3*   CHLORIDE  98 97 96   CO2 22 22 19*   GLUCOSE 181* 182* 178*   BUN 20 19 23*   CREATININE 1.16 0.95 1.19   CALCIUM 8.4* 8.8 8.7                   Imaging  DX-ABDOMEN FOR TUBE PLACEMENT   Final Result      Feeding tube tip in expected location the gastric antrum.      Persistent mild gaseous distention of colon suggestive of ileus.      KG-IEXTURR-5 VIEW   Final Result      Mild to moderate colonic distention may represent ileus.      Moderate amount of colonic stool in the ascending colon.         MR-BRAIN-W/O   Final Result      Numerous acute/subacute supratentorial and infratentorial infarcts most consistent with cardioembolic etiology.      Several chronic lacunar infarcts.      No acute intracranial hemorrhage.      DX-CHEST-PORTABLE (1 VIEW)   Final Result         1.  Pulmonary edema and/or infiltrates are identified, which are stable since the prior exam.   2.  Cardiomegaly      CT-CTA NECK WITH & W/O-POST PROCESSING   Final Result      1.  CT angiogram of the neck within normal limits.      2.  Mild bilateral internal carotid artery atherosclerotic plaque without stenosis      3.  Small right pleural effusion      4.  Small left apical pneumothorax      CT-CTA HEAD WITH & W/O-POST PROCESS   Final Result      No thrombosis is seen within the [place].      Bilateral white matter hypodensities can be seen in the setting of bilateral lacunar infarcts, chronic microangiopathic change, demyelination or gliosis. MRI would be more sensitive for further evaluation.      Paranasal sinus disease.      DX-CHEST-PORTABLE (1 VIEW)   Final Result         Interval extubation. No other significant interval change.      EC-MARGOTH W/O CONT   Final Result      DX-CHEST-PORTABLE (1 VIEW)   Final Result      1.  Left basilar opacification likely represents atelectasis.      2.  Endotracheal tube tip projects approximately 1.5 cm above the migel.      3.  Left subclavian catheter tip projects over the superior vena cava. No  pneumothorax is identified.      US-CAROTID DOPPLER BILAT   Final Result      US-VEIN MAPPING LOWER EXTREMITY BILAT   Final Result      EC-ECHOCARDIOGRAM COMPLETE W/O CONT   Final Result      DX-CHEST-PORTABLE (1 VIEW)   Final Result      No acute cardiopulmonary abnormality.      CL-LEFT HEART CATHETERIZATION WITH POSSIBLE INTERVENTION    (Results Pending)        Assessment/Plan  * NSTEMI (non-ST elevated myocardial infarction) (HCC)- (present on admission)  Assessment & Plan  Cardiology and cardiothoracic surgery following  Patient is status post CABG x3  Continue aspirin, atorvastatin 80 mg, lisinopril 5 mg and metoprolol 25 mg.        Abdominal pain  Assessment & Plan  Patient endorsing abdominal pain this morning via quarantine and guarding on physical examination.  A x-ray of the abdomen was ordered and shows ileus likely secondary to constipation.  Increase PRN laxatives for constipation.    Altered mental status- (present on admission)  Assessment & Plan  Patient was found to have a CVA on MRI.    TSH was normal, HIV and syphilis were negative.  EEG was normal.    Neurology following.        CAD (coronary artery disease)- (present on admission)  Assessment & Plan  Status post CABG x3.  Continue aspirin, lisinopril, metoprolol and atorvastatin.    Essential hypertension- (present on admission)  Assessment & Plan  Blood pressure goal less than 140/90.  Restarted on carvedilol, lisinopril and norvasc    Type 2 diabetes mellitus with complication, without long-term current use of insulin (HCC)- (present on admission)  Assessment & Plan  Start on insulin sliding scale with serial Accu-Checks  HbA1C 7.9; c/w ISS for now  Hypoglycemic protocol in place        Cardioembolic stroke (HCC)  Assessment & Plan  Postoperatively patient had an acute change in mental status.  A code stroke was called on 9/27.  CTA of the head did not show any obvious lesions.  MRI was done which showed numerous acute/subacute supratentorial  and infratentorial infarcts likely cardioembolic also multiple chronic lacunar infarcts noted.    Neurology following  Maintain BP less than 140/90   Continue atorvastatin, aspirin and Plavix.  Physiatry, OT/PT-recommend postacute placement    SONNY (acute kidney injury) (HCC)- (present on admission)  Assessment & Plan  Unclear if contrast-induced  Started gentle hydration and now resolved    Normochromic normocytic anemia  Assessment & Plan  Likely secondary to postoperative blood losses.  Monitor with daily CBC.       VTE prophylaxis: Lovenox 40mg

## 2020-09-30 NOTE — DISCHARGE PLANNING
Follow up for rehab spoke with friend Miriam. Guerrero was living with Miriam prior to acute event. Guerrero and Miriam live in a single story home with 2 steps to enter. Miriam is available to support Rajeev' return to the community. Miriam is actively visiting Guerrero at the acute. Miriam is physically able to assist with all transfer and available 24/7. Spoke about insurance prior authorization, length of stay, goal for IRF level of care. Miriam says that she is encouraged by the progress Guerrero has shown.  Will follow.

## 2020-09-30 NOTE — THERAPY
Physical Therapy   Daily Treatment     Patient Name: Guerrero Ann  Age:  49 y.o., Sex:  male  Medical Record #: [id number]  Today's Date: 9/30/2020     Precautions: Fall Risk, Swallow Precautions ( See Comments)(s/p CABG x3, acute CVA)    Assessment  Patient continues to be limited by impaired cognition, impaired motor function, and decreased activity tolerance. He required max A for bed mobility and max A for support sitting EOB. He initiated some anterior weight shifting in sitting with cueing but limited ability to achieve upright in sitting. Will continue to follow.    Plan    Continue current treatment plan.    DC Equipment Recommendations: Unable to determine at this time  Discharge Recommendations: Recommend post-acute placement for additional physical therapy services prior to discharge home     Objective       09/30/20 0957   Cognition    Cognition / Consciousness X   Speech/ Communication Delayed Responses   Orientation Level Not Oriented to Day   Level of Consciousness Lethargic   Ability To Follow Commands 1 Step  (25% of the time)   Safety Awareness Impaired   New Learning Impaired   Attention Impaired   Sequencing Impaired   Initiation Impaired   Comments flat affect, decreased tracking past midline to R, decreased command following on R side   Balance   Sitting Balance (Static) Trace +   Sitting Balance (Dynamic) Trace   Weight Shift Sitting Poor   Comments required max A to maintain sitting at EOB, significant posterior lean   Gait Analysis   Gait Level Of Assist Unable to Participate   Bed Mobility    Supine to Sit Maximal Assist   Sit to Supine Maximal Assist   Scooting Maximal Assist   Comments able to initiate BLE OOB   Functional Mobility   Sit to Stand   (deferred given safety concerns, patient exhibiting extension)   Bed, Chair, Wheelchair Transfer Unable to Participate   Short Term Goals    Short Term Goal # 1 Patient will move supine<>sitting EOB without bed features  with supervision within 6tx in order to get in/out of bed   Goal Outcome # 1 goal not met   Short Term Goal # 2 Patient will move sitting<>standing with supervision within 6tx in order to initiate gait and transfers   Goal Outcome # 2 Goal not met   Short Term Goal # 3 Patient will ambulate 500ft with supervision within 6tx in order to access environment   Goal Outcome # 3 Goal not met   Short Term Goal # 4 Patient will verbalize cardiac rehab education including sternal precautions, home walking program, and techniques for activity pacing within 6tx in order to demonstrate understanding.   Goal Outcome # 4 Goal not met   Anticipated Discharge Equipment and Recommendations   DC Equipment Recommendations Unable to determine at this time   Discharge Recommendations Recommend post-acute placement for additional physical therapy services prior to discharge home

## 2020-09-30 NOTE — PROGRESS NOTES
Cardiovascular Surgery Progress Note    Name: Guerrero Ann  MRN: [id number]  : 1970  Admit Date: 2020  7:37 PM  Procedure:  Procedure(s) and Anesthesia Type:     * CABG, WITH ENDOSCOPIC VEIN PROCUREMENT- X3 - General     * ECHOCARDIOGRAM, TRANSESOPHAGEAL - General  5 Day Post-Op    Vitals:  Patient Vitals for the past 8 hrs:   Temp SpO2 O2 Delivery Device Pulse Resp BP   20 0814 -- -- None - Room Air (!) 107 (!) 33 (!) 176/96   20 0600 36.1 °C (97 °F) 93 % -- (!) 113 (!) 37 158/103     Temp (24hrs), Av.2 °C (97.2 °F), Min:36.1 °C (97 °F), Max:36.4 °C (97.6 °F)    Respiratory:    Respiration: (!) 33, Pulse Oximetry: 93 %     Chest Tube Drains:          Fluids:    Intake/Output Summary (Last 24 hours) at 2020 0911  Last data filed at 2020 0600  Gross per 24 hour   Intake 700 ml   Output 800 ml   Net -100 ml     Admit weight: Weight: 72.6 kg (160 lb)  Current weight: Weight: 72.2 kg (159 lb 2.8 oz) (20 0500)    Labs:  Recent Labs     20  0200 20  0230 20  0230   WBC 12.3* 14.0* 12.6*   RBC 2.36* 2.55* 2.49*   HEMOGLOBIN 7.8* 8.2* 8.1*   HEMATOCRIT 22.5* 24.4* 24.2*   MCV 95.3 95.7 97.2   MCH 33.1* 32.2 32.5   MCHC 34.7 33.6* 33.5*   RDW 50.0 47.5 49.1   PLATELETCT 160* 226 247   MPV 11.1 11.2 10.7     Recent Labs     20  0230 20  0230   NEUTSPOLYS 72.40* 70.10   LYMPHOCYTES 14.60* 15.70*   MONOCYTES 11.50 11.20   EOSINOPHILS 0.60 1.90   BASOPHILS 0.30 0.60     Recent Labs     20  0200 20  0230 20  0230   SODIUM 135 136 135   POTASSIUM 3.5* 3.4* 3.3*   CHLORIDE 98 97 96   CO2 22 22 19*   GLUCOSE 181* 182* 178*   BUN 20 19 23*   CREATININE 1.16 0.95 1.19   CALCIUM 8.4* 8.8 8.7     Medications:  • potassium chloride (KCL-CENTRAL) IV *Administer in ICU only*  40 mEq 40 mEq (20 0853)   • Pharmacy  1 Each     • acetaminophen  1,000 mg     • [START ON 10/1/2020] allopurinol  100 mg     • [START ON  10/1/2020] aspirin  81 mg     • atorvastatin  80 mg     • [START ON 10/1/2020] lisinopril  10 mg     • buPROPion  100 mg     • [START ON 10/1/2020] omeprazole  40 mg     • senna-docusate  2 Tab      And   • [START ON 10/1/2020] polyethylene glycol/lytes  1 Packet      And   • [START ON 10/1/2020] magnesium hydroxide  30 mL     • [START ON 10/1/2020] clopidogrel  75 mg     • [START ON 10/1/2020] potassium chloride SA  40 mEq     • carvedilol  12.5 mg     • ketorolac  15 mg     • lisinopril  5 mg     • MD Alert...Adult ICU Electrolyte Replacement per Pharmacy       • lidocaine  1 Patch     • tamsulosin  0.4 mg     • furosemide  40 mg     • insulin lispro  4 Units     • insulin lispro  0-15 Units     • Pharmacy Consult Request  1 Each     • enoxaparin  40 mg        Ordered Medications:    ASA - Yes    Plavix - Yes    Post-operative Beta Blockers - Yes    Ace Inhibitor - Yes    Statin - Yes    Exam:   Review of Systems   Constitutional: Positive for malaise/fatigue.   HENT: Negative.    Eyes: Negative.    Respiratory: Negative.    Cardiovascular: Negative.    Gastrointestinal: Negative.    Genitourinary: Negative.    Musculoskeletal: Negative.    Skin: Negative.    Neurological: Negative.    Endo/Heme/Allergies: Negative.    Psychiatric/Behavioral: Negative.      Physical Exam  Constitutional:       General: He is not in acute distress.     Appearance: He is well-developed. He is not diaphoretic.   Neck:      Musculoskeletal: Neck supple.   Cardiovascular:      Rate and Rhythm: Normal rate and regular rhythm.      Heart sounds: Normal heart sounds. No murmur. No friction rub. No gallop.    Pulmonary:      Effort: Pulmonary effort is normal. No respiratory distress.      Breath sounds: Examination of the right-lower field reveals decreased breath sounds. Examination of the left-lower field reveals decreased breath sounds. Decreased breath sounds present. No wheezing or rales.   Abdominal:      General: There is no  distension.      Palpations: Abdomen is soft.      Tenderness: There is no abdominal tenderness.   Skin:     General: Skin is warm and dry.      Comments: Sternum- prevena dressing  SVG sites- c/d/i   Neurological:      Mental Status: He is alert and oriented to person, place, and time.       Quality Measures:   Quality-Core Measures   Reviewed items::  EKG reviewed, Labs reviewed, Medications reviewed and Radiology images reviewed    Assessment/Plan:  POD 1 HDS, SR, Acute blood loss anemia- s/p transfusion- keep mediastinal tubes, gently diurese, d/c gomez  POD 2 HTN- inc lisinopril, SR/ST- change to metoprolol, acute blood loss anemia s/p transfusion- watch, d/c mediastinal tubes, urine retention- replace gomez/start flomax, cont diuresis  POD 3  HDS, SR, neuro right  weaker than left gazes to right, wounds CDI, abdomen S/NT, fluid balance negative, wt still up from admit.  Plan:  MRI today, decrease ace for permissive hypertension, dc gabapentin, BB, Statin, IS/ambulate. CPM.  POD 4  HDS but hypertensive, SR, neuro answers questions but delayed.  Gaze more center.  EEG normal, MRI pending, wounds CDI, abdomen distended, fluid balance negative, wt down.  Plan:  Restart ace, IM getting abdominal study, BB, Statin, IS/ambulate. CPM.  POD 5  HDS, ST, neuro unchanged, wounds CDI, abdomen S/NT, fluid balance negative, wt down.  Failed swallow evaluation.  Hypertensive.  Pain issues.  Plan:  core trak, switch BB to coreg, ACE, Statin, add norco, IS/ambulate. CPM.    Active Hospital Problems    Diagnosis   • Abdominal pain [R10.9]     Priority: High   • Altered mental status [R41.82]     Priority: High   • Encounter for weaning from ventilator ([facility]) [Z99.11]     Priority: High   • NSTEMI (non-ST elevated myocardial infarction) ([facility]) [I21.4]     Priority: High   • CAD (coronary artery disease) [I25.10]     Priority: Medium   • Type 2 diabetes mellitus with complication, without long-term current use of insulin ([facility])  [E11.8]     Priority: Medium   • Essential hypertension [I10]     Priority: Medium   • Normochromic normocytic anemia [D64.9]     Priority: Low   • Lacunar infarct, acute (HCC) [I63.81]   • Cardioembolic stroke (HCC) [I63.9]   • SONNY (acute kidney injury) (HCC) [N17.9]

## 2020-09-30 NOTE — PROGRESS NOTES
Cardiac Surgery RN Navigator Daily Rounding Note  Procedure Performed: Procedure(s):  CABG, WITH ENDOSCOPIC VEIN PROCUREMENT- X3  ECHOCARDIOGRAM, TRANSESOPHAGEAL     By  Dr. Chicas  5 Days Post-Op    Pertinent Overnight Events:    Uncontrolled pain with associated tachycardia; ativan given    Speech determined patient should be NPO-nursing to insert cortrak for meds and TF    Weight down, good UOP overnight    Hypertensive overnight; 140's to to 150's mostly    Rehab accepted patient if he has social support / upon discharge from rehab    Pertinent neuro findings/needs: A&O-confirmed CVA    Cardiac/hemodynamics:  Temp (24hrs), Av.2 °C (97.2 °F), Min:36.1 °C (97 °F), Max:36.4 °C (97.6 °F)    Heart rhythm: ST  Temp:  [36.1 °C (97 °F)-36.4 °C (97.6 °F)] 36.1 °C (97 °F)  Pulse:  [] 107  Resp:  [15-38] 33  BP: ()/() 176/96  SpO2:  [51 %-97 %] 93 %        /Weights:  Admit weight: Weight: 72.6 kg (160 lb)  Current Weight: Weight: 72.2 kg (159 lb 2.8 oz) (20 0500)  Weight change:     Intake/Output Summary (Last 24 hours) at 2020 0851  Last data filed at 2020 0600  Gross per 24 hour   Intake 700 ml   Output 800 ml   Net -100 ml       Adequate urine output: 600 cc    Respiratory:        Pertinent respiratory findings/needs: none RA    GI findings/needs: + BM     Labs:  Recent Labs     20  0200 20  0230 20  0230   WBC 12.3* 14.0* 12.6*   RBC 2.36* 2.55* 2.49*   HEMOGLOBIN 7.8* 8.2* 8.1*   HEMATOCRIT 22.5* 24.4* 24.2*   MCV 95.3 95.7 97.2   MCH 33.1* 32.2 32.5   MCHC 34.7 33.6* 33.5*   RDW 50.0 47.5 49.1   PLATELETCT 160* 226 247   MPV 11.1 11.2 10.7     Recent Labs     20  0200 20  0230 20  0230   SODIUM 135 136 135   POTASSIUM 3.5* 3.4* 3.3*   CHLORIDE 98 97 96   CO2 22 22 19*   GLUCOSE 181* 182* 178*   BUN 20 19 23*   CREATININE 1.16 0.95 1.19   CALCIUM 8.4* 8.8 8.7     INR:       Required Cardiac medications review:  ASA:  Yes  Plavix:  Yes  BB: Yes  ACE/ARB: Yes  Statin: yes  Diuretic: yes    LDA's necessity reviewed: YES    Thoughts and considerations for team rounding:    Consider increasing lisinopril again-BP's pretty high overnight    ICU electrolyte replacement order set-potassium was 3.3    Discharge plan:    TBD-accepted to rehab pending 24/7 social support upon discharge from rehab-will investigate family support situation.

## 2020-09-30 NOTE — PROGRESS NOTES
"RN attempting to retake BP when patient asked \"what is it with you guys\". Explained to patient that if his BP is high it must be treated but that we shouldnt treat a falsely high BP if he is moving too much during the cuff inflation. Patient had answered all orientation questions correctly. BP read 175/106, will try retaking in 15 minutes as benadryl was just administered.  "

## 2020-09-30 NOTE — PREADMISSION SCREENING NOTE
"Updated Pre-Screen Assessment     Name: Guerrero Ann  MRN: [id number]  : 1970    Medical Status/ Changes: /90   Pulse (!) 108   Temp 36.7 °C (98 °F) (Temporal)   Resp (!) 21   Ht 1.575 m (5' 2\")   Wt 71.9 kg (158 lb 8.2 oz)   SpO2 92%   BMI 28.99 kg/m²      Results for GUERRERO ANN (MRN [id number]) as of 10/1/2020 13:07   Ref. Range 10/1/2020 05:48   WBC Latest Ref Range: 4.8 - 10.8 K/uL 8.5   RBC Latest Ref Range: 4.70 - 6.10 M/uL 2.68 (L)   Hemoglobin Latest Ref Range: 14.0 - 18.0 g/dL 8.5 (L)   Hematocrit Latest Ref Range: 42.0 - 52.0 % 26.2 (L)   MCV Latest Ref Range: 81.4 - 97.8 fL 97.8   MCH Latest Ref Range: 27.0 - 33.0 pg 31.7   MCHC Latest Ref Range: 33.7 - 35.3 g/dL 32.4 (L)   RDW Latest Ref Range: 35.9 - 50.0 fL 50.2 (H)   Platelet Count Latest Ref Range: 164 - 446 K/uL 317   MPV Latest Ref Range: 9.0 - 12.9 fL 10.9   Neutrophils-Polys Latest Ref Range: 44.00 - 72.00 % 61.70   Neutrophils (Absolute) Latest Ref Range: 1.82 - 7.42 K/uL 5.54   Bands-Stabs Latest Ref Range: 0.00 - 10.00 % 3.50   Lymphocytes Latest Ref Range: 22.00 - 41.00 % 17.40 (L)   Lymphs (Absolute) Latest Ref Range: 1.00 - 4.80 K/uL 1.48   Monocytes Latest Ref Range: 0.00 - 13.40 % 14.80 (H)   Monos (Absolute) Latest Ref Range: 0.00 - 0.85 K/uL 1.26 (H)   Eosinophils Latest Ref Range: 0.00 - 6.90 % 1.70   Eos (Absolute) Latest Ref Range: 0.00 - 0.51 K/uL 0.14   Basophils Latest Ref Range: 0.00 - 1.80 % 0.90   Baso (Absolute) Latest Ref Range: 0.00 - 0.12 K/uL 0.08   Nucleated RBC Latest Units: /100 WBC 0.50   NRBC (Absolute) Latest Units: K/uL 0.04   Plt Estimation Unknown Normal   RBC Morphology Unknown Present   Hypochromia Unknown 1+   Anisocytosis Unknown 1+   Microcytosis Unknown 1+   Polychromia Unknown 2+   Peripheral Smear Review Unknown see below   Manual Diff Status Unknown PERFORMED   Sodium Latest Ref Range: 135 - 145 mmol/L 137   Potassium Latest Ref Range: 3.6 " - 5.5 mmol/L 3.6   Chloride Latest Ref Range: 96 - 112 mmol/L 98   Co2 Latest Ref Range: 20 - 33 mmol/L 19 (L)   Anion Gap Latest Ref Range: 7.0 - 16.0  20.0 (H)   Glucose Latest Ref Range: 65 - 99 mg/dL 159 (H)   Bun Latest Ref Range: 8 - 22 mg/dL 34 (H)   Creatinine Latest Ref Range: 0.50 - 1.40 mg/dL 1.19   GFR If  Latest Ref Range: >60 mL/min/1.73 m 2 >60   GFR If Non  Latest Ref Range: >60 mL/min/1.73 m 2 >60   Calcium Latest Ref Range: 8.5 - 10.5 mg/dL 9.1   Pre-Albumin Latest Ref Range: 18.0 - 38.0 mg/dL 16.2 (L)   Stat C-Reactive Protein Latest Ref Range: 0.00 - 0.75 mg/dL 18.55 (H)         Reviewer: Ning Moralez  Date: 10/1/2020  Time: 1:01 PM  Pre-Admission Screening Form    Patient Information:   Name: Guerrero Ann     MRN: [id number]       : 1970      Age: 49 y.o.   Gender: male      Race: White [7]       Marital Status: Single [1]  Family Contact: Radha Farley Michelle        Relationship: Friend [5]  Friend [5]  Home Phone: [phone]             Cell Phone:   [phone]  Advanced Directives: None  Code Status:  FULL  Current Attending Provider: Mateus Stringer D.O.  Referring Physician: GEORGINA Rodriguez   Physiatrist Consult: Dr. Ankita Ng       Referral Date: 2020  Primary Payor Source:  [facility]  Secondary Payor Source:      Medical Information:   Date of Admission to Acute Care [place]  Room Number: T613/00  Rehabilitation Diagnosis: 0001.3 - Stroke: Bilateral Involvement    Post CABG x3   Lacunar infarct/cardoemobolic stroke 20    Immunization History   Administered Date(s) Administered   • Influenza Vaccine Adult HD 10/31/2017   • Pneumococcal polysaccharide vaccine (PPSV-23) 2017     No Known Allergies  Past Medical History:   Diagnosis Date   • Diabetes (HCC)     oral meds only   • Hypertension    • Kidney stones    • MI, old    • Psychiatric problem     depression and  anxiety     Past Surgical History:   Procedure Laterality Date   • MULTIPLE CORONARY ARTERY BYPASS ENDO VEIN HARVEST  9/25/2020    Procedure: CABG, WITH ENDOSCOPIC VEIN PROCUREMENT- X3;  Surgeon: Michael Chicas M.D.;  Location: SURGERY [facility];  Service: Cardiothoracic   • MARGOTH  9/25/2020    Procedure: ECHOCARDIOGRAM, TRANSESOPHAGEAL;  Surgeon: Michael Chicas M.D.;  Location: SURGERY [facility];  Service: Cardiothoracic   • STENT PLACEMENT  2017    1 cardiac stent   • OTHER ORTHOPEDIC SURGERY      rt wrist fracture with fixation 34 years ago       History Leading to Admission, Conditions that Caused the Need for Rehab (CMS):     Alden Harden M.D.   Physician   [facility]        Date of Service  9/22/2020     Primary Care Physician  Hannah Hernadez P.A.-C.     Consultants  None     Code Status  Full Code     Chief Complaint      Chief Complaint   Patient presents with   • Chest Pain         History of Presenting Illness  49 y.o. male with a past medical history of CAD status post stent placement, hypertension, diabetes who presented 9/21/2020 with chest pain.  The patient states he has been having exertional chest pain for the past month however yesterday became worse and persistent.  He reported substernal chest pressure with radiation down his left arm associated with some numbness and tingling of his left hand.  He reported associated shortness of breath.  He denies any fevers, chills or cough.  Patient states that he has not been taking his medications for the past year since he lost his insurance.  He underwent a cardiac catheterization in 2018 and had a stent placed to his RCA.  He underwent a repeat cardiac catheterization in July 2019 which revealed two-vessel coronary artery disease with small vessel chronic total occlusion of the mid left anterior descending artery and patent stents in the mid right coronary artery.  Normal left ventricular systolic function with EF of 65%.  Patient also  "reports his blood pressure has been uncontrolled.  At this time his chest pain has resolved.  His initial troponin in the ER was 21 which increased to 63.        Assessment/Plan:  I anticipate this patient will require at least two midnights for appropriate medical management, necessitating inpatient admission.     NSTEMI (non-ST elevated myocardial infarction) (HCC)- (present on admission)  Assessment & Plan  Concern for in-stent thrombosis due to noncompliance with aspirin and Plavix for the past year  Patient will be admitted to the telemetry unit with close cardiac monitoring, serial EKG and troponin  Patient has been given full dose of aspirin and is started on IV heparin, monitor APTT  I will start the patient on metoprolol 25 mg twice daily and atorvastatin 40 mg daily  Check 2D echo, lipid panel, TSH and hemoglobin A1c  Nitro and morphine when necessary for chest pain  Currently chest pain-free.  Cardiology was consulted by the ER physician        CAD (coronary artery disease)- (present on admission)  Assessment & Plan  Started on aspirin, Plavix and Lipitor     Essential hypertension- (present on admission)  Assessment & Plan  Uncontrolled  Restarted on metoprolol, lisinopril and norvasc     Type 2 diabetes mellitus with complication, without long-term current use of insulin (HCC)- (present on admission)  Assessment & Plan  Start on insulin sliding scale with serial Accu-Checks  Check hemoglobin A1c  Hypoglycemic protocol in place                       Chuckie Cantu M.D.   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 Describes substernal. No associated symptoms. Improved with rest. Prior to this had prior episodes and went for [facility] but states \"blood vessel was too small.\" Claims compliance with medications. Denies toxic social habits but does use alcohol in moderation about 10 weekly whiskey " drinks. Follows Dr Huddleston outpatient.         Assessment / Plan:  49 year old man with PMH HTN, HLD, DM2, CAD/PCI presents with typical angina after shoveling rocks at work found NSTEMI.      -admit med tele  -check TTE  -agree with dapt 12 months and hep gtt 48h  -continue high intensity statin  -BP control, convert metoprolol to carvedilol and add chlorthalidone  -if still has angina after BP control, add antianginal  -consider SGLT2 inhib  -in future, may benefit from cabg if still has anginal symptoms     I personally discussed his case with Dr Perera     It is my pleasure to participate in the care of Mr. Ann.  Please do not hesitate to contact me with questions or concerns.     Chuckie Cantu MD  Cardiologist [facility]             Michael Chicas M.D.   Physician   Surgery Cardiac       REFERRING PHYSICIAN: Hebert Green MD.      CONSULTING PHYSICIAN: Michael Chicas MD, FACS.     CHIEF COMPLAINT: chest pain     HISTORY OF PRESENT ILLNESS: The patient is a 49 y.o. 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  The day of admission it occurred lasted several minutes was relieved with rest.  It was substernal and radiated to his left arm.  He became short of breath.  He went home.  The pain returned and it felt like a boulder on his chest pain 10/10.  No other associated signs or symptoms.  No other alleviating or aggravating factors.  He then brought himself to the ED here at [facility].  A cath revealed progression of his disease.  He was referred for CABG.       IMPRESSION:  3 vessel coronary artery disease not amenable to PCI.  Will get TEG and if positive will plan for surgery on Monday.  If negative will plan for surgery on Friday.     PLAN:  I recommend CABG x 3.      The procedure, its risks, benefits, potential complications and alternative treatments were  discussed with the patient in detail including the risks should he decide not to undergo my recommended treatment. All of his questions were answered to his satisfaction and he is willing to proceed with the operation. The risks include death, stroke,  infection: to include a rare bacterial infection related to the use of the heart/lung machine, tyshawn-operative myocardial infarction, dysrhythmias, diaphragmatic paralysis, chest wall paresthesia, tracheostomy, kidney or other organ failure, possible return to the operating room for bleeding, bleeding requiring transfusion with its attendant risks including AIDS or hepatitis, dehiscence of surgical incisions, respiratory complications including the need for prolonged ventilator support, Protamine or other drug reaction, peripheral neuropathy, loss of limb, and miscount of surgical items. The operative mortality risk is approximately 1-2%. The STS mortality risk score is 1% and the morbidity and mortality risk score is 5%. The scores were discussed with patient.     Sincerely,         Michael Chicas MD, FACS.     I,  Michael Chicas MD, FACS performed a substantial portion of the EM visit face-to-face with the same patient on the same date of service with GEORGINA Rodriguez. I was personally involved in reviewing and interpreting the films and conducted elements of the history and physical exam. I performed all of the medical decision making for the patient.        Jeremy M Gonda, M.D.   Physician   Pulmonary       Critical Care Consultation     Date of consult: 9/25/2020     Referring Physician  Christi Conway, A.P.N.     Reason for Consultation  CAD, perioperative critical care management     History of Presenting Illness  49 y.o. male who presented 9/21/2020 with a past medical history significant for type 2 diabetes, hypertension and left ventricular hypertrophy who presented to the emergency department on 9/21 complaining of left-sided chest pain.  He was found to  have acute coronary syndrome and was started on a heparin drip and admitted to the hospital with a cardiology consultation.  He underwent cardiac catheterization on 9/22 finding multivessel coronary arterial disease with severe ISR previously placed RCA stents and an ejection fraction of 55%.  Cardiothoracic surgery was consulted and took him to the operating room today for an uncomplicated three-vessel CABG.  He received 290 mL of Cell Saver and had a good urine output throughout the case.  He did not require any pacing and came off the pump without difficulty.  His ejection fraction was between 50 and 60% per anesthesia.  He initially required an epinephrine drip which is currently off and he is on Cleviprex at 2, dexmedetomidine at 0.5, insulin at 4 and on full mechanical ventilatory support upon arrival to the ICU.      Assessment/Plan  * CAD (coronary artery disease)- (present on admission)  Assessment & Plan  Status post three-vessel CABG 9/25  Routine postoperative management, monitor chest tube output  Tight blood pressure and glucose control with Cleviprex and insulin drips  Eventual beta-blocker  Antiplatelets, statin  PRN analgesics     Encounter for weaning from ventilator ([facility])  Assessment & Plan  Intubated in OR 9/25  Continue full mechanical ventilatory support using ASV mode  Titrate per protocol keeping SaO2 greater than 92%  RT/O2 protocol  Titrate Precedex drip PRN anxiety  Early sedation vacation, mobilization, pulmonary toiletry     NSTEMI (non-ST elevated myocardial infarction) ([facility])- (present on admission)  Assessment & Plan  Status post PCI finding multivessel disease now status post CABG  Antiplatelets, statin     Essential hypertension- (present on admission)  Assessment & Plan  Continue to titrate Cleviprex drip for now  Resume Coreg, ACE inhibitor     Type 2 diabetes mellitus with complication, without long-term current use of insulin ([facility])- (present on admission)  Assessment &  Plan  Insulin drip for now for tight glycemic control postoperatively follow transition to long and short acting insulin  Diabetic diet monitoring glucose closely     SONNY (acute kidney injury) (HCC)  Assessment & Plan  Improving  Monitor creatinine, urine output, electrolytes closely  Avoid nephrotoxins        Discussed patient condition and risk of morbidity and/or mortality with RN, RT, Pharmacy, Charge nurse / hot rounds, CVS and Anesthesiology.    The patient remains critically ill.  Critical care time = 34 minutes in directly providing and coordinating critical care and extensive data review.  No time overlap and excludes procedures.              KIKE Newsome.   Nurse Practitioner   Neurology     Adriel Le M.D. at 9/27/2020 5:59 PM   The patient was seen and examined. I agree with plan as detailed; see any corrections below:     I saw and evaluated the patient and discussed the management with the primary medical team, nursing, and resident staff. I reviewed GEORGINA Herzog's note and agree with their findings and plan as documented in the note except as documented below. The chart was reviewed and summarized.  As listed in reviewed note any/all available labs, imaging, vitals were reviewed and neuroimaging visualized. Available nursing, consultant, and resident notes were reviewed.     49 year old man with likely toxic metabolic encephalopathy vs. Seizure; EEG pending.  Stroke also on the ddx (with etiology secondary to an iatrogenic cause in the setting of cardiac surgery vs. Thromboembolic vs. Cardioembolic).  Not a candidate for tPA given recent cardiac surgery and not a candidate for IR intervention given no LVO on CTA.  Permissive HTN until MRI brain can be completed.  DAPT and statin, with remainder of management as eloquently detailed below.     Adriel Le MD  Neurohospitalist, Acute Care Services   of Neurology           Neurology STROKE CODE  H&P  Neurohospitalist Service, [facility]     Referring Physician: SUJATHA Yang     STROKE CODE:       Chief Complaint   Patient presents with   • Chest Pain         To obtain the most accurate data regarding the time called, and time patient seen, refer to the stroke run-sheet and chart.  For time of CT, refer to the radiology report. See A&P below for TPA Decision and door to needle time if and when applicable.     HPI: Guerrero Ann is a 49 y.o. male with history of CAD with stent placement (2018), DM type II, HTN, MI, depression, and anxiety presenting to the hospital on 9/21/2020 for chest pain. Neurology was consulted today via Code Stroke at 15:51 for acute onset expressive aphasia, worsening of right sided weakness, and right gaze preference with concerns of possible stroke. Patient is unable to provide history, so HPI comes from medical staff and family at bedside. Patient is POD 2 after CABG x3 on 9/25/20, and family and RN state he had some right arm weakness since yesterday. Last known well reported 13:00 today, and had acute onset of decreased LOC, right gaze preference, worsening right sided weakness, and aphasia at approximately 15:00. /66 at 15:00 and BG last 192 at 12:10. Currently on ASA, Plavix, and Atorvastatin, but no anticoagulation given recent surgery. Of note, patient has received tramadol 50 mg PRN q4hr over the past two days. Patient taken for STAT CT head wo, CTA head and neck w/wo, and CTP with results and interpretation below.               NEUROLOGICAL EXAM:      Mental status: Lethargic, awakens to repeated verbal stimuli to attend, oriented to self only, and follows some simple commands with repeated requests.  Speech and language: Speech is mildly dysarthric with delayed responses, no aphasia. The patient is able to name, repeat 1 to 2 words requiring redirection and multiple attempts to attend, and comprehend.  Cranial  nerve exam: Pupils are equal, round and reactive to light bilaterally. Visual fields are intact to confrontation. Extraocular muscles are intact with right gaze preference that overcomes midline but cannot bury sclera to left. Face is symmetric. Unable to assess sensation in the face, hearing, palate, tongue, or shoulder shrug, as patient non-contributory.    Motor exam: Strength is 4+/5 LUE, 3/5 to RUE with pronator drift, and 2/5 BLE both distally and proximally. Tone is normal. No abnormal movements were seen on exam.  Sensory exam: Withdraws to noxious stimuli in all 4 extremities.    Deep tendon reflexes:  2+ and symmetric. Toes up-going bilaterally.  Coordination: Normal finger-nose-finger to LUE, unable to complete with RUE or BLE secondary to weakness.   Gait: deferred as patient emergently transported to CT scanner.     NIH Stroke Scale:     1a. Level of Consciousness (Alert, drowsy, etc): 1= Drowsy     1b. LOC Questions (Month, age): 1= Answers one correctly     1c. LOC Commands (Open/close eyes make fist/let go): 0= Obeys both correctly     2.   Best Gaze (Eyes open - patient follows examiner's finger on face): 1= Partial gaze palay     3.   Visual Fields (introduce visual stimulus/threat to patient's field quadrants): 0= No visual loss     4.   Facial Paresis (Show teeth, raise eyebrows and squeeze eyes shut): 0= Normal             5a. Motor Arm - Left (Elevate arm to 90 degrees if patient is sitting, 45 degrees if  supine): 0= No drift     5b. Motor Arm - Right (Elevate arm to 90 degrees if patient is sitting, 45 degrees if supine): 2= Can't resist gravity     6a. Motor Leg - Left (Elevate leg 30 degrees with patient supine): 2= Can't resist gravity     6b. Motor Leg - Right  (Elevate leg 30 degrees with patient supine): 3= No effort against gravity     7.   Limb Ataxia (Finger-nose, heel down shin): 0= No ataxia     8.   Sensory (Pin prick to face, arm, trunk and leg - compare side to side): 0=  Normal     9.  Best Language (Name item, describe a picture and read sentences): 0= No aphasia     10. Dysarthria (Evaluate speech clarity by patient repeating listed words): 1= Mild to moderate slurring     11. Extinction and Inattention (Use information from prior testing to identify neglect or  double simultaneous stimuli testing): 0= No neglect     Total NIH Score: 11     Modified Bartholomew Scale (MRS): 4 = Moderately severe disability; unable to walk without assistance and unable to attend to own bodily needs without assistance        Assessment:     Guerrero Ann is a 49 y.o. male with relevant history of CAD with stent placement (2018), DM type II, HTN, MI, depression, and anxiety presenting to the hospital on 9/21/2020 for chest pain. Neurology was consulted today via Code Stroke at 15:51 for acute onset expressive aphasia, worsening of right sided weakness, and right gaze preference with concerns of possible stroke.  CTA head and neck revealed no large vessel occlusion, mild bilateral ICA atherosclerotic plaque without stenosis, and nonspecific bilateral white matter hypodensities suggestive for possible bilateral lacunar infarcts. NIHSS currently 11 with nonfocal findings of drowsiness, partial gaze palsy, right upper extremity weakness, bilateral lower extremity weakness, and mild dysarthria. As patient is POD 2 after CABG x3, and family and RN state he had some right arm weakness since yesterday he is not a candidate for tPA.  Patient is also not a thrombectomy candidate has no LVO was seen on vessel imaging. Given right gaze preference and right greater than left sided weakness and sparing of the face for deficits, infarction is less likely, but still on differential given vascular risk factors and recent cardiovascular surgery. Differential diagnoses include toxic/metabolic encephalopathy versus seizure versus acute ischemic stroke.     Plan:     -q2h and PRN neuro assessment. VS  per nursing/unit protocol. Permissive HTN ok for 24-48 hours, not to exceed SBP > 220, DBP > 105. Then, BP goal < 140/90. Antihypertensives per primary team.   -Obtain MRI Brain wo contrast.   -Obtain routine video EEG  -Toxic/metabolic work-up including UA, UDS, ammonia, thiamine, folate, B12, TSH with reflex to T4, HIV. RPR,  -Telemetry; currently SR. Screen for Afib/arrhythmia.  -TTE 9/25/2020 with EF 60% post bypass LV concentric hypertrophy with severe LVH, but normal left atrium size.    -Continue ASA 81 mg PO q day and Plavix 75mg PO q day  -Agree with Atorvastatin 80 mg PO q HS. Note lipid panel 9/22/20 unable to determine LDL given triglycerides 610.   -Recommend aggressive BG management per primary team. Avoid IVF with Dextrose. BG goal 140-180. Note hemoglobin A1c 7.9 (goal <7).   -PT/OT/SLP eval and treat.  -Discussed plan of care and ddx with family with all questions answered as able.   -All other medical management per ICU/primary team.   -DVT PPX: SCDs.      The evaluation of the patient, and recommended management, was discussed with Dr. Le, Dr. Marcelino, and bedside RNs.      Tim Greene, MSN GEORGINA [facility]  Nurse Practitioner, Neurohospitalist  [facility]  (t) [phone]             Cosigned by: Adriel Le M.D. at 9/27/2020  5:59 PM       Ankita Ng M.D.   Physician   Physical Medicine & Rehab             Medical chart review completed.      Patient is a 49 y.o. male  with a past medical history of coronary artery disease s/p stents, diabetes, hypertension, not taking any medications, admitted to [facility] on 9/21, with chest pain. He was admitted for acute coronary syndrome/NSTEMI, went to cath lab, found to have multivessel coronary artery disease and in-stent stenosis. He is now s/p CABG x 3 with Dr. Chicas on 9/25. Patient with acute blood loss anemia, s/p transfusion, urinary retention, right sided weakness and gaze preference, NIHSS 11,  neurology consulted. CTA negative. Negative EEG. MRI with numerous acute/subacute supratentorial and infratentorial infarctions consistent with cardio-embolic etiology. HgbA1c 7.9, LDL unable to be determined due to elevated Tgs at 610, ECHO EF 60 % s/p CABG, with severe LVH. COVID negative.         Patient with multiple co-morbidities(including but not limited to diabetes with hyperglycemia, hypertension, leukocytosis, anemia, hypokalemia, urinary retention, ); with cognitive deficits and functional deficits in mobility/self-cares, and severe de-conditioning.      Pre-morbidly, this patient lived in a one level home with one step to enter, with his girlfriend and her 2 adolescent children. The patient was evaluated by acute care PT, OT, and SLP and is currently requiring min to max assist for mobility, max assist for ADLs, with significant cognitive and language deficits.      If patient has 24/7 support at discharge, then he would be an excellent candidate for an acute inpatient rehabilitation program with a coordinated program of care at an intensity and frequency not available at a lower level of care.      Note: if the patient continues to progress while waiting for medical clearance, and no longer requires 2 out of 3 therapy services (PT/OT/SLP), then the patient would no longer meet the criteria for acute inpatient rehabilitation.     This recommendation is substantiated by the patient's current medical condition with intervention and assessment of medical issues requiring an acute level of care for patient's safety and maximum outcome. A coordinated program of care will be provided by an interdisciplinary team including physiatry, hospitalist, PT, OT, SLP, nursing. Rehab goals include improved cognition, language, mobility, self-care management, strength and conditioning/endurance, pain management, bowel and bladder management, mood and affect, and safety with independent home management including caregiver  training. Estimated length of stay is approximately 21-25 days. Rehab potential: excellent. Disposition: to pre-morbid independent living setting with supportive care of patient's significant other. We will continue to follow with you in anticipation of discharge to acute inpatient rehabilitation when medically stable to do so at the discretion of his attending physician.      Thank you for allowing us to participate in his care.     Ankita Ng M.D.  Physical Medicine and Rehabilitation             Hebert Mead M.D.   Physician   Interventional Cardiology   PREOPERATIVE DIAGNOSIS:  1.  Non-STEMI  2.  Diabetes mellitus  3.  CAD status post prior PCI     POSTOPERATIVE DIAGNOSIS:  1.  Multivessel coronary artery disease  2.  Severe ISR previously placed RCA stents  3.  IFR LAD 0.86 due to a proximal stenosis  4.  LVEF 55%        PROCEDURE PERFORMED:  Selective coronary angiography of the native vessels  Left heart catheterization  Left ventriculogram  IFR LAD  Supervision moderate sedation    Michael Chicas M.D.   Physician   Surgery Cardiac     DATE OF SERVICE:  09/25/2020     PREOPERATIVE DIAGNOSIS:  Coronary artery disease.     POSTOPERATIVE DIAGNOSIS:  Coronary artery disease.     PROCEDURES PERFORMED:  Coronary artery bypass grafting x3, left internal   mammary to left anterior descending, reverse saphenous vein graft to the major   obtuse marginal branch and the right posterior descending, endoscopic   saphenous vein harvesting, extracorporal circulation.    Brendan Vazquez M.D.   Physician   Neurology     ROUTINE ELECTROENCEPHALOGRAM REPORT        Referring provider: Dr. Marcelino.      DOS: 9/28/2020 (total recording of 33 minutes)     INDICATION:  Guerrero Ann 49 y.o. male presenting with seizure.     CURRENT ANTIEPILEPTIC REGIMEN: None.     TECHNIQUE: 30 channel routine electroencephalogram (EEG) was performed in accordance with the international 10-20 system. The study was  reviewed in bipolar and referential montages. The recording examined the patient during wakeful and drowsy state(s).      DESCRIPTION OF THE RECORD:  During the wakefulness, the background showed a symmetrical 8 hz alpha activity posteriorly with amplitude of 70 mV.  There was reactivity to eye closure/opening.  A normal anterior-posterior gradient was noted with faster beta frequencies seen anteriorly.  During drowsiness, theta/delta frequencies were seen.     ACTIVATION PROCEDURES:   Intermittent Photic stimulation was performed in a stepwise fashion from 1 to 30 Hz and elicited a normal response (photic driving), most noticeable in the posterior leads.        ICTAL AND/OR INTERICTAL FINDINGS:   No focal or generalized epileptiform activity noted. No regional slowing was seen during this routine study.  No clinical events or seizures were reported or recorded during the study.      EKG: sampling of the EKG recording demonstrated sinus rhythm.         INTERPRETATION:  This is a normal routine EEG recording in the awake and drowsy state(s).  Clinical correlation is recommended.     Note: A normal EEG does not rule out epilepsy.  If the clinical suspicion remains high for seizures, a prolonged recording to capture clinical or subclinical events may be helpful.        Brendan Vazquez MD      Imaging/Testing:   I interpreted and/or reviewed the patient's neuroimaging     CT-CTA NECK WITH & W/O-POST PROCESSING   Final Result       1.  CT angiogram of the neck within normal limits.       2.  Mild bilateral internal carotid artery atherosclerotic plaque without stenosis       3.  Small right pleural effusion       4.  Small left apical pneumothorax       CT-CTA HEAD WITH & W/O-POST PROCESS   Final Result       No thrombosis is seen within the [place].       Bilateral white matter hypodensities can be seen in the setting of bilateral lacunar infarcts, chronic microangiopathic change, demyelination or gliosis. MRI  would be more sensitive for further evaluation.       Paranasal sinus disease.       DX-CHEST-PORTABLE (1 VIEW)   Final Result           Interval extubation. No other significant interval change.       EC-MARGOTH W/O CONT   Final Result       DX-CHEST-PORTABLE (1 VIEW)   Final Result       1.  Left basilar opacification likely represents atelectasis.       2.  Endotracheal tube tip projects approximately 1.5 cm above the migel.       3.  Left subclavian catheter tip projects over the superior vena cava. No pneumothorax is identified.       US-CAROTID DOPPLER BILAT   Final Result       US-VEIN MAPPING LOWER EXTREMITY BILAT   Final Result       EC-ECHOCARDIOGRAM COMPLETE W/O CONT   Final Result       DX-CHEST-PORTABLE (1 VIEW)   Final Result       No acute cardiopulmonary abnormality.              MRI BRAIN With and without         HISTORY/REASON FOR EXAM:  Altered mental status.        TECHNIQUE/EXAM DESCRIPTION:  MRI of the brain without contrast.     T1 sagittal, T2 fast spin-echo axial, T1 coronal, diffusion-weighted and apparent diffusion coefficient (ADC map) axial images were obtained of the whole brain.     The study was performed on a [facility] [facility] 1.5 Camelia MRI scanner.     COMPARISON:  CT exams 9/27/2020     FINDINGS: Study is limited by patient motion.  There are multifocal acute/subacute small supratentorial and infratentorial infarcts involving multiple vascular distributions suggesting embolic infarcts. There are several bilateral cerebral infarcts, most involving the periventricular frontoparietal   and right periatrial white matter with a few scattered cortical and punctate deep basal ganglia infarcts. There is a small infarct in the left cerebral peduncle, left middle cerebellar peduncle and a few scattered tiny infarcts in the cerebellar   hemispheres.     There is a punctate chronic lacunar infarct in the right thalamus, small posterior right basal ganglia and small chronic left basal ganglia/corona  radiata lacunar infarct. There is also small chronic lacunar infarct in the subcortical left frontal lobe.     No acute intracranial hemorrhage or extra-axial fluid collection. There is no significant mass effect, midline shift or hydrocephalus.     Proximal vascular flow voids are patent.     There is mucosal thickening in the maxillary sinuses, air-fluid level in the left sphenoid sinus and some scattered mucosal thickening and opacification in the ethmoid and right sphenoid sinuses. Mastoids are clear.     IMPRESSION:     Numerous acute/subacute supratentorial and infratentorial infarcts most consistent with cardioembolic etiology.     Several chronic lacunar infarcts.     No acute intracranial hemorrhage.    Co-morbidities: Please see above.   Potential Risk - Complications: Cognitive Impairment, Deep Vein Thrombosis, Dysphagia, Incontinence, Malnutrition, Pain, Perceptual Impairment, Pneumonia, Pressure Ulcer, Seizures and Urinary Tract Infection  Level of Risk: High    Ongoing Medical Management Needed (Medical/Nursing Needs):   Patient Active Problem List    Diagnosis Date Noted   • Abdominal pain 09/29/2020     Priority: High   • Altered mental status 09/28/2020     Priority: High   • Encounter for weaning from ventilator (formerly [facility]) 09/25/2020     Priority: High   • NSTEMI (non-ST elevated myocardial infarction) (formerly [facility]) 09/22/2020     Priority: High   • CAD (coronary artery disease) 12/22/2018     Priority: Medium   • Type 2 diabetes mellitus with complication, without long-term current use of insulin (formerly [facility]) 05/29/2018     Priority: Medium   • Essential hypertension 05/29/2018     Priority: Medium   • Normochromic normocytic anemia 09/28/2020     Priority: Low   • Lacunar infarct, acute (formerly [facility]) 09/29/2020   • Cardioembolic stroke (formerly [facility]) 09/29/2020   • SONNY (acute kidney injury) (formerly [facility]) 09/23/2020   • Acute non-ST elevation myocardial infarction (NSTEMI) (formerly [facility]) 05/29/2018   • Family history of stroke or transient ischemic  "attack in mother 05/29/2018   • Hypokalemia 05/29/2018       Current Vital Signs:   Temperature: 36 °C (96.8 °F) Pulse: 99 Respiration: 16 Blood Pressure: 145/104  Weight: 72.2 kg (159 lb 2.8 oz) Height: 157.5 cm (5' 2\")  Pulse Oximetry: 100 % O2 (LPM): 0      Completed Laboratory Reports:  Recent Labs     09/27/20  1407 09/27/20  1623 09/27/20  2205 09/28/20  0146 09/28/20  0200 09/28/20  0811 09/28/20  1110 09/28/20  1709 09/28/20  2102 09/29/20  0126 09/29/20  0230 09/29/20  0813 09/29/20  1133 09/29/20  1724 09/29/20  2228 09/30/20  0230 09/30/20  0823   WBC  --   --   --   --  12.3*  --   --   --   --   --  14.0*  --   --   --   --  12.6*  --    HEMOGLOBIN  --   --   --   --  7.8*  --   --   --   --   --  8.2*  --   --   --   --  8.1*  --    HEMATOCRIT  --   --   --   --  22.5*  --   --   --   --   --  24.4*  --   --   --   --  24.2*  --    PLATELETCT  --   --   --   --  160*  --   --   --   --   --  226  --   --   --   --  247  --    SODIUM  --   --   --   --  135  --   --   --   --   --  136  --   --   --   --  135  --    POTASSIUM  --   --   --   --  3.5*  --   --   --   --   --  3.4*  --   --   --   --  3.3*  --    BUN  --   --   --   --  20  --   --   --   --   --  19  --   --   --   --  23*  --    CREATININE  --   --   --   --  1.16  --   --   --   --   --  0.95  --   --   --   --  1.19  --    GLUCOSE  --   --   --   --  181*  --   --   --   --   --  182*  --   --   --   --  178*  --    POCGLUCOSE 144* 150* 167* 172*  --  186* 192* 223* 198* 170*  --  229* 162* 176* 170* 180* 203*     Additional Labs: Not Applicable    Prior Living Situation:   Housing / Facility: 1 Story House  Steps Into Home: 1  Steps In Home: 0  Lives with - Patient's Self Care Capacity: Significant Other, Child Less than 18 Years of Age  Equipment Owned: None    Prior Level of Function / Living Situation:   Physical Therapy: Prior Services: None  Housing / Facility: 1 Story House  Steps Into Home: 1  Steps In Home: 0  Equipment " Owned: None  Lives with - Patient's Self Care Capacity: Significant Other, Child Less than 18 Years of Age  Bed Mobility: Independent  Transfer Status: Independent  Ambulation: Independent  Distance Ambulation (Feet): (community)  Assistive Devices Used: None  Stairs: Independent  Current Level of Function:   Gait Level Of Assist: Moderate Assist  Assistive Device: Front Wheel Walker  Distance (Feet): 5  Deviation: Decreased Heel Strike, Decreased Toe Off, Other (Comment)(decreased jessica, step length, poor motor control)  # of Stairs Climbed: 0  Weight Bearing Status: no restrictions  Supine to Sit: Maximal Assist  Sit to Supine: Maximal Assist  Scooting: Maximal Assist  Comments: able to initiate bed mobility with L leg with verbal cues  Sit to Stand: Moderate Assist(x2)  Bed, Chair, Wheelchair Transfer: Unable to Participate  Toilet Transfers: Unable to Participate  Transfer Method: Stand Step  Sitting in Chair: Unable  Sitting Edge of Bed: 10+  Standin  Occupational Therapy:   Self Feeding: Unable To Determine At This Time  Grooming / Hygiene: Unable To Determine At This Time  Bathing: Unable To Determine At This Time  Dressing: Unable To Determine At This Time  Toileting: Unable To Determine At This Time  Medication Management: Unable To Determine At This Time  Laundry: Unable To Determine At This Time  Kitchen Mobility: Unable To Determine At This Time  Finances: Unable To Determine At This Time  Home Management: Unable To Determine At This Time  Shopping: Unable To Determine At This Time  Prior Level Of Mobility: Unable to Determine At This Time  Driving / Transportation: Unable To Determine At This Time  Prior Services: None  Housing / Facility: 1 Story House  Occupation (Pre-Hospital Vocational): Unable To Determine At This Time  Leisure Interests: Unable To Determine At This Time  Current Level of Function:   Lower Body Dressing: Maximal Assist(socks)  Toileting: (unable to get to BSC)  Speech  Language Pathology:   Problem List: Dysphagia  Diet / Liquid Recommendation: Pre-Feeding Trials with SLP Only, NPO  Comments: Inconsistent cough 50% of opportunities, post swallow on tsp boluses.  Rehabilitation Prognosis/Potential: Good  Estimated Length of Stay: 14 - 21 days    Nursing:   Orientation : Oriented x 4  Continent of bowel.    Quispe cath ether placed.     Scope/Intensity of Services Recommended:  Physical Therapy: 1 hr / day  5 days / week. Therapeutic Interventions Required: Maximize Endurance, Mobility, Strength and Safety  Occupational Therapy: 1 hr / day 5 days / week. Therapeutic Interventions Required: Maximize Self Care, ADLs, IADLs, Energy Conservation and family training  Speech & Language Pathology: 1 hr / day 5 days / week. Therapeutic Interventions Required: Maximize Cognition, Swallowing, Safety and family training  Rehabilitation Nursin/7. Therapeutic Interventions Required: Monitor Pain, Skin, Vital Signs, Intake and Output, Labs, Safety, Aspiration Risk and Family Training  Rehabilitation Physician: 3 - 5 days / week. Therapeutic Interventions Required: Medical Management  Respiratory Care: Eval and Tx.. Therapeutic Interventions Required: Per Protocol.  Dietician: Eval and Tx.. Therapeutic Interventions Required: Per protocol    He requires 24-hour rehabilitation nursing to manage bowel and bladder function, skin care, surgical incision, wound, nutrition and fluid intake, pulmonary hygiene, pain control, safety, medication management and patient/family goals. In addition, rehabilitation nursing will reiterate and reinforce therapy skills and equipment use, including ADLs, as well as provide education to the patient and family. Guerrero So Lauro Seth is willing to participate in and is able to tolerate the proposed plan of care.    Rehabilitation Goals and Plan (Expected frequency & duration of treatment in the IRF):   Return to the Community  Anticipated Date of  Rehabilitation Admission: 10/1/2020  Patient/Family oriented IRF level of care/facility/plan: Yes  Patient/Family willing to participate in IRF care/facility/plan: Yes  Patient able to tolerate IRF level of care proposed: Yes  Patient has potential to benefit IRF level of care proposed: Yes      Special Needs or Precautions - Medical Necessity:  Precautions: Fall Risk, Swallow Precautions   Comments: Mildly agitated/combative @ times.      Diet:   DIET ORDERS (From admission to next 24h)     Start     Ordered    09/30/20 0843  Diet Tube Feeding  CONTINUOUS DIET     Comments: Start at 25mL/hr. Do not advance until dietitian consult completed.   Question Answer Comment   Which Rate/Volume? 25 mL/hr    Formula: FIBERSOURCE HN    Specify Type: CONTINUOUS        09/30/20 0842    09/30/20 0840  Diet NPO  ALL MEALS     Question:  Restrict to:  Answer:  Strict    09/30/20 0839                Anticipated Discharge Destination / Patient/Family Goal:  Destination: Home with Assistance Support System: PT lives with his SO Miriam and her children ages 9 and 11 who is able to provide 24/7 and provide physical assist.  [facility] w/ 1 step to enter  Anticipated home health services: OT, PT, SLP, Nursing and Aide  Previously used HH service/ provider: Not Applicable  Anticipated DME Needs: Walker  Outpatient Services: to be determined.   Alternative resources to address additional identified needs:   After hours phone call  Future Appointments   Date Time Provider Department Center   10/20/2020  2:15 PM SHALOM Fitzgerald.P.R.N. [facility] None   11/16/2020  1:15 PM ROSIO CamachoPLUANA [facility] None         Pre-Screen Completed: 9/30/2020 12:16 PM Ning Moralez

## 2020-09-30 NOTE — THERAPY
"Speech Language Pathology  Daily Treatment     Patient Name: Guerrero Ann  Age:  49 y.o., Sex:  male  Medical Record #: [id number]  Today's Date: 9/30/2020     Precautions  Precautions: Fall Risk, Swallow Precautions ( See Comments)  Comments: Mildly agitated/combative    Assessment    Pt was admitted 9/21/20 with NSTEMI, AMS, Type 2 DM with insulin, essential HTN, CAD, SNONY.  Post CABG x3 9/25, with lacunar infarct/cardoemobolic stroke 9/29/20.  Chest xray 9/28; pulmonary edema and/or infiltrates identified, stable vs prior exam, +cardiomegaly.     Pt seen at request of RN, post difficulty with med pass.  Pt is awake, alert, oriented x 3, however eyes are glazed and VS are all elevated.  Pt appears tense, anxious, complains of feeling hot, and is pulling at monitors/leads.  Gown lowered, cold cloth applied to head and prefeeding trials revealed inconsistent cough 50% of opportunities, post swallow on tsp boluses of both puree and mildly thick liquids, concerning for descending penetration or aspiration.  Pt does trigger a timely first swallow and delayed second and third swallows.  Currently not appropriate for po diet; unable to eat to meet nutritional needs as well as presenting with s/s of descending aspiration risk.  Discussed status with pt; unclear retention of information presented.      Plan    Continue current treatment plan.    Discharge Recommendations: (P) Recommend post-acute placement for additional speech therapy services prior to discharge home    Subjective    \"I'm hot\"     Objective       09/30/20 0814   Vitals   Vitals Comments VS elevated at baseline; appears acutely ill   Non Verbal Descriptors   Non Verbal Scale  Tense Body Language   Cognitive-Linguistic   Level of Consciousness Alert   Orientation Level Not Oriented to Month;Not Oriented to Day   Voice   Comments Clear   Dysphagia    Dysphagia X   Other Treatments Trials of MT2/puree   Diet / Liquid Recommendation " "Pre-Feeding Trials with SLP Only;NPO   Nutritional Liquid Intake Rating Scale Non thickened beverages   Nutritional Food Intake Rating Scale Tube dependent with minimal attempts of oral intake   Nursing Communication   (ok for single ice chips; RN aware)   Skilled Intervention Compensatory Strategies;Verbal Cueing;Tactile Cueing   Comments Inconsistent cough 50% of opportunities, post swallow on tsp boluses.   Recommended Route of Medication Administration   Medication Administration  Via Gastric Tube   Patient / Family Goals   Patient / Family Goal #1 \"I'm hot.\" (gown lowered)   Goal #1 Outcome Progressing slower than expected   Short Term Goals   Short Term Goal # 1 Patient will consume meals of PU4/MT2 with no s/sx of aspiration given 1:1 feeding.   Goal Outcome # 1 Goal not met   Education Group   Additional Comments pt intake of info hindered by physical discomfort   Interdisciplinary Plan of Care Collaboration   Collaboration Comments npo with main recommended.   Session Information   Date / Session Number 2, 9/30/20 (2/3-10/4 CW)  (cortrak rec/anxious/no nutrition)   Priority 4  (3x.MI/CABG/MRI;lacunar infarcts.Med pass trouble;NPO;Ck?nutr)         "

## 2020-09-30 NOTE — CARE PLAN
Problem: Communication  Goal: The ability to communicate needs accurately and effectively will improve  Outcome: PROGRESSING AS EXPECTED     Problem: Safety  Goal: Will remain free from injury  Outcome: PROGRESSING AS EXPECTED     Problem: Post Op Day 4 CABG/Heart Valve Replacement  Goal: Optimal care of the Post Op CABG/Heart Valve replacement Post Op Day 4  Outcome: PROGRESSING AS EXPECTED

## 2020-09-30 NOTE — DIETARY
"Nutrition Support Assessment:  Day 8 of admit.  Guerrero Ann is a 49 y.o. male with admitting DX of NSTEMI (non-ST elevated myocardial infarction) (HCC)     Current problem list:  1. PMH: CAD/stents, DM, HTN  2. S/p CABG x3   3. Neuro changes noted   4. MRI: Numerous areas of ischemic infarct     Assessment:  Estimated Nutritional Needs based on:   Height: 157.5 cm (5' 2\")  Weight: 72.2 kg (159 lb 2.8 oz)  Ideal Body Weight: 53.5 kg (118 lb)  Percent Ideal Body Weight: 134.9  Body mass index is 29.11 kg/m²., BMI classification: Overweight    Calculation/Equation: REE per MSJ x1.1-1.2 = 8766-2762 kcal/day  Total Calories / day: 1600 - 1800 (Calories / k - 25)  Total Grams Protein / day: 72 - 87 (Grams Protein / k - 1.2)     Evaluation:   1. Pt failed swallow evaluation per SLP; NPO.  2. Gastric Cortrak in place with orders to start TF.   3. Of note, per imaging, \"There is mild gaseous distention of colon which could indicate ileus.\" Last BM recorded .  4. Labs: K+ 3.3, glu 178, BUN 23, POC glu/24 hours 162-203  5. Meds: Lasix, insulin, electrolyte replacement, bowel meds  6. Low-CHO TF formula is indicated.     Malnutrition Risk: None identified @ this time.      Recommendations/Plan:  1. Start Diabetisource AC @ 25 ml/hr and advance per protocol to goal rate 60 ml/hr to provide 1728 kcal, 86 grams protein, and 1181 ml free water per day.  2. Fluids per MD.  3. PO diet per SLP.    RD following.   "

## 2020-10-01 ENCOUNTER — APPOINTMENT (OUTPATIENT)
Dept: RADIOLOGY | Facility: MEDICAL CENTER | Age: 50
DRG: 233 | End: 2020-10-01
Attending: STUDENT IN AN ORGANIZED HEALTH CARE EDUCATION/TRAINING PROGRAM
Payer: COMMERCIAL

## 2020-10-01 ENCOUNTER — APPOINTMENT (OUTPATIENT)
Dept: RADIOLOGY | Facility: MEDICAL CENTER | Age: 50
DRG: 233 | End: 2020-10-01
Attending: THORACIC SURGERY (CARDIOTHORACIC VASCULAR SURGERY)
Payer: COMMERCIAL

## 2020-10-01 LAB
ANION GAP SERPL CALC-SCNC: 20 MMOL/L (ref 7–16)
ANISOCYTOSIS BLD QL SMEAR: ABNORMAL
BASOPHILS # BLD AUTO: 0.9 % (ref 0–1.8)
BASOPHILS # BLD: 0.08 K/UL (ref 0–0.12)
BUN SERPL-MCNC: 34 MG/DL (ref 8–22)
CALCIUM SERPL-MCNC: 9.1 MG/DL (ref 8.5–10.5)
CHLORIDE SERPL-SCNC: 98 MMOL/L (ref 96–112)
CO2 SERPL-SCNC: 19 MMOL/L (ref 20–33)
CREAT SERPL-MCNC: 1.19 MG/DL (ref 0.5–1.4)
CRP SERPL HS-MCNC: 18.55 MG/DL (ref 0–0.75)
EOSINOPHIL # BLD AUTO: 0.14 K/UL (ref 0–0.51)
EOSINOPHIL NFR BLD: 1.7 % (ref 0–6.9)
ERYTHROCYTE [DISTWIDTH] IN BLOOD BY AUTOMATED COUNT: 50.2 FL (ref 35.9–50)
GLUCOSE BLD-MCNC: 144 MG/DL (ref 65–99)
GLUCOSE BLD-MCNC: 150 MG/DL (ref 65–99)
GLUCOSE BLD-MCNC: 153 MG/DL (ref 65–99)
GLUCOSE BLD-MCNC: 181 MG/DL (ref 65–99)
GLUCOSE BLD-MCNC: 198 MG/DL (ref 65–99)
GLUCOSE SERPL-MCNC: 159 MG/DL (ref 65–99)
HCT VFR BLD AUTO: 26.2 % (ref 42–52)
HGB BLD-MCNC: 8.5 G/DL (ref 14–18)
HYPOCHROMIA BLD QL SMEAR: ABNORMAL
LYMPHOCYTES # BLD AUTO: 1.48 K/UL (ref 1–4.8)
LYMPHOCYTES NFR BLD: 17.4 % (ref 22–41)
MANUAL DIFF BLD: NORMAL
MCH RBC QN AUTO: 31.7 PG (ref 27–33)
MCHC RBC AUTO-ENTMCNC: 32.4 G/DL (ref 33.7–35.3)
MCV RBC AUTO: 97.8 FL (ref 81.4–97.8)
MICROCYTES BLD QL SMEAR: ABNORMAL
MONOCYTES # BLD AUTO: 1.26 K/UL (ref 0–0.85)
MONOCYTES NFR BLD AUTO: 14.8 % (ref 0–13.4)
MORPHOLOGY BLD-IMP: NORMAL
NEUTROPHILS # BLD AUTO: 5.54 K/UL (ref 1.82–7.42)
NEUTROPHILS NFR BLD: 61.7 % (ref 44–72)
NEUTS BAND NFR BLD MANUAL: 3.5 % (ref 0–10)
NRBC # BLD AUTO: 0.04 K/UL
NRBC BLD-RTO: 0.5 /100 WBC
PLATELET # BLD AUTO: 317 K/UL (ref 164–446)
PLATELET BLD QL SMEAR: NORMAL
PMV BLD AUTO: 10.9 FL (ref 9–12.9)
POLYCHROMASIA BLD QL SMEAR: NORMAL
POTASSIUM SERPL-SCNC: 3.6 MMOL/L (ref 3.6–5.5)
PREALB SERPL-MCNC: 16.2 MG/DL (ref 18–38)
RBC # BLD AUTO: 2.68 M/UL (ref 4.7–6.1)
RBC BLD AUTO: PRESENT
SODIUM SERPL-SCNC: 137 MMOL/L (ref 135–145)
WBC # BLD AUTO: 8.5 K/UL (ref 4.8–10.8)

## 2020-10-01 PROCEDURE — 86140 C-REACTIVE PROTEIN: CPT

## 2020-10-01 PROCEDURE — 700102 HCHG RX REV CODE 250 W/ 637 OVERRIDE(OP): Performed by: STUDENT IN AN ORGANIZED HEALTH CARE EDUCATION/TRAINING PROGRAM

## 2020-10-01 PROCEDURE — 700101 HCHG RX REV CODE 250: Performed by: CLINICAL NURSE SPECIALIST

## 2020-10-01 PROCEDURE — 700111 HCHG RX REV CODE 636 W/ 250 OVERRIDE (IP): Performed by: NURSE PRACTITIONER

## 2020-10-01 PROCEDURE — 71045 X-RAY EXAM CHEST 1 VIEW: CPT

## 2020-10-01 PROCEDURE — 700101 HCHG RX REV CODE 250: Performed by: STUDENT IN AN ORGANIZED HEALTH CARE EDUCATION/TRAINING PROGRAM

## 2020-10-01 PROCEDURE — 99232 SBSQ HOSP IP/OBS MODERATE 35: CPT | Performed by: STUDENT IN AN ORGANIZED HEALTH CARE EDUCATION/TRAINING PROGRAM

## 2020-10-01 PROCEDURE — A9270 NON-COVERED ITEM OR SERVICE: HCPCS | Performed by: CLINICAL NURSE SPECIALIST

## 2020-10-01 PROCEDURE — 51798 US URINE CAPACITY MEASURE: CPT

## 2020-10-01 PROCEDURE — A9270 NON-COVERED ITEM OR SERVICE: HCPCS | Performed by: STUDENT IN AN ORGANIZED HEALTH CARE EDUCATION/TRAINING PROGRAM

## 2020-10-01 PROCEDURE — 87040 BLOOD CULTURE FOR BACTERIA: CPT | Mod: 91

## 2020-10-01 PROCEDURE — 80048 BASIC METABOLIC PNL TOTAL CA: CPT | Mod: 91

## 2020-10-01 PROCEDURE — 84134 ASSAY OF PREALBUMIN: CPT

## 2020-10-01 PROCEDURE — 85007 BL SMEAR W/DIFF WBC COUNT: CPT | Mod: 91

## 2020-10-01 PROCEDURE — 85027 COMPLETE CBC AUTOMATED: CPT | Mod: 91

## 2020-10-01 PROCEDURE — 770020 HCHG ROOM/CARE - TELE (206)

## 2020-10-01 PROCEDURE — 97530 THERAPEUTIC ACTIVITIES: CPT

## 2020-10-01 PROCEDURE — 99024 POSTOP FOLLOW-UP VISIT: CPT | Performed by: THORACIC SURGERY (CARDIOTHORACIC VASCULAR SURGERY)

## 2020-10-01 PROCEDURE — 700102 HCHG RX REV CODE 250 W/ 637 OVERRIDE(OP): Performed by: CLINICAL NURSE SPECIALIST

## 2020-10-01 PROCEDURE — 82962 GLUCOSE BLOOD TEST: CPT | Mod: 91

## 2020-10-01 PROCEDURE — 700111 HCHG RX REV CODE 636 W/ 250 OVERRIDE (IP): Performed by: CLINICAL NURSE SPECIALIST

## 2020-10-01 RX ORDER — LISINOPRIL 20 MG/1
20 TABLET ORAL
Status: DISCONTINUED | OUTPATIENT
Start: 2020-10-02 | End: 2020-10-02

## 2020-10-01 RX ORDER — ASPIRIN 81 MG/1
81 TABLET, CHEWABLE ORAL DAILY
Status: CANCELLED | OUTPATIENT
Start: 2020-10-02

## 2020-10-01 RX ORDER — CARVEDILOL 25 MG/1
25 TABLET ORAL 2 TIMES DAILY WITH MEALS
Status: DISCONTINUED | OUTPATIENT
Start: 2020-10-01 | End: 2020-10-02

## 2020-10-01 RX ORDER — LIDOCAINE 50 MG/G
1 PATCH TOPICAL EVERY 24 HOURS
Status: CANCELLED | OUTPATIENT
Start: 2020-10-02

## 2020-10-01 RX ORDER — LISINOPRIL 20 MG/1
20 TABLET ORAL
Status: CANCELLED | OUTPATIENT
Start: 2020-10-02

## 2020-10-01 RX ORDER — POTASSIUM CHLORIDE 20 MEQ/1
40 TABLET, EXTENDED RELEASE ORAL DAILY
Status: CANCELLED | OUTPATIENT
Start: 2020-10-02

## 2020-10-01 RX ORDER — POLYETHYLENE GLYCOL 3350 17 G/17G
1 POWDER, FOR SOLUTION ORAL DAILY
Status: CANCELLED | OUTPATIENT
Start: 2020-10-02

## 2020-10-01 RX ORDER — CARVEDILOL 25 MG/1
25 TABLET ORAL 2 TIMES DAILY WITH MEALS
Status: CANCELLED | OUTPATIENT
Start: 2020-10-01

## 2020-10-01 RX ORDER — ATORVASTATIN CALCIUM 80 MG/1
80 TABLET, FILM COATED ORAL
Status: CANCELLED | OUTPATIENT
Start: 2020-10-01

## 2020-10-01 RX ORDER — BUPROPION HYDROCHLORIDE 100 MG/1
100 TABLET ORAL 3 TIMES DAILY
Status: CANCELLED | OUTPATIENT
Start: 2020-10-01

## 2020-10-01 RX ORDER — ACETAMINOPHEN 500 MG
1000 TABLET ORAL EVERY 6 HOURS
Status: CANCELLED | OUTPATIENT
Start: 2020-10-01

## 2020-10-01 RX ORDER — BISACODYL 10 MG
10 SUPPOSITORY, RECTAL RECTAL
Status: CANCELLED | OUTPATIENT
Start: 2020-10-01

## 2020-10-01 RX ORDER — ALLOPURINOL 100 MG/1
100 TABLET ORAL DAILY
Status: CANCELLED | OUTPATIENT
Start: 2020-10-02

## 2020-10-01 RX ORDER — CLOPIDOGREL BISULFATE 75 MG/1
75 TABLET ORAL DAILY
Status: CANCELLED | OUTPATIENT
Start: 2020-10-02

## 2020-10-01 RX ORDER — TRAMADOL HYDROCHLORIDE 50 MG/1
50 TABLET ORAL EVERY 4 HOURS PRN
Status: CANCELLED | OUTPATIENT
Start: 2020-10-01

## 2020-10-01 RX ORDER — AMOXICILLIN 250 MG
2 CAPSULE ORAL 2 TIMES DAILY
Status: CANCELLED | OUTPATIENT
Start: 2020-10-01

## 2020-10-01 RX ADMIN — DOCUSATE SODIUM 50 MG AND SENNOSIDES 8.6 MG 2 TABLET: 8.6; 5 TABLET, FILM COATED ORAL at 05:37

## 2020-10-01 RX ADMIN — BUPROPION HYDROCHLORIDE 100 MG: 100 TABLET, FILM COATED ORAL at 17:22

## 2020-10-01 RX ADMIN — CARVEDILOL 25 MG: 25 TABLET, FILM COATED ORAL at 22:05

## 2020-10-01 RX ADMIN — INSULIN LISPRO 3 UNITS: 100 INJECTION, SOLUTION INTRAVENOUS; SUBCUTANEOUS at 05:53

## 2020-10-01 RX ADMIN — FUROSEMIDE 40 MG: 10 INJECTION, SOLUTION INTRAMUSCULAR; INTRAVENOUS at 05:35

## 2020-10-01 RX ADMIN — LIDOCAINE 1 PATCH: 50 PATCH TOPICAL at 12:17

## 2020-10-01 RX ADMIN — KETOROLAC TROMETHAMINE 15 MG: 30 INJECTION, SOLUTION INTRAMUSCULAR at 23:39

## 2020-10-01 RX ADMIN — ASPIRIN 81 MG: 81 TABLET, CHEWABLE ORAL at 05:36

## 2020-10-01 RX ADMIN — OMEPRAZOLE 40 MG: KIT at 05:40

## 2020-10-01 RX ADMIN — KETOROLAC TROMETHAMINE 15 MG: 30 INJECTION, SOLUTION INTRAMUSCULAR at 17:22

## 2020-10-01 RX ADMIN — ACETAMINOPHEN 1000 MG: 500 TABLET ORAL at 23:38

## 2020-10-01 RX ADMIN — BUPROPION HYDROCHLORIDE 100 MG: 100 TABLET, FILM COATED ORAL at 12:16

## 2020-10-01 RX ADMIN — INSULIN LISPRO 3 UNITS: 100 INJECTION, SOLUTION INTRAVENOUS; SUBCUTANEOUS at 14:34

## 2020-10-01 RX ADMIN — ALLOPURINOL 100 MG: 100 TABLET ORAL at 05:37

## 2020-10-01 RX ADMIN — CLOPIDOGREL BISULFATE 75 MG: 75 TABLET ORAL at 05:36

## 2020-10-01 RX ADMIN — KETOROLAC TROMETHAMINE 15 MG: 30 INJECTION, SOLUTION INTRAMUSCULAR at 05:35

## 2020-10-01 RX ADMIN — KETOROLAC TROMETHAMINE 15 MG: 30 INJECTION, SOLUTION INTRAMUSCULAR at 12:11

## 2020-10-01 RX ADMIN — DOCUSATE SODIUM 50 MG AND SENNOSIDES 8.6 MG 2 TABLET: 8.6; 5 TABLET, FILM COATED ORAL at 17:22

## 2020-10-01 RX ADMIN — POLYETHYLENE GLYCOL 3350 1 PACKET: 17 POWDER, FOR SOLUTION ORAL at 05:39

## 2020-10-01 RX ADMIN — INSULIN LISPRO 2 UNITS: 100 INJECTION, SOLUTION INTRAVENOUS; SUBCUTANEOUS at 17:24

## 2020-10-01 RX ADMIN — ACETAMINOPHEN 1000 MG: 500 TABLET ORAL at 12:16

## 2020-10-01 RX ADMIN — CARVEDILOL 12.5 MG: 12.5 TABLET, FILM COATED ORAL at 05:36

## 2020-10-01 RX ADMIN — BUPROPION HYDROCHLORIDE 100 MG: 100 TABLET, FILM COATED ORAL at 05:35

## 2020-10-01 RX ADMIN — ACETAMINOPHEN 1000 MG: 500 TABLET ORAL at 05:36

## 2020-10-01 RX ADMIN — ACETAMINOPHEN 1000 MG: 500 TABLET ORAL at 17:22

## 2020-10-01 RX ADMIN — ENOXAPARIN SODIUM 40 MG: 40 INJECTION SUBCUTANEOUS at 17:23

## 2020-10-01 RX ADMIN — POTASSIUM CHLORIDE 40 MEQ: 1500 TABLET, EXTENDED RELEASE ORAL at 05:37

## 2020-10-01 RX ADMIN — LISINOPRIL 10 MG: 10 TABLET ORAL at 05:36

## 2020-10-01 RX ADMIN — BISACODYL 10 MG: 10 SUPPOSITORY RECTAL at 23:38

## 2020-10-01 RX ADMIN — ATORVASTATIN CALCIUM 80 MG: 80 TABLET, FILM COATED ORAL at 22:05

## 2020-10-01 RX ADMIN — TRAMADOL HYDROCHLORIDE 50 MG: 50 TABLET, FILM COATED ORAL at 05:36

## 2020-10-01 ASSESSMENT — ENCOUNTER SYMPTOMS
CHILLS: 0
SHORTNESS OF BREATH: 0
BRUISES/BLEEDS EASILY: 0
POLYDIPSIA: 0
CARDIOVASCULAR NEGATIVE: 1
SORE THROAT: 0
PALPITATIONS: 0
BACK PAIN: 0
GASTROINTESTINAL NEGATIVE: 1
NEUROLOGICAL NEGATIVE: 1
EYE PAIN: 0
MYALGIAS: 0
RESPIRATORY NEGATIVE: 1
MUSCULOSKELETAL NEGATIVE: 1
NAUSEA: 0
SPEECH CHANGE: 1
WEAKNESS: 1
EYES NEGATIVE: 1
DIZZINESS: 0
BLURRED VISION: 0
BLOOD IN STOOL: 0
TINGLING: 0
HEADACHES: 0
ABDOMINAL PAIN: 0
COUGH: 0
WHEEZING: 0
FOCAL WEAKNESS: 1
DIARRHEA: 0
WEIGHT LOSS: 0
VOMITING: 0
DEPRESSION: 0
FEVER: 0
NERVOUS/ANXIOUS: 0
PSYCHIATRIC NEGATIVE: 1

## 2020-10-01 ASSESSMENT — COGNITIVE AND FUNCTIONAL STATUS - GENERAL
PERSONAL GROOMING: A LOT
DAILY ACTIVITIY SCORE: 14
EATING MEALS: A LITTLE
SUGGESTED CMS G CODE MODIFIER DAILY ACTIVITY: CK
DRESSING REGULAR UPPER BODY CLOTHING: A LITTLE
TOILETING: A LOT
DRESSING REGULAR LOWER BODY CLOTHING: A LOT
HELP NEEDED FOR BATHING: A LOT

## 2020-10-01 ASSESSMENT — FIBROSIS 4 INDEX: FIB4 SCORE: 0.73

## 2020-10-01 ASSESSMENT — PAIN DESCRIPTION - PAIN TYPE
TYPE: SURGICAL PAIN
TYPE: SURGICAL PAIN

## 2020-10-01 NOTE — DISCHARGE PLANNING
Care Transition Team Assessment    No NOK given. Lives with Girlfriend and her children in a one story house. Miriam Garcia [phone].     Discussed with Pt and Girlfriend that Pt was totally independent with I/ADL's prior to admission. 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 Pt. was prescribed medication for kidney problems approx. two years ago but was not compliant with them and did not continue seeing his pcp due to not having insurance. His former pcp is located in [facility] and Pt. would like a new pcp in the [facility].    Miriam reports that Pt. Has a history of narcotic use and went to rehab over a year ago. 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 She is not aware of any mental health history. Miriam has agreed to care for Pt when he returns home. Discharge plan is to Rehab.      Information Source  Orientation : Disoriented to Time(patient states year)  Information Given By: Patient, Significant Other  Informant's Name: (Girlfriend Miriam [phone])  Who is responsible for making decisions for patient? : Patient    Readmission Evaluation  Is this a readmission?: No    Elopement Risk  Legal Hold: No  Ambulatory or Self Mobile in Wheelchair: No-Not an Elopement Risk  Disoriented: No  Psychiatric Symptoms: None  History of Wandering: No  Elopement this Admit: No  Vocalizing Wanting to Leave: No  Displays Behaviors, Body Language Wanting to Leave: No-Not at Risk for Elopement  Elopement Risk: Not at Risk for Elopement    Interdisciplinary Discharge Planning  Primary Care Physician: (Looking for PCP in [facility])  Lives with - Patient's Self Care Capacity: Significant Other  Patient or legal guardian wants to designate a caregiver: No  Support Systems: Spouse / Significant Other  Housing / Facility: 1 Story House  Do You Take your Prescribed Medications Regularly: No  Reasons Why Not Taking Medications : Financial Reasons  Prior Services: None  Durable Medical Equipment: Not  Applicable    Discharge Preparedness  What is your plan after discharge?: Skilled nursing facility  What are your discharge supports?: Partner  Prior Functional Level: Ambulatory, Drives Self, Independent with Activities of Daily Living, Independent with Medication Management  Difficulity with ADLs: Bathing, Brushing teeth, Dressing, Eating, Toileting, Walking  Difficulity with IADLs: Cooking, Driving, Laundry, Shopping    Functional Assesment  Prior Functional Level: Ambulatory, Drives Self, Independent with Activities of Daily Living, Independent with Medication Management    Finances  Financial Barriers to Discharge: No  Prescription Coverage: Yes              Advance Directive  Advance Directive?: None    Domestic Abuse  Have you ever been the victim of abuse or violence?: No  Physical Abuse or Sexual Abuse: No  Verbal Abuse or Emotional Abuse: No  Possible Abuse/Neglect Reported to:: Not Applicable    Psychological Assessment  History of Substance Abuse: None, Other (comment)  Date Last Used - Prescription Opioids: (More than a year ago)  Substance Abuse Comments: (Girlfriend reports pt went to rehab for narcotics)  History of Psychiatric Problems: No  Non-compliant with Treatment: Yes  Newly Diagnosed Illness: Yes    Discharge Risks or Barriers  Discharge risks or barriers?: Post-acute placement / services, Complex medical needs, Non-adherence to medication or treatment  Patient risk factors: Complex medical needs    Anticipated Discharge Information  Discharge Disposition: Disch to  rehab facility or distinct part unit (62)

## 2020-10-01 NOTE — THERAPY
Occupational Therapy  Daily Treatment     Patient Name: Guerrero Ann  Age:  49 y.o., Sex:  male  Medical Record #: [id number]  Today's Date: 10/1/2020     Precautions  Precautions: Fall Risk, Swallow Precautions ( See Comments)  Comments: Mildly agitated/combative    Assessment    Pt demonstrated improved initiation in all tasks as well as improved 1-step command following. Pt followed commands 100% of the time. Pt with improved ability to maintain anterior weight shift EOB with cues and physical assist. Pt able to stand and complete chair transfer with modA x2. Pt continues to require maxA to don socks, however improved initiation with task. Pt able to don R sock with modA while L sock appeared to be more difficult. Pts AROM and strength of BUE are WFL. Pt was postured with balled fists and flexed elbows upon entry to room but when cued to give high five, able to do so with no issues. Unclear if pt has BUE hypertonicity versus pain response as pt able to complete AROM upon command which is not consistent with typical hypertone. Pt able to wash face with cloth with spv/setup.     Plan    Continue current treatment plan.    DC Equipment Recommendations: Unable to determine at this time  Discharge Recommendations: Recommend post-acute placement for additional occupational therapy services prior to discharge home    Subjective    Pt alert and cooperative during OT tx session. Pt indicated pain at incision site, but otherwise minimally verbal during session.     Objective       10/01/20 1131   Cognition    Cognition / Consciousness X   Speech/ Communication Delayed Responses   Level of Consciousness Alert   Ability To Follow Commands 1 Step  (100% of time)   Safety Awareness Impaired   New Learning Impaired   Attention Impaired   Sequencing Impaired   Comments flat, minimal verbal output, improved command following and initiation, continues to require tactile/verbal cues for sequencing   Active ROM  Upper Body   Comments AROM BUE WFL, able to complete opposition, finger to nose test WFL, possible fine motor deficits   Upper Body Muscle Tone   Comments Upon entry to room and when pt is at rest, clenches fists/elbows very tightly, however able to give high five upon command, tone appears normal. Question if pt is posturing this way due to pain.   Balance   Comments Improved balance while seated EOB, able to shift weight anteriorly with cues   Bed Mobility    Supine to Sit Maximal Assist  (improved BLE initiation towards EOB, assist for UB/trunk)   Activities of Daily Living   Grooming Supervision;Seated  (washed face)   Lower Body Dressing Maximal Assist  (able to don R sock with modA, L sock maxA)   Comments Improved initiation/participation/command following. Backward chaining for donning socks.   Functional Mobility   Sit to Stand Moderate Assist  (x2 for safety)   Toilet Transfers Refused   Mobility supine to EOB, STS x2, SST to chair   Visual Perception   Comments improved tracking this session   Activity Tolerance   Comments limited by cognition, fatigue   Patient / Family Goals   Patient / Family Goal #1 Unable to state   Short Term Goals   Short Term Goal # 1 Pt will complete seated grooming/hygiene with Donny by discharge.   Goal Outcome # 1 Progressing as expected   Short Term Goal # 2 Pt will complete UE dressing with Donny by discharge.   Goal Outcome # 2 Goal not met   Short Term Goal # 3 Pt will complete toilet transfer with modA by discharge.   Goal Outcome # 3 Progressing as expected

## 2020-10-01 NOTE — PROGRESS NOTES
Cardiovascular Surgery Progress Note    Name: Guerrero Ann  MRN: [id number]  : 1970  Admit Date: 2020  7:37 PM  Procedure:  Procedure(s) and Anesthesia Type:     * CABG, WITH ENDOSCOPIC VEIN PROCUREMENT- X3 - General     * ECHOCARDIOGRAM, TRANSESOPHAGEAL - General  5 Day Post-Op    Vitals:  Patient Vitals for the past 8 hrs:   Temp SpO2 O2 Delivery Device Pulse Resp BP Weight   10/01/20 0800 36.7 °C (98 °F) 92 % None - Room Air -- -- -- --   10/01/20 0700 -- -- -- (!) 108 (!) 21 -- 71.9 kg (158 lb 8.2 oz)   10/01/20 0500 35.9 °C (96.6 °F) 94 % None - Room Air (!) 101 (!) 29 145/90 --   10/01/20 0300 -- -- -- 99 (!) 32 -- --     Temp (24hrs), Av.3 °C (97.3 °F), Min:35.9 °C (96.6 °F), Max:36.7 °C (98 °F)    Respiratory:    Respiration: (!) 21, Pulse Oximetry: 92 %     Chest Tube Drains:          Fluids:    Intake/Output Summary (Last 24 hours) at 10/1/2020 0913  Last data filed at 10/1/2020 0800  Gross per 24 hour   Intake 740 ml   Output 700 ml   Net 40 ml     Admit weight: Weight: 72.6 kg (160 lb)  Current weight: Weight: 71.9 kg (158 lb 8.2 oz) (10/01/20 0700)    Labs:  Recent Labs     20  0230 20  0230 10/01/20  0548   WBC 14.0* 12.6* 8.5   RBC 2.55* 2.49* 2.68*   HEMOGLOBIN 8.2* 8.1* 8.5*   HEMATOCRIT 24.4* 24.2* 26.2*   MCV 95.7 97.2 97.8   MCH 32.2 32.5 31.7   MCHC 33.6* 33.5* 32.4*   RDW 47.5 49.1 50.2*   PLATELETCT 226 247 317   MPV 11.2 10.7 10.9     Recent Labs     20  0230 20  0230   NEUTSPOLYS 72.40* 70.10   LYMPHOCYTES 14.60* 15.70*   MONOCYTES 11.50 11.20   EOSINOPHILS 0.60 1.90   BASOPHILS 0.30 0.60     Recent Labs     20  0230 20  0230 20  1435 10/01/20  0548   SODIUM 136 135  --  137   POTASSIUM 3.4* 3.3* 3.8 3.6   CHLORIDE 97 96  --  98   CO2 22 19*  --  19*   GLUCOSE 182* 178*  --  159*   BUN 19 23*  --  34*   CREATININE 0.95 1.19  --  1.19   CALCIUM 8.8 8.7  --  9.1     Medications:  • carvedilol  25 mg     • [START  ON 10/2/2020] lisinopril  20 mg     • Pharmacy  1 Each     • acetaminophen  1,000 mg     • allopurinol  100 mg     • aspirin  81 mg     • atorvastatin  80 mg     • buPROPion  100 mg     • omeprazole  40 mg     • senna-docusate  2 Tab      And   • polyethylene glycol/lytes  1 Packet      And   • magnesium hydroxide  30 mL     • clopidogrel  75 mg     • potassium chloride SA  40 mEq     • insulin lispro  4 Units     • insulin lispro  0-15 Units     • ketorolac  15 mg     • MD Alert...Adult ICU Electrolyte Replacement per Pharmacy       • lidocaine  1 Patch     • furosemide  40 mg     • Pharmacy Consult Request  1 Each     • enoxaparin  40 mg        Ordered Medications:    ASA - Yes    Plavix - Yes    Post-operative Beta Blockers - Yes    Ace Inhibitor - Yes    Statin - Yes    Exam:   Review of Systems   Constitutional: Positive for malaise/fatigue.   HENT: Negative.    Eyes: Negative.    Respiratory: Negative.    Cardiovascular: Negative.    Gastrointestinal: Negative.    Genitourinary: Negative.    Musculoskeletal: Negative.    Skin: Negative.    Neurological: Negative.    Endo/Heme/Allergies: Negative.    Psychiatric/Behavioral: Negative.      Physical Exam  Constitutional:       General: He is not in acute distress.     Appearance: He is well-developed. He is not diaphoretic.   Neck:      Musculoskeletal: Neck supple.   Cardiovascular:      Rate and Rhythm: Normal rate and regular rhythm.      Heart sounds: Normal heart sounds. No murmur. No friction rub. No gallop.    Pulmonary:      Effort: Pulmonary effort is normal. No respiratory distress.      Breath sounds: Examination of the right-lower field reveals decreased breath sounds. Examination of the left-lower field reveals decreased breath sounds. Decreased breath sounds present. No wheezing or rales.   Abdominal:      General: There is no distension.      Palpations: Abdomen is soft.      Tenderness: There is no abdominal tenderness.   Skin:     General: Skin  is warm and dry.      Comments: Sternum- prevena dressing  SVG sites- c/d/i   Neurological:      Mental Status: He is alert and oriented to person, place, and time.       Quality Measures:   Quality-Core Measures   Reviewed items::  EKG reviewed, Labs reviewed, Medications reviewed and Radiology images reviewed  Central line in place:  Concentrated IV drugs  DVT prophylaxis pharmacological::  Enoxaparin (Lovenox)  DVT prophylaxis - mechanical:  SCDs  Ulcer Prophylaxis::  Yes    Assessment/Plan:  POD 1 HDS, SR, Acute blood loss anemia- s/p transfusion- keep mediastinal tubes, gently diurese, d/c gomez  POD 2 HTN- inc lisinopril, SR/ST- change to metoprolol, acute blood loss anemia s/p transfusion- watch, d/c mediastinal tubes, urine retention- replace gomez/start flomax, cont diuresis  POD 3  HDS, SR, neuro right  weaker than left gazes to right, wounds CDI, abdomen S/NT, fluid balance negative, wt still up from admit.  Plan:  MRI today, decrease ace for permissive hypertension, dc gabapentin, BB, Statin, IS/ambulate. CPM.  POD 4  HDS but hypertensive, SR, neuro answers questions but delayed.  Gaze more center.  EEG normal, MRI pending, wounds CDI, abdomen distended, fluid balance negative, wt down.  Plan:  Restart ace, IM getting abdominal study, BB, Statin, IS/ambulate. CPM.  POD 5  HDS, ST, neuro unchanged, wounds CDI, abdomen S/NT, fluid balance negative, wt down.  Failed swallow evaluation.  Hypertensive.  Pain issues.  Plan:  core trak, switch BB to coreg, ACE, Statin, add norco, IS/ambulate. CPM.  POD 6  HDS, SR, neuro unchanged, wounds CDI, abdomen S/NT, fluid balance negative, wt stable.  Plan:  Awaiting rehab authorization, BB, ACE, Statin, IS/ambulate. CPM.    Active Hospital Problems    Diagnosis   • Abdominal pain [R10.9]     Priority: High   • Altered mental status [R41.82]     Priority: High   • Encounter for weaning from ventilator (HCC) [Z99.11]     Priority: High   • NSTEMI (non-ST elevated  myocardial infarction) (HCC) [I21.4]     Priority: High   • CAD (coronary artery disease) [I25.10]     Priority: Medium   • Type 2 diabetes mellitus with complication, without long-term current use of insulin (HCC) [E11.8]     Priority: Medium   • Essential hypertension [I10]     Priority: Medium   • Normochromic normocytic anemia [D64.9]     Priority: Low   • Lacunar infarct, acute ([facility]) [I63.81]   • Cardioembolic stroke (HCC) [I63.9]   • SONNY (acute kidney injury) ([facility]) [N17.9]

## 2020-10-01 NOTE — DISCHARGE PLANNING
Acute [facility] Transitional Care Coordination:    UPDATE  Unable to accept at [facility] today.  Xray of abdomen indicates air filled distended loops of bowel; appearance suggest ileus or enteritis .     I jeaneth Reece CM and Dr. Mateus Stringer.      WIll continue to follow.

## 2020-10-01 NOTE — DISCHARGE PLANNING
[facility]ute [facility]/ Transitional Care Coordination  Insurance has authorized [facility]  [place] texted to Dr. Mateus Stringer who confirms pt is medical cleared for transfer to Rehab.  I Voalted Shanell CM re above.     Dr. Ng is reviewing for acceptance to Rehab.

## 2020-10-01 NOTE — PROGRESS NOTES
Cardiac Surgery RN Navigator Daily Rounding Note  Procedure Performed: Procedure(s):  CABG, WITH ENDOSCOPIC VEIN PROCUREMENT- X3  ECHOCARDIOGRAM, TRANSESOPHAGEAL     By  Dr. Chicas  6 Days Post-Op    Pertinent Overnight Events:    cortrak placed, weight slightly up but below baseline, UOP marginal    Pertinent neuro findings/needs: A&O x 3    Cardiac/hemodynamics:  Temp (24hrs), Av.3 °C (97.3 °F), Min:35.9 °C (96.6 °F), Max:36.7 °C (98 °F)    Heart rhythm: SR to ST  Temp:  [35.9 °C (96.6 °F)-36.7 °C (98 °F)] 36.7 °C (98 °F)  Pulse:  [] 108  Resp:  [16-43] 21  BP: (114-162)/() 145/90  SpO2:  [88 %-100 %] 92 %        /Weights:  Admit weight: Weight: 72.6 kg (160 lb)  Current Weight: Weight: 71.9 kg (158 lb 8.2 oz) (10/01/20 0700)  Weight change:     Intake/Output Summary (Last 24 hours) at 10/1/2020 0850  Last data filed at 10/1/2020 0800  Gross per 24 hour   Intake 740 ml   Output 700 ml   Net 40 ml       Adequate urine output: 300 cc    Respiratory:        Pertinent respiratory findings/needs: none RA    GI findings/needs: + BM     Labs:  Recent Labs     20  0230 20  0230 10/01/20  0548   WBC 14.0* 12.6* 8.5   RBC 2.55* 2.49* 2.68*   HEMOGLOBIN 8.2* 8.1* 8.5*   HEMATOCRIT 24.4* 24.2* 26.2*   MCV 95.7 97.2 97.8   MCH 32.2 32.5 31.7   MCHC 33.6* 33.5* 32.4*   RDW 47.5 49.1 50.2*   PLATELETCT 226 247 317   MPV 11.2 10.7 10.9     Recent Labs     20  0230 20  0230 20  1435 10/01/20  0548   SODIUM 136 135  --  137   POTASSIUM 3.4* 3.3* 3.8 3.6   CHLORIDE 97 96  --  98   CO2 22 19*  --  19*   GLUCOSE 182* 178*  --  159*   BUN 19 23*  --  34*   CREATININE 0.95 1.19  --  1.19   CALCIUM 8.8 8.7  --  9.1     INR:       Required Cardiac medications review:  ASA:  Yes  Plavix: Yes  BB: Yes  ACE/ARB: Yes  Statin: yes  Diuretic: yes    LDA's necessity reviewed: YES    Thoughts and considerations for team rounding:    Discharge to rehab hopefully today after rehab MD  re-evaluates patient for appropriateness for rehab now that his girlfriend can care for him upon discharge from rehab    Discharge plan:    To rehab

## 2020-10-01 NOTE — PROGRESS NOTES
Hospital Medicine Daily Progress Note    Date of Service  10/1/2020    Chief Complaint  49 y.o. male admitted 9/21/2020 with NSTEMI    Hospital Course    This is a 49 y.o. male with CAD status post stent placement, hypertension, diabetes who presented 9/21/2020 with chest pain.  The patient states he has been having exertional chest pain for the past month; however, became worse and persistent one day prior. He reported substernal chest pressure with radiation down his left arm associated with some numbness and tingling of his left hand and associated shortness of breath.  He denied any fevers, chills or cough. He has not been taking his medications for the past year since he lost his insurance.  He underwent a cardiac catheterization in 2018 and had a stent placed to his RCA.  He underwent a repeat cardiac catheterization in July 2019 which revealed two-vessel coronary artery disease with small vessel chronic total occlusion of the mid left anterior descending artery and patent stents in the mid right coronary artery.  Normal left ventricular systolic function with EF of 65%.  Patient also reported his blood pressure has been uncontrolled.  At the time of evaluation in ED, his chest pain has resolved.  His initial troponin in the ER was 21 which increased to 63.  Patient was placed on heparin GTT     EKG on arrival revealed sinus rhythm with LVH and repolarization changes.  T wave inversions with minimal ST depression noted in inferior leads  Second EKG sinus rhythm with ST elevation in V2 and V3, does not meet criteria given underlying LVH.  Chest x-ray did not show any acute cardiopulmonary process.      9/22: Underwent cardiac cath which showed multivessel coronary artery disease cerebrovascular previously placed RCA stents.     9/25: CABG x3     9/28:  EEG normal    9/29 MRI show numerous infarcts likely cardiogenic with old CVAs      Interval Problem Update  Postop day #6 CABG x3.  Patient's physical exam is  unchanged from yesterday.  Patient reporting incisional chest pain is well controlled.  No overnight events per nursing.  He is pending discharge to rehab.  No significant change in his neurologic status.  He is participating with physical therapy and speech therapy.  WBC 8.5, hemoglobin 8.5.  Creatinine 1.19.  T-max 98, /69.    Consultants/Specialty  Cardiology- Dr Cantu  Cardiothoracic surgery- DR Chicas  Neurology- Dr Le  Critical Care- Dr Gonda    Code Status  Full Code    Disposition  Awaiting clinical course.  Anticipate that the patient will likely required postacute care.    Review of Systems  Review of Systems   Constitutional: Negative for chills, fever and weight loss.   HENT: Negative for hearing loss, sore throat and tinnitus.    Eyes: Negative for blurred vision and pain.   Respiratory: Negative for cough, shortness of breath and wheezing.    Cardiovascular: Positive for chest pain (incisional). Negative for palpitations and leg swelling.   Gastrointestinal: Negative for abdominal pain, blood in stool, diarrhea, nausea and vomiting.   Genitourinary: Negative for dysuria, frequency and hematuria.   Musculoskeletal: Negative for back pain, joint pain and myalgias.   Skin: Negative for itching and rash.   Neurological: Positive for speech change, focal weakness and weakness. Negative for dizziness, tingling and headaches.   Endo/Heme/Allergies: Negative for polydipsia. Does not bruise/bleed easily.   Psychiatric/Behavioral: Negative for depression. The patient is not nervous/anxious.    All other systems reviewed and are negative.       Physical Exam  Temp:  [35.9 °C (96.6 °F)-36.7 °C (98 °F)] 36.7 °C (98 °F)  Pulse:  [] 108  Resp:  [21-43] 21  BP: (114-162)/(74-93) 145/90  SpO2:  [90 %-95 %] 92 %    Physical Exam  Constitutional:       General: He is not in acute distress.     Appearance: Normal appearance.   HENT:      Head: Normocephalic and atraumatic.      Nose: Nose normal.       Mouth/Throat:      Mouth: Mucous membranes are moist.      Pharynx: Oropharynx is clear.   Eyes:      General: No scleral icterus.     Extraocular Movements: Extraocular movements intact.      Pupils: Pupils are equal, round, and reactive to light.   Neck:      Musculoskeletal: Normal range of motion and neck supple. No muscular tenderness.   Cardiovascular:      Rate and Rhythm: Normal rate and regular rhythm.      Pulses: Normal pulses.      Heart sounds: Normal heart sounds. No murmur.   Pulmonary:      Effort: Pulmonary effort is normal. No respiratory distress.      Breath sounds: Normal breath sounds. No wheezing or rales.   Chest:      Comments: Mid chest incision clean dry and intact.  Left subclavian clean dry and intact.  Abdominal:      General: There is distension.      Tenderness: There is no abdominal tenderness. There is no guarding.   Musculoskeletal: Normal range of motion.         General: No swelling or tenderness.   Skin:     Coloration: Skin is not jaundiced.      Findings: No bruising.   Neurological:      General: No focal deficit present.      Mental Status: He is alert. He is disoriented and confused.      Cranial Nerves: No cranial nerve deficit.      Motor: Weakness present.      Comments: RUE 4+/5, LUE 5/5, RLE 2+/5, LLE 4+/5   Psychiatric:      Comments: Unable to assess due to patient condition.         Fluids    Intake/Output Summary (Last 24 hours) at 10/1/2020 1145  Last data filed at 10/1/2020 0800  Gross per 24 hour   Intake 684.17 ml   Output 550 ml   Net 134.17 ml       Laboratory  Recent Labs     09/29/20  0230 09/30/20  0230 10/01/20  0548   WBC 14.0* 12.6* 8.5   RBC 2.55* 2.49* 2.68*   HEMOGLOBIN 8.2* 8.1* 8.5*   HEMATOCRIT 24.4* 24.2* 26.2*   MCV 95.7 97.2 97.8   MCH 32.2 32.5 31.7   MCHC 33.6* 33.5* 32.4*   RDW 47.5 49.1 50.2*   PLATELETCT 226 247 317   MPV 11.2 10.7 10.9     Recent Labs     09/29/20  0230 09/30/20  0230 09/30/20  1435 10/01/20  0548   SODIUM 136 135  --   137   POTASSIUM 3.4* 3.3* 3.8 3.6   CHLORIDE 97 96  --  98   CO2 22 19*  --  19*   GLUCOSE 182* 178*  --  159*   BUN 19 23*  --  34*   CREATININE 0.95 1.19  --  1.19   CALCIUM 8.8 8.7  --  9.1                   Imaging  DX-ABDOMEN FOR TUBE PLACEMENT   Final Result         1.  Air-filled distended loops of bowel are seen with reactive mucosal pattern, appearance suggests ileus or enteritis. Recommend radiographic followup to resolution to exclude progression to obstruction.   2.  Dobbhoff tube tip terminates overlying the expected location of the gastric body.      DX-ABDOMEN FOR TUBE PLACEMENT   Final Result      Feeding tube tip in expected location the gastric antrum.      Persistent mild gaseous distention of colon suggestive of ileus.      IY-[id number] VIEW   Final Result      Mild to moderate colonic distention may represent ileus.      Moderate amount of colonic stool in the ascending colon.         MR-BRAIN-W/O   Final Result      Numerous acute/subacute supratentorial and infratentorial infarcts most consistent with cardioembolic etiology.      Several chronic lacunar infarcts.      No acute intracranial hemorrhage.      DX-CHEST-PORTABLE (1 VIEW)   Final Result         1.  Pulmonary edema and/or infiltrates are identified, which are stable since the prior exam.   2.  Cardiomegaly      CT-CTA NECK WITH & W/O-POST PROCESSING   Final Result      1.  CT angiogram of the neck within normal limits.      2.  Mild bilateral internal carotid artery atherosclerotic plaque without stenosis      3.  Small right pleural effusion      4.  Small left apical pneumothorax      CT-CTA HEAD WITH & W/O-POST PROCESS   Final Result      No thrombosis is seen within the [place].      Bilateral white matter hypodensities can be seen in the setting of bilateral lacunar infarcts, chronic microangiopathic change, demyelination or gliosis. MRI would be more sensitive for further evaluation.      Paranasal sinus disease.       DX-CHEST-PORTABLE (1 VIEW)   Final Result         Interval extubation. No other significant interval change.      EC-MARGOTH W/O CONT   Final Result      DX-CHEST-PORTABLE (1 VIEW)   Final Result      1.  Left basilar opacification likely represents atelectasis.      2.  Endotracheal tube tip projects approximately 1.5 cm above the migel.      3.  Left subclavian catheter tip projects over the superior vena cava. No pneumothorax is identified.      US-CAROTID DOPPLER BILAT   Final Result      US-VEIN MAPPING LOWER EXTREMITY BILAT   Final Result      EC-ECHOCARDIOGRAM COMPLETE W/O CONT   Final Result      DX-CHEST-PORTABLE (1 VIEW)   Final Result      No acute cardiopulmonary abnormality.      CL-LEFT HEART CATHETERIZATION WITH POSSIBLE INTERVENTION    (Results Pending)        Assessment/Plan  * NSTEMI (non-ST elevated myocardial infarction) ([facility])- (present on admission)  Assessment & Plan  Cardiology and cardiothoracic surgery following  Patient is status post CABG x3  Continue aspirin, atorvastatin 80 mg, lisinopril 5 mg and metoprolol 25 mg.        Abdominal pain  Assessment & Plan  Patient endorsing abdominal pain this morning via quarantine and guarding on physical examination.  A x-ray of the abdomen was ordered and shows ileus likely secondary to constipation.  Increase PRN laxatives for constipation.    Altered mental status- (present on admission)  Assessment & Plan  Patient was found to have a CVA on MRI.    TSH was normal, HIV and syphilis were negative.  EEG was normal.    Neurology following.        CAD (coronary artery disease)- (present on admission)  Assessment & Plan  Status post CABG x3.  Continue aspirin, lisinopril, metoprolol and atorvastatin.    Essential hypertension- (present on admission)  Assessment & Plan  Blood pressure goal less than 140/90.  Restarted on carvedilol, lisinopril and norvasc    Type 2 diabetes mellitus with complication, without long-term current use of insulin (HCC)-  (present on admission)  Assessment & Plan  Start on insulin sliding scale with serial Accu-Checks  HbA1C 7.9; c/w ISS for now  Hypoglycemic protocol in place        Cardioembolic stroke (HCC)  Assessment & Plan  Postoperatively patient had an acute change in mental status.  A code stroke was called on 9/27.  CTA of the head did not show any obvious lesions.  MRI was done which showed numerous acute/subacute supratentorial and infratentorial infarcts likely cardioembolic also multiple chronic lacunar infarcts noted.    Neurology following  Maintain BP less than 140/90   Continue atorvastatin, aspirin and Plavix.  Physiatry, OT/PT-recommend postacute placement    SONNY (acute kidney injury) (HCC)- (present on admission)  Assessment & Plan  Unclear if contrast-induced  Started gentle hydration and now resolved    Normochromic normocytic anemia  Assessment & Plan  Likely secondary to postoperative blood losses.  Monitor with daily CBC.       VTE prophylaxis: Lovenox 40mg

## 2020-10-01 NOTE — PROGRESS NOTES
Cortrak Placement    Tube Team verified patient name and medical record number prior to tube placement.  Cortrak tube (43 inches, 12 Uzbek) placed at 55 cm in right nare.  Per Cortrak picture, tube appears to be in the stomach.  Nursing Instructions: Awaiting KUB to confirm placement before use for medications or feeding. Once placement confirmed, flush tube with 30 ml of water, and then remove and save stylet, in patient medication drawer.

## 2020-10-02 ENCOUNTER — APPOINTMENT (OUTPATIENT)
Dept: RADIOLOGY | Facility: MEDICAL CENTER | Age: 50
DRG: 233 | End: 2020-10-02
Attending: INTERNAL MEDICINE
Payer: COMMERCIAL

## 2020-10-02 ENCOUNTER — APPOINTMENT (OUTPATIENT)
Dept: RADIOLOGY | Facility: MEDICAL CENTER | Age: 50
DRG: 233 | End: 2020-10-02
Attending: STUDENT IN AN ORGANIZED HEALTH CARE EDUCATION/TRAINING PROGRAM
Payer: COMMERCIAL

## 2020-10-02 ENCOUNTER — APPOINTMENT (OUTPATIENT)
Dept: RADIOLOGY | Facility: MEDICAL CENTER | Age: 50
DRG: 233 | End: 2020-10-02
Attending: NURSE PRACTITIONER
Payer: COMMERCIAL

## 2020-10-02 PROBLEM — K56.7 ILEUS (HCC): Status: ACTIVE | Noted: 2020-10-02

## 2020-10-02 PROBLEM — A41.9 SEPTIC SHOCK (HCC): Status: ACTIVE | Noted: 2020-10-02

## 2020-10-02 PROBLEM — Z99.11 ENCOUNTER FOR WEANING FROM VENTILATOR (HCC): Status: RESOLVED | Noted: 2020-09-25 | Resolved: 2020-10-02

## 2020-10-02 PROBLEM — R65.21 SEPTIC SHOCK (HCC): Status: ACTIVE | Noted: 2020-10-02

## 2020-10-02 PROBLEM — J69.0 ASPIRATION PNEUMONIA (HCC): Status: ACTIVE | Noted: 2020-10-02

## 2020-10-02 LAB
ACTION RANGE TRIGGERED IACRT: NO
ALBUMIN SERPL BCP-MCNC: 3.8 G/DL (ref 3.2–4.9)
ALBUMIN/GLOB SERPL: 1.1 G/DL
ALP SERPL-CCNC: 127 U/L (ref 30–99)
ALT SERPL-CCNC: 136 U/L (ref 2–50)
ANION GAP SERPL CALC-SCNC: 24 MMOL/L (ref 7–16)
ANION GAP SERPL CALC-SCNC: 25 MMOL/L (ref 7–16)
ANISOCYTOSIS BLD QL SMEAR: ABNORMAL
AST SERPL-CCNC: 124 U/L (ref 12–45)
BASE EXCESS BLDA CALC-SCNC: -7 MMOL/L (ref -4–3)
BASOPHILS # BLD AUTO: 0 % (ref 0–1.8)
BASOPHILS # BLD: 0 K/UL (ref 0–0.12)
BILIRUB SERPL-MCNC: 0.7 MG/DL (ref 0.1–1.5)
BODY TEMPERATURE: ABNORMAL DEGREES
BUN SERPL-MCNC: 62 MG/DL (ref 8–22)
BUN SERPL-MCNC: 95 MG/DL (ref 8–22)
CALCIUM SERPL-MCNC: 7.4 MG/DL (ref 8.5–10.5)
CALCIUM SERPL-MCNC: 9.2 MG/DL (ref 8.5–10.5)
CHLORIDE SERPL-SCNC: 94 MMOL/L (ref 96–112)
CHLORIDE SERPL-SCNC: 96 MMOL/L (ref 96–112)
CO2 BLDA-SCNC: 15 MMOL/L (ref 20–33)
CO2 SERPL-SCNC: 16 MMOL/L (ref 20–33)
CO2 SERPL-SCNC: 17 MMOL/L (ref 20–33)
CREAT SERPL-MCNC: 2.37 MG/DL (ref 0.5–1.4)
CREAT SERPL-MCNC: 4.32 MG/DL (ref 0.5–1.4)
DOHLE BOD BLD QL SMEAR: NORMAL
EOSINOPHIL # BLD AUTO: 0.08 K/UL (ref 0–0.51)
EOSINOPHIL NFR BLD: 1.7 % (ref 0–6.9)
ERYTHROCYTE [DISTWIDTH] IN BLOOD BY AUTOMATED COUNT: 48.9 FL (ref 35.9–50)
ERYTHROCYTE [DISTWIDTH] IN BLOOD BY AUTOMATED COUNT: 49.1 FL (ref 35.9–50)
GLOBULIN SER CALC-MCNC: 3.6 G/DL (ref 1.9–3.5)
GLUCOSE BLD-MCNC: 137 MG/DL (ref 65–99)
GLUCOSE BLD-MCNC: 205 MG/DL (ref 65–99)
GLUCOSE SERPL-MCNC: 194 MG/DL (ref 65–99)
GLUCOSE SERPL-MCNC: 201 MG/DL (ref 65–99)
HCO3 BLDA-SCNC: 14.4 MMOL/L (ref 17–25)
HCT VFR BLD AUTO: 23 % (ref 42–52)
HCT VFR BLD AUTO: 29.1 % (ref 42–52)
HGB BLD-MCNC: 7.4 G/DL (ref 14–18)
HGB BLD-MCNC: 9.5 G/DL (ref 14–18)
HOROWITZ INDEX BLDA+IHG-RTO: 163 MM[HG]
INST. QUALIFIED PATIENT IIQPT: YES
LACTATE BLD-SCNC: 2.6 MMOL/L (ref 0.5–2)
LYMPHOCYTES # BLD AUTO: 0.7 K/UL (ref 1–4.8)
LYMPHOCYTES NFR BLD: 14.8 % (ref 22–41)
MANUAL DIFF BLD: NORMAL
MCH RBC QN AUTO: 31.2 PG (ref 27–33)
MCH RBC QN AUTO: 31.8 PG (ref 27–33)
MCHC RBC AUTO-ENTMCNC: 31.6 G/DL (ref 33.7–35.3)
MCHC RBC AUTO-ENTMCNC: 32.6 G/DL (ref 33.7–35.3)
MCV RBC AUTO: 97.3 FL (ref 81.4–97.8)
MCV RBC AUTO: 98.7 FL (ref 81.4–97.8)
MICROCYTES BLD QL SMEAR: ABNORMAL
MONOCYTES # BLD AUTO: 0.45 K/UL (ref 0–0.85)
MONOCYTES NFR BLD AUTO: 9.6 % (ref 0–13.4)
MORPHOLOGY BLD-IMP: NORMAL
NEUTROPHILS # BLD AUTO: 3.47 K/UL (ref 1.82–7.42)
NEUTROPHILS NFR BLD: 69.6 % (ref 44–72)
NEUTS BAND NFR BLD MANUAL: 4.3 % (ref 0–10)
NRBC # BLD AUTO: 0.09 K/UL
NRBC BLD-RTO: 1.9 /100 WBC
O2/TOTAL GAS SETTING VFR VENT: 40 %
PCO2 BLDA: 18.2 MMHG (ref 26–37)
PCO2 TEMP ADJ BLDA: 19.8 MMHG (ref 26–37)
PH BLDA: 7.51 [PH] (ref 7.4–7.5)
PH TEMP ADJ BLDA: 7.48 [PH] (ref 7.4–7.5)
PLATELET # BLD AUTO: 343 K/UL (ref 164–446)
PLATELET # BLD AUTO: 400 K/UL (ref 164–446)
PLATELET BLD QL SMEAR: NORMAL
PMV BLD AUTO: 10.9 FL (ref 9–12.9)
PMV BLD AUTO: 11.2 FL (ref 9–12.9)
PO2 BLDA: 65 MMHG (ref 64–87)
PO2 TEMP ADJ BLDA: 74 MMHG (ref 64–87)
POLYCHROMASIA BLD QL SMEAR: NORMAL
POTASSIUM SERPL-SCNC: 3.2 MMOL/L (ref 3.6–5.5)
POTASSIUM SERPL-SCNC: 3.8 MMOL/L (ref 3.6–5.5)
PROT SERPL-MCNC: 7.4 G/DL (ref 6–8.2)
RBC # BLD AUTO: 2.34 M/UL (ref 4.7–6.1)
RBC # BLD AUTO: 2.99 M/UL (ref 4.7–6.1)
RBC BLD AUTO: PRESENT
SAO2 % BLDA: 95 % (ref 93–99)
SODIUM SERPL-SCNC: 136 MMOL/L (ref 135–145)
SODIUM SERPL-SCNC: 136 MMOL/L (ref 135–145)
SPECIMEN DRAWN FROM PATIENT: ABNORMAL
TOXIC GRANULES BLD QL SMEAR: SLIGHT
WBC # BLD AUTO: 4.7 K/UL (ref 4.8–10.8)
WBC # BLD AUTO: 9.9 K/UL (ref 4.8–10.8)

## 2020-10-02 PROCEDURE — 99291 CRITICAL CARE FIRST HOUR: CPT | Mod: 25 | Performed by: INTERNAL MEDICINE

## 2020-10-02 PROCEDURE — A9270 NON-COVERED ITEM OR SERVICE: HCPCS | Performed by: NURSE PRACTITIONER

## 2020-10-02 PROCEDURE — 71045 X-RAY EXAM CHEST 1 VIEW: CPT

## 2020-10-02 PROCEDURE — 700102 HCHG RX REV CODE 250 W/ 637 OVERRIDE(OP): Performed by: CLINICAL NURSE SPECIALIST

## 2020-10-02 PROCEDURE — 700102 HCHG RX REV CODE 250 W/ 637 OVERRIDE(OP): Performed by: STUDENT IN AN ORGANIZED HEALTH CARE EDUCATION/TRAINING PROGRAM

## 2020-10-02 PROCEDURE — 700111 HCHG RX REV CODE 636 W/ 250 OVERRIDE (IP): Performed by: CLINICAL NURSE SPECIALIST

## 2020-10-02 PROCEDURE — 82803 BLOOD GASES ANY COMBINATION: CPT

## 2020-10-02 PROCEDURE — 700105 HCHG RX REV CODE 258: Performed by: INTERNAL MEDICINE

## 2020-10-02 PROCEDURE — 700111 HCHG RX REV CODE 636 W/ 250 OVERRIDE (IP): Performed by: NURSE PRACTITIONER

## 2020-10-02 PROCEDURE — 700102 HCHG RX REV CODE 250 W/ 637 OVERRIDE(OP): Performed by: NURSE PRACTITIONER

## 2020-10-02 PROCEDURE — 83605 ASSAY OF LACTIC ACID: CPT

## 2020-10-02 PROCEDURE — A9270 NON-COVERED ITEM OR SERVICE: HCPCS | Performed by: CLINICAL NURSE SPECIALIST

## 2020-10-02 PROCEDURE — 36600 WITHDRAWAL OF ARTERIAL BLOOD: CPT

## 2020-10-02 PROCEDURE — 700105 HCHG RX REV CODE 258: Performed by: NURSE PRACTITIONER

## 2020-10-02 PROCEDURE — 700101 HCHG RX REV CODE 250: Performed by: INTERNAL MEDICINE

## 2020-10-02 PROCEDURE — A9270 NON-COVERED ITEM OR SERVICE: HCPCS | Performed by: STUDENT IN AN ORGANIZED HEALTH CARE EDUCATION/TRAINING PROGRAM

## 2020-10-02 PROCEDURE — 99292 CRITICAL CARE ADDL 30 MIN: CPT | Mod: 25 | Performed by: INTERNAL MEDICINE

## 2020-10-02 PROCEDURE — 02HV33Z INSERTION OF INFUSION DEVICE INTO SUPERIOR VENA CAVA, PERCUTANEOUS APPROACH: ICD-10-PCS | Performed by: INTERNAL MEDICINE

## 2020-10-02 PROCEDURE — 51798 US URINE CAPACITY MEASURE: CPT

## 2020-10-02 PROCEDURE — 80053 COMPREHEN METABOLIC PANEL: CPT

## 2020-10-02 PROCEDURE — 36556 INSERT NON-TUNNEL CV CATH: CPT | Mod: RT | Performed by: INTERNAL MEDICINE

## 2020-10-02 PROCEDURE — P9045 ALBUMIN (HUMAN), 5%, 250 ML: HCPCS | Performed by: CLINICAL NURSE SPECIALIST

## 2020-10-02 PROCEDURE — B548ZZA ULTRASONOGRAPHY OF SUPERIOR VENA CAVA, GUIDANCE: ICD-10-PCS | Performed by: INTERNAL MEDICINE

## 2020-10-02 PROCEDURE — 700105 HCHG RX REV CODE 258: Performed by: STUDENT IN AN ORGANIZED HEALTH CARE EDUCATION/TRAINING PROGRAM

## 2020-10-02 PROCEDURE — 99024 POSTOP FOLLOW-UP VISIT: CPT | Performed by: THORACIC SURGERY (CARDIOTHORACIC VASCULAR SURGERY)

## 2020-10-02 PROCEDURE — 700111 HCHG RX REV CODE 636 W/ 250 OVERRIDE (IP): Performed by: INTERNAL MEDICINE

## 2020-10-02 PROCEDURE — 700101 HCHG RX REV CODE 250: Performed by: STUDENT IN AN ORGANIZED HEALTH CARE EDUCATION/TRAINING PROGRAM

## 2020-10-02 PROCEDURE — 770022 HCHG ROOM/CARE - ICU (200)

## 2020-10-02 PROCEDURE — 74176 CT ABD & PELVIS W/O CONTRAST: CPT

## 2020-10-02 PROCEDURE — 700101 HCHG RX REV CODE 250: Performed by: CLINICAL NURSE SPECIALIST

## 2020-10-02 PROCEDURE — 85027 COMPLETE CBC AUTOMATED: CPT

## 2020-10-02 PROCEDURE — 82962 GLUCOSE BLOOD TEST: CPT

## 2020-10-02 PROCEDURE — 99232 SBSQ HOSP IP/OBS MODERATE 35: CPT | Performed by: STUDENT IN AN ORGANIZED HEALTH CARE EDUCATION/TRAINING PROGRAM

## 2020-10-02 RX ORDER — NOREPINEPHRINE BITARTRATE 1 MG/ML
INJECTION, SOLUTION INTRAVENOUS
Status: ACTIVE
Start: 2020-10-02 | End: 2020-10-03

## 2020-10-02 RX ORDER — SODIUM CHLORIDE, SODIUM LACTATE, POTASSIUM CHLORIDE, CALCIUM CHLORIDE 600; 310; 30; 20 MG/100ML; MG/100ML; MG/100ML; MG/100ML
INJECTION, SOLUTION INTRAVENOUS
Status: ACTIVE
Start: 2020-10-02 | End: 2020-10-03

## 2020-10-02 RX ORDER — HEPARIN SODIUM 5000 [USP'U]/ML
5000 INJECTION, SOLUTION INTRAVENOUS; SUBCUTANEOUS EVERY 8 HOURS
Status: DISCONTINUED | OUTPATIENT
Start: 2020-10-02 | End: 2020-10-04

## 2020-10-02 RX ORDER — METOCLOPRAMIDE HYDROCHLORIDE 5 MG/ML
10 INJECTION INTRAMUSCULAR; INTRAVENOUS ONCE
Status: COMPLETED | OUTPATIENT
Start: 2020-10-02 | End: 2020-10-02

## 2020-10-02 RX ORDER — NOREPINEPHRINE BITARTRATE 0.03 MG/ML
0-30 INJECTION, SOLUTION INTRAVENOUS CONTINUOUS
Status: DISCONTINUED | OUTPATIENT
Start: 2020-10-02 | End: 2020-10-05

## 2020-10-02 RX ORDER — LINEZOLID 2 MG/ML
600 INJECTION, SOLUTION INTRAVENOUS EVERY 12 HOURS
Status: DISCONTINUED | OUTPATIENT
Start: 2020-10-02 | End: 2020-10-04

## 2020-10-02 RX ORDER — SODIUM CHLORIDE 9 MG/ML
INJECTION, SOLUTION INTRAVENOUS CONTINUOUS
Status: DISCONTINUED | OUTPATIENT
Start: 2020-10-02 | End: 2020-10-02

## 2020-10-02 RX ORDER — ALBUMIN, HUMAN INJ 5% 5 %
25 SOLUTION INTRAVENOUS ONCE
Status: COMPLETED | OUTPATIENT
Start: 2020-10-02 | End: 2020-10-02

## 2020-10-02 RX ORDER — ACETAMINOPHEN 650 MG/1
975 SUPPOSITORY RECTAL EVERY 6 HOURS
Status: DISCONTINUED | OUTPATIENT
Start: 2020-10-02 | End: 2020-10-04

## 2020-10-02 RX ORDER — ENEMA 19; 7 G/133ML; G/133ML
1 ENEMA RECTAL ONCE
Status: COMPLETED | OUTPATIENT
Start: 2020-10-02 | End: 2020-10-02

## 2020-10-02 RX ORDER — BISACODYL 10 MG
10 SUPPOSITORY, RECTAL RECTAL ONCE
Status: COMPLETED | OUTPATIENT
Start: 2020-10-02 | End: 2020-10-02

## 2020-10-02 RX ORDER — SODIUM CHLORIDE, SODIUM GLUCONATE, SODIUM ACETATE, POTASSIUM CHLORIDE AND MAGNESIUM CHLORIDE 526; 502; 368; 37; 30 MG/100ML; MG/100ML; MG/100ML; MG/100ML; MG/100ML
1000 INJECTION, SOLUTION INTRAVENOUS ONCE
Status: COMPLETED | OUTPATIENT
Start: 2020-10-02 | End: 2020-10-03

## 2020-10-02 RX ADMIN — INSULIN LISPRO 2 UNITS: 100 INJECTION, SOLUTION INTRAVENOUS; SUBCUTANEOUS at 13:21

## 2020-10-02 RX ADMIN — INSULIN LISPRO 6 UNITS: 100 INJECTION, SOLUTION INTRAVENOUS; SUBCUTANEOUS at 00:06

## 2020-10-02 RX ADMIN — BISACODYL 10 MG: 10 SUPPOSITORY RECTAL at 09:32

## 2020-10-02 RX ADMIN — SODIUM CHLORIDE: 9 INJECTION, SOLUTION INTRAVENOUS at 08:36

## 2020-10-02 RX ADMIN — CARVEDILOL 25 MG: 25 TABLET, FILM COATED ORAL at 16:29

## 2020-10-02 RX ADMIN — CARVEDILOL 25 MG: 25 TABLET, FILM COATED ORAL at 08:35

## 2020-10-02 RX ADMIN — LINEZOLID 600 MG: 600 INJECTION, SOLUTION INTRAVENOUS at 22:23

## 2020-10-02 RX ADMIN — FUROSEMIDE 40 MG: 10 INJECTION, SOLUTION INTRAMUSCULAR; INTRAVENOUS at 04:54

## 2020-10-02 RX ADMIN — ACETAMINOPHEN 1000 MG: 500 TABLET ORAL at 16:28

## 2020-10-02 RX ADMIN — ACETAMINOPHEN 650 MG: 650 SUPPOSITORY RECTAL at 05:01

## 2020-10-02 RX ADMIN — CLOPIDOGREL BISULFATE 75 MG: 75 TABLET ORAL at 08:35

## 2020-10-02 RX ADMIN — LIDOCAINE 1 PATCH: 50 PATCH TOPICAL at 09:33

## 2020-10-02 RX ADMIN — NOREPINEPHRINE BITARTRATE 10 MCG/MIN: 1 INJECTION, SOLUTION, CONCENTRATE INTRAVENOUS at 20:30

## 2020-10-02 RX ADMIN — ALBUMIN (HUMAN) 25 G: 2.5 SOLUTION INTRAVENOUS at 19:36

## 2020-10-02 RX ADMIN — METOCLOPRAMIDE 10 MG: 5 INJECTION, SOLUTION INTRAMUSCULAR; INTRAVENOUS at 09:32

## 2020-10-02 RX ADMIN — SODIUM CHLORIDE, SODIUM GLUCONATE, SODIUM ACETATE, POTASSIUM CHLORIDE AND MAGNESIUM CHLORIDE 1000 ML: 526; 502; 368; 37; 30 INJECTION, SOLUTION INTRAVENOUS at 22:23

## 2020-10-02 RX ADMIN — PIPERACILLIN AND TAZOBACTAM 4.5 G: 4; .5 INJECTION, POWDER, LYOPHILIZED, FOR SOLUTION INTRAVENOUS; PARENTERAL at 22:23

## 2020-10-02 RX ADMIN — DOCUSATE SODIUM 50 MG AND SENNOSIDES 8.6 MG 2 TABLET: 8.6; 5 TABLET, FILM COATED ORAL at 16:29

## 2020-10-02 RX ADMIN — BUPROPION HYDROCHLORIDE 100 MG: 100 TABLET, FILM COATED ORAL at 16:29

## 2020-10-02 RX ADMIN — SODIUM PHOSPHATE 133 ML: 7; 19 ENEMA RECTAL at 09:32

## 2020-10-02 RX ADMIN — KETOROLAC TROMETHAMINE 15 MG: 30 INJECTION, SOLUTION INTRAMUSCULAR at 04:54

## 2020-10-02 RX ADMIN — CEFTRIAXONE SODIUM 1 G: 1 INJECTION, POWDER, FOR SOLUTION INTRAMUSCULAR; INTRAVENOUS at 04:54

## 2020-10-02 RX ADMIN — SODIUM CHLORIDE: 9 INJECTION, SOLUTION INTRAVENOUS at 18:25

## 2020-10-02 RX ADMIN — HEPARIN SODIUM 5000 UNITS: 5000 INJECTION, SOLUTION INTRAVENOUS; SUBCUTANEOUS at 22:23

## 2020-10-02 RX ADMIN — ASPIRIN 81 MG: 81 TABLET, CHEWABLE ORAL at 08:36

## 2020-10-02 RX ADMIN — INSULIN LISPRO 2 UNITS: 100 INJECTION, SOLUTION INTRAVENOUS; SUBCUTANEOUS at 06:26

## 2020-10-02 RX ADMIN — PROCHLORPERAZINE EDISYLATE 10 MG: 5 INJECTION INTRAMUSCULAR; INTRAVENOUS at 02:40

## 2020-10-02 RX ADMIN — INSULIN LISPRO 3 UNITS: 100 INJECTION, SOLUTION INTRAVENOUS; SUBCUTANEOUS at 17:31

## 2020-10-02 ASSESSMENT — ENCOUNTER SYMPTOMS
VOMITING: 1
SPEECH CHANGE: 1
FOCAL WEAKNESS: 1
BLURRED VISION: 0
HEADACHES: 0
ABDOMINAL PAIN: 1
POLYDIPSIA: 0
FEVER: 0
PALPITATIONS: 0
DIZZINESS: 0
SORE THROAT: 0
CHILLS: 0
NERVOUS/ANXIOUS: 0
MYALGIAS: 0
BRUISES/BLEEDS EASILY: 0
TINGLING: 0
WHEEZING: 0
EYE PAIN: 0
DEPRESSION: 0
WEAKNESS: 1
BACK PAIN: 0
BLOOD IN STOOL: 0
DIARRHEA: 0
NAUSEA: 1
COUGH: 0
WEIGHT LOSS: 0
SHORTNESS OF BREATH: 0

## 2020-10-02 ASSESSMENT — FIBROSIS 4 INDEX: FIB4 SCORE: 0.58

## 2020-10-02 NOTE — PROGRESS NOTES
Pt increasingly tachycardic and tachypneic.  Temporal thermometer reading WNL.  Axillary temp 101.9.  Post surgical protocol followed - blood cultures and urine cultures ordered, sputum not ordered due to non-productive cough.  CXR done.  Notified APRLEO Conway.  Central line removed, labs sent per new order.  Pt has not voided since removal of indwelling catheter at 1530.  Bladder scan = 172 ml.  Pt straight cath'd for 150 ml clear connor urine - specimen sent to lab.

## 2020-10-02 NOTE — CARE PLAN
Problem: Post Op Day 4 CABG/Heart Valve Replacement  Goal: Optimal care of the Post Op CABG/Heart Valve replacement Post Op Day 4  Outcome: PROGRESSING AS EXPECTED  Note: POD 5  Intervention: Up in chair for all meals  Note: Patient up to chair for afternoon of shift.   Pt mobility is limited. Coretrak in place.  Intervention: Ambulate, increasing the distance each time x 3 and before bed  Note: Patient ambulated 3X throughout shift.   Pt is able to transfer to chair as well as stand and march in place with assistance.  Intervention: Consider removal of gomez, chest tube and pacer wire if not already done  Note: Gomez removed today.   Chest tubes and pacer wires removed previously.  Intervention: Discharge Education  Note: In process.  Pt will be discharging to cardiac rehab.

## 2020-10-02 NOTE — PROGRESS NOTES
Hospital Medicine Daily Progress Note    Date of Service  10/2/2020    Chief Complaint  49 y.o. male admitted 9/21/2020 with NSTEMI    Hospital Course    This is a 49 y.o. male with CAD status post stent placement, hypertension, diabetes who presented 9/21/2020 with chest pain.  The patient states he has been having exertional chest pain for the past month; however, became worse and persistent one day prior. He reported substernal chest pressure with radiation down his left arm associated with some numbness and tingling of his left hand and associated shortness of breath.  He denied any fevers, chills or cough. He has not been taking his medications for the past year since he lost his insurance.  He underwent a cardiac catheterization in 2018 and had a stent placed to his RCA.  He underwent a repeat cardiac catheterization in July 2019 which revealed two-vessel coronary artery disease with small vessel chronic total occlusion of the mid left anterior descending artery and patent stents in the mid right coronary artery.  Normal left ventricular systolic function with EF of 65%.  Patient also reported his blood pressure has been uncontrolled.  At the time of evaluation in ED, his chest pain has resolved.  His initial troponin in the ER was 21 which increased to 63.  Patient was placed on heparin GTT     EKG on arrival revealed sinus rhythm with LVH and repolarization changes.  T wave inversions with minimal ST depression noted in inferior leads  Second EKG sinus rhythm with ST elevation in V2 and V3, does not meet criteria given underlying LVH.  Chest x-ray did not show any acute cardiopulmonary process.      9/22: Underwent cardiac cath which showed multivessel coronary artery disease cerebrovascular previously placed RCA stents.     9/25: CABG x3     9/28:  EEG normal    9/29 MRI show numerous infarcts likely cardiogenic with old CVAs      Interval Problem Update  Overnight the patient experienced abdominal pain,  nausea and vomiting.  This is since resolved this morning.  Suctioning of the abdominal contents resulted in a removal of 2 L.  Concern for possible fetal position.  Patient possibly aspirated, therefore was started on Unasyn.  Chest x-ray was relatively unchanged with no definitive opacities noted.  Patient also spiked a fever of 101.3.  And blood cultures were drawn  In the chest x-ray was noted that they were dilated bowel loops.  A CT of the abdomen with oral contrast has been ordered to further evaluate.  Also the patient's creatinine increased to 2.37 likely secondary to dehydration.  Toradol was discontinued as was lisinopril.  He was started on IV fluids 125 mL/h.  .    Consultants/Specialty  Cardiology- Dr Cantu  Cardiothoracic surgery- DR Chicsa  Neurology- Dr Le  Critical Care- Dr Gonda    Code Status  Full Code    Disposition  Awaiting clinical course.  Anticipate that the patient will likely required postacute care.    Review of Systems  Review of Systems   Constitutional: Negative for chills, fever and weight loss.   HENT: Negative for hearing loss, sore throat and tinnitus.    Eyes: Negative for blurred vision and pain.   Respiratory: Negative for cough, shortness of breath and wheezing.    Cardiovascular: Positive for chest pain (incisional). Negative for palpitations and leg swelling.   Gastrointestinal: Positive for abdominal pain, nausea and vomiting. Negative for blood in stool and diarrhea.   Genitourinary: Negative for dysuria, frequency and hematuria.   Musculoskeletal: Negative for back pain, joint pain and myalgias.   Skin: Negative for itching and rash.   Neurological: Positive for speech change, focal weakness and weakness. Negative for dizziness, tingling and headaches.   Endo/Heme/Allergies: Negative for polydipsia. Does not bruise/bleed easily.   Psychiatric/Behavioral: Negative for depression. The patient is not nervous/anxious.    All other systems reviewed and are negative.        Physical Exam  Temp:  [36.3 °C (97.3 °F)-38.8 °C (101.9 °F)] 37.7 °C (99.8 °F)  Pulse:  [] 103  Resp:  [16-41] 34  BP: ()/(53-82) 142/55  SpO2:  [82 %-100 %] 92 %    Physical Exam  Constitutional:       General: He is not in acute distress.     Appearance: Normal appearance. He is not toxic-appearing or diaphoretic.   HENT:      Head: Normocephalic and atraumatic.      Nose: Nose normal.      Mouth/Throat:      Mouth: Mucous membranes are moist.      Pharynx: Oropharynx is clear.   Eyes:      Extraocular Movements: Extraocular movements intact.      Pupils: Pupils are equal, round, and reactive to light.   Neck:      Musculoskeletal: Normal range of motion and neck supple. No muscular tenderness.   Cardiovascular:      Rate and Rhythm: Normal rate and regular rhythm.      Pulses: Normal pulses.      Heart sounds: Normal heart sounds. No murmur.   Pulmonary:      Effort: Pulmonary effort is normal. No respiratory distress.      Breath sounds: Normal breath sounds. No wheezing or rales.   Chest:      Comments: Mid chest incision clean dry and intact.  Left subclavian clean dry and intact.  Abdominal:      General: There is distension.      Tenderness: There is no abdominal tenderness. There is no right CVA tenderness, left CVA tenderness or guarding.   Musculoskeletal: Normal range of motion.         General: No swelling or tenderness.   Skin:     Coloration: Skin is not jaundiced.      Findings: No bruising.   Neurological:      General: No focal deficit present.      Mental Status: He is alert. He is disoriented and confused.      Cranial Nerves: No cranial nerve deficit.      Motor: Weakness present.      Comments: RUE 4+/5, LUE 5/5, RLE 2+/5, LLE 4+/5   Psychiatric:      Comments: Unable to assess due to patient condition.         Fluids    Intake/Output Summary (Last 24 hours) at 10/2/2020 1105  Last data filed at 10/2/2020 1000  Gross per 24 hour   Intake 1125 ml   Output 2450 ml   Net -1325 ml        Laboratory  Recent Labs     09/30/20  0230 10/01/20  0548 10/01/20  2230   WBC 12.6* 8.5 4.7*   RBC 2.49* 2.68* 2.99*   HEMOGLOBIN 8.1* 8.5* 9.5*   HEMATOCRIT 24.2* 26.2* 29.1*   MCV 97.2 97.8 97.3   MCH 32.5 31.7 31.8   MCHC 33.5* 32.4* 32.6*   RDW 49.1 50.2* 48.9   PLATELETCT 247 317 400   MPV 10.7 10.9 10.9     Recent Labs     09/30/20  0230 09/30/20  1435 10/01/20  0548 10/01/20  2230   SODIUM 135  --  137 136   POTASSIUM 3.3* 3.8 3.6 3.8   CHLORIDE 96  --  98 96   CO2 19*  --  19* 16*   GLUCOSE 178*  --  159* 201*   BUN 23*  --  34* 62*   CREATININE 1.19  --  1.19 2.37*   CALCIUM 8.7  --  9.1 9.2                   Imaging  DX-CHEST-PORTABLE (1 VIEW)   Final Result         1.  Pulmonary edema and/or infiltrates are identified, which are stable since the prior exam.   2.  Cardiomegaly      DX-CHEST-PORTABLE (1 VIEW)   Final Result         1.  Pulmonary edema and/or infiltrates are identified, which are stable since the prior exam.   2.  Cardiomegaly      DX-ABDOMEN FOR TUBE PLACEMENT   Final Result         1.  Air-filled distended loops of bowel are seen with reactive mucosal pattern, appearance suggests ileus or enteritis. Recommend radiographic followup to resolution to exclude progression to obstruction.   2.  Dobbhoff tube tip terminates overlying the expected location of the gastric body.      DX-ABDOMEN FOR TUBE PLACEMENT   Final Result      Feeding tube tip in expected location the gastric antrum.      Persistent mild gaseous distention of colon suggestive of ileus.      [id number] VIEW   Final Result      Mild to moderate colonic distention may represent ileus.      Moderate amount of colonic stool in the ascending colon.         MR-BRAIN-W/O   Final Result      Numerous acute/subacute supratentorial and infratentorial infarcts most consistent with cardioembolic etiology.      Several chronic lacunar infarcts.      No acute intracranial hemorrhage.      DX-CHEST-PORTABLE (1 VIEW)   Final Result          1.  Pulmonary edema and/or infiltrates are identified, which are stable since the prior exam.   2.  Cardiomegaly      CT-CTA NECK WITH & W/O-POST PROCESSING   Final Result      1.  CT angiogram of the neck within normal limits.      2.  Mild bilateral internal carotid artery atherosclerotic plaque without stenosis      3.  Small right pleural effusion      4.  Small left apical pneumothorax      CT-CTA HEAD WITH & W/O-POST PROCESS   Final Result      No thrombosis is seen within the [place].      Bilateral white matter hypodensities can be seen in the setting of bilateral lacunar infarcts, chronic microangiopathic change, demyelination or gliosis. MRI would be more sensitive for further evaluation.      Paranasal sinus disease.      DX-CHEST-PORTABLE (1 VIEW)   Final Result         Interval extubation. No other significant interval change.      EC-MARGOTH W/O CONT   Final Result      DX-CHEST-PORTABLE (1 VIEW)   Final Result      1.  Left basilar opacification likely represents atelectasis.      2.  Endotracheal tube tip projects approximately 1.5 cm above the migel.      3.  Left subclavian catheter tip projects over the superior vena cava. No pneumothorax is identified.      US-CAROTID DOPPLER BILAT   Final Result      US-VEIN MAPPING LOWER EXTREMITY BILAT   Final Result      EC-ECHOCARDIOGRAM COMPLETE W/O CONT   Final Result      DX-CHEST-PORTABLE (1 VIEW)   Final Result      No acute cardiopulmonary abnormality.      CL-LEFT HEART CATHETERIZATION WITH POSSIBLE INTERVENTION    (Results Pending)   CT-ABDOMEN-PELVIS W/O    (Results Pending)        Assessment/Plan  * NSTEMI (non-ST elevated myocardial infarction) (HCC)- (present on admission)  Assessment & Plan  Cardiology and cardiothoracic surgery following  Patient is status post CABG x3  Continue aspirin, atorvastatin 80 mg, and metoprolol 25 mg.        Aspiration pneumonia (HCC)  Assessment & Plan  Overnight concern for aspiration pneumonia. Patient  had multiple episodes of vomiting and 2L residual found.   Started on Unasyn  Cont RT protocol    Abdominal pain  Assessment & Plan  Overnight the patient had an additional episode of abdominal pain.  He was experiencing nausea and vomiting.  He was found to have 2 L residual on the core track was suction.  Abdominal exam this morning is benign.  Though from the chest x-ray can see dilated bowel loops.  CT abdomen with oral contrast ordered  Fleet enema.  N.p.o.    Altered mental status- (present on admission)  Assessment & Plan  Patient was found to have a CVA on MRI.    TSH was normal, HIV and syphilis were negative.  EEG was normal.    Neurology following.        CAD (coronary artery disease)- (present on admission)  Assessment & Plan  Status post CABG x3.  Continue aspirin, metoprolol and atorvastatin.  Restart lisinopril when Cr improves    Essential hypertension- (present on admission)  Assessment & Plan  Blood pressure goal less than 140/90.  Cont carvedilol, and norvasc    Type 2 diabetes mellitus with complication, without long-term current use of insulin (HCC)- (present on admission)  Assessment & Plan  Start on insulin sliding scale with serial Accu-Checks  HbA1C 7.9; c/w ISS for now  Hypoglycemic protocol in place        Cardioembolic stroke (HCC)  Assessment & Plan  Postoperatively patient had an acute change in mental status.  A code stroke was called on 9/27.  CTA of the head did not show any obvious lesions.  MRI was done which showed numerous acute/subacute supratentorial and infratentorial infarcts likely cardioembolic also multiple chronic lacunar infarcts noted.    Neurology following  Maintain BP less than 140/90   Continue atorvastatin, aspirin and Plavix.  Physiatry, OT/PT-recommend postacute placement    SONNY (acute kidney injury) (HCC)- (present on admission)  Assessment & Plan  Cr had improved yesterday to 1.19 now it is 2.37.   Increase normal saline to 125 mL/Hr  Avoid nephrotoxic  agents-toradol dc-ed, lisinopril dc-ed for today.  Repeat bmp in am    Normochromic normocytic anemia  Assessment & Plan  Likely secondary to postoperative blood losses.  Monitor with daily CBC.       VTE prophylaxis: Heparin

## 2020-10-02 NOTE — DIETARY
Nutrition Services: Nutrition Support Brief Update    Pt previously on TF at goal (Diabetisource AC at 60 ml/hr) last night. Discussed with RN, TF stopped at ~2200 last night due to 2L of residuals and possible ileus. MD notes overnight multiple episodes of vomiting and had abdominal pain, plan for CT of abdomen and fleet enema.    RD to follow.

## 2020-10-02 NOTE — CARE PLAN
Problem: Venous Thromboembolism (VTW)/Deep Vein Thrombosis (DVT) Prevention:  Goal: Patient will participate in Venous Thrombosis (VTE)/Deep Vein Thrombosis (DVT)Prevention Measures  Outcome: PROGRESSING AS EXPECTED     Problem: Bowel/Gastric:  Goal: Normal bowel function is maintained or improved  Outcome: PROGRESSING SLOWER THAN EXPECTED  Note: Last BM 2 days ago.  Abd firm and distended.  Laxatives given per order     Problem: Post Op Day 4 CABG/Heart Valve Replacement  Goal: Optimal care of the Post Op CABG/Heart Valve replacement Post Op Day 4  Outcome: PROGRESSING SLOWER THAN EXPECTED  Intervention: Ambulate, increasing the distance each time x 3 and before bed  Note: Pt minimally ambulatory with full assistance  Intervention: Consider removal of gomez, chest tube and pacer wire if not already done  Note: Gomez catheter has been removed.  Pt has not yet voided

## 2020-10-02 NOTE — ASSESSMENT & PLAN NOTE
Overnight concern for aspiration pneumonia. Patient had multiple episodes of vomiting and 2L residual found.   Started on Unasyn  Cont RT protocol

## 2020-10-02 NOTE — PROGRESS NOTES
Low urine output, approximately 60 mls out since last night at 2330. Pt legs mottled and cold. NS running, see MAR. Maia Chavira notified, no orders received. CT abdomen scheduled for 5pm.

## 2020-10-02 NOTE — PROGRESS NOTES
Pt had large emesis following removal of 2 liters from stomach.  Increasing tachypnea, new need for oxygen, rigors with increasing temp.  Notified GEORGINA Conway of changes and previous discussion with hospitalist NISSA.  Orders received for repeat CXR, ABG results reported to Christi.

## 2020-10-02 NOTE — PROGRESS NOTES
Cardiac Surgery RN Navigator Daily Rounding Note  Procedure Performed: Procedure(s):  CABG, WITH ENDOSCOPIC VEIN PROCUREMENT- X3  ECHOCARDIOGRAM, TRANSESOPHAGEAL     By  Dr. Chicas  7 Days Post-Op    Pertinent Overnight Events:    Patient febrile, tachycardic, and tachypnic- PAN cultures sent minus sputum (no productive cough)    High residuals from tube feed with emesis-suspicious for fecal content    Creatinine elevated; poor UOP overnight    Weight below admit, negative 5 liters    Pertinent neuro findings/needs: lethargic; A&O     Cardiac/hemodynamics:  Temp (24hrs), Av.1 °C (98.8 °F), Min:36.3 °C (97.3 °F), Max:38.8 °C (101.9 °F)    Heart rhythm: SR to ST  Temp:  [36.3 °C (97.3 °F)-38.8 °C (101.9 °F)] 37.7 °C (99.8 °F)  Pulse:  [] 117  Resp:  [16-41] 16  BP: ()/(43-82) 106/55  SpO2:  [82 %-98 %] 95 %        /Weights:  Admit weight: Weight: 72.6 kg (160 lb)  Current Weight: Weight: 71.2 kg (156 lb 15.5 oz) (10/02/20 0400)  Weight change: 1.5 kg (3 lb 4.9 oz)    Intake/Output Summary (Last 24 hours) at 10/2/2020 0807  Last data filed at 10/2/2020 0800  Gross per 24 hour   Intake 1020 ml   Output 2450 ml   Net -1430 ml       Adequate urine output:  cc    Respiratory:        Pertinent respiratory findings/needs: 3 L NC    GI findings/needs: cortrak for tube feeds; stopped for emesis and high residuals, KUB suggestive for ileus but had BM last night    Labs:  Recent Labs     20  0230 10/01/20  0548 10/01/20  2230   WBC 12.6* 8.5 4.7*   RBC 2.49* 2.68* 2.99*   HEMOGLOBIN 8.1* 8.5* 9.5*   HEMATOCRIT 24.2* 26.2* 29.1*   MCV 97.2 97.8 97.3   MCH 32.5 31.7 31.8   MCHC 33.5* 32.4* 32.6*   RDW 49.1 50.2* 48.9   PLATELETCT 247 317 400   MPV 10.7 10.9 10.9     Recent Labs     20  0230 20  1435 10/01/20  0548 10/01/20  2230   SODIUM 135  --  137 136   POTASSIUM 3.3* 3.8 3.6 3.8   CHLORIDE 96  --  98 96   CO2 19*  --  19* 16*   GLUCOSE 178*  --  159* 201*   BUN 23*  --  34* 62*    CREATININE 1.19  --  1.19 2.37*   CALCIUM 8.7  --  9.1 9.2     INR:       Required Cardiac medications review:  ASA:  Yes  Plavix: Yes  BB: Yes  ACE/ARB: Yes  Statin: yes  Diuretic: yes    LDA's necessity reviewed: YES    Thoughts and considerations for team rounding:    Dc lisinopril for high crt  DC toradol  Reglan vs Relistor for GI issues    May be dry, consider giving some fluids or stop lasix or both?    Discharge plan:    TBD-rehab once medically stable

## 2020-10-02 NOTE — PROGRESS NOTES
Cortrak came unclamped and drained large amount mcpherson milky drainage with fecal odor.  Tube feed had been off for more than 3 hours.  Oral medications held due to NPO status.  Tylenol suppository given in lieu of scheduled oral tylenol when temp increased to 39.6.

## 2020-10-02 NOTE — DISCHARGE PLANNING
Follow up for rehab ongoing medical management as the acute level of care. Will follow for medical clearance for potential IRF level of care.

## 2020-10-02 NOTE — PROGRESS NOTES
Pt has had 2000 ml residual removed from Cortrak.  Notified GEORGINA Hunter.  Orders received to stop tube feeding.

## 2020-10-03 ENCOUNTER — APPOINTMENT (OUTPATIENT)
Dept: RADIOLOGY | Facility: MEDICAL CENTER | Age: 50
DRG: 233 | End: 2020-10-03
Attending: INTERNAL MEDICINE
Payer: COMMERCIAL

## 2020-10-03 LAB
ABO GROUP BLD: NORMAL
ANION GAP SERPL CALC-SCNC: 19 MMOL/L (ref 7–16)
ANION GAP SERPL CALC-SCNC: 23 MMOL/L (ref 7–16)
ANISOCYTOSIS BLD QL SMEAR: ABNORMAL
BARCODED ABORH UBTYP: 6200
BARCODED PRD CODE UBPRD: NORMAL
BARCODED UNIT NUM UBUNT: NORMAL
BASOPHILS # BLD AUTO: 0 % (ref 0–1.8)
BASOPHILS # BLD AUTO: 0.2 % (ref 0–1.8)
BASOPHILS # BLD: 0 K/UL (ref 0–0.12)
BASOPHILS # BLD: 0.02 K/UL (ref 0–0.12)
BLD GP AB SCN SERPL QL: NORMAL
BUN SERPL-MCNC: 85 MG/DL (ref 8–22)
BUN SERPL-MCNC: 96 MG/DL (ref 8–22)
CA-I SERPL-SCNC: 0.8 MMOL/L (ref 1.1–1.3)
CALCIUM SERPL-MCNC: 7.3 MG/DL (ref 8.5–10.5)
CALCIUM SERPL-MCNC: 8.3 MG/DL (ref 8.5–10.5)
CHLORIDE SERPL-SCNC: 103 MMOL/L (ref 96–112)
CHLORIDE SERPL-SCNC: 95 MMOL/L (ref 96–112)
CO2 SERPL-SCNC: 18 MMOL/L (ref 20–33)
CO2 SERPL-SCNC: 18 MMOL/L (ref 20–33)
COMPONENT R 8504R: NORMAL
CORTIS SERPL-MCNC: 27.1 UG/DL (ref 0–23)
CREAT SERPL-MCNC: 2.58 MG/DL (ref 0.5–1.4)
CREAT SERPL-MCNC: 3.87 MG/DL (ref 0.5–1.4)
EOSINOPHIL # BLD AUTO: 0.03 K/UL (ref 0–0.51)
EOSINOPHIL # BLD AUTO: 0.09 K/UL (ref 0–0.51)
EOSINOPHIL NFR BLD: 0.3 % (ref 0–6.9)
EOSINOPHIL NFR BLD: 0.9 % (ref 0–6.9)
ERYTHROCYTE [DISTWIDTH] IN BLOOD BY AUTOMATED COUNT: 48.2 FL (ref 35.9–50)
ERYTHROCYTE [DISTWIDTH] IN BLOOD BY AUTOMATED COUNT: 49.4 FL (ref 35.9–50)
GLUCOSE BLD-MCNC: 145 MG/DL (ref 65–99)
GLUCOSE BLD-MCNC: 162 MG/DL (ref 65–99)
GLUCOSE BLD-MCNC: 164 MG/DL (ref 65–99)
GLUCOSE BLD-MCNC: 209 MG/DL (ref 65–99)
GLUCOSE BLD-MCNC: 224 MG/DL (ref 65–99)
GLUCOSE BLD-MCNC: 283 MG/DL (ref 65–99)
GLUCOSE SERPL-MCNC: 196 MG/DL (ref 65–99)
GLUCOSE SERPL-MCNC: 202 MG/DL (ref 65–99)
HCT VFR BLD AUTO: 23.1 % (ref 42–52)
HCT VFR BLD AUTO: 24.4 % (ref 42–52)
HGB BLD-MCNC: 7.4 G/DL (ref 14–18)
HGB BLD-MCNC: 7.9 G/DL (ref 14–18)
IMM GRANULOCYTES # BLD AUTO: 0.08 K/UL (ref 0–0.11)
IMM GRANULOCYTES NFR BLD AUTO: 0.8 % (ref 0–0.9)
LACTATE BLD-SCNC: 2 MMOL/L (ref 0.5–2)
LYMPHOCYTES # BLD AUTO: 0.86 K/UL (ref 1–4.8)
LYMPHOCYTES # BLD AUTO: 1.05 K/UL (ref 1–4.8)
LYMPHOCYTES NFR BLD: 10.8 % (ref 22–41)
LYMPHOCYTES NFR BLD: 8.7 % (ref 22–41)
MAGNESIUM SERPL-MCNC: 3.2 MG/DL (ref 1.5–2.5)
MANUAL DIFF BLD: NORMAL
MCH RBC QN AUTO: 31.1 PG (ref 27–33)
MCH RBC QN AUTO: 31.5 PG (ref 27–33)
MCHC RBC AUTO-ENTMCNC: 32 G/DL (ref 33.7–35.3)
MCHC RBC AUTO-ENTMCNC: 32.4 G/DL (ref 33.7–35.3)
MCV RBC AUTO: 97.1 FL (ref 81.4–97.8)
MCV RBC AUTO: 97.2 FL (ref 81.4–97.8)
METAMYELOCYTES NFR BLD MANUAL: 2.6 %
MICROCYTES BLD QL SMEAR: ABNORMAL
MONOCYTES # BLD AUTO: 0.51 K/UL (ref 0–0.85)
MONOCYTES # BLD AUTO: 1.23 K/UL (ref 0–0.85)
MONOCYTES NFR BLD AUTO: 12.7 % (ref 0–13.4)
MONOCYTES NFR BLD AUTO: 5.2 % (ref 0–13.4)
MORPHOLOGY BLD-IMP: NORMAL
MRSA DNA SPEC QL NAA+PROBE: NORMAL
NEUTROPHILS # BLD AUTO: 7.31 K/UL (ref 1.82–7.42)
NEUTROPHILS # BLD AUTO: 8.18 K/UL (ref 1.82–7.42)
NEUTROPHILS NFR BLD: 73 % (ref 44–72)
NEUTROPHILS NFR BLD: 75.2 % (ref 44–72)
NEUTS BAND NFR BLD MANUAL: 9.6 % (ref 0–10)
NRBC # BLD AUTO: 0.03 K/UL
NRBC # BLD AUTO: 0.03 K/UL
NRBC BLD-RTO: 0.3 /100 WBC
NRBC BLD-RTO: 0.3 /100 WBC
OVALOCYTES BLD QL SMEAR: NORMAL
PHOSPHATE SERPL-MCNC: 7.8 MG/DL (ref 2.5–4.5)
PLATELET # BLD AUTO: 357 K/UL (ref 164–446)
PLATELET # BLD AUTO: 387 K/UL (ref 164–446)
PLATELET BLD QL SMEAR: NORMAL
PMV BLD AUTO: 11 FL (ref 9–12.9)
PMV BLD AUTO: 11 FL (ref 9–12.9)
POIKILOCYTOSIS BLD QL SMEAR: NORMAL
POLYCHROMASIA BLD QL SMEAR: NORMAL
POTASSIUM SERPL-SCNC: 3 MMOL/L (ref 3.6–5.5)
POTASSIUM SERPL-SCNC: 3 MMOL/L (ref 3.6–5.5)
POTASSIUM SERPL-SCNC: 3.6 MMOL/L (ref 3.6–5.5)
PRODUCT TYPE UPROD: NORMAL
RBC # BLD AUTO: 2.38 M/UL (ref 4.7–6.1)
RBC # BLD AUTO: 2.51 M/UL (ref 4.7–6.1)
RBC BLD AUTO: PRESENT
RH BLD: NORMAL
SIGNIFICANT IND 70042: NORMAL
SITE SITE: NORMAL
SODIUM SERPL-SCNC: 136 MMOL/L (ref 135–145)
SODIUM SERPL-SCNC: 140 MMOL/L (ref 135–145)
SOURCE SOURCE: NORMAL
TOXIC GRANULES BLD QL SMEAR: SLIGHT
UNIT STATUS USTAT: NORMAL
VIT B1 BLD-MCNC: 52 NMOL/L (ref 70–180)
WBC # BLD AUTO: 9.7 K/UL (ref 4.8–10.8)
WBC # BLD AUTO: 9.9 K/UL (ref 4.8–10.8)

## 2020-10-03 PROCEDURE — 86901 BLOOD TYPING SEROLOGIC RH(D): CPT

## 2020-10-03 PROCEDURE — A9270 NON-COVERED ITEM OR SERVICE: HCPCS | Performed by: CLINICAL NURSE SPECIALIST

## 2020-10-03 PROCEDURE — 770022 HCHG ROOM/CARE - ICU (200)

## 2020-10-03 PROCEDURE — 700111 HCHG RX REV CODE 636 W/ 250 OVERRIDE (IP): Performed by: INTERNAL MEDICINE

## 2020-10-03 PROCEDURE — 700105 HCHG RX REV CODE 258: Performed by: INTERNAL MEDICINE

## 2020-10-03 PROCEDURE — 83735 ASSAY OF MAGNESIUM: CPT

## 2020-10-03 PROCEDURE — 700101 HCHG RX REV CODE 250: Performed by: INTERNAL MEDICINE

## 2020-10-03 PROCEDURE — 86850 RBC ANTIBODY SCREEN: CPT

## 2020-10-03 PROCEDURE — 87641 MR-STAPH DNA AMP PROBE: CPT

## 2020-10-03 PROCEDURE — 99024 POSTOP FOLLOW-UP VISIT: CPT | Performed by: THORACIC SURGERY (CARDIOTHORACIC VASCULAR SURGERY)

## 2020-10-03 PROCEDURE — 36430 TRANSFUSION BLD/BLD COMPNT: CPT

## 2020-10-03 PROCEDURE — 36556 INSERT NON-TUNNEL CV CATH: CPT

## 2020-10-03 PROCEDURE — P9016 RBC LEUKOCYTES REDUCED: HCPCS

## 2020-10-03 PROCEDURE — 700105 HCHG RX REV CODE 258

## 2020-10-03 PROCEDURE — 84132 ASSAY OF SERUM POTASSIUM: CPT | Mod: 91

## 2020-10-03 PROCEDURE — 99291 CRITICAL CARE FIRST HOUR: CPT | Performed by: INTERNAL MEDICINE

## 2020-10-03 PROCEDURE — 700111 HCHG RX REV CODE 636 W/ 250 OVERRIDE (IP)

## 2020-10-03 PROCEDURE — 700101 HCHG RX REV CODE 250: Performed by: CLINICAL NURSE SPECIALIST

## 2020-10-03 PROCEDURE — 86900 BLOOD TYPING SEROLOGIC ABO: CPT

## 2020-10-03 PROCEDURE — 82533 TOTAL CORTISOL: CPT

## 2020-10-03 PROCEDURE — 86923 COMPATIBILITY TEST ELECTRIC: CPT

## 2020-10-03 PROCEDURE — 700102 HCHG RX REV CODE 250 W/ 637 OVERRIDE(OP): Performed by: CLINICAL NURSE SPECIALIST

## 2020-10-03 PROCEDURE — 82330 ASSAY OF CALCIUM: CPT

## 2020-10-03 PROCEDURE — 84100 ASSAY OF PHOSPHORUS: CPT

## 2020-10-03 PROCEDURE — 85027 COMPLETE CBC AUTOMATED: CPT

## 2020-10-03 PROCEDURE — 85007 BL SMEAR W/DIFF WBC COUNT: CPT

## 2020-10-03 PROCEDURE — 85025 COMPLETE CBC W/AUTO DIFF WBC: CPT

## 2020-10-03 PROCEDURE — 82962 GLUCOSE BLOOD TEST: CPT | Mod: 91

## 2020-10-03 PROCEDURE — 700111 HCHG RX REV CODE 636 W/ 250 OVERRIDE (IP): Performed by: NURSE PRACTITIONER

## 2020-10-03 PROCEDURE — 80048 BASIC METABOLIC PNL TOTAL CA: CPT | Mod: 91

## 2020-10-03 PROCEDURE — 83605 ASSAY OF LACTIC ACID: CPT

## 2020-10-03 RX ORDER — MIDAZOLAM HYDROCHLORIDE 1 MG/ML
INJECTION INTRAMUSCULAR; INTRAVENOUS
Status: ACTIVE
Start: 2020-10-03 | End: 2020-10-04

## 2020-10-03 RX ORDER — SODIUM CHLORIDE 9 MG/ML
INJECTION, SOLUTION INTRAVENOUS CONTINUOUS
Status: DISCONTINUED | OUTPATIENT
Start: 2020-10-03 | End: 2020-10-06

## 2020-10-03 RX ORDER — POTASSIUM CHLORIDE 14.9 MG/ML
20 INJECTION INTRAVENOUS ONCE
Status: COMPLETED | OUTPATIENT
Start: 2020-10-03 | End: 2020-10-03

## 2020-10-03 RX ORDER — POTASSIUM CHLORIDE 7.45 MG/ML
10 INJECTION INTRAVENOUS ONCE
Status: COMPLETED | OUTPATIENT
Start: 2020-10-03 | End: 2020-10-03

## 2020-10-03 RX ORDER — MIDAZOLAM HYDROCHLORIDE 1 MG/ML
2 INJECTION INTRAMUSCULAR; INTRAVENOUS ONCE
Status: COMPLETED | OUTPATIENT
Start: 2020-10-03 | End: 2020-10-03

## 2020-10-03 RX ORDER — CALCIUM CHLORIDE 100 MG/ML
1 INJECTION INTRAVENOUS; INTRAVENTRICULAR ONCE
Status: COMPLETED | OUTPATIENT
Start: 2020-10-03 | End: 2020-10-03

## 2020-10-03 RX ORDER — MIDAZOLAM HYDROCHLORIDE 1 MG/ML
INJECTION INTRAMUSCULAR; INTRAVENOUS
Status: COMPLETED
Start: 2020-10-03 | End: 2020-10-03

## 2020-10-03 RX ORDER — LIDOCAINE HYDROCHLORIDE 20 MG/ML
30 SOLUTION OROPHARYNGEAL ONCE
Status: COMPLETED | OUTPATIENT
Start: 2020-10-03 | End: 2020-10-03

## 2020-10-03 RX ORDER — PANTOPRAZOLE SODIUM 40 MG/10ML
40 INJECTION, POWDER, LYOPHILIZED, FOR SOLUTION INTRAVENOUS DAILY
Status: DISCONTINUED | OUTPATIENT
Start: 2020-10-04 | End: 2020-10-06

## 2020-10-03 RX ORDER — SODIUM CHLORIDE 9 MG/ML
INJECTION, SOLUTION INTRAVENOUS
Status: COMPLETED
Start: 2020-10-03 | End: 2020-10-03

## 2020-10-03 RX ADMIN — HEPARIN SODIUM 5000 UNITS: 5000 INJECTION, SOLUTION INTRAVENOUS; SUBCUTANEOUS at 23:29

## 2020-10-03 RX ADMIN — CALCIUM CHLORIDE 1 G: 100 INJECTION, SOLUTION INTRAVENOUS at 10:45

## 2020-10-03 RX ADMIN — LINEZOLID 600 MG: 600 INJECTION, SOLUTION INTRAVENOUS at 17:29

## 2020-10-03 RX ADMIN — MIDAZOLAM HYDROCHLORIDE 2 MG: 1 INJECTION, SOLUTION INTRAMUSCULAR; INTRAVENOUS at 14:53

## 2020-10-03 RX ADMIN — LIDOCAINE 1 PATCH: 50 PATCH TOPICAL at 10:48

## 2020-10-03 RX ADMIN — ACETAMINOPHEN 975 MG: 650 SUPPOSITORY RECTAL at 05:40

## 2020-10-03 RX ADMIN — NOREPINEPHRINE BITARTRATE 15 MCG/MIN: 1 INJECTION, SOLUTION, CONCENTRATE INTRAVENOUS at 08:01

## 2020-10-03 RX ADMIN — POTASSIUM CHLORIDE 20 MEQ: 14.9 INJECTION, SOLUTION INTRAVENOUS at 20:38

## 2020-10-03 RX ADMIN — INSULIN LISPRO 9 UNITS: 100 INJECTION, SOLUTION INTRAVENOUS; SUBCUTANEOUS at 00:18

## 2020-10-03 RX ADMIN — POTASSIUM CHLORIDE 10 MEQ: 7.46 INJECTION, SOLUTION INTRAVENOUS at 15:04

## 2020-10-03 RX ADMIN — MIDAZOLAM HYDROCHLORIDE 2 MG: 1 INJECTION INTRAMUSCULAR; INTRAVENOUS at 11:20

## 2020-10-03 RX ADMIN — PIPERACILLIN AND TAZOBACTAM 4.5 G: 4; .5 INJECTION, POWDER, LYOPHILIZED, FOR SOLUTION INTRAVENOUS; PARENTERAL at 13:22

## 2020-10-03 RX ADMIN — LINEZOLID 600 MG: 600 INJECTION, SOLUTION INTRAVENOUS at 05:40

## 2020-10-03 RX ADMIN — SODIUM CHLORIDE: 9 INJECTION, SOLUTION INTRAVENOUS at 10:49

## 2020-10-03 RX ADMIN — INSULIN LISPRO 6 UNITS: 100 INJECTION, SOLUTION INTRAVENOUS; SUBCUTANEOUS at 05:41

## 2020-10-03 RX ADMIN — INSULIN LISPRO 3 UNITS: 100 INJECTION, SOLUTION INTRAVENOUS; SUBCUTANEOUS at 17:37

## 2020-10-03 RX ADMIN — PIPERACILLIN AND TAZOBACTAM 4.5 G: 4; .5 INJECTION, POWDER, LYOPHILIZED, FOR SOLUTION INTRAVENOUS; PARENTERAL at 02:05

## 2020-10-03 RX ADMIN — PIPERACILLIN AND TAZOBACTAM 4.5 G: 4; .5 INJECTION, POWDER, LYOPHILIZED, FOR SOLUTION INTRAVENOUS; PARENTERAL at 20:38

## 2020-10-03 RX ADMIN — ACETAMINOPHEN 975 MG: 650 SUPPOSITORY RECTAL at 00:08

## 2020-10-03 RX ADMIN — HEPARIN SODIUM 5000 UNITS: 5000 INJECTION, SOLUTION INTRAVENOUS; SUBCUTANEOUS at 13:22

## 2020-10-03 RX ADMIN — SODIUM CHLORIDE 500 ML: 9 INJECTION, SOLUTION INTRAVENOUS at 10:50

## 2020-10-03 RX ADMIN — LIDOCAINE HYDROCHLORIDE 30 ML: 20 SOLUTION ORAL; TOPICAL at 10:51

## 2020-10-03 RX ADMIN — HEPARIN SODIUM 5000 UNITS: 5000 INJECTION, SOLUTION INTRAVENOUS; SUBCUTANEOUS at 05:40

## 2020-10-03 RX ADMIN — POTASSIUM CHLORIDE 20 MEQ: 14.9 INJECTION, SOLUTION INTRAVENOUS at 13:22

## 2020-10-03 RX ADMIN — INSULIN LISPRO 6 UNITS: 100 INJECTION, SOLUTION INTRAVENOUS; SUBCUTANEOUS at 11:51

## 2020-10-03 RX ADMIN — MIDAZOLAM HYDROCHLORIDE 2 MG: 1 INJECTION, SOLUTION INTRAMUSCULAR; INTRAVENOUS at 11:20

## 2020-10-03 RX ADMIN — INSULIN LISPRO 6 UNITS: 100 INJECTION, SOLUTION INTRAVENOUS; SUBCUTANEOUS at 23:30

## 2020-10-03 ASSESSMENT — PAIN DESCRIPTION - PAIN TYPE: TYPE: ACUTE PAIN

## 2020-10-03 ASSESSMENT — FIBROSIS 4 INDEX: FIB4 SCORE: 1.32

## 2020-10-03 NOTE — ASSESSMENT & PLAN NOTE
MRI on 9/29 confirms numerous acute/subacute supratentorial and infratentorial infarcts consistent with cardioembolic phenomenon  On aspirin and Plavix for CAD/CABG  Statin  Follow neuro exam and limit sedatives  PT/OT/SLP  Appears ready for rehab, physiatry is seen the patient and is willing to accept him  Does he need a MARGOTH or follow-up with neurology/cardiology in regards to anticoagulation for cardioembolic stroke?  Will need [facility] clearance post CABG for full dose anticoagulation, he appears to be far enough out from his surgery at this point?

## 2020-10-03 NOTE — PROGRESS NOTES
JACOB Pineda expressed concern about insurance and cost due to patient unable to be at work. RN to place IP to .

## 2020-10-03 NOTE — ASSESSMENT & PLAN NOTE
Resolved, monitor for recurrence  NG removed 10/6  Advancing tube feeds to goal  Continue Reglan for now, query component of diabetic gastroparesis  Continue to optimize electrolytes  Mobilize as tolerated, this has helped significantly  Limit narcotics  Serial abdominal exams -abdomen back to baseline, positive BMs

## 2020-10-03 NOTE — PROGRESS NOTES
Updated Dr. Norton that was unable to place NG. Patient coughed up large blood clot then was bleeding. Orders received to give 2 of versed to help place NG. Order placed, see MAR.

## 2020-10-03 NOTE — ASSESSMENT & PLAN NOTE
Source appears to be pulmonary +/- GI, cultures negative so far - IMPROVED  Nasal MRSA PCR is negative - stop Zyvox, monitor  Continue Zosyn for aspiration/ABD and plan 5 days of therapy, last day of antibiotics 10-7  Shock has resolved - he is off norepinephrine

## 2020-10-03 NOTE — ASSESSMENT & PLAN NOTE
Improved clinically  Nasal MRSA PCR negative -status post short course of Zyvox  Zosyn and plan 5 days of therapy, last day of therapy 10/7-complete-monitor for recurrence  Mobilize/I-S and limit narcotics  Follow-up chest x-ray as needed  RT protocols  Failed swallow eval  Aspiration precautions  Update vaccines including flu shot per protocols

## 2020-10-03 NOTE — PROGRESS NOTES
Critical Care Progress Note    Date of admission  9/21/2020    Chief Complaint  49 y.o. male admitted 9/21/2020 with an acute coronary syndrome.  He subsequently underwent CABG.    Hospital Course   10/3 -    Place NG to suction.  Titrating norepinephrine.  Hydrate with intravenous NS.  Add vasopressin.      Interval Problem Update  Reviewed last 24 hour events:      SR  Remove cortrak and place NG to suction  NE - 15  Add vasopressin  +2293 mL in the last 24 hours  Replete K and Ca  Lactic acid has normalized  Transfuse 1 U PRBC's      Review of Systems  Review of Systems   Unable to perform ROS: Acuity of condition        Vital Signs for last 24 hours   Temp:  [36.9 °C (98.4 °F)] 36.9 °C (98.4 °F)  Pulse:  [] 87  Resp:  [18-38] 30  BP: ()/(41-69) 90/60  SpO2:  [80 %-100 %] 92 %    Hemodynamic parameters for last 24 hours       Respiratory Information for the last 24 hours       Physical Exam   Physical Exam  Constitutional:       Appearance: He is not diaphoretic.   HENT:      Head: Normocephalic and atraumatic.      Right Ear: External ear normal.      Left Ear: External ear normal.      Nose: Nose normal.      Mouth/Throat:      Mouth: Mucous membranes are moist.      Pharynx: Oropharynx is clear.   Eyes:      Conjunctiva/sclera: Conjunctivae normal.      Pupils: Pupils are equal, round, and reactive to light.   Neck:      Musculoskeletal: Normal range of motion and neck supple.   Cardiovascular:      Pulses: Normal pulses.      Heart sounds: No murmur. No gallop.       Comments: Sinus rhythm  Pulmonary:      Breath sounds: Rales (Very few crackles at the bases) present. No wheezing.   Abdominal:      General: There is distension.      Tenderness: There is no abdominal tenderness. There is no guarding.   Musculoskeletal:      Comments: No clubbing or cyanosis   Skin:     General: Skin is warm and dry.      Capillary Refill: Capillary refill takes less than 2 seconds.   Neurological:      Comments:  Pupils are 3 mm and brisk.  No focal weakness.         Medications  Current Facility-Administered Medications   Medication Dose Route Frequency Provider Last Rate Last Dose   • [START ON 10/4/2020] pantoprazole (PROTONIX) injection 40 mg  40 mg Intravenous DAILY Karan Norton M.D.       • vasopressin (VASOSTRICT) 20 Units in  mL Infusion  0.03 Units/min Intravenous Continuous Karan Norton M.D.   Stopped at 10/03/20 0900   • calcium CHLORIDE injection 1 g  1 g Intravenous Once Karan Norton M.D.       • lidocaine (XYLOCAINE) 2 % viscous solution 30 mL  30 mL Mouth/Throat Once Karan Norton M.D.       • K+ Scale: Goal of 4.5  1 Each Intravenous Q6HRS Karan Norton M.D.       • NS infusion   Intravenous Continuous Karan Norton M.D.       • SODIUM CHLORIDE 0.9 % IV SOLN            • heparin injection 5,000 Units  5,000 Units Subcutaneous Q8HRS Christi Conway, A.P.NStacie   5,000 Units at 10/03/20 0540   • acetaminophen (TYLENOL) suppository 975 mg  975 mg Rectal Q6HRS Maia Chavira A.P.N.   975 mg at 10/03/20 0540   • Linezolid (ZYVOX) premix 600 mg  600 mg Intravenous Q12HRS Byron Edmond M.D.   Stopped at 10/03/20 0640   • piperacillin-tazobactam (ZOSYN) 4.5 g in  mL IVPB  4.5 g Intravenous Q8HRS Byron Edmond M.D.   Stopped at 10/03/20 0605   • norepinephrine (Levophed) infusion 8 mg/250 mL (premix)  0-30 mcg/min Intravenous Continuous Karan Norton M.D. 28.1 mL/hr at 10/03/20 0801 15 mcg/min at 10/03/20 0801   • Pharmacy Consult: Enteral tube insertion - review meds/change route/product selection  1 Each Other PHARMACY TO DOSE Maia Chavira, SHALOM.P.N.       • aspirin (ASA) chewable tab 81 mg  81 mg Enteral Tube DAILY Mateus Stringer D.O.   Stopped at 10/03/20 0600   • tramadol (ULTRAM) 50 MG tablet 50 mg  50 mg Enteral Tube Q4HRS PRN ЮЛИЯ Holm.O.   50 mg at 10/01/20 0536   • atorvastatin (LIPITOR) tablet 80 mg  80 mg Enteral  Tube QHS Mateus Stringer D.O.   Stopped at 10/02/20 2100   • mag hydrox-al hydrox-simeth (MAALOX PLUS ES or MYLANTA DS) suspension 30 mL  30 mL Enteral Tube Q4HRS PRN Mateus Stringer D.O.       • senna-docusate (PERICOLACE or SENOKOT S) 8.6-50 MG per tablet 2 Tab  2 Tab Enteral Tube BID Mateus Stringer D.O.   Stopped at 10/03/20 0600    And   • polyethylene glycol/lytes (MIRALAX) PACKET 1 Packet  1 Packet Enteral Tube DAILY MAUREEN HolmO.   Stopped at 10/02/20 0600    And   • magnesium hydroxide (MILK OF MAGNESIA) suspension 30 mL  30 mL Enteral Tube DAILY Mateus Stringer D.O.   Stopped at 10/01/20 0600    And   • bisacodyl (DULCOLAX) suppository 10 mg  10 mg Rectal QDAY PRN MAUREEN HolmO.   10 mg at 10/01/20 2338   • clopidogrel (PLAVIX) tablet 75 mg  75 mg Enteral Tube DAILY MAUREEN HolmO.   Stopped at 10/03/20 0600   • diphenhydrAMINE (BENADRYL) tablet/capsule 25 mg  25 mg Enteral Tube HS PRN - MR X 1 MAUREEN HolmO.   25 mg at 09/30/20 2315   • HYDROcodone-acetaminophen (NORCO) 5-325 MG per tablet 1-2 Tab  1-2 Tab Enteral Tube Q6HRS PRN Maia Chavira A.P.N.       • insulin lispro (HumaLOG) injection  4 Units Subcutaneous Q6HRS Maia Chavira A.P.N.   Stopped at 10/02/20 0600   • insulin lispro (HumaLOG) injection  0-15 Units Subcutaneous Q6HRS Maia Chavira A.P.N.   6 Units at 10/03/20 0541   • MD Alert...ICU Electrolyte Replacement per Pharmacy   Other PHARMACY TO DOSE Karan Norton M.D.       • lidocaine (LIDODERM) 5 % 1 Patch  1 Patch Transdermal Q24HR Maia Chavira A.P.N.   1 Patch at 10/02/20 0933   • Respiratory Therapy Consult   Nebulization Continuous RT Maia Chavira A.P.N.       • Pharmacy Consult Request ...Pain Management Review 1 Each  1 Each Other PHARMACY TO DOSE Maia Chavira, A.P.N.       • ondansetron (ZOFRAN) syringe/vial injection 8 mg  8 mg Intravenous Q6HRS PRN Maia Chavira A.P.N.        Or   • prochlorperazine (COMPAZINE) injection 10 mg  10 mg  Intravenous Q6HRS PRN Maai Chavira, A.P.N.   10 mg at 10/02/20 0240    Or   • promethazine (PHENERGAN) suppository 25 mg  25 mg Rectal Q6HRS PRN Maia Chavira, A.P.N.           Fluids    Intake/Output Summary (Last 24 hours) at 10/3/2020 1029  Last data filed at 10/3/2020 0600  Gross per 24 hour   Intake 4288.11 ml   Output 2100 ml   Net 2188.11 ml       Laboratory  Recent Labs     10/02/20  0324   ISTATAPH 7.507*   ISTATAPCO2 18.2*   ISTATAPO2 65   ISTATATCO2 15*   MYGIUBI4URX 95   ISTATARTHCO3 14.4*   ISTATARTBE -7*   ISTATTEMP 38.9 C   ISTATFIO2 40   ISTATSPEC Arterial   ISTATAPHTC 7.478   KGYBUVJD2MK 74         Recent Labs     10/01/20  2230 10/02/20  2050 10/03/20  0325   SODIUM 136 136 136   POTASSIUM 3.8 3.2* 3.0*   CHLORIDE 96 94* 95*   CO2 16* 17* 18*   BUN 62* 95* 96*   CREATININE 2.37* 4.32* 3.87*   MAGNESIUM  --   --  3.2*   PHOSPHORUS  --   --  7.8*   CALCIUM 9.2 7.4* 7.3*     Recent Labs     10/01/20  0548 10/01/20  2230 10/02/20  2050 10/03/20  0325   ALTSGPT  --   --  136*  --    ASTSGOT  --   --  124*  --    ALKPHOSPHAT  --   --  127*  --    TBILIRUBIN  --   --  0.7  --    PREALBUMIN 16.2*  --   --   --    GLUCOSE 159* 201* 194* 202*     Recent Labs     10/01/20  2230 10/02/20  2050 10/03/20  0015 10/03/20  0325   WBC 4.7* 9.9 9.9 9.7   NEUTSPOLYS 69.60  --  73.00* 75.20*   LYMPHOCYTES 14.80*  --  8.70* 10.80*   MONOCYTES 9.60  --  5.20 12.70   EOSINOPHILS 1.70  --  0.90 0.30   BASOPHILS 0.00  --  0.00 0.20   ASTSGOT  --  124*  --   --    ALTSGPT  --  136*  --   --    ALKPHOSPHAT  --  127*  --   --    TBILIRUBIN  --  0.7  --   --      Recent Labs     10/02/20  2050 10/03/20  0015 10/03/20  0325   RBC 2.34* 2.38* 2.51*   HEMOGLOBIN 7.4* 7.4* 7.9*   HEMATOCRIT 23.0* 23.1* 24.4*   PLATELETCT [phone]       Imaging  X-Ray:  I have personally reviewed the images and compared with prior images. and My impression is: Bibasilar subsegmental atelectasis    Assessment/Plan  * Septic shock  ([facility])  Assessment & Plan  Source appears to be pulmonary +/- GI  Continue empiric Zosyn and Zyvox  Check nasal MRSA PCR    Ileus ([facility])  Assessment & Plan  Place OG to suction  Bowel rest  Correct electrolyte abnormalities  Serial abdominal exams    Aspiration pneumonia ([facility])  Assessment & Plan  Continue empiric Zosyn and Zyvox  Check nasal MRSA PCR - if negative stop Zyvox    Cardioembolic stroke ([facility])  Assessment & Plan  MRI on 9/29 confirms numerous acute/subacute supratentorial and infratentorial infarcts consistent with cardioembolic phenomenon  Continue aspirin and statin  Follow neuro exam and limit sedatives    SONNY (acute kidney injury) ([facility])- (present on admission)  Assessment & Plan  Continue IV fluid resuscitation  Monitor renal function and urine output  Avoid nephrotoxins and renal dose medications    NSTEMI (non-ST elevated myocardial infarction) ([facility])- (present on admission)  Assessment & Plan  Continue aspirin, Plavix and statin    CAD (coronary artery disease)- (present on admission)  Assessment & Plan  S/P 3V CABG on 9/25  Continue aspirin, Plavix and statin    Essential hypertension- (present on admission)  Assessment & Plan  Now hypotensive on vasopressor support    Type 2 diabetes mellitus with complication, without long-term current use of insulin ([facility])- (present on admission)  Assessment & Plan  Continue sliding scale insulin    Hypokalemia- (present on admission)  Assessment & Plan  Replete potassium       VTE:  Heparin  Ulcer: PPI  Lines: Central Line  Ongoing indication addressed and Quispe Catheter  Ongoing indication addressed    I have performed a physical exam and reviewed and updated ROS and Plan today (10/3/2020). In review of yesterday's note (10/2/2020), there are no changes except as documented above.     I have assessed and reassessed his respiratory status, blood pressure, hemodynamics, cardiovascular status with titration of norepinephrine, urine output and his neurologic status.   He is at increased risk for worsening respiratory, cardiovascular, renal and gastrointestinal system dysfunction.    Discussed patient condition and risk of morbidity and/or mortality with RN, RT, Pharmacy, Charge nurse / hot rounds and QA team     The patient remains critically ill.  Critical care time = 35 minutes in directly providing and coordinating critical care and extensive data review.  No time overlap and excludes procedures.    Karan Norton MD  Pulmonary and Critical Care Medicine

## 2020-10-03 NOTE — PROGRESS NOTES
When Rn went to round on patient, patient had pulled out his NG tube. Dr. Norton updated. Received orders to place patient in restraints and replace NG tube. Orders given for 2 mg of versed to be given for NG placement.

## 2020-10-03 NOTE — PROGRESS NOTES
Updated GEORGINA Rodriguez on overnight events. Received orders to transfuse 1 unit of PRBC's, COD, and repeat BMP at 1600. If patient Creatinine increased orders received to update Maia Chavira.

## 2020-10-03 NOTE — CONSULTS
Critical Care Consultation    Date of consult: 10/2/2020    Referring Physician  Maia Chavira    Reason for Consultation  Shock    History of Presenting Illness  49 y.o. male who presented 9/21/2020 with CAD, HTN, DM which underwent cardiac cath 9/22 with multiple vessel disease, 9/25 CABGx3 with Dr Chicas combilicated by numerous cardiogenic emboli, had a negative EEG. Patient today with ileus and aspiration developed fever and double his creatine. When I was walking by this patient room his SBP was in the 60's discussed with nurse and cardiac surgery with Maia Lambert started norepinephrine and placed central line will sign back on for septic shock and acute organ dysfunction. CT abdomen shows an ileus which we decompressed his abdomen with also shows possible right lower lobe pna vs atlectasis no fibrinous material in bronchi with his blood pressure up his mentation improved. We gave albumin and plasmalyte boluses and he was on norepi at 15.     Code Status  Full Code    Review of Systems  Review of Systems   Unable to perform ROS: Acuity of condition       Past Medical History   has a past medical history of Diabetes (HCC), Hypertension, Kidney stones, MI, old, and Psychiatric problem.    Surgical History   has a past surgical history that includes other orthopedic surgery; stent placement (2017); multiple coronary artery bypass endo vein harvest (9/25/2020); and benjamin (9/25/2020).    Family History  family history includes No Known Problems in his father; Stroke (age of onset: 47) in his mother; Stroke (age of onset: 54) in his maternal grandfather; Stroke (age of onset: 85) in his maternal grandmother.    Social History   reports that he has never smoked. His smokeless tobacco use includes chew. He reports current alcohol use. He reports that he does not use drugs.    Medications  Home Medications     Reviewed by Maura Cruz R.N. (Registered Nurse) on 09/25/20 at 0989  Med List Status: Complete   Medication Last  Dose Status   acetaminophen (TYLENOL) 500 MG Tab 2000 Active   acetaminophen (TYLENOL) tablet 650 mg  Active   allopurinol (ZYLOPRIM) 100 MG Tab 9/21/2020 Active   aminocaproic acid (AMICAR) 5 g in  mL Infusion  Active   aminocaproic acid (AMICAR) 7.34 g in  mL IV Bolus  Active   amLODIPine (NORVASC) 10 MG Tab  Active   amLODIPine (NORVASC) tablet 10 mg  Active   aspirin EC (ECOTRIN) 81 MG Tablet Delayed Response  Active   aspirin EC (ECOTRIN) tablet 81 mg  Active   atorvastatin (LIPITOR) 40 MG Tab  Active   atorvastatin (LIPITOR) tablet 80 mg  Active   bisacodyl (DULCOLAX) suppository 10 mg  Active   buPROPion (WELLBUTRIN XL) 300 MG XL tablet  Active   Cardiac Surgery Prophylactic Antibiotics per Pharmacy  Active   carvedilol (COREG) tablet 12.5 mg  Active   clopidogrel (PLAVIX) 75 MG Tab  Active   dexmedetomidine (PRECEDEX) 400 mcg/100mL NS premix infusion  Active   dextrose 50% (D50W) injection 50 mL  Active   enoxaparin (LOVENOX) inj 40 mg  Active   EPINEPHrine (Adrenalin) infusion 4 mg/250 mL (premix)  Active   glucose 4 g chewable tablet 16 g  Active   insulin regular (HumuLIN R,NovoLIN R) injection  Active   insulin regular human (HUMULIN/NOVOLIN R) 62.5 Units in  mL infusion per protocol  Active   lidocaine (XYLOCAINE) 1 % injection 0.5 mL  Active   lisinopril (PRINIVIL) tablet 40 mg  Active   lisinopril (PRINIVIL, ZESTRIL) 40 MG tablet  Active   magnesium hydroxide (MILK OF MAGNESIA) suspension 30 mL  Active   metFORMIN (GLUCOPHAGE) 500 MG Tab  Active   metoprolol (LOPRESSOR) 25 MG Tab  Active   metoprolol (LOPRESSOR) tablet 12.5 mg  Active   morphine (pf) 4 MG/ML injection 2-4 mg  Active   nitroglycerin (NITROSTAT) tablet 0.4 mg  Active   norepinephrine (Levophed) infusion 8 mg/250 mL (premix)  Active   ondansetron (ZOFRAN ODT) dispertab 4 mg  Active   ondansetron (ZOFRAN) syringe/vial injection 4 mg  Active   polyethylene glycol/lytes (MIRALAX) PACKET 1 Packet  Active   Respiratory  Therapy Consult  Active   senna-docusate (PERICOLACE or SENOKOT S) 8.6-50 MG per tablet 2 Tab  Active   SODIUM CHLORIDE 0.9 % IV SOLN  Active   vancomycin 1000 mg in 250 mL NS IVPB Premix  Active              Current Facility-Administered Medications   Medication Dose Route Frequency Provider Last Rate Last Dose   • heparin injection 5,000 Units  5,000 Units Subcutaneous Q8HRS SHALOM Yang.P.N.       • NOREPINEPHRINE BITARTRATE 1 MG/ML IV SOLN            • acetaminophen (TYLENOL) suppository 975 mg  975 mg Rectal Q6HRS SHALOM Camacho.P.N.       • Linezolid (ZYVOX) premix 600 mg  600 mg Intravenous Q12HRS Byron Edmond M.D.       • electrolyte-A (PLASMALYTE-A) (BOLUS) infusion 1,000 mL  1,000 mL Intravenous Once Byron Edmond M.D.       • piperacillin-tazobactam (ZOSYN) 4.5 g in  mL IVPB  4.5 g Intravenous Once Byron Edmond M.D.        And   • [START ON 10/3/2020] piperacillin-tazobactam (ZOSYN) 4.5 g in  mL IVPB  4.5 g Intravenous Q8HRS Byron Edmond M.D.       • Pharmacy Consult: Enteral tube insertion - review meds/change route/product selection  1 Each Other PHARMACY TO DOSE ROSIO CamachoPLUANA       • aspirin (ASA) chewable tab 81 mg  81 mg Enteral Tube DAILY ЮЛИЯ Holm.O.   81 mg at 10/02/20 0836   • tramadol (ULTRAM) 50 MG tablet 50 mg  50 mg Enteral Tube Q4HRS PRN ЮЛИЯ Homl.O.   50 mg at 10/01/20 0536   • atorvastatin (LIPITOR) tablet 80 mg  80 mg Enteral Tube QHS ЮИЛЯ Holm.O.   Stopped at 10/02/20 2100   • mag hydrox-al hydrox-simeth (MAALOX PLUS ES or MYLANTA DS) suspension 30 mL  30 mL Enteral Tube Q4HRS PRN MAUREEN HolmO.       • omeprazole (FIRST-OMEPRAZOLE) 2 mg/mL oral susp 40 mg  40 mg Enteral Tube DAILY Mateus Stringer D.O.   Stopped at 10/02/20 0600   • senna-docusate (PERICOLACE or SENOKOT S) 8.6-50 MG per tablet 2 Tab  2 Tab Enteral Tube BID Mateus Stringer D.O.   2 Tab at 10/02/20 1629    And   • polyethylene glycol/lytes  (MIRALAX) PACKET 1 Packet  1 Packet Enteral Tube DAILY Mateus Stringer D.O.   Stopped at 10/02/20 0600    And   • magnesium hydroxide (MILK OF MAGNESIA) suspension 30 mL  30 mL Enteral Tube DAILY Mateus Stringer D.O.   Stopped at 10/01/20 0600    And   • bisacodyl (DULCOLAX) suppository 10 mg  10 mg Rectal QDAY PRN MAUREEN HolmO.   10 mg at 10/01/20 2338   • clopidogrel (PLAVIX) tablet 75 mg  75 mg Enteral Tube DAILY MAUREEN HolmO.   75 mg at 10/02/20 0835   • diphenhydrAMINE (BENADRYL) tablet/capsule 25 mg  25 mg Enteral Tube HS PRN - MR X 1 MAUREEN HolmOStacie   25 mg at 09/30/20 2315   • HYDROcodone-acetaminophen (NORCO) 5-325 MG per tablet 1-2 Tab  1-2 Tab Enteral Tube Q6HRS PRN Maia Chavira A.P.N.       • insulin lispro (HumaLOG) injection  4 Units Subcutaneous Q6HRS Maia Chavira A.P.N.   Stopped at 10/02/20 0600   • insulin lispro (HumaLOG) injection  0-15 Units Subcutaneous Q6HRS Maia Chavira A.P.N.   3 Units at 10/02/20 1731   • MD Alert...ICU Electrolyte Replacement per Pharmacy   Other PHARMACY TO DOSE Karan Norton M.D.       • lidocaine (LIDODERM) 5 % 1 Patch  1 Patch Transdermal Q24HR Maia Chavira A.P.N.   1 Patch at 10/02/20 0933   • Respiratory Therapy Consult   Nebulization Continuous RT Maia Chavira A.P.N.       • Pharmacy Consult Request ...Pain Management Review 1 Each  1 Each Other PHARMACY TO DOSE Maia Chavira A.P.N.       • ondansetron (ZOFRAN) syringe/vial injection 8 mg  8 mg Intravenous Q6HRS PRN Maia Chavira A.P.N.        Or   • prochlorperazine (COMPAZINE) injection 10 mg  10 mg Intravenous Q6HRS PRN Maia Chavira A.P.N.   10 mg at 10/02/20 0240    Or   • promethazine (PHENERGAN) suppository 25 mg  25 mg Rectal Q6HRS PRN Maia Chavira A.P.N.           Allergies  No Known Allergies    Vital Signs last 24 hours  Temp:  [36.7 °C (98.1 °F)-38.8 °C (101.9 °F)] 37.7 °C (99.8 °F)  Pulse:  [] 105  Resp:  [16-41] 26  BP: ()/(44-82)  97/44  SpO2:  [80 %-100 %] 97 %    Physical Exam  Physical Exam  Vitals signs reviewed.   Constitutional:       General: He is not in acute distress.     Appearance: He is ill-appearing.      Comments: Ill appearing male   HENT:      Head: Normocephalic.      Nose: Nose normal.      Comments: cortrax     Mouth/Throat:      Mouth: Mucous membranes are moist.   Eyes:      Pupils: Pupils are equal, round, and reactive to light.   Neck:      Musculoskeletal: No neck rigidity or muscular tenderness.      Comments: Right IJ catheter placed  Cardiovascular:      Rate and Rhythm: Tachycardia present.      Heart sounds: No murmur.   Pulmonary:      Effort: Respiratory distress present.      Breath sounds: No stridor. No wheezing or rhonchi.      Comments: Mild labored and tachypnea but no acute distress  Abdominal:      General: There is distension.      Palpations: There is no mass.      Tenderness: There is no abdominal tenderness. There is no guarding or rebound.      Hernia: No hernia is present.      Comments: Firm distended abdomen without peritoneal signs   Musculoskeletal:         General: No swelling, tenderness, deformity or signs of injury.      Comments: Mottling and cold to bilateral knees   Skin:     Coloration: Skin is pale. Skin is not jaundiced.   Neurological:      Mental Status: He is alert.      Comments: Lethargic minimal responsiveness, moves right arm spontaneoulsly   Psychiatric:      Comments: Unable to determine         Fluids    Intake/Output Summary (Last 24 hours) at 10/2/2020 2127  Last data filed at 10/2/2020 1600  Gross per 24 hour   Intake 1676.67 ml   Output 2150 ml   Net -473.33 ml       Laboratory  Recent Results (from the past 48 hour(s))   ACCU-CHEK GLUCOSE    Collection Time: 09/30/20 11:42 PM   Result Value Ref Range    Glucose - Accu-Ck 150 (H) 65 - 99 mg/dL   ACCU-CHEK GLUCOSE    Collection Time: 10/01/20  5:46 AM   Result Value Ref Range    Glucose - Accu-Ck 153 (H) 65 - 99 mg/dL    CRP Quantitive (Non-Cardiac)    Collection Time: 10/01/20  5:48 AM   Result Value Ref Range    Stat C-Reactive Protein 18.55 (H) 0.00 - 0.75 mg/dL   Prealbumin    Collection Time: 10/01/20  5:48 AM   Result Value Ref Range    Pre-Albumin 16.2 (L) 18.0 - 38.0 mg/dL   CBC WITH DIFFERENTIAL    Collection Time: 10/01/20  5:48 AM   Result Value Ref Range    WBC 8.5 4.8 - 10.8 K/uL    RBC 2.68 (L) 4.70 - 6.10 M/uL    Hemoglobin 8.5 (L) 14.0 - 18.0 g/dL    Hematocrit 26.2 (L) 42.0 - 52.0 %    MCV 97.8 81.4 - 97.8 fL    MCH 31.7 27.0 - 33.0 pg    MCHC 32.4 (L) 33.7 - 35.3 g/dL    RDW 50.2 (H) 35.9 - 50.0 fL    Platelet Count 317 164 - 446 K/uL    MPV 10.9 9.0 - 12.9 fL    Neutrophils-Polys 61.70 44.00 - 72.00 %    Lymphocytes 17.40 (L) 22.00 - 41.00 %    Monocytes 14.80 (H) 0.00 - 13.40 %    Eosinophils 1.70 0.00 - 6.90 %    Basophils 0.90 0.00 - 1.80 %    Nucleated RBC 0.50 /100 WBC    Neutrophils (Absolute) 5.54 1.82 - 7.42 K/uL    Lymphs (Absolute) 1.48 1.00 - 4.80 K/uL    Monos (Absolute) 1.26 (H) 0.00 - 0.85 K/uL    Eos (Absolute) 0.14 0.00 - 0.51 K/uL    Baso (Absolute) 0.08 0.00 - 0.12 K/uL    NRBC (Absolute) 0.04 K/uL    Hypochromia 1+     Anisocytosis 1+     Microcytosis 1+    Basic Metabolic Panel    Collection Time: 10/01/20  5:48 AM   Result Value Ref Range    Sodium 137 135 - 145 mmol/L    Potassium 3.6 3.6 - 5.5 mmol/L    Chloride 98 96 - 112 mmol/L    Co2 19 (L) 20 - 33 mmol/L    Glucose 159 (H) 65 - 99 mg/dL    Bun 34 (H) 8 - 22 mg/dL    Creatinine 1.19 0.50 - 1.40 mg/dL    Calcium 9.1 8.5 - 10.5 mg/dL    Anion Gap 20.0 (H) 7.0 - 16.0   ESTIMATED GFR    Collection Time: 10/01/20  5:48 AM   Result Value Ref Range    GFR If African American >60 >60 mL/min/1.73 m 2    GFR If Non African American >60 >60 mL/min/1.73 m 2   DIFFERENTIAL MANUAL    Collection Time: 10/01/20  5:48 AM   Result Value Ref Range    Bands-Stabs 3.50 0.00 - 10.00 %    Manual Diff Status PERFORMED    PERIPHERAL SMEAR REVIEW    Collection  Time: 10/01/20  5:48 AM   Result Value Ref Range    Peripheral Smear Review see below    PLATELET ESTIMATE    Collection Time: 10/01/20  5:48 AM   Result Value Ref Range    Plt Estimation Normal    MORPHOLOGY    Collection Time: 10/01/20  5:48 AM   Result Value Ref Range    RBC Morphology Present     Polychromia 2+    ACCU-CHEK GLUCOSE    Collection Time: 10/01/20 12:33 PM   Result Value Ref Range    Glucose - Accu-Ck 181 (H) 65 - 99 mg/dL   ACCU-CHEK GLUCOSE    Collection Time: 10/01/20  2:34 PM   Result Value Ref Range    Glucose - Accu-Ck 198 (H) 65 - 99 mg/dL   ACCU-CHEK GLUCOSE    Collection Time: 10/01/20  5:21 PM   Result Value Ref Range    Glucose - Accu-Ck 144 (H) 65 - 99 mg/dL   CBC WITH DIFFERENTIAL    Collection Time: 10/01/20 10:30 PM   Result Value Ref Range    WBC 4.7 (L) 4.8 - 10.8 K/uL    RBC 2.99 (L) 4.70 - 6.10 M/uL    Hemoglobin 9.5 (L) 14.0 - 18.0 g/dL    Hematocrit 29.1 (L) 42.0 - 52.0 %    MCV 97.3 81.4 - 97.8 fL    MCH 31.8 27.0 - 33.0 pg    MCHC 32.6 (L) 33.7 - 35.3 g/dL    RDW 48.9 35.9 - 50.0 fL    Platelet Count 400 164 - 446 K/uL    MPV 10.9 9.0 - 12.9 fL    Neutrophils-Polys 69.60 44.00 - 72.00 %    Lymphocytes 14.80 (L) 22.00 - 41.00 %    Monocytes 9.60 0.00 - 13.40 %    Eosinophils 1.70 0.00 - 6.90 %    Basophils 0.00 0.00 - 1.80 %    Nucleated RBC 1.90 /100 WBC    Neutrophils (Absolute) 3.47 1.82 - 7.42 K/uL    Lymphs (Absolute) 0.70 (L) 1.00 - 4.80 K/uL    Monos (Absolute) 0.45 0.00 - 0.85 K/uL    Eos (Absolute) 0.08 0.00 - 0.51 K/uL    Baso (Absolute) 0.00 0.00 - 0.12 K/uL    NRBC (Absolute) 0.09 K/uL    Anisocytosis 1+     Microcytosis 1+    Basic Metabolic Panel    Collection Time: 10/01/20 10:30 PM   Result Value Ref Range    Sodium 136 135 - 145 mmol/L    Potassium 3.8 3.6 - 5.5 mmol/L    Chloride 96 96 - 112 mmol/L    Co2 16 (L) 20 - 33 mmol/L    Glucose 201 (H) 65 - 99 mg/dL    Bun 62 (H) 8 - 22 mg/dL    Creatinine 2.37 (H) 0.50 - 1.40 mg/dL    Calcium 9.2 8.5 - 10.5 mg/dL     Anion Gap 24.0 (H) 7.0 - 16.0   ESTIMATED GFR    Collection Time: 10/01/20 10:30 PM   Result Value Ref Range    GFR If  36 (A) >60 mL/min/1.73 m 2    GFR If Non  29 (A) >60 mL/min/1.73 m 2   DIFFERENTIAL MANUAL    Collection Time: 10/01/20 10:30 PM   Result Value Ref Range    Bands-Stabs 4.30 0.00 - 10.00 %    Manual Diff Status PERFORMED    PERIPHERAL SMEAR REVIEW    Collection Time: 10/01/20 10:30 PM   Result Value Ref Range    Peripheral Smear Review see below    PLATELET ESTIMATE    Collection Time: 10/01/20 10:30 PM   Result Value Ref Range    Plt Estimation Normal    MORPHOLOGY    Collection Time: 10/01/20 10:30 PM   Result Value Ref Range    RBC Morphology Present     Polychromia 2+     Toxic Gran Slight     Dohle Bodies Moderate    ACCU-CHEK GLUCOSE    Collection Time: 10/01/20 11:59 PM   Result Value Ref Range    Glucose - Accu-Ck 205 (H) 65 - 99 mg/dL   ISTAT ARTERIAL BLOOD GAS    Collection Time: 10/02/20  3:24 AM   Result Value Ref Range    Ph 7.507 (H) 7.400 - 7.500    Pco2 18.2 (L) 26.0 - 37.0 mmHg    Po2 65 64 - 87 mmHg    Tco2 15 (L) 20 - 33 mmol/L    S02 95 93 - 99 %    Hco3 14.4 (L) 17.0 - 25.0 mmol/L    BE -7 (L) -4 - 3 mmol/L    Body Temp 38.9 C degrees    O2 Therapy 40 %    iPF Ratio 163     Ph Temp Darrius 7.478 7.400 - 7.500    Pco2 Temp Co 19.8 (L) 26.0 - 37.0 mmHg    Po2 Temp Cor 74 64 - 87 mmHg    Specimen Arterial     Action Range Triggered NO     Inst. Qualified Patient YES    ACCU-CHEK GLUCOSE    Collection Time: 10/02/20  6:24 AM   Result Value Ref Range    Glucose - Accu-Ck 137 (H) 65 - 99 mg/dL       Imaging  CT-ABDOMEN-PELVIS W/O   Final Result         1.  Postoperative changes recent open heart surgery. There is some anterior mediastinal hematoma/hemorrhage.   2.  Hypoinflation with right lower lobe atelectasis. Correlate clinically for infection.   3.  Dilated small and large bowel loops with air-fluid levels suggesting ileus.   4.  Left kidney is not  visualized.   5.  Compensatory hypertrophy of the right kidney. Duplicated right renal collecting system with mild pelvocaliectasis. Tiny nonobstructing right renal stones.               DX-CHEST-PORTABLE (1 VIEW)   Final Result         1.  Pulmonary edema and/or infiltrates are identified, which are stable since the prior exam.   2.  Cardiomegaly      DX-CHEST-PORTABLE (1 VIEW)   Final Result         1.  Pulmonary edema and/or infiltrates are identified, which are stable since the prior exam.   2.  Cardiomegaly      DX-ABDOMEN FOR TUBE PLACEMENT   Final Result         1.  Air-filled distended loops of bowel are seen with reactive mucosal pattern, appearance suggests ileus or enteritis. Recommend radiographic followup to resolution to exclude progression to obstruction.   2.  Dobbhoff tube tip terminates overlying the expected location of the gastric body.      DX-ABDOMEN FOR TUBE PLACEMENT   Final Result      Feeding tube tip in expected location the gastric antrum.      Persistent mild gaseous distention of colon suggestive of ileus.      [id number] VIEW   Final Result      Mild to moderate colonic distention may represent ileus.      Moderate amount of colonic stool in the ascending colon.         MR-BRAIN-W/O   Final Result      Numerous acute/subacute supratentorial and infratentorial infarcts most consistent with cardioembolic etiology.      Several chronic lacunar infarcts.      No acute intracranial hemorrhage.      DX-CHEST-PORTABLE (1 VIEW)   Final Result         1.  Pulmonary edema and/or infiltrates are identified, which are stable since the prior exam.   2.  Cardiomegaly      CT-CTA NECK WITH & W/O-POST PROCESSING   Final Result      1.  CT angiogram of the neck within normal limits.      2.  Mild bilateral internal carotid artery atherosclerotic plaque without stenosis      3.  Small right pleural effusion      4.  Small left apical pneumothorax      CT-CTA HEAD WITH & W/O-POST PROCESS   Final Result       No thrombosis is seen within the [place].      Bilateral white matter hypodensities can be seen in the setting of bilateral lacunar infarcts, chronic microangiopathic change, demyelination or gliosis. MRI would be more sensitive for further evaluation.      Paranasal sinus disease.      DX-CHEST-PORTABLE (1 VIEW)   Final Result         Interval extubation. No other significant interval change.      EC-MARGOTH W/O CONT   Final Result      DX-CHEST-PORTABLE (1 VIEW)   Final Result      1.  Left basilar opacification likely represents atelectasis.      2.  Endotracheal tube tip projects approximately 1.5 cm above the migel.      3.  Left subclavian catheter tip projects over the superior vena cava. No pneumothorax is identified.      US-CAROTID DOPPLER BILAT   Final Result      US-VEIN MAPPING LOWER EXTREMITY BILAT   Final Result      EC-ECHOCARDIOGRAM COMPLETE W/O CONT   Final Result      DX-CHEST-PORTABLE (1 VIEW)   Final Result      No acute cardiopulmonary abnormality.      CL-LEFT HEART CATHETERIZATION WITH POSSIBLE INTERVENTION    (Results Pending)   DX-CHEST-PORTABLE (1 VIEW)    (Results Pending)       Assessment/Plan  * NSTEMI (non-ST elevated myocardial infarction) (HCC)- (present on admission)  Assessment & Plan  Status post PCI finding multivessel disease now status post CABG  Antiplatelets, statin    Aspiration pneumonia (HCC)  Assessment & Plan  Concerns for aspiration pna today per day team  Will check MRSA nares and change to Zosyn to cover both abdomen and respirator viviana until more data is returned  Linezolid due to guero de-escalate quickly if nares negative and blood cx negative at 48hrs    Altered mental status- (present on admission)  Assessment & Plan  CVA seems unlikely culprit given right gaze and right weakness localize to opposite cerebral hemispheres  EEG, MRI ordered  Metabolic eval sent  Neuro checks    CAD (coronary artery disease)- (present on admission)  Assessment &  Plan  Status post three-vessel CABG 9/25  Tight blood pressure and glucose control      Essential hypertension- (present on admission)  Assessment & Plan  metoprolol, ACE inhibitor -> stop with shock and SONNY    Type 2 diabetes mellitus with complication, without long-term current use of insulin (HCC)- (present on admission)  Assessment & Plan  Goal blood glucose 120-180  NPH, sliding scale insulin, accuchecks  hypoglycemia protocol    Septic shock ([facility])  Assessment & Plan  This is Septic shock Not present on admission  SIRS criteria identified on my evaluation include: Fever, with temperature greater than 101 deg F, Tachycardia, with heart rate greater than 90 BPM and Tachypnea, with respirations greater than 20 per minute  Source is pna or abdomen  Presentation includes: Severe sepsis present, persistent hypotension after 30 ml/kg completed, and initial lactate level result is >= 4 mmol/L.   Despite appropriate fluid resuscitation with crystalloid given per sepsis guidelines, the patient remains hypotensive with systolic blood pressure less than 90 or MAP less than 65  Hemodynamic support with additional fluids and IV vasopressors as needed to maintain a SBP of 90 or MAP of 65  IV antibiotics as appropriate for source of sepsis  Reassessment: I have reassessed the patient's hemodynamic status    Resus, check cortisol with his multiple events and prolonged course, blood cx, mrsa nares zosyn and linezolid      Ileus (HCC)  Assessment & Plan  NG/OG to low wall suction  Avoid opiates, calcium channel blockers, anticholinergic, antidepressants  Correct electrolytes  Rule/out: malignancy, hernia, adhesions, inflammatory process    Consider surgery consultation if not improving    Serial abdominal exams and kub      Cardioembolic stroke (HCC)  Assessment & Plan  Serial monitor neuro exam  Aspirin and statin    SONNY (acute kidney injury) (HCC)- (present on admission)  Assessment & Plan  Cr double  Avoid nephrotoxins  Stop  all negative iontropics  S/p toradol contrast die from cta head and neck 9/27  Now with ATN with prolonged hypotension    Map goal >65 for renal perfusion        Discussed patient condition and risk of morbidity and/or mortality with RN, Pharmacy, Charge nurse / hot rounds, Patient and CVS.      The patient remains critically ill with septic shock need emergent pressor and fluid resus.  Critical care time = 78minutes in directly providing and coordinating critical care and extensive data review.  No time overlap and excludes procedures.

## 2020-10-03 NOTE — CARE PLAN
Problem: Infection  Goal: Will remain free from infection  Intervention: Assess signs and symptoms of infection  Note: Hypotensive at the start of shift. SR 80's-90's. Confused, unable to speak more than 1 word at a time at start of shift. Now communicating in short sentences. Urine and blood samples sent to lab for culture. Zosyn and Zyvox ordered while waiting for culture results. Norepinephrine infusing at 10 mcg/min to maintain a MAP >65.      Problem: Venous Thromboembolism (VTW)/Deep Vein Thrombosis (DVT) Prevention:  Goal: Patient will participate in Venous Thrombosis (VTE)/Deep Vein Thrombosis (DVT)Prevention Measures  Intervention: Ensure patient wears graduated elastic stockings (JOSE hose) and/or SCDs, if ordered, when in bed or chair (Remove at least once per shift for skin check)  Flowsheets (Taken 10/3/2020 0400)  Mechanical Prophylaxis: SCDs, Sequential Compression Device  JOSE Hose (Graduated Compression Stockings): Off  SCDs, Sequential Compression Device: On  Note: SCDs on bilateral calfs. Patient encourages to perform ankle flex, foot rotation, and knee flex exercises Q1H while in bed. Patient demonstrates and verbalized understanding.

## 2020-10-03 NOTE — PROCEDURES
Central Line Insertion    Date/Time: 10/2/2020 8:59 PM  Performed by: Byron Edmond M.D.  Authorized by: Byron Edmond M.D.     Consent:     Consent obtained:  Emergent situation    Consent given by:  Spouse    Risks discussed:  Arterial puncture, infection and pneumothorax    Alternatives discussed:  Delayed treatment and alternative treatment  Pre-procedure details:     Hand hygiene: Hand hygiene performed prior to insertion      Sterile barrier technique: All elements of maximal sterile technique followed      Skin preparation:  2% chlorhexidine  Sedation:     Sedation type:  None  Procedure details:     Location:  R internal jugular    Patient position:  Reverse Trendelenburg    Procedural supplies:  Triple lumen    Catheter size: 7.    Landmarks identified: yes      Ultrasound guidance: yes      Sterile ultrasound techniques: Sterile gel and sterile probe covers were used      Number of attempts:  1  Post-procedure details:     Post-procedure:  Dressing applied and line sutured    Assessment:  Blood return through all ports (pending stat CXR)    Patient tolerance of procedure:  Tolerated well, no immediate complications  Comments:      US guided central line for septic shock

## 2020-10-03 NOTE — PROGRESS NOTES
Updated Dr. Norton on overnight events. Orders received to remove cortrak and place NG tube. Dr. Norton to review chart and patient then place orders in computer.

## 2020-10-04 ENCOUNTER — APPOINTMENT (OUTPATIENT)
Dept: RADIOLOGY | Facility: MEDICAL CENTER | Age: 50
DRG: 233 | End: 2020-10-04
Attending: INTERNAL MEDICINE
Payer: COMMERCIAL

## 2020-10-04 LAB
ANION GAP SERPL CALC-SCNC: 17 MMOL/L (ref 7–16)
ANISOCYTOSIS BLD QL SMEAR: ABNORMAL
BASOPHILS # BLD AUTO: 0 % (ref 0–1.8)
BASOPHILS # BLD: 0 K/UL (ref 0–0.12)
BUN SERPL-MCNC: 65 MG/DL (ref 8–22)
CALCIUM SERPL-MCNC: 8.5 MG/DL (ref 8.5–10.5)
CHLORIDE SERPL-SCNC: 107 MMOL/L (ref 96–112)
CO2 SERPL-SCNC: 20 MMOL/L (ref 20–33)
CREAT SERPL-MCNC: 1.77 MG/DL (ref 0.5–1.4)
EOSINOPHIL # BLD AUTO: 0.43 K/UL (ref 0–0.51)
EOSINOPHIL NFR BLD: 4.3 % (ref 0–6.9)
ERYTHROCYTE [DISTWIDTH] IN BLOOD BY AUTOMATED COUNT: 51.8 FL (ref 35.9–50)
GLUCOSE BLD-MCNC: 184 MG/DL (ref 65–99)
GLUCOSE BLD-MCNC: 192 MG/DL (ref 65–99)
GLUCOSE BLD-MCNC: 203 MG/DL (ref 65–99)
GLUCOSE SERPL-MCNC: 211 MG/DL (ref 65–99)
HCT VFR BLD AUTO: 29.5 % (ref 42–52)
HGB BLD-MCNC: 9.7 G/DL (ref 14–18)
LYMPHOCYTES # BLD AUTO: 1.39 K/UL (ref 1–4.8)
LYMPHOCYTES NFR BLD: 13.9 % (ref 22–41)
MAGNESIUM SERPL-MCNC: 3.1 MG/DL (ref 1.5–2.5)
MANUAL DIFF BLD: NORMAL
MCH RBC QN AUTO: 30.8 PG (ref 27–33)
MCHC RBC AUTO-ENTMCNC: 32.7 G/DL (ref 33.7–35.3)
MCV RBC AUTO: 94.2 FL (ref 81.4–97.8)
MICROCYTES BLD QL SMEAR: ABNORMAL
MONOCYTES # BLD AUTO: 0.96 K/UL (ref 0–0.85)
MONOCYTES NFR BLD AUTO: 9.6 % (ref 0–13.4)
MORPHOLOGY BLD-IMP: NORMAL
NEUTROPHILS # BLD AUTO: 7.22 K/UL (ref 1.82–7.42)
NEUTROPHILS NFR BLD: 72.2 % (ref 44–72)
NRBC # BLD AUTO: 0.03 K/UL
NRBC BLD-RTO: 0.3 /100 WBC
PHOSPHATE SERPL-MCNC: 2.4 MG/DL (ref 2.5–4.5)
PLATELET # BLD AUTO: 285 K/UL (ref 164–446)
PLATELET BLD QL SMEAR: NORMAL
PMV BLD AUTO: 10.7 FL (ref 9–12.9)
POLYCHROMASIA BLD QL SMEAR: NORMAL
POTASSIUM SERPL-SCNC: 3.7 MMOL/L (ref 3.6–5.5)
POTASSIUM SERPL-SCNC: 3.7 MMOL/L (ref 3.6–5.5)
POTASSIUM SERPL-SCNC: 3.8 MMOL/L (ref 3.6–5.5)
POTASSIUM SERPL-SCNC: 4 MMOL/L (ref 3.6–5.5)
RBC # BLD AUTO: 3.12 M/UL (ref 4.7–6.1)
RBC BLD AUTO: PRESENT
SODIUM SERPL-SCNC: 144 MMOL/L (ref 135–145)
WBC # BLD AUTO: 10 K/UL (ref 4.8–10.8)

## 2020-10-04 PROCEDURE — C9113 INJ PANTOPRAZOLE SODIUM, VIA: HCPCS | Performed by: INTERNAL MEDICINE

## 2020-10-04 PROCEDURE — 700111 HCHG RX REV CODE 636 W/ 250 OVERRIDE (IP): Performed by: INTERNAL MEDICINE

## 2020-10-04 PROCEDURE — 99233 SBSQ HOSP IP/OBS HIGH 50: CPT | Performed by: INTERNAL MEDICINE

## 2020-10-04 PROCEDURE — 85007 BL SMEAR W/DIFF WBC COUNT: CPT

## 2020-10-04 PROCEDURE — A9270 NON-COVERED ITEM OR SERVICE: HCPCS | Performed by: INTERNAL MEDICINE

## 2020-10-04 PROCEDURE — 770022 HCHG ROOM/CARE - ICU (200)

## 2020-10-04 PROCEDURE — 700105 HCHG RX REV CODE 258: Performed by: INTERNAL MEDICINE

## 2020-10-04 PROCEDURE — 700102 HCHG RX REV CODE 250 W/ 637 OVERRIDE(OP): Performed by: INTERNAL MEDICINE

## 2020-10-04 PROCEDURE — 85027 COMPLETE CBC AUTOMATED: CPT

## 2020-10-04 PROCEDURE — 84132 ASSAY OF SERUM POTASSIUM: CPT | Mod: 91

## 2020-10-04 PROCEDURE — 71045 X-RAY EXAM CHEST 1 VIEW: CPT

## 2020-10-04 PROCEDURE — 700102 HCHG RX REV CODE 250 W/ 637 OVERRIDE(OP): Performed by: CLINICAL NURSE SPECIALIST

## 2020-10-04 PROCEDURE — 700111 HCHG RX REV CODE 636 W/ 250 OVERRIDE (IP): Performed by: NURSE PRACTITIONER

## 2020-10-04 PROCEDURE — 99024 POSTOP FOLLOW-UP VISIT: CPT | Performed by: THORACIC SURGERY (CARDIOTHORACIC VASCULAR SURGERY)

## 2020-10-04 PROCEDURE — 84100 ASSAY OF PHOSPHORUS: CPT

## 2020-10-04 PROCEDURE — 82962 GLUCOSE BLOOD TEST: CPT | Mod: 91

## 2020-10-04 PROCEDURE — 700101 HCHG RX REV CODE 250: Performed by: CLINICAL NURSE SPECIALIST

## 2020-10-04 PROCEDURE — 700101 HCHG RX REV CODE 250: Performed by: INTERNAL MEDICINE

## 2020-10-04 PROCEDURE — A9270 NON-COVERED ITEM OR SERVICE: HCPCS | Performed by: CLINICAL NURSE SPECIALIST

## 2020-10-04 PROCEDURE — 80048 BASIC METABOLIC PNL TOTAL CA: CPT

## 2020-10-04 PROCEDURE — 83735 ASSAY OF MAGNESIUM: CPT

## 2020-10-04 RX ORDER — POTASSIUM CHLORIDE 7.45 MG/ML
10 INJECTION INTRAVENOUS ONCE
Status: COMPLETED | OUTPATIENT
Start: 2020-10-04 | End: 2020-10-04

## 2020-10-04 RX ORDER — ACETAMINOPHEN 650 MG/1
975 SUPPOSITORY RECTAL EVERY 6 HOURS PRN
Status: DISCONTINUED | OUTPATIENT
Start: 2020-10-04 | End: 2020-10-09 | Stop reason: HOSPADM

## 2020-10-04 RX ORDER — ASPIRIN 300 MG/1
300 SUPPOSITORY RECTAL DAILY
Status: DISCONTINUED | OUTPATIENT
Start: 2020-10-04 | End: 2020-10-06

## 2020-10-04 RX ORDER — POTASSIUM CHLORIDE 14.9 MG/ML
20 INJECTION INTRAVENOUS ONCE
Status: COMPLETED | OUTPATIENT
Start: 2020-10-04 | End: 2020-10-04

## 2020-10-04 RX ORDER — LABETALOL HYDROCHLORIDE 5 MG/ML
10 INJECTION, SOLUTION INTRAVENOUS EVERY 4 HOURS PRN
Status: DISCONTINUED | OUTPATIENT
Start: 2020-10-04 | End: 2020-10-09 | Stop reason: HOSPADM

## 2020-10-04 RX ADMIN — ENOXAPARIN SODIUM 40 MG: 40 INJECTION SUBCUTANEOUS at 17:20

## 2020-10-04 RX ADMIN — LIDOCAINE 1 PATCH: 50 PATCH TOPICAL at 08:25

## 2020-10-04 RX ADMIN — POTASSIUM PHOSPHATE, MONOBASIC AND POTASSIUM PHOSPHATE, DIBASIC 15 MMOL: 224; 236 INJECTION, SOLUTION, CONCENTRATE INTRAVENOUS at 08:25

## 2020-10-04 RX ADMIN — INSULIN LISPRO 3 UNITS: 100 INJECTION, SOLUTION INTRAVENOUS; SUBCUTANEOUS at 12:47

## 2020-10-04 RX ADMIN — SODIUM CHLORIDE 1000 ML: 9 INJECTION, SOLUTION INTRAVENOUS at 17:20

## 2020-10-04 RX ADMIN — POTASSIUM CHLORIDE 10 MEQ: 7.46 INJECTION, SOLUTION INTRAVENOUS at 12:59

## 2020-10-04 RX ADMIN — ACETAMINOPHEN 975 MG: 650 SUPPOSITORY RECTAL at 05:32

## 2020-10-04 RX ADMIN — PANTOPRAZOLE SODIUM 40 MG: 40 INJECTION, POWDER, LYOPHILIZED, FOR SOLUTION INTRAVENOUS at 05:12

## 2020-10-04 RX ADMIN — LABETALOL HYDROCHLORIDE 10 MG: 5 INJECTION, SOLUTION INTRAVENOUS at 19:56

## 2020-10-04 RX ADMIN — POTASSIUM CHLORIDE 10 MEQ: 7.46 INJECTION, SOLUTION INTRAVENOUS at 07:42

## 2020-10-04 RX ADMIN — ACETAMINOPHEN 975 MG: 650 SUPPOSITORY RECTAL at 00:00

## 2020-10-04 RX ADMIN — PIPERACILLIN AND TAZOBACTAM 4.5 G: 4; .5 INJECTION, POWDER, LYOPHILIZED, FOR SOLUTION INTRAVENOUS; PARENTERAL at 05:12

## 2020-10-04 RX ADMIN — SODIUM CHLORIDE: 9 INJECTION, SOLUTION INTRAVENOUS at 05:13

## 2020-10-04 RX ADMIN — LINEZOLID 600 MG: 600 INJECTION, SOLUTION INTRAVENOUS at 05:12

## 2020-10-04 RX ADMIN — INSULIN LISPRO 3 UNITS: 100 INJECTION, SOLUTION INTRAVENOUS; SUBCUTANEOUS at 05:13

## 2020-10-04 RX ADMIN — PIPERACILLIN AND TAZOBACTAM 4.5 G: 4; .5 INJECTION, POWDER, LYOPHILIZED, FOR SOLUTION INTRAVENOUS; PARENTERAL at 12:59

## 2020-10-04 RX ADMIN — INSULIN LISPRO 3 UNITS: 100 INJECTION, SOLUTION INTRAVENOUS; SUBCUTANEOUS at 23:49

## 2020-10-04 RX ADMIN — HEPARIN SODIUM 5000 UNITS: 5000 INJECTION, SOLUTION INTRAVENOUS; SUBCUTANEOUS at 05:12

## 2020-10-04 RX ADMIN — ASPIRIN 300 MG: 300 SUPPOSITORY RECTAL at 10:13

## 2020-10-04 RX ADMIN — INSULIN LISPRO 3 UNITS: 100 INJECTION, SOLUTION INTRAVENOUS; SUBCUTANEOUS at 17:28

## 2020-10-04 RX ADMIN — PIPERACILLIN AND TAZOBACTAM 4.5 G: 4; .5 INJECTION, POWDER, LYOPHILIZED, FOR SOLUTION INTRAVENOUS; PARENTERAL at 21:23

## 2020-10-04 RX ADMIN — POTASSIUM CHLORIDE 20 MEQ: 14.9 INJECTION, SOLUTION INTRAVENOUS at 18:59

## 2020-10-04 RX ADMIN — POTASSIUM CHLORIDE 20 MEQ: 14.9 INJECTION, SOLUTION INTRAVENOUS at 01:42

## 2020-10-04 ASSESSMENT — PAIN DESCRIPTION - PAIN TYPE
TYPE: ACUTE PAIN

## 2020-10-04 ASSESSMENT — FIBROSIS 4 INDEX: FIB4 SCORE: 1.79

## 2020-10-04 NOTE — PROGRESS NOTES
Critical Care Progress Note    Date of admission  9/21/2020    Chief Complaint  49 y.o. male admitted 9/21/2020 with an acute coronary syndrome.  He subsequently underwent CABG.    Hospital Course   10/3 -    Place NG to suction.  Titrating norepinephrine.  Hydrate with intravenous NS.  Add vasopressin.  10/4 -    nasal MRSA PCR is negative.  Stop Zyvox.  Continue Zosyn.  Keep NG to suction.  Continue IV hydration.  Renal function is improving.  Off norepinephrine.      Interval Problem Update  Reviewed last 24 hour events:      POD 9 - CABG x 3  Oriented x 4  Follows  SR  NE off at 1400 yesterday  -130  NPO  NG to LCS - 1300 mL out over night  Had a BM this am - small and loose   mL out last night    Quispe in place  Stop Zyvox - nasal MRSA PCR negative      Review of Systems  Review of Systems   Unable to perform ROS: Acuity of condition        Vital Signs for last 24 hours   Temp:  [37.1 °C (98.8 °F)] 37.1 °C (98.8 °F)  Pulse:  [] 88  Resp:  [19-29] 27  BP: ()/(45-91) 141/86  SpO2:  [77 %-100 %] 98 %    Hemodynamic parameters for last 24 hours       Respiratory Information for the last 24 hours       Physical Exam   Physical Exam  Constitutional:       Appearance: He is not toxic-appearing or diaphoretic.   HENT:      Head: Normocephalic and atraumatic.      Right Ear: External ear normal.      Left Ear: External ear normal.      Nose: Nose normal.      Mouth/Throat:      Mouth: Mucous membranes are moist.      Pharynx: No posterior oropharyngeal erythema.   Eyes:      General:         Right eye: No discharge.         Left eye: No discharge.      Pupils: Pupils are equal, round, and reactive to light.   Neck:      Musculoskeletal: Normal range of motion. No neck rigidity or muscular tenderness.   Cardiovascular:      Pulses: Normal pulses.      Heart sounds: No murmur. No friction rub.      Comments: Sinus rhythm  Pulmonary:      Effort: No respiratory distress.      Breath sounds:  Rales (Unchanged basilar crackles) present. No wheezing.   Abdominal:      General: There is distension (A wee bit better).      Tenderness: There is no abdominal tenderness. There is no guarding.   Musculoskeletal:      Comments: No clubbing or cyanosis   Skin:     General: Skin is warm and dry.      Capillary Refill: Capillary refill takes less than 2 seconds.   Neurological:      Comments: Pupils are 3 mm and brisk.  No focal weakness.         Medications  Current Facility-Administered Medications   Medication Dose Route Frequency Provider Last Rate Last Dose   • aspirin (ASA) suppository 300 mg  300 mg Rectal DAILY Karan Norton M.D.   300 mg at 10/04/20 1013   • enoxaparin (LOVENOX) inj 40 mg  40 mg Subcutaneous Q EVENING Karan Norton M.D.       • potassium chloride (KCL) ivpb 10 mEq  10 mEq Intravenous Once Karan Norton M.D. 100 mL/hr at 10/04/20 1259 10 mEq at 10/04/20 1259   • acetaminophen (TYLENOL) suppository 975 mg  975 mg Rectal Q6HRS Karan Norton M.D.       • pantoprazole (PROTONIX) injection 40 mg  40 mg Intravenous DAILY Karan Norton M.D.   40 mg at 10/04/20 0512   • K+ Scale: Goal of 4.5  1 Each Intravenous Q6HRS Karan Norton M.D.   1 Each at 10/04/20 1200   • NS infusion   Intravenous Continuous Karan Norton M.D. 100 mL/hr at 10/04/20 0513     • piperacillin-tazobactam (ZOSYN) 4.5 g in  mL IVPB  4.5 g Intravenous Q8HRS Byron Edmond M.D. 25 mL/hr at 10/04/20 1259 4.5 g at 10/04/20 1259   • norepinephrine (Levophed) infusion 8 mg/250 mL (premix)  0-30 mcg/min Intravenous Continuous Karan Norton M.D.   Stopped at 10/03/20 1400   • Pharmacy Consult: Enteral tube insertion - review meds/change route/product selection  1 Each Other PHARMACY TO DOSE ROSIO CamachoPLUANA       • tramadol (ULTRAM) 50 MG tablet 50 mg  50 mg Enteral Tube Q4HRS PRN Mateus Stringer D.O.   50 mg at 10/01/20 0536   • atorvastatin (LIPITOR)  tablet 80 mg  80 mg Enteral Tube QHS MAUREEN HolmO.   Stopped at 10/02/20 2100   • mag hydrox-al hydrox-simeth (MAALOX PLUS ES or MYLANTA DS) suspension 30 mL  30 mL Enteral Tube Q4HRS PRN Mateus Stringer D.O.       • senna-docusate (PERICOLACE or SENOKOT S) 8.6-50 MG per tablet 2 Tab  2 Tab Enteral Tube BID Mateus Stringer D.O.   Stopped at 10/03/20 0600    And   • polyethylene glycol/lytes (MIRALAX) PACKET 1 Packet  1 Packet Enteral Tube DAILY MAUREEN HolmO.   Stopped at 10/02/20 0600    And   • magnesium hydroxide (MILK OF MAGNESIA) suspension 30 mL  30 mL Enteral Tube DAILY Mateus Stringer D.O.   Stopped at 10/01/20 0600    And   • bisacodyl (DULCOLAX) suppository 10 mg  10 mg Rectal QDAY PRN MAUREEN HolmO.   10 mg at 10/01/20 2338   • clopidogrel (PLAVIX) tablet 75 mg  75 mg Enteral Tube DAILY MAUREEN HolmO.   Stopped at 10/03/20 0600   • diphenhydrAMINE (BENADRYL) tablet/capsule 25 mg  25 mg Enteral Tube HS PRN - MR X 1 MAUREEN HolmO.   25 mg at 09/30/20 2315   • HYDROcodone-acetaminophen (NORCO) 5-325 MG per tablet 1-2 Tab  1-2 Tab Enteral Tube Q6HRS PRN Maia Chavira A.P.N.       • insulin lispro (HumaLOG) injection  0-15 Units Subcutaneous Q6HRS Maia Chavira A.P.N.   3 Units at 10/04/20 1247   • MD Alert...ICU Electrolyte Replacement per Pharmacy   Other PHARMACY TO DOSE Karan Norton M.D.       • lidocaine (LIDODERM) 5 % 1 Patch  1 Patch Transdermal Q24HR Maia Chavira A.P.N.   1 Patch at 10/04/20 0825   • Respiratory Therapy Consult   Nebulization Continuous RT SHALOM Camacho.P.N.       • Pharmacy Consult Request ...Pain Management Review 1 Each  1 Each Other PHARMACY TO DOSE Maia Chavira A.P.N.       • ondansetron (ZOFRAN) syringe/vial injection 8 mg  8 mg Intravenous Q6HRS PRN Maia Chavira, A.P.N.        Or   • prochlorperazine (COMPAZINE) injection 10 mg  10 mg Intravenous Q6HRS PRN Maia Chavira, A.P.N.   10 mg at 10/02/20 0240    Or   •  promethazine (PHENERGAN) suppository 25 mg  25 mg Rectal Q6HRS PRN ROSIO CamachoPStacieNStacie           Fluids    Intake/Output Summary (Last 24 hours) at 10/4/2020 1344  Last data filed at 10/4/2020 0800  Gross per 24 hour   Intake 2845.68 ml   Output 3300 ml   Net -454.32 ml       Laboratory  Recent Labs     10/02/20  0324   ISTATAPH 7.507*   ISTATAPCO2 18.2*   ISTATAPO2 65   ISTATATCO2 15*   [id number] 95   ISTATARTHCO3 14.4*   ISTATARTBE -7*   ISTATTEMP 38.9 C   ISTATFIO2 40   ISTATSPEC Arterial   ISTATAPHTC 7.478   [id number] 74         Recent Labs     10/03/20  0325  10/03/20  1734 10/03/20  2333 10/04/20  0508 10/04/20  1126   SODIUM 136  --  140  --  144  --    POTASSIUM 3.0*   < > 3.6 3.7 3.8 4.0   CHLORIDE 95*  --  103  --  107  --    CO2 18*  --  18*  --  20  --    BUN 96*  --  85*  --  65*  --    CREATININE 3.87*  --  2.58*  --  1.77*  --    MAGNESIUM 3.2*  --   --   --  3.1*  --    PHOSPHORUS 7.8*  --   --   --  2.4*  --    CALCIUM 7.3*  --  8.3*  --  8.5  --     < > = values in this interval not displayed.     Recent Labs     10/02/20  2050 10/03/20  0325 10/03/20  1734 10/04/20  0508   ALTSGPT 136*  --   --   --    ASTSGOT 124*  --   --   --    ALKPHOSPHAT 127*  --   --   --    TBILIRUBIN 0.7  --   --   --    GLUCOSE 194* 202* 196* 211*     Recent Labs     10/02/20  2050 10/03/20  0015 10/03/20  0325 10/04/20  0508   WBC 9.9 9.9 9.7 10.0   NEUTSPOLYS  --  73.00* 75.20* 72.20*   LYMPHOCYTES  --  8.70* 10.80* 13.90*   MONOCYTES  --  5.20 12.70 9.60   EOSINOPHILS  --  0.90 0.30 4.30   BASOPHILS  --  0.00 0.20 0.00   ASTSGOT 124*  --   --   --    ALTSGPT 136*  --   --   --    ALKPHOSPHAT 127*  --   --   --    TBILIRUBIN 0.7  --   --   --      Recent Labs     10/03/20  0015 10/03/20  0325 10/04/20  0508   RBC 2.38* 2.51* 3.12*   HEMOGLOBIN 7.4* 7.9* 9.7*   HEMATOCRIT 23.1* 24.4* 29.5*   PLATELETCT 357 350 798       Imaging  None    Assessment/Plan  * Septic shock (HCC)  Assessment & Plan  Source appears to  be pulmonary +/- GI  Nasal MRSA PCR is negative - stop Zyvox  Continue Zosyn for aspiration and plan 5 days of therapy  Shock has resolved - he is off norepinephrine  Sepsis improved    Ileus ([facility])  Assessment & Plan  Keep NG to suction  Continue bowel rest  Correct electrolyte abnormalities  Serial abdominal exams - abdomen is definitely a wee bit less distended today    Aspiration pneumonia ([facility])  Assessment & Plan  Nasal MRSA PCR negative - stop Zyvox  Continue Zosyn and plan 5 days of therapy    Cardioembolic stroke ([facility])  Assessment & Plan  MRI on 9/29 confirms numerous acute/subacute supratentorial and infratentorial infarcts consistent with cardioembolic phenomenon  Change aspirin to rectal preparation  Resume statin when ileus resolves  Follow neuro exam and limit sedatives    SONNY (acute kidney injury) ([facility])- (present on admission)  Assessment & Plan  Continue IV fluid resuscitation  Monitor renal function and urine output  Avoid nephrotoxins and renal dose medications  Renal function improving nicely    NSTEMI (non-ST elevated myocardial infarction) ([facility])- (present on admission)  Assessment & Plan  Change aspirin to rectal preparation  Resume Plavix and statin when ileus resolves    CAD (coronary artery disease)- (present on admission)  Assessment & Plan  S/P 3V CABG on 9/25  Change aspirin to rectal preparation  Resume Plavix and statin when ileus resolves    Essential hypertension- (present on admission)  Assessment & Plan  Goal SBP less than 160    Type 2 diabetes mellitus with complication, without long-term current use of insulin ([facility])- (present on admission)  Assessment & Plan  Continue sliding scale insulin       VTE:  Lovenox  Ulcer: PPI  Lines: Central Line  Ongoing indication addressed and Quispe Catheter  Ongoing indication addressed    I have performed a physical exam and reviewed and updated ROS and Plan today (10/4/2020). In review of yesterday's note (10/3/2020), there are no changes except as  documented above.     Discussed patient condition and risk of morbidity and/or mortality with RN, RT, Pharmacy, Charge nurse / hot rounds and QA team     Karan Norton MD  Pulmonary and Critical Care Medicine

## 2020-10-04 NOTE — PROGRESS NOTES
Cardiovascular Surgery Progress Note    Name: Guerrero Ann  MRN: [id number]  : 1970  Admit Date: 2020  7:37 PM  Procedure:  Procedure(s) and Anesthesia Type:     * CABG, WITH ENDOSCOPIC VEIN PROCUREMENT- X3 - General     * ECHOCARDIOGRAM, TRANSESOPHAGEAL - General  9 Day Post-Op    Vitals:  Patient Vitals for the past 8 hrs:   Monitored Temp 2 SpO2 O2 Delivery Device O2 (LPM) Pulse Resp BP   10/04/20 0400 37.3 °C (99.1 °F) 93 % Silicone Nasal Cannula 3 95 (!) 27 148/53   10/04/20 0300 37.3 °C (99.1 °F) 94 % -- -- 97 (!) 24 116/74   10/04/20 0200 37.5 °C (99.5 °F) 98 % Silicone Nasal Cannula 3 (!) 102 (!) 22 109/69   10/04/20 0100 37.8 °C (100 °F) 96 % -- -- 96 (!) 26 --   10/04/20 0000 37.7 °C (99.9 °F) 98 % Silicone Nasal Cannula 3 98 (!) 28 139/77   10/03/20 2300 34.3 °C (93.7 °F) 98 % -- -- 94 (!) 28 136/80   10/03/20 2200 35.1 °C (95.2 °F) 94 % Silicone Nasal Cannula 3 95 (!) 29 127/83     Temp (24hrs), Av.2 °C (98.9 °F), Min:36.9 °C (98.4 °F), Max:37.4 °C (99.3 °F)    Respiratory:    Respiration: (!) 27, Pulse Oximetry: 93 %     Chest Tube Drains:          Fluids:    Intake/Output Summary (Last 24 hours) at 10/4/2020 0547  Last data filed at 10/4/2020 0400  Gross per 24 hour   Intake 3294.51 ml   Output 3100 ml   Net 194.51 ml     Admit weight: Weight: 72.6 kg (160 lb)  Current weight: Weight: 70.2 kg (154 lb 12.2 oz) (10/03/20 0545)    Labs:  Recent Labs     10/03/20  0015 10/03/20  0325 10/04/20  0508   WBC 9.9 9.7 10.0   RBC 2.38* 2.51* 3.12*   HEMOGLOBIN 7.4* 7.9* 9.7*   HEMATOCRIT 23.1* 24.4* 29.5*   MCV 97.1 97.2 94.2   MCH 31.1 31.5 30.8   MCHC 32.0* 32.4* 32.7*   RDW 49.4 48.2 51.8*   PLATELETCT 357 387 285   MPV 11.0 11.0 10.7     Recent Labs     10/01/20  2230 10/03/20  0015 10/03/20  0325 10/04/20  0508   NEUTSPOLYS 69.60 73.00* 75.20* 72.20*   LYMPHOCYTES 14.80* 8.70* 10.80* 13.90*   MONOCYTES 9.60 5.20 12.70 9.60   EOSINOPHILS 1.70 0.90 0.30 4.30   BASOPHILS  0.00 0.00 0.20 0.00   RBCMORPHOLO Present Present  --  Present     Recent Labs     10/02/20  2050 10/03/20  0325 10/03/20  1150 10/03/20  1734 10/03/20  2333   SODIUM 136 136  --  140  --    POTASSIUM 3.2* 3.0* 3.0* 3.6 3.7   CHLORIDE 94* 95*  --  103  --    CO2 17* 18*  --  18*  --    GLUCOSE 194* 202*  --  196*  --    BUN 95* 96*  --  85*  --    CREATININE 4.32* 3.87*  --  2.58*  --    CALCIUM 7.4* 7.3*  --  8.3*  --      Medications:  • pantoprazole  40 mg     • K+ Scale: Goal of 4.5  1 Each     • heparin  5,000 Units     • acetaminophen  975 mg     • linezolid (ZYVOX) IV  600 mg 600 mg (10/04/20 0512)   • piperacillin-tazobactam  4.5 g 4.5 g (10/04/20 0512)   • Pharmacy  1 Each     • aspirin  81 mg     • atorvastatin  80 mg     • senna-docusate  2 Tab      And   • polyethylene glycol/lytes  1 Packet      And   • magnesium hydroxide  30 mL     • clopidogrel  75 mg     • insulin lispro  0-15 Units     • MD Alert...Adult ICU Electrolyte Replacement per Pharmacy       • lidocaine  1 Patch     • Pharmacy Consult Request  1 Each        Ordered Medications:    ASA - Yes    Plavix - Yes    Post-operative Beta Blockers - Yes    Ace Inhibitor - No; contraindicated because of Increased creatinine/CKD    Statin - Yes    Exam:   Review of Systems   Unable to perform ROS: Mental status change     Physical Exam  Constitutional:       General: He is not in acute distress.     Appearance: He is well-developed. He is not diaphoretic.   HENT:      Head: Normocephalic.   Eyes:      Pupils: Pupils are equal, round, and reactive to light.   Neck:      Musculoskeletal: Neck supple.   Cardiovascular:      Rate and Rhythm: Normal rate and regular rhythm.      Heart sounds: Normal heart sounds. No murmur. No friction rub. No gallop.    Pulmonary:      Effort: Pulmonary effort is normal. No respiratory distress.      Breath sounds: Examination of the right-lower field reveals decreased breath sounds. Examination of the left-lower field  reveals decreased breath sounds. Decreased breath sounds present. No wheezing or rales.   Abdominal:      General: There is no distension.      Tenderness: There is no abdominal tenderness.      Comments: firm   Skin:     General: Skin is warm and dry.      Comments: Sternum- prevena dressing  SVG sites- c/d/i   Neurological:      Mental Status: He is alert and oriented to person, place, and time.       Quality Measures:   Quality-Core Measures   Reviewed items::  EKG reviewed, Labs reviewed, Medications reviewed and Radiology images reviewed  Gomez catheter::  Critically Ill - Requiring Accurate Measurement of Urinary Output  Central line in place:  Concentrated IV drugs  DVT prophylaxis pharmacological::  Heparin  DVT prophylaxis - mechanical:  SCDs  Ulcer Prophylaxis::  Yes    Assessment/Plan:  POD 1 HDS, SR, Acute blood loss anemia- s/p transfusion- keep mediastinal tubes, gently diurese, d/c gomez  POD 2 HTN- inc lisinopril, SR/ST- change to metoprolol, acute blood loss anemia s/p transfusion- watch, d/c mediastinal tubes, urine retention- replace gomez/start flomax, cont diuresis  POD 3  HDS, SR, neuro right  weaker than left gazes to right, wounds CDI, abdomen S/NT, fluid balance negative, wt still up from admit.  Plan:  MRI today, decrease ace for permissive hypertension, dc gabapentin, BB, Statin, IS/ambulate. CPM.  POD 4  HDS but hypertensive, SR, neuro answers questions but delayed.  Gaze more center.  EEG normal, MRI pending, wounds CDI, abdomen distended, fluid balance negative, wt down.  Plan:  Restart ace, IM getting abdominal study, BB, Statin, IS/ambulate. CPM.  POD 5  HDS, ST, neuro unchanged, wounds CDI, abdomen S/NT, fluid balance negative, wt down.  Failed swallow evaluation.  Hypertensive.  Pain issues.  Plan:  core trak, switch BB to coreg, ACE, Statin, add norco, IS/ambulate. CPM.  POD 6  HDS, SR, neuro unchanged, wounds CDI, abdomen S/NT, fluid balance negative, wt stable.  Plan:   Awaiting rehab authorization, BB, ACE, Statin, IS/ambulate. CPM.  POD 7 HDS, SR, Febrile- most likely aspiration pneumonia- started on rocephin- d/w pharmacy will change to unasyn, ileus- 2 liters of residual last night- start reglan/supp- if no response try relistor, Elevated creatinine- stop lisinopril/lasix- switch lovenox to heparin- start fluids 75 cc/hr, replace gomez for strict I&O, neuro- follows with slowed response, minimal speech  POD 8  HDS, SR, neuro intact, wounds CDI, abdomen firm and distended CT scan shows ileus, fluid balance negative, wt down.  Creatine trending down   Plan:  NG to decompress abd, 1 unit blood, Statin, IS/ambulate. CPM.  POD 9  HDS, SR, neuro follows commands slowly, wounds CDI, abdomen S/NT, fluid balance negative, wt down, NG tube with 1800 out, had 2 loose BM's.  Creatinine improved.  Off levo.  Plan:  Continue to decompress abd with NG.  Bowel protocol.  IV antibiotics for possible aspiration. CPM.    Active Hospital Problems    Diagnosis   • Septic shock ([facility]) [A41.9, R65.21]     Priority: High   • Aspiration pneumonia ([facility]) [J69.0]     Priority: Medium   • Ileus ([facility]) [K56.7]     Priority: Medium   • Lacunar infarct, acute ([facility]) [I63.81]     Priority: Medium   • Cardioembolic stroke ([facility]) [I63.9]     Priority: Medium   • Altered mental status [R41.82]     Priority: Medium   • SONNY (acute kidney injury) ([facility]) [N17.9]     Priority: Medium   • NSTEMI (non-ST elevated myocardial infarction) ([facility]) [I21.4]     Priority: Medium   • CAD (coronary artery disease) [I25.10]     Priority: Medium   • Type 2 diabetes mellitus with complication, without long-term current use of insulin ([facility]) [E11.8]     Priority: Medium   • Essential hypertension [I10]     Priority: Medium   • Normochromic normocytic anemia [D64.9]     Priority: Low   • Hypokalemia [E87.6]     Priority: Low

## 2020-10-05 LAB
ANION GAP SERPL CALC-SCNC: 14 MMOL/L (ref 7–16)
BASOPHILS # BLD AUTO: 0.4 % (ref 0–1.8)
BASOPHILS # BLD: 0.04 K/UL (ref 0–0.12)
BUN SERPL-MCNC: 37 MG/DL (ref 8–22)
CALCIUM SERPL-MCNC: 8.2 MG/DL (ref 8.5–10.5)
CHLORIDE SERPL-SCNC: 115 MMOL/L (ref 96–112)
CO2 SERPL-SCNC: 17 MMOL/L (ref 20–33)
CREAT SERPL-MCNC: 1.17 MG/DL (ref 0.5–1.4)
CRP SERPL HS-MCNC: 7.3 MG/DL (ref 0–0.75)
EOSINOPHIL # BLD AUTO: 0.17 K/UL (ref 0–0.51)
EOSINOPHIL NFR BLD: 1.5 % (ref 0–6.9)
ERYTHROCYTE [DISTWIDTH] IN BLOOD BY AUTOMATED COUNT: 53.3 FL (ref 35.9–50)
GLUCOSE BLD-MCNC: 155 MG/DL (ref 65–99)
GLUCOSE BLD-MCNC: 165 MG/DL (ref 65–99)
GLUCOSE BLD-MCNC: 170 MG/DL (ref 65–99)
GLUCOSE BLD-MCNC: 171 MG/DL (ref 65–99)
GLUCOSE BLD-MCNC: 177 MG/DL (ref 65–99)
GLUCOSE SERPL-MCNC: 186 MG/DL (ref 65–99)
HCT VFR BLD AUTO: 29.5 % (ref 42–52)
HGB BLD-MCNC: 9.2 G/DL (ref 14–18)
IMM GRANULOCYTES # BLD AUTO: 0.13 K/UL (ref 0–0.11)
IMM GRANULOCYTES NFR BLD AUTO: 1.2 % (ref 0–0.9)
LYMPHOCYTES # BLD AUTO: 1.97 K/UL (ref 1–4.8)
LYMPHOCYTES NFR BLD: 17.8 % (ref 22–41)
MAGNESIUM SERPL-MCNC: 2.7 MG/DL (ref 1.5–2.5)
MCH RBC QN AUTO: 30.5 PG (ref 27–33)
MCHC RBC AUTO-ENTMCNC: 31.2 G/DL (ref 33.7–35.3)
MCV RBC AUTO: 97.7 FL (ref 81.4–97.8)
MONOCYTES # BLD AUTO: 1.42 K/UL (ref 0–0.85)
MONOCYTES NFR BLD AUTO: 12.9 % (ref 0–13.4)
NEUTROPHILS # BLD AUTO: 7.31 K/UL (ref 1.82–7.42)
NEUTROPHILS NFR BLD: 66.2 % (ref 44–72)
NRBC # BLD AUTO: 0 K/UL
NRBC BLD-RTO: 0 /100 WBC
PHOSPHATE SERPL-MCNC: 2.7 MG/DL (ref 2.5–4.5)
PLATELET # BLD AUTO: 202 K/UL (ref 164–446)
PMV BLD AUTO: 10.6 FL (ref 9–12.9)
POTASSIUM SERPL-SCNC: 3.9 MMOL/L (ref 3.6–5.5)
POTASSIUM SERPL-SCNC: 4.1 MMOL/L (ref 3.6–5.5)
POTASSIUM SERPL-SCNC: 4.1 MMOL/L (ref 3.6–5.5)
POTASSIUM SERPL-SCNC: 4.2 MMOL/L (ref 3.6–5.5)
PREALB SERPL-MCNC: 14.1 MG/DL (ref 18–38)
RBC # BLD AUTO: 3.02 M/UL (ref 4.7–6.1)
SODIUM SERPL-SCNC: 146 MMOL/L (ref 135–145)
WBC # BLD AUTO: 11 K/UL (ref 4.8–10.8)

## 2020-10-05 PROCEDURE — 700102 HCHG RX REV CODE 250 W/ 637 OVERRIDE(OP): Performed by: INTERNAL MEDICINE

## 2020-10-05 PROCEDURE — C9113 INJ PANTOPRAZOLE SODIUM, VIA: HCPCS | Performed by: INTERNAL MEDICINE

## 2020-10-05 PROCEDURE — 99024 POSTOP FOLLOW-UP VISIT: CPT | Performed by: THORACIC SURGERY (CARDIOTHORACIC VASCULAR SURGERY)

## 2020-10-05 PROCEDURE — 83735 ASSAY OF MAGNESIUM: CPT

## 2020-10-05 PROCEDURE — 700105 HCHG RX REV CODE 258: Performed by: INTERNAL MEDICINE

## 2020-10-05 PROCEDURE — 99233 SBSQ HOSP IP/OBS HIGH 50: CPT | Performed by: INTERNAL MEDICINE

## 2020-10-05 PROCEDURE — 700111 HCHG RX REV CODE 636 W/ 250 OVERRIDE (IP): Performed by: INTERNAL MEDICINE

## 2020-10-05 PROCEDURE — 700101 HCHG RX REV CODE 250: Performed by: CLINICAL NURSE SPECIALIST

## 2020-10-05 PROCEDURE — 700111 HCHG RX REV CODE 636 W/ 250 OVERRIDE (IP): Performed by: CLINICAL NURSE SPECIALIST

## 2020-10-05 PROCEDURE — 700101 HCHG RX REV CODE 250: Performed by: INTERNAL MEDICINE

## 2020-10-05 PROCEDURE — 86140 C-REACTIVE PROTEIN: CPT

## 2020-10-05 PROCEDURE — 770022 HCHG ROOM/CARE - ICU (200)

## 2020-10-05 PROCEDURE — 84132 ASSAY OF SERUM POTASSIUM: CPT

## 2020-10-05 PROCEDURE — A9270 NON-COVERED ITEM OR SERVICE: HCPCS | Performed by: INTERNAL MEDICINE

## 2020-10-05 PROCEDURE — 85025 COMPLETE CBC W/AUTO DIFF WBC: CPT

## 2020-10-05 PROCEDURE — 84100 ASSAY OF PHOSPHORUS: CPT

## 2020-10-05 PROCEDURE — 82962 GLUCOSE BLOOD TEST: CPT

## 2020-10-05 PROCEDURE — A9270 NON-COVERED ITEM OR SERVICE: HCPCS | Performed by: STUDENT IN AN ORGANIZED HEALTH CARE EDUCATION/TRAINING PROGRAM

## 2020-10-05 PROCEDURE — 700102 HCHG RX REV CODE 250 W/ 637 OVERRIDE(OP): Performed by: STUDENT IN AN ORGANIZED HEALTH CARE EDUCATION/TRAINING PROGRAM

## 2020-10-05 PROCEDURE — 97112 NEUROMUSCULAR REEDUCATION: CPT

## 2020-10-05 PROCEDURE — 97530 THERAPEUTIC ACTIVITIES: CPT

## 2020-10-05 PROCEDURE — 80048 BASIC METABOLIC PNL TOTAL CA: CPT

## 2020-10-05 PROCEDURE — 84134 ASSAY OF PREALBUMIN: CPT

## 2020-10-05 RX ORDER — LABETALOL HYDROCHLORIDE 5 MG/ML
20 INJECTION, SOLUTION INTRAVENOUS ONCE
Status: COMPLETED | OUTPATIENT
Start: 2020-10-05 | End: 2020-10-05

## 2020-10-05 RX ORDER — LABETALOL HYDROCHLORIDE 5 MG/ML
40 INJECTION, SOLUTION INTRAVENOUS ONCE
Status: DISCONTINUED | OUTPATIENT
Start: 2020-10-06 | End: 2020-10-06

## 2020-10-05 RX ORDER — AMLODIPINE BESYLATE 10 MG/1
10 TABLET ORAL ONCE
Status: COMPLETED | OUTPATIENT
Start: 2020-10-06 | End: 2020-10-06

## 2020-10-05 RX ORDER — POTASSIUM CHLORIDE 7.45 MG/ML
10 INJECTION INTRAVENOUS ONCE
Status: COMPLETED | OUTPATIENT
Start: 2020-10-05 | End: 2020-10-05

## 2020-10-05 RX ORDER — ENALAPRILAT 1.25 MG/ML
.625-1.25 INJECTION INTRAVENOUS EVERY 6 HOURS PRN
Status: DISCONTINUED | OUTPATIENT
Start: 2020-10-05 | End: 2020-10-06

## 2020-10-05 RX ORDER — LABETALOL HYDROCHLORIDE 5 MG/ML
20 INJECTION, SOLUTION INTRAVENOUS ONCE
Status: COMPLETED | OUTPATIENT
Start: 2020-10-06 | End: 2020-10-06

## 2020-10-05 RX ORDER — AMLODIPINE BESYLATE 10 MG/1
5 TABLET ORAL
Status: DISCONTINUED | OUTPATIENT
Start: 2020-10-05 | End: 2020-10-05

## 2020-10-05 RX ORDER — METOCLOPRAMIDE HYDROCHLORIDE 5 MG/ML
5 INJECTION INTRAMUSCULAR; INTRAVENOUS EVERY 6 HOURS
Status: DISCONTINUED | OUTPATIENT
Start: 2020-10-05 | End: 2020-10-07

## 2020-10-05 RX ORDER — ENALAPRILAT 1.25 MG/ML
1.25 INJECTION INTRAVENOUS ONCE
Status: COMPLETED | OUTPATIENT
Start: 2020-10-05 | End: 2020-10-05

## 2020-10-05 RX ORDER — AMLODIPINE BESYLATE 10 MG/1
5 TABLET ORAL
Status: DISCONTINUED | OUTPATIENT
Start: 2020-10-06 | End: 2020-10-07

## 2020-10-05 RX ORDER — HYDRALAZINE HYDROCHLORIDE 20 MG/ML
25 INJECTION INTRAMUSCULAR; INTRAVENOUS EVERY 6 HOURS PRN
Status: DISCONTINUED | OUTPATIENT
Start: 2020-10-05 | End: 2020-10-05

## 2020-10-05 RX ADMIN — LABETALOL HYDROCHLORIDE 10 MG: 5 INJECTION, SOLUTION INTRAVENOUS at 10:37

## 2020-10-05 RX ADMIN — METOPROLOL TARTRATE 12.5 MG: 25 TABLET, FILM COATED ORAL at 17:49

## 2020-10-05 RX ADMIN — ENALAPRILAT 1.25 MG: 1.25 INJECTION INTRAVENOUS at 19:23

## 2020-10-05 RX ADMIN — METOCLOPRAMIDE 5 MG: 5 INJECTION, SOLUTION INTRAMUSCULAR; INTRAVENOUS at 17:48

## 2020-10-05 RX ADMIN — LIDOCAINE 1 PATCH: 50 PATCH TOPICAL at 09:26

## 2020-10-05 RX ADMIN — SODIUM CHLORIDE 1000 ML: 9 INJECTION, SOLUTION INTRAVENOUS at 16:13

## 2020-10-05 RX ADMIN — SODIUM CHLORIDE: 9 INJECTION, SOLUTION INTRAVENOUS at 06:08

## 2020-10-05 RX ADMIN — DOCUSATE SODIUM 50 MG AND SENNOSIDES 8.6 MG 2 TABLET: 8.6; 5 TABLET, FILM COATED ORAL at 17:49

## 2020-10-05 RX ADMIN — ENALAPRILAT 1.25 MG: 1.25 INJECTION INTRAVENOUS at 13:21

## 2020-10-05 RX ADMIN — LABETALOL HYDROCHLORIDE 20 MG: 5 INJECTION, SOLUTION INTRAVENOUS at 20:47

## 2020-10-05 RX ADMIN — POTASSIUM CHLORIDE 10 MEQ: 7.46 INJECTION, SOLUTION INTRAVENOUS at 19:23

## 2020-10-05 RX ADMIN — POTASSIUM CHLORIDE 10 MEQ: 7.46 INJECTION, SOLUTION INTRAVENOUS at 13:00

## 2020-10-05 RX ADMIN — ENOXAPARIN SODIUM 40 MG: 40 INJECTION SUBCUTANEOUS at 17:49

## 2020-10-05 RX ADMIN — PANTOPRAZOLE SODIUM 40 MG: 40 INJECTION, POWDER, LYOPHILIZED, FOR SOLUTION INTRAVENOUS at 06:03

## 2020-10-05 RX ADMIN — POTASSIUM CHLORIDE 10 MEQ: 7.46 INJECTION, SOLUTION INTRAVENOUS at 08:26

## 2020-10-05 RX ADMIN — PIPERACILLIN AND TAZOBACTAM 4.5 G: 4; .5 INJECTION, POWDER, LYOPHILIZED, FOR SOLUTION INTRAVENOUS; PARENTERAL at 20:47

## 2020-10-05 RX ADMIN — METOCLOPRAMIDE 5 MG: 5 INJECTION, SOLUTION INTRAMUSCULAR; INTRAVENOUS at 10:07

## 2020-10-05 RX ADMIN — INSULIN LISPRO 3 UNITS: 100 INJECTION, SOLUTION INTRAVENOUS; SUBCUTANEOUS at 11:35

## 2020-10-05 RX ADMIN — LABETALOL HYDROCHLORIDE 10 MG: 5 INJECTION, SOLUTION INTRAVENOUS at 14:51

## 2020-10-05 RX ADMIN — INSULIN LISPRO 3 UNITS: 100 INJECTION, SOLUTION INTRAVENOUS; SUBCUTANEOUS at 06:16

## 2020-10-05 RX ADMIN — PIPERACILLIN AND TAZOBACTAM 4.5 G: 4; .5 INJECTION, POWDER, LYOPHILIZED, FOR SOLUTION INTRAVENOUS; PARENTERAL at 13:00

## 2020-10-05 RX ADMIN — LABETALOL HYDROCHLORIDE 10 MG: 5 INJECTION, SOLUTION INTRAVENOUS at 05:20

## 2020-10-05 RX ADMIN — LABETALOL HYDROCHLORIDE 10 MG: 5 INJECTION, SOLUTION INTRAVENOUS at 22:41

## 2020-10-05 RX ADMIN — POTASSIUM CHLORIDE 10 MEQ: 7.46 INJECTION, SOLUTION INTRAVENOUS at 00:54

## 2020-10-05 RX ADMIN — ASPIRIN 300 MG: 300 SUPPOSITORY RECTAL at 05:59

## 2020-10-05 RX ADMIN — ATORVASTATIN CALCIUM 80 MG: 80 TABLET, FILM COATED ORAL at 20:47

## 2020-10-05 RX ADMIN — INSULIN LISPRO 3 UNITS: 100 INJECTION, SOLUTION INTRAVENOUS; SUBCUTANEOUS at 17:45

## 2020-10-05 RX ADMIN — ENALAPRILAT 1.25 MG: 1.25 INJECTION INTRAVENOUS at 03:01

## 2020-10-05 RX ADMIN — LABETALOL HYDROCHLORIDE 10 MG: 5 INJECTION, SOLUTION INTRAVENOUS at 01:07

## 2020-10-05 RX ADMIN — PIPERACILLIN AND TAZOBACTAM 4.5 G: 4; .5 INJECTION, POWDER, LYOPHILIZED, FOR SOLUTION INTRAVENOUS; PARENTERAL at 06:08

## 2020-10-05 ASSESSMENT — FIBROSIS 4 INDEX
FIB4 SCORE: 1.79
FIB4 SCORE: 2.53

## 2020-10-05 ASSESSMENT — PAIN DESCRIPTION - PAIN TYPE
TYPE: SURGICAL PAIN

## 2020-10-05 ASSESSMENT — COGNITIVE AND FUNCTIONAL STATUS - GENERAL
SUGGESTED CMS G CODE MODIFIER MOBILITY: CN
WALKING IN HOSPITAL ROOM: TOTAL
MOVING TO AND FROM BED TO CHAIR: UNABLE
MOBILITY SCORE: 6
MOVING FROM LYING ON BACK TO SITTING ON SIDE OF FLAT BED: UNABLE
STANDING UP FROM CHAIR USING ARMS: TOTAL
CLIMB 3 TO 5 STEPS WITH RAILING: TOTAL
TURNING FROM BACK TO SIDE WHILE IN FLAT BAD: UNABLE

## 2020-10-05 ASSESSMENT — GAIT ASSESSMENTS: GAIT LEVEL OF ASSIST: UNABLE TO PARTICIPATE

## 2020-10-05 NOTE — PROGRESS NOTES
Cardiac Surgery RN Navigator Daily Rounding Note  Procedure Performed: Procedure(s):  CABG, WITH ENDOSCOPIC VEIN PROCUREMENT- X3  ECHOCARDIOGRAM, TRANSESOPHAGEAL     By Dr. Chicas  10 Days Post-Op    Pertinent Overnight Events:    Weight down, below baseline, fluid balance negative    HTN overnight, not responsive to labetalol, hydralazine ordered    NG tube placed to decompress stomach, ileus, 2 BM's    1 unit of PRBC's given    abx for aspiration pneumonia    Creatinine improved    Pertinent neuro findings/needs: A&O; appears to have expressive aphasia  Patient has been up to edge of bed twice and slide to cardiac chair--nurse reports good effort and participation    Cardiac/hemodynamics:  Temp (24hrs), Av.6 °C (97.8 °F), Min:35.9 °C (96.7 °F), Max:37.1 °C (98.8 °F)    Heart rhythm: SB to ST  Temp:  [35.9 °C (96.7 °F)-37.1 °C (98.8 °F)] 35.9 °C (96.7 °F)  Pulse:  [] 75  Resp:  [20-32] 25  BP: ()/() 153/95  SpO2:  [77 %-100 %] 97 %      IV gtts: NS, Last Rate: 100 mL/hr at 10/05/20 0608  norepinephrine (Levophed) infusion, Last Rate: Stopped (10/03/20 1400)      /Weights:  Admit weight: Weight: 72.6 kg (160 lb)  Current Weight: Weight: 66.3 kg (146 lb 2.6 oz) (10/05/20 0600)  Weight change:     Intake/Output Summary (Last 24 hours) at 10/5/2020 0724  Last data filed at 10/5/2020 0600  Gross per 24 hour   Intake 2450 ml   Output 1525 ml   Net 925 ml       Adequate urine output: 700 cc    Respiratory:        Pertinent respiratory findings/needs: none RA    GI findings/needs: + BM X 2 yesterday    Labs:  Recent Labs     10/03/20  0325 10/04/20  0508 10/05/20  0625   WBC 9.7 10.0 11.0*   RBC 2.51* 3.12* 3.02*   HEMOGLOBIN 7.9* 9.7* 9.2*   HEMATOCRIT 24.4* 29.5* 29.5*   MCV 97.2 94.2 97.7   MCH 31.5 30.8 30.5   MCHC 32.4* 32.7* 31.2*   RDW 48.2 51.8* 53.3*   PLATELETCT 387 285 202   MPV 11.0 10.7 10.6     Recent Labs     10/03/20  1734  10/04/20  0508  10/04/20  1726 10/04/20  234  10/05/20  0625   SODIUM 140  --  144  --   --   --  146*   POTASSIUM 3.6   < > 3.8   < > 3.7 3.9 4.1   CHLORIDE 103  --  107  --   --   --  115*   CO2 18*  --  20  --   --   --  17*   GLUCOSE 196*  --  211*  --   --   --  186*   BUN 85*  --  65*  --   --   --  37*   CREATININE 2.58*  --  1.77*  --   --   --  1.17   CALCIUM 8.3*  --  8.5  --   --   --  8.2*    < > = values in this interval not displayed.     INR:       Required Cardiac medications review:  ASA:  Yes  Plavix: Yes  BB: No; contraindicated because of Other on pressors yesterday  ACE/ARB: No; contraindicated because of Increased creatinine/CKD  Statin: yes  Diuretic: no    LDA's necessity reviewed: YES    Thoughts and considerations for team rounding:    Creatinine normalized now--consider when adding ACE/ARB back to regimen is warranted    BB not ordered--unclear why, could be he was on levo briefly, now off--restart?    Discharge plan:    TBD--rehab to eval again when medically stable

## 2020-10-05 NOTE — PROGRESS NOTES
Cardiovascular Surgery Progress Note    Name: Guerrero Ann  MRN: [id number]  : 1970  Admit Date: 2020  7:37 PM  Procedure:  Procedure(s) and Anesthesia Type:     * CABG, WITH ENDOSCOPIC VEIN PROCUREMENT- X3 - General     * ECHOCARDIOGRAM, TRANSESOPHAGEAL - General  10 Day Post-Op    Vitals:  Patient Vitals for the past 8 hrs:   Temp SpO2 O2 Delivery Device Pulse Resp BP Weight   10/05/20 0825 -- -- -- 87 -- (!) 179/109 --   10/05/20 0800 35.8 °C (96.5 °F) 98 % None - Room Air 94 20 (!) 161/110 67.7 kg (149 lb 4 oz)   10/05/20 0700 -- 98 % -- 87 (!) 28 (!) 165/93 --   10/05/20 0600 35.9 °C (96.7 °F) 97 % None - Room Air 75 (!) 25 153/95 66.3 kg (146 lb 2.6 oz)   10/05/20 0400 -- 98 % None - Room Air 90 (!) 26 156/90 --   10/05/20 0345 -- -- -- 83 (!) 25 148/103 --   10/05/20 0330 -- -- -- 83 (!) 26 (!) 169/104 --   10/05/20 0315 -- -- -- 82 (!) 27 (!) 171/87 --   10/05/20 0300 -- 96 % -- 85 (!) 26 153/104 --   10/05/20 0245 -- -- -- 91 (!) 28 (!) 164/102 --   10/05/20 0200 36.6 °C (97.8 °F) 97 % None - Room Air 84 (!) 26 152/104 --     Temp (24hrs), Av.3 °C (97.4 °F), Min:35.8 °C (96.5 °F), Max:37 °C (98.6 °F)    Respiratory:    Respiration: 20, Pulse Oximetry: 98 %     Chest Tube Drains:          Fluids:    Intake/Output Summary (Last 24 hours) at 10/5/2020 0950  Last data filed at 10/5/2020 0800  Gross per 24 hour   Intake 2450 ml   Output 1485 ml   Net 965 ml     Admit weight: Weight: 72.6 kg (160 lb)  Current weight: Weight: 67.7 kg (149 lb 4 oz) (10/05/20 0800)    Labs:  Recent Labs     10/03/20  0325 10/04/20  0508 10/05/20  0625   WBC 9.7 10.0 11.0*   RBC 2.51* 3.12* 3.02*   HEMOGLOBIN 7.9* 9.7* 9.2*   HEMATOCRIT 24.4* 29.5* 29.5*   MCV 97.2 94.2 97.7   MCH 31.5 30.8 30.5   MCHC 32.4* 32.7* 31.2*   RDW 48.2 51.8* 53.3*   PLATELETCT 387 285 202   MPV 11.0 10.7 10.6     Recent Labs     10/03/20  0015 10/03/20  0325 10/04/20  0508 10/05/20  0625   NEUTSPOLYS 73.00* 75.20*  72.20* 66.20   LYMPHOCYTES 8.70* 10.80* 13.90* 17.80*   MONOCYTES 5.20 12.70 9.60 12.90   EOSINOPHILS 0.90 0.30 4.30 1.50   BASOPHILS 0.00 0.20 0.00 0.40   RBCMORPHOLO Present  --  Present  --      Recent Labs     10/03/20  1734  10/04/20  0508  10/04/20  1726 10/04/20  2349 10/05/20  0625   SODIUM 140  --  144  --   --   --  146*   POTASSIUM 3.6   < > 3.8   < > 3.7 3.9 4.1   CHLORIDE 103  --  107  --   --   --  115*   CO2 18*  --  20  --   --   --  17*   GLUCOSE 196*  --  211*  --   --   --  186*   BUN 85*  --  65*  --   --   --  37*   CREATININE 2.58*  --  1.77*  --   --   --  1.17   CALCIUM 8.3*  --  8.5  --   --   --  8.2*    < > = values in this interval not displayed.     Medications:  • [START ON 10/6/2020] amLODIPine  5 mg     • metoprolol  12.5 mg     • metoclopramide  5 mg     • aspirin  300 mg     • enoxaparin (LOVENOX) injection  40 mg     • pantoprazole  40 mg     • K+ Scale: Goal of 4.5  1 Each     • piperacillin-tazobactam  4.5 g 4.5 g (10/05/20 0608)   • Pharmacy  1 Each     • atorvastatin  80 mg     • senna-docusate  2 Tab      And   • polyethylene glycol/lytes  1 Packet      And   • magnesium hydroxide  30 mL     • clopidogrel  75 mg     • insulin lispro  0-15 Units     • MD Alert...Adult ICU Electrolyte Replacement per Pharmacy       • lidocaine  1 Patch     • Pharmacy Consult Request  1 Each        Ordered Medications:    ASA - Yes    Plavix - Yes    Post-operative Beta Blockers - Yes    Ace Inhibitor - No; contraindicated because of Increased creatinine/CKD    Statin - Yes    Exam:   Review of Systems   Unable to perform ROS: Mental status change     Physical Exam  Constitutional:       General: He is not in acute distress.     Appearance: He is well-developed. He is not diaphoretic.   HENT:      Head: Normocephalic.   Eyes:      Pupils: Pupils are equal, round, and reactive to light.   Neck:      Musculoskeletal: Neck supple.   Cardiovascular:      Rate and Rhythm: Normal rate and regular  rhythm.      Heart sounds: Normal heart sounds. No murmur. No friction rub. No gallop.    Pulmonary:      Effort: Pulmonary effort is normal. No respiratory distress.      Breath sounds: Examination of the right-lower field reveals decreased breath sounds. Examination of the left-lower field reveals decreased breath sounds. Decreased breath sounds present. No wheezing or rales.   Abdominal:      General: There is no distension.      Tenderness: There is no abdominal tenderness.      Comments: firm   Skin:     General: Skin is warm and dry.      Comments: Sternum- prevena dressing  SVG sites- c/d/i   Neurological:      Mental Status: He is alert and oriented to person, place, and time.       Quality Measures:   Quality-Core Measures   Reviewed items::  EKG reviewed, Labs reviewed, Medications reviewed and Radiology images reviewed  Gomez catheter::  Critically Ill - Requiring Accurate Measurement of Urinary Output  Central line in place:  Concentrated IV drugs  DVT prophylaxis pharmacological::  Heparin  DVT prophylaxis - mechanical:  SCDs  Ulcer Prophylaxis::  Yes    Assessment/Plan:  POD 1 HDS, SR, Acute blood loss anemia- s/p transfusion- keep mediastinal tubes, gently diurese, d/c gomez  POD 2 HTN- inc lisinopril, SR/ST- change to metoprolol, acute blood loss anemia s/p transfusion- watch, d/c mediastinal tubes, urine retention- replace gomez/start flomax, cont diuresis  POD 3  HDS, SR, neuro right  weaker than left gazes to right, wounds CDI, abdomen S/NT, fluid balance negative, wt still up from admit.  Plan:  MRI today, decrease ace for permissive hypertension, dc gabapentin, BB, Statin, IS/ambulate. CPM.  POD 4  HDS but hypertensive, SR, neuro answers questions but delayed.  Gaze more center.  EEG normal, MRI pending, wounds CDI, abdomen distended, fluid balance negative, wt down.  Plan:  Restart ace, IM getting abdominal study, BB, Statin, IS/ambulate. CPM.  POD 5  HDS, ST, neuro unchanged, wounds CDI,  abdomen S/NT, fluid balance negative, wt down.  Failed swallow evaluation.  Hypertensive.  Pain issues.  Plan:  core trak, switch BB to coreg, ACE, Statin, add norco, IS/ambulate. CPM.  POD 6  HDS, SR, neuro unchanged, wounds CDI, abdomen S/NT, fluid balance negative, wt stable.  Plan:  Awaiting rehab authorization, BB, ACE, Statin, IS/ambulate. CPM.  POD 7 HDS, SR, Febrile- most likely aspiration pneumonia- started on rocephin- d/w pharmacy will change to unasyn, ileus- 2 liters of residual last night- start reglan/supp- if no response try relistor, Elevated creatinine- stop lisinopril/lasix- switch lovenox to heparin- start fluids 75 cc/hr, replace gomez for strict I&O, neuro- follows with slowed response, minimal speech  POD 8  HDS, SR, neuro intact, wounds CDI, abdomen firm and distended CT scan shows ileus, fluid balance negative, wt down.  Creatine trending down   Plan:  NG to decompress abd, 1 unit blood, Statin, IS/ambulate. CPM.  POD 9  HDS, SR, neuro follows commands slowly, wounds CDI, abdomen S/NT, fluid balance negative, wt down, NG tube with 1800 out, had 2 loose BM's.  Creatinine improved.  Off levo.  Plan:  Continue to decompress abd with NG.  Bowel protocol.  IV antibiotics for possible aspiration. CPM.  POD 10  HDS, SR, neuro intact, wounds CDI, abdomen less distended today + BS, fluid balance negative, wt up slightly.  Hypertensive but does not tolerate oral medications.  Getting ace and bb.  Creatinine normalized.  Plan:  Continue bowel protocol, continue NG tube, BB, ACE, Statin, IS/ambulate. CPM.    Active Hospital Problems    Diagnosis   • Septic shock (HCC) [A41.9, R65.21]     Priority: High   • Aspiration pneumonia (HCC) [J69.0]     Priority: Medium   • Ileus (HCC) [K56.7]     Priority: Medium   • Lacunar infarct, acute (HCC) [I63.81]     Priority: Medium   • Cardioembolic stroke (HCC) [I63.9]     Priority: Medium   • Altered mental status [R41.82]     Priority: Medium   • SONNY (acute kidney  injury) ([facility]) [N17.9]     Priority: Medium   • NSTEMI (non-ST elevated myocardial infarction) ([facility]) [I21.4]     Priority: Medium   • CAD (coronary artery disease) [I25.10]     Priority: Medium   • Type 2 diabetes mellitus with complication, without long-term current use of insulin ([facility]) [E11.8]     Priority: Medium   • Essential hypertension [I10]     Priority: Medium   • Normochromic normocytic anemia [D64.9]     Priority: Low   • Hypokalemia [E87.6]     Priority: Low

## 2020-10-05 NOTE — THERAPY
Physical Therapy   Daily Treatment     Patient Name: Guerrero Ann  Age:  49 y.o., Sex:  male  Medical Record #: [id number]  Today's Date: 10/5/2020     Precautions: Fall Risk, Swallow Precautions ( See Comments), Nasogastric Tube    Assessment    Patient progressing with functional mobility. He continues to be limited by impaired cognition, impaired motor function, impaired balance and coordination, functional weakness, and decreased activity tolerance. Today he required max A to move supine to sitting EOB but his sitting balance improved during session; he initially required max A to maintain upright seat at EOB but required only min A at end of session. He was able to stand from EOB with HHA/mod A x2. Pre gait activities introduced standing EOB. Will continue to follow.    Plan    Continue current treatment plan. Patient may benefit from increased frequency following next visit given OHS and neurological injury; dependent on activity tolerance and ability to meaningfully participate.    DC Equipment Recommendations: Unable to determine at this time  Discharge Recommendations: Recommend post-acute placement for additional physical therapy services prior to discharge home      Subjective    agreeable     Objective       10/05/20 1115   Cognition    Cognition / Consciousness X   Speech/ Communication Delayed Responses  (limited verbalizations)   Level of Consciousness Alert   Ability To Follow Commands 1 Step  (with delay)   Safety Awareness Impaired   New Learning Impaired   Attention Impaired   Sequencing Impaired   Initiation Impaired   Comments improving but continues to present with flat affect, delayed responses, required increased time and cueing   Balance   Sitting Balance (Static) Trace +   Sitting Balance (Dynamic) Trace   Standing Balance (Static) Fair -   Standing Balance (Dynamic) Poor +   Comments improved during session; posterior lean in sitting initially required max A,  progressed to min   Gait Analysis   Gait Level Of Assist Unable to Participate   Weight Bearing Status no restrictions   Vision Deficits Impacting Mobility appears to have some L inattention   Comments pregait activities at EOB; weight shifting and acceptance with facilitation, patient able to initiate limb advancement 50% of time   Bed Mobility    Supine to Sit Maximal Assist  (able to initiate BLE toward EOB)   Sit to Supine Maximal Assist   Scooting Maximal Assist   Functional Mobility   Sit to Stand Moderate Assist  (x2 for safety)   Bed, Chair, Wheelchair Transfer Unable to Participate  (deferred given RN plan for cardiac chair)   Transfer Method Other (Comments)  (STS only)   Short Term Goals    Short Term Goal # 1 Patient will move supine<>sitting EOB without bed features with supervision within 6tx in order to get in/out of bed   Goal Outcome # 1 goal not met   Short Term Goal # 2 Patient will move sitting<>standing with supervision within 6tx in order to initiate gait and transfers   Goal Outcome # 2 Goal not met   Short Term Goal # 3 Patient will ambulate 500ft with supervision within 6tx in order to access environment   Goal Outcome # 3 Goal not met   Short Term Goal # 4 Patient will verbalize cardiac rehab education including sternal precautions, home walking program, and techniques for activity pacing within 6tx in order to demonstrate understanding.   Goal Outcome # 4 Goal not met   Anticipated Discharge Equipment and Recommendations   DC Equipment Recommendations Unable to determine at this time   Discharge Recommendations Recommend post-acute placement for additional physical therapy services prior to discharge home

## 2020-10-05 NOTE — PROGRESS NOTES
Critical Care Progress Note    Date of admission  9/21/2020    Chief Complaint  49 y.o. male with PMHx DMII, HTN and LVH admitted 9/21/2020 with an acute coronary syndrome.  He subsequently underwent CABG.    Hospital Course   10/3 -    Place NG to suction.  Titrating norepinephrine.  Hydrate with intravenous NS.  Add vasopressin.  10/4 -    nasal MRSA PCR is negative.  Stop Zyvox.  Continue Zosyn.  Keep NG to suction.  Continue IV hydration.  Renal function is improving.  Off norepinephrine.  10/5 - Zosyn, neurologically starting to speak in limited fashion, hypotensive now off norepinephrine but vomited first dose of metoprolol despite marked improved NG output, start IV ACE inhibitor      Interval Problem Update  Reviewed last 24 hour events:    POD 10  Hypertensive  NG output better 100cc/12 hrs  Started PO meds for Bp this AM -> dry heaves quickly  PRN labetalol last night helped some    A&O x4  Starting to talk  SR 80s  SBp 160s  UO adequate 700  Afebrile  BM x3  Creat 1.17  Room air  IS pending  Lovenox  PPI  Unasyn -> Zosyn 3/5      Add ACE IV PRN and scheduled if effective  BB enteral when can take PO  Mobilize as tolerated  Reglan vs Relistor?-> Reglan 5 mg IV every 6 hours  Speech eval when ready, not appropriate for swallow eval today       YESTERDAY  POD 9 - CABG x 3  Oriented x 4  Follows  SR  NE off at 1400 yesterday  -130  NPO  NG to LCS - 1300 mL out over night  Had a BM this am - small and loose   mL out last night    Quispe in place  Stop Zyvox - nasal MRSA PCR negative      Review of Systems  Review of Systems   Unable to perform ROS: Acuity of condition        Vital Signs for last 24 hours   Temp:  [35.8 °C (96.5 °F)-37.3 °C (99.1 °F)] 37.3 °C (99.1 °F)  Pulse:  [] 90  Resp:  [19-32] 25  BP: (137-187)/() 181/93  SpO2:  [94 %-100 %] 96 %    Hemodynamic parameters for last 24 hours       Respiratory Information for the last 24 hours       Physical Exam   Physical  Exam  Vitals signs reviewed.   Constitutional:       Appearance: He is normal weight. He is not toxic-appearing or diaphoretic.   HENT:      Head: Normocephalic and atraumatic.      Right Ear: External ear normal.      Left Ear: External ear normal.      Nose: Nose normal.      Mouth/Throat:      Mouth: Mucous membranes are moist.      Pharynx: No posterior oropharyngeal erythema.   Eyes:      General:         Right eye: No discharge.         Left eye: No discharge.      Pupils: Pupils are equal, round, and reactive to light.   Neck:      Musculoskeletal: Normal range of motion. No neck rigidity or muscular tenderness.      Vascular: No JVD.   Cardiovascular:      Rate and Rhythm:  No extrasystoles are present.     Pulses: Normal pulses.      Heart sounds: No murmur. No gallop.       Comments: SR  Pulmonary:      Effort: No respiratory distress.      Breath sounds: Rales (Unchanged basilar crackles) present. No wheezing or rhonchi.   Chest:      Comments: Median sternotomy incision without drainage or obvious signs of infection  Abdominal:      General: Abdomen is protuberant. Bowel sounds are decreased. There is distension (A wee bit better).      Palpations: Abdomen is soft. There is no fluid wave.      Tenderness: There is no abdominal tenderness. There is no right CVA tenderness, left CVA tenderness or guarding. Negative signs include Calvin's sign and McBurney's sign.      Comments: Mildly tympanitic   Genitourinary:     Comments: Quispe catheter  Musculoskeletal:      Comments: No clubbing or cyanosis   Skin:     General: Skin is warm and dry.      Capillary Refill: Capillary refill takes less than 2 seconds.      Coloration: Skin is not cyanotic.      Nails: There is no clubbing.     Neurological:      Mental Status: He is lethargic.      GCS: GCS eye subscore is 4. GCS verbal subscore is 3. GCS motor subscore is 6.      Comments: Patient starting to verbalize for first time but in limited fashion, brainstem  reflexes appear intact, moves all 4 extremities to command   Psychiatric:         Mood and Affect: Mood is not anxious.         Behavior: Behavior is not agitated. Behavior is cooperative.         Cognition and Memory: Cognition is impaired.       Medications  Current Facility-Administered Medications   Medication Dose Route Frequency Provider Last Rate Last Dose   • [START ON 10/6/2020] amLODIPine (NORVASC) tablet 5 mg  5 mg Enteral Tube Q DAY Guerrero Solo M.D.       • metoprolol (LOPRESSOR) tablet 12.5 mg  12.5 mg Enteral Tube TWICE DAILY Guerrero Solo M.D.   12.5 mg at 10/05/20 1749   • metoclopramide (REGLAN) injection 5 mg  5 mg Intravenous Q6HRS Guerrero Solo M.D.   5 mg at 10/05/20 1748   • enalaprilat (VASOTEC) injection 0.625-1.25 mg  0.625-1.25 mg Intravenous Q6HRS PRN Guerrero Solo M.D.   1.25 mg at 10/05/20 1923   • aspirin (ASA) suppository 300 mg  300 mg Rectal DAILY Karan Norton M.D.   300 mg at 10/05/20 0559   • enoxaparin (LOVENOX) inj 40 mg  40 mg Subcutaneous Q EVENING Karan Norton M.D.   40 mg at 10/05/20 1749   • acetaminophen (TYLENOL) suppository 975 mg  975 mg Rectal Q6HRS PRN Karan Norton M.D.       • labetalol (NORMODYNE/TRANDATE) injection 10 mg  10 mg Intravenous Q4HRS PRN Byron Edmond M.D.   10 mg at 10/05/20 1451   • pantoprazole (PROTONIX) injection 40 mg  40 mg Intravenous DAILY Karan Norton M.D.   40 mg at 10/05/20 0603   • K+ Scale: Goal of 4.5  1 Each Intravenous Q6HRS Karan Norton M.D.   1 Each at 10/05/20 1800   • NS infusion   Intravenous Continuous Karan Norton M.D. 100 mL/hr at 10/05/20 1613 1,000 mL at 10/05/20 1613   • piperacillin-tazobactam (ZOSYN) 4.5 g in  mL IVPB  4.5 g Intravenous Q8HRS Byron Edmond M.D. 25 mL/hr at 10/05/20 2047 4.5 g at 10/05/20 2047   • Pharmacy Consult: Enteral tube insertion - review meds/change route/product selection  1 Each Other PHARMACY TO DOSE  Maia Chavira A.P.N.       • tramadol (ULTRAM) 50 MG tablet 50 mg  50 mg Enteral Tube Q4HRS PRN MAUREEN HolmO.   50 mg at 10/01/20 0536   • atorvastatin (LIPITOR) tablet 80 mg  80 mg Enteral Tube QHS ЮЛИЯ Holm.O.   80 mg at 10/05/20 2047   • mag hydrox-al hydrox-simeth (MAALOX PLUS ES or MYLANTA DS) suspension 30 mL  30 mL Enteral Tube Q4HRS PRN Mateus Stringer D.O.       • senna-docusate (PERICOLACE or SENOKOT S) 8.6-50 MG per tablet 2 Tab  2 Tab Enteral Tube BID MAUREEN HolmO.   2 Tab at 10/05/20 1749    And   • polyethylene glycol/lytes (MIRALAX) PACKET 1 Packet  1 Packet Enteral Tube DAILY MAUREEN HolmO.   Stopped at 10/02/20 0600    And   • magnesium hydroxide (MILK OF MAGNESIA) suspension 30 mL  30 mL Enteral Tube DAILY MAUREEN HolmO.   Stopped at 10/01/20 0600    And   • bisacodyl (DULCOLAX) suppository 10 mg  10 mg Rectal QDAY PRN MAUREEN HolmO.   10 mg at 10/01/20 2338   • clopidogrel (PLAVIX) tablet 75 mg  75 mg Enteral Tube DAILY MAUREEN HolmO.   Stopped at 10/03/20 0600   • diphenhydrAMINE (BENADRYL) tablet/capsule 25 mg  25 mg Enteral Tube HS PRN - MR X 1 ЮЛИЯ Holm.O.   25 mg at 09/30/20 2315   • HYDROcodone-acetaminophen (NORCO) 5-325 MG per tablet 1-2 Tab  1-2 Tab Enteral Tube Q6HRS PRN Maia Chavira A.P.N.       • insulin lispro (HumaLOG) injection  0-15 Units Subcutaneous Q6HRS Maia Chavira A.P.N.   3 Units at 10/05/20 1745   • MD Alert...ICU Electrolyte Replacement per Pharmacy   Other PHARMACY TO DOSE Karan Norton M.D.       • lidocaine (LIDODERM) 5 % 1 Patch  1 Patch Transdermal Q24HR SHALOM Camacho.P.N.   1 Patch at 10/05/20 0926   • Respiratory Therapy Consult   Nebulization Continuous RT SUJATHA Camacho       • Pharmacy Consult Request ...Pain Management Review 1 Each  1 Each Other PHARMACY TO DOSE SUJATHA Camacho       • ondansetron (ZOFRAN) syringe/vial injection 8 mg  8 mg Intravenous Q6HRS PRN Maia ESCOBAR  Cait, A.P.N.        Or   • prochlorperazine (COMPAZINE) injection 10 mg  10 mg Intravenous Q6HRS PRN SHALOM Camacho.P.N.   10 mg at 10/02/20 0240    Or   • promethazine (PHENERGAN) suppository 25 mg  25 mg Rectal Q6HRS PRN Maia Chavira A.P.N.           Fluids    Intake/Output Summary (Last 24 hours) at 10/5/2020 2129  Last data filed at 10/5/2020 2000  Gross per 24 hour   Intake 2290.84 ml   Output 1240 ml   Net 1050.84 ml       Laboratory          Recent Labs     10/03/20  0325  10/03/20  1734  10/04/20  0508  10/05/20  0625 10/05/20  1122 10/05/20  1734   SODIUM 136  --  140  --  144  --  146*  --   --    POTASSIUM 3.0*   < > 3.6   < > 3.8   < > 4.1 4.2 4.1   CHLORIDE 95*  --  103  --  107  --  115*  --   --    CO2 18*  --  18*  --  20  --  17*  --   --    BUN 96*  --  85*  --  65*  --  37*  --   --    CREATININE 3.87*  --  2.58*  --  1.77*  --  1.17  --   --    MAGNESIUM 3.2*  --   --   --  3.1*  --  2.7*  --   --    PHOSPHORUS 7.8*  --   --   --  2.4*  --  2.7  --   --    CALCIUM 7.3*  --  8.3*  --  8.5  --  8.2*  --   --     < > = values in this interval not displayed.     Recent Labs     10/03/20  1734 10/04/20  0508 10/05/20  0625 10/05/20  0834   PREALBUMIN  --   --   --  14.1*   GLUCOSE 196* 211* 186*  --      Recent Labs     10/03/20  0325 10/04/20  0508 10/05/20  0625   WBC 9.7 10.0 11.0*   NEUTSPOLYS 75.20* 72.20* 66.20   LYMPHOCYTES 10.80* 13.90* 17.80*   MONOCYTES 12.70 9.60 12.90   EOSINOPHILS 0.30 4.30 1.50   BASOPHILS 0.20 0.00 0.40     Recent Labs     10/03/20  0325 10/04/20  0508 10/05/20  0625   RBC 2.51* 3.12* 3.02*   HEMOGLOBIN 7.9* 9.7* 9.2*   HEMATOCRIT 24.4* 29.5* 29.5*   PLATELETCT [phone]       Imaging  X-Ray:  I have personally reviewed the images and compared with prior images.  EKG:  I have personally reviewed the images and compared with prior images.  CT:    Reviewed  Echo:   Reviewed    Assessment/Plan  * Septic shock (HCC)  Assessment & Plan  Source appears to be  pulmonary +/- GI, cultures negative so far  Nasal MRSA PCR is negative - stop Zyvox, monitor  Continue Zosyn for aspiration and plan 5 days of therapy, last day of antibiotics 10-7  Shock has resolved - he is off norepinephrine  Sepsis improved    Ileus (HCC)  Assessment & Plan  Keep NG to suction  Continue bowel rest  Correct electrolyte abnormalities  Serial abdominal exams - abdomen is definitely a wee bit less distended today    Aspiration pneumonia (HCC)  Assessment & Plan  Nasal MRSA PCR negative - stop Zyvox  Continue Zosyn and plan 5 days of therapy, last day of therapy 10/7  Mobilize/I-S  Follow-up chest x-ray as needed  RT protocols  Swallow eval pending, patient n.p.o.    Cardioembolic stroke ([facility])  Assessment & Plan  MRI on 9/29 confirms numerous acute/subacute supratentorial and infratentorial infarcts consistent with cardioembolic phenomenon  Continue aspirin to rectal preparation  Resume statin when ileus resolves  Follow neuro exam and limit sedatives  PT/OT  May need physiatry consult and inpatient care?    Altered mental status- (present on admission)  Assessment & Plan  Improved    SONNY (acute kidney injury) ([facility])- (present on admission)  Assessment & Plan  Continue IV fluid resuscitation as needed  Monitor renal function and urine output  Avoid nephrotoxins and renal dose medications  Renal function continues to improve, creatinine normal today for the first time    NSTEMI (non-ST elevated myocardial infarction) ([facility])- (present on admission)  Assessment & Plan  Change aspirin to rectal preparation, back to p.o. when taking enteral nutrition adequately  Resume Plavix and statin when ileus resolves  Adding ACE/beta-blocker as well when clinically appropriate    CAD (coronary artery disease)- (present on admission)  Assessment & Plan  S/P 3V CABG on 9/25  Continue aspirin to rectal preparation  Resume Plavix and statin when ileus resolves    Essential hypertension- (present on admission)  Assessment  & Plan  Goal SBP less than 160  IV enalapril as needed  Oral beta-blocker and ACE inhibitor therapy when got allows transition to taking enteral meds  Was on calcium channel blocker at home but would prefer starting beta-blocker and ACE post MI first, add amlodipine if necessary as a third agent    Type 2 diabetes mellitus with complication, without long-term current use of insulin (HCC)- (present on admission)  Assessment & Plan  Continue sliding scale insulin  Hypoglycemia protocols    Normochromic normocytic anemia  Assessment & Plan  Serial CBC  Transfuse per protocols  No bleeding clinically, monitoring       VTE:  Lovenox  Ulcer: PPI  Lines: Central Line  Ongoing indication addressed and Quispe Catheter  Ongoing indication addressed    I have performed a physical exam and reviewed and updated ROS and Plan today (10/5/2020). In review of yesterday's note (10/4/2020), there are no changes except as documented above.     Discussed patient condition and risk of morbidity and/or mortality with RN, RT, Pharmacy and Charge nurse / hot rounds

## 2020-10-05 NOTE — DIETARY
Nutrition Services: Update   Day 13 of admit.  Guerrero So Lauro Ann is a 49 y.o. male with admitting DX of NSTEMI.  Pt with overall poor nutritional status this admit (last decent meal 9/27), which has been complicated by an ileus.  NGT was placed to suction ~10/2 w/ output; noted most recently with 100 mL out overnight and 50 mL out this AM. RN notes pt with dry heaving after med administration w/ water flush this AM.  +Bowel sounds and distended abdomen.  Possible gastroparesis 2/2 uncontrolled T2DM ? -Scheduled Reglan to be added.  +Large, loose BM this AM with 3 in total since overnight.  POD #10 s/p CABGx3.  Plan to mobilize today per IDT rounds.   Will continue to monitor nutrition POC.

## 2020-10-05 NOTE — PROGRESS NOTES
Spoke with MD Aparicio about pt BP not resolving with q4h labetalol.Per MD Aparicio continue PRN labetalol for now, okay to give early if needed.

## 2020-10-05 NOTE — CARE PLAN
Problem: Bowel/Gastric:  Goal: Normal bowel function is maintained or improved  Outcome: PROGRESSING AS EXPECTED    Goal: Will not experience complications related to bowel motility  Intervention: Implement interventions to promote bowel evacuation if inadequate bowel movements in past 48 hours  Note: Pt determined to have ileus on 10/2.  He has had three large, loose bowel movements on 10/4-10/5.  Reglan ordered for motility.     Problem: Discharge Barriers/Planning  Goal: Patient's continuum of care needs will be met  Outcome: PROGRESSING SLOWER THAN EXPECTED  Intervention: Assess potential discharge barriers on admission and throughout hospital stay  Note: Pt suffered cardioembolic stroke on POD 2 CABG x3, and will require assistance outside of the hospital before going home.  Rehabilitation would be a good option to help in stroke recovery and aiding in ADL.

## 2020-10-05 NOTE — THERAPY
Missed Therapy     Patient Name: Guerrero Ann  Age:  49 y.o., Sex:  male  Medical Record #: [id number]  Today's Date: 10/5/2020    Discussed missed therapy with RN.     Per RN, pt failed clamping trial with NG to suction. SLP will hold tx until pt is appropriate to participate in dysphagia tx.

## 2020-10-05 NOTE — CARE PLAN
Problem: Communication  Goal: The ability to communicate needs accurately and effectively will improve  Outcome: PROGRESSING AS EXPECTED  Intervention: Educate patient and significant other/support system about the plan of care, procedures, treatments, medications and allow for questions  Note: Discussed with patient and SO plan of care and medications to be given.      Problem: Safety  Goal: Will remain free from falls  Outcome: PROGRESSING AS EXPECTED  Intervention: Implement fall precautions  Flowsheets  Taken 10/4/2020 2117 by Ajit Ahmadi RLUANA  Bed Alarm: Yes - Alarm On  IV Pole on Same Side of Bed as Bathroom: Yes  Taken 10/4/2020 1600 by Cayla Escudero R.N.  Environmental Precautions:   Treaded Slipper Socks on Patient   Personal Belongings, Wastebasket, Call Bell etc. in Easy Reach   Transferred to Stronger Side   Report Given to Other Health Care Providers Regarding Fall Risk   Bed in Low Position   Communication Sign for Patients & Families   Mobility Assessed & Appropriate Sign Placed  Note: Fall precautions in place. Bed locked and lowered. Call light within reach.   Room near nurses station.

## 2020-10-05 NOTE — PROGRESS NOTES
Spoke with Maia Chavira about Pt SBP sustaining over ~160-170s. MD aware of HTN despite pt getting labetalol 10 mg IV q4h prn. Per MD order Hydralazine 25mg IV q6h prn for SBP >160.

## 2020-10-05 NOTE — CARE PLAN
Problem: Nutritional:  Goal: Nutrition support tolerated and meeting greater than 85% of estimated needs VS toleration of PO diet  Outcome: NOT MET   Refer to RD note.

## 2020-10-06 ENCOUNTER — APPOINTMENT (OUTPATIENT)
Dept: RADIOLOGY | Facility: MEDICAL CENTER | Age: 50
DRG: 233 | End: 2020-10-06
Attending: INTERNAL MEDICINE
Payer: COMMERCIAL

## 2020-10-06 LAB
ANION GAP SERPL CALC-SCNC: 13 MMOL/L (ref 7–16)
BASOPHILS # BLD AUTO: 0.6 % (ref 0–1.8)
BASOPHILS # BLD: 0.07 K/UL (ref 0–0.12)
BUN SERPL-MCNC: 21 MG/DL (ref 8–22)
CALCIUM SERPL-MCNC: 8.6 MG/DL (ref 8.5–10.5)
CHLORIDE SERPL-SCNC: 115 MMOL/L (ref 96–112)
CO2 SERPL-SCNC: 18 MMOL/L (ref 20–33)
CREAT SERPL-MCNC: 0.88 MG/DL (ref 0.5–1.4)
EOSINOPHIL # BLD AUTO: 0.23 K/UL (ref 0–0.51)
EOSINOPHIL NFR BLD: 1.9 % (ref 0–6.9)
ERYTHROCYTE [DISTWIDTH] IN BLOOD BY AUTOMATED COUNT: 53.4 FL (ref 35.9–50)
GLUCOSE BLD-MCNC: 150 MG/DL (ref 65–99)
GLUCOSE BLD-MCNC: 155 MG/DL (ref 65–99)
GLUCOSE BLD-MCNC: 161 MG/DL (ref 65–99)
GLUCOSE BLD-MCNC: 162 MG/DL (ref 65–99)
GLUCOSE SERPL-MCNC: 172 MG/DL (ref 65–99)
HCT VFR BLD AUTO: 29.5 % (ref 42–52)
HGB BLD-MCNC: 9.5 G/DL (ref 14–18)
IMM GRANULOCYTES # BLD AUTO: 0.2 K/UL (ref 0–0.11)
IMM GRANULOCYTES NFR BLD AUTO: 1.7 % (ref 0–0.9)
LYMPHOCYTES # BLD AUTO: 2.38 K/UL (ref 1–4.8)
LYMPHOCYTES NFR BLD: 19.9 % (ref 22–41)
MAGNESIUM SERPL-MCNC: 2.2 MG/DL (ref 1.5–2.5)
MCH RBC QN AUTO: 31.8 PG (ref 27–33)
MCHC RBC AUTO-ENTMCNC: 32.2 G/DL (ref 33.7–35.3)
MCV RBC AUTO: 98.7 FL (ref 81.4–97.8)
MONOCYTES # BLD AUTO: 1.55 K/UL (ref 0–0.85)
MONOCYTES NFR BLD AUTO: 13 % (ref 0–13.4)
NEUTROPHILS # BLD AUTO: 7.53 K/UL (ref 1.82–7.42)
NEUTROPHILS NFR BLD: 62.9 % (ref 44–72)
NRBC # BLD AUTO: 0 K/UL
NRBC BLD-RTO: 0 /100 WBC
PHOSPHATE SERPL-MCNC: 2.7 MG/DL (ref 2.5–4.5)
PLATELET # BLD AUTO: 204 K/UL (ref 164–446)
PMV BLD AUTO: 10.4 FL (ref 9–12.9)
POTASSIUM SERPL-SCNC: 3.7 MMOL/L (ref 3.6–5.5)
POTASSIUM SERPL-SCNC: 3.8 MMOL/L (ref 3.6–5.5)
POTASSIUM SERPL-SCNC: 4 MMOL/L (ref 3.6–5.5)
POTASSIUM SERPL-SCNC: 4.1 MMOL/L (ref 3.6–5.5)
RBC # BLD AUTO: 2.99 M/UL (ref 4.7–6.1)
SODIUM SERPL-SCNC: 146 MMOL/L (ref 135–145)
WBC # BLD AUTO: 12 K/UL (ref 4.8–10.8)

## 2020-10-06 PROCEDURE — A9270 NON-COVERED ITEM OR SERVICE: HCPCS | Performed by: PHYSICIAN ASSISTANT

## 2020-10-06 PROCEDURE — 700102 HCHG RX REV CODE 250 W/ 637 OVERRIDE(OP): Performed by: INTERNAL MEDICINE

## 2020-10-06 PROCEDURE — A9270 NON-COVERED ITEM OR SERVICE: HCPCS | Performed by: STUDENT IN AN ORGANIZED HEALTH CARE EDUCATION/TRAINING PROGRAM

## 2020-10-06 PROCEDURE — 84100 ASSAY OF PHOSPHORUS: CPT

## 2020-10-06 PROCEDURE — 700111 HCHG RX REV CODE 636 W/ 250 OVERRIDE (IP): Performed by: INTERNAL MEDICINE

## 2020-10-06 PROCEDURE — 99291 CRITICAL CARE FIRST HOUR: CPT | Performed by: INTERNAL MEDICINE

## 2020-10-06 PROCEDURE — A9270 NON-COVERED ITEM OR SERVICE: HCPCS | Performed by: INTERNAL MEDICINE

## 2020-10-06 PROCEDURE — 700102 HCHG RX REV CODE 250 W/ 637 OVERRIDE(OP): Performed by: STUDENT IN AN ORGANIZED HEALTH CARE EDUCATION/TRAINING PROGRAM

## 2020-10-06 PROCEDURE — 700105 HCHG RX REV CODE 258: Performed by: INTERNAL MEDICINE

## 2020-10-06 PROCEDURE — 82962 GLUCOSE BLOOD TEST: CPT | Mod: 91

## 2020-10-06 PROCEDURE — 83735 ASSAY OF MAGNESIUM: CPT

## 2020-10-06 PROCEDURE — 700102 HCHG RX REV CODE 250 W/ 637 OVERRIDE(OP): Performed by: PHYSICIAN ASSISTANT

## 2020-10-06 PROCEDURE — 84132 ASSAY OF SERUM POTASSIUM: CPT

## 2020-10-06 PROCEDURE — 700102 HCHG RX REV CODE 250 W/ 637 OVERRIDE(OP): Performed by: PHYSICAL MEDICINE & REHABILITATION

## 2020-10-06 PROCEDURE — 700101 HCHG RX REV CODE 250: Performed by: INTERNAL MEDICINE

## 2020-10-06 PROCEDURE — 92526 ORAL FUNCTION THERAPY: CPT

## 2020-10-06 PROCEDURE — C9113 INJ PANTOPRAZOLE SODIUM, VIA: HCPCS | Performed by: INTERNAL MEDICINE

## 2020-10-06 PROCEDURE — 80048 BASIC METABOLIC PNL TOTAL CA: CPT

## 2020-10-06 PROCEDURE — 85025 COMPLETE CBC W/AUTO DIFF WBC: CPT

## 2020-10-06 PROCEDURE — 99223 1ST HOSP IP/OBS HIGH 75: CPT | Performed by: PHYSICAL MEDICINE & REHABILITATION

## 2020-10-06 PROCEDURE — A9270 NON-COVERED ITEM OR SERVICE: HCPCS | Performed by: PHYSICAL MEDICINE & REHABILITATION

## 2020-10-06 PROCEDURE — 700101 HCHG RX REV CODE 250: Performed by: CLINICAL NURSE SPECIALIST

## 2020-10-06 PROCEDURE — 99024 POSTOP FOLLOW-UP VISIT: CPT | Performed by: THORACIC SURGERY (CARDIOTHORACIC VASCULAR SURGERY)

## 2020-10-06 PROCEDURE — 770022 HCHG ROOM/CARE - ICU (200)

## 2020-10-06 RX ORDER — SODIUM CHLORIDE, SODIUM LACTATE, POTASSIUM CHLORIDE, CALCIUM CHLORIDE 600; 310; 30; 20 MG/100ML; MG/100ML; MG/100ML; MG/100ML
INJECTION, SOLUTION INTRAVENOUS CONTINUOUS
Status: DISCONTINUED | OUTPATIENT
Start: 2020-10-06 | End: 2020-10-08

## 2020-10-06 RX ORDER — AMANTADINE HYDROCHLORIDE 100 MG/1
100 CAPSULE, GELATIN COATED ORAL 2 TIMES DAILY
Status: DISCONTINUED | OUTPATIENT
Start: 2020-10-06 | End: 2020-10-06

## 2020-10-06 RX ORDER — POTASSIUM CHLORIDE 7.45 MG/ML
10 INJECTION INTRAVENOUS ONCE
Status: COMPLETED | OUTPATIENT
Start: 2020-10-06 | End: 2020-10-06

## 2020-10-06 RX ORDER — LISINOPRIL 10 MG/1
10 TABLET ORAL
Status: DISCONTINUED | OUTPATIENT
Start: 2020-10-06 | End: 2020-10-07

## 2020-10-06 RX ORDER — ASPIRIN 81 MG/1
81 TABLET, CHEWABLE ORAL DAILY
Status: DISCONTINUED | OUTPATIENT
Start: 2020-10-07 | End: 2020-10-09 | Stop reason: HOSPADM

## 2020-10-06 RX ORDER — POTASSIUM CHLORIDE 14.9 MG/ML
20 INJECTION INTRAVENOUS ONCE
Status: COMPLETED | OUTPATIENT
Start: 2020-10-06 | End: 2020-10-06

## 2020-10-06 RX ADMIN — METOPROLOL TARTRATE 12.5 MG: 25 TABLET, FILM COATED ORAL at 17:29

## 2020-10-06 RX ADMIN — DOCUSATE SODIUM 50 MG AND SENNOSIDES 8.6 MG 2 TABLET: 8.6; 5 TABLET, FILM COATED ORAL at 17:29

## 2020-10-06 RX ADMIN — CLOPIDOGREL BISULFATE 75 MG: 75 TABLET ORAL at 05:30

## 2020-10-06 RX ADMIN — LIDOCAINE 1 PATCH: 50 PATCH TOPICAL at 09:24

## 2020-10-06 RX ADMIN — POTASSIUM CHLORIDE 10 MEQ: 7.46 INJECTION, SOLUTION INTRAVENOUS at 03:23

## 2020-10-06 RX ADMIN — POTASSIUM CHLORIDE 10 MEQ: 7.46 INJECTION, SOLUTION INTRAVENOUS at 13:10

## 2020-10-06 RX ADMIN — AMLODIPINE BESYLATE 10 MG: 10 TABLET ORAL at 00:13

## 2020-10-06 RX ADMIN — INSULIN LISPRO 3 UNITS: 100 INJECTION, SOLUTION INTRAVENOUS; SUBCUTANEOUS at 12:03

## 2020-10-06 RX ADMIN — AMLODIPINE BESYLATE 5 MG: 10 TABLET ORAL at 07:43

## 2020-10-06 RX ADMIN — LISINOPRIL 10 MG: 10 TABLET ORAL at 09:24

## 2020-10-06 RX ADMIN — METOCLOPRAMIDE 5 MG: 5 INJECTION, SOLUTION INTRAMUSCULAR; INTRAVENOUS at 00:10

## 2020-10-06 RX ADMIN — PIPERACILLIN AND TAZOBACTAM 4.5 G: 4; .5 INJECTION, POWDER, LYOPHILIZED, FOR SOLUTION INTRAVENOUS; PARENTERAL at 21:29

## 2020-10-06 RX ADMIN — SODIUM CHLORIDE 1000 ML: 9 INJECTION, SOLUTION INTRAVENOUS at 13:50

## 2020-10-06 RX ADMIN — ASPIRIN 300 MG: 300 SUPPOSITORY RECTAL at 05:03

## 2020-10-06 RX ADMIN — METOCLOPRAMIDE 5 MG: 5 INJECTION, SOLUTION INTRAMUSCULAR; INTRAVENOUS at 12:03

## 2020-10-06 RX ADMIN — ATORVASTATIN CALCIUM 80 MG: 80 TABLET, FILM COATED ORAL at 21:29

## 2020-10-06 RX ADMIN — POTASSIUM CHLORIDE 20 MEQ: 14.9 INJECTION, SOLUTION INTRAVENOUS at 18:39

## 2020-10-06 RX ADMIN — INSULIN LISPRO 2 UNITS: 100 INJECTION, SOLUTION INTRAVENOUS; SUBCUTANEOUS at 05:27

## 2020-10-06 RX ADMIN — ENOXAPARIN SODIUM 40 MG: 40 INJECTION SUBCUTANEOUS at 17:29

## 2020-10-06 RX ADMIN — POTASSIUM CHLORIDE 10 MEQ: 7.46 INJECTION, SOLUTION INTRAVENOUS at 07:37

## 2020-10-06 RX ADMIN — METOCLOPRAMIDE 5 MG: 5 INJECTION, SOLUTION INTRAMUSCULAR; INTRAVENOUS at 17:29

## 2020-10-06 RX ADMIN — PIPERACILLIN AND TAZOBACTAM 4.5 G: 4; .5 INJECTION, POWDER, LYOPHILIZED, FOR SOLUTION INTRAVENOUS; PARENTERAL at 05:30

## 2020-10-06 RX ADMIN — NICARDIPINE HYDROCHLORIDE 2.5 MG/HR: 2.5 INJECTION, SOLUTION INTRAVENOUS at 09:23

## 2020-10-06 RX ADMIN — PIPERACILLIN AND TAZOBACTAM 4.5 G: 4; .5 INJECTION, POWDER, LYOPHILIZED, FOR SOLUTION INTRAVENOUS; PARENTERAL at 13:10

## 2020-10-06 RX ADMIN — AMANTADINE HYDROCHLORIDE 100 MG: 50 SOLUTION ORAL at 18:39

## 2020-10-06 RX ADMIN — PANTOPRAZOLE SODIUM 40 MG: 40 INJECTION, POWDER, LYOPHILIZED, FOR SOLUTION INTRAVENOUS at 05:36

## 2020-10-06 RX ADMIN — METOCLOPRAMIDE 5 MG: 5 INJECTION, SOLUTION INTRAMUSCULAR; INTRAVENOUS at 05:30

## 2020-10-06 RX ADMIN — INSULIN LISPRO 3 UNITS: 100 INJECTION, SOLUTION INTRAVENOUS; SUBCUTANEOUS at 17:30

## 2020-10-06 RX ADMIN — METOPROLOL TARTRATE 12.5 MG: 25 TABLET, FILM COATED ORAL at 07:38

## 2020-10-06 RX ADMIN — INSULIN LISPRO 3 UNITS: 100 INJECTION, SOLUTION INTRAVENOUS; SUBCUTANEOUS at 00:30

## 2020-10-06 RX ADMIN — NICARDIPINE HYDROCHLORIDE 5 MG/HR: 2.5 INJECTION, SOLUTION INTRAVENOUS at 03:23

## 2020-10-06 RX ADMIN — SODIUM CHLORIDE: 9 INJECTION, SOLUTION INTRAVENOUS at 03:22

## 2020-10-06 RX ADMIN — LABETALOL HYDROCHLORIDE 20 MG: 5 INJECTION, SOLUTION INTRAVENOUS at 00:13

## 2020-10-06 RX ADMIN — SODIUM CHLORIDE, POTASSIUM CHLORIDE, SODIUM LACTATE AND CALCIUM CHLORIDE: 600; 310; 30; 20 INJECTION, SOLUTION INTRAVENOUS at 20:45

## 2020-10-06 ASSESSMENT — PAIN DESCRIPTION - PAIN TYPE
TYPE: SURGICAL PAIN

## 2020-10-06 ASSESSMENT — FIBROSIS 4 INDEX: FIB4 SCORE: 2.53

## 2020-10-06 NOTE — PROGRESS NOTES
MD Dodd at bedside. MD aware of HTN issues with pt. Per MD schafer to try labetalol 20 mg IV in conjunction with giving amlodipine 10 mg now. Spoke also with pharmacy about BP medication dosing. Per Pharmacy hold 10 mg amlodipine dose at 6 am.

## 2020-10-06 NOTE — PROGRESS NOTES
"Critical Care Progress Note    Date of admission  9/21/2020    Chief Complaint  49 y.o. male with PMHx DMII, HTN and LVH admitted 9/21/2020 with an acute coronary syndrome.  He subsequently underwent CABG.    Hospital Course   10/3 -    Place NG to suction.  Titrating norepinephrine.  Hydrate with intravenous NS.  Add vasopressin.  10/4 -    nasal MRSA PCR is negative.  Stop Zyvox.  Continue Zosyn.  Keep NG to suction.  Continue IV hydration.  Renal function is improving.  Off norepinephrine.  10/5 - Zosyn, neurologically starting to speak in limited fashion, hypotensive now off norepinephrine but vomited first dose of metoprolol despite marked improved NG output, start IV ACE inhibitor  10/6 - Ileus better, NG pulled, failed swallow eval, Cortrak to be placed and trickle TF to start, Physiatry consult pending      Interval Problem Update  Reviewed last 24 hour events:    POD 11 - CABG 3V  Talking some for me - \"water\"  More alert, follows  SR 70s  SBp 170s+  Nicardipine started - 5, actively titrating  Metoprolol/Amlodpine/Lisinopril tolerated this AM  TF goal  BM  UO great  Room air  Lovenox  Zosyn 4/5  Reglan helped  Sodium and chloride slightly elevated    Titrate/wean off nicardipine if possible, adjust enteral regimen if necessary  Change normal saline to lactated Ringer's  Swallow eval pending  IS to be encouraged  Wean CCB drip  Start TF? Needs cortrak  Rehab consult  PPI and ASA to PO    Still min NG output - pulling NG    Speech eval failed -> Cortrak and start TF 10cc  Start reducing IV fluids     YESTERDAY  POD 10  Hypertensive  NG output better 100cc/12 hrs  Started PO meds for Bp this AM -> dry heaves quickly  PRN labetalol last night helped some    A&O x4  Starting to talk  SR 80s  SBp 160s  UO adequate 700  Afebrile  BM x3  Creat 1.17  Room air  IS pending  Lovenox  PPI  Unasyn -> Zosyn 3/5      Add ACE IV PRN and scheduled if effective  BB enteral when can take PO  Mobilize as tolerated  Reglan vs " Relistor?-> Reglan 5 mg IV every 6 hours  Speech eval when ready, not appropriate for swallow eval today    Review of Systems  Review of Systems   Unable to perform ROS: Acuity of condition        Vital Signs for last 24 hours   Temp:  [35.8 °C (96.5 °F)-37.3 °C (99.2 °F)] 35.8 °C (96.5 °F)  Pulse:  [] 76  Resp:  [20-31] 24  BP: (126-198)/() 170/84  SpO2:  [92 %-99 %] 99 %    Hemodynamic parameters for last 24 hours       Respiratory Information for the last 24 hours       Physical Exam   Physical Exam  Vitals signs reviewed.   Constitutional:       Appearance: He is normal weight. He is not toxic-appearing or diaphoretic.   HENT:      Head: Normocephalic and atraumatic.      Right Ear: External ear normal.      Left Ear: External ear normal.      Nose: Nose normal.      Mouth/Throat:      Mouth: Mucous membranes are moist.      Pharynx: No posterior oropharyngeal erythema.   Eyes:      General:         Right eye: No discharge.         Left eye: No discharge.      Pupils: Pupils are equal, round, and reactive to light.   Neck:      Musculoskeletal: Normal range of motion. No neck rigidity or muscular tenderness.      Vascular: No JVD.   Cardiovascular:      Rate and Rhythm:  No extrasystoles are present.     Pulses: Normal pulses.      Heart sounds: No murmur. No gallop.       Comments: SR  Pulmonary:      Effort: No respiratory distress.      Breath sounds: Rales (resolving) present. No wheezing or rhonchi.   Chest:      Comments: Median sternotomy incision without drainage or obvious signs of infection  Abdominal:      General: Abdomen is protuberant. Bowel sounds are normal. There is no distension (improved).      Palpations: Abdomen is soft. There is no fluid wave.      Tenderness: There is no abdominal tenderness. There is no right CVA tenderness, left CVA tenderness or guarding. Negative signs include Calvin's sign and McBurney's sign.      Comments: improved   Genitourinary:     Comments: Brittaney  catheter  Musculoskeletal:      Comments: No clubbing or cyanosis   Skin:     General: Skin is warm and dry.      Capillary Refill: Capillary refill takes less than 2 seconds.      Coloration: Skin is not cyanotic.      Nails: There is no clubbing.     Neurological:      GCS: GCS eye subscore is 4. GCS verbal subscore is 4. GCS motor subscore is 6.      Comments: Patient starting to verbalize for first time but in limited fashion, brainstem reflexes appear intact, moves all 4 extremities to command, lethargy better   Psychiatric:         Mood and Affect: Mood is not anxious.         Behavior: Behavior is not agitated. Behavior is cooperative.         Cognition and Memory: Cognition is impaired.       Medications  Current Facility-Administered Medications   Medication Dose Route Frequency Provider Last Rate Last Dose   • niCARdipine (CARDENE) 25 mg in  mL Infusion  0-15 mg/hr Intravenous Continuous Tim Dodd M.D.   Stopped at 10/06/20 1432   • lisinopril (PRINIVIL) tablet 10 mg  10 mg Enteral Tube Q DAY Blanca Cooney P.A.-CStacie   10 mg at 10/06/20 0924   • [START ON 10/7/2020] aspirin (ASA) chewable tab 81 mg  81 mg Enteral Tube DAILY Guerrero Solo M.D.       • [START ON 10/7/2020] omeprazole (FIRST-OMEPRAZOLE) 2 mg/mL oral susp 40 mg  40 mg Enteral Tube DAILY Guerrero Solo M.D.       • amantadine (SYMMETREL) 50 MG/5ML syrup 100 mg  100 mg Enteral Tube BID Terry Wagner, D.O.   100 mg at 10/06/20 1839   • potassium chloride in water (KCL) ivpb **Administer in ICU only** 20 mEq  20 mEq Intravenous Once Guerrero Solo M.D. 50 mL/hr at 10/06/20 1839 20 mEq at 10/06/20 1839   • lactated ringers infusion   Intravenous Continuous Guerrero Solo M.D.       • amLODIPine (NORVASC) tablet 5 mg  5 mg Enteral Tube Q DAY Guerrero Solo M.D.   5 mg at 10/06/20 0743   • metoprolol (LOPRESSOR) tablet 12.5 mg  12.5 mg Enteral Tube TWICE DAILY Guerrero Solo M.D.   12.5 mg at 10/06/20 1729   •  metoclopramide (REGLAN) injection 5 mg  5 mg Intravenous Q6HRS Guerrero Solo M.D.   5 mg at 10/06/20 1729   • enoxaparin (LOVENOX) inj 40 mg  40 mg Subcutaneous Q EVENING Karan Norton M.D.   40 mg at 10/06/20 1729   • acetaminophen (TYLENOL) suppository 975 mg  975 mg Rectal Q6HRS PRN Karan Norton M.D.       • labetalol (NORMODYNE/TRANDATE) injection 10 mg  10 mg Intravenous Q4HRS PRN Byron Edmond M.D.   10 mg at 10/05/20 2241   • K+ Scale: Goal of 4.5  1 Each Intravenous Q6HRS Karan Norton M.D.   1 Each at 10/06/20 1800   • piperacillin-tazobactam (ZOSYN) 4.5 g in  mL IVPB  4.5 g Intravenous Q8HRS Byron Edmond M.D.   Stopped at 10/06/20 1710   • Pharmacy Consult: Enteral tube insertion - review meds/change route/product selection  1 Each Other PHARMACY TO DOSE Maia Chavira, ROSIOP.N.       • tramadol (ULTRAM) 50 MG tablet 50 mg  50 mg Enteral Tube Q4HRS PRN ЮЛИЯ Holm.O.   50 mg at 10/01/20 0536   • atorvastatin (LIPITOR) tablet 80 mg  80 mg Enteral Tube QHS ЮЛИЯ Holm.O.   80 mg at 10/05/20 2047   • mag hydrox-al hydrox-simeth (MAALOX PLUS ES or MYLANTA DS) suspension 30 mL  30 mL Enteral Tube Q4HRS PRN ЮЛИЯ Holm.O.       • senna-docusate (PERICOLACE or SENOKOT S) 8.6-50 MG per tablet 2 Tab  2 Tab Enteral Tube BID MAUREEN HolmO.   2 Tab at 10/06/20 1729    And   • polyethylene glycol/lytes (MIRALAX) PACKET 1 Packet  1 Packet Enteral Tube DAILY MAUREEN HolmO.   Stopped at 10/02/20 0600    And   • magnesium hydroxide (MILK OF MAGNESIA) suspension 30 mL  30 mL Enteral Tube DAILY Mateus Stringer D.O.   Stopped at 10/01/20 0600    And   • bisacodyl (DULCOLAX) suppository 10 mg  10 mg Rectal QDAY PRN Mateus Stringer D.O.   10 mg at 10/01/20 2338   • clopidogrel (PLAVIX) tablet 75 mg  75 mg Enteral Tube DAILY Mateus Stringer D.O.   75 mg at 10/06/20 0530   • diphenhydrAMINE (BENADRYL) tablet/capsule 25 mg  25 mg Enteral Tube HS PRN - MR X  1 Mateus Stringer D.O.   25 mg at 09/30/20 2315   • HYDROcodone-acetaminophen (NORCO) 5-325 MG per tablet 1-2 Tab  1-2 Tab Enteral Tube Q6HRS PRN SHALOM Camacho.P.N.       • insulin lispro (HumaLOG) injection  0-15 Units Subcutaneous Q6HRS Maia Chavira A.P.N.   3 Units at 10/06/20 1730   • MD Alert...ICU Electrolyte Replacement per Pharmacy   Other PHARMACY TO DOSE Karan Norton M.D.       • lidocaine (LIDODERM) 5 % 1 Patch  1 Patch Transdermal Q24HR SHALOM Camacho.P.N.   1 Patch at 10/06/20 0924   • Respiratory Therapy Consult   Nebulization Continuous RT SHALOM Camacho.P.N.       • Pharmacy Consult Request ...Pain Management Review 1 Each  1 Each Other PHARMACY TO DOSE Maia Chavira A.P.N.       • ondansetron (ZOFRAN) syringe/vial injection 8 mg  8 mg Intravenous Q6HRS PRN Maia Chavira A.P.N.        Or   • prochlorperazine (COMPAZINE) injection 10 mg  10 mg Intravenous Q6HRS PRN Maia Chavira A.P.N.   10 mg at 10/02/20 0240    Or   • promethazine (PHENERGAN) suppository 25 mg  25 mg Rectal Q6HRS PRN Maia Chavira A.P.N.           Fluids    Intake/Output Summary (Last 24 hours) at 10/6/2020 2012  Last data filed at 10/6/2020 1800  Gross per 24 hour   Intake 3106.66 ml   Output 4175 ml   Net -1068.34 ml       Laboratory          Recent Labs     10/04/20  0508  10/05/20  0625  10/06/20  0526 10/06/20  1137 10/06/20  1724   SODIUM 144  --  146*  --  146*  --   --    POTASSIUM 3.8   < > 4.1   < > 4.1 3.8 3.7   CHLORIDE 107  --  115*  --  115*  --   --    CO2 20  --  17*  --  18*  --   --    BUN 65*  --  37*  --  21  --   --    CREATININE 1.77*  --  1.17  --  0.88  --   --    MAGNESIUM 3.1*  --  2.7*  --  2.2  --   --    PHOSPHORUS 2.4*  --  2.7  --  2.7  --   --    CALCIUM 8.5  --  8.2*  --  8.6  --   --     < > = values in this interval not displayed.     Recent Labs     10/04/20  0508 10/05/20  0625 10/05/20  0834 10/06/20  0526   PREALBUMIN  --   --  14.1*  --    GLUCOSE 211* 186*  --   172*     Recent Labs     10/04/20  0508 10/05/20  0625 10/06/20  0526   WBC 10.0 11.0* 12.0*   NEUTSPOLYS 72.20* 66.20 62.90   LYMPHOCYTES 13.90* 17.80* 19.90*   MONOCYTES 9.60 12.90 13.00   EOSINOPHILS 4.30 1.50 1.90   BASOPHILS 0.00 0.40 0.60     Recent Labs     10/04/20  0508 10/05/20  0625 10/06/20  0526   RBC 3.12* 3.02* 2.99*   HEMOGLOBIN 9.7* 9.2* 9.5*   HEMATOCRIT 29.5* 29.5* 29.5*   PLATELETCT 285 202 204       Imaging  X-Ray:  I have personally reviewed the images and compared with prior images.  EKG:  I have personally reviewed the images and compared with prior images.  CT:    Reviewed  Echo:   Reviewed    Assessment/Plan  * Septic shock (HCC)  Assessment & Plan  Source appears to be pulmonary +/- GI, cultures negative so far - IMPROVED  Nasal MRSA PCR is negative - stop Zyvox, monitor  Continue Zosyn for aspiration/ABD and plan 5 days of therapy, last day of antibiotics 10-7  Shock has resolved - he is off norepinephrine    Ileus (HCC)  Assessment & Plan  Improved, NG clamped x4 hours and tolerated, remove NG 10/6  Start trickle tube feeds at 10 cc/hour, do not increase for now  Continue Reglan  Optimize electrolytes  Mobilize  Limit narcotics  Serial abdominal exams -abdomen returning to baseline, positive BMs    Aspiration pneumonia (HCC)  Assessment & Plan  Did not improve clinically, 1 more day of antibiotics  Nasal MRSA PCR negative - stop Zyvox  Continue Zosyn and plan 5 days of therapy, last day of therapy 10/7  Mobilize/I-S  Follow-up chest x-ray as needed  RT protocols  Failed swallowing evaluation, enteral feeding tube to be placed  Aspiration precautions    Cardioembolic stroke (HCC)  Assessment & Plan  MRI on 9/29 confirms numerous acute/subacute supratentorial and infratentorial infarcts consistent with cardioembolic phenomenon  Continue aspirin to rectal preparation  Resume statin when ileus resolves  Follow neuro exam and limit sedatives  PT/OT  Reviewed therapy needs with [facility], both agree  it is time for physiatry consultation and possible rehab transfer in a few days    Altered mental status- (present on admission)  Assessment & Plan  Significantly improved, follows and starting to become in a limited fashion verbal    SONNY (acute kidney injury) ([facility])- (present on admission)  Assessment & Plan  Renal function has returned to normal  Continue IV fluid resuscitation as needed  Monitor renal function and urine output  Avoid nephrotoxins and renal dose medications  Renal function continues to improve, creatinine normal today for the first time    NSTEMI (non-ST elevated myocardial infarction) ([facility])- (present on admission)  Assessment & Plan  Change aspirin to enteral   Resume Plavix and statin   Adding ACE/beta-blocker   Risk factor modification long-term    CAD (coronary artery disease)- (present on admission)  Assessment & Plan  S/P 3V CABG on 9/25  Continue aspirin to rectal preparation  Resume Plavix and statin when ileus resolves    Essential hypertension- (present on admission)  Assessment & Plan  Goal SBP less than 160  Again actively titrating nicardipine  IV enalapril as needed  Metoprolol and lisinopril started enterally, adjust dose to facilitate weaning off nicardipine  Adding amlodipine to facilitate weaning off nicardipine    Type 2 diabetes mellitus with complication, without long-term current use of insulin ([facility])- (present on admission)  Assessment & Plan  Continue sliding scale insulin  Hypoglycemia protocols    Normochromic normocytic anemia  Assessment & Plan  Serial CBC  Transfuse per protocols  No bleeding clinically, monitoring       VTE:  Lovenox  Ulcer: PPI  Lines: Central Line  Ongoing indication addressed and Quispe Catheter  Ongoing indication addressed    I have performed a physical exam and reviewed and updated ROS and Plan today (10/6/2020). In review of yesterday's note (10/5/2020), there are no changes except as documented above.     Patient remains critically ill.  Actively  titrating IV nicardipine and adjusting enteral regimen for blood pressure in the setting of recent MI, coronary bypass grafting surgery and embolic CVA.  Prognosis remains guarded and patient high risk for increased neuro and cardiovascular complication without above critical care intervention and tight blood pressure control.    Discussed patient condition and risk of morbidity and/or mortality with RN, RT, Pharmacy, Charge nurse / hot rounds, Patient and [facility]     The patient remains critically ill.  Critical care time = 40 minutes in directly providing and coordinating critical care and extensive data review.  No time overlap and excludes procedures.

## 2020-10-06 NOTE — PROGRESS NOTES
MD Monroy at bedside. MD aware of BP currently at 188/95. Labetalol previously given 10 mg PRN IV but pt's SBP goal of < 160 is not sustaining. Per MD okay to give labetalol 40 mg IV once now and up dose of Vasotec to 2.5mg when next PRN dose of vasotec becomes available.

## 2020-10-06 NOTE — DIETARY
"Nutrition Support Assessment:  Day 14 of admit.  Guerrero Ann is a 49 y.o. male with admitting DX of NSTEMI.     Current problem list:  1. Septic shock  2. Aspiration pneumonia  3. Ileus  4. Acute lacunar infarct  5. Cardioembolic stroke  6. AMS  7. SONNY  8. CAD  9. T2DM  10. Essential HTN     Assessment:  Estimated Nutritional Needs based on:   Height: 157.5 cm (5' 2.01\")  Weight: 66.6 kg (146 lb 13.2 oz)  Weight to Use in Calculations: 66.6 kg (146 lb 13.2 oz) - via bed scale.   Ideal Body Weight: 53.5 kg (118 lb)  Percent Ideal Body Weight: 134.9  Body mass index is 26.85 kg/m²., BMI classification: Overweight.     Calculation/Equation: MSJ x 1.2 = 1700 kcal.   Total Calories / day: 1700 - 1900  (Calories / k - 29)  Total Grams Protein / day: 80 - 100 (Grams Protein / k.2 - 1.5)     Evaluation:   1. Pt with prolonged NPO /poor nutritional status 2/2 ileus.  NGT has been in place to suction since ~10/2.  RN notified RD Cortrak has been placed and anticipate starting trickle TFs per MD and advance slowly as tolerated.  Pt failed SLP eval in addition today, 10/6.  Will provide TF recommendations.   2. Cortrak placed for enteral access - awaiting confirmation.   3. Noted w/ possible gastroparesis - scheduled Reglan was added 10/5.   4. S/p CABG x 3 on .    5. It is worth noting that pt has lost 5.8% of his wt since admit ( 2 weeks) - this is considered a severe wt loss.  Some may be r/t fluid status (noted to be -3L per I/Os) however poor nutritional status most of admit.  6. Noted with trace edema to BUE and BLE.   7. Labs: Sodium: 146, Glucose: 172.  8. Meds: Symmetrel, Humalog, K+ scale, Prinivil, Electrolyte replacement, Reglan, Omeprazole, Zosyn, Miralax, Senokot, Milk of Mag, NS infusion @ 1000 mL.   9. +Room air.   10. LBM: 10/5.   11. Impact Peptide 1.5 is an appropriate low volume formula to meet pt's estimated needs and gauge toleration. Impact Peptide 1.5 is also CHO " controlled.      Malnutrition Risk: Pt with severe malnutrition in the context of acute illness related malnutrition related to ileus and prolonged poor PO intake as evidenced by a severe 5.8% wt loss in 2 weeks and <50% of estimated needs for >5 days.      Recommendations/Plan:  1. Initiate Impact Peptide 1.5 @ 10 mL/hr and advance slowly as tolerated to goal rate of 50 mL/hr, providing 1800 kcal, 113 gm protein, 168 gm CHO, and 924 mL of free water per day.  2. Fluids per MD.  3. Continue to monitor weight.  4. Safest PO diet per SLP/MD as appropriate.    RD follows.

## 2020-10-06 NOTE — PROGRESS NOTES
Cardiovascular Surgery Progress Note    Name: Guerrero Ann  MRN: [id number]  : 1970  Admit Date: 2020  7:37 PM  Procedure:  Procedure(s) and Anesthesia Type:     * CABG, WITH ENDOSCOPIC VEIN PROCUREMENT- X3 - General     * ECHOCARDIOGRAM, TRANSESOPHAGEAL - General  11 Day Post-Op    Vitals:  Patient Vitals for the past 8 hrs:   Temp SpO2 O2 Delivery Device Pulse Resp BP Weight   10/06/20 0615 -- 96 % -- (!) 103 (!) 25 151/79 --   10/06/20 0600 36.7 °C (98.1 °F) 94 % None - Room Air (!) 103 (!) 27 152/85 --   10/06/20 0545 -- 95 % -- (!) 101 (!) 28 150/76 --   10/06/20 0530 -- 94 % -- 89 (!) 26 150/76 --   10/06/20 0500 -- 99 % -- 96 (!) 26 146/82 --   10/06/20 0445 -- 92 % -- 85 (!) 23 147/74 66.6 kg (146 lb 13.2 oz)   10/06/20 0430 -- 92 % -- 77 (!) 21 143/80 --   10/06/20 0415 -- 95 % -- (!) 101 (!) 31 126/72 --   10/06/20 0400 -- 94 % None - Room Air 79 (!) 22 (!) 163/102 --   10/06/20 0345 -- 95 % -- 75 (!) 24 (!) 174/88 --   10/06/20 0330 -- 96 % -- 63 (!) 25 (!) 184/101 --   10/06/20 0315 -- 97 % -- 86 (!) 25 (!) 169/95 --   10/06/20 0245 -- 96 % -- 78 (!) 22 (!) 165/109 --   10/06/20 0230 -- 96 % -- 83 (!) 25 (!) 186/103 --   10/06/20 0200 -- 97 % None - Room Air 84 (!) 25 158/101 --   10/06/20 0130 -- 97 % -- 90 (!) 25 (!) 174/100 --   10/06/20 0115 -- 98 % -- 90 (!) 25 (!) 198/104 --     Temp (24hrs), Av.9 °C (98.4 °F), Min:36.3 °C (97.3 °F), Max:37.3 °C (99.2 °F)    Respiratory:    Respiration: (!) 25, Pulse Oximetry: 96 %     Chest Tube Drains:          Fluids:    Intake/Output Summary (Last 24 hours) at 10/6/2020 0914  Last data filed at 10/6/2020 0600  Gross per 24 hour   Intake 2780.83 ml   Output 2280 ml   Net 500.83 ml     Admit weight: Weight: 72.6 kg (160 lb)  Current weight: Weight: 66.6 kg (146 lb 13.2 oz) (10/06/20 0445)    Labs:  Recent Labs     10/04/20  0508 10/05/20  0625 10/06/20  05   WBC 10.0 11.0* 12.0*   RBC 3.12* 3.02* 2.99*   HEMOGLOBIN 9.7*  9.2* 9.5*   HEMATOCRIT 29.5* 29.5* 29.5*   MCV 94.2 97.7 98.7*   MCH 30.8 30.5 31.8   MCHC 32.7* 31.2* 32.2*   RDW 51.8* 53.3* 53.4*   PLATELETCT 285 202 204   MPV 10.7 10.6 10.4     Recent Labs     10/04/20  0508 10/05/20  0625 10/06/20  0526   NEUTSPOLYS 72.20* 66.20 62.90   LYMPHOCYTES 13.90* 17.80* 19.90*   MONOCYTES 9.60 12.90 13.00   EOSINOPHILS 4.30 1.50 1.90   BASOPHILS 0.00 0.40 0.60   RBCMORPHOLO Present  --   --      Recent Labs     10/04/20  0508  10/05/20  0625  10/05/20  1734 10/06/20  0034 10/06/20  0526   SODIUM 144  --  146*  --   --   --  146*   POTASSIUM 3.8   < > 4.1   < > 4.1 4.0 4.1   CHLORIDE 107  --  115*  --   --   --  115*   CO2 20  --  17*  --   --   --  18*   GLUCOSE 211*  --  186*  --   --   --  172*   BUN 65*  --  37*  --   --   --  21   CREATININE 1.77*  --  1.17  --   --   --  0.88   CALCIUM 8.5  --  8.2*  --   --   --  8.6    < > = values in this interval not displayed.     Medications:  • lisinopril  10 mg     • amLODIPine  5 mg     • metoprolol  12.5 mg     • metoclopramide  5 mg     • aspirin  300 mg     • enoxaparin (LOVENOX) injection  40 mg     • pantoprazole  40 mg     • K+ Scale: Goal of 4.5  1 Each     • piperacillin-tazobactam  4.5 g 4.5 g (10/06/20 0530)   • Pharmacy  1 Each     • atorvastatin  80 mg     • senna-docusate  2 Tab      And   • polyethylene glycol/lytes  1 Packet      And   • magnesium hydroxide  30 mL     • clopidogrel  75 mg     • insulin lispro  0-15 Units     • MD Alert...Adult ICU Electrolyte Replacement per Pharmacy       • lidocaine  1 Patch     • Pharmacy Consult Request  1 Each        Ordered Medications:    ASA - Yes    Plavix - Yes    Post-operative Beta Blockers - Yes    Ace Inhibitor - Yes    Statin - Yes    Exam:   Review of Systems   Unable to perform ROS: Mental status change     Physical Exam  Constitutional:       General: He is not in acute distress.     Appearance: He is well-developed. He is not diaphoretic.   HENT:      Head:  Normocephalic.   Eyes:      Pupils: Pupils are equal, round, and reactive to light.   Neck:      Musculoskeletal: Neck supple.   Cardiovascular:      Rate and Rhythm: Normal rate and regular rhythm.      Heart sounds: Normal heart sounds. No murmur. No friction rub. No gallop.    Pulmonary:      Effort: Pulmonary effort is normal. No respiratory distress.      Breath sounds: Examination of the right-lower field reveals decreased breath sounds. Examination of the left-lower field reveals decreased breath sounds. Decreased breath sounds present. No wheezing or rales.   Abdominal:      General: There is no distension.      Tenderness: There is no abdominal tenderness.      Comments: firm   Skin:     General: Skin is warm and dry.      Comments: Sternum- prevena dressing  SVG sites- c/d/i   Neurological:      Mental Status: He is alert and oriented to person, place, and time.       Quality Measures:   Quality-Core Measures   Reviewed items::  EKG reviewed, Labs reviewed, Medications reviewed and Radiology images reviewed  Gomez catheter::  Critically Ill - Requiring Accurate Measurement of Urinary Output  Central line in place:  Concentrated IV drugs  DVT prophylaxis pharmacological::  Heparin  DVT prophylaxis - mechanical:  SCDs  Ulcer Prophylaxis::  Yes    Assessment/Plan:  POD 1 HDS, SR, Acute blood loss anemia- s/p transfusion- keep mediastinal tubes, gently diurese, d/c gomez  POD 2 HTN- inc lisinopril, SR/ST- change to metoprolol, acute blood loss anemia s/p transfusion- watch, d/c mediastinal tubes, urine retention- replace gomez/start flomax, cont diuresis  POD 3  HDS, SR, neuro right  weaker than left gazes to right, wounds CDI, abdomen S/NT, fluid balance negative, wt still up from admit.  Plan:  MRI today, decrease ace for permissive hypertension, dc gabapentin, BB, Statin, IS/ambulate. CPM.  POD 4  HDS but hypertensive, SR, neuro answers questions but delayed.  Gaze more center.  EEG normal, MRI pending,  wounds CDI, abdomen distended, fluid balance negative, wt down.  Plan:  Restart ace, IM getting abdominal study, BB, Statin, IS/ambulate. CPM.  POD 5  HDS, ST, neuro unchanged, wounds CDI, abdomen S/NT, fluid balance negative, wt down.  Failed swallow evaluation.  Hypertensive.  Pain issues.  Plan:  core trak, switch BB to coreg, ACE, Statin, add norco, IS/ambulate. CPM.  POD 6  HDS, SR, neuro unchanged, wounds CDI, abdomen S/NT, fluid balance negative, wt stable.  Plan:  Awaiting rehab authorization, BB, ACE, Statin, IS/ambulate. CPM.  POD 7 HDS, SR, Febrile- most likely aspiration pneumonia- started on rocephin- d/w pharmacy will change to unasyn, ileus- 2 liters of residual last night- start reglan/supp- if no response try relistor, Elevated creatinine- stop lisinopril/lasix- switch lovenox to heparin- start fluids 75 cc/hr, replace gomez for strict I&O, neuro- follows with slowed response, minimal speech  POD 8  HDS, SR, neuro intact, wounds CDI, abdomen firm and distended CT scan shows ileus, fluid balance negative, wt down.  Creatine trending down   Plan:  NG to decompress abd, 1 unit blood, Statin, IS/ambulate. CPM.  POD 9  HDS, SR, neuro follows commands slowly, wounds CDI, abdomen S/NT, fluid balance negative, wt down, NG tube with 1800 out, had 2 loose BM's.  Creatinine improved.  Off levo.  Plan:  Continue to decompress abd with NG.  Bowel protocol.  IV antibiotics for possible aspiration. CPM.  POD 10  HDS, SR, neuro intact, wounds CDI, abdomen less distended today + BS, fluid balance negative, wt up slightly.  Hypertensive but does not tolerate oral medications.  Getting ace and bb.  Creatinine normalized.  Plan:  Continue bowel protocol, continue NG tube, BB, ACE, Statin, IS/ambulate. CPM.  POD 11:  HDS.  NSR.  Hypertensive.  Weight stable, below baseline admit weight.  Pending swallow evaluation today.  NG tube clamped since 4am, tolerating.  Switching medications to oral, crushed.  Remains on IV abx.   Plan:  Discussed with Dr. Solo.  Plan for repeat rehab evaluation once swallow evaluation complete.  Continue current management.      Active Hospital Problems    Diagnosis   • Septic shock ([facility]) [A41.9, R65.21]     Priority: High   • Aspiration pneumonia ([facility]) [J69.0]     Priority: Medium   • Ileus ([facility]) [K56.7]     Priority: Medium   • Lacunar infarct, acute ([facility]) [I63.81]     Priority: Medium   • Cardioembolic stroke ([facility]) [I63.9]     Priority: Medium   • Altered mental status [R41.82]     Priority: Medium   • SONNY (acute kidney injury) ([facility]) [N17.9]     Priority: Medium   • NSTEMI (non-ST elevated myocardial infarction) ([facility]) [I21.4]     Priority: Medium   • CAD (coronary artery disease) [I25.10]     Priority: Medium   • Type 2 diabetes mellitus with complication, without long-term current use of insulin ([facility]) [E11.8]     Priority: Medium   • Essential hypertension [I10]     Priority: Medium   • Normochromic normocytic anemia [D64.9]     Priority: Low   • Hypokalemia [E87.6]     Priority: Low

## 2020-10-06 NOTE — PROGRESS NOTES
Spoke with MD Monroy about pt's BP going up from Systolic 170s to 180s despite given Vasotec 1.25mg about a hour ago. Per MD give 20mg labetalol IV now once.

## 2020-10-06 NOTE — THERAPY
"Speech Language Pathology  Daily Treatment     Patient Name: Guerrero Ann  Age:  49 y.o., Sex:  male  Medical Record #: [id number]  Today's Date: 10/6/2020     Precautions  Precautions: (P) Fall Risk, Swallow Precautions ( See Comments)  Comments: (P) delayed/absent verbal responses     Assessment    Pt was seen for a swallow reassessment today after NGT was removed. Pt was alert, though responses to questions/commands were either significantly delayed or absent. Vocal quality was either aphonic or hypophonic when pt did respond. Volitional throat clear was weak. Pt presents with R gaze preference. Pt was presented with ice chips x5, mildly thick water via tsp x2. Oral responses were appropriate with pt demo'ing normal mastication of ice chips. Pharyngeal swallow response was timely. Hyolaryngeal excursion was palpated as completed. Pt swallowed x2 per bolus with MTL, which can indicate pharyngeal residue. Reflexive coughing occurred with 1/5 ice chips and 2/2 trials of MTL. O2 sats remained stable. Pt is not safe for PO intake at this time. Attempted to teach pt swallowing exercises, though participation was limited by impaired cognition. Recommend pt remain strict NPO with non-oral source of nutrition/hydration/meds.     Plan    Recommend pt remain strict NPO with non-oral source of nutrition/hydration/meds.     Treatment plan modified to 5 times per week until therapy goals are met for the following treatments:  Dysphagia Training and Patient / Family / Caregiver Education.    Discharge Recommendations: (P) Recommend post-acute placement for additional speech therapy services prior to discharge home    Subjective    \"Water, water.\"     Objective       10/06/20 6607   Cognitive-Linguistic   Level of Consciousness Alert   Voice   Comments hypophonic/aphonic   Dysphagia    Oral / Pharyngeal / Laryngeal Exercises Base of Tongue Exercises  (attempted; pt able to perform x1)   Other Treatments PO " trials of ice chips x5, MTL via tsp x2   Diet / Liquid Recommendation NPO;Pre-Feeding Trials with SLP Only   Recommended Route of Medication Administration   Medication Administration  Via Gastric Tube   Short Term Goals   Short Term Goal # 1 NEW 10/6: Patient will consume PO trials with SLP only with no s/sx of aspiration.   Short Term Goal # 2 NEW 10/6: Patient will complete swallowing exercises x5-10 reps with max cues.

## 2020-10-06 NOTE — PROGRESS NOTES
0715- Discussed pt's overnight HTN with Dr. Dodd.  OK to give morning antihypertensives and work to titrate nicardipine gtt down.  Will give amlodipine and metoprolol this AM that were previously held.

## 2020-10-06 NOTE — CONSULTS
Physical Medicine and Rehabilitation Consultation         Initial Consult      Date of initial consultation: 10/6/2020  Consulting provider: GABBY Bowie  Reason for consultation: assess for acute inpatient rehab appropriateness  LOS: 14 Day(s)    Chief complaint: stoke, Cardiac     HPI: The patient is a 49 y.o. right hand dominant male with a past medical history of CAD status post stents, diabetes, hypertension;  who presented on 2020  7:37 PM with chest pain.  Patient found to have an STEMI, went to Cath Lab, found to have multivessel coronary artery disease and in-stent stenosis.  Patient is now status post CABG x3 by Dr. Chicas on .  Postoperatively patient found to have right-sided weakness and gaze preference with NIH stroke score of 11.  MRI found numerous acute/subacute supratentorial and infratentorial infarctions consistent with cardioembolic etiology.  Hospital course further complicated by septic shock with hypotension requiring norepinephrine, CT abdomen showing ileus, and possible right lower lobe pneumonia versus atelectasis.  Patient on Zosyn for presumed aspiration pneumonia, currently running until 10/8.     The patient currently reports confusion. He is minimally verbal and follows commands 80%.    THERAPY:  PT: Functional mobility   10/5: Moderate assist x2 for safety    OT: ADLs  10/1: Max A for LBD    SLP:   10/6: NPO/ dysphagia     Social Hx:  1 SH  1 MICHEL  With: Girlfriend, and her kids ages 9 and 11    IMAGIN/28 MR brain   Numerous acute/subacute supratentorial and infratentorial infarcts most consistent with cardioembolic etiology.  Several chronic lacunar infarcts.  No acute intracranial hemorrhage.    PROCEDURES:   Hebert Mead MD  Selective coronary angiography of the native vessels  Left heart catheterization  Left ventriculogram  IFR LAD  Supervision moderate sedation     Michael Chicas MD  Coronary artery bypass grafting x3, left internal   mammary to  left anterior descending, reverse saphenous vein graft to the major   obtuse marginal branch and the right posterior descending, endoscopic   saphenous vein harvesting, extracorporal circulation.    PMH:  Past Medical History:   Diagnosis Date   • Diabetes (HCC)     oral meds only   • Hypertension    • Kidney stones    • MI, old    • Psychiatric problem     depression and anxiety       PSH:  Past Surgical History:   Procedure Laterality Date   • MULTIPLE CORONARY ARTERY BYPASS ENDO VEIN HARVEST  2020    Procedure: CABG, WITH ENDOSCOPIC VEIN PROCUREMENT- X3;  Surgeon: Michael Chicas M.D.;  Location: SURGERY [facility];  Service: Cardiothoracic   • MARGOTH  2020    Procedure: ECHOCARDIOGRAM, TRANSESOPHAGEAL;  Surgeon: Michael Chicas M.D.;  Location: SURGERY [facility];  Service: Cardiothoracic   • STENT PLACEMENT  2017 cardiac stent   • OTHER ORTHOPEDIC SURGERY      rt wrist fracture with fixation 34 years ago       FHX:  Family History   Problem Relation Age of Onset   • Stroke Mother 47        had 3 devastating strokes,  56yo stroke complications   • No Known Problems Father         does not know his father.   • Stroke Maternal Grandmother 85   • Stroke Maternal Grandfather 54       Medications:  Current Facility-Administered Medications   Medication Dose   • niCARdipine (CARDENE) 25 mg in  mL Infusion  0-15 mg/hr   • lisinopril (PRINIVIL) tablet 10 mg  10 mg   • [START ON 10/7/2020] aspirin (ASA) chewable tab 81 mg  81 mg   • [START ON 10/7/2020] omeprazole (FIRST-OMEPRAZOLE) 2 mg/mL oral susp 40 mg  40 mg   • amLODIPine (NORVASC) tablet 5 mg  5 mg   • metoprolol (LOPRESSOR) tablet 12.5 mg  12.5 mg   • metoclopramide (REGLAN) injection 5 mg  5 mg   • enoxaparin (LOVENOX) inj 40 mg  40 mg   • acetaminophen (TYLENOL) suppository 975 mg  975 mg   • labetalol (NORMODYNE/TRANDATE) injection 10 mg  10 mg   • K+ Scale: Goal of 4.5  1 Each   • NS infusion     • piperacillin-tazobactam (ZOSYN) 4.5  "g in  mL IVPB  4.5 g   • Pharmacy Consult: Enteral tube insertion - review meds/change route/product selection  1 Each   • tramadol (ULTRAM) 50 MG tablet 50 mg  50 mg   • atorvastatin (LIPITOR) tablet 80 mg  80 mg   • mag hydrox-al hydrox-simeth (MAALOX PLUS ES or MYLANTA DS) suspension 30 mL  30 mL   • senna-docusate (PERICOLACE or SENOKOT S) 8.6-50 MG per tablet 2 Tab  2 Tab    And   • polyethylene glycol/lytes (MIRALAX) PACKET 1 Packet  1 Packet    And   • magnesium hydroxide (MILK OF MAGNESIA) suspension 30 mL  30 mL    And   • bisacodyl (DULCOLAX) suppository 10 mg  10 mg   • clopidogrel (PLAVIX) tablet 75 mg  75 mg   • diphenhydrAMINE (BENADRYL) tablet/capsule 25 mg  25 mg   • HYDROcodone-acetaminophen (NORCO) 5-325 MG per tablet 1-2 Tab  1-2 Tab   • insulin lispro (HumaLOG) injection  0-15 Units   • MD Alert...ICU Electrolyte Replacement per Pharmacy     • lidocaine (LIDODERM) 5 % 1 Patch  1 Patch   • Respiratory Therapy Consult     • Pharmacy Consult Request ...Pain Management Review 1 Each  1 Each   • ondansetron (ZOFRAN) syringe/vial injection 8 mg  8 mg    Or   • prochlorperazine (COMPAZINE) injection 10 mg  10 mg    Or   • promethazine (PHENERGAN) suppository 25 mg  25 mg       Allergies:  No Known Allergies    Physical Exam:  Vitals: /85   Pulse 89   Temp 35.8 °C (96.5 °F) (Temporal)   Resp (!) 22   Ht 1.575 m (5' 2\")   Wt 66.6 kg (146 lb 13.2 oz)   SpO2 95%   Gen: NAD  Head: NC/AT  Eyes/ Nose/ Mouth: PERRLA, moist mucous membranes  Cardio: RRR, no mumurs  Pulm: CTAB, with normal respiratory effort  Abd: Soft NTND, active bowel sounds,   Ext: No peripheral edema. No calf tenderness. No clubbing/cyanosis.    Mental status: follows commands 80%. Orients to Tuesday (correct) and unknown month. States spring season. (it is fall)  Speech: fluent, no aphasia or dysarthria    CRANIAL NERVES:  2,3: visual acuity grossly intact, PERRL  3,4,6: EOMI bilaterally, no nystagmus or diplopia  5: " sensation intact to light touch bilaterally and symmetric  7: no facial asymmetry  8: hearing grossly intact  9,10: symmetric palate elevation  11: SCM/Trapezius strength 5/5 bilaterally  12: tongue protrudes left      Motor:      Upper Extremity  Myotome R L   Shoulder flexion C5 5 5   Elbow flexion C5 4/5 5   Wrist extension C6 5 5   Elbow extension C7 5 5   Finger flexion C8 5 5   Finger abduction T1 5 5     Lower Extremity Myotome R L   Hip flexion L2 5 5   Knee extension L3 4/5 5   Ankle dorsiflexion L4 4/5 5   Toe extension L5 5 5   Ankle plantarflexion S1 5 5     Sensory:   intact to light touch through out    DTRs: 2+ in bilateral  brachioradialis, 2+ in bilateral patellar tendons    Tone: no spasticity noted, no cogwheeling noted    Labs: Reviewed and significant for   Recent Labs     10/04/20  0508 10/05/20  0625 10/06/20  0526   RBC 3.12* 3.02* 2.99*   HEMOGLOBIN 9.7* 9.2* 9.5*   HEMATOCRIT 29.5* 29.5* 29.5*   PLATELETCT 285 202 204     Recent Labs     10/04/20  0508  10/05/20  0625  10/05/20  1734 10/06/20  0034 10/06/20  0526   SODIUM 144  --  146*  --   --   --  146*   POTASSIUM 3.8   < > 4.1   < > 4.1 4.0 4.1   CHLORIDE 107  --  115*  --   --   --  115*   CO2 20  --  17*  --   --   --  18*   GLUCOSE 211*  --  186*  --   --   --  172*   BUN 65*  --  37*  --   --   --  21   CREATININE 1.77*  --  1.17  --   --   --  0.88   CALCIUM 8.5  --  8.2*  --   --   --  8.6    < > = values in this interval not displayed.     Recent Results (from the past 24 hour(s))   POTASSIUM SERUM (K)    Collection Time: 10/05/20 11:22 AM   Result Value Ref Range    Potassium 4.2 3.6 - 5.5 mmol/L   ACCU-CHEK GLUCOSE    Collection Time: 10/05/20 11:34 AM   Result Value Ref Range    Glucose - Accu-Ck 165 (H) 65 - 99 mg/dL   POTASSIUM SERUM (K)    Collection Time: 10/05/20  5:34 PM   Result Value Ref Range    Potassium 4.1 3.6 - 5.5 mmol/L   ACCU-CHEK GLUCOSE    Collection Time: 10/05/20  5:43 PM   Result Value Ref Range    Glucose  - Accu-Ck 170 (H) 65 - 99 mg/dL   ACCU-CHEK GLUCOSE    Collection Time: 10/06/20 12:28 AM   Result Value Ref Range    Glucose - Accu-Ck 162 (H) 65 - 99 mg/dL   POTASSIUM SERUM (K)    Collection Time: 10/06/20 12:34 AM   Result Value Ref Range    Potassium 4.0 3.6 - 5.5 mmol/L   ACCU-CHEK GLUCOSE    Collection Time: 10/06/20  5:25 AM   Result Value Ref Range    Glucose - Accu-Ck 150 (H) 65 - 99 mg/dL   Basic Metabolic Panel    Collection Time: 10/06/20  5:26 AM   Result Value Ref Range    Sodium 146 (H) 135 - 145 mmol/L    Potassium 4.1 3.6 - 5.5 mmol/L    Chloride 115 (H) 96 - 112 mmol/L    Co2 18 (L) 20 - 33 mmol/L    Glucose 172 (H) 65 - 99 mg/dL    Bun 21 8 - 22 mg/dL    Creatinine 0.88 0.50 - 1.40 mg/dL    Calcium 8.6 8.5 - 10.5 mg/dL    Anion Gap 13.0 7.0 - 16.0   CBC WITH DIFFERENTIAL    Collection Time: 10/06/20  5:26 AM   Result Value Ref Range    WBC 12.0 (H) 4.8 - 10.8 K/uL    RBC 2.99 (L) 4.70 - 6.10 M/uL    Hemoglobin 9.5 (L) 14.0 - 18.0 g/dL    Hematocrit 29.5 (L) 42.0 - 52.0 %    MCV 98.7 (H) 81.4 - 97.8 fL    MCH 31.8 27.0 - 33.0 pg    MCHC 32.2 (L) 33.7 - 35.3 g/dL    RDW 53.4 (H) 35.9 - 50.0 fL    Platelet Count 204 164 - 446 K/uL    MPV 10.4 9.0 - 12.9 fL    Neutrophils-Polys 62.90 44.00 - 72.00 %    Lymphocytes 19.90 (L) 22.00 - 41.00 %    Monocytes 13.00 0.00 - 13.40 %    Eosinophils 1.90 0.00 - 6.90 %    Basophils 0.60 0.00 - 1.80 %    Immature Granulocytes 1.70 (H) 0.00 - 0.90 %    Nucleated RBC 0.00 /100 WBC    Neutrophils (Absolute) 7.53 (H) 1.82 - 7.42 K/uL    Lymphs (Absolute) 2.38 1.00 - 4.80 K/uL    Monos (Absolute) 1.55 (H) 0.00 - 0.85 K/uL    Eos (Absolute) 0.23 0.00 - 0.51 K/uL    Baso (Absolute) 0.07 0.00 - 0.12 K/uL    Immature Granulocytes (abs) 0.20 (H) 0.00 - 0.11 K/uL    NRBC (Absolute) 0.00 K/uL   PHOSPHORUS    Collection Time: 10/06/20  5:26 AM   Result Value Ref Range    Phosphorus 2.7 2.5 - 4.5 mg/dL   MAGNESIUM    Collection Time: 10/06/20  5:26 AM   Result Value Ref Range     Magnesium 2.2 1.5 - 2.5 mg/dL   ESTIMATED GFR    Collection Time: 10/06/20  5:26 AM   Result Value Ref Range    GFR If African American >60 >60 mL/min/1.73 m 2    GFR If Non African American >60 >60 mL/min/1.73 m 2       ASSESSMENT:  Patient is a 49 y.o. male admitted with CAD now s/p 3v CABG and complicated by post op cardio embolic stroke, septic shock and ileus.      [facility] Code / Diagnosis to Support: 0001.3 - Stroke: Bilateral Involvement    Rehabilitation: Impaired ADLs and mobility  Patient is a good candidate for inpatient rehab based on needs for PT, OT, and speech therapy.  Patient will also benefit from family training.  Patient has a good discharge situation which will be home with family.     Barriers to transfer include: Insurance authorization, TCCs to verify disposition, medical clearance and bed availability     Additional Recommendations:  - will need updated therapy notes  - Patient has need with SLP for cog, dysphagia and needs with PT/OT for mobility and ADLs   - He is following commands mostly, limited by inattention  - Starting amantadine at 6am and noon for inattention   - Pain well controlled    Medical Complexity:  Leukocytosis  Anemia  Hypernatremia  Diabetes  Hypertension/labile blood pressure    DVT PPX: Lovenox 40 mg injection daily      Thank you for allowing us to participate in the care of this patient.     Patient was seen for 82 minutes on unit/floor of which > 50% of time was spent on counseling and coordination of care regarding the above, including prognosis, risk reduction, benefits of treatment, and options for next stage of care.    Terry Wagner, DO   Physical Medicine and Rehabilitation

## 2020-10-06 NOTE — CARE PLAN
Problem: Venous Thromboembolism (VTW)/Deep Vein Thrombosis (DVT) Prevention:  Goal: Patient will participate in Venous Thrombosis (VTE)/Deep Vein Thrombosis (DVT)Prevention Measures  Outcome: PROGRESSING AS EXPECTED  Intervention: Assess and monitor for anticoagulation complications  Note: Pt does not show signs or symptoms of DVT and has Lovenox ordered daily for prophylaxis.     Problem: Discharge Barriers/Planning  Goal: Patient's continuum of care needs will be met  Outcome: PROGRESSING AS EXPECTED  Intervention: Collaborate with Transitional Care Team and Interdisciplinary Team to meet discharge needs  Note: Pt is authorized to go to rehab facility, must be off all BP gtts and stable with Po/enteral route medications.  Nicardipine gtt currently off, BP 130s-150s, goal less than 160.

## 2020-10-07 ENCOUNTER — APPOINTMENT (OUTPATIENT)
Dept: RADIOLOGY | Facility: MEDICAL CENTER | Age: 50
DRG: 233 | End: 2020-10-07
Attending: INTERNAL MEDICINE
Payer: COMMERCIAL

## 2020-10-07 LAB
ANION GAP SERPL CALC-SCNC: 15 MMOL/L (ref 7–16)
BACTERIA BLD CULT: NORMAL
BACTERIA BLD CULT: NORMAL
BASOPHILS # BLD AUTO: 0.5 % (ref 0–1.8)
BASOPHILS # BLD: 0.06 K/UL (ref 0–0.12)
BUN SERPL-MCNC: 17 MG/DL (ref 8–22)
CALCIUM SERPL-MCNC: 8.4 MG/DL (ref 8.5–10.5)
CHLORIDE SERPL-SCNC: 111 MMOL/L (ref 96–112)
CO2 SERPL-SCNC: 16 MMOL/L (ref 20–33)
CREAT SERPL-MCNC: 0.88 MG/DL (ref 0.5–1.4)
CRP SERPL HS-MCNC: 2.41 MG/DL (ref 0–0.75)
EOSINOPHIL # BLD AUTO: 0.27 K/UL (ref 0–0.51)
EOSINOPHIL NFR BLD: 2.2 % (ref 0–6.9)
ERYTHROCYTE [DISTWIDTH] IN BLOOD BY AUTOMATED COUNT: 50 FL (ref 35.9–50)
GLUCOSE BLD-MCNC: 161 MG/DL (ref 65–99)
GLUCOSE BLD-MCNC: 190 MG/DL (ref 65–99)
GLUCOSE BLD-MCNC: 209 MG/DL (ref 65–99)
GLUCOSE BLD-MCNC: 213 MG/DL (ref 65–99)
GLUCOSE SERPL-MCNC: 161 MG/DL (ref 65–99)
HCT VFR BLD AUTO: 30.6 % (ref 42–52)
HGB BLD-MCNC: 9.5 G/DL (ref 14–18)
IMM GRANULOCYTES # BLD AUTO: 0.17 K/UL (ref 0–0.11)
IMM GRANULOCYTES NFR BLD AUTO: 1.4 % (ref 0–0.9)
LYMPHOCYTES # BLD AUTO: 2.26 K/UL (ref 1–4.8)
LYMPHOCYTES NFR BLD: 18.8 % (ref 22–41)
MAGNESIUM SERPL-MCNC: 1.9 MG/DL (ref 1.5–2.5)
MCH RBC QN AUTO: 30.1 PG (ref 27–33)
MCHC RBC AUTO-ENTMCNC: 31 G/DL (ref 33.7–35.3)
MCV RBC AUTO: 96.8 FL (ref 81.4–97.8)
MONOCYTES # BLD AUTO: 1.29 K/UL (ref 0–0.85)
MONOCYTES NFR BLD AUTO: 10.7 % (ref 0–13.4)
NEUTROPHILS # BLD AUTO: 7.96 K/UL (ref 1.82–7.42)
NEUTROPHILS NFR BLD: 66.4 % (ref 44–72)
NRBC # BLD AUTO: 0 K/UL
NRBC BLD-RTO: 0 /100 WBC
PHOSPHATE SERPL-MCNC: 3.1 MG/DL (ref 2.5–4.5)
PLATELET # BLD AUTO: 233 K/UL (ref 164–446)
PMV BLD AUTO: 10.3 FL (ref 9–12.9)
POTASSIUM SERPL-SCNC: 3.7 MMOL/L (ref 3.6–5.5)
POTASSIUM SERPL-SCNC: 3.9 MMOL/L (ref 3.6–5.5)
PREALB SERPL-MCNC: 16.2 MG/DL (ref 18–38)
RBC # BLD AUTO: 3.16 M/UL (ref 4.7–6.1)
SIGNIFICANT IND 70042: NORMAL
SIGNIFICANT IND 70042: NORMAL
SITE SITE: NORMAL
SITE SITE: NORMAL
SODIUM SERPL-SCNC: 142 MMOL/L (ref 135–145)
SOURCE SOURCE: NORMAL
SOURCE SOURCE: NORMAL
WBC # BLD AUTO: 12 K/UL (ref 4.8–10.8)

## 2020-10-07 PROCEDURE — 84134 ASSAY OF PREALBUMIN: CPT

## 2020-10-07 PROCEDURE — 700102 HCHG RX REV CODE 250 W/ 637 OVERRIDE(OP): Performed by: PHYSICAL MEDICINE & REHABILITATION

## 2020-10-07 PROCEDURE — 97535 SELF CARE MNGMENT TRAINING: CPT | Mod: CO

## 2020-10-07 PROCEDURE — 83735 ASSAY OF MAGNESIUM: CPT

## 2020-10-07 PROCEDURE — A9270 NON-COVERED ITEM OR SERVICE: HCPCS | Performed by: STUDENT IN AN ORGANIZED HEALTH CARE EDUCATION/TRAINING PROGRAM

## 2020-10-07 PROCEDURE — 84132 ASSAY OF SERUM POTASSIUM: CPT

## 2020-10-07 PROCEDURE — 700111 HCHG RX REV CODE 636 W/ 250 OVERRIDE (IP): Performed by: INTERNAL MEDICINE

## 2020-10-07 PROCEDURE — 770020 HCHG ROOM/CARE - TELE (206)

## 2020-10-07 PROCEDURE — 99024 POSTOP FOLLOW-UP VISIT: CPT | Performed by: THORACIC SURGERY (CARDIOTHORACIC VASCULAR SURGERY)

## 2020-10-07 PROCEDURE — 700105 HCHG RX REV CODE 258: Performed by: INTERNAL MEDICINE

## 2020-10-07 PROCEDURE — A9270 NON-COVERED ITEM OR SERVICE: HCPCS | Performed by: INTERNAL MEDICINE

## 2020-10-07 PROCEDURE — 80048 BASIC METABOLIC PNL TOTAL CA: CPT

## 2020-10-07 PROCEDURE — 700102 HCHG RX REV CODE 250 W/ 637 OVERRIDE(OP): Performed by: STUDENT IN AN ORGANIZED HEALTH CARE EDUCATION/TRAINING PROGRAM

## 2020-10-07 PROCEDURE — 700101 HCHG RX REV CODE 250: Performed by: CLINICAL NURSE SPECIALIST

## 2020-10-07 PROCEDURE — 74018 RADEX ABDOMEN 1 VIEW: CPT

## 2020-10-07 PROCEDURE — 99233 SBSQ HOSP IP/OBS HIGH 50: CPT | Performed by: INTERNAL MEDICINE

## 2020-10-07 PROCEDURE — A9270 NON-COVERED ITEM OR SERVICE: HCPCS | Performed by: CLINICAL NURSE SPECIALIST

## 2020-10-07 PROCEDURE — 97112 NEUROMUSCULAR REEDUCATION: CPT

## 2020-10-07 PROCEDURE — 700102 HCHG RX REV CODE 250 W/ 637 OVERRIDE(OP): Performed by: INTERNAL MEDICINE

## 2020-10-07 PROCEDURE — 97530 THERAPEUTIC ACTIVITIES: CPT | Mod: CO

## 2020-10-07 PROCEDURE — 84100 ASSAY OF PHOSPHORUS: CPT

## 2020-10-07 PROCEDURE — 700102 HCHG RX REV CODE 250 W/ 637 OVERRIDE(OP): Performed by: PHYSICIAN ASSISTANT

## 2020-10-07 PROCEDURE — 85025 COMPLETE CBC W/AUTO DIFF WBC: CPT

## 2020-10-07 PROCEDURE — 97116 GAIT TRAINING THERAPY: CPT

## 2020-10-07 PROCEDURE — A9270 NON-COVERED ITEM OR SERVICE: HCPCS | Performed by: PHYSICIAN ASSISTANT

## 2020-10-07 PROCEDURE — 82962 GLUCOSE BLOOD TEST: CPT | Mod: 91

## 2020-10-07 PROCEDURE — 700102 HCHG RX REV CODE 250 W/ 637 OVERRIDE(OP): Performed by: CLINICAL NURSE SPECIALIST

## 2020-10-07 PROCEDURE — A9270 NON-COVERED ITEM OR SERVICE: HCPCS | Performed by: PHYSICAL MEDICINE & REHABILITATION

## 2020-10-07 PROCEDURE — 86140 C-REACTIVE PROTEIN: CPT

## 2020-10-07 RX ORDER — POTASSIUM CHLORIDE 14.9 MG/ML
20 INJECTION INTRAVENOUS ONCE
Status: DISCONTINUED | OUTPATIENT
Start: 2020-10-07 | End: 2020-10-07

## 2020-10-07 RX ORDER — POTASSIUM CHLORIDE 14.9 MG/ML
20 INJECTION INTRAVENOUS ONCE
Status: COMPLETED | OUTPATIENT
Start: 2020-10-07 | End: 2020-10-07

## 2020-10-07 RX ORDER — METOCLOPRAMIDE 10 MG/1
5 TABLET ORAL EVERY 6 HOURS
Status: DISCONTINUED | OUTPATIENT
Start: 2020-10-07 | End: 2020-10-09 | Stop reason: HOSPADM

## 2020-10-07 RX ORDER — AMLODIPINE BESYLATE 10 MG/1
10 TABLET ORAL
Status: DISCONTINUED | OUTPATIENT
Start: 2020-10-08 | End: 2020-10-09 | Stop reason: HOSPADM

## 2020-10-07 RX ORDER — POTASSIUM CHLORIDE 7.45 MG/ML
10 INJECTION INTRAVENOUS ONCE
Status: COMPLETED | OUTPATIENT
Start: 2020-10-07 | End: 2020-10-07

## 2020-10-07 RX ORDER — HALOPERIDOL 5 MG/ML
5 INJECTION INTRAMUSCULAR ONCE
Status: COMPLETED | OUTPATIENT
Start: 2020-10-07 | End: 2020-10-07

## 2020-10-07 RX ORDER — MAGNESIUM SULFATE HEPTAHYDRATE 40 MG/ML
2 INJECTION, SOLUTION INTRAVENOUS ONCE
Status: COMPLETED | OUTPATIENT
Start: 2020-10-07 | End: 2020-10-07

## 2020-10-07 RX ORDER — POTASSIUM CHLORIDE 14.9 MG/ML
20 INJECTION INTRAVENOUS ONCE
Status: COMPLETED | OUTPATIENT
Start: 2020-10-07 | End: 2020-10-08

## 2020-10-07 RX ORDER — LISINOPRIL 10 MG/1
10 TABLET ORAL TWICE DAILY
Status: DISCONTINUED | OUTPATIENT
Start: 2020-10-07 | End: 2020-10-09 | Stop reason: HOSPADM

## 2020-10-07 RX ADMIN — MAGNESIUM SULFATE 2 G: 2 INJECTION INTRAVENOUS at 07:40

## 2020-10-07 RX ADMIN — METOCLOPRAMIDE 5 MG: 10 TABLET ORAL at 23:32

## 2020-10-07 RX ADMIN — AMANTADINE HYDROCHLORIDE 100 MG: 50 SOLUTION ORAL at 17:36

## 2020-10-07 RX ADMIN — METOPROLOL TARTRATE 25 MG: 25 TABLET, FILM COATED ORAL at 17:35

## 2020-10-07 RX ADMIN — HALOPERIDOL LACTATE 5 MG: 5 INJECTION, SOLUTION INTRAMUSCULAR at 20:48

## 2020-10-07 RX ADMIN — METOCLOPRAMIDE 5 MG: 5 INJECTION, SOLUTION INTRAMUSCULAR; INTRAVENOUS at 06:14

## 2020-10-07 RX ADMIN — METOCLOPRAMIDE 5 MG: 10 TABLET ORAL at 12:23

## 2020-10-07 RX ADMIN — METOCLOPRAMIDE 5 MG: 5 INJECTION, SOLUTION INTRAMUSCULAR; INTRAVENOUS at 01:23

## 2020-10-07 RX ADMIN — SODIUM CHLORIDE, POTASSIUM CHLORIDE, SODIUM LACTATE AND CALCIUM CHLORIDE 1000 ML: 600; 310; 30; 20 INJECTION, SOLUTION INTRAVENOUS at 13:41

## 2020-10-07 RX ADMIN — AMLODIPINE BESYLATE 5 MG: 10 TABLET ORAL at 05:15

## 2020-10-07 RX ADMIN — PIPERACILLIN AND TAZOBACTAM 4.5 G: 4; .5 INJECTION, POWDER, LYOPHILIZED, FOR SOLUTION INTRAVENOUS; PARENTERAL at 13:41

## 2020-10-07 RX ADMIN — ATORVASTATIN CALCIUM 80 MG: 80 TABLET, FILM COATED ORAL at 23:33

## 2020-10-07 RX ADMIN — LIDOCAINE 1 PATCH: 50 PATCH TOPICAL at 09:19

## 2020-10-07 RX ADMIN — DIPHENHYDRAMINE HYDROCHLORIDE 25 MG: 25 TABLET ORAL at 01:17

## 2020-10-07 RX ADMIN — CLOPIDOGREL BISULFATE 75 MG: 75 TABLET ORAL at 05:15

## 2020-10-07 RX ADMIN — DOCUSATE SODIUM 50 MG AND SENNOSIDES 8.6 MG 2 TABLET: 8.6; 5 TABLET, FILM COATED ORAL at 17:35

## 2020-10-07 RX ADMIN — POTASSIUM CHLORIDE 20 MEQ: 14.9 INJECTION, SOLUTION INTRAVENOUS at 04:13

## 2020-10-07 RX ADMIN — PIPERACILLIN AND TAZOBACTAM 4.5 G: 4; .5 INJECTION, POWDER, LYOPHILIZED, FOR SOLUTION INTRAVENOUS; PARENTERAL at 20:49

## 2020-10-07 RX ADMIN — POTASSIUM CHLORIDE 20 MEQ: 14.9 INJECTION, SOLUTION INTRAVENOUS at 22:08

## 2020-10-07 RX ADMIN — INSULIN LISPRO 6 UNITS: 100 INJECTION, SOLUTION INTRAVENOUS; SUBCUTANEOUS at 12:22

## 2020-10-07 RX ADMIN — INSULIN LISPRO 6 UNITS: 100 INJECTION, SOLUTION INTRAVENOUS; SUBCUTANEOUS at 17:36

## 2020-10-07 RX ADMIN — SODIUM CHLORIDE, POTASSIUM CHLORIDE, SODIUM LACTATE AND CALCIUM CHLORIDE: 600; 310; 30; 20 INJECTION, SOLUTION INTRAVENOUS at 01:00

## 2020-10-07 RX ADMIN — POTASSIUM CHLORIDE 10 MEQ: 7.46 INJECTION, SOLUTION INTRAVENOUS at 13:42

## 2020-10-07 RX ADMIN — POTASSIUM CHLORIDE 20 MEQ: 14.9 INJECTION, SOLUTION INTRAVENOUS at 09:19

## 2020-10-07 RX ADMIN — LISINOPRIL 10 MG: 10 TABLET ORAL at 05:15

## 2020-10-07 RX ADMIN — AMANTADINE HYDROCHLORIDE 100 MG: 50 SOLUTION ORAL at 05:24

## 2020-10-07 RX ADMIN — PIPERACILLIN AND TAZOBACTAM 4.5 G: 4; .5 INJECTION, POWDER, LYOPHILIZED, FOR SOLUTION INTRAVENOUS; PARENTERAL at 05:16

## 2020-10-07 RX ADMIN — ENOXAPARIN SODIUM 40 MG: 40 INJECTION SUBCUTANEOUS at 17:36

## 2020-10-07 RX ADMIN — OMEPRAZOLE 40 MG: KIT at 05:24

## 2020-10-07 RX ADMIN — METOPROLOL TARTRATE 12.5 MG: 25 TABLET, FILM COATED ORAL at 05:14

## 2020-10-07 RX ADMIN — METOCLOPRAMIDE 5 MG: 10 TABLET ORAL at 17:36

## 2020-10-07 RX ADMIN — INSULIN LISPRO 3 UNITS: 100 INJECTION, SOLUTION INTRAVENOUS; SUBCUTANEOUS at 06:19

## 2020-10-07 RX ADMIN — ASPIRIN 81 MG: 81 TABLET, CHEWABLE ORAL at 05:15

## 2020-10-07 RX ADMIN — INSULIN LISPRO 3 UNITS: 100 INJECTION, SOLUTION INTRAVENOUS; SUBCUTANEOUS at 01:24

## 2020-10-07 RX ADMIN — LISINOPRIL 10 MG: 10 TABLET ORAL at 17:35

## 2020-10-07 ASSESSMENT — COGNITIVE AND FUNCTIONAL STATUS - GENERAL
CLIMB 3 TO 5 STEPS WITH RAILING: TOTAL
HELP NEEDED FOR BATHING: A LOT
EATING MEALS: A LITTLE
TOILETING: A LOT
STANDING UP FROM CHAIR USING ARMS: A LOT
SUGGESTED CMS G CODE MODIFIER MOBILITY: CM
PERSONAL GROOMING: A LOT
DRESSING REGULAR UPPER BODY CLOTHING: A LITTLE
MOVING FROM LYING ON BACK TO SITTING ON SIDE OF FLAT BED: UNABLE
DRESSING REGULAR LOWER BODY CLOTHING: A LOT
WALKING IN HOSPITAL ROOM: A LOT
MOBILITY SCORE: 8
TURNING FROM BACK TO SIDE WHILE IN FLAT BAD: UNABLE
SUGGESTED CMS G CODE MODIFIER DAILY ACTIVITY: CK
MOVING TO AND FROM BED TO CHAIR: UNABLE
DAILY ACTIVITIY SCORE: 14

## 2020-10-07 ASSESSMENT — PAIN DESCRIPTION - PAIN TYPE
TYPE: SURGICAL PAIN

## 2020-10-07 ASSESSMENT — GAIT ASSESSMENTS
DISTANCE (FEET): 5
GAIT LEVEL OF ASSIST: MODERATE ASSIST
ASSISTIVE DEVICE: HAND HELD ASSIST

## 2020-10-07 ASSESSMENT — FIBROSIS 4 INDEX: FIB4 SCORE: 2.19

## 2020-10-07 NOTE — CARE PLAN
Problem: Communication  Goal: The ability to communicate needs accurately and effectively will improve  10/7/2020 1553 by Cayla Escudero R.N.  Outcome: PROGRESSING AS EXPECTED  Note: Pt continues to have improved speech, is able to form small simple sentences to communicate questions and needs.     Problem: Bowel/Gastric:  Goal: Normal bowel function is maintained or improved  Outcome: PROGRESSING AS EXPECTED  Note: Pt has been having large, loose bowel movements regularly (~once per shift).  Signs and symptoms of previous ileus are resolving.

## 2020-10-07 NOTE — PROGRESS NOTES
Cardiac Surgery RN Navigator Daily Rounding Note  Procedure Performed: Procedure(s):  CABG, WITH ENDOSCOPIC VEIN PROCUREMENT- X3  ECHOCARDIOGRAM, TRANSESOPHAGEAL     By Dr. Chicas  12 Days Post-Op    Pertinent Overnight Events:    Off nicardipine gtt    Speech continues to recommend tube feeding    Pertinent neuro findings/needs: A&O    Cardiac/hemodynamics:  Temp (24hrs), Av.8 °C (96.5 °F), Min:35.8 °C (96.5 °F), Max:35.8 °C (96.5 °F)    Heart rhythm: SR to ST  Temp:  [35.8 °C (96.5 °F)] 35.8 °C (96.5 °F)  Pulse:  [] 94  Resp:  [22-30] 27  BP: (124-176)/() 176/96  SpO2:  [93 %-99 %] 93 %      IV gtts: niCARdipine infusion, Last Rate: Stopped (10/06/20 1432)  LR, Last Rate: 83 mL/hr at 10/07/20 0100      /Weights:  Admit weight: Weight: 72.6 kg (160 lb)  Current Weight: Weight: 66.6 kg (146 lb 13.2 oz) (10/06/20 0445)  Weight change:     Intake/Output Summary (Last 24 hours) at 10/7/2020 0806  Last data filed at 10/7/2020 0400  Gross per 24 hour   Intake 2526.75 ml   Output 2480 ml   Net 46.75 ml       Adequate urine output: 755 cc    Respiratory:        Pertinent respiratory findings/needs: none RA    GI findings/needs: + BM 10/5    Labs:  Recent Labs     10/05/20  0625 10/06/20  0526 10/07/20  0120   WBC 11.0* 12.0* 12.0*   RBC 3.02* 2.99* 3.16*   HEMOGLOBIN 9.2* 9.5* 9.5*   HEMATOCRIT 29.5* 29.5* 30.6*   MCV 97.7 98.7* 96.8   MCH 30.5 31.8 30.1   MCHC 31.2* 32.2* 31.0*   RDW 53.3* 53.4* 50.0   PLATELETCT 202 204 233   MPV 10.6 10.4 10.3     Recent Labs     10/05/20  0625  10/06/20  0526 10/06/20  1137 10/06/20  1724 10/07/20  0120   SODIUM 146*  --  146*  --   --  142   POTASSIUM 4.1   < > 4.1 3.8 3.7 3.7   CHLORIDE 115*  --  115*  --   --  111   CO2 17*  --  18*  --   --  16*   GLUCOSE 186*  --  172*  --   --  161*   BUN 37*  --  21  --   --  17   CREATININE 1.17  --  0.88  --   --  0.88   CALCIUM 8.2*  --  8.6  --   --  8.4*    < > = values in this interval not displayed.     INR:        Required Cardiac medications review:  ASA:  Yes  Plavix: Yes  BB: Yes  ACE/ARB: Yes  Statin: yes  Diuretic: no-below baseline weight, 12 days post op    LDA's necessity reviewed: YES    Thoughts and considerations for team rounding:    Rehab accepted patient yesterday. Can go when we medically clear him and he is off vasoactive gtts    Discharge plan:    To rehab  Post op appointments in place

## 2020-10-07 NOTE — THERAPY
Physical Therapy   Daily Treatment     Patient Name: Guerrero Ann  Age:  49 y.o., Sex:  male  Medical Record #: [id number]  Today's Date: 10/7/2020     Precautions: Fall Risk, Nasogastric Tube, Swallow Precautions ( See Comments)    Assessment    Pt able to improve his upright stability in standing, but requires inconsistent min A to remain in midline in unsupported sitting. During gait trial, pt requires mod A for weight shift and verbal cues for swing limb advancement. Pattern is mostly shuffled with excessively narrow NEL. PT will follow to address gait deviations, impaired motor control, and instability. Recommend placement.     Plan    Treatment plan modified to 4 times per week until therapy goals are met for the following treatments:  Bed Mobility, Equipment, Gait Training, Neuro Re-Education / Balance, Self Care/Home Evaluation, Stair Training, Therapeutic Activities and Therapeutic Exercises.    DC Equipment Recommendations: Unable to determine at this time  Discharge Recommendations: Recommend post-acute placement for additional physical therapy services prior to discharge home         Objective       10/07/20 1137   Balance   Comments Seated EOB mostly at min A with L lateral lean. Cues manually and verbally for correction, but pt unable to sustain. Able to perform reaching to facilitate more upright independent posture. Pt with limited cervical ROM bilaterally to around 45deg rotation; unclear if new or not.   Gait Analysis   Gait Level Of Assist Moderate Assist   Assistive Device Hand Held Assist   Distance (Feet) 5   # of Times Distance was Traveled 2   Comments Cues mostly for weight shift and verbally for advancement of each LE. Sometimes steps were more automatic, but mostly required cueing. Shuffled pattern. Required seated rests in between.    Bed Mobility    Supine to Sit Maximal Assist   Sit to Supine Maximal Assist   Scooting Maximal Assist   Comments Assist for all aspects  of bed mob; pt able to initiate sit to supine.    Functional Mobility   Sit to Stand Minimal Assist   Bed, Chair, Wheelchair Transfer   (deferred to focus on gait)   Short Term Goals    Short Term Goal # 1 Patient will move supine<>sitting EOB without bed features with supervision within 6tx in order to get in/out of bed   Goal Outcome # 1 goal not met   Short Term Goal # 2 Patient will move sitting<>standing with supervision within 6tx in order to initiate gait and transfers   Goal Outcome # 2 Goal not met   Short Term Goal # 3 Patient will ambulate 500ft with supervision within 6tx in order to access environment   Goal Outcome # 3 Goal not met   Short Term Goal # 4 Patient will verbalize cardiac rehab education including sternal precautions, home walking program, and techniques for activity pacing within 6tx in order to demonstrate understanding.   Goal Outcome # 4 Goal not met   Short Term Goal # 5 Pt will sit upright EOB in neutral at spv in 6tx to improve upright trunk control.    Anticipated Discharge Equipment and Recommendations   DC Equipment Recommendations Unable to determine at this time   Discharge Recommendations Recommend post-acute placement for additional physical therapy services prior to discharge home

## 2020-10-07 NOTE — PROGRESS NOTES
"Critical Care Progress Note    Date of admission  9/21/2020    Chief Complaint  49 y.o. male with PMHx DMII, HTN and LVH admitted 9/21/2020 with an acute coronary syndrome.  He subsequently underwent CABG.    Hospital Course   10/3 -    Place NG to suction.  Titrating norepinephrine.  Hydrate with intravenous NS.  Add vasopressin.  10/4 -    nasal MRSA PCR is negative.  Stop Zyvox.  Continue Zosyn.  Keep NG to suction.  Continue IV hydration.  Renal function is improving.  Off norepinephrine.  10/5 - Zosyn, neurologically starting to speak in limited fashion, hypotensive now off norepinephrine but vomited first dose of metoprolol despite marked improved NG output, start IV ACE inhibitor  10/6 - Ileus better, NG pulled, failed swallow eval, Cortrak to be placed and trickle TF to start, Physiatry consult pending  10/7 - Ileus resolved, advancing TF, HTN better but increasing BB and ACE, neurologically slt better and appears near ready for Rehab      Interval Problem Update  Reviewed last 24 hour events:    POD 12  Awake talking more  O x4 - slow - soft voice  Failed swallow eval  Cortrak 20 -> goal 50  SR 90s  SBp 130-160s  Off Nicardipine  No PRN Bp meds  Afebrile  UO good  Physiatry ok w/Rehab  RA, IS 1000  Lovenox  PPI  Zosyn 5/5  Reglan      TKO IVF 30  Reglan to enteral  Anti-coag when?  Rehab transfer?      Serial follow-up  Reviewed with RN, blood pressure and heart rate still elevated, will increase metoprolol dose, still not back to full outpatient dose of lisinopril       YESTERDAY  POD 11 - CABG 3V  Talking some for me - \"water\"  More alert, follows  SR 70s  SBp 170s+  Nicardipine started - 5, actively titrating  Metoprolol/Amlodpine/Lisinopril tolerated this AM  TF goal  BM  UO great  Room air  Lovenox  Zosyn 4/5  Reglan helped  Sodium and chloride slightly elevated    Titrate/wean off nicardipine if possible, adjust enteral regimen if necessary  Change normal saline to lactated Ringer's  Swallow eval " pending  IS to be encouraged  Wean CCB drip  Start TF? Needs cortrak  Rehab consult  PPI and ASA to PO    Still min NG output - pulling NG    Speech eval failed -> Cortrak and start TF 10cc  Start reducing IV fluids    Review of Systems  Review of Systems   Unable to perform ROS: Acuity of condition   Patient starting to be a little more verbal but ROS still not obtainable    Vital Signs for last 24 hours   Temp:  [36.9 °C (98.5 °F)-37.2 °C (99 °F)] 37.2 °C (99 °F)  Pulse:  [] 104  Resp:  [16-32] 28  BP: (124-176)/() 150/104  SpO2:  [82 %-99 %] 94 %    Hemodynamic parameters for last 24 hours       Respiratory Information for the last 24 hours       Physical Exam   Physical Exam  Vitals signs reviewed.   Constitutional:       Appearance: He is normal weight. He is not toxic-appearing or diaphoretic.   HENT:      Head: Normocephalic and atraumatic.      Right Ear: External ear normal.      Left Ear: External ear normal.      Nose: Nose normal.      Mouth/Throat:      Mouth: Mucous membranes are moist.      Pharynx: No posterior oropharyngeal erythema.   Eyes:      General: No scleral icterus.        Right eye: No discharge.         Left eye: No discharge.      Pupils: Pupils are equal, round, and reactive to light.   Neck:      Musculoskeletal: Normal range of motion. No neck rigidity or muscular tenderness.      Vascular: No JVD.   Cardiovascular:      Rate and Rhythm: Normal rate and regular rhythm.  No extrasystoles are present.     Pulses: Normal pulses.      Heart sounds: No murmur. No gallop.       Comments: SR  Pulmonary:      Effort: No respiratory distress.      Breath sounds: No wheezing, rhonchi or rales (Resolved).   Chest:      Comments: Median sternotomy incision without drainage or obvious signs of infection  Abdominal:      General: Abdomen is protuberant. Bowel sounds are normal. There is no distension (improved).      Palpations: Abdomen is soft. There is no fluid wave.      Tenderness:  There is no abdominal tenderness. There is no right CVA tenderness, left CVA tenderness or guarding. Negative signs include Calvin's sign and McBurney's sign.      Comments: improved   Genitourinary:     Comments: Quispe catheter  Musculoskeletal:      Right lower leg: No edema.      Left lower leg: No edema.      Comments: No clubbing or cyanosis   Skin:     General: Skin is warm and dry.      Capillary Refill: Capillary refill takes less than 2 seconds.      Coloration: Skin is not cyanotic.      Nails: There is no clubbing.     Neurological:      GCS: GCS eye subscore is 4. GCS verbal subscore is 5. GCS motor subscore is 6.      Comments: Patient starting to be more verbal but still not carrying on civic conversations and he speaks in a whisper almost exclusively making communication difficult but he is definitely improved, still mildly lethargic/slow, brainstem reflexes intact, moving all 4 extremities to command, mild diffuse weakness   Psychiatric:         Mood and Affect: Mood is not anxious.         Behavior: Behavior is not agitated. Behavior is cooperative.         Cognition and Memory: Cognition is impaired.       Medications  Current Facility-Administered Medications   Medication Dose Route Frequency Provider Last Rate Last Dose   • metoclopramide (REGLAN) tablet 5 mg  5 mg Enteral Tube Q6HRS Guerrero Solo M.D.   5 mg at 10/07/20 1736   • [START ON 10/8/2020] amLODIPine (NORVASC) tablet 10 mg  10 mg Enteral Tube Q DAY Maia Chavira, A.P.N.       • lisinopril (PRINIVIL) tablet 10 mg  10 mg Enteral Tube TWICE DAILY Maia Chavira A.P.N.   10 mg at 10/07/20 1735   • metoprolol (LOPRESSOR) tablet 25 mg  25 mg Enteral Tube TWICE DAILY Guerrero Solo M.D.   25 mg at 10/07/20 1735   • potassium chloride in water (KCL) ivpb **Administer in ICU only** 20 mEq  20 mEq Intravenous Once Guerrero Solo M.D.       • aspirin (ASA) chewable tab 81 mg  81 mg Enteral Tube DAILY Guerrero Solo M.D.   81 mg  at 10/07/20 0515   • omeprazole (FIRST-OMEPRAZOLE) 2 mg/mL oral susp 40 mg  40 mg Enteral Tube DAILY Guerrero Solo M.D.   40 mg at 10/07/20 0524   • amantadine (SYMMETREL) 50 MG/5ML syrup 100 mg  100 mg Enteral Tube BID Terry Wagner D.OStacie   100 mg at 10/07/20 1736   • lactated ringers infusion   Intravenous Continuous Guerrero Solo M.D. 83 mL/hr at 10/07/20 1341 1,000 mL at 10/07/20 1341   • enoxaparin (LOVENOX) inj 40 mg  40 mg Subcutaneous Q EVENING Karan Norton M.D.   40 mg at 10/07/20 1736   • acetaminophen (TYLENOL) suppository 975 mg  975 mg Rectal Q6HRS PRN Karan Norton M.D.       • labetalol (NORMODYNE/TRANDATE) injection 10 mg  10 mg Intravenous Q4HRS PRN Byron Edmond M.D.   10 mg at 10/05/20 2241   • K+ Scale: Goal of 4.5  1 Each Intravenous Q6HRS Karan Norton M.D.   1 Each at 10/07/20 1800   • piperacillin-tazobactam (ZOSYN) 4.5 g in  mL IVPB  4.5 g Intravenous Q8HRS Byron Edmond M.D. 25 mL/hr at 10/07/20 2049 4.5 g at 10/07/20 2049   • Pharmacy Consult: Enteral tube insertion - review meds/change route/product selection  1 Each Other PHARMACY TO DOSE ROSIO CamachoPStacieN.       • tramadol (ULTRAM) 50 MG tablet 50 mg  50 mg Enteral Tube Q4HRS PRN ЮЛИЯ Holm.O.   50 mg at 10/01/20 0536   • atorvastatin (LIPITOR) tablet 80 mg  80 mg Enteral Tube QHS ЮЛИЯ Holm.O.   80 mg at 10/06/20 2129   • mag hydrox-al hydrox-simeth (MAALOX PLUS ES or MYLANTA DS) suspension 30 mL  30 mL Enteral Tube Q4HRS PRN Mateus Stringer D.O.       • senna-docusate (PERICOLACE or SENOKOT S) 8.6-50 MG per tablet 2 Tab  2 Tab Enteral Tube BID Mateus Stringer D.O.   2 Tab at 10/07/20 1735    And   • polyethylene glycol/lytes (MIRALAX) PACKET 1 Packet  1 Packet Enteral Tube DAILY Mateus Stringer D.O.   Stopped at 10/02/20 0600    And   • magnesium hydroxide (MILK OF MAGNESIA) suspension 30 mL  30 mL Enteral Tube DAILY Mateus Stringer D.O.   Stopped at 10/01/20 0600     And   • bisacodyl (DULCOLAX) suppository 10 mg  10 mg Rectal QDAY PRN MAUREEN HolmOStacie   10 mg at 10/01/20 2338   • clopidogrel (PLAVIX) tablet 75 mg  75 mg Enteral Tube DAILY MAUREEN HolmO.   75 mg at 10/07/20 0515   • diphenhydrAMINE (BENADRYL) tablet/capsule 25 mg  25 mg Enteral Tube HS PRN - MR X 1 MAUREEN HolmOStacie   25 mg at 10/07/20 0117   • HYDROcodone-acetaminophen (NORCO) 5-325 MG per tablet 1-2 Tab  1-2 Tab Enteral Tube Q6HRS PRN Maia Chavira A.P.N.       • insulin lispro (HumaLOG) injection  0-15 Units Subcutaneous Q6HRS Maia Chavira A.P.N.   6 Units at 10/07/20 1736   • MD Alert...ICU Electrolyte Replacement per Pharmacy   Other PHARMACY TO DOSE Karan Norton M.D.       • lidocaine (LIDODERM) 5 % 1 Patch  1 Patch Transdermal Q24HR Maia Chavira A.P.N.   1 Patch at 10/07/20 0919   • Respiratory Therapy Consult   Nebulization Continuous RT Maia Chavira A.P.N.       • Pharmacy Consult Request ...Pain Management Review 1 Each  1 Each Other PHARMACY TO DOSE Maia Chavira A.P.N.       • ondansetron (ZOFRAN) syringe/vial injection 8 mg  8 mg Intravenous Q6HRS PRN Maia Chavira A.P.N.        Or   • prochlorperazine (COMPAZINE) injection 10 mg  10 mg Intravenous Q6HRS PRN Maia Chavira A.P.N.   10 mg at 10/02/20 0240    Or   • promethazine (PHENERGAN) suppository 25 mg  25 mg Rectal Q6HRS PRN Maia Chavira A.P.N.           Fluids    Intake/Output Summary (Last 24 hours) at 10/7/2020 2104  Last data filed at 10/7/2020 1800  Gross per 24 hour   Intake 2763.75 ml   Output 1860 ml   Net 903.75 ml       Laboratory          Recent Labs     10/05/20  0625  10/06/20  0526  10/07/20  0120 10/07/20  0735 10/07/20  1217 10/07/20  1800   SODIUM 146*  --  146*  --  142  --   --   --    POTASSIUM 4.1   < > 4.1   < > 3.7 3.7 3.9 3.7   CHLORIDE 115*  --  115*  --  111  --   --   --    CO2 17*  --  18*  --  16*  --   --   --    BUN 37*  --  21  --  17  --   --   --    CREATININE 1.17   --  0.88  --  0.88  --   --   --    MAGNESIUM 2.7*  --  2.2  --  1.9  --   --   --    PHOSPHORUS 2.7  --  2.7  --  3.1  --   --   --    CALCIUM 8.2*  --  8.6  --  8.4*  --   --   --     < > = values in this interval not displayed.     Recent Labs     10/05/20  0625 10/05/20  0834 10/06/20  0526 10/07/20  0120   PREALBUMIN  --  14.1*  --  16.2*   GLUCOSE 186*  --  172* 161*     Recent Labs     10/05/20  0625 10/06/20  0526 10/07/20  0120   WBC 11.0* 12.0* 12.0*   NEUTSPOLYS 66.20 62.90 66.40   LYMPHOCYTES 17.80* 19.90* 18.80*   MONOCYTES 12.90 13.00 10.70   EOSINOPHILS 1.50 1.90 2.20   BASOPHILS 0.40 0.60 0.50     Recent Labs     10/05/20  0625 10/06/20  0526 10/07/20  0120   RBC 3.02* 2.99* 3.16*   HEMOGLOBIN 9.2* 9.5* 9.5*   HEMATOCRIT 29.5* 29.5* 30.6*   PLATELETCT 202 204 233       Imaging  X-Ray:  I have personally reviewed the images and compared with prior images.  EKG:  I have personally reviewed the images and compared with prior images.  CT:    Reviewed  Echo:   Reviewed    Assessment/Plan  * Septic shock (HCC)  Assessment & Plan  Source appears to be pulmonary +/- GI, cultures negative so far - IMPROVED  Nasal MRSA PCR is negative - stop Zyvox, monitor  Continue Zosyn for aspiration/ABD and plan 5 days of therapy, last day of antibiotics 10-7  Shock has resolved - he is off norepinephrine    Ileus (HCC)  Assessment & Plan  Resolved  NG removed 10/6  Advancing tube feeds to goal  Continue Reglan for now  Optimize electrolytes  Mobilize as tolerated  Limit narcotics  Serial abdominal exams -abdomen back to baseline, positive BMs    Aspiration pneumonia (HCC)  Assessment & Plan  Did not improve clinically, 1 more day of antibiotics  Nasal MRSA PCR negative - stop Zyvox  Zosyn and plan 5 days of therapy, last day of therapy 10/7-complete  Mobilize/I-S  Follow-up chest x-ray as needed  RT protocols  Failed swallowing evaluation, enteral feeding tube to be placed  Aspiration precautions  Update vaccines  including flu shot per protocols    Cardioembolic stroke (HCC)  Assessment & Plan  MRI on 9/29 confirms numerous acute/subacute supratentorial and infratentorial infarcts consistent with cardioembolic phenomenon  Continue aspirin to rectal preparation  Resume statin when ileus resolves  Follow neuro exam and limit sedatives  PT/OT  Reviewed therapy needs with [facility], both agree it is time for physiatry consultation and possible rehab transfer in a few days    Altered mental status- (present on admission)  Assessment & Plan  Significantly improved, follows and starting to become in a limited fashion verbal-slowly better    SONNY (acute kidney injury) ([facility])- (present on admission)  Assessment & Plan  Renal function has returned to normal  Continue IV fluid resuscitation as needed  Monitor renal function and urine output  Avoid nephrotoxins and renal dose medications  Renal function continues to improve, creatinine normal today for the first time    NSTEMI (non-ST elevated myocardial infarction) ([facility])- (present on admission)  Assessment & Plan  Change aspirin to enteral   Resume Plavix and statin   Adding ACE/beta-blocker   Risk factor modification long-term    CAD (coronary artery disease)- (present on admission)  Assessment & Plan  S/P 3V CABG on 9/25  Continue aspirin to rectal preparation  Resume Plavix and statin when ileus resolves    Essential hypertension- (present on admission)  Assessment & Plan  Goal SBP less than 160, transition to goal of 140 for rehab and 130 long-term  Off nicardipine infusion  IV enalapril as needed  Up lisinopril 10 mg twice daily, outpatient dose was 40/day  Up metoprolol to 25 twice daily, may need to increase dose tomorrow for still tachycardic/hypertensive  Continue amlodipine    Type 2 diabetes mellitus with complication, without long-term current use of insulin ([facility])- (present on admission)  Assessment & Plan  Continue sliding scale insulin  Hypoglycemia protocols    Normochromic  normocytic anemia  Assessment & Plan  Serial CBC  Transfuse per protocols  No bleeding clinically, monitoring    Hypokalemia- (present on admission)  Assessment & Plan  Replete       VTE:  Lovenox  Ulcer: PPI  Lines: Central Line  Ongoing indication addressed and Quispe Catheter  Ongoing indication addressed    I have performed a physical exam and reviewed and updated ROS and Plan today (10/7/2020). In review of yesterday's note (10/6/2020), there are no changes except as documented above.     Discussed patient condition and risk of morbidity and/or mortality with RN, RT, Pharmacy, Charge nurse / hot rounds, Patient and [facility]     Clinically significantly improved, nearly medically clear for transfer to rehab, hopefully tomorrow, may need to increase beta-blocker for hypertension/heart rate control tomorrow but that should not prevent transfer, tolerating increase in tube feeds to goal without recurrence of ileus, neurologically improving and ready to participate with therapies and no new cardiac or respiratory issues.

## 2020-10-07 NOTE — THERAPY
"Occupational Therapy  Daily Treatment     Patient Name: Guerrero Ann  Age:  49 y.o., Sex:  male  Medical Record #: [id number]  Today's Date: 10/7/2020     Precautions  Precautions: (P) Fall Risk, Nasogastric Tube, Swallow Precautions ( See Comments)  Comments: (P) delayed verbal response. , mildly agitated.     Assessment    Pt is making progress towards OT goals. Pt needed increased encouragement to participate, did better when wife was present.  Pt did not speak when spoken to until his wife came in stating, \"Here comes the boss\". Pt gave good effort with some delays with MP. Pt stated he understood he needs more therapy prior to going home and is agreeable to go to rehab. Continues with delayed verbal responses and is mildly agitated with his medical condition. RN/Wife updated on OT treatment findings and recommendations.    Plan    Continue current treatment plan.    DC Equipment Recommendations: (P) Unable to determine at this time  Discharge Recommendations: (P) Recommend post-acute placement for additional occupational therapy services prior to discharge home    Subjective    \" Here comes the boss\". Pt referring to his wife.      Objective       10/07/20 1506   Cognition    Cognition / Consciousness X   Speech/ Communication Delayed Responses   Orientation Level Not Oriented to Day   Level of Consciousness Alert   Ability To Follow Commands 1 Step   Safety Awareness Impaired   New Learning Impaired   Attention Impaired   Sequencing Impaired   Initiation Impaired   Comments Pt was awake and needed increased encouragement to participate. Not speaking for the first 20 mins    Strength Upper Body   Right  Impaired   Comments Continues to present 3/5 R hand , 5/5 left , difficult to complete full MMT due to impaired cognition/command following   Other Treatments   Other Treatments Provided Psychosocial intervention addressed. Pt's wife came into tx at end of session. Discussed the " need for more therapy prior to D/C home. Wife agrees.   Balance   Sitting Balance (Static)   (refused EOB)   Comments Pt refused to come to EOB. Seen supine in bed.    Activities of Daily Living   Grooming Minimal Assist  (to wash face needed [place] A to begin task. Oral H/G set up)   Bathing   (refused)   Upper Body Dressing   (refused)   Lower Body Dressing Maximal Assist   Toileting   (unable to get to EOB and BSC)   Skilled Intervention Verbal Cuing;Tactile Cuing;Sequencing;Compensatory Strategies   Comments Poor initiation/participation initially, but did give good effort with encouragement     Visual Perception   Comments improved tracking demo   Activity Tolerance   Comments  limited by fatigue and arousal.    Short Term Goals   Short Term Goal # 1 Pt will complete seated grooming/hygiene with Donny by discharge.   Goal Outcome # 1 Progressing slower than expected   Short Term Goal # 2 Pt will complete UE dressing with Donny by discharge.   Goal Outcome # 2 Goal not met   Short Term Goal # 3 Pt will complete toilet transfer with modA by discharge.   Goal Outcome # 3 Progressing slower than expected   Anticipated Discharge Equipment and Recommendations   DC Equipment Recommendations Unable to determine at this time   Discharge Recommendations Recommend post-acute placement for additional occupational therapy services prior to discharge home   Interdisciplinary Plan of Care Collaboration   Collaboration Comments RN updated on OT treatment findings and recommendations.

## 2020-10-07 NOTE — CARE PLAN
Problem: Safety  Goal: Will remain free from injury  Note: No falls this shift. Bed alarm activated and audible. Hourly rounds maintained      Problem: Skin Integrity  Goal: Risk for impaired skin integrity will decrease  Note: Waffle cushion in place. Pt turning self in bed.

## 2020-10-07 NOTE — PROGRESS NOTES
Cardiovascular Surgery Progress Note    Name: Guerrero Ann  MRN: [id number]  : 1970  Admit Date: 2020  7:37 PM  Procedure:  Procedure(s) and Anesthesia Type:     * CABG, WITH ENDOSCOPIC VEIN PROCUREMENT- X3 - General     * ECHOCARDIOGRAM, TRANSESOPHAGEAL - General  12 Day Post-Op    Vitals:  Patient Vitals for the past 8 hrs:   SpO2 O2 Delivery Device Pulse Resp BP Weight   10/07/20 0600 -- -- -- -- -- 69.4 kg (153 lb)   10/07/20 0514 -- -- 94 -- (!) 176/96 --   10/07/20 0330 93 % None - Room Air 98 (!) 27 160/112 --     Temp (24hrs), Av.8 °C (96.5 °F), Min:35.8 °C (96.5 °F), Max:35.8 °C (96.5 °F)    Respiratory:    Respiration: (!) 27, Pulse Oximetry: 93 %     Chest Tube Drains:          Fluids:    Intake/Output Summary (Last 24 hours) at 10/7/2020 1020  Last data filed at 10/7/2020 0812  Gross per 24 hour   Intake 2215.08 ml   Output 1730 ml   Net 485.08 ml     Admit weight: Weight: 72.6 kg (160 lb)  Current weight: Weight: 69.4 kg (153 lb) (10/07/20 0600)    Labs:  Recent Labs     10/05/20  0625 10/06/20  0526 10/07/20  0120   WBC 11.0* 12.0* 12.0*   RBC 3.02* 2.99* 3.16*   HEMOGLOBIN 9.2* 9.5* 9.5*   HEMATOCRIT 29.5* 29.5* 30.6*   MCV 97.7 98.7* 96.8   MCH 30.5 31.8 30.1   MCHC 31.2* 32.2* 31.0*   RDW 53.3* 53.4* 50.0   PLATELETCT 202 204 233   MPV 10.6 10.4 10.3     Recent Labs     10/05/20  0625 10/06/20  0526 10/07/20  0120   NEUTSPOLYS 66.20 62.90 66.40   LYMPHOCYTES 17.80* 19.90* 18.80*   MONOCYTES 12.90 13.00 10.70   EOSINOPHILS 1.50 1.90 2.20   BASOPHILS 0.40 0.60 0.50     Recent Labs     10/05/20  0625  10/06/20  0526  10/06/20  1724 10/07/20  0120 10/07/20  0735   SODIUM 146*  --  146*  --   --  142  --    POTASSIUM 4.1   < > 4.1   < > 3.7 3.7 3.7   CHLORIDE 115*  --  115*  --   --  111  --    CO2 17*  --  18*  --   --  16*  --    GLUCOSE 186*  --  172*  --   --  161*  --    BUN 37*  --  21  --   --  17  --    CREATININE 1.17  --  0.88  --   --  0.88  --    CALCIUM  8.2*  --  8.6  --   --  8.4*  --     < > = values in this interval not displayed.     Medications:  • potassium chloride (KCL-CENTRAL) IV *Administer in ICU only*  20 mEq 20 mEq (10/07/20 0919)   • metoclopramide  5 mg     • lisinopril  10 mg     • aspirin  81 mg     • omeprazole  40 mg     • amantadine  100 mg     • amLODIPine  5 mg     • metoprolol  12.5 mg     • enoxaparin (LOVENOX) injection  40 mg     • K+ Scale: Goal of 4.5  1 Each     • piperacillin-tazobactam  4.5 g 4.5 g (10/07/20 0516)   • Pharmacy  1 Each     • atorvastatin  80 mg     • senna-docusate  2 Tab      And   • polyethylene glycol/lytes  1 Packet      And   • magnesium hydroxide  30 mL     • clopidogrel  75 mg     • insulin lispro  0-15 Units     • MD Alert...Adult ICU Electrolyte Replacement per Pharmacy       • lidocaine  1 Patch     • Pharmacy Consult Request  1 Each        Ordered Medications:    ASA - Yes    Plavix - Yes    Post-operative Beta Blockers - Yes    Ace Inhibitor - Yes    Statin - Yes    Exam:   Review of Systems   Unable to perform ROS: Mental status change     Physical Exam  Constitutional:       General: He is not in acute distress.     Appearance: He is well-developed. He is not diaphoretic.   HENT:      Head: Normocephalic.   Eyes:      Pupils: Pupils are equal, round, and reactive to light.   Neck:      Musculoskeletal: Neck supple.   Cardiovascular:      Rate and Rhythm: Normal rate and regular rhythm.      Heart sounds: Normal heart sounds. No murmur. No friction rub. No gallop.    Pulmonary:      Effort: Pulmonary effort is normal. No respiratory distress.      Breath sounds: Examination of the right-lower field reveals decreased breath sounds. Examination of the left-lower field reveals decreased breath sounds. Decreased breath sounds present. No wheezing or rales.   Abdominal:      General: There is no distension.      Tenderness: There is no abdominal tenderness.      Comments: firm   Skin:     General: Skin is warm  and dry.      Comments: Sternum- prevena dressing  SVG sites- c/d/i   Neurological:      Mental Status: He is alert and oriented to person, place, and time.       Quality Measures:   Quality-Core Measures   Reviewed items::  EKG reviewed, Labs reviewed, Medications reviewed and Radiology images reviewed  Gomez catheter::  Critically Ill - Requiring Accurate Measurement of Urinary Output  Central line in place:  Concentrated IV drugs  DVT prophylaxis pharmacological::  Heparin  DVT prophylaxis - mechanical:  SCDs  Ulcer Prophylaxis::  Yes    Assessment/Plan:  POD 1 HDS, SR, Acute blood loss anemia- s/p transfusion- keep mediastinal tubes, gently diurese, d/c gomez  POD 2 HTN- inc lisinopril, SR/ST- change to metoprolol, acute blood loss anemia s/p transfusion- watch, d/c mediastinal tubes, urine retention- replace gomez/start flomax, cont diuresis  POD 3  HDS, SR, neuro right  weaker than left gazes to right, wounds CDI, abdomen S/NT, fluid balance negative, wt still up from admit.  Plan:  MRI today, decrease ace for permissive hypertension, dc gabapentin, BB, Statin, IS/ambulate. CPM.  POD 4  HDS but hypertensive, SR, neuro answers questions but delayed.  Gaze more center.  EEG normal, MRI pending, wounds CDI, abdomen distended, fluid balance negative, wt down.  Plan:  Restart ace, IM getting abdominal study, BB, Statin, IS/ambulate. CPM.  POD 5  HDS, ST, neuro unchanged, wounds CDI, abdomen S/NT, fluid balance negative, wt down.  Failed swallow evaluation.  Hypertensive.  Pain issues.  Plan:  core trak, switch BB to coreg, ACE, Statin, add norco, IS/ambulate. CPM.  POD 6  HDS, SR, neuro unchanged, wounds CDI, abdomen S/NT, fluid balance negative, wt stable.  Plan:  Awaiting rehab authorization, BB, ACE, Statin, IS/ambulate. CPM.  POD 7 HDS, SR, Febrile- most likely aspiration pneumonia- started on rocephin- d/w pharmacy will change to unasyn, ileus- 2 liters of residual last night- start reglan/supp- if no  response try relistor, Elevated creatinine- stop lisinopril/lasix- switch lovenox to heparin- start fluids 75 cc/hr, replace gomez for strict I&O, neuro- follows with slowed response, minimal speech  POD 8  HDS, SR, neuro intact, wounds CDI, abdomen firm and distended CT scan shows ileus, fluid balance negative, wt down.  Creatine trending down   Plan:  NG to decompress abd, 1 unit blood, Statin, IS/ambulate. CPM.  POD 9  HDS, SR, neuro follows commands slowly, wounds CDI, abdomen S/NT, fluid balance negative, wt down, NG tube with 1800 out, had 2 loose BM's.  Creatinine improved.  Off levo.  Plan:  Continue to decompress abd with NG.  Bowel protocol.  IV antibiotics for possible aspiration. CPM.  POD 10  HDS, SR, neuro intact, wounds CDI, abdomen less distended today + BS, fluid balance negative, wt up slightly.  Hypertensive but does not tolerate oral medications.  Getting ace and bb.  Creatinine normalized.  Plan:  Continue bowel protocol, continue NG tube, BB, ACE, Statin, IS/ambulate. CPM.  POD 11:  HDS.  NSR.  Hypertensive.  Weight stable, below baseline admit weight.  Pending swallow evaluation today.  NG tube clamped since 4am, tolerating.  Switching medications to oral, crushed.  Remains on IV abx.  Plan:  Discussed with Dr. Solo.  Plan for repeat rehab evaluation once swallow evaluation complete.  Continue current management.    POD 12  HDS, SR, neuro intact, wounds CDI, abdomen S/NT today active BS all 4 quads, fluid balance negative, wt up today.  Failed swallow evaluation.  Plan:  Continue tube feeds, today last day of abx.  Watch abd.  Increase norvasc and ace, BB, Statin, IS/ambulate. CPM.  To rehab in AM?    Active Hospital Problems    Diagnosis   • Septic shock (HCC) [A41.9, R65.21]     Priority: High   • Aspiration pneumonia (HCC) [J69.0]     Priority: Medium   • Ileus (HCC) [K56.7]     Priority: Medium   • Lacunar infarct, acute (HCC) [I63.81]     Priority: Medium   • Cardioembolic stroke  ([facility]) [I63.9]     Priority: Medium   • Altered mental status [R41.82]     Priority: Medium   • SONNY (acute kidney injury) ([facility]) [N17.9]     Priority: Medium   • NSTEMI (non-ST elevated myocardial infarction) ([facility]) [I21.4]     Priority: Medium   • CAD (coronary artery disease) [I25.10]     Priority: Medium   • Type 2 diabetes mellitus with complication, without long-term current use of insulin ([facility]) [E11.8]     Priority: Medium   • Essential hypertension [I10]     Priority: Medium   • Normochromic normocytic anemia [D64.9]     Priority: Low   • Hypokalemia [E87.6]     Priority: Low

## 2020-10-08 PROBLEM — E87.6 HYPOKALEMIA: Status: RESOLVED | Noted: 2018-05-29 | Resolved: 2020-10-08

## 2020-10-08 PROBLEM — N17.9 AKI (ACUTE KIDNEY INJURY) (HCC): Status: RESOLVED | Noted: 2020-09-23 | Resolved: 2020-10-08

## 2020-10-08 PROBLEM — A41.9 SEPTIC SHOCK (HCC): Status: RESOLVED | Noted: 2020-10-02 | Resolved: 2020-10-08

## 2020-10-08 PROBLEM — R65.21 SEPTIC SHOCK (HCC): Status: RESOLVED | Noted: 2020-10-02 | Resolved: 2020-10-08

## 2020-10-08 LAB
ANION GAP SERPL CALC-SCNC: 11 MMOL/L (ref 7–16)
BASOPHILS # BLD AUTO: 0.4 % (ref 0–1.8)
BASOPHILS # BLD: 0.05 K/UL (ref 0–0.12)
BUN SERPL-MCNC: 18 MG/DL (ref 8–22)
CALCIUM SERPL-MCNC: 8.5 MG/DL (ref 8.5–10.5)
CHLORIDE SERPL-SCNC: 108 MMOL/L (ref 96–112)
CO2 SERPL-SCNC: 18 MMOL/L (ref 20–33)
CREAT SERPL-MCNC: 0.85 MG/DL (ref 0.5–1.4)
EOSINOPHIL # BLD AUTO: 0.31 K/UL (ref 0–0.51)
EOSINOPHIL NFR BLD: 2.6 % (ref 0–6.9)
ERYTHROCYTE [DISTWIDTH] IN BLOOD BY AUTOMATED COUNT: 48.4 FL (ref 35.9–50)
GLUCOSE BLD-MCNC: 173 MG/DL (ref 65–99)
GLUCOSE BLD-MCNC: 214 MG/DL (ref 65–99)
GLUCOSE BLD-MCNC: 225 MG/DL (ref 65–99)
GLUCOSE BLD-MCNC: 259 MG/DL (ref 65–99)
GLUCOSE SERPL-MCNC: 226 MG/DL (ref 65–99)
HCT VFR BLD AUTO: 29.4 % (ref 42–52)
HGB BLD-MCNC: 9.5 G/DL (ref 14–18)
IMM GRANULOCYTES # BLD AUTO: 0.12 K/UL (ref 0–0.11)
IMM GRANULOCYTES NFR BLD AUTO: 1 % (ref 0–0.9)
LYMPHOCYTES # BLD AUTO: 1.79 K/UL (ref 1–4.8)
LYMPHOCYTES NFR BLD: 15.1 % (ref 22–41)
MAGNESIUM SERPL-MCNC: 1.8 MG/DL (ref 1.5–2.5)
MCH RBC QN AUTO: 30.5 PG (ref 27–33)
MCHC RBC AUTO-ENTMCNC: 32.3 G/DL (ref 33.7–35.3)
MCV RBC AUTO: 94.5 FL (ref 81.4–97.8)
MONOCYTES # BLD AUTO: 0.95 K/UL (ref 0–0.85)
MONOCYTES NFR BLD AUTO: 8 % (ref 0–13.4)
NEUTROPHILS # BLD AUTO: 8.67 K/UL (ref 1.82–7.42)
NEUTROPHILS NFR BLD: 72.9 % (ref 44–72)
NRBC # BLD AUTO: 0 K/UL
NRBC BLD-RTO: 0 /100 WBC
PHOSPHATE SERPL-MCNC: 3 MG/DL (ref 2.5–4.5)
PLATELET # BLD AUTO: 257 K/UL (ref 164–446)
PMV BLD AUTO: 10.3 FL (ref 9–12.9)
POTASSIUM SERPL-SCNC: 3.8 MMOL/L (ref 3.6–5.5)
POTASSIUM SERPL-SCNC: 4.1 MMOL/L (ref 3.6–5.5)
RBC # BLD AUTO: 3.11 M/UL (ref 4.7–6.1)
SODIUM SERPL-SCNC: 137 MMOL/L (ref 135–145)
WBC # BLD AUTO: 11.9 K/UL (ref 4.8–10.8)

## 2020-10-08 PROCEDURE — 82962 GLUCOSE BLOOD TEST: CPT | Mod: 91

## 2020-10-08 PROCEDURE — 700102 HCHG RX REV CODE 250 W/ 637 OVERRIDE(OP): Performed by: CLINICAL NURSE SPECIALIST

## 2020-10-08 PROCEDURE — 85025 COMPLETE CBC W/AUTO DIFF WBC: CPT

## 2020-10-08 PROCEDURE — 83735 ASSAY OF MAGNESIUM: CPT

## 2020-10-08 PROCEDURE — 700102 HCHG RX REV CODE 250 W/ 637 OVERRIDE(OP): Performed by: PHYSICAL MEDICINE & REHABILITATION

## 2020-10-08 PROCEDURE — A9270 NON-COVERED ITEM OR SERVICE: HCPCS | Performed by: INTERNAL MEDICINE

## 2020-10-08 PROCEDURE — A9270 NON-COVERED ITEM OR SERVICE: HCPCS | Performed by: CLINICAL NURSE SPECIALIST

## 2020-10-08 PROCEDURE — 700102 HCHG RX REV CODE 250 W/ 637 OVERRIDE(OP): Performed by: STUDENT IN AN ORGANIZED HEALTH CARE EDUCATION/TRAINING PROGRAM

## 2020-10-08 PROCEDURE — 700105 HCHG RX REV CODE 258: Performed by: INTERNAL MEDICINE

## 2020-10-08 PROCEDURE — 99233 SBSQ HOSP IP/OBS HIGH 50: CPT | Performed by: INTERNAL MEDICINE

## 2020-10-08 PROCEDURE — 80048 BASIC METABOLIC PNL TOTAL CA: CPT

## 2020-10-08 PROCEDURE — 700111 HCHG RX REV CODE 636 W/ 250 OVERRIDE (IP): Performed by: INTERNAL MEDICINE

## 2020-10-08 PROCEDURE — A9270 NON-COVERED ITEM OR SERVICE: HCPCS | Performed by: STUDENT IN AN ORGANIZED HEALTH CARE EDUCATION/TRAINING PROGRAM

## 2020-10-08 PROCEDURE — 700102 HCHG RX REV CODE 250 W/ 637 OVERRIDE(OP): Performed by: INTERNAL MEDICINE

## 2020-10-08 PROCEDURE — 84100 ASSAY OF PHOSPHORUS: CPT

## 2020-10-08 PROCEDURE — 770020 HCHG ROOM/CARE - TELE (206)

## 2020-10-08 PROCEDURE — 700101 HCHG RX REV CODE 250: Performed by: STUDENT IN AN ORGANIZED HEALTH CARE EDUCATION/TRAINING PROGRAM

## 2020-10-08 PROCEDURE — 84132 ASSAY OF SERUM POTASSIUM: CPT

## 2020-10-08 PROCEDURE — 700101 HCHG RX REV CODE 250: Performed by: CLINICAL NURSE SPECIALIST

## 2020-10-08 PROCEDURE — 99024 POSTOP FOLLOW-UP VISIT: CPT | Performed by: THORACIC SURGERY (CARDIOTHORACIC VASCULAR SURGERY)

## 2020-10-08 PROCEDURE — A9270 NON-COVERED ITEM OR SERVICE: HCPCS | Performed by: PHYSICAL MEDICINE & REHABILITATION

## 2020-10-08 RX ORDER — POTASSIUM CHLORIDE 7.45 MG/ML
10 INJECTION INTRAVENOUS ONCE
Status: COMPLETED | OUTPATIENT
Start: 2020-10-08 | End: 2020-10-08

## 2020-10-08 RX ORDER — POLYETHYLENE GLYCOL 3350 17 G/17G
17 POWDER, FOR SOLUTION ORAL DAILY
Refills: 3 | Status: ON HOLD
Start: 2020-10-09 | End: 2020-10-20

## 2020-10-08 RX ORDER — POTASSIUM CHLORIDE 14.9 MG/ML
20 INJECTION INTRAVENOUS ONCE
Status: DISCONTINUED | OUTPATIENT
Start: 2020-10-08 | End: 2020-10-08

## 2020-10-08 RX ORDER — METOCLOPRAMIDE 5 MG/1
5 TABLET ORAL EVERY 6 HOURS
Qty: 120 TAB | Status: ON HOLD
Start: 2020-10-08 | End: 2020-10-20

## 2020-10-08 RX ORDER — ALUMINA, MAGNESIA, AND SIMETHICONE 2400; 2400; 240 MG/30ML; MG/30ML; MG/30ML
30 SUSPENSION ORAL EVERY 4 HOURS PRN
Qty: 560 ML | Status: ON HOLD
Start: 2020-10-08 | End: 2020-10-20

## 2020-10-08 RX ORDER — CLOPIDOGREL BISULFATE 75 MG/1
75 TABLET ORAL DAILY
Qty: 30 TAB | Status: ON HOLD
Start: 2020-10-09 | End: 2020-10-20

## 2020-10-08 RX ORDER — AMLODIPINE BESYLATE 10 MG/1
10 TABLET ORAL DAILY
Qty: 30 TAB | Status: ON HOLD
Start: 2020-10-09 | End: 2020-10-20

## 2020-10-08 RX ORDER — MAGNESIUM SULFATE HEPTAHYDRATE 40 MG/ML
2 INJECTION, SOLUTION INTRAVENOUS ONCE
Status: COMPLETED | OUTPATIENT
Start: 2020-10-08 | End: 2020-10-08

## 2020-10-08 RX ORDER — LIDOCAINE 50 MG/G
1 PATCH TOPICAL EVERY 24 HOURS
Qty: 10 PATCH | Status: ON HOLD
Start: 2020-10-09 | End: 2020-10-20

## 2020-10-08 RX ORDER — HYDROCODONE BITARTRATE AND ACETAMINOPHEN 5; 325 MG/1; MG/1
1-2 TABLET ORAL EVERY 6 HOURS PRN
Qty: 20 TAB | Refills: 0 | Status: ON HOLD
Start: 2020-10-08 | End: 2020-10-20

## 2020-10-08 RX ORDER — BISACODYL 10 MG
10 SUPPOSITORY, RECTAL RECTAL
Refills: 0 | Status: ON HOLD
Start: 2020-10-08 | End: 2020-10-20

## 2020-10-08 RX ORDER — DIPHENHYDRAMINE HCL 25 MG
25 TABLET ORAL
Qty: 30 TAB | Refills: 0 | Status: ON HOLD
Start: 2020-10-08 | End: 2020-10-20

## 2020-10-08 RX ORDER — ASPIRIN 81 MG/1
81 TABLET, CHEWABLE ORAL DAILY
Qty: 100 TAB | Status: ON HOLD
Start: 2020-10-09 | End: 2020-10-20 | Stop reason: SDUPTHER

## 2020-10-08 RX ORDER — AMOXICILLIN 250 MG
2 CAPSULE ORAL 2 TIMES DAILY
Qty: 30 TAB | Refills: 0 | Status: ON HOLD | OUTPATIENT
Start: 2020-10-08 | End: 2020-10-20

## 2020-10-08 RX ORDER — LISINOPRIL 10 MG/1
10 TABLET ORAL 2 TIMES DAILY
Qty: 30 TAB | Status: ON HOLD
Start: 2020-10-08 | End: 2020-10-20

## 2020-10-08 RX ORDER — PROMETHAZINE HYDROCHLORIDE 25 MG/1
25 SUPPOSITORY RECTAL EVERY 6 HOURS PRN
Qty: 10 SUPPOSITORY | Refills: 0 | Status: ON HOLD
Start: 2020-10-08 | End: 2020-10-20

## 2020-10-08 RX ORDER — ATORVASTATIN CALCIUM 80 MG/1
80 TABLET, FILM COATED ORAL
Qty: 30 TAB | Status: ON HOLD
Start: 2020-10-08 | End: 2020-10-20 | Stop reason: SDUPTHER

## 2020-10-08 RX ADMIN — CLOPIDOGREL BISULFATE 75 MG: 75 TABLET ORAL at 05:20

## 2020-10-08 RX ADMIN — DOCUSATE SODIUM 50 MG AND SENNOSIDES 8.6 MG 2 TABLET: 8.6; 5 TABLET, FILM COATED ORAL at 17:06

## 2020-10-08 RX ADMIN — MAGNESIUM SULFATE 2 G: 2 INJECTION INTRAVENOUS at 08:42

## 2020-10-08 RX ADMIN — METOPROLOL TARTRATE 25 MG: 25 TABLET, FILM COATED ORAL at 17:06

## 2020-10-08 RX ADMIN — POLYETHYLENE GLYCOL 3350 1 PACKET: 17 POWDER, FOR SOLUTION ORAL at 05:20

## 2020-10-08 RX ADMIN — AMANTADINE HYDROCHLORIDE 100 MG: 50 SOLUTION ORAL at 18:04

## 2020-10-08 RX ADMIN — AMANTADINE HYDROCHLORIDE 100 MG: 50 SOLUTION ORAL at 05:20

## 2020-10-08 RX ADMIN — DIPHENHYDRAMINE HYDROCHLORIDE 25 MG: 25 TABLET ORAL at 23:30

## 2020-10-08 RX ADMIN — POTASSIUM BICARBONATE 25 MEQ: 978 TABLET, EFFERVESCENT ORAL at 08:31

## 2020-10-08 RX ADMIN — ENOXAPARIN SODIUM 40 MG: 40 INJECTION SUBCUTANEOUS at 17:07

## 2020-10-08 RX ADMIN — ASPIRIN 81 MG: 81 TABLET, CHEWABLE ORAL at 05:20

## 2020-10-08 RX ADMIN — SODIUM CHLORIDE, POTASSIUM CHLORIDE, SODIUM LACTATE AND CALCIUM CHLORIDE: 600; 310; 30; 20 INJECTION, SOLUTION INTRAVENOUS at 04:39

## 2020-10-08 RX ADMIN — ATORVASTATIN CALCIUM 80 MG: 80 TABLET, FILM COATED ORAL at 19:54

## 2020-10-08 RX ADMIN — LIDOCAINE 1 PATCH: 50 PATCH TOPICAL at 08:40

## 2020-10-08 RX ADMIN — POTASSIUM BICARBONATE 25 MEQ: 978 TABLET, EFFERVESCENT ORAL at 17:05

## 2020-10-08 RX ADMIN — METOCLOPRAMIDE 5 MG: 10 TABLET ORAL at 11:42

## 2020-10-08 RX ADMIN — POTASSIUM CHLORIDE 10 MEQ: 7.46 INJECTION, SOLUTION INTRAVENOUS at 02:40

## 2020-10-08 RX ADMIN — METOPROLOL TARTRATE 25 MG: 25 TABLET, FILM COATED ORAL at 05:20

## 2020-10-08 RX ADMIN — INSULIN LISPRO 3 UNITS: 100 INJECTION, SOLUTION INTRAVENOUS; SUBCUTANEOUS at 00:21

## 2020-10-08 RX ADMIN — OMEPRAZOLE 40 MG: KIT at 05:20

## 2020-10-08 RX ADMIN — LISINOPRIL 10 MG: 10 TABLET ORAL at 05:21

## 2020-10-08 RX ADMIN — INSULIN LISPRO 9 UNITS: 100 INJECTION, SOLUTION INTRAVENOUS; SUBCUTANEOUS at 17:13

## 2020-10-08 RX ADMIN — METOCLOPRAMIDE 5 MG: 10 TABLET ORAL at 05:20

## 2020-10-08 RX ADMIN — POTASSIUM CHLORIDE 10 MEQ: 7.46 INJECTION, SOLUTION INTRAVENOUS at 08:31

## 2020-10-08 RX ADMIN — INSULIN LISPRO 6 UNITS: 100 INJECTION, SOLUTION INTRAVENOUS; SUBCUTANEOUS at 05:21

## 2020-10-08 RX ADMIN — INSULIN LISPRO 6 UNITS: 100 INJECTION, SOLUTION INTRAVENOUS; SUBCUTANEOUS at 11:42

## 2020-10-08 RX ADMIN — AMLODIPINE BESYLATE 10 MG: 10 TABLET ORAL at 05:20

## 2020-10-08 RX ADMIN — METOCLOPRAMIDE 5 MG: 10 TABLET ORAL at 17:06

## 2020-10-08 RX ADMIN — LISINOPRIL 10 MG: 10 TABLET ORAL at 17:06

## 2020-10-08 ASSESSMENT — FIBROSIS 4 INDEX: FIB4 SCORE: 1.99

## 2020-10-08 NOTE — DISCHARGE PLANNING
Anticipated Discharge Disposition: [facility]    Action: spoke w/ Brodie at [facility]. Pt off restraints. Cortrak re-inserted. Brodie states they might be able to accept pt tomorrow.    Barriers to Discharge: [facility] rehab acceptance    Plan: will follow up w/ [facility] tomorrow. If [facility] unable to accept pt, will send referral for SNF

## 2020-10-08 NOTE — PROGRESS NOTES
Monitor Summary:    HR 80-100s SR/ST  BP 130s-160s    Rare/occassional PVCs    .14/.08/.34    12 hour chart check complete

## 2020-10-08 NOTE — DISCHARGE PLANNING
Msg placed to Kell, [facility] PAR, to look into extending auth from insurance as the original auth was from 10-01 thru 10-07.  Guerrero pulled his cortrak.  Orders to give 5mg haldol IVPx1 and place b/l soft wrist restraints.  TCC remains monitoring.

## 2020-10-08 NOTE — PROGRESS NOTES
Cortrak Placement    Tube Team verified patient name and medical record number prior to tube placement.  Cortrak tube (43 inches, 10 [place] of [place]) placed at 75 cm in right nare.  Per Cortrak picture, tube appears to be in the stomach.    Nursing Instructions: Awaiting KUB to confirm placement before use for medications or feeding. Once placement confirmed, flush tube with 30 ml of water, and then remove and save stylet, in patient medication drawer.

## 2020-10-08 NOTE — PROGRESS NOTES
Critical Care Progress Note    Date of admission  9/21/2020    Chief Complaint  49 y.o. male with PMHx DMII, HTN and LVH admitted 9/21/2020 with an acute coronary syndrome.  He subsequently underwent CABG.    Hospital Course   10/3 -    Place NG to suction.  Titrating norepinephrine.  Hydrate with intravenous NS.  Add vasopressin.  10/4 -    nasal MRSA PCR is negative.  Stop Zyvox.  Continue Zosyn.  Keep NG to suction.  Continue IV hydration.  Renal function is improving.  Off norepinephrine.  10/5 - Zosyn, neurologically starting to speak in limited fashion, hypotensive now off norepinephrine but vomited first dose of metoprolol despite marked improved NG output, start IV ACE inhibitor  10/6 - Ileus better, NG pulled, failed swallow eval, Cortrak to be placed and trickle TF to start, Physiatry consult pending  10/7 - Ileus resolved, advancing TF, HTN better but increasing BB and ACE, neurologically slt better and appears near ready for Rehab  10/8 - neurologically improved, communicating more effectively, blood pressure now reasonably controlled, briefly restrained last night after he pulled his cortrak, tolerating tube feeds-ileus resolved      Interval Problem Update  Reviewed last 24 hour events:    POD 13 - CABG  A&O x4 - soft limited speech    SR 80-90s  SBp 90-170s - 150saverage  PRN Bp meds   Metoprolol/Lisinopril/Amlodipine  Dual anti-platelets  UO adequate  LR 83  No ectopy  Tm 99.0  Room air  IS 1000  Cortrak TF  Therapies  Lovenox  Last dose Zosyn  Reglan PO      Full anticoagulation?  Heplock IVF  Water per cortrak  D/c K scale - Effer K 25 bid  To rehab?  Apparently needs to be off restraints for 24 hours      Reviewed case at length and updated wife at bedside       YESTERDAY  POD 12  Awake talking more  O x4 - slow - soft voice  Failed swallow eval  Cortrak 20 -> goal 50  SR 90s  SBp 130-160s  Off Nicardipine  No PRN Bp meds  Afebrile  UO good  Physiatry ok w/Rehab  RA, IS 1000  Lovenox  PPI  Zosyn  5/5  Reglan      TKO IVF 30  Reglan to enteral  Anti-coag when?  Rehab transfer?      Serial follow-up  Reviewed with RN, blood pressure and heart rate still elevated, will increase metoprolol dose, still not back to full outpatient dose of lisinopril    Review of Systems  Review of Systems   Unable to perform ROS: Acuity of condition   Patient starting to be a little more verbal but complete ROS still not obtainable, denies pain and shortness of breath, no new GI symptoms    Vital Signs for last 24 hours   Temp:  [35.7 °C (96.3 °F)-37 °C (98.6 °F)] 35.7 °C (96.3 °F)  Pulse:  [] 107  Resp:  [19-34] 28  BP: ()/() 132/89  SpO2:  [94 %-100 %] 97 %    Hemodynamic parameters for last 24 hours       Respiratory Information for the last 24 hours       Physical Exam   Physical Exam  Vitals signs reviewed.   Constitutional:       Appearance: He is normal weight. He is not toxic-appearing or diaphoretic.   HENT:      Head: Normocephalic and atraumatic.      Right Ear: External ear normal.      Left Ear: External ear normal.      Nose: Nose normal.      Mouth/Throat:      Mouth: Mucous membranes are moist.      Pharynx: No posterior oropharyngeal erythema.   Eyes:      General: No scleral icterus.        Right eye: No discharge.         Left eye: No discharge.      Pupils: Pupils are equal, round, and reactive to light.   Neck:      Musculoskeletal: Normal range of motion. No neck rigidity or muscular tenderness.      Vascular: No JVD.   Cardiovascular:      Rate and Rhythm: Normal rate and regular rhythm.  No extrasystoles are present.     Pulses: Normal pulses.      Heart sounds: No murmur. No gallop.       Comments: SR  Pulmonary:      Effort: No respiratory distress.      Breath sounds: No wheezing, rhonchi or rales (Resolved).   Chest:      Comments: Median sternotomy incision without drainage or obvious signs of infection  Abdominal:      General: Abdomen is protuberant. Bowel sounds are normal. There  is no distension (Resolved).      Palpations: Abdomen is soft. There is no fluid wave.      Tenderness: There is no abdominal tenderness. There is no right CVA tenderness, left CVA tenderness or guarding. Negative signs include Calvin's sign and McBurney's sign.      Comments: improved   Genitourinary:     Comments: Quispe catheter  Musculoskeletal:      Right lower leg: No edema.      Left lower leg: No edema.      Comments: No clubbing or cyanosis   Skin:     General: Skin is warm and dry.      Capillary Refill: Capillary refill takes less than 2 seconds.      Coloration: Skin is not cyanotic.      Nails: There is no clubbing.     Neurological:      Mental Status: He is alert.      GCS: GCS eye subscore is 4. GCS verbal subscore is 5. GCS motor subscore is 6.      Comments: Patient definitely more verbal and with normal tone/volume to his voice and able to carry on more complex conversations, moving all 4 to command, brainstem reflexes intact, no new focal deficits   Psychiatric:         Mood and Affect: Mood is not anxious.         Behavior: Behavior is not agitated. Behavior is cooperative.         Cognition and Memory: Cognition is impaired.       Medications  Current Facility-Administered Medications   Medication Dose Route Frequency Provider Last Rate Last Dose   • potassium bicarbonate (KLYTE) effervescent tablet 25 mEq  25 mEq Enteral Tube BID Guerrero Solo M.D.   25 mEq at 10/08/20 1705   • metoclopramide (REGLAN) tablet 5 mg  5 mg Enteral Tube Q6HRS Guerrero Solo M.D.   5 mg at 10/08/20 1706   • amLODIPine (NORVASC) tablet 10 mg  10 mg Enteral Tube Q DAY Maia Chavira A.P.N.   10 mg at 10/08/20 0520   • lisinopril (PRINIVIL) tablet 10 mg  10 mg Enteral Tube TWICE DAILY Maia Chavira A.P.N.   10 mg at 10/08/20 1706   • metoprolol (LOPRESSOR) tablet 25 mg  25 mg Enteral Tube TWICE DAILY Guerrero Solo M.D.   25 mg at 10/08/20 1706   • aspirin (ASA) chewable tab 81 mg  81 mg Enteral Tube  DAILY Guerrero Solo M.D.   81 mg at 10/08/20 0520   • omeprazole (FIRST-OMEPRAZOLE) 2 mg/mL oral susp 40 mg  40 mg Enteral Tube DAILY Guerrero Solo M.D.   40 mg at 10/08/20 0520   • amantadine (SYMMETREL) 50 MG/5ML syrup 100 mg  100 mg Enteral Tube BID Terry Worchel, D.OStacie   100 mg at 10/08/20 1804   • enoxaparin (LOVENOX) inj 40 mg  40 mg Subcutaneous Q EVENING Karan Norton M.D.   40 mg at 10/08/20 1707   • acetaminophen (TYLENOL) suppository 975 mg  975 mg Rectal Q6HRS PRN Karan Norton M.D.       • labetalol (NORMODYNE/TRANDATE) injection 10 mg  10 mg Intravenous Q4HRS PRN Byron Edmond M.D.   10 mg at 10/05/20 2241   • Pharmacy Consult: Enteral tube insertion - review meds/change route/product selection  1 Each Other PHARMACY TO DOSE Maia Chavira, SVENN.       • tramadol (ULTRAM) 50 MG tablet 50 mg  50 mg Enteral Tube Q4HRS PRN MAUREEN HolmOStacie   50 mg at 10/01/20 0536   • atorvastatin (LIPITOR) tablet 80 mg  80 mg Enteral Tube QHS MAUREEN HolmO.   80 mg at 10/08/20 1954   • mag hydrox-al hydrox-simeth (MAALOX PLUS ES or MYLANTA DS) suspension 30 mL  30 mL Enteral Tube Q4HRS PRN MAUREEN HolmO.       • senna-docusate (PERICOLACE or SENOKOT S) 8.6-50 MG per tablet 2 Tab  2 Tab Enteral Tube BID Mateus Stringer D.O.   2 Tab at 10/08/20 1706    And   • polyethylene glycol/lytes (MIRALAX) PACKET 1 Packet  1 Packet Enteral Tube DAILY MAUREEN HolmO.   1 Packet at 10/08/20 0520    And   • magnesium hydroxide (MILK OF MAGNESIA) suspension 30 mL  30 mL Enteral Tube DAILY Mateus Stringer D.O.   Stopped at 10/01/20 0600    And   • bisacodyl (DULCOLAX) suppository 10 mg  10 mg Rectal QDAY PRN MAUREEN HolmOStacie   10 mg at 10/01/20 2338   • clopidogrel (PLAVIX) tablet 75 mg  75 mg Enteral Tube DAILY MAUREEN HolmOStacie   75 mg at 10/08/20 0520   • diphenhydrAMINE (BENADRYL) tablet/capsule 25 mg  25 mg Enteral Tube HS PRN - MR X 1 MAUREEN HolmOStacie   25 mg at  10/07/20 0117   • HYDROcodone-acetaminophen (NORCO) 5-325 MG per tablet 1-2 Tab  1-2 Tab Enteral Tube Q6HRS PRN Maia Chavira A.P.N.       • insulin lispro (HumaLOG) injection  0-15 Units Subcutaneous Q6HRS Maia Chavira A.P.N.   9 Units at 10/08/20 1713   • MD Alert...ICU Electrolyte Replacement per Pharmacy   Other PHARMACY TO DOSE Karan Norton M.D.       • lidocaine (LIDODERM) 5 % 1 Patch  1 Patch Transdermal Q24HR SHALOM Camacho.P.N.   1 Patch at 10/08/20 0840   • Respiratory Therapy Consult   Nebulization Continuous RT SHALOM Camacho.P.N.       • Pharmacy Consult Request ...Pain Management Review 1 Each  1 Each Other PHARMACY TO DOSE Maia Chavira A.P.N.       • ondansetron (ZOFRAN) syringe/vial injection 8 mg  8 mg Intravenous Q6HRS PRN Maia Chavira A.P.N.        Or   • prochlorperazine (COMPAZINE) injection 10 mg  10 mg Intravenous Q6HRS PRN Maia Chavira A.P.N.   10 mg at 10/02/20 0240    Or   • promethazine (PHENERGAN) suppository 25 mg  25 mg Rectal Q6HRS PRN Maia Chavira A.P.N.           Fluids    Intake/Output Summary (Last 24 hours) at 10/8/2020 2039  Last data filed at 10/8/2020 1800  Gross per 24 hour   Intake 2570.51 ml   Output 1670 ml   Net 900.51 ml       Laboratory          Recent Labs     10/06/20  0526  10/07/20  0120  10/07/20  1800 10/08/20  0015 10/08/20  0515   SODIUM 146*  --  142  --   --   --  137   POTASSIUM 4.1   < > 3.7   < > 3.7 4.1 3.8   CHLORIDE 115*  --  111  --   --   --  108   CO2 18*  --  16*  --   --   --  18*   BUN 21  --  17  --   --   --  18   CREATININE 0.88  --  0.88  --   --   --  0.85   MAGNESIUM 2.2  --  1.9  --   --   --  1.8   PHOSPHORUS 2.7  --  3.1  --   --   --  3.0   CALCIUM 8.6  --  8.4*  --   --   --  8.5    < > = values in this interval not displayed.     Recent Labs     10/06/20  0526 10/07/20  0120 10/08/20  0515   PREALBUMIN  --  16.2*  --    GLUCOSE 172* 161* 226*     Recent Labs     10/06/20  0526 10/07/20  0120  10/08/20  0515   WBC 12.0* 12.0* 11.9*   NEUTSPOLYS 62.90 66.40 72.90*   LYMPHOCYTES 19.90* 18.80* 15.10*   MONOCYTES 13.00 10.70 8.00   EOSINOPHILS 1.90 2.20 2.60   BASOPHILS 0.60 0.50 0.40     Recent Labs     10/06/20  0526 10/07/20  0120 10/08/20  0515   RBC 2.99* 3.16* 3.11*   HEMOGLOBIN 9.5* 9.5* 9.5*   HEMATOCRIT 29.5* 30.6* 29.4*   PLATELETCT 204 233 257       Imaging  X-Ray:  I have personally reviewed the images and compared with prior images.  EKG:  I have personally reviewed the images and compared with prior images.  CT:    Reviewed  Echo:   Reviewed    Assessment/Plan  Ileus (HCC)  Assessment & Plan  Resolved, monitor for recurrence  NG removed 10/6  Advancing tube feeds to goal  Continue Reglan for now, query component of diabetic gastroparesis  Continue to optimize electrolytes  Mobilize as tolerated, this has helped significantly  Limit narcotics  Serial abdominal exams -abdomen back to baseline, positive BMs    Aspiration pneumonia (HCC)  Assessment & Plan  Improved clinically  Nasal MRSA PCR negative -status post short course of Zyvox  Zosyn and plan 5 days of therapy, last day of therapy 10/7-complete-monitor for recurrence  Mobilize/I-S and limit narcotics  Follow-up chest x-ray as needed  RT protocols  Failed swallow eval  Aspiration precautions  Update vaccines including flu shot per protocols    Cardioembolic stroke (HCC)  Assessment & Plan  MRI on 9/29 confirms numerous acute/subacute supratentorial and infratentorial infarcts consistent with cardioembolic phenomenon  On aspirin and Plavix for CAD/CABG  Statin  Follow neuro exam and limit sedatives  PT/OT/SLP  Appears ready for rehab, physiatry is seen the patient and is willing to accept him  Does he need a MARGOTH or follow-up with neurology/cardiology in regards to anticoagulation for cardioembolic stroke?  Will need [facility] clearance post CABG for full dose anticoagulation, he appears to be far enough out from his surgery at this  point?    Altered mental status- (present on admission)  Assessment & Plan  Significantly improved, follows and starting to become in a limited fashion verbal-slowly better    NSTEMI (non-ST elevated myocardial infarction) ([facility])- (present on admission)  Assessment & Plan  Continue dual antiplatelet therapy aspirin/Plavix  Continue statin  Metoprolol and lisinopril resumed  Continue risk factor modification long-term  No significant atrial fibrillation identified so far    CAD (coronary artery disease)- (present on admission)  Assessment & Plan  S/P 3V CABG on 9/25  Continue aspirin/Plavix  Continue statin  Continue risk factor modification    Essential hypertension- (present on admission)  Assessment & Plan  Goal SBP less than 160, transition to goal of 140 for rehab and 130 long-term  Off nicardipine infusion  IV enalapril as needed  Continue lisinopril 10 mg twice daily, outpatient dose was 40/day  Continue metoprolol to 25 twice daily, may need to increase dose tomorrow for still tachycardic/hypertensive  Continue amlodipine    Type 2 diabetes mellitus with complication, without long-term current use of insulin ([facility])- (present on admission)  Assessment & Plan  Continue sliding scale insulin  Blood sugar increasing as tube feeds advancing, A1c levels almost 8 recently  May need to add Lantus to his regimen, monitor sliding scale insulin use daily and reassess  Hypoglycemia protocols    Normochromic normocytic anemia  Assessment & Plan  Serial CBC  Transfuse per protocols  No bleeding clinically, monitoring       VTE:  Lovenox  Ulcer: PPI  Lines: Central Line  Ongoing indication addressed and Quispe Catheter  Ongoing indication addressed    I have performed a physical exam and reviewed and updated ROS and Plan today (10/8/2020). In review of yesterday's note (10/7/2020), there are no changes except as documented above.     Discussed patient condition and risk of morbidity and/or mortality with RN, RT, Pharmacy,  Charge nurse / hot rounds, Patient and [facility]     Clinically appears appropriate for transfer to rehab or neuro floor for ongoing therapies post embolic stroke.  Does he need anticoagulation or follow-up MARGOTH?  Defer to neuro/cardiology.  No cardiac rhythm issue associated with embolic stroke so far.  Blood pressure improved with adjustments in BP regimen and no further issues with ileus.  Blood sugars trending up as tube feeds advanced, may need to add Lantus to sliding scale/metformin regimen.  Reviewed with [facility] at length, [facility] will sign off, call if we can be of assistance.

## 2020-10-08 NOTE — DISCHARGE SUMMARY
DISCHARGE SUMMARY    ADMISSION DATE: 9/21/2020    DISCHARGE DATE: 10/8/2020    CHIEF COMPLAINT ON ADMISSION: chest pain    ADMITTING DIAGNOSES: CAD, aspiration pneumonia, sepsis, embolic CVA    DISCHARGE DIAGNOSES: CAD, aspiration pneumonia, sepsis, embolic CVA    PROCEDURES PERFORMED: Coronary artery bypass grafting x3, left internal   mammary to left anterior descending, reverse saphenous vein graft to the major   obtuse marginal branch and the right posterior descending, endoscopic   saphenous vein harvesting, extracorporal circulation.    HISTORY OF PRESENT ILLNESS:  The patient is a 49 y.o. 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  The day of admission it occurred lasted several minutes was relieved with rest.  It was substernal and radiated to his left arm.  He became short of breath.  He went home.  The pain returned and it felt like a boulder on his chest pain 10/10.  No other associated signs or symptoms.  No other alleviating or aggravating factors.  He then brought himself to the ED here at [facility].  A cath revealed progression of his disease.  He was referred for CABG.    HOSPITAL COURSE:   POD 1 HDS, SR, Acute blood loss anemia- s/p transfusion- keep mediastinal tubes, gently diurese, d/c gomez  POD 2 HTN- inc lisinopril, SR/ST- change to metoprolol, acute blood loss anemia s/p transfusion- watch, d/c mediastinal tubes, urine retention- replace gomez/start flomax, cont diuresis  POD 3  HDS, SR, neuro right  weaker than left gazes to right, wounds CDI, abdomen S/NT, fluid balance negative, wt still up from admit.  Plan:  MRI today, decrease ace for permissive hypertension, dc gabapentin, BB, Statin, IS/ambulate. CPM.  POD 4  HDS but hypertensive, SR, neuro answers questions but delayed.  Gaze more center.  EEG normal, MRI pending, wounds CDI, abdomen distended, fluid balance negative, wt  down.  Plan:  Restart ace, IM getting abdominal study, BB, Statin, IS/ambulate. CPM.  POD 5  HDS, ST, neuro unchanged, wounds CDI, abdomen S/NT, fluid balance negative, wt down.  Failed swallow evaluation.  Hypertensive.  Pain issues.  Plan:  core trak, switch BB to coreg, ACE, Statin, add norco, IS/ambulate. CPM.  POD 6  HDS, SR, neuro unchanged, wounds CDI, abdomen S/NT, fluid balance negative, wt stable.  Plan:  Awaiting rehab authorization, BB, ACE, Statin, IS/ambulate. CPM.  POD 7 HDS, SR, Febrile- most likely aspiration pneumonia- started on rocephin- d/w pharmacy will change to unasyn, ileus- 2 liters of residual last night- start reglan/supp- if no response try relistor, Elevated creatinine- stop lisinopril/lasix- switch lovenox to heparin- start fluids 75 cc/hr, replace gomez for strict I&O, neuro- follows with slowed response, minimal speech  POD 8  HDS, SR, neuro intact, wounds CDI, abdomen firm and distended CT scan shows ileus, fluid balance negative, wt down.  Creatine trending down   Plan:  NG to decompress abd, 1 unit blood, Statin, IS/ambulate. CPM.  POD 9  HDS, SR, neuro follows commands slowly, wounds CDI, abdomen S/NT, fluid balance negative, wt down, NG tube with 1800 out, had 2 loose BM's.  Creatinine improved.  Off levo.  Plan:  Continue to decompress abd with NG.  Bowel protocol.  IV antibiotics for possible aspiration. CPM.  POD 10  HDS, SR, neuro intact, wounds CDI, abdomen less distended today + BS, fluid balance negative, wt up slightly.  Hypertensive but does not tolerate oral medications.  Getting ace and bb.  Creatinine normalized.  Plan:  Continue bowel protocol, continue NG tube, BB, ACE, Statin, IS/ambulate. CPM.  POD 11:  HDS.  NSR.  Hypertensive.  Weight stable, below baseline admit weight.  Pending swallow evaluation today.  NG tube clamped since 4am, tolerating.  Switching medications to oral, crushed.  Remains on IV abx.  Plan:  Discussed with Dr. Solo.  Plan for repeat  rehab evaluation once swallow evaluation complete.  Continue current management.    POD 12  HDS, SR, neuro intact, wounds CDI, abdomen S/NT today active BS all 4 quads, fluid balance negative, wt up today.  Failed swallow evaluation.  Plan:  Continue tube feeds, today last day of abx.  Watch abd.  Increase norvasc and ace, BB, Statin, IS/ambulate. CPM.  To rehab in AM?  POD 13  HDS, SR, neuro gazes to right follows commands but delayed response, wounds CDI, abdomen S/NT, fluid balance negative, wt down 3 kg  Pulled out core trax last night.  Question if rehab will transfer.  Plan:  Will get ready to transfer to rehab.  Medically cleared.  If not candidate for rehab will asked hospitalist to see and possibly transfer to neuro unit and sign off, BB, ACE, Statin, IS/ambulate. CPM.  POD 14 HDS, SR/ST, more alert and conversive.  Speaking complete sentences.  Plan:  To rehab today.  Medically cleared to transfer.      RECENT LABS:     Lab Results   Component Value Date/Time    SODIUM 137 10/08/2020 05:15 AM    POTASSIUM 3.8 10/08/2020 05:15 AM    CHLORIDE 108 10/08/2020 05:15 AM    CO2 18 (L) 10/08/2020 05:15 AM    GLUCOSE 226 (H) 10/08/2020 05:15 AM    BUN 18 10/08/2020 05:15 AM    CREATININE 0.85 10/08/2020 05:15 AM      Lab Results   Component Value Date/Time    WBC 11.9 (H) 10/08/2020 05:15 AM    RBC 3.11 (L) 10/08/2020 05:15 AM    HEMOGLOBIN 9.5 (L) 10/08/2020 05:15 AM    HEMATOCRIT 29.4 (L) 10/08/2020 05:15 AM    MCV 94.5 10/08/2020 05:15 AM    MCH 30.5 10/08/2020 05:15 AM    MCHC 32.3 (L) 10/08/2020 05:15 AM    MPV 10.3 10/08/2020 05:15 AM    NEUTSPOLYS 72.90 (H) 10/08/2020 05:15 AM    LYMPHOCYTES 15.10 (L) 10/08/2020 05:15 AM    MONOCYTES 8.00 10/08/2020 05:15 AM    EOSINOPHILS 2.60 10/08/2020 05:15 AM    BASOPHILS 0.40 10/08/2020 05:15 AM    HYPOCHROMIA 1+ 10/01/2020 05:48 AM    ANISOCYTOSIS 1+ 10/04/2020 05:08 AM      Lab Results   Component Value Date/Time    PROTHROMBTM 17.0 (H) 09/25/2020 02:21 PM    INR  1.34 (H) 09/25/2020 02:21 PM        ALLERGIES:   Patient has no known allergies.    DISCHARGE MEDICATIONS:      Medication List      START taking these medications      Instructions   acetaminophen 325 MG Supp  Commonly known as: TYLENOL  Replaces: acetaminophen 500 MG Tabs   Insert 3 Suppositories in rectum every 6 hours as needed.  Dose: 975 mg     amantadine 50 MG/5ML Syrp  Commonly known as: SYMMETREL   Take 10 mL by mouth 2 Times a Day.  Dose: 100 mg     aspirin 81 MG Chew chewable tablet  Start taking on: October 9, 2020  Commonly known as: ASA  Replaces: aspirin EC 81 MG Tbec   Take 1 Tab by mouth every day.  Dose: 81 mg     bisacodyl 10 MG Supp  Commonly known as: DULCOLAX   Insert 1 Suppository in rectum 1 time daily as needed (if magnesium hydroxide ineffective after 24 hours).  Dose: 10 mg     diphenhydrAMINE 25 MG Tabs  Commonly known as: BENADRYL   Take 1 tablet by mouth at bedtime as needed.  May repeat 1 time. for Sleep.  Dose: 25 mg     enoxaparin 40 MG/0.4ML Soln inj  Commonly known as: LOVENOX   Inject 40 mg as instructed every evening.  Dose: 40 mg     HYDROcodone-acetaminophen 5-325 MG Tabs per tablet  Commonly known as: NORCO   Take 1-2 Tabs by mouth every 6 hours as needed for up to 14 days.  Dose: 1-2 Tab     insulin lispro 100 UNIT/ML  Commonly known as: HumaLOG   Inject 0-15 Units as instructed every 6 hours.  Dose: 0-15 Units     lidocaine 5 % Ptch  Start taking on: October 9, 2020  Commonly known as: LIDODERM   Apply 1 Patch to skin as directed every 24 hours.  Dose: 1 Patch     mag hydrox-al hydrox-simeth 400-400-40 MG/5ML Susp  Commonly known as: MAALOX PLUS ES or MYLANTA DS   30 mL by Per NG Tube route every four hours as needed (Indigestion).  Dose: 30 mL     magnesium hydroxide 400 MG/5ML Susp  Start taking on: October 9, 2020  Commonly known as: MILK OF MAGNESIA   Take 30 mL by mouth every day.  Dose: 30 mL     metoclopramide 5 MG tablet  Commonly known as: REGLAN   Take 1 Tab by  mouth every 6 hours.  Dose: 5 mg     omeprazole 2 mg/mL Susp  Start taking on: October 9, 2020  Commonly known as: FIRST-OMEPRAZOLE   20 mL by Enteral Tube route every day.  Dose: 40 mg     Pharmacy   1 Each by Other route PHARMACY TO DOSE.  Dose: 1 Each     polyethylene glycol/lytes 17 g Pack  Start taking on: October 9, 2020  Commonly known as: MIRALAX   Take 1 Packet by mouth every day.  Dose: 17 g     potassium bicarbonate 25 MEQ tablet  Commonly known as: KLYTE   1 Tab by Enteral Tube route 2 Times a Day.  Dose: 25 mEq     promethazine 25 MG Supp  Commonly known as: Phenergan   Insert 1 Suppository in rectum every 6 hours as needed for Nausea/Vomiting (Nausea/Vomiting if both ondansetron and prochlorperazine ineffective).  Dose: 25 mg     senna-docusate 8.6-50 MG Tabs  Commonly known as: PERICOLACE or SENOKOT S   Take 2 Tabs by mouth 2 Times a Day.  Dose: 2 Tab        CHANGE how you take these medications      Instructions   atorvastatin 80 MG tablet  What changed: medication strength  Commonly known as: LIPITOR   Take 1 Tab by mouth every bedtime.  Dose: 80 mg     lisinopril 10 MG Tabs  What changed:   · medication strength  · how much to take  · when to take this  Commonly known as: PRINIVIL   Take 1 Tab by mouth 2 Times a Day.  Dose: 10 mg     metoprolol 25 MG Tabs  What changed: when to take this  Commonly known as: LOPRESSOR   Take 1 Tab by mouth 2 Times a Day.  Dose: 25 mg        CONTINUE taking these medications      Instructions   amLODIPine 10 MG Tabs  Start taking on: October 9, 2020  Commonly known as: NORVASC   Take 1 Tab by mouth every day.  Dose: 10 mg     clopidogrel 75 MG Tabs  Start taking on: October 9, 2020  Commonly known as: PLAVIX   Take 1 Tab by mouth every day.  Dose: 75 mg     metFORMIN 500 MG Tabs  Commonly known as: GLUCOPHAGE   Take 2,000 mg by mouth 2 Times a Day.  Dose: 2,000 mg        STOP taking these medications    acetaminophen 500 MG Tabs  Commonly known as: TYLENOL  Replaced  by: acetaminophen 325 MG Supp     allopurinol 100 MG Tabs  Commonly known as: ZYLOPRIM     aspirin EC 81 MG Tbec  Commonly known as: ECOTRIN  Replaced by: aspirin 81 MG Chew chewable tablet     Wellbutrin  MG XL tablet  Generic drug: buPROPion            NARCOTIC PAIN MEDICATIONS:   In prescribing controlled substances to this patient, I certify that I have obtained and reviewed their medical history. I have also made a good epi effort to obtain applicable records from other providers who have treated the patient .    I have conducted a physical exam and documented it. I have reviewed the patient's prescription history as maintained by the [place] Prescription Monitoring Program.     I have assessed the patient’s risk for abuse, dependency, and addiction using the validated Opioid Risk Tool.    Given the above, I believe the benefits of controlled substance therapy outweigh the risks. The reasons for prescribing controlled substances include non-narcotic, oral analgesic alternatives have been inadequate for pain control. Accordingly, I have discussed the risk and benefits, treatment plan, and alternative therapies with the patient.     Pt understands this prescription is a controlled substance which is potentially habit-forming and its use is regulated by the [place]. It must be submitted to the pharmacy within 5 days of the date written and can not be called in or faxed to the pharmacy. Refills are subject to terms of a medicine agreement. Any refill requires a new prescription that must be obtained from this office during regular office hours. We ask for 72 hours notice to get an appointment for a narcotic pain medication refill. This medicine can cause nausea, significant constipation, sedation, confusion.     DIET:   Cardiac diet    DISCHARGE INSTRUCTIONS DISCUSSED WITH THE PATIENT:      1. NO driving for 4 weeks after surgery. You may ride as a passenger.  2. NO lifting of any item over 10 lbs (e.g. gallon  of milk) for 6 weeks after surgery.  3. DO walk as much as possible! Walk a minimum of once a day. Depending on your fatigue and comfort level, you may walk as much as you wish. There is no maximum.  4. Other physical activities (sex, housework, gardening, etc.) are OK after 4 weeks   5. Continue using incentive spirometer for 2 weeks, especially if going home on oxygen.    Incision Care:  1. SHOWER ONLY - no baths. Clean incision daily with plain Ivory ® soap or any other dye or perfume free soap. Then pat incision dry with clean towel. Avoid creams or lotions on the incision(s).  a. If there is any increase in redness or swelling, or separation of the incision line, or thick drainage* from any of the incisions, call right away  * Clear, thin drainage is not abnormal especially from the leg incision and/or                         chest tube sites.  2. Continue to wear your JOSE Stockings for 4 weeks. You may take off the stockings when in bed or when the legs are elevated.    Patient instructed to call [facility] cardiac surgery at [phone]  if any increased shortness of breath, uncontrolled pain, weight gain greater than 2 pounds in 1 day, SBP >140, HR <60 or redness swelling or drainage of incisions.      FOLLOW-UP:   Future Appointments   Date Time Provider Department Center   10/20/2020  2:15 PM KIKE Fitzgerald. [facility] None   11/16/2020  1:15 PM Blanca Cooney P.A.-C. [facility] None

## 2020-10-08 NOTE — PROGRESS NOTES
Assumed care of pt at 1900.  Pt resting comfortably during bedside report. At about 1930, RN observed pt playing with cortrak tube.  RN educated pt on importance of cortrak in regards to receiving nutrition and  Medication. Pt easily reoriented and stopped playing with cortrak tube. At 2000, RN found pt had pulled cortrak tube and attempting to pull out other IV's    Attempted to place new tube. Pt became agitated, yelling and refusing the cortrak tube.  Pt again educated on importance of feeding tube.  2038 notified Dr. Cruz about pt status.  Orders to give 5mg haldol IVPx1 and place b/l soft wrist restraints.    2130 new cortrak tube place by JOEY Genao.  Abdominal xray completed. Awaiting verification.

## 2020-10-08 NOTE — PROGRESS NOTES
Report received from RN Campbell, assumed care of patient, call light in place, no needs at this time

## 2020-10-08 NOTE — PROGRESS NOTES
Cardiovascular Surgery Progress Note    Name: Guerrero Ann  MRN: [id number]  : 1970  Admit Date: 2020  7:37 PM  Procedure:  Procedure(s) and Anesthesia Type:     * CABG, WITH ENDOSCOPIC VEIN PROCUREMENT- X3 - General     * ECHOCARDIOGRAM, TRANSESOPHAGEAL - General  12 Day Post-Op    Vitals:  Patient Vitals for the past 8 hrs:   Temp SpO2 O2 Delivery Device Pulse Resp BP Weight   10/08/20 0800 36.8 °C (98.2 °F) -- None - Room Air -- -- -- --   10/08/20 0700 -- 99 % -- 80 (!) 23 153/81 --   10/08/20 0600 -- 94 % None - Room Air 79 (!) 22 140/85 67.3 kg (148 lb 5.9 oz)   10/08/20 0500 -- 95 % None - Room Air (!) 112 (!) 33 (!) 171/102 --   10/08/20 0400 -- 97 % None - Room Air (!) 111 (!) 22 (!) 93/54 --   10/08/20 0300 -- 95 % None - Room Air 91 (!) 25 -- --   10/08/20 0200 -- 97 % None - Room Air 88 (!) 23 126/56 --     Temp (24hrs), Av °C (98.6 °F), Min:36.8 °C (98.2 °F), Max:37.2 °C (99 °F)    Respiratory:    Respiration: (!) 23, Pulse Oximetry: 99 %     Chest Tube Drains:          Fluids:    Intake/Output Summary (Last 24 hours) at 10/8/2020 0929  Last data filed at 10/8/2020 0842  Gross per 24 hour   Intake 3748.18 ml   Output 1820 ml   Net 1928.18 ml     Admit weight: Weight: 72.6 kg (160 lb)  Current weight: Weight: 67.3 kg (148 lb 5.9 oz) (10/08/20 0600)    Labs:  Recent Labs     10/06/20  0526 10/07/20  0120 10/08/20  0515   WBC 12.0* 12.0* 11.9*   RBC 2.99* 3.16* 3.11*   HEMOGLOBIN 9.5* 9.5* 9.5*   HEMATOCRIT 29.5* 30.6* 29.4*   MCV 98.7* 96.8 94.5   MCH 31.8 30.1 30.5   MCHC 32.2* 31.0* 32.3*   RDW 53.4* 50.0 48.4   PLATELETCT 204 233 257   MPV 10.4 10.3 10.3     Recent Labs     10/06/20  0526 10/07/20  0120 10/08/20  0515   NEUTSPOLYS 62.90 66.40 72.90*   LYMPHOCYTES 19.90* 18.80* 15.10*   MONOCYTES 13.00 10.70 8.00   EOSINOPHILS 1.90 2.20 2.60   BASOPHILS 0.60 0.50 0.40     Recent Labs     10/06/20  0526  10/07/20  0120  10/07/20  1800 10/08/20  0015 10/08/20  0515    SODIUM 146*  --  142  --   --   --  137   POTASSIUM 4.1   < > 3.7   < > 3.7 4.1 3.8   CHLORIDE 115*  --  111  --   --   --  108   CO2 18*  --  16*  --   --   --  18*   GLUCOSE 172*  --  161*  --   --   --  226*   BUN 21  --  17  --   --   --  18   CREATININE 0.88  --  0.88  --   --   --  0.85   CALCIUM 8.6  --  8.4*  --   --   --  8.5    < > = values in this interval not displayed.     Medications:  • potassium chloride (KCL-CENTRAL) IV *Administer in ICU only*  10 mEq 10 mEq (10/08/20 0831)   • potassium bicarbonate  25 mEq     • magnesium sulfate  2 g 2 g (10/08/20 0842)   • metoclopramide  5 mg     • amLODIPine  10 mg     • lisinopril  10 mg     • metoprolol  25 mg     • aspirin  81 mg     • omeprazole  40 mg     • amantadine  100 mg     • enoxaparin (LOVENOX) injection  40 mg     • Pharmacy  1 Each     • atorvastatin  80 mg     • senna-docusate  2 Tab      And   • polyethylene glycol/lytes  1 Packet      And   • magnesium hydroxide  30 mL     • clopidogrel  75 mg     • insulin lispro  0-15 Units     • MD Alert...Adult ICU Electrolyte Replacement per Pharmacy       • lidocaine  1 Patch     • Pharmacy Consult Request  1 Each        Ordered Medications:    ASA - Yes    Plavix - Yes    Post-operative Beta Blockers - Yes    Ace Inhibitor - Yes    Statin - Yes    Exam:   Review of Systems   Unable to perform ROS: Mental status change     Physical Exam  Constitutional:       General: He is not in acute distress.     Appearance: He is well-developed. He is not diaphoretic.   HENT:      Head: Normocephalic.   Eyes:      Pupils: Pupils are equal, round, and reactive to light.   Neck:      Musculoskeletal: Neck supple.   Cardiovascular:      Rate and Rhythm: Normal rate and regular rhythm.      Heart sounds: Normal heart sounds. No murmur. No friction rub. No gallop.    Pulmonary:      Effort: Pulmonary effort is normal. No respiratory distress.      Breath sounds: Examination of the right-lower field reveals decreased  breath sounds. Examination of the left-lower field reveals decreased breath sounds. Decreased breath sounds present. No wheezing or rales.   Abdominal:      General: There is no distension.      Tenderness: There is no abdominal tenderness.      Comments: firm   Skin:     General: Skin is warm and dry.      Comments: Sternum- prevena dressing  SVG sites- c/d/i   Neurological:      Mental Status: He is alert.       Quality Measures:   Quality-Core Measures   Reviewed items::  EKG reviewed, Labs reviewed, Medications reviewed and Radiology images reviewed  Gomez catheter::  Critically Ill - Requiring Accurate Measurement of Urinary Output  Central line in place:  Concentrated IV drugs  DVT prophylaxis pharmacological::  Heparin  DVT prophylaxis - mechanical:  SCDs  Ulcer Prophylaxis::  Yes    Assessment/Plan:  POD 1 HDS, SR, Acute blood loss anemia- s/p transfusion- keep mediastinal tubes, gently diurese, d/c gomez  POD 2 HTN- inc lisinopril, SR/ST- change to metoprolol, acute blood loss anemia s/p transfusion- watch, d/c mediastinal tubes, urine retention- replace gomez/start flomax, cont diuresis  POD 3  HDS, SR, neuro right  weaker than left gazes to right, wounds CDI, abdomen S/NT, fluid balance negative, wt still up from admit.  Plan:  MRI today, decrease ace for permissive hypertension, dc gabapentin, BB, Statin, IS/ambulate. CPM.  POD 4  HDS but hypertensive, SR, neuro answers questions but delayed.  Gaze more center.  EEG normal, MRI pending, wounds CDI, abdomen distended, fluid balance negative, wt down.  Plan:  Restart ace, IM getting abdominal study, BB, Statin, IS/ambulate. CPM.  POD 5  HDS, ST, neuro unchanged, wounds CDI, abdomen S/NT, fluid balance negative, wt down.  Failed swallow evaluation.  Hypertensive.  Pain issues.  Plan:  core trak, switch BB to coreg, ACE, Statin, add norco, IS/ambulate. CPM.  POD 6  HDS, SR, neuro unchanged, wounds CDI, abdomen S/NT, fluid balance negative, wt stable.   Plan:  Awaiting rehab authorization, BB, ACE, Statin, IS/ambulate. CPM.  POD 7 HDS, SR, Febrile- most likely aspiration pneumonia- started on rocephin- d/w pharmacy will change to unasyn, ileus- 2 liters of residual last night- start reglan/supp- if no response try relistor, Elevated creatinine- stop lisinopril/lasix- switch lovenox to heparin- start fluids 75 cc/hr, replace gomez for strict I&O, neuro- follows with slowed response, minimal speech  POD 8  HDS, SR, neuro intact, wounds CDI, abdomen firm and distended CT scan shows ileus, fluid balance negative, wt down.  Creatine trending down   Plan:  NG to decompress abd, 1 unit blood, Statin, IS/ambulate. CPM.  POD 9  HDS, SR, neuro follows commands slowly, wounds CDI, abdomen S/NT, fluid balance negative, wt down, NG tube with 1800 out, had 2 loose BM's.  Creatinine improved.  Off levo.  Plan:  Continue to decompress abd with NG.  Bowel protocol.  IV antibiotics for possible aspiration. CPM.  POD 10  HDS, SR, neuro intact, wounds CDI, abdomen less distended today + BS, fluid balance negative, wt up slightly.  Hypertensive but does not tolerate oral medications.  Getting ace and bb.  Creatinine normalized.  Plan:  Continue bowel protocol, continue NG tube, BB, ACE, Statin, IS/ambulate. CPM.  POD 11:  HDS.  NSR.  Hypertensive.  Weight stable, below baseline admit weight.  Pending swallow evaluation today.  NG tube clamped since 4am, tolerating.  Switching medications to oral, crushed.  Remains on IV abx.  Plan:  Discussed with Dr. Solo.  Plan for repeat rehab evaluation once swallow evaluation complete.  Continue current management.    POD 12  HDS, SR, neuro intact, wounds CDI, abdomen S/NT today active BS all 4 quads, fluid balance negative, wt up today.  Failed swallow evaluation.  Plan:  Continue tube feeds, today last day of abx.  Watch abd.  Increase norvasc and ace, BB, Statin, IS/ambulate. CPM.  To rehab in AM?  POD 13  HDS, SR, neuro gazes to right  follows commands but delayed response, wounds CDI, abdomen S/NT, fluid balance negative, wt down 3 kg  Pulled out core trax last night.  Question if rehab will transfer.  Plan:  Will asked hospitalist to see and possibly transfer to neuro unit and sign off, BB, ACE, Statin, IS/ambulate. CPM.    Active Hospital Problems    Diagnosis   • Septic shock (HCC) [A41.9, R65.21]     Priority: High   • Aspiration pneumonia ([facility]) [J69.0]     Priority: Medium   • Ileus ([facility]) [K56.7]     Priority: Medium   • Lacunar infarct, acute ([facility]) [I63.81]     Priority: Medium   • Cardioembolic stroke ([facility]) [I63.9]     Priority: Medium   • Altered mental status [R41.82]     Priority: Medium   • SONNY (acute kidney injury) ([facility]) [N17.9]     Priority: Medium   • NSTEMI (non-ST elevated myocardial infarction) ([facility]) [I21.4]     Priority: Medium   • CAD (coronary artery disease) [I25.10]     Priority: Medium   • Type 2 diabetes mellitus with complication, without long-term current use of insulin ([facility]) [E11.8]     Priority: Medium   • Essential hypertension [I10]     Priority: Medium   • Normochromic normocytic anemia [D64.9]     Priority: Low   • Hypokalemia [E87.6]     Priority: Low

## 2020-10-08 NOTE — PROGRESS NOTES
Cardiac Surgery RN Navigator Daily Rounding Note  Procedure Performed: Procedure(s):  CABG, WITH ENDOSCOPIC VEIN PROCUREMENT- X3  ECHOCARDIOGRAM, TRANSESOPHAGEAL     By Dr. Chicas  13 Days Post-Op    Pertinent Overnight Events:    auth needs to be resubmitted for rehab, patient now in restraints for pulling cortrak out, haldol given overnight    Pertinent neuro findings/needs: A&O    Cardiac/hemodynamics:  Temp (24hrs), Av °C (98.6 °F), Min:36.8 °C (98.2 °F), Max:37.2 °C (99 °F)    Heart rhythm: SR to ST  Temp:  [36.8 °C (98.2 °F)-37.2 °C (99 °F)] 36.8 °C (98.2 °F)  Pulse:  [] 80  Resp:  [19-33] 23  BP: ()/() 153/81  SpO2:  [93 %-99 %] 99 %        /Weights:  Admit weight: Weight: 72.6 kg (160 lb)  Current Weight: Weight: 67.3 kg (148 lb 5.9 oz) (10/08/20 0600)  Weight change: -2.1 kg (-4 lb 10.1 oz)    Intake/Output Summary (Last 24 hours) at 10/8/2020 0902  Last data filed at 10/8/2020 0842  Gross per 24 hour   Intake 3449.85 ml   Output 1695 ml   Net 1754.85 ml       Adequate urine output: 1845 cc    Respiratory:        Pertinent respiratory findings/needs: none RA    GI findings/needs: + BM    Labs:  Recent Labs     10/06/20  0526 10/07/20  0120 10/08/20  0515   WBC 12.0* 12.0* 11.9*   RBC 2.99* 3.16* 3.11*   HEMOGLOBIN 9.5* 9.5* 9.5*   HEMATOCRIT 29.5* 30.6* 29.4*   MCV 98.7* 96.8 94.5   MCH 31.8 30.1 30.5   MCHC 32.2* 31.0* 32.3*   RDW 53.4* 50.0 48.4   PLATELETCT 204 233 257   MPV 10.4 10.3 10.3     Recent Labs     10/06/20  0526  10/07/20  0120  10/07/20  1800 10/08/20  0015 10/08/20  0515   SODIUM 146*  --  142  --   --   --  137   POTASSIUM 4.1   < > 3.7   < > 3.7 4.1 3.8   CHLORIDE 115*  --  111  --   --   --  108   CO2 18*  --  16*  --   --   --  18*   GLUCOSE 172*  --  161*  --   --   --  226*   BUN 21  --  17  --   --   --  18   CREATININE 0.88  --  0.88  --   --   --  0.85   CALCIUM 8.6  --  8.4*  --   --   --  8.5    < > = values in this interval not displayed.     INR:        Required Cardiac medications review:  ASA:  Yes  Plavix: Yes  BB: Yes  ACE/ARB: Yes  Statin: yes  Diuretic: no    LDA's necessity reviewed: YES    Thoughts and considerations for team rounding:    Patient is two weeks out from surgery.  Remaining medical problems are more residual from CVA than cardiac surgery related. Transfer patient to neuro until patient can be transferred to rehab.    Discharge plan:    Post op appointments in place

## 2020-10-09 ENCOUNTER — HOSPITAL ENCOUNTER (INPATIENT)
Facility: REHABILITATION | Age: 50
LOS: 12 days | DRG: 057 | End: 2020-10-21
Attending: PHYSICAL MEDICINE & REHABILITATION | Admitting: PHYSICAL MEDICINE & REHABILITATION
Payer: COMMERCIAL

## 2020-10-09 ENCOUNTER — APPOINTMENT (OUTPATIENT)
Dept: RADIOLOGY | Facility: REHABILITATION | Age: 50
DRG: 057 | End: 2020-10-09
Attending: PHYSICAL MEDICINE & REHABILITATION
Payer: COMMERCIAL

## 2020-10-09 VITALS
HEART RATE: 94 BPM | OXYGEN SATURATION: 97 % | TEMPERATURE: 96.6 F | SYSTOLIC BLOOD PRESSURE: 137 MMHG | RESPIRATION RATE: 25 BRPM | WEIGHT: 154.76 LBS | BODY MASS INDEX: 28.48 KG/M2 | DIASTOLIC BLOOD PRESSURE: 82 MMHG | HEIGHT: 62 IN

## 2020-10-09 PROBLEM — D72.829 LEUKOCYTOSIS: Status: ACTIVE | Noted: 2020-10-09

## 2020-10-09 PROBLEM — F32.9 REACTIVE DEPRESSION: Status: ACTIVE | Noted: 2020-10-09

## 2020-10-09 PROBLEM — R00.0 TACHYCARDIA: Status: ACTIVE | Noted: 2020-10-09

## 2020-10-09 PROBLEM — Z74.09 IMPAIRED MOBILITY AND ADLS: Status: ACTIVE | Noted: 2020-10-09

## 2020-10-09 PROBLEM — Z78.9 IMPAIRED MOBILITY AND ADLS: Status: ACTIVE | Noted: 2020-10-09

## 2020-10-09 PROBLEM — E08.65 DIABETES MELLITUS DUE TO UNDERLYING CONDITION WITH HYPERGLYCEMIA (HCC): Status: ACTIVE | Noted: 2020-10-09

## 2020-10-09 LAB
ANION GAP SERPL CALC-SCNC: 11 MMOL/L (ref 7–16)
BASOPHILS # BLD AUTO: 0.4 % (ref 0–1.8)
BASOPHILS # BLD: 0.06 K/UL (ref 0–0.12)
BUN SERPL-MCNC: 19 MG/DL (ref 8–22)
CALCIUM SERPL-MCNC: 8.7 MG/DL (ref 8.5–10.5)
CHLORIDE SERPL-SCNC: 103 MMOL/L (ref 96–112)
CO2 SERPL-SCNC: 20 MMOL/L (ref 20–33)
CREAT SERPL-MCNC: 0.77 MG/DL (ref 0.5–1.4)
EOSINOPHIL # BLD AUTO: 0.37 K/UL (ref 0–0.51)
EOSINOPHIL NFR BLD: 2.6 % (ref 0–6.9)
ERYTHROCYTE [DISTWIDTH] IN BLOOD BY AUTOMATED COUNT: 48.3 FL (ref 35.9–50)
GLUCOSE BLD-MCNC: 241 MG/DL (ref 65–99)
GLUCOSE BLD-MCNC: 248 MG/DL (ref 65–99)
GLUCOSE BLD-MCNC: 281 MG/DL (ref 65–99)
GLUCOSE BLD-MCNC: 288 MG/DL (ref 65–99)
GLUCOSE SERPL-MCNC: 258 MG/DL (ref 65–99)
HCT VFR BLD AUTO: 30.4 % (ref 42–52)
HGB BLD-MCNC: 9.8 G/DL (ref 14–18)
IMM GRANULOCYTES # BLD AUTO: 0.15 K/UL (ref 0–0.11)
IMM GRANULOCYTES NFR BLD AUTO: 1 % (ref 0–0.9)
LYMPHOCYTES # BLD AUTO: 2.05 K/UL (ref 1–4.8)
LYMPHOCYTES NFR BLD: 14.2 % (ref 22–41)
MAGNESIUM SERPL-MCNC: 1.9 MG/DL (ref 1.5–2.5)
MCH RBC QN AUTO: 30.2 PG (ref 27–33)
MCHC RBC AUTO-ENTMCNC: 32.2 G/DL (ref 33.7–35.3)
MCV RBC AUTO: 93.8 FL (ref 81.4–97.8)
MONOCYTES # BLD AUTO: 0.93 K/UL (ref 0–0.85)
MONOCYTES NFR BLD AUTO: 6.5 % (ref 0–13.4)
NEUTROPHILS # BLD AUTO: 10.84 K/UL (ref 1.82–7.42)
NEUTROPHILS NFR BLD: 75.3 % (ref 44–72)
NRBC # BLD AUTO: 0 K/UL
NRBC BLD-RTO: 0 /100 WBC
PHOSPHATE SERPL-MCNC: 2.8 MG/DL (ref 2.5–4.5)
PLATELET # BLD AUTO: 317 K/UL (ref 164–446)
PMV BLD AUTO: 11 FL (ref 9–12.9)
POTASSIUM SERPL-SCNC: 4.2 MMOL/L (ref 3.6–5.5)
RBC # BLD AUTO: 3.24 M/UL (ref 4.7–6.1)
SODIUM SERPL-SCNC: 134 MMOL/L (ref 135–145)
WBC # BLD AUTO: 14.4 K/UL (ref 4.8–10.8)

## 2020-10-09 PROCEDURE — 700102 HCHG RX REV CODE 250 W/ 637 OVERRIDE(OP): Performed by: PHYSICAL MEDICINE & REHABILITATION

## 2020-10-09 PROCEDURE — 99024 POSTOP FOLLOW-UP VISIT: CPT | Performed by: THORACIC SURGERY (CARDIOTHORACIC VASCULAR SURGERY)

## 2020-10-09 PROCEDURE — 99223 1ST HOSP IP/OBS HIGH 75: CPT | Performed by: PHYSICAL MEDICINE & REHABILITATION

## 2020-10-09 PROCEDURE — 770010 HCHG ROOM/CARE - REHAB SEMI PRIVAT*

## 2020-10-09 PROCEDURE — 94760 N-INVAS EAR/PLS OXIMETRY 1: CPT

## 2020-10-09 PROCEDURE — 700102 HCHG RX REV CODE 250 W/ 637 OVERRIDE(OP): Performed by: STUDENT IN AN ORGANIZED HEALTH CARE EDUCATION/TRAINING PROGRAM

## 2020-10-09 PROCEDURE — 99233 SBSQ HOSP IP/OBS HIGH 50: CPT | Performed by: HOSPITALIST

## 2020-10-09 PROCEDURE — 83735 ASSAY OF MAGNESIUM: CPT

## 2020-10-09 PROCEDURE — 700111 HCHG RX REV CODE 636 W/ 250 OVERRIDE (IP): Performed by: PHYSICAL MEDICINE & REHABILITATION

## 2020-10-09 PROCEDURE — A9270 NON-COVERED ITEM OR SERVICE: HCPCS | Performed by: HOSPITALIST

## 2020-10-09 PROCEDURE — 700102 HCHG RX REV CODE 250 W/ 637 OVERRIDE(OP): Performed by: HOSPITALIST

## 2020-10-09 PROCEDURE — 82962 GLUCOSE BLOOD TEST: CPT | Mod: 91

## 2020-10-09 PROCEDURE — 700101 HCHG RX REV CODE 250: Performed by: CLINICAL NURSE SPECIALIST

## 2020-10-09 PROCEDURE — A9270 NON-COVERED ITEM OR SERVICE: HCPCS | Performed by: STUDENT IN AN ORGANIZED HEALTH CARE EDUCATION/TRAINING PROGRAM

## 2020-10-09 PROCEDURE — A9270 NON-COVERED ITEM OR SERVICE: HCPCS | Performed by: PHYSICAL MEDICINE & REHABILITATION

## 2020-10-09 PROCEDURE — 85025 COMPLETE CBC W/AUTO DIFF WBC: CPT

## 2020-10-09 PROCEDURE — 84100 ASSAY OF PHOSPHORUS: CPT

## 2020-10-09 PROCEDURE — A9270 NON-COVERED ITEM OR SERVICE: HCPCS | Performed by: CLINICAL NURSE SPECIALIST

## 2020-10-09 PROCEDURE — 80048 BASIC METABOLIC PNL TOTAL CA: CPT

## 2020-10-09 PROCEDURE — 71045 X-RAY EXAM CHEST 1 VIEW: CPT

## 2020-10-09 PROCEDURE — 700111 HCHG RX REV CODE 636 W/ 250 OVERRIDE (IP): Performed by: CLINICAL NURSE SPECIALIST

## 2020-10-09 PROCEDURE — 82962 GLUCOSE BLOOD TEST: CPT

## 2020-10-09 PROCEDURE — 700102 HCHG RX REV CODE 250 W/ 637 OVERRIDE(OP): Performed by: CLINICAL NURSE SPECIALIST

## 2020-10-09 PROCEDURE — A9270 NON-COVERED ITEM OR SERVICE: HCPCS | Performed by: INTERNAL MEDICINE

## 2020-10-09 PROCEDURE — 700102 HCHG RX REV CODE 250 W/ 637 OVERRIDE(OP): Performed by: INTERNAL MEDICINE

## 2020-10-09 RX ORDER — ATORVASTATIN CALCIUM 80 MG/1
80 TABLET, FILM COATED ORAL
Status: CANCELLED | OUTPATIENT
Start: 2020-10-09

## 2020-10-09 RX ORDER — METRONIDAZOLE 500 MG/1
500 TABLET ORAL EVERY 8 HOURS
Status: COMPLETED | OUTPATIENT
Start: 2020-10-09 | End: 2020-10-16

## 2020-10-09 RX ORDER — AMOXICILLIN 250 MG
2 CAPSULE ORAL 2 TIMES DAILY
Status: CANCELLED | OUTPATIENT
Start: 2020-10-09

## 2020-10-09 RX ORDER — AMLODIPINE BESYLATE 5 MG/1
10 TABLET ORAL
Status: DISCONTINUED | OUTPATIENT
Start: 2020-10-10 | End: 2020-10-12

## 2020-10-09 RX ORDER — TRAZODONE HYDROCHLORIDE 50 MG/1
50 TABLET ORAL
Status: DISCONTINUED | OUTPATIENT
Start: 2020-10-09 | End: 2020-10-12

## 2020-10-09 RX ORDER — ATORVASTATIN CALCIUM 40 MG/1
80 TABLET, FILM COATED ORAL
Status: DISCONTINUED | OUTPATIENT
Start: 2020-10-09 | End: 2020-10-19

## 2020-10-09 RX ORDER — TRAMADOL HYDROCHLORIDE 50 MG/1
50 TABLET ORAL EVERY 4 HOURS PRN
Status: CANCELLED | OUTPATIENT
Start: 2020-10-09

## 2020-10-09 RX ORDER — ONDANSETRON 4 MG/1
4 TABLET, ORALLY DISINTEGRATING ORAL 4 TIMES DAILY PRN
Status: DISCONTINUED | OUTPATIENT
Start: 2020-10-09 | End: 2020-10-21 | Stop reason: HOSPADM

## 2020-10-09 RX ORDER — HYDROXYZINE HYDROCHLORIDE 25 MG/1
50 TABLET, FILM COATED ORAL EVERY 6 HOURS PRN
Status: DISCONTINUED | OUTPATIENT
Start: 2020-10-09 | End: 2020-10-21 | Stop reason: HOSPADM

## 2020-10-09 RX ORDER — ALUMINA, MAGNESIA, AND SIMETHICONE 2400; 2400; 240 MG/30ML; MG/30ML; MG/30ML
20 SUSPENSION ORAL
Status: DISCONTINUED | OUTPATIENT
Start: 2020-10-09 | End: 2020-10-21 | Stop reason: HOSPADM

## 2020-10-09 RX ORDER — AMOXICILLIN 250 MG
2 CAPSULE ORAL 2 TIMES DAILY
Status: DISCONTINUED | OUTPATIENT
Start: 2020-10-09 | End: 2020-10-12

## 2020-10-09 RX ORDER — BISACODYL 10 MG
10 SUPPOSITORY, RECTAL RECTAL
Status: CANCELLED | OUTPATIENT
Start: 2020-10-09

## 2020-10-09 RX ORDER — POLYETHYLENE GLYCOL 3350 17 G/17G
1 POWDER, FOR SOLUTION ORAL DAILY
Status: DISCONTINUED | OUTPATIENT
Start: 2020-10-10 | End: 2020-10-12

## 2020-10-09 RX ORDER — METOCLOPRAMIDE 10 MG/1
5 TABLET ORAL EVERY 6 HOURS
Status: CANCELLED | OUTPATIENT
Start: 2020-10-09

## 2020-10-09 RX ORDER — ONDANSETRON 2 MG/ML
4 INJECTION INTRAMUSCULAR; INTRAVENOUS 4 TIMES DAILY PRN
Status: DISCONTINUED | OUTPATIENT
Start: 2020-10-09 | End: 2020-10-21 | Stop reason: HOSPADM

## 2020-10-09 RX ORDER — POLYETHYLENE GLYCOL 3350 17 G/17G
1 POWDER, FOR SOLUTION ORAL DAILY
Status: CANCELLED | OUTPATIENT
Start: 2020-10-10

## 2020-10-09 RX ORDER — METOCLOPRAMIDE 10 MG/1
5 TABLET ORAL EVERY 6 HOURS
Status: DISCONTINUED | OUTPATIENT
Start: 2020-10-09 | End: 2020-10-12

## 2020-10-09 RX ORDER — ASPIRIN 81 MG/1
81 TABLET, CHEWABLE ORAL DAILY
Status: DISCONTINUED | OUTPATIENT
Start: 2020-10-10 | End: 2020-10-19

## 2020-10-09 RX ORDER — AMLODIPINE BESYLATE 10 MG/1
10 TABLET ORAL
Status: CANCELLED | OUTPATIENT
Start: 2020-10-10

## 2020-10-09 RX ORDER — TRAMADOL HYDROCHLORIDE 50 MG/1
50 TABLET ORAL EVERY 4 HOURS PRN
Status: DISCONTINUED | OUTPATIENT
Start: 2020-10-09 | End: 2020-10-21 | Stop reason: HOSPADM

## 2020-10-09 RX ORDER — LISINOPRIL 5 MG/1
10 TABLET ORAL
Status: DISCONTINUED | OUTPATIENT
Start: 2020-10-10 | End: 2020-10-18

## 2020-10-09 RX ORDER — POLYVINYL ALCOHOL 14 MG/ML
1 SOLUTION/ DROPS OPHTHALMIC PRN
Status: DISCONTINUED | OUTPATIENT
Start: 2020-10-09 | End: 2020-10-21 | Stop reason: HOSPADM

## 2020-10-09 RX ORDER — LIDOCAINE 50 MG/G
1 PATCH TOPICAL EVERY 24 HOURS
Status: DISCONTINUED | OUTPATIENT
Start: 2020-10-10 | End: 2020-10-21 | Stop reason: HOSPADM

## 2020-10-09 RX ORDER — LACTULOSE 20 G/30ML
30 SOLUTION ORAL
Status: DISCONTINUED | OUTPATIENT
Start: 2020-10-09 | End: 2020-10-21 | Stop reason: HOSPADM

## 2020-10-09 RX ORDER — ACETAMINOPHEN 325 MG/1
650 TABLET ORAL EVERY 4 HOURS PRN
Status: DISCONTINUED | OUTPATIENT
Start: 2020-10-09 | End: 2020-10-21 | Stop reason: HOSPADM

## 2020-10-09 RX ORDER — BISACODYL 10 MG
10 SUPPOSITORY, RECTAL RECTAL
Status: DISCONTINUED | OUTPATIENT
Start: 2020-10-09 | End: 2020-10-12

## 2020-10-09 RX ORDER — LISINOPRIL 5 MG/1
10 TABLET ORAL TWICE DAILY
Status: DISCONTINUED | OUTPATIENT
Start: 2020-10-09 | End: 2020-10-09

## 2020-10-09 RX ORDER — LEVOFLOXACIN 250 MG/1
500 TABLET, FILM COATED ORAL DAILY
Status: DISCONTINUED | OUTPATIENT
Start: 2020-10-09 | End: 2020-10-14

## 2020-10-09 RX ORDER — CLOPIDOGREL BISULFATE 75 MG/1
75 TABLET ORAL DAILY
Status: DISCONTINUED | OUTPATIENT
Start: 2020-10-10 | End: 2020-10-19

## 2020-10-09 RX ORDER — MAGNESIUM SULFATE HEPTAHYDRATE 40 MG/ML
2 INJECTION, SOLUTION INTRAVENOUS ONCE
Status: COMPLETED | OUTPATIENT
Start: 2020-10-09 | End: 2020-10-09

## 2020-10-09 RX ORDER — LIDOCAINE 50 MG/G
1 PATCH TOPICAL EVERY 24 HOURS
Status: CANCELLED | OUTPATIENT
Start: 2020-10-10

## 2020-10-09 RX ORDER — ECHINACEA PURPUREA EXTRACT 125 MG
2 TABLET ORAL PRN
Status: DISCONTINUED | OUTPATIENT
Start: 2020-10-09 | End: 2020-10-21 | Stop reason: HOSPADM

## 2020-10-09 RX ORDER — LANOLIN ALCOHOL/MO/W.PET/CERES
3 CREAM (GRAM) TOPICAL NIGHTLY PRN
Status: DISCONTINUED | OUTPATIENT
Start: 2020-10-09 | End: 2020-10-12

## 2020-10-09 RX ORDER — CLOPIDOGREL BISULFATE 75 MG/1
75 TABLET ORAL DAILY
Status: CANCELLED | OUTPATIENT
Start: 2020-10-10

## 2020-10-09 RX ORDER — ENEMA 19; 7 G/133ML; G/133ML
1 ENEMA RECTAL
Status: DISCONTINUED | OUTPATIENT
Start: 2020-10-09 | End: 2020-10-21 | Stop reason: HOSPADM

## 2020-10-09 RX ORDER — LISINOPRIL 10 MG/1
10 TABLET ORAL TWICE DAILY
Status: CANCELLED | OUTPATIENT
Start: 2020-10-09

## 2020-10-09 RX ORDER — ASPIRIN 81 MG/1
81 TABLET, CHEWABLE ORAL DAILY
Status: CANCELLED | OUTPATIENT
Start: 2020-10-10

## 2020-10-09 RX ADMIN — ASPIRIN 81 MG: 81 TABLET, CHEWABLE ORAL at 05:08

## 2020-10-09 RX ADMIN — TRAMADOL HYDROCHLORIDE 50 MG: 50 TABLET, COATED ORAL at 18:01

## 2020-10-09 RX ADMIN — LEVOFLOXACIN 500 MG: 250 TABLET, FILM COATED ORAL at 18:02

## 2020-10-09 RX ADMIN — INSULIN LISPRO 6 UNITS: 100 INJECTION, SOLUTION INTRAVENOUS; SUBCUTANEOUS at 00:42

## 2020-10-09 RX ADMIN — INSULIN LISPRO 6 UNITS: 100 INJECTION, SOLUTION INTRAVENOUS; SUBCUTANEOUS at 11:54

## 2020-10-09 RX ADMIN — POTASSIUM BICARBONATE 25 MEQ: 978 TABLET, EFFERVESCENT ORAL at 05:11

## 2020-10-09 RX ADMIN — METOPROLOL TARTRATE 37.5 MG: 25 TABLET, FILM COATED ORAL at 18:02

## 2020-10-09 RX ADMIN — METOCLOPRAMIDE 5 MG: 10 TABLET ORAL at 05:09

## 2020-10-09 RX ADMIN — MAGNESIUM SULFATE 2 G: 2 INJECTION INTRAVENOUS at 09:24

## 2020-10-09 RX ADMIN — CLOPIDOGREL BISULFATE 75 MG: 75 TABLET ORAL at 05:08

## 2020-10-09 RX ADMIN — METFORMIN HYDROCHLORIDE 1000 MG: 500 TABLET ORAL at 18:01

## 2020-10-09 RX ADMIN — METOPROLOL TARTRATE 25 MG: 25 TABLET, FILM COATED ORAL at 05:08

## 2020-10-09 RX ADMIN — ENOXAPARIN SODIUM 40 MG: 40 INJECTION SUBCUTANEOUS at 22:20

## 2020-10-09 RX ADMIN — LISINOPRIL 10 MG: 10 TABLET ORAL at 05:08

## 2020-10-09 RX ADMIN — ATORVASTATIN CALCIUM 80 MG: 40 TABLET, FILM COATED ORAL at 22:14

## 2020-10-09 RX ADMIN — AMLODIPINE BESYLATE 10 MG: 10 TABLET ORAL at 05:08

## 2020-10-09 RX ADMIN — INSULIN LISPRO 9 UNITS: 100 INJECTION, SOLUTION INTRAVENOUS; SUBCUTANEOUS at 17:37

## 2020-10-09 RX ADMIN — POTASSIUM BICARBONATE 25 MEQ: 978 TABLET, EFFERVESCENT ORAL at 22:14

## 2020-10-09 RX ADMIN — METRONIDAZOLE 500 MG: 500 TABLET ORAL at 18:01

## 2020-10-09 RX ADMIN — METOCLOPRAMIDE 5 MG: 10 TABLET ORAL at 11:51

## 2020-10-09 RX ADMIN — AMANTADINE HYDROCHLORIDE 100 MG: 50 SOLUTION ORAL at 05:08

## 2020-10-09 RX ADMIN — METOCLOPRAMIDE 5 MG: 10 TABLET ORAL at 18:02

## 2020-10-09 RX ADMIN — OMEPRAZOLE 40 MG: KIT at 05:08

## 2020-10-09 RX ADMIN — METOCLOPRAMIDE 5 MG: 10 TABLET ORAL at 00:38

## 2020-10-09 RX ADMIN — INSULIN LISPRO 9 UNITS: 100 INJECTION, SOLUTION INTRAVENOUS; SUBCUTANEOUS at 05:20

## 2020-10-09 RX ADMIN — LIDOCAINE 1 PATCH: 50 PATCH TOPICAL at 09:24

## 2020-10-09 ASSESSMENT — LIFESTYLE VARIABLES
EVER FELT BAD OR GUILTY ABOUT YOUR DRINKING: NO
EVER HAD A DRINK FIRST THING IN THE MORNING TO STEADY YOUR NERVES TO GET RID OF A HANGOVER: NO
EVER_SMOKED: NEVER
HOW MANY TIMES IN THE PAST YEAR HAVE YOU HAD 5 OR MORE DRINKS IN A DAY: 0
CONSUMPTION TOTAL: NEGATIVE
TOTAL SCORE: 0
HAVE PEOPLE ANNOYED YOU BY CRITICIZING YOUR DRINKING: NO
AVERAGE NUMBER OF DAYS PER WEEK YOU HAVE A DRINK CONTAINING ALCOHOL: 2
TOTAL SCORE: 0
TOTAL SCORE: 0
HAVE YOU EVER FELT YOU SHOULD CUT DOWN ON YOUR DRINKING: NO
ON A TYPICAL DAY WHEN YOU DRINK ALCOHOL HOW MANY DRINKS DO YOU HAVE: 2
ALCOHOL_USE: YES

## 2020-10-09 ASSESSMENT — PAIN DESCRIPTION - PAIN TYPE
TYPE: ACUTE PAIN

## 2020-10-09 ASSESSMENT — PATIENT HEALTH QUESTIONNAIRE - PHQ9
1. LITTLE INTEREST OR PLEASURE IN DOING THINGS: NOT AT ALL
2. FEELING DOWN, DEPRESSED, IRRITABLE, OR HOPELESS: NOT AT ALL
SUM OF ALL RESPONSES TO PHQ9 QUESTIONS 1 AND 2: 0

## 2020-10-09 ASSESSMENT — FIBROSIS 4 INDEX
FIB4 SCORE: 1.61
FIB4 SCORE: 1.99

## 2020-10-09 ASSESSMENT — COPD QUESTIONNAIRES
HAVE YOU SMOKED AT LEAST 100 CIGARETTES IN YOUR ENTIRE LIFE: NO/DON'T KNOW
DURING THE PAST 4 WEEKS HOW MUCH DID YOU FEEL SHORT OF BREATH: NONE/LITTLE OF THE TIME
COPD SCREENING SCORE: 1
DO YOU EVER COUGH UP ANY MUCUS OR PHLEGM?: NO/ONLY WITH OCCASIONAL COLDS OR INFECTIONS

## 2020-10-09 NOTE — PROGRESS NOTES
Pt transported by [facility] ([facility]rickson RAMIREZ) to [facility].     Pt has stuffed turtle and blue bag full of personal belongings. COBRA, H&P and Facesheet provided to Erickson PALACIOS.    Updated Miriam that pt has now been transferred.

## 2020-10-09 NOTE — FLOWSHEET NOTE
10/09/20 1300   Patient History   Pulmonary Diagnosis CABG, CVA   Home O2 No   Nocturnal CPAP No   Home Treatments/Frequency No   Sleep Apnea Screening   Have you had a sleep study? No   Have you been diagnosed with sleep apnea? No

## 2020-10-09 NOTE — DISCHARGE PLANNING
Received Transport Form @ 1012  Spoke to Frances @ [facility]  Transport is scheduled for 10/09/20 @1130 [facility]    Notified Bedside JOEY Boyd.

## 2020-10-09 NOTE — DISCHARGE PLANNING
Anticipated Discharge Disposition: [facility]    Action: notified SO Miriam of transfer [phone] and provided phone number for [facility] to Miriam. [facility]. Updated BSRN and reminded BSRN to pack belongings w/ pt (blue bag and green turtle doll per SO Miriam. CHELSEA completed    Barriers to Discharge: none    Plan: [facility]  11:30 am today

## 2020-10-09 NOTE — PROGRESS NOTES
Patient admitted to facility at about 13:15 via gurney; accompanied by NATAN.  Patient assisted to room and positioned in bed for comfort and safety; call light within reach.  Patient assisted with stowing belongings and oriented to room and facility.  Admission assessment performed and documented in computer.  Received and reviewed education binder. Admission paperwork completed; signed copies placed in chart.  Will continue to monitor.

## 2020-10-09 NOTE — ASSESSMENT & PLAN NOTE
Has better appetite  Abd exam improved  F/U KUB unremarkable  Diarrhea resolved prior to C Dif sample taken  Tapered off Reglan  Change scheduled Simethicone to prn

## 2020-10-09 NOTE — H&P
"REHABILITATION HISTORY AND PHYSICAL/POST ADMISSION EVALUATION    10/9/2020  2:31 PM  Guerrero Ann  RH05/[id number]  Admission  10/9/2020  1:25 PM  [facility] Code/Reason for admission: 0001.3 - Stroke: Bilateral Involvement   Etiologic diagnosis/problem: Cardioembolic stroke (HCC)  Chief Complaint: \"I want to be normal\"    HPI:  Patient is a 49 y.o. male  with a past medical history of coronary artery disease s/p stents, diabetes, hypertension, not taking any medications, admitted to [facility] on 9/21, with chest pain. He was admitted for acute coronary syndrome/NSTEMI, went to cath lab, found to have multivessel coronary artery disease and in-stent stenosis. He is now s/p CABG x 3 with Dr. Chicas on 9/25. Patient with acute blood loss anemia, s/p transfusion, urinary retention, right sided weakness and gaze preference, NIHSS 11, neurology consulted. CTA negative. Negative EEG. MRI with numerous acute/subacute supratentorial and infratentorial infarctions consistent with cardio-embolic etiology. HgbA1c 7.9, LDL unable to be determined due to elevated TGs at 610, ECHO EF 60 % s/p CABG, with severe LVH. COVID negative.    Acute stay complications included aspiration pneumonia, ileus requiring NG tube. Patient pulled out cortrak yesterday, briefly required restraints and Haldol, cortrak replaced.     Patient currently is not very talkative and a lot of the history was verified by his girlfriend Miriam who is at bedside.  Patient reports his goal is to get back to normal.  He is very tearful and his girlfriend reports he was emotional even prior to this open heart surgery and stroke.  Patient does have a history of depression and had been on medications at some point.  He denies a history of any misha.  He denies any significant weakness numbness or tingling or visual changes from his stroke.  He has an indwelling Quispe catheter.  The hospitalist has been consulted for his medical issues including " tachycardia and leukocytosis.     Patient was evaluated by Rehab Medicine physician and Physical Therapy, Occupational Therapy and Speech Therapy and determined to be appropriate for acute inpatient rehab and was transferred to [facility] on 10/9/2020  1:25 PM.    With this acute therapeutic intervention, this patient hopes to improve his functional status, and return to independent living with the supportive care of signigicant other.    REVIEW OF SYSTEMS:     A complete review of systems was performed and was negative in detail with the exception of items mentioned elsewhere in this document.    PMH:  Past Medical History:   Diagnosis Date   • Diabetes (HCC)     oral meds only   • Hypertension    • Kidney stones    • MI, old    • Psychiatric problem     depression and anxiety       PSH:  Past Surgical History:   Procedure Laterality Date   • MULTIPLE CORONARY ARTERY BYPASS ENDO VEIN HARVEST  2020    Procedure: CABG, WITH ENDOSCOPIC VEIN PROCUREMENT- X3;  Surgeon: Michael Chicas M.D.;  Location: SURGERY [facility];  Service: Cardiothoracic   • MARGOTH  2020    Procedure: ECHOCARDIOGRAM, TRANSESOPHAGEAL;  Surgeon: Michael Chicas M.D.;  Location: SURGERY [facility];  Service: Cardiothoracic   • STENT PLACEMENT  2017    1 cardiac stent   • OTHER ORTHOPEDIC SURGERY      rt wrist fracture with fixation 34 years ago       Family History   Problem Relation Age of Onset   • Stroke Mother 47        had 3 devastating strokes,  54yo stroke complications   • No Known Problems Father         does not know his father.   • Stroke Maternal Grandmother 85   • Stroke Maternal Grandfather 54        MEDICATIONS:  Current Facility-Administered Medications   Medication Dose   • hydrOXYzine HCl (ATARAX) tablet 50 mg  50 mg   • melatonin tablet 3 mg  3 mg   • Respiratory Therapy Consult     • Pharmacy Consult Request ...Pain Management Review 1 Each  1 Each   • acetaminophen (TYLENOL) tablet 650 mg  650  mg   • artificial tears ophthalmic solution 1 Drop  1 Drop   • benzocaine-menthol (CEPACOL) lozenge 1 Lozenge  1 Lozenge   • mag hydrox-al hydrox-simeth (MAALOX PLUS ES or MYLANTA DS) suspension 20 mL  20 mL   • ondansetron (ZOFRAN ODT) dispertab 4 mg  4 mg    Or   • ondansetron (ZOFRAN) syringe/vial injection 4 mg  4 mg   • traZODone (DESYREL) tablet 50 mg  50 mg   • sodium chloride (OCEAN) 0.65 % nasal spray 2 Spray  2 Spray   • lactulose 20 GM/30ML solution 30 mL  30 mL   • docusate sodium (ENEMEEZ) enema 283 mg  283 mg   • fleet enema 133 mL  1 Each   • [START ON 10/10/2020] clopidogrel (PLAVIX) tablet 75 mg  75 mg   • senna-docusate (PERICOLACE or SENOKOT S) 8.6-50 MG per tablet 2 Tab  2 Tab    And   • [START ON 10/10/2020] polyethylene glycol/lytes (MIRALAX) PACKET 1 Packet  1 Packet    And   • [START ON 10/10/2020] magnesium hydroxide (MILK OF MAGNESIA) suspension 30 mL  30 mL    And   • bisacodyl (DULCOLAX) suppository 10 mg  10 mg   • tramadol (ULTRAM) 50 MG tablet 50 mg  50 mg   • insulin lispro (HumaLOG) injection  0-15 Units   • [START ON 10/10/2020] amLODIPine (NORVASC) tablet 10 mg  10 mg   • [START ON 10/10/2020] aspirin (ASA) chewable tab 81 mg  81 mg   • atorvastatin (LIPITOR) tablet 80 mg  80 mg   • enoxaparin (LOVENOX) inj 40 mg  40 mg   • [START ON 10/10/2020] lidocaine (LIDODERM) 5 % 1 Patch  1 Patch   • lisinopril (PRINIVIL) tablet 10 mg  10 mg   • metoclopramide (REGLAN) tablet 5 mg  5 mg   • metoprolol (LOPRESSOR) tablet 25 mg  25 mg   • [START ON 10/10/2020] omeprazole (FIRST-OMEPRAZOLE) 2 mg/mL oral susp 40 mg  40 mg   • potassium bicarbonate (KLYTE) effervescent tablet 25 mEq  25 mEq       ALLERGIES:  Patient has no known allergies.    PSYCHOSOCIAL HISTORY:  Pre-morbidly, this patient lived in a one level home with one step to enter, with his girlfriend Miriam and her 2 adolescent children, aged 9 and 11. Miriam can assist at discharge.    Patient was working in construction. He chews  "tobacco, doesn't drink daily, but does drink up to 5 drinks of liquor with mixors when he does drink. He denies drug use.    LEVEL OF FUNCTION PRIOR TO DISABILTY:  Independent, working    LEVEL OF FUNCTION PRIOR TO ADMISSION to [facility]:  The patient was evaluated by acute care PT, OT, and SLP and is currently requiring min to max assist for mobility, max assist for ADLs, with significant cognitive and language deficits.     CURRENT LEVEL OF FUNCTION:   Same as level of function prior to admission to [facility]    PHYSICAL EXAM:     VITAL SIGNS:   height is 1.575 m (5' 2.01\") and weight is 68.5 kg (151 lb 0.2 oz). His oral temperature is 36.4 °C (97.6 °F). His blood pressure is 129/93 and his pulse is 99. His respiration is 18 and oxygen saturation is 93%.     GENERAL: No apparent distress  HEENT: Normocephalic/atraumatic, EOMI, PERRL and No nystagmus, moist mucous membranes, NG tube in right nare  CARDIAC: Regular rate and rhythm, normal S1, S2, no murmurs, no peripheral edema   LUNGS: Clear to auscultation, normal respiratory effort, on room air   ABDOMINAL: bowel sounds present, soft, nontender and nondistended    EXTREMITIES: no spasticity, no edema or no calf tenderness bilaterally  MSK: No joint swelling  PSYCH: tearful    NEURO:    Mental status: alert, not very talkative   Speech: hypophonia     CRANIAL NERVES:  2,3: visual acuity grossly intact, PERRL  3,4,6: EOMI bilaterally, no nystagmus or diplopia  5: intact in all branches   7: no facial asymmetry  8: hearing grossly intact  9,10: symmetric palate elevation  11: SCM/Trapezius strength 5/5 bilaterally  12: tongue protrudes midline    Motor:  4+/5 throughout UE and LE  Except 2-3/5 left hip flexor    Sensory:   intact to light touch through out    DTRs: 2+ in bilateral biceps, triceps, brachioradialis, 2+ in bilateral patellar and achilles tendons  No clonus at bilateral ankles  Negative babinski b/l  Negative " Aiden centeno/silvina     RADIOLOGY:              Results for orders placed during the hospital encounter of 09/21/20   MR-BRAIN-W/O    Impression Numerous acute/subacute supratentorial and infratentorial infarcts most consistent with cardioembolic etiology.    Several chronic lacunar infarcts.    No acute intracranial hemorrhage.                                                                                               Results for orders placed during the hospital encounter of 09/21/20   CT-CTA NECK WITH & W/O-POST PROCESSING    Impression 1.  CT angiogram of the neck within normal limits.    2.  Mild bilateral internal carotid artery atherosclerotic plaque without stenosis    3.  Small right pleural effusion    4.  Small left apical pneumothorax                                          Results for orders placed during the hospital encounter of 09/21/20   CT-ABDOMEN-PELVIS W/O    Impression 1.  Postoperative changes recent open heart surgery. There is some anterior mediastinal hematoma/hemorrhage.  2.  Hypoinflation with right lower lobe atelectasis. Correlate clinically for infection.  3.  Dilated small and large bowel loops with air-fluid levels suggesting ileus.  4.  Left kidney is not visualized.  5.  Compensatory hypertrophy of the right kidney. Duplicated right renal collecting system with mild pelvocaliectasis. Tiny nonobstructing right renal stones.                                    LABS:  Recent Labs     10/07/20  0120  10/08/20  0015 10/08/20  0515 10/09/20  0010   SODIUM 142  --   --  137 134*   POTASSIUM 3.7   < > 4.1 3.8 4.2   CHLORIDE 111  --   --  108 103   CO2 16*  --   --  18* 20   GLUCOSE 161*  --   --  226* 258*   BUN 17  --   --  18 19   CREATININE 0.88  --   --  0.85 0.77   CALCIUM 8.4*  --   --  8.5 8.7    < > = values in this interval not displayed.     Recent Labs     10/07/20  0120 10/08/20  0515 10/09/20  0010   WBC 12.0* 11.9* 14.4*   RBC 3.16* 3.11* 3.24*   HEMOGLOBIN 9.5* 9.5* 9.8*    HEMATOCRIT 30.6* 29.4* 30.4*   MCV 96.8 94.5 93.8   MCH 30.1 30.5 30.2   MCHC 31.0* 32.3* 32.2*   RDW 50.0 48.4 48.3   PLATELETCT 233 257 317   MPV 10.3 10.3 11.0         PRIMARY REHAB DIAGNOSIS:    This patient is a 49 y.o. male admitted for acute inpatient rehabilitation with Cardioembolic stroke (HCC).    IMPAIRMENTS:   Cognitive  ADLs/IADLs  Mobility  Speech  Swallow    SECONDARY DIAGNOSIS/MEDICAL CO-MORBIDITIES AFFECTING FUNCTION:    Reactive depression   Diabetes with hyperglycemia  Coronary artery disease status post CABG  Hypertension  Leukocytosis  Status post ileus  Aspiration pneumonia  Tobacco use  Hypokalemia  Normocytic anemia  Hyponatremia      RELEVANT CHANGES SINCE PREADMISSION EVALUATION:    Status unchanged    The patient's rehabilitation potential is excellent  The patient's medical prognosis is good    PLAN:   Discussion and Recommendations, discussed with the patient and/or family:   1. The patient requires an acute inpatient rehabilitation program with a coordinated program of care at an intensity and frequency not available at a lower level of care. This recommendation is substantiated by the patient's medical physicians who recommend that the patient's intervention and assessment of medical issues needs to be done at an acute level of care for patient's safety and maximum outcome.     2. A coordinated program of care will be supplied by an interdisciplinary team of physical therapy, occupational therapy, rehab physician, rehab nursing, and, if needed, speech therapy and rehab psychology. Rehab team presents a patient-specific rehabilitation and education program concentrating on prevention of future problems related to accessibility, mobility, skin, bowel, bladder, sexuality, and psychosocial and medical/surgical problems.     3. Need for Rehabilitation Physician: The rehab physician will be evaluating the patient on a multi-weekly basis to help coordinate the program of care. The rehab  physician communicates between medical physicians, therapists, and nurses to maximize the patient's potential outcome. Specific areas in which the rehab physician will be providing daily assessment include the following:   A. Assessing the patient's heart rate and blood pressure response (vitals monitoring) to activity and making adjustments in medications or conservative measures as needed.   B. The rehab physician will be assessing the frequency at which the program can be increased to allow the patient to reach optimal functional outcome.   C. The rehab physician will also provide assessments in daily skin care, especially in light of patient's impairments in mobility.   D. The rehab physician will provide special expertise in understanding how to work with functional impairment and recommend appropriate interventions, compensatory techniques, and education that will facilitate the patient's outcome.     4. Rehab R.N.   The rehab RN will be working with patient to carry over in room mobility and activities of daily living when the patient is not in 3 hours of skilled therapy. Rehab nursing will be working in conjunction with rehab physician to address all the medical issues above and continue to assess laboratory work and discuss abnormalities with the treating physicians, assess vitals, and response to activity, and discuss and report abnormalities with the rehab physician. Rehab RN will also continue daily skin care, supervise bladder/bowel program, instruct in medication administration, and ensure patient safety.     5. Therapies to treat at intensity and frequency of (may change after completion of evaluation by all therapeutic disciplines):       PT:  Physical therapy to address mobility, transfer, gait training and evaluation for adaptive equipment needs 1hour/day at least 5 days/week for the duration of the ELOS (see below)       OT:  Occupational therapy to address ADLs, self-care, home management  training, functional mobility/transfers and assistive device evaluation, and community re-integration 1hour/day at least 5 days/week for the duration of the ELOS (see below).        ST/Dysphagia:  Speech therapy to address speech, language, and cognitive deficits as well as swallowing difficulties with retraining/dysphagia management and community re-integration with comprehension, expression, cognitive training 1hour/day at least 5 days/week for the duration of the ELOS (see below).     6. Medical management / Rehabilitation Issues/Adverse Potential affecting function as part of rehabilitation plan.    Reactive depression   Start sertraline in am  Dr. Heard consult next week    Appreciate assistance of hospitalist with his medical co-morbidities:      Diabetes with hyperglycemia, metformin, sliding scale insulin  Coronary artery disease status post CABG, aspirin, Plavix, and statin  Hypertension, amlodipine, lisinopril, metoprolol  Leukocytosis, x-ray suggestive of pneumonia, check urinalysis  Status post ileus, continue Reglan  Aspiration pneumonia, started on antibiotics including metronidazole and levofloxacin  Tobacco use, will need counseling during his stay   Hypokalemia, continue supplementation, check a.m. labs  Normocytic anemia, likely postoperative, check a.m. labs  Hyponatremia, check a.m. labs    I performed a complete drug regimen review and did not identify any potential clinically significant medication issues.    The patient's CODE STATUS was confirmed as FULL CODE on admission, with the patient and/or family at bedside.    REHABILITATION ISSUES/ADVERSE POTENTIAL:  1.  CVA (Cerebrovascular Accident): Continue aspirin and statin for secondary prophylaxis as well as lipid and blood pressure management. Patient demonstrates functional deficits in strength, balance, coordination, and ADL's. Patient is admitted to [facility] for comprehensive rehabilitation therapy as described  below.   Rehabilitation nursing monitors bowel and bladder control, educates on medication administration, co-morbidities and monitors patient safety.    2.  DVT prophylaxis:  Patient is on Lovenox for anticoagulation upon transfer. Encourage OOB. Monitor daily for signs and symptoms of DVT including but not limited to swelling and pain to prevent the development of DVT that may interfere with therapies.    3.  Pain: No issues with pain currently / Controlled with as needed oral analgesics.    4.  Nutrition/Dysphagia: Dietician monitors nutrient intake, recommend supplements prn and provide nutrition education to pt/family to promote optimal nutrition for wound healing/recovery.     5.  Bladder/bowel:  Start bowel and bladder program, to prevent constipation, urinary retention (which may lead to UTI), and urinary incontinence (which will impact upon pt's functional independence). Remove Quispe on admission.   - TV Q3h while awake with post void bladder scans, I&O cath for PVRs >400  - up to commode after meal     6.  Skin/dermal ulcer prophylaxis: Monitor for new skin conditions with q.2 h. turns as required to prevent the development of skin breakdown.     7.  Cognition/Behavior:  Psychologist Dr. Heard provides adjustment counseling to illness and psychosocial barriers that may be potential barriers to rehabilitation.     8. Respiratory therapy: RT performs O2 management prn, breathing retraining, pulmonary hygiene and bronchospasm management prn to optimize participation in therapies.    Pt was seen today for 73 min, and entire time spent in face-to-face contact was >50% in counseling and coordination of care as detailed in A/P above.        GOALS/EXPECTED LEVEL OF FUNCTION BASED ON CURRENT MEDICAL AND FUNCTIONAL STATUS (may change based on patient's medical status and rate of impairment recovery):  Transfers:   Supervision  Mobility/Gait:   Supervision  ADL's:   Supervision  Cognition:  Supervision  Swallowing:   Regular with thins    DISPOSITION: Discharge to pre-morbid independent living setting with the supportive care of patient's signigicant other.      ELOS: 2-3 weeks    Ankita Ng M.D.  Physical Medicine and Rehabilitation

## 2020-10-09 NOTE — CONSULTS
DATE OF SERVICE:  10/9/2020    REQUESTING PHYSICIAN:  Ankita Ng MD    CHIEF COMPLAINT / REASON FOR CONSULTATION:   Hypertension  Tachycardia  Diabetes  Leukocytosis  S/P CABG  S/P CVA    HISTORY OF PRESENT ILLNESS:  This is a 48 y/o male with a PMH significant for hypertension, diabetes, and CAD with 2 cardiac stents, who presented to [facility] on 9/21 chest pain.  The patient states he has been having exertional chest pain for the past month however yesterday became worse and persistent.  He reported substernal chest pressure with radiation down his left arm associated with some numbness and tingling of his left hand.  He reported associated shortness of breath.  Patient states that he has not been taking his medications for the past year since he lost his insurance.  Patient also reports his blood pressure has been uncontrolled.  An EKG did not show any ST elevations or depression.  A cardiac cath revealed MVD.  Subsequently the patient had a CABG with 3 vessel bypass.  After surgery, he developed a fever and it was suspected 2nd to aspiration pneumonia and was treated with abx.  The patient developed altered mentation and neuro deficits and an MRI showed numerous acute/subacute supratentorial and infratentorial infarcts most consistent with cardioembolic etiology, and there was also noted several chronic lacunar infarcts.  He is on ASA, Plavix, and Lipitor.    Because of the patient's weakness and debility, Rehab was consulted, evaluated the patient, and was deemed a good Rehab candidate.  The patient was transferred over to the Rehab facility on 10/9/2020.      The patient denies fever, chills, nausea, vomiting, headaches, blurry vision, or chest pain.    REVIEW OF SYSTEMS: All review of systems are negative pre AMA and CMS criteria   except for that stated in the HPI.    PAST MEDICAL HISTORY:  Past Medical History:   Diagnosis Date   • Diabetes (HCC)     oral meds only   • Hypertension    • Kidney stones     • MI, old    • Pneumonia    • Psychiatric problem     depression and anxiety   • Stroke (HCC)        PAST SURGICAL HISTORY:  Past Surgical History:   Procedure Laterality Date   • MULTIPLE CORONARY ARTERY BYPASS ENDO VEIN HARVEST  9/25/2020    Procedure: CABG, WITH ENDOSCOPIC VEIN PROCUREMENT- X3;  Surgeon: Michael Chicas M.D.;  Location: SURGERY [facility];  Service: Cardiothoracic   • MARGOTH  9/25/2020    Procedure: ECHOCARDIOGRAM, TRANSESOPHAGEAL;  Surgeon: Michael Chicas M.D.;  Location: SURGERY [facility];  Service: Cardiothoracic   • STENT PLACEMENT  2017    1 cardiac stent   • OTHER ORTHOPEDIC SURGERY      rt wrist fracture with fixation 34 years ago       No Known Allergies    CURRENT MEDICATIONS:    Current Facility-Administered Medications:   •  hydrOXYzine HCl  •  melatonin  •  Respiratory Therapy Consult  •  Pharmacy Consult Request  •  acetaminophen  •  artificial tears  •  benzocaine-menthol  •  mag hydrox-al hydrox-simeth  •  ondansetron **OR** ondansetron  •  traZODone  •  sodium chloride  •  lactulose  •  docusate sodium  •  fleet  •  [START ON 10/10/2020] clopidogrel  •  senna-docusate **AND** [START ON 10/10/2020] polyethylene glycol/lytes **AND** [START ON 10/10/2020] magnesium hydroxide **AND** bisacodyl  •  tramadol  •  insulin lispro  •  [START ON 10/10/2020] amLODIPine  •  [START ON 10/10/2020] aspirin  •  atorvastatin  •  enoxaparin (LOVENOX) injection  •  [START ON 10/10/2020] lidocaine  •  metoclopramide  •  [START ON 10/10/2020] omeprazole  •  potassium bicarbonate  •  [START ON 10/10/2020] lisinopril  •  metoprolol  •  metFORMIN    Social History     Socioeconomic History   • Marital status: Single     Spouse name: Not on file   • Number of children: Not on file   • Years of education: Not on file   • Highest education level: Not on file   Occupational History   • Not on file   Social Needs   • Financial resource strain: Not on file   • Food insecurity     Worry: Not on file      Inability: Not on file   • Transportation needs     Medical: Not on file     Non-medical: Not on file   Tobacco Use   • Smoking status: Never Smoker   • Smokeless tobacco: Current User     Types: Chew   Substance and Sexual Activity   • Alcohol use: Yes     Comment: 2 per week   • Drug use: No   • Sexual activity: Not on file   Lifestyle   • Physical activity     Days per week: Not on file     Minutes per session: Not on file   • Stress: Not on file   Relationships   • Social connections     Talks on phone: Not on file     Gets together: Not on file     Attends Church service: Not on file     Active member of club or organization: Not on file     Attends meetings of clubs or organizations: Not on file     Relationship status: Not on file   • Intimate partner violence     Fear of current or ex partner: Not on file     Emotionally abused: Not on file     Physically abused: Not on file     Forced sexual activity: Not on file   Other Topics Concern   • Not on file   Social History Narrative   • Not on file       FAMILY HISTORY:  was reviewed and is not pertinent to this consultation.    PHYSICAL EXAMINATION:  VITAL SIGNS:  Temp is 97.6, blood pressure is 129/93, heart rate is 103, respiratory rate is 18.  GENERAL:  Patient was lying in bed in no distress.  HEENT:  Pupils were equal, round and reactive to light and accomodation.  Oral mucosa was pink and moist.  NECK:  Soft.  Supple.  No JVD.  HEART:  Tachycardic.  Normal S1 and S2.  No murmurs were appreciated.  LUNGS:  Are clear to auscultation bilaterally.  ABDOMEN:  Soft, non tender, non distended.  Bowels sound were positive in all four quadrants.  EXTREMITIES:  No clubbing, cyanosis.  There was no lower extremity edema.  NEUROLOGIC:  Cranial nerves two through twelve were grossly intact.    LABS:  Lab Results   Component Value Date/Time    SODIUM 134 (L) 10/09/2020 12:10 AM    POTASSIUM 4.2 10/09/2020 12:10 AM    CHLORIDE 103 10/09/2020 12:10 AM    CO2 20  10/09/2020 12:10 AM    GLUCOSE 258 (H) 10/09/2020 12:10 AM    BUN 19 10/09/2020 12:10 AM    CREATININE 0.77 10/09/2020 12:10 AM      Lab Results   Component Value Date/Time    WBC 14.4 (H) 10/09/2020 12:10 AM    RBC 3.24 (L) 10/09/2020 12:10 AM    HEMOGLOBIN 9.8 (L) 10/09/2020 12:10 AM    HEMATOCRIT 30.4 (L) 10/09/2020 12:10 AM    MCV 93.8 10/09/2020 12:10 AM    MCH 30.2 10/09/2020 12:10 AM    MCHC 32.2 (L) 10/09/2020 12:10 AM    MPV 11.0 10/09/2020 12:10 AM    NEUTSPOLYS 75.30 (H) 10/09/2020 12:10 AM    LYMPHOCYTES 14.20 (L) 10/09/2020 12:10 AM    MONOCYTES 6.50 10/09/2020 12:10 AM    EOSINOPHILS 2.60 10/09/2020 12:10 AM    BASOPHILS 0.40 10/09/2020 12:10 AM    HYPOCHROMIA 1+ 10/01/2020 05:48 AM    ANISOCYTOSIS 1+ 10/04/2020 05:08 AM      Lab Results   Component Value Date/Time    PROTHROMBTM 17.0 (H) 09/25/2020 02:21 PM    INR 1.34 (H) 09/25/2020 02:21 PM        CAD (coronary artery disease)  S/P NSTEMI  S/P CABG x 3 vessel bypass  Has hx of 2 cardiac stents  On ASA and Plavix  On Lipitor  On Lopressor & Lisinopril    Cardioembolic stroke (HCC)  Occurred post-op  On ASA & Plavix  On Lipitor    Essential hypertension  BP ok  On Norvasc: 10 mg daily  On Lisinopril: 10 mg bid --> will decrease to 10 mg daily (10/9)  On Lopressor: 25 mg bid --> will increase to 37.5 mg bid 2nd to tachycardia (10/9 evening)  Cont to monitor    Ileus (HCC)  Having BM's recently  On Reglan    Leukocytosis  WBC's: 12.0 (10/7) --> 11.9 (10/8) --> 14.4 (10/9)  Afebrile  ? 2nd to recent surgery  CXR (10/9): poorly defined opacifications in the perihilar region and lower lungs are slightly more prominent than on the prior radiograph;                      findings could be due to edema or atelectasis; developing pneumonia is also possible.  Will check U/A  Will start Levaquin and Flagyl (10/9)  Note: was treated for aspiration pneumonia at [facility] (not sure duration of abx)  Monitor    Tachycardia  HR mildly elevated  On Lopressor: 25 mg bid -->  will increase to 37.5 mg bid 2nd to tachycardia (10/9 evening)  Cont to monitor    Type 2 diabetes mellitus with complication, without long-term current use of insulin (HCC)  Hba1c: 7.9  -281  BS recently trending upwards more  Currently not on any diabetic meds  Will start Metformin: 1000 mg bid (10/9 evening)  Note: home meds appear to be Metformin 2000 mg bid -- per chart but pt can't confirm  Cont to monitor      This case has been discussed with the attending Physiatrist.    Thank you for the consultation.  Will follow the patient with you.

## 2020-10-09 NOTE — PROGRESS NOTES
Notified by , Shanell, that [facility]    Central Line removed. PIV and Quispe Catheter in place. Report given to [facility] Re[facility] RN Radha via telephone. All questions and concerns answered at this time.     Updated girlfriend, Miriam, regarding transfer. Pt has all belongings.

## 2020-10-09 NOTE — DISCHARGE PLANNING
Insurance authorization obtained for IRF  level of care. Raul Ng accepting Transportation TBD. CM looking into transport.

## 2020-10-09 NOTE — ASSESSMENT & PLAN NOTE
Hba1c: 7.9  -160  On Metformin: 500 mg bid   On Lantus: 22 units qam   Note: home meds appear to be Metformin 2000 mg bid (not appropriate dosing) per Epic chart -- but pt can't confirm  Cont to monitor

## 2020-10-09 NOTE — FLOWSHEET NOTE
10/09/20 1300   Patient History   Pulmonary Diagnosis CABG, CVA   Procedures Relevant to Respiratory Status aspiration pna    Home O2 No   Nocturnal CPAP No   Home Treatments/Frequency No   Sleep Apnea Screening   Have you had a sleep study? No   Have you been diagnosed with sleep apnea? No   COPD Risk Screening   Do you have a history of COPD? No   COPD Population Screener   During the past 4 weeks, how much did you feel short of breath? 0   Do you ever cough up any mucus or phlegm? 0   In the past 12 months, you do less than you used to because of your breathing problems 1   Have you smoked at least 100 cigarettes in your entire life? 0   How old are you? 0   COPD Screening Score 1   Protocol Pathways   Protocol Pathways Hyperinflation Protocol   Hyperinflation Protocol   Hyperinflation Protocol Indications Abdominal/Thoracic Surgeries (Open Heart)   I.S. Less Than 40% of Predicted PEP QID x 24 hours, failure to progress in 24 hours, IPV QID (all other patients)   Hyperinflation Protocol Goals/Outcome Improvement in Repeat CXR

## 2020-10-09 NOTE — CARE PLAN
Problem: Safety  Goal: Will remain free from injury  Outcome: PROGRESSING AS EXPECTED  Note: Reviewed fall and safety precautions with patient and reminded patient to call for assistance before getting out of bed. Patient verbalized understanding and has not attempted self transfer this shift.      Problem: Urinary Elimination:  Goal: Ability to reestablish a normal urinary elimination pattern will improve  Outcome: PROGRESSING SLOWER THAN EXPECTED  Note: Patient has gomez in place upon admission.    Order received to remove gomez today and UA ordered.

## 2020-10-09 NOTE — CARE PLAN
Problem: Communication  Goal: The ability to communicate needs accurately and effectively will improve  Outcome: PROGRESSING AS EXPECTED  Intervention: Evans patient and significant other/support system to call light to alert staff of needs  Flowsheets (Taken 10/8/2020 2038)  Oriented to:: All of the Following : Location of Bathroom, Visiting Policy, Unit Routine, Call Light and Bedside Controls, Bedside Rail Policy, Smoking Policy, Rights and Responsibilities, Bedside Report, and Patient Education Notebook     Problem: Bowel/Gastric:  Goal: Normal bowel function is maintained or improved  Outcome: PROGRESSING AS EXPECTED

## 2020-10-09 NOTE — ASSESSMENT & PLAN NOTE
S/P NSTEMI  S/P CABG x 3 vessel bypass  Has hx of 2 cardiac stents  BNP: 629 (10/10) --> 197 (10/13)  On ASA and Plavix  On Lipitor  On Lopressor & Lisinopril

## 2020-10-09 NOTE — PREADMISSION SCREENING NOTE
Updated Pre-Screen Assessment     Name: Guerrero Ann  MRN: [id number]  : 1970    Medical Status/ Changes:      Cosign Needed Addendum             []Hide copied text    []Xenia for details                                                  DISCHARGE SUMMARY     ADMISSION DATE: 2020     DISCHARGE DATE: 10/8/2020     CHIEF COMPLAINT ON ADMISSION: chest pain     ADMITTING DIAGNOSES: CAD, aspiration pneumonia, sepsis, embolic CVA     DISCHARGE DIAGNOSES: CAD, aspiration pneumonia, sepsis, embolic CVA     PROCEDURES PERFORMED: Coronary artery bypass grafting x3, left internal   mammary to left anterior descending, reverse saphenous vein graft to the major   obtuse marginal branch and the right posterior descending, endoscopic   saphenous vein harvesting, extracorporal circulation.     HISTORY OF PRESENT ILLNESS:  The patient is a 49 y.o. 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  The day of admission it occurred lasted several minutes was relieved with rest.  It was substernal and radiated to his left arm.  He became short of breath.  He went home.  The pain returned and it felt like a boulder on his chest pain 10/10.  No other associated signs or symptoms.  No other alleviating or aggravating factors.  He then brought himself to the ED here at [facility].  A cath revealed progression of his disease.  He was referred for CABG.     HOSPITAL COURSE:   POD 1 HDS, SR, Acute blood loss anemia- s/p transfusion- keep mediastinal tubes, gently diurese, d/c gomez  POD 2 HTN- inc lisinopril, SR/ST- change to metoprolol, acute blood loss anemia s/p transfusion- watch, d/c mediastinal tubes, urine retention- replace gomez/start flomax, cont diuresis  POD 3  HDS, SR, neuro right  weaker than left gazes to right, wounds CDI, abdomen S/NT, fluid balance negative, wt still up from admit.  Plan:   MRI today, decrease ace for permissive hypertension, dc gabapentin, BB, Statin, IS/ambulate. CPM.  POD 4  HDS but hypertensive, SR, neuro answers questions but delayed.  Gaze more center.  EEG normal, MRI pending, wounds CDI, abdomen distended, fluid balance negative, wt down.  Plan:  Restart ace, IM getting abdominal study, BB, Statin, IS/ambulate. CPM.  POD 5  HDS, ST, neuro unchanged, wounds CDI, abdomen S/NT, fluid balance negative, wt down.  Failed swallow evaluation.  Hypertensive.  Pain issues.  Plan:  core trak, switch BB to coreg, ACE, Statin, add norco, IS/ambulate. CPM.  POD 6  HDS, SR, neuro unchanged, wounds CDI, abdomen S/NT, fluid balance negative, wt stable.  Plan:  Awaiting rehab authorization, BB, ACE, Statin, IS/ambulate. CPM.  POD 7 HDS, SR, Febrile- most likely aspiration pneumonia- started on rocephin- d/w pharmacy will change to unasyn, ileus- 2 liters of residual last night- start reglan/supp- if no response try relistor, Elevated creatinine- stop lisinopril/lasix- switch lovenox to heparin- start fluids 75 cc/hr, replace gomez for strict I&O, neuro- follows with slowed response, minimal speech  POD 8  HDS, SR, neuro intact, wounds CDI, abdomen firm and distended CT scan shows ileus, fluid balance negative, wt down.  Creatine trending down   Plan:  NG to decompress abd, 1 unit blood, Statin, IS/ambulate. CPM.  POD 9  HDS, SR, neuro follows commands slowly, wounds CDI, abdomen S/NT, fluid balance negative, wt down, NG tube with 1800 out, had 2 loose BM's.  Creatinine improved.  Off levo.  Plan:  Continue to decompress abd with NG.  Bowel protocol.  IV antibiotics for possible aspiration. CPM.  POD 10  HDS, SR, neuro intact, wounds CDI, abdomen less distended today + BS, fluid balance negative, wt up slightly.  Hypertensive but does not tolerate oral medications.  Getting ace and bb.  Creatinine normalized.  Plan:  Continue bowel protocol, continue NG tube, BB, ACE, Statin, IS/ambulate.  CPM.  POD 11:  HDS.  NSR.  Hypertensive.  Weight stable, below baseline admit weight.  Pending swallow evaluation today.  NG tube clamped since 4am, tolerating.  Switching medications to oral, crushed.  Remains on IV abx.  Plan:  Discussed with Dr. Solo.  Plan for repeat rehab evaluation once swallow evaluation complete.  Continue current management.    POD 12  HDS, SR, neuro intact, wounds CDI, abdomen S/NT today active BS all 4 quads, fluid balance negative, wt up today.  Failed swallow evaluation.  Plan:  Continue tube feeds, today last day of abx.  Watch abd.  Increase norvasc and ace, BB, Statin, IS/ambulate. CPM.  To rehab in AM?  POD 13  HDS, SR, neuro gazes to right follows commands but delayed response, wounds CDI, abdomen S/NT, fluid balance negative, wt down 3 kg  Pulled out core trax last night.  Question if rehab will transfer.  Plan:  Will get ready to transfer to rehab.  Medically cleared.  If not candidate for rehab will asked hospitalist to see and possibly transfer to neuro unit and sign off, BB, ACE, Statin, IS/ambulate. CPM.  POD 14 HDS, SR/ST, more alert and conversive.  Speaking complete sentences.  Plan:  To rehab today.  Medically cleared to transfer.       RECENT LABS:           Lab Results   Component Value Date/Time     SODIUM 137 10/08/2020 05:15 AM     POTASSIUM 3.8 10/08/2020 05:15 AM     CHLORIDE 108 10/08/2020 05:15 AM     CO2 18 (L) 10/08/2020 05:15 AM     GLUCOSE 226 (H) 10/08/2020 05:15 AM     BUN 18 10/08/2020 05:15 AM     CREATININE 0.85 10/08/2020 05:15 AM            Lab Results   Component Value Date/Time     WBC 11.9 (H) 10/08/2020 05:15 AM     RBC 3.11 (L) 10/08/2020 05:15 AM     HEMOGLOBIN 9.5 (L) 10/08/2020 05:15 AM     HEMATOCRIT 29.4 (L) 10/08/2020 05:15 AM     MCV 94.5 10/08/2020 05:15 AM     MCH 30.5 10/08/2020 05:15 AM     MCHC 32.3 (L) 10/08/2020 05:15 AM     MPV 10.3 10/08/2020 05:15 AM     NEUTSPOLYS 72.90 (H) 10/08/2020 05:15 AM     LYMPHOCYTES 15.10 (L)  10/08/2020 05:15 AM     MONOCYTES 8.00 10/08/2020 05:15 AM     EOSINOPHILS 2.60 10/08/2020 05:15 AM     BASOPHILS 0.40 10/08/2020 05:15 AM     HYPOCHROMIA 1+ 10/01/2020 05:48 AM     ANISOCYTOSIS 1+ 10/04/2020 05:08 AM            Lab Results   Component Value Date/Time     PROTHROMBTM 17.0 (H) 09/25/2020 02:21 PM     INR 1.34 (H) 09/25/2020 02:21 PM         ALLERGIES:   Patient has no known allergies.    DISCHARGE MEDICATIONS:           Medication List           START taking these medications      Instructions   acetaminophen 325 MG Supp  Commonly known as: TYLENOL  Replaces: acetaminophen 500 MG Tabs    Insert 3 Suppositories in rectum every 6 hours as needed.  Dose: 975 mg      amantadine 50 MG/5ML Syrp  Commonly known as: SYMMETREL    Take 10 mL by mouth 2 Times a Day.  Dose: 100 mg      aspirin 81 MG Chew chewable tablet  Start taking on: October 9, 2020  Commonly known as: ASA  Replaces: aspirin EC 81 MG Tbec    Take 1 Tab by mouth every day.  Dose: 81 mg      bisacodyl 10 MG Supp  Commonly known as: DULCOLAX    Insert 1 Suppository in rectum 1 time daily as needed (if magnesium hydroxide ineffective after 24 hours).  Dose: 10 mg      diphenhydrAMINE 25 MG Tabs  Commonly known as: BENADRYL    Take 1 tablet by mouth at bedtime as needed.  May repeat 1 time. for Sleep.  Dose: 25 mg      enoxaparin 40 MG/0.4ML Soln inj  Commonly known as: LOVENOX    Inject 40 mg as instructed every evening.  Dose: 40 mg      HYDROcodone-acetaminophen 5-325 MG Tabs per tablet  Commonly known as: NORCO    Take 1-2 Tabs by mouth every 6 hours as needed for up to 14 days.  Dose: 1-2 Tab      insulin lispro 100 UNIT/ML  Commonly known as: HumaLOG    Inject 0-15 Units as instructed every 6 hours.  Dose: 0-15 Units      lidocaine 5 % Ptch  Start taking on: October 9, 2020  Commonly known as: LIDODERM    Apply 1 Patch to skin as directed every 24 hours.  Dose: 1 Patch      mag hydrox-al hydrox-simeth 400-400-40 MG/5ML Susp  Commonly known  as: MAALOX PLUS ES or MYLANTA DS    30 mL by Per NG Tube route every four hours as needed (Indigestion).  Dose: 30 mL      magnesium hydroxide 400 MG/5ML Susp  Start taking on: October 9, 2020  Commonly known as: MILK OF MAGNESIA    Take 30 mL by mouth every day.  Dose: 30 mL      metoclopramide 5 MG tablet  Commonly known as: REGLAN    Take 1 Tab by mouth every 6 hours.  Dose: 5 mg      omeprazole 2 mg/mL Susp  Start taking on: October 9, 2020  Commonly known as: FIRST-OMEPRAZOLE    20 mL by Enteral Tube route every day.  Dose: 40 mg      Pharmacy    1 Each by Other route PHARMACY TO DOSE.  Dose: 1 Each      polyethylene glycol/lytes 17 g Pack  Start taking on: October 9, 2020  Commonly known as: MIRALAX    Take 1 Packet by mouth every day.  Dose: 17 g      potassium bicarbonate 25 MEQ tablet  Commonly known as: KLYTE    1 Tab by Enteral Tube route 2 Times a Day.  Dose: 25 mEq      promethazine 25 MG Supp  Commonly known as: Phenergan    Insert 1 Suppository in rectum every 6 hours as needed for Nausea/Vomiting (Nausea/Vomiting if both ondansetron and prochlorperazine ineffective).  Dose: 25 mg      senna-docusate 8.6-50 MG Tabs  Commonly known as: PERICOLACE or SENOKOT S    Take 2 Tabs by mouth 2 Times a Day.  Dose: 2 Tab                  CHANGE how you take these medications      Instructions   atorvastatin 80 MG tablet  What changed: medication strength  Commonly known as: LIPITOR    Take 1 Tab by mouth every bedtime.  Dose: 80 mg      lisinopril 10 MG Tabs  What changed:   · medication strength  · how much to take  · when to take this  Commonly known as: PRINIVIL    Take 1 Tab by mouth 2 Times a Day.  Dose: 10 mg      metoprolol 25 MG Tabs  What changed: when to take this  Commonly known as: LOPRESSOR    Take 1 Tab by mouth 2 Times a Day.  Dose: 25 mg                  CONTINUE taking these medications      Instructions   amLODIPine 10 MG Tabs  Start taking on: October 9, 2020  Commonly known as: NORVASC    Take  1 Tab by mouth every day.  Dose: 10 mg      clopidogrel 75 MG Tabs  Start taking on: October 9, 2020  Commonly known as: PLAVIX    Take 1 Tab by mouth every day.  Dose: 75 mg      metFORMIN 500 MG Tabs  Commonly known as: GLUCOPHAGE    Take 2,000 mg by mouth 2 Times a Day.  Dose: 2,000 mg          STOP taking these medications    acetaminophen 500 MG Tabs  Commonly known as: TYLENOL  Replaced by: acetaminophen 325 MG Supp      allopurinol 100 MG Tabs  Commonly known as: ZYLOPRIM      aspirin EC 81 MG Tbec  Commonly known as: ECOTRIN  Replaced by: aspirin 81 MG Chew chewable tablet      Wellbutrin  MG XL tablet  Generic drug: buPROPion                NARCOTIC PAIN MEDICATIONS:   In prescribing controlled substances to this patient, I certify that I have obtained and reviewed their medical history. I have also made a good epi effort to obtain applicable records from other providers who have treated the patient .     I have conducted a physical exam and documented it. I have reviewed the patient's prescription history as maintained by the [place] Prescription Monitoring Program.      I have assessed the patient’s risk for abuse, dependency, and addiction using the validated Opioid Risk Tool.     Given the above, I believe the benefits of controlled substance therapy outweigh the risks. The reasons for prescribing controlled substances include non-narcotic, oral analgesic alternatives have been inadequate for pain control. Accordingly, I have discussed the risk and benefits, treatment plan, and alternative therapies with the patient.      Pt understands this prescription is a controlled substance which is potentially habit-forming and its use is regulated by the [place]. It must be submitted to the pharmacy within 5 days of the date written and can not be called in or faxed to the pharmacy. Refills are subject to terms of a medicine agreement. Any refill requires a new prescription that must be obtained from this  office during regular office hours. We ask for 72 hours notice to get an appointment for a narcotic pain medication refill. This medicine can cause nausea, significant constipation, sedation, confusion.      DIET:   Cardiac diet     DISCHARGE INSTRUCTIONS DISCUSSED WITH THE PATIENT:       1. NO driving for 4 weeks after surgery. You may ride as a passenger.  2. NO lifting of any item over 10 lbs (e.g. gallon of milk) for 6 weeks after surgery.  3. DO walk as much as possible! Walk a minimum of once a day. Depending on your fatigue and comfort level, you may walk as much as you wish. There is no maximum.  4. Other physical activities (sex, housework, gardening, etc.) are OK after 4 weeks   5. Continue using incentive spirometer for 2 weeks, especially if going home on oxygen.     Incision Care:  1. SHOWER ONLY - no baths. Clean incision daily with plain Ivory ® soap or any other dye or perfume free soap. Then pat incision dry with clean towel. Avoid creams or lotions on the incision(s).  a. If there is any increase in redness or swelling, or separation of the incision line, or thick drainage* from any of the incisions, call right away  * Clear, thin drainage is not abnormal especially from the leg incision and/or                         chest tube sites.  2. Continue to wear your JOSE Stockings for 4 weeks. You may take off the stockings when in bed or when the legs are elevated.     Patient instructed to call [facility] surgery at [phone]  if any increased shortness of breath, uncontrolled pain, weight gain greater than 2 pounds in 1 day, SBP >140, HR <60 or redness swelling or drainage of incisions.        FOLLOW-UP:          Future Appointments   Date Time Provider Department Center   10/20/2020  2:15 PM KIKE Fitzgerald. [facility] None   11/16/2020  1:15 PM Blanca Cooney P.A.-C. [facility] None                                             Functional Status/ Changes:    JUNAID Castro  "  Occupational Therapy Assistant      Therapy   Signed   Date of Service:  10/7/2020  3:06 PM                    []Hide copied text    []Xenia for details  Occupational Therapy  Daily Treatment     Patient Name: Guerrero Ann  Age:  49 y.o., Sex:  male  Medical Record #: [id number]  Today's Date: 10/7/2020     Precautions  Precautions: (P) Fall Risk, Nasogastric Tube, Swallow Precautions ( See Comments)  Comments: (P) delayed verbal response. , mildly agitated.      Assessment     Pt is making progress towards OT goals. Pt needed increased encouragement to participate, did better when wife was present.  Pt did not speak when spoken to until his wife came in stating, \"Here comes the boss\". Pt gave good effort with some delays with MP. Pt stated he understood he needs more therapy prior to going home and is agreeable to go to rehab. Continues with delayed verbal responses and is mildly agitated with his medical condition. RN/Wife updated on OT treatment findings and recommendations.     Plan     Continue current treatment plan.     DC Equipment Recommendations: (P) Unable to determine at this time  Discharge Recommendations: (P) Recommend post-acute placement for additional occupational therapy services prior to discharge home     Subjective     \" Here comes the boss\". Pt referring to his wife.      Objective          10/07/20 1506   Cognition    Cognition / Consciousness X   Speech/ Communication Delayed Responses   Orientation Level Not Oriented to Day   Level of Consciousness Alert   Ability To Follow Commands 1 Step   Safety Awareness Impaired   New Learning Impaired   Attention Impaired   Sequencing Impaired   Initiation Impaired   Comments Pt was awake and needed increased encouragement to participate. Not speaking for the first 20 mins    Strength Upper Body   Right  Impaired   Comments Continues to present 3/5 R hand , 5/5 left , difficult to complete full MMT due to impaired " cognition/command following   Other Treatments   Other Treatments Provided Psychosocial intervention addressed. Pt's wife came into tx at end of session. Discussed the need for more therapy prior to D/C home. Wife agrees.   Balance   Sitting Balance (Static)    (refused EOB)   Comments Pt refused to come to EOB. Seen supine in bed.    Activities of Daily Living   Grooming Minimal Assist  (to wash face needed [place] A to begin task. Oral H/G set up)   Bathing    (refused)   Upper Body Dressing    (refused)   Lower Body Dressing Maximal Assist   Toileting    (unable to get to EOB and BSC)   Skilled Intervention Verbal Cuing;Tactile Cuing;Sequencing;Compensatory Strategies   Comments Poor initiation/participation initially, but did give good effort with encouragement     Visual Perception   Comments improved tracking demo   Activity Tolerance   Comments  limited by fatigue and arousal.    Short Term Goals   Short Term Goal # 1 Pt will complete seated grooming/hygiene with Donny by discharge.   Goal Outcome # 1 Progressing slower than expected   Short Term Goal # 2 Pt will complete UE dressing with Donny by discharge.   Goal Outcome # 2 Goal not met   Short Term Goal # 3 Pt will complete toilet transfer with modA by discharge.   Goal Outcome # 3 Progressing slower than expected   Anticipated Discharge Equipment and Recommendations   DC Equipment Recommendations Unable to determine at this time   Discharge Recommendations Recommend post-acute placement for additional occupational therapy services prior to discharge home   Interdisciplinary Plan of Care Collaboration   Collaboration Comments RN updated on OT treatment findings and recommendations.                      Marlee Manley, PT   Physical Therapist   Specialty:  Physical Therapy   Therapy   Signed   Date of Service:  10/7/2020 11:37 AM                    []Hide copied text    []Xenia for details  Physical Therapy   Daily Treatment     Patient Name: Guerrero Dixon  Judah Ann  Age:  49 y.o., Sex:  male  Medical Record #: [id number]  Today's Date: 10/7/2020     Precautions: Fall Risk, Nasogastric Tube, Swallow Precautions ( See Comments)     Assessment     Pt able to improve his upright stability in standing, but requires inconsistent min A to remain in midline in unsupported sitting. During gait trial, pt requires mod A for weight shift and verbal cues for swing limb advancement. Pattern is mostly shuffled with excessively narrow NEL. PT will follow to address gait deviations, impaired motor control, and instability. Recommend placement.      Plan     Treatment plan modified to 4 times per week until therapy goals are met for the following treatments:  Bed Mobility, Equipment, Gait Training, Neuro Re-Education / Balance, Self Care/Home Evaluation, Stair Training, Therapeutic Activities and Therapeutic Exercises.     DC Equipment Recommendations: Unable to determine at this time  Discharge Recommendations: Recommend post-acute placement for additional physical therapy services prior to discharge home           Objective          10/07/20 1137   Balance   Comments Seated EOB mostly at min A with L lateral lean. Cues manually and verbally for correction, but pt unable to sustain. Able to perform reaching to facilitate more upright independent posture. Pt with limited cervical ROM bilaterally to around 45deg rotation; unclear if new or not.   Gait Analysis   Gait Level Of Assist Moderate Assist   Assistive Device Hand Held Assist   Distance (Feet) 5   # of Times Distance was Traveled 2   Comments Cues mostly for weight shift and verbally for advancement of each LE. Sometimes steps were more automatic, but mostly required cueing. Shuffled pattern. Required seated rests in between.    Bed Mobility    Supine to Sit Maximal Assist   Sit to Supine Maximal Assist   Scooting Maximal Assist   Comments Assist for all aspects of bed mob; pt able to initiate sit to supine.     Functional Mobility   Sit to Stand Minimal Assist   Bed, Chair, Wheelchair Transfer    (deferred to focus on gait)   Short Term Goals    Short Term Goal # 1 Patient will move supine<>sitting EOB without bed features with supervision within 6tx in order to get in/out of bed   Goal Outcome # 1 goal not met   Short Term Goal # 2 Patient will move sitting<>standing with supervision within 6tx in order to initiate gait and transfers   Goal Outcome # 2 Goal not met   Short Term Goal # 3 Patient will ambulate 500ft with supervision within 6tx in order to access environment   Goal Outcome # 3 Goal not met   Short Term Goal # 4 Patient will verbalize cardiac rehab education including sternal precautions, home walking program, and techniques for activity pacing within 6tx in order to demonstrate understanding.   Goal Outcome # 4 Goal not met   Short Term Goal # 5 Pt will sit upright EOB in neutral at spv in 6tx to improve upright trunk control.    Anticipated Discharge Equipment and Recommendations   DC Equipment Recommendations Unable to determine at this time   Discharge Recommendations Recommend post-acute placement for additional physical therapy services prior to discharge home                  Sharlene Rae MS,CCC-SLP   Speech Language Pathologist      Therapy   Signed   Date of Service:  10/6/2020  2:27 PM                    [][place] copied text    []Xenia for details  Speech Language Pathology  Daily Treatment     Patient Name: Guerrero Ann  Age:  49 y.o., Sex:  male  Medical Record #: [id number]  Today's Date: 10/6/2020     Precautions  Precautions: (P) Fall Risk, Swallow Precautions ( See Comments)  Comments: (P) delayed/absent verbal responses      Assessment     Pt was seen for a swallow reassessment today after NGT was removed. Pt was alert, though responses to questions/commands were either significantly delayed or absent. Vocal quality was either aphonic or hypophonic when pt did respond.  "Volitional throat clear was weak. Pt presents with R gaze preference. Pt was presented with ice chips x5, mildly thick water via tsp x2. Oral responses were appropriate with pt demo'ing normal mastication of ice chips. Pharyngeal swallow response was timely. Hyolaryngeal excursion was palpated as completed. Pt swallowed x2 per bolus with MTL, which can indicate pharyngeal residue. Reflexive coughing occurred with 1/5 ice chips and 2/2 trials of MTL. O2 sats remained stable. Pt is not safe for PO intake at this time. Attempted to teach pt swallowing exercises, though participation was limited by impaired cognition. Recommend pt remain strict NPO with non-oral source of nutrition/hydration/meds.      Plan     Recommend pt remain strict NPO with non-oral source of nutrition/hydration/meds.      Treatment plan modified to 5 times per week until therapy goals are met for the following treatments:  Dysphagia Training and Patient / Family / Caregiver Education.     Discharge Recommendations: (P) Recommend post-acute placement for additional speech therapy services prior to discharge home     Subjective     \"Water, water.\"     Objective          10/06/20 1427   Cognitive-Linguistic   Level of Consciousness Alert   Voice   Comments hypophonic/aphonic   Dysphagia    Oral / Pharyngeal / Laryngeal Exercises Base of Tongue Exercises  (attempted; pt able to perform x1)   Other Treatments PO trials of ice chips x5, MTL via tsp x2   Diet / Liquid Recommendation NPO;Pre-Feeding Trials with SLP Only   Recommended Route of Medication Administration   Medication Administration  Via Gastric Tube   Short Term Goals   Short Term Goal # 1 NEW 10/6: Patient will consume PO trials with SLP only with no s/sx of aspiration.   Short Term Goal # 2 NEW 10/6: Patient will complete swallowing exercises x5-10 reps with max cues.                  Original prescreen completed in Epic 09/30.           Reviewer: Richar Bowling  Date: 10/9/2020  Time: " 9:42 AM

## 2020-10-09 NOTE — PROGRESS NOTES
Spoke with Shanell Diaz regarding need to print AVS - per  there is no need to print AVS since patient is not discharging from [facility].    Notified and confirmed with charge RN Alan.

## 2020-10-09 NOTE — FLOWSHEET NOTE
"   10/09/20 1419   Incentive Spirometry Treatment   Height 1.575 m (5' 2.01\")   Predicted Inspiratory Capacity 2500   60% of predicted IS capacity 1500 mL   40% of predicted IS capacity 1000 mL   Incentive Spirometer Volume 500 mL  (has poor effort and unable to do will start on pep therapy)   Incentive Spirometer Next Change Date 11/09/20     "

## 2020-10-09 NOTE — PROGRESS NOTES
2 RN skin check done with Kelly. Face photo and skin photos documented in media. Appropriate LDAs opened.   Pt with Mahad score of 17, RN wound protocol ordered on 10/9, orders current. Prevention measures in place including, waffle overlay, turn and reposition during rounds, barrier cream for incontinence.

## 2020-10-09 NOTE — FLOWSHEET NOTE
10/09/20 1346   Events/Summary/Plan   Events/Summary/Plan new admit pt on room air no known hx    Vital Signs   Pulse 99   Respiration 18   Pulse Oximetry 93 %   $ Pulse Oximetry (Spot Check) Yes   Respiratory Assessment   Level of Consciousness Alert   Chest Exam   Work Of Breathing / Effort Mild   Breath Sounds   RUL Breath Sounds Clear   RML Breath Sounds Clear   RLL Breath Sounds Clear;Diminished   ROBERT Breath Sounds Clear   LLL Breath Sounds Clear;Diminished   Oxygen   O2 (LPM) 0   FiO2% 21 %   O2 Delivery Device None - Room Air

## 2020-10-09 NOTE — CARE PLAN
Problem: Nutritional:  Goal: Nutrition support tolerated and meeting greater than 85% of estimated needs  Outcome: MET   Impact Peptide 1.5 @ 50 mL/hr (final goal rate) and tolerating.

## 2020-10-10 PROBLEM — E87.1 HYPONATREMIA: Status: ACTIVE | Noted: 2020-10-10

## 2020-10-10 PROBLEM — E55.9 VITAMIN D DEFICIENCY: Status: ACTIVE | Noted: 2020-10-10

## 2020-10-10 PROBLEM — R79.89 AZOTEMIA: Status: ACTIVE | Noted: 2020-10-10

## 2020-10-10 PROBLEM — D50.9 IRON DEFICIENCY ANEMIA: Status: ACTIVE | Noted: 2020-10-10

## 2020-10-10 LAB
25(OH)D3 SERPL-MCNC: 17 NG/ML (ref 30–100)
ALBUMIN SERPL BCP-MCNC: 3.4 G/DL (ref 3.2–4.9)
ALBUMIN/GLOB SERPL: 0.9 G/DL
ALP SERPL-CCNC: 107 U/L (ref 30–99)
ALT SERPL-CCNC: 40 U/L (ref 2–50)
ANION GAP SERPL CALC-SCNC: 12 MMOL/L (ref 7–16)
APPEARANCE UR: CLEAR
AST SERPL-CCNC: 22 U/L (ref 12–45)
BACTERIA #/AREA URNS HPF: NEGATIVE /HPF
BASOPHILS # BLD AUTO: 0.4 % (ref 0–1.8)
BASOPHILS # BLD: 0.06 K/UL (ref 0–0.12)
BILIRUB SERPL-MCNC: 0.4 MG/DL (ref 0.1–1.5)
BILIRUB UR QL STRIP.AUTO: NEGATIVE
BUN SERPL-MCNC: 27 MG/DL (ref 8–22)
CALCIUM SERPL-MCNC: 8.9 MG/DL (ref 8.5–10.5)
CHLORIDE SERPL-SCNC: 100 MMOL/L (ref 96–112)
CO2 SERPL-SCNC: 18 MMOL/L (ref 20–33)
COLOR UR: YELLOW
CREAT SERPL-MCNC: 0.96 MG/DL (ref 0.5–1.4)
EOSINOPHIL # BLD AUTO: 0.39 K/UL (ref 0–0.51)
EOSINOPHIL NFR BLD: 2.5 % (ref 0–6.9)
EPI CELLS #/AREA URNS HPF: NEGATIVE /HPF
ERYTHROCYTE [DISTWIDTH] IN BLOOD BY AUTOMATED COUNT: 49.1 FL (ref 35.9–50)
FERRITIN SERPL-MCNC: 706 NG/ML (ref 22–322)
GLOBULIN SER CALC-MCNC: 3.8 G/DL (ref 1.9–3.5)
GLUCOSE BLD-MCNC: 186 MG/DL (ref 65–99)
GLUCOSE BLD-MCNC: 196 MG/DL (ref 65–99)
GLUCOSE BLD-MCNC: 200 MG/DL (ref 65–99)
GLUCOSE BLD-MCNC: 256 MG/DL (ref 65–99)
GLUCOSE BLD-MCNC: 290 MG/DL (ref 65–99)
GLUCOSE SERPL-MCNC: 290 MG/DL (ref 65–99)
GLUCOSE UR STRIP.AUTO-MCNC: 250 MG/DL
HCT VFR BLD AUTO: 31.3 % (ref 42–52)
HGB BLD-MCNC: 10 G/DL (ref 14–18)
HYALINE CASTS #/AREA URNS LPF: ABNORMAL /LPF
IMM GRANULOCYTES # BLD AUTO: 0.14 K/UL (ref 0–0.11)
IMM GRANULOCYTES NFR BLD AUTO: 0.9 % (ref 0–0.9)
IRON SATN MFR SERPL: 11 % (ref 15–55)
IRON SERPL-MCNC: 31 UG/DL (ref 50–180)
KETONES UR STRIP.AUTO-MCNC: NEGATIVE MG/DL
LEUKOCYTE ESTERASE UR QL STRIP.AUTO: NEGATIVE
LYMPHOCYTES # BLD AUTO: 2.41 K/UL (ref 1–4.8)
LYMPHOCYTES NFR BLD: 15.3 % (ref 22–41)
MAGNESIUM SERPL-MCNC: 1.9 MG/DL (ref 1.5–2.5)
MCH RBC QN AUTO: 30.5 PG (ref 27–33)
MCHC RBC AUTO-ENTMCNC: 31.9 G/DL (ref 33.7–35.3)
MCV RBC AUTO: 95.4 FL (ref 81.4–97.8)
MICRO URNS: ABNORMAL
MONOCYTES # BLD AUTO: 0.95 K/UL (ref 0–0.85)
MONOCYTES NFR BLD AUTO: 6 % (ref 0–13.4)
NEUTROPHILS # BLD AUTO: 11.83 K/UL (ref 1.82–7.42)
NEUTROPHILS NFR BLD: 74.9 % (ref 44–72)
NITRITE UR QL STRIP.AUTO: NEGATIVE
NRBC # BLD AUTO: 0 K/UL
NRBC BLD-RTO: 0 /100 WBC
NT-PROBNP SERPL IA-MCNC: 629 PG/ML (ref 0–125)
PH UR STRIP.AUTO: 6 [PH] (ref 5–8)
PHOSPHATE SERPL-MCNC: 3.7 MG/DL (ref 2.5–4.5)
PLATELET # BLD AUTO: 473 K/UL (ref 164–446)
PMV BLD AUTO: 11 FL (ref 9–12.9)
POTASSIUM SERPL-SCNC: 4.5 MMOL/L (ref 3.6–5.5)
PROT SERPL-MCNC: 7.2 G/DL (ref 6–8.2)
PROT UR QL STRIP: 30 MG/DL
RBC # BLD AUTO: 3.28 M/UL (ref 4.7–6.1)
RBC # URNS HPF: ABNORMAL /HPF
RBC UR QL AUTO: ABNORMAL
SODIUM SERPL-SCNC: 130 MMOL/L (ref 135–145)
SP GR UR STRIP.AUTO: 1.02
TIBC SERPL-MCNC: 270 UG/DL (ref 250–450)
UIBC SERPL-MCNC: 239 UG/DL (ref 110–370)
UROBILINOGEN UR STRIP.AUTO-MCNC: 0.2 MG/DL
WBC # BLD AUTO: 15.8 K/UL (ref 4.8–10.8)
WBC #/AREA URNS HPF: ABNORMAL /HPF

## 2020-10-10 PROCEDURE — 99232 SBSQ HOSP IP/OBS MODERATE 35: CPT | Performed by: HOSPITALIST

## 2020-10-10 PROCEDURE — 83540 ASSAY OF IRON: CPT

## 2020-10-10 PROCEDURE — A9270 NON-COVERED ITEM OR SERVICE: HCPCS | Performed by: PHYSICAL MEDICINE & REHABILITATION

## 2020-10-10 PROCEDURE — 92523 SPEECH SOUND LANG COMPREHEN: CPT

## 2020-10-10 PROCEDURE — 94760 N-INVAS EAR/PLS OXIMETRY 1: CPT

## 2020-10-10 PROCEDURE — 97535 SELF CARE MNGMENT TRAINING: CPT

## 2020-10-10 PROCEDURE — 84100 ASSAY OF PHOSPHORUS: CPT

## 2020-10-10 PROCEDURE — 83550 IRON BINDING TEST: CPT

## 2020-10-10 PROCEDURE — 83880 ASSAY OF NATRIURETIC PEPTIDE: CPT

## 2020-10-10 PROCEDURE — 36415 COLL VENOUS BLD VENIPUNCTURE: CPT

## 2020-10-10 PROCEDURE — 81001 URINALYSIS AUTO W/SCOPE: CPT

## 2020-10-10 PROCEDURE — 85025 COMPLETE CBC W/AUTO DIFF WBC: CPT

## 2020-10-10 PROCEDURE — 770010 HCHG ROOM/CARE - REHAB SEMI PRIVAT*

## 2020-10-10 PROCEDURE — 700102 HCHG RX REV CODE 250 W/ 637 OVERRIDE(OP): Performed by: HOSPITALIST

## 2020-10-10 PROCEDURE — A9270 NON-COVERED ITEM OR SERVICE: HCPCS | Performed by: HOSPITALIST

## 2020-10-10 PROCEDURE — 82306 VITAMIN D 25 HYDROXY: CPT

## 2020-10-10 PROCEDURE — 82962 GLUCOSE BLOOD TEST: CPT | Mod: 91

## 2020-10-10 PROCEDURE — 700102 HCHG RX REV CODE 250 W/ 637 OVERRIDE(OP): Performed by: PHYSICAL MEDICINE & REHABILITATION

## 2020-10-10 PROCEDURE — 83735 ASSAY OF MAGNESIUM: CPT

## 2020-10-10 PROCEDURE — 82728 ASSAY OF FERRITIN: CPT

## 2020-10-10 PROCEDURE — 94669 MECHANICAL CHEST WALL OSCILL: CPT

## 2020-10-10 PROCEDURE — 97162 PT EVAL MOD COMPLEX 30 MIN: CPT

## 2020-10-10 PROCEDURE — 700101 HCHG RX REV CODE 250: Performed by: PHYSICAL MEDICINE & REHABILITATION

## 2020-10-10 PROCEDURE — 99232 SBSQ HOSP IP/OBS MODERATE 35: CPT | Performed by: PHYSICAL MEDICINE & REHABILITATION

## 2020-10-10 PROCEDURE — 97166 OT EVAL MOD COMPLEX 45 MIN: CPT

## 2020-10-10 PROCEDURE — 80053 COMPREHEN METABOLIC PANEL: CPT

## 2020-10-10 PROCEDURE — 700111 HCHG RX REV CODE 636 W/ 250 OVERRIDE (IP): Performed by: PHYSICAL MEDICINE & REHABILITATION

## 2020-10-10 PROCEDURE — 92610 EVALUATE SWALLOWING FUNCTION: CPT

## 2020-10-10 RX ORDER — FERROUS SULFATE 325(65) MG
325 TABLET ORAL 2 TIMES DAILY WITH MEALS
Status: DISCONTINUED | OUTPATIENT
Start: 2020-10-10 | End: 2020-10-12

## 2020-10-10 RX ORDER — TAMSULOSIN HYDROCHLORIDE 0.4 MG/1
0.4 CAPSULE ORAL
Status: DISCONTINUED | OUTPATIENT
Start: 2020-10-10 | End: 2020-10-10

## 2020-10-10 RX ORDER — TERAZOSIN 1 MG/1
1 CAPSULE ORAL EVERY EVENING
Status: DISCONTINUED | OUTPATIENT
Start: 2020-10-10 | End: 2020-10-13

## 2020-10-10 RX ORDER — VITAMIN B COMPLEX
TABLET ORAL
Status: ACTIVE
Start: 2020-10-10 | End: 2020-10-10

## 2020-10-10 RX ORDER — VITAMIN B COMPLEX
2000 TABLET ORAL DAILY
Status: DISCONTINUED | OUTPATIENT
Start: 2020-10-11 | End: 2020-10-14

## 2020-10-10 RX ORDER — VITAMIN B COMPLEX
1000 TABLET ORAL DAILY
Status: DISCONTINUED | OUTPATIENT
Start: 2020-10-10 | End: 2020-10-10

## 2020-10-10 RX ADMIN — TRAMADOL HYDROCHLORIDE 50 MG: 50 TABLET, COATED ORAL at 15:28

## 2020-10-10 RX ADMIN — FERROUS SULFATE TAB 325 MG (65 MG ELEMENTAL FE) 325 MG: 325 (65 FE) TAB at 11:31

## 2020-10-10 RX ADMIN — AMLODIPINE BESYLATE 10 MG: 5 TABLET ORAL at 05:44

## 2020-10-10 RX ADMIN — FERROUS SULFATE TAB 325 MG (65 MG ELEMENTAL FE) 325 MG: 325 (65 FE) TAB at 17:50

## 2020-10-10 RX ADMIN — INSULIN LISPRO 3 UNITS: 100 INJECTION, SOLUTION INTRAVENOUS; SUBCUTANEOUS at 00:24

## 2020-10-10 RX ADMIN — TRAMADOL HYDROCHLORIDE 50 MG: 50 TABLET, COATED ORAL at 22:04

## 2020-10-10 RX ADMIN — METOCLOPRAMIDE 5 MG: 10 TABLET ORAL at 00:10

## 2020-10-10 RX ADMIN — ENOXAPARIN SODIUM 40 MG: 40 INJECTION SUBCUTANEOUS at 22:27

## 2020-10-10 RX ADMIN — TERAZOSIN HYDROCHLORIDE 1 MG: 1 CAPSULE ORAL at 22:04

## 2020-10-10 RX ADMIN — METOCLOPRAMIDE 5 MG: 10 TABLET ORAL at 17:42

## 2020-10-10 RX ADMIN — METOCLOPRAMIDE 5 MG: 10 TABLET ORAL at 05:43

## 2020-10-10 RX ADMIN — METOPROLOL TARTRATE 37.5 MG: 25 TABLET, FILM COATED ORAL at 05:44

## 2020-10-10 RX ADMIN — LIDOCAINE 1 PATCH: 50 PATCH TOPICAL at 09:15

## 2020-10-10 RX ADMIN — METFORMIN HYDROCHLORIDE 1000 MG: 500 TABLET ORAL at 09:12

## 2020-10-10 RX ADMIN — INSULIN LISPRO 9 UNITS: 100 INJECTION, SOLUTION INTRAVENOUS; SUBCUTANEOUS at 11:51

## 2020-10-10 RX ADMIN — INSULIN LISPRO 9 UNITS: 100 INJECTION, SOLUTION INTRAVENOUS; SUBCUTANEOUS at 06:14

## 2020-10-10 RX ADMIN — ATORVASTATIN CALCIUM 80 MG: 40 TABLET, FILM COATED ORAL at 22:04

## 2020-10-10 RX ADMIN — MELATONIN TAB 3 MG 3 MG: 3 TAB at 00:31

## 2020-10-10 RX ADMIN — LEVOFLOXACIN 500 MG: 250 TABLET, FILM COATED ORAL at 09:12

## 2020-10-10 RX ADMIN — METRONIDAZOLE 500 MG: 500 TABLET ORAL at 22:04

## 2020-10-10 RX ADMIN — CLOPIDOGREL BISULFATE 75 MG: 75 TABLET ORAL at 09:12

## 2020-10-10 RX ADMIN — TRAMADOL HYDROCHLORIDE 50 MG: 50 TABLET, COATED ORAL at 03:14

## 2020-10-10 RX ADMIN — POTASSIUM BICARBONATE 25 MEQ: 978 TABLET, EFFERVESCENT ORAL at 09:12

## 2020-10-10 RX ADMIN — OMEPRAZOLE 40 MG: KIT at 09:13

## 2020-10-10 RX ADMIN — METFORMIN HYDROCHLORIDE 1000 MG: 500 TABLET ORAL at 17:42

## 2020-10-10 RX ADMIN — MELATONIN TAB 3 MG 3 MG: 3 TAB at 22:04

## 2020-10-10 RX ADMIN — ASPIRIN 81 MG CHEWABLE TABLET 81 MG: 81 TABLET CHEWABLE at 09:12

## 2020-10-10 RX ADMIN — METRONIDAZOLE 500 MG: 500 TABLET ORAL at 14:57

## 2020-10-10 RX ADMIN — METOCLOPRAMIDE 5 MG: 10 TABLET ORAL at 11:31

## 2020-10-10 RX ADMIN — METRONIDAZOLE 500 MG: 500 TABLET ORAL at 00:10

## 2020-10-10 RX ADMIN — METRONIDAZOLE 500 MG: 500 TABLET ORAL at 05:43

## 2020-10-10 RX ADMIN — ACETAMINOPHEN 650 MG: 325 TABLET, FILM COATED ORAL at 06:03

## 2020-10-10 RX ADMIN — SERTRALINE HYDROCHLORIDE 50 MG: 50 TABLET ORAL at 09:13

## 2020-10-10 RX ADMIN — LISINOPRIL 10 MG: 5 TABLET ORAL at 05:44

## 2020-10-10 RX ADMIN — METOCLOPRAMIDE 5 MG: 10 TABLET ORAL at 23:15

## 2020-10-10 RX ADMIN — POTASSIUM BICARBONATE 25 MEQ: 978 TABLET, EFFERVESCENT ORAL at 22:06

## 2020-10-10 RX ADMIN — Medication 1000 UNITS: at 11:32

## 2020-10-10 ASSESSMENT — BRIEF INTERVIEW FOR MENTAL STATUS (BIMS)
ASKED TO RECALL BLUE: YES, NO CUE REQUIRED
WHAT DAY OF THE WEEK IS IT: CORRECT
WHAT MONTH IS IT: ACCURATE WITHIN 5 DAYS
ASKED TO RECALL BED: YES, NO CUE REQUIRED
INITIAL REPETITION OF BED BLUE SOCK - FIRST ATTEMPT: 3
ASKED TO RECALL SOCK: YES, NO CUE REQUIRED
ASKED TO RECALL BED: YES, NO CUE REQUIRED
BIMS SUMMARY SCORE: 15
ASKED TO RECALL SOCK: YES, NO CUE REQUIRED
WHAT DAY OF THE WEEK IS IT: CORRECT
WHAT MONTH IS IT: ACCURATE WITHIN 5 DAYS
BIMS SUMMARY SCORE: 15
ASKED TO RECALL BLUE: YES, NO CUE REQUIRED
WHAT YEAR IS IT: CORRECT
INITIAL REPETITION OF BED BLUE SOCK - FIRST ATTEMPT: 3
WHAT YEAR IS IT: CORRECT

## 2020-10-10 ASSESSMENT — PAIN DESCRIPTION - PAIN TYPE
TYPE: ACUTE PAIN
TYPE: ACUTE PAIN

## 2020-10-10 ASSESSMENT — GAIT ASSESSMENTS
GAIT LEVEL OF ASSIST: MINIMAL ASSIST
ASSISTIVE DEVICE: PARALLEL BARS;OTHER (COMMENTS)
DISTANCE (FEET): 10
DEVIATION: STEP TO;SHUFFLED GAIT;BRADYKINETIC

## 2020-10-10 ASSESSMENT — ENCOUNTER SYMPTOMS
BLURRED VISION: 0
DIZZINESS: 0
HEADACHES: 0
NAUSEA: 0
HALLUCINATIONS: 0
VOMITING: 0
SHORTNESS OF BREATH: 0
PALPITATIONS: 0
FEVER: 0

## 2020-10-10 ASSESSMENT — ACTIVITIES OF DAILY LIVING (ADL)
TOILET_TRANSFER_DESCRIPTION: GRAB BAR;SUPERVISION FOR SAFETY;SET-UP OF EQUIPMENT;VERBAL CUEING
TOILETING: INDEPENDENT
TOILET_TRANSFER_DESCRIPTION: GRAB BAR
TUB_SHOWER_TRANSFER_DESCRIPTION: GRAB BAR;SHOWER BENCH;SUPERVISION FOR SAFETY;SET-UP OF EQUIPMENT;VERBAL CUEING
BED_CHAIR_WHEELCHAIR_TRANSFER_DESCRIPTION: ADAPTIVE EQUIPMENT;VERBAL CUEING;SUPERVISION FOR SAFETY

## 2020-10-10 NOTE — THERAPY
Occupational Therapy   Initial Evaluation     Patient Name: Guerrero Ann  Age:  49 y.o., Sex:  male  Medical Record #: [id number]  Today's Date: 10/10/2020     Subjective    Patient asleep upon arrival @ 7am, difficult to arouse initially. Agreeable to participate in OT.     Objective     10/10/20 0701   Prior Living Situation   Prior Services None;Home-Independent   Housing / Facility 1 Story House   Steps Into Home 1   Steps In Home 0   Bathroom Set up Walk In Shower  (slding glass doors, hand held shower head)   Equipment Owned None   Lives with - Patient's Self Care Capacity Significant Other  (girlfriend, Miriam)   Prior Level of ADL Function   Self Feeding Independent   Grooming / Hygiene Independent   Bathing Independent   Dressing Independent   Toileting Independent   Comments Previously independent with all ADLs without AD   Prior Level of IADL Function   Medication Management   (patient reports he did not take any medications previously)   Laundry Independent   Kitchen Mobility Independent   Finances Independent   Home Management Independent   Shopping Independent   Prior Level Of Mobility Independent Without Device in Community;Independent Without Device in Home   Driving / Transportation Driving Independent   Occupation (Pre-Hospital Vocational) Employed Full Time  (Construction )   Prior Functioning: Everyday Activities   Self Care Independent   Indoor Mobility (Ambulation) Independent   Stairs Independent   Functional Cognition Independent   Prior Device Use None of the given options  (no AD)   Vitals   O2 Delivery Device None - Room Air   Cognition    Level of Consciousness Alert   ABS (Agitated Behavior Scale)   Agitated Behavior Scale Performed No   Cognitive Pattern Assessment   Cognitive Pattern Assessment Used BIMS   Brief Interview for Mental Status (BIMS)   Repetition of Three Words (First Attempt) 3   Temporal Orientation: Year Correct   Temporal Orientation: Month  "Accurate within 5 days   Temporal Orientation: Day Correct   Recall: \"Sock\" Yes, no cue required   Recall: \"Blue\" Yes, no cue required   Recall: \"Bed\" Yes, no cue required   BIMS Summary Score 15   Vision Screen   Vision Tested  (Decreased lateral/vertical tracking to L side (B eyes))   Visual Acuity   (stated therapist's badge said \"Veronica\" and time \"8:20\"vs 7:20)   Passive ROM Upper Body   Passive ROM Upper Body WDL   Active ROM Upper Body   Active ROM Upper Body  WDL   Dominant Hand Right   Strength Upper Body   Upper Body Strength  WDL   Sensation Upper Body   Upper Extremity Sensation  WDL   Upper Body Muscle Tone   Upper Body Muscle Tone  WDL   Balance Assessment   Sitting Balance (Static) Fair +   Sitting Balance (Dynamic) Fair   Standing Balance (Static) Fair -   Bed Mobility    Supine to Sit Contact Guard Assist   Sit to Stand Minimal Assist   Scooting Contact Guard Assist   Coordination Upper Body   Coordination WDL   Eating   Assistance Needed Physical assistance  (NGT)   Physical Assistance Level Total assistance   CARE Score 1   Eating Discharge Goal   Discharge Goal Independent   Oral Hygiene   Assistance Needed Set-up / clean-up   CARE Score 5   Oral Hygiene Discharge Goal   Discharge Goal Independent   Shower/Bathe Self   Assistance Needed Physical assistance   Physical Assistance Level Less than 25%   CARE Score 3   Shower/Bathe Self Discharge Goal   Discharge Goal Independent   Upper Body Dressing   Assistance Needed Set-up / clean-up   CARE Score 5   Upper Body Dressing Discharge Goal   Discharge Goal Independent   Lower Body Dressing   Assistance Needed Physical assistance   Physical Assistance Level Less than 25%   CARE Score 3   Lower Body Dressing Discharge Goal   Discharge Goal Independent   Putting On/Taking Off Footwear   Assistance Needed Physical assistance   Physical Assistance Level Less than 25%   CARE Score 3   Putting On/Taking Off Footwear Discharge Goal   Discharge Goal Independent "   Toileting Hygiene   Assistance Needed Physical assistance   Physical Assistance Level Less than 25%   CARE Score 3   Toileting Hygiene Discharge Goal   Discharge Goal Independent   Toilet Transfer   Assistance Needed Physical assistance   Physical Assistance Level Less than 25%   CARE Score 3   Toilet Transfer Discharge Goal   Discharge Goal Independent   Hearing, Speech, and Vision   Expression of Ideas and Wants Some difficulty   Understanding Verbal and Non-Verbal Content Understands   Functional Level of Assist   Eating Total Assist  (NGT )   Grooming Stand by Assist   Bathing Minimal Assist  (for balance/safety, cues to adhere to sternal precautions )   Upper Body Dressing Stand by Assist  (Set-up for donning t-shirt while seated)   Lower Body Dressing Minimal Assist  (for balance/safety, cues to adhere to sternal precautions )   Toileting Minimal Assist   Toilet Transfers Minimal Assist   Toilet Transfer Description Grab bar;Supervision for safety;Set-up of equipment;Verbal cueing   Tub / Shower Transfers Minimal Assist   Tub Shower Transfer Description Grab bar;Shower bench;Supervision for safety;Set-up of equipment;Verbal cueing   Problem List   Problem List Decreased Activity Tolerance;Decreased Functional Mobility;Safety Awareness Deficits / Cognition;Impaired Vision;Impaired Postural Control / Balance;Limited Knowledge of Post Op Precautions   Precautions   Precautions Fall Risk;Sternal Precautions (See Comments);Nasogastric Tube   Current Discharge Plan   Current Discharge Plan Return to Prior Living Situation   Benefit    Therapy Benefit Patient Would Benefit from Inpatient Rehab Occupational Therapy to Maximize [place] with ADLs, IADLs and Functional Mobility.   Interdisciplinary Plan of Care Collaboration   Patient Position at End of Therapy Seated;Call Light within Reach;Self Releasing Lap Belt Applied   Equipment Needs   Assistive Device / DME   (TBA)   Adaptive Equipment   (TBA)   Strengths  & Barriers   Strengths Alert and oriented;Adequate strength;Independent prior level of function;Motivated for self care and independence;Willingly participates in therapeutic activities;Pleasant and cooperative;Supportive family   Barriers Decreased endurance;Generalized weakness;Impaired balance;Tube feeding;Visual impairment   OT Total Time Spent   OT Individual Total Time Spent (Mins) 60   OT Charge Group   Charges Yes   OT Self Care / ADL 1   OT Evaluation OT Evaluation Mod       Assessment  Patient is 49 y.o. male with a diagnosis of cardioembolic stroke.  Additional factors influencing patient status / progress (ie: cognitive factors, co-morbidities, social support, etc): Per H&P, patient with past medical history of coronary artery disease s/p stents, diabetes, hypertension, not taking any medications, admitted to [facility] on 9/21, with chest pain. He was admitted for acute coronary syndrome/NSTEMI, went to cath lab, found to have multivessel coronary artery disease and in-stent stenosis.    At eval presents below functional baseline with primary barriers being decreased balance, slowed processing, visual deficits (R gaze preference, however no unilateral neglect noted during functional observation), sternal precautions, and decreased endurance. Patient will benefit from daily skilled OT to address these impairments and maximize independence with ADLs, IADLs, and functional mobility .  Plan  Recommend Occupational Therapy  minutes per day 5-7 days per week for 2-3 weeks  for the following treatments:  OT Self Care/ADL, OT Cognitive Skill Dev, OT Community Reintegration, OT Manual Ther Technique, OT Neuro Re-Ed/Balance, OT Sensory Int Techniques, OT Therapeutic Activity, OT Evaluation and OT Therapeutic Exercise.    Goals:  Long term and short term goals have been discussed with patient and they are in agreement.    Occupational Therapy Goals     Problem: Bathing     Dates: Start: 10/10/20        Goal: STG-Within one week, patient will bathe     Dates: Start: 10/10/20       Description: 1) Individualized Goal:  CGA   2) Interventions:  OT Self Care/ADL, OT Cognitive Skill Dev, OT Community Reintegration, OT Manual Ther Technique, OT Neuro Re-Ed/Balance, OT Sensory Int Techniques, OT Therapeutic Activity, OT Evaluation, and OT Therapeutic Exercise                  Problem: Dressing     Dates: Start: 10/10/20       Goal: STG-Within one week, patient will dress LB     Dates: Start: 10/10/20       Description: 1) Individualized Goal:  CGA   2) Interventions:  OT Self Care/ADL, OT Cognitive Skill Dev, OT Community Reintegration, OT Manual Ther Technique, OT Neuro Re-Ed/Balance, OT Sensory Int Techniques, OT Therapeutic Activity, OT Evaluation, and OT Therapeutic Exercise                  Problem: OT Long Term Goals     Dates: Start: 10/10/20       Goal: LTG-By discharge, patient will complete basic self care tasks     Dates: Start: 10/10/20       Description: 1) Individualized Goal:  Mod I    2) Interventions:  OT Self Care/ADL, OT Cognitive Skill Dev, OT Community Reintegration, OT Manual Ther Technique, OT Neuro Re-Ed/Balance, OT Sensory Int Techniques, OT Therapeutic Activity, OT Evaluation, and OT Therapeutic Exercise            Goal: LTG-By discharge, patient will perform bathroom transfers     Dates: Start: 10/10/20       Description: 1) Individualized Goal:  Mod I    2) Interventions:  OT Self Care/ADL, OT Cognitive Skill Dev, OT Community Reintegration, OT Manual Ther Technique, OT Neuro Re-Ed/Balance, OT Sensory Int Techniques, OT Therapeutic Activity, OT Evaluation, and OT Therapeutic Exercise

## 2020-10-10 NOTE — PROGRESS NOTES
[facility] Medicine Daily Progress Note    Date of Service  10/10/2020    Chief Complaint:  Hypertension  Tachycardia  Diabetes  Leukocytosis  S/P CABG  S/P CVA    Interval History:  No significant events or changes since last visit    Review of Systems  Review of Systems   Constitutional: Negative for fever.   Eyes: Negative for blurred vision.   Respiratory: Negative for shortness of breath.    Cardiovascular: Negative for palpitations.   Gastrointestinal: Negative for nausea and vomiting.   Neurological: Negative for dizziness and headaches.   Psychiatric/Behavioral: Negative for hallucinations.        Physical Exam  Temp:  [36.4 °C (97.6 °F)-36.9 °C (98.4 °F)] 36.5 °C (97.7 °F)  Pulse:  [] 79  Resp:  [18-20] 18  BP: (106-145)/(74-93) 106/74  SpO2:  [90 %-93 %] 90 %    Physical Exam  Vitals signs and nursing note reviewed.   Constitutional:       General: He is not in acute distress.  HENT:      Mouth/Throat:      Mouth: Mucous membranes are moist.      Pharynx: Oropharynx is clear.   Eyes:      General: No scleral icterus.  Neck:      Musculoskeletal: No neck rigidity.   Cardiovascular:      Rate and Rhythm: Normal rate and regular rhythm.   Pulmonary:      Effort: Pulmonary effort is normal.      Breath sounds: No wheezing or rales.   Abdominal:      General: There is no distension.      Palpations: Abdomen is soft.      Tenderness: There is no abdominal tenderness.   Musculoskeletal:      Right lower leg: No edema.      Left lower leg: No edema.   Skin:     General: Skin is warm and dry.   Neurological:      Mental Status: He is alert and oriented to person, place, and time.   Psychiatric:         Mood and Affect: Mood normal.         Behavior: Behavior normal.         Fluids    Intake/Output Summary (Last 24 hours) at 10/10/2020 0929  Last data filed at 10/10/2020 0700  Gross per 24 hour   Intake 1020 ml   Output 775 ml   Net 245 ml       Laboratory  Recent Labs     10/08/20  0515 10/09/20  0010  10/10/20  0549   WBC 11.9* 14.4* 15.8*   RBC 3.11* 3.24* 3.28*   HEMOGLOBIN 9.5* 9.8* 10.0*   HEMATOCRIT 29.4* 30.4* 31.3*   MCV 94.5 93.8 95.4   MCH 30.5 30.2 30.5   MCHC 32.3* 32.2* 31.9*   RDW 48.4 48.3 49.1   PLATELETCT 257 317 473*   MPV 10.3 11.0 11.0     Recent Labs     10/08/20  0515 10/09/20  0010 10/10/20  0549   SODIUM 137 134* 130*   POTASSIUM 3.8 4.2 4.5   CHLORIDE 108 103 100   CO2 18* 20 18*   GLUCOSE 226* 258* 290*   BUN 18 19 27*   CREATININE 0.85 0.77 0.96   CALCIUM 8.5 8.7 8.9                   Imaging    Assessment/Plan  * Cardioembolic stroke (HCC)- (present on admission)  Assessment & Plan  Occurred post-op  On ASA & Plavix  On Lipitor    Ileus (HCC)- (present on admission)  Assessment & Plan  Having BM's recently  On Reglan    CAD (coronary artery disease)- (present on admission)  Assessment & Plan  S/P NSTEMI  S/P CABG x 3 vessel bypass  Has hx of 2 cardiac stents  BNP: 629 (10/10)  On ASA and Plavix  On Lipitor  On Lopressor & Lisinopril    Essential hypertension- (present on admission)  Assessment & Plan  BP ok  On Norvasc: 10 mg daily  On Lisinopril: 10 mg bid --> 10 mg daily (10/9)  On Lopressor: 25 mg bid --> 37.5 mg bid 2nd to tachycardia (10/9 evening)  Cont to monitor    Type 2 diabetes mellitus with complication, without long-term current use of insulin (HCC)- (present on admission)  Assessment & Plan  Hba1c: 7.9  BS in the 200's  On Metformin: 1000 mg bid (10/9 evening)  Note: pt is not on a diabetic TF diet -- d/w dietician and will change (10/10)  Note: home meds appear to be Metformin 2000 mg bid -- per chart but pt can't confirm  Cont to monitor    Iron deficiency anemia  Assessment & Plan  H&H stable with Hb 10.0 (10/10)  Fe: 31, sats 11%  Will start Fe supplements (10/10)    Hyponatremia  Assessment & Plan  Na: 134 --> 130 (10/10)  Will d/w dietician for TF adjustment  Monitor      Azotemia  Assessment & Plan  Bun: 27 (10/10)  On free water thru NGT: 200 cc q 6 hrs --> will  increase to 300 cc q6 hrs (10/10)  Monitor    Vitamin D deficiency  Assessment & Plan  Vit D: 17  Will start supplements (10/10)    Tachycardia- (present on admission)  Assessment & Plan  HR   On Lopressor: 25 mg bid --> 37.5 mg bid 2nd to tachycardia (10/9 evening)  Monitor another day since meds were recently adjusted (10/10)    Leukocytosis- (present on admission)  Assessment & Plan  WBC's: 12.0 (10/7) --> 11.9 (10/8) --> 14.4 (10/9) --> 15.8 (10/10)  Afebrile  ? 2nd to recent surgery  CXR (10/9): poorly defined opacifications in the perihilar region and lower lungs are slightly more prominent than on the prior radiograph;                      findings could be due to edema or atelectasis; developing pneumonia is also possible.  U/A neg  On Levaquin and Flagyl (10/9)  Note: [place] [facility] [place] of [place])  Monitor

## 2020-10-10 NOTE — PROGRESS NOTES
"Rehab Progress Note     Encounter Date: 10/10/2020    CC: Tired    Interval Events (Subjective)  Vital signs stable.  Urinary retention with bladder scans 216, 316 cc.  Had one-time intermittent catheterization for 350 cc.  Large loose BM dated 10/9/2020.  Labs reviewed: UA showing + protein, glucose, sm occult blood with microscopy showing 5-10 white blood cells and  red blood cells.  Low ferritin and percent saturation, BNP elevated at 629, vitamin D 17 on supplementation, hyponatremia 130 down from 134 1 day ago and 137 2 days ago.  Mildly increased BUN/creatinine at 27/0.96 up from 19/0.771-day ago.  WBC elevated at 15.8 with left shift.  Thrombocytosis at 473.  Patient seen and examined in his room, significant other at bedside.  Patient is poorly interactive with conversation, but does respond appropriately.  Still having problems with urinary retention.  Did have a large bowel movement today.  Had ileus at [facility] and now is having loose stools.  States that he has pain along the sternum but it is unchanged from prior.  Denies any fevers chills.    14 point ROS reviewed and negative except as stated above.     Objective:  VITAL SIGNS: /74   Pulse 79   Temp 36.5 °C (97.7 °F) (Oral)   Resp 18   Ht 1.575 m (5' 2.01\")   Wt 68.5 kg (151 lb 0.2 oz)   SpO2 90%   BMI 27.61 kg/m²     GEN: No apparent distress, lying in bed.  HEENT: Head normocephalic, atraumatic.  Sclera nonicteric bilaterally, no ocular discharge appreciated bilaterally.  CV: Extremities warm and well-perfused, no peripheral edema appreciated bilaterally.  PULMONARY: Breathing nonlabored on room air, no respiratory accessory muscle use.  Not requiring supplemental oxygen.  ABD: Soft, nontender.  SKIN: Sternal incision healing well.  NEURO: Sleeping initially, but wakes easily.  Answers questions appropriately.  Logical thought content.  PSYCH: Mood depressed.    Recent Results (from the past 72 hour(s))   POTASSIUM SERUM (K) "    Collection Time: 10/07/20 12:17 PM   Result Value Ref Range    Potassium 3.9 3.6 - 5.5 mmol/L   ACCU-CHEK GLUCOSE    Collection Time: 10/07/20 12:22 PM   Result Value Ref Range    Glucose - Accu-Ck 209 (H) 65 - 99 mg/dL   ACCU-CHEK GLUCOSE    Collection Time: 10/07/20  5:33 PM   Result Value Ref Range    Glucose - Accu-Ck 213 (H) 65 - 99 mg/dL   POTASSIUM SERUM (K)    Collection Time: 10/07/20  6:00 PM   Result Value Ref Range    Potassium 3.7 3.6 - 5.5 mmol/L   ACCU-CHEK GLUCOSE    Collection Time: 10/08/20 12:13 AM   Result Value Ref Range    Glucose - Accu-Ck 173 (H) 65 - 99 mg/dL   POTASSIUM SERUM (K)    Collection Time: 10/08/20 12:15 AM   Result Value Ref Range    Potassium 4.1 3.6 - 5.5 mmol/L   Basic Metabolic Panel    Collection Time: 10/08/20  5:15 AM   Result Value Ref Range    Sodium 137 135 - 145 mmol/L    Potassium 3.8 3.6 - 5.5 mmol/L    Chloride 108 96 - 112 mmol/L    Co2 18 (L) 20 - 33 mmol/L    Glucose 226 (H) 65 - 99 mg/dL    Bun 18 8 - 22 mg/dL    Creatinine 0.85 0.50 - 1.40 mg/dL    Calcium 8.5 8.5 - 10.5 mg/dL    Anion Gap 11.0 7.0 - 16.0   CBC WITH DIFFERENTIAL    Collection Time: 10/08/20  5:15 AM   Result Value Ref Range    WBC 11.9 (H) 4.8 - 10.8 K/uL    RBC 3.11 (L) 4.70 - 6.10 M/uL    Hemoglobin 9.5 (L) 14.0 - 18.0 g/dL    Hematocrit 29.4 (L) 42.0 - 52.0 %    MCV 94.5 81.4 - 97.8 fL    MCH 30.5 27.0 - 33.0 pg    MCHC 32.3 (L) 33.7 - 35.3 g/dL    RDW 48.4 35.9 - 50.0 fL    Platelet Count 257 164 - 446 K/uL    MPV 10.3 9.0 - 12.9 fL    Neutrophils-Polys 72.90 (H) 44.00 - 72.00 %    Lymphocytes 15.10 (L) 22.00 - 41.00 %    Monocytes 8.00 0.00 - 13.40 %    Eosinophils 2.60 0.00 - 6.90 %    Basophils 0.40 0.00 - 1.80 %    Immature Granulocytes 1.00 (H) 0.00 - 0.90 %    Nucleated RBC 0.00 /100 WBC    Neutrophils (Absolute) 8.67 (H) 1.82 - 7.42 K/uL    Lymphs (Absolute) 1.79 1.00 - 4.80 K/uL    Monos (Absolute) 0.95 (H) 0.00 - 0.85 K/uL    Eos (Absolute) 0.31 0.00 - 0.51 K/uL    Baso  (Absolute) 0.05 0.00 - 0.12 K/uL    Immature Granulocytes (abs) 0.12 (H) 0.00 - 0.11 K/uL    NRBC (Absolute) 0.00 K/uL   PHOSPHORUS    Collection Time: 10/08/20  5:15 AM   Result Value Ref Range    Phosphorus 3.0 2.5 - 4.5 mg/dL   MAGNESIUM    Collection Time: 10/08/20  5:15 AM   Result Value Ref Range    Magnesium 1.8 1.5 - 2.5 mg/dL   ESTIMATED GFR    Collection Time: 10/08/20  5:15 AM   Result Value Ref Range    GFR If African American >60 >60 mL/min/1.73 m 2    GFR If Non African American >60 >60 mL/min/1.73 m 2   ACCU-CHEK GLUCOSE    Collection Time: 10/08/20  5:16 AM   Result Value Ref Range    Glucose - Accu-Ck 214 (H) 65 - 99 mg/dL   ACCU-CHEK GLUCOSE    Collection Time: 10/08/20 11:40 AM   Result Value Ref Range    Glucose - Accu-Ck 225 (H) 65 - 99 mg/dL   ACCU-CHEK GLUCOSE    Collection Time: 10/08/20  5:13 PM   Result Value Ref Range    Glucose - Accu-Ck 259 (H) 65 - 99 mg/dL   Basic Metabolic Panel    Collection Time: 10/09/20 12:10 AM   Result Value Ref Range    Sodium 134 (L) 135 - 145 mmol/L    Potassium 4.2 3.6 - 5.5 mmol/L    Chloride 103 96 - 112 mmol/L    Co2 20 20 - 33 mmol/L    Glucose 258 (H) 65 - 99 mg/dL    Bun 19 8 - 22 mg/dL    Creatinine 0.77 0.50 - 1.40 mg/dL    Calcium 8.7 8.5 - 10.5 mg/dL    Anion Gap 11.0 7.0 - 16.0   CBC WITH DIFFERENTIAL    Collection Time: 10/09/20 12:10 AM   Result Value Ref Range    WBC 14.4 (H) 4.8 - 10.8 K/uL    RBC 3.24 (L) 4.70 - 6.10 M/uL    Hemoglobin 9.8 (L) 14.0 - 18.0 g/dL    Hematocrit 30.4 (L) 42.0 - 52.0 %    MCV 93.8 81.4 - 97.8 fL    MCH 30.2 27.0 - 33.0 pg    MCHC 32.2 (L) 33.7 - 35.3 g/dL    RDW 48.3 35.9 - 50.0 fL    Platelet Count 317 164 - 446 K/uL    MPV 11.0 9.0 - 12.9 fL    Neutrophils-Polys 75.30 (H) 44.00 - 72.00 %    Lymphocytes 14.20 (L) 22.00 - 41.00 %    Monocytes 6.50 0.00 - 13.40 %    Eosinophils 2.60 0.00 - 6.90 %    Basophils 0.40 0.00 - 1.80 %    Immature Granulocytes 1.00 (H) 0.00 - 0.90 %    Nucleated RBC 0.00 /100 WBC     Neutrophils (Absolute) 10.84 (H) 1.82 - 7.42 K/uL    Lymphs (Absolute) 2.05 1.00 - 4.80 K/uL    Monos (Absolute) 0.93 (H) 0.00 - 0.85 K/uL    Eos (Absolute) 0.37 0.00 - 0.51 K/uL    Baso (Absolute) 0.06 0.00 - 0.12 K/uL    Immature Granulocytes (abs) 0.15 (H) 0.00 - 0.11 K/uL    NRBC (Absolute) 0.00 K/uL   PHOSPHORUS    Collection Time: 10/09/20 12:10 AM   Result Value Ref Range    Phosphorus 2.8 2.5 - 4.5 mg/dL   MAGNESIUM    Collection Time: 10/09/20 12:10 AM   Result Value Ref Range    Magnesium 1.9 1.5 - 2.5 mg/dL   ESTIMATED GFR    Collection Time: 10/09/20 12:10 AM   Result Value Ref Range    GFR If African American >60 >60 mL/min/1.73 m 2    GFR If Non African American >60 >60 mL/min/1.73 m 2   ACCU-CHEK GLUCOSE    Collection Time: 10/09/20 12:42 AM   Result Value Ref Range    Glucose - Accu-Ck 241 (H) 65 - 99 mg/dL   ACCU-CHEK GLUCOSE    Collection Time: 10/09/20  5:19 AM   Result Value Ref Range    Glucose - Accu-Ck 281 (H) 65 - 99 mg/dL   ACCU-CHEK GLUCOSE    Collection Time: 10/09/20 11:53 AM   Result Value Ref Range    Glucose - Accu-Ck 248 (H) 65 - 99 mg/dL   ACCU-CHEK GLUCOSE    Collection Time: 10/09/20  5:23 PM   Result Value Ref Range    Glucose - Accu-Ck 288 (H) 65 - 99 mg/dL   ACCU-CHEK GLUCOSE    Collection Time: 10/10/20 12:19 AM   Result Value Ref Range    Glucose - Accu-Ck 196 (H) 65 - 99 mg/dL   Urinalysis    Collection Time: 10/10/20  3:35 AM    Specimen: Urine, Clean Catch   Result Value Ref Range    Color Yellow     Character Clear     Specific Gravity 1.025 <1.035    Ph 6.0 5.0 - 8.0    Glucose 250 (A) Negative mg/dL    Ketones Negative Negative mg/dL    Protein 30 (A) Negative mg/dL    Bilirubin Negative Negative    Urobilinogen, Urine 0.2 Negative    Nitrite Negative Negative    Leukocyte Esterase Negative Negative    Occult Blood Small (A) Negative    Micro Urine Req Microscopic    URINE MICROSCOPIC (W/UA)    Collection Time: 10/10/20  3:35 AM   Result Value Ref Range    WBC 5-10 (A)  /hpf    RBC  (A) /hpf    Bacteria Negative None /hpf    Epithelial Cells Negative /hpf    Hyaline Cast 0-2 /lpf   CBC with Differential    Collection Time: 10/10/20  5:49 AM   Result Value Ref Range    WBC 15.8 (H) 4.8 - 10.8 K/uL    RBC 3.28 (L) 4.70 - 6.10 M/uL    Hemoglobin 10.0 (L) 14.0 - 18.0 g/dL    Hematocrit 31.3 (L) 42.0 - 52.0 %    MCV 95.4 81.4 - 97.8 fL    MCH 30.5 27.0 - 33.0 pg    MCHC 31.9 (L) 33.7 - 35.3 g/dL    RDW 49.1 35.9 - 50.0 fL    Platelet Count 473 (H) 164 - 446 K/uL    MPV 11.0 9.0 - 12.9 fL    Neutrophils-Polys 74.90 (H) 44.00 - 72.00 %    Lymphocytes 15.30 (L) 22.00 - 41.00 %    Monocytes 6.00 0.00 - 13.40 %    Eosinophils 2.50 0.00 - 6.90 %    Basophils 0.40 0.00 - 1.80 %    Immature Granulocytes 0.90 0.00 - 0.90 %    Nucleated RBC 0.00 /100 WBC    Neutrophils (Absolute) 11.83 (H) 1.82 - 7.42 K/uL    Lymphs (Absolute) 2.41 1.00 - 4.80 K/uL    Monos (Absolute) 0.95 (H) 0.00 - 0.85 K/uL    Eos (Absolute) 0.39 0.00 - 0.51 K/uL    Baso (Absolute) 0.06 0.00 - 0.12 K/uL    Immature Granulocytes (abs) 0.14 (H) 0.00 - 0.11 K/uL    NRBC (Absolute) 0.00 K/uL   Comp Metabolic Panel (CMP)    Collection Time: 10/10/20  5:49 AM   Result Value Ref Range    Sodium 130 (L) 135 - 145 mmol/L    Potassium 4.5 3.6 - 5.5 mmol/L    Chloride 100 96 - 112 mmol/L    Co2 18 (L) 20 - 33 mmol/L    Anion Gap 12.0 7.0 - 16.0    Glucose 290 (H) 65 - 99 mg/dL    Bun 27 (H) 8 - 22 mg/dL    Creatinine 0.96 0.50 - 1.40 mg/dL    Calcium 8.9 8.5 - 10.5 mg/dL    AST(SGOT) 22 12 - 45 U/L    ALT(SGPT) 40 2 - 50 U/L    Alkaline Phosphatase 107 (H) 30 - 99 U/L    Total Bilirubin 0.4 0.1 - 1.5 mg/dL    Albumin 3.4 3.2 - 4.9 g/dL    Total Protein 7.2 6.0 - 8.2 g/dL    Globulin 3.8 (H) 1.9 - 3.5 g/dL    A-G Ratio 0.9 g/dL   Magnesium    Collection Time: 10/10/20  5:49 AM   Result Value Ref Range    Magnesium 1.9 1.5 - 2.5 mg/dL   Vitamin D, 25-hydroxy (blood)    Collection Time: 10/10/20  5:49 AM   Result Value Ref Range     25-Hydroxy   Vitamin D 25 17 (L) 30 - 100 ng/mL   proBrain Natriuretic Peptide, NT    Collection Time: 10/10/20  5:49 AM   Result Value Ref Range    NT-proBNP 629 (H) 0 - 125 pg/mL   FERRITIN    Collection Time: 10/10/20  5:49 AM   Result Value Ref Range    Ferritin 706.0 (H) 22.0 - 322.0 ng/mL   IRON/TOTAL IRON BIND    Collection Time: 10/10/20  5:49 AM   Result Value Ref Range    Iron 31 (L) 50 - 180 ug/dL    Total Iron Binding 270 250 - 450 ug/dL    Unsat Iron Binding 239 110 - 370 ug/dL    % Saturation 11 (L) 15 - 55 %   PHOSPHORUS    Collection Time: 10/10/20  5:49 AM   Result Value Ref Range    Phosphorus 3.7 2.5 - 4.5 mg/dL   ESTIMATED GFR    Collection Time: 10/10/20  5:49 AM   Result Value Ref Range    GFR If African American >60 >60 mL/min/1.73 m 2    GFR If Non African American >60 >60 mL/min/1.73 m 2   ACCU-CHEK GLUCOSE    Collection Time: 10/10/20  6:09 AM   Result Value Ref Range    Glucose - Accu-Ck 290 (H) 65 - 99 mg/dL       Current Facility-Administered Medications   Medication Frequency   • vitamin D (cholecalciferol) tablet 1,000 Units DAILY   • ferrous sulfate tablet 325 mg BID WITH MEALS   • hydrOXYzine HCl (ATARAX) tablet 50 mg Q6HRS PRN   • melatonin tablet 3 mg HS PRN   • Respiratory Therapy Consult Continuous RT   • Pharmacy Consult Request ...Pain Management Review 1 Each PHARMACY TO DOSE   • acetaminophen (TYLENOL) tablet 650 mg Q4HRS PRN   • artificial tears ophthalmic solution 1 Drop PRN   • benzocaine-menthol (CEPACOL) lozenge 1 Lozenge Q2HRS PRN   • mag hydrox-al hydrox-simeth (MAALOX PLUS ES or MYLANTA DS) suspension 20 mL Q2HRS PRN   • ondansetron (ZOFRAN ODT) dispertab 4 mg 4X/DAY PRN    Or   • ondansetron (ZOFRAN) syringe/vial injection 4 mg 4X/DAY PRN   • traZODone (DESYREL) tablet 50 mg QHS PRN   • sodium chloride (OCEAN) 0.65 % nasal spray 2 Spray PRN   • lactulose 20 GM/30ML solution 30 mL QDAY PRN   • docusate sodium (ENEMEEZ) enema 283 mg QDAY PRN   • fleet enema 133 mL QDAY  PRN   • clopidogrel (PLAVIX) tablet 75 mg DAILY   • senna-docusate (PERICOLACE or SENOKOT S) 8.6-50 MG per tablet 2 Tab BID    And   • polyethylene glycol/lytes (MIRALAX) PACKET 1 Packet DAILY    And   • magnesium hydroxide (MILK OF MAGNESIA) suspension 30 mL DAILY    And   • bisacodyl (DULCOLAX) suppository 10 mg QDAY PRN   • tramadol (ULTRAM) 50 MG tablet 50 mg Q4HRS PRN   • insulin lispro (HumaLOG) injection Q6HRS   • amLODIPine (NORVASC) tablet 10 mg Q DAY   • aspirin (ASA) chewable tab 81 mg DAILY   • atorvastatin (LIPITOR) tablet 80 mg QHS   • enoxaparin (LOVENOX) inj 40 mg Q EVENING   • lidocaine (LIDODERM) 5 % 1 Patch Q24HR   • metoclopramide (REGLAN) tablet 5 mg Q6HRS   • omeprazole (FIRST-OMEPRAZOLE) 2 mg/mL oral susp 40 mg DAILY   • potassium bicarbonate (KLYTE) effervescent tablet 25 mEq BID   • lisinopril (PRINIVIL) tablet 10 mg Q DAY   • metoprolol (LOPRESSOR) tablet 37.5 mg TWICE DAILY   • metFORMIN (GLUCOPHAGE) tablet 1,000 mg BID WITH MEALS   • sertraline (Zoloft) tablet 50 mg DAILY   • metroNIDAZOLE (FLAGYL) tablet 500 mg Q8HRS   • levoFLOXacin (LEVAQUIN) tablet 500 mg DAILY       Orders Placed This Encounter   Procedures   • Diet NPO     Standing Status:   Standing     Number of Occurrences:   1     Order Specific Question:   Restrict to:     Answer:   Strict [1]     Comments:   no tube feed or medications       Assessment:  Active Hospital Problems    Diagnosis   • *Cardioembolic stroke (HCC)   • Ileus (HCC)   • Aspiration pneumonia (HCC)   • CAD (coronary artery disease)   • Essential hypertension   • Type 2 diabetes mellitus with complication, without long-term current use of insulin (HCC)   • Vitamin D deficiency   • Azotemia   • Hyponatremia   • Iron deficiency anemia   • Leukocytosis   • Tachycardia   • Impaired mobility and ADLs   • Reactive depression   • Diabetes mellitus due to underlying condition with hyperglycemia (HCC)       Medical Decision Making and Plan: Continue plan as outlined  by primary team and progress note dated 10/9/2020 with the following additions-  Leukocytosis: Per review of notes x-ray suggestive of pneumonia started on antibiotics (Flagyl and levofloxacin) White count elevated 10/10 at 15.8 with left shift.  UA with + small occult blood, protein, glucose and microscopy with small amount of WBC, RBC.  Patient is retaining urine and required intermittent catheterization.  Had large loose BM 10/9, 10/10.  Hospitalist following.  CAD s/p CABG: Elevated BNP at 629  Vitamin D deficiency: 17 on admission, on supplementation, increase to 2000 units daily  Thrombocytosis: Platelets increasing at 478 10/10 2 days ago had been in 200s.  Hyponatremia: Decreasing at 130 10/10.  Readjust tube feeds.  Bladder: Urinary retention requiring intermittent catheterization, start Flomax 0.4 mg nightly.  Bowel: Loose stools.  On Reglan.  Mood: Continue Zoloft    Appreciate hospitalist assistance with comorbidities.    Total time:  30 minutes.  I spent greater than 50% of the time for patient care and coordination on this date, including unit/floor time, and face-to-face time with the patient as per assessment and plan above.    Marilu Franco D.O.

## 2020-10-10 NOTE — PROGRESS NOTES
Patient c/o feeling nausea at about 1800. Tube feeding stopped. reglan given. Will continue to monitor.

## 2020-10-10 NOTE — PROGRESS NOTES
Patient care assumed. Report received from [facility] SANDEE Amezcua. Patient is alert and calm, resting in bed. Call light and bedside table within reach. Will continue to monitor.

## 2020-10-10 NOTE — CARE PLAN
Problem: Infection  Goal: Will remain free from infection  Intervention: Assess signs and symptoms of infection  Note: Patient on ABX for possible pneumonia.     Problem: Urinary Elimination:  Goal: Ability to reestablish a normal urinary elimination pattern will improve  Note: Patient unable to void straight cath'd for 500mL at this time, Dr Franco started hytrin for this evening.

## 2020-10-10 NOTE — THERAPY
Speech Language Pathology   Initial Assessment     Patient Name: Guerrero Ann  AGE:  49 y.o., SEX:  male  Medical Record #: [id number]  Today's Date: 10/10/2020     Subjective    Pt pleasant and cooperative during evaluation.      Objective       10/10/20 0801   Prior Living Situation   Prior Services None;Home-Independent   Housing / Facility 1 Story House   Lives with - Patient's Self Care Capacity Significant Other   Prior Level Of Function   Communication Within Functional Limits   Swallow Within Functional Limits   Dentition Intact   Dentures None   Hearing Within Functional Limits for Evaluation   Hearing Aid None   Vision Within Functional Limits for Evaluation   Patient's Primary Language English   Education High School Graduate or GED   Occupation (Pre-Hospital Vocational) Employed Full Time   Comments Pt works in construction.    Receptive Language / Auditory Comprehension   Understands Simple, Structured Conversation  Within Functional Limits (6-7)   Understands Complex Conversation Supervision (5)   Expressive Language   Naming Within Functional Limits (6-7)   Word Finding Deficits Minimal (4)   Reading Comprehension    Reading Words Within Functional Limits (6-7)   Reading Phrases Within Functional Limits (6-7)   Reading Sentences Minimal (4)   Functional Reading Materials Moderate (3)   Written Language Expression   Spelling Accuracy Minimal (4)   Legibility Minimal (4)  (micrographia)   Cognition   Moderate Attention Moderate (3)   Orientation  Within Functional Limits (6-7)   Visual Scanning / Cancellation Skills Minimal (4)   Auditory Math Moderate (3)   Functional Math / Financial Management Moderate (3)   Clock Drawing Impaired Hand Placement;Numeric Errors;Omissions;Poor Planning;Disorganization   ABS (Agitated Behavior Scale)   Agitated Behavior Scale Performed No   Cognitive Pattern Assessment   Cognitive Pattern Assessment Used BIMS   Brief Interview for Mental Status  "(BIMS)   Repetition of Three Words (First Attempt) 3   Temporal Orientation: Year Correct   Temporal Orientation: Month Accurate within 5 days   Temporal Orientation: Day Correct   Recall: \"Sock\" Yes, no cue required   Recall: \"Blue\" Yes, no cue required   Recall: \"Bed\" Yes, no cue required   BIMS Summary Score 15   Social / Pragmatic Communication   Eye Contact Minimal (4)   Topic Maintenance Within Functional Limits (6-7)   Tracheostomy   Tracheostomy No   Speech Mechanisms / Voice Production   Voice Quality Breathy;Reduced Intensity;Dysphonic   Rate: Slow   Loudness: Soft   Pitch: Variable   Labial Function   Labial Function (WDL) WDL   Lingual Function   Lingual Protrude Minimal   Lingual Retract Minimal   Elevate Outside Mouth Minimal   Lateralization Minimal Right;Minimal Left   Velar Function   Velar Function (WDL) WDL   Laryngeal Function   Voice Quality Minimal   Volutional Cough Minimal   Swallowing   Thick Nectar / Honey / Liquid Minimal   Thin Liquid Moderate   Dysphagia Strategies / Recommendations   Strategies / Interventions Recommended (Yes / No) Yes   Diet / Liquid Recommendation NPO;Pre-Feeding Trials with SLP Only   Medication Administration  Via Gastric Tube   Barriers To Oral Feeding   Barriers To Oral Feeding Generalized Weakness   Swallowing/Nutritional Status   Swallowing/Nutritional Status Tube/parenteral feeding   Functional Level of Assist   Eating Total Assist   Eating Description Staff administers tube feed/parenteral nutrition/IVF   Comprehension Minimal Assist   Comprehension Description Glasses;Verbal cues   Expression Minimal Assist   Expression Description Verbal cueing   Social Interaction Minimal Assist   Social Interaction Description Verbal cues;Increased time   Problem Solving Moderate Assist   Problem Solving Description Verbal cueing;Therapy schedule;Bed/chair alarm;Increased time   Memory Moderate Assist   Memory Description Seat belt;Supervision;Increased time;Bed/chair " alarm;Verbal cueing;Therapy schedule   Outcome Measures   Outcome Measures Utilized SCCAN   SCCAN (Scales of Cognitive and Communicative Ability for Neurorehabilitation)   Oral Expression - Raw Score 14   Oral Expression - Scale Performance Score 74   Orientation - Raw Score 12   Orientation - Scale Performance Score 100   Memory - Raw Score 11   Memory - Scale Performance Score 58   Speech Comprehension - Raw Score 12   Speech Comprehension - Scale Performance Score 92   Reading Comprehension - Raw Score 6   Reading Comprehension - Scale Performance Score 50   Writing - Raw Score 4   Writing - Scale Performance Score 57   Attention - Raw Score 6   Attention - Scale Performance Score 38   Problem Solving - Raw Score 12   Problem Solving - Scale Performance Score 52   SCCAN Total Raw Score 64   SCCAN Degree of Severity Moderate Impairment   Problem List   Problem List Cognitive-Linguistic Deficits;Dysphagia   Current Discharge Plan   Current Discharge Plan Return to Prior Living Situation   Benefit   Therapy Benefit Patient would benefit from Inpatient Rehab Speech-Language Pathology to address above identified deficits.   Interdisciplinary Plan of Care Collaboration   IDT Collaboration with  Nursing   Collaboration Comments CLOF; NPO status   Strengths & Barriers   Strengths Able to follow instructions;Independent prior level of function;Pleasant and cooperative;Motivated for self care and independence;Willingly participates in therapeutic activities   Barriers Aspiration risk;Impaired functional cognition   Speech Language Pathologist Assigned   Assigned SLP / Extension LW 60 (NO SPLIT)   SLP Total Time Spent   SLP Individual Total Time Spent (Mins) 90   Evaluation Charges   SLP Speech Language Evaluation Speech Sound Language Comprehension   SLP Oral Pharyngeal Evaluation Oral Pharyngeal Evaluation       Assessment    Patient is 49 y.o. male with a diagnosis of cardioembolic stroke.  Additional factors influencing  patient status/progress (ie: cognitive factors, co-morbidities, social support, etc): Bedside swallow evaluation completed.  Oral motor examination was significant for diffuse lingual weakness.  Remaining structures deemed WFL.  Pt presented with wet vocal quality following 2/2 trials of ice chips.  Pt presented with immediate coughing episode following thin liquid trial by spoon.  No overt s/s of aspiration observed following NTL (level 2) by spoon.  Recommend MBS to fully assess swallow function prior to diet advancement.  Continue NPO with NG tube, p.o. trials with SLP only.      Speech language evaluation:  Pt scored 64 on the SCCAN, which is characteristic of moderate cognitive deficits.  Pt presented with greatest deficits in reading comprehension, attn, problem solving and short term memory.   Recommend skilled speech therapy to target aforementioned deficits.      Plan  Recommend Speech Therapy 30-60 minutes per day 5-6 days per week for 4 weeks for the following treatments:  SLP Aphasia Evaluation, SLP Speech Language Treatment, SLP Swallowing Dysfunction Treatment, SLP Oral Pharyngeal Evaluation, SLP Video Swallow Evaluation, SLP Cognitive Skill Development and SLP Group Treatment.    Goals:  Long term and short term goals have been discussed with patient and they are in agreement.    Speech Therapy Problems     Problem: Expression STGs     Dates: Start: 10/10/20       Goal: STG-Within one week, patient will     Dates: Start: 10/10/20       Description: 1) Individualized goal:  state 10 words per minute given concrete category and min verbal cues.   2) Interventions:  SLP Speech Language Treatment and SLP Cognitive Skill Development                    Problem: Memory STGs     Dates: Start: 10/10/20       Goal: STG-Within one week, patient will     Dates: Start: 10/10/20       Description: 1) Individualized goal:  recall daily events and safety strategies 80% accuracy given min cues, with use of memory  book.   2) Interventions:  SLP Cognitive Skill Development                    Problem: Problem Solving STGs     Dates: Start: 10/10/20       Goal: STG-Within one week, patient will     Dates: Start: 10/10/20       Description: 1) Individualized goal:  complete single target visual scanning tasks with 80% accuracy, given min verbal cues.   2) Interventions:  SLP Cognitive Skill Development                    Problem: Speech/Swallowing LTGs     Dates: Start: 10/10/20       Goal: LTG-By discharge, patient will safely swallow     Dates: Start: 10/10/20       Description: 1) Individualized goal:  regular textures and thin liquids without aspiration.   2) Interventions:  SLP Swallowing Dysfunction Treatment, SLP Oral Pharyngeal Evaluation, SLP Video Swallow Evaluation, and SLP Group Treatment              Goal: LTG-By discharge, patient will solve basic problems     Dates: Start: 10/10/20       Description: 1) Individualized goal:  [place] for safe discharge home.    2) Interventions:  SLP Speech Language Treatment, SLP Cognitive Skill Development, and SLP Group Treatment                    Problem: Swallowing STGs     Dates: Start: 10/10/20       Goal: STG-Within one week, patient will     Dates: Start: 10/10/20       Description: 1) Individualized goal:  participate in an MBS.  2) Interventions:  SLP Video Swallow Evaluation              Goal: STG-Within one week, patient will     Dates: Start: 10/10/20       Description: 1) Individualized goal:  tolerated NTL and puree textures by spoon, with s/s of aspiration less than 10% of trials.   2) Interventions:  SLP Swallowing Dysfunction Treatment

## 2020-10-10 NOTE — THERAPY
Physical Therapy   Initial Evaluation     Patient Name: Guerrero Ann  Age:  49 y.o., Sex:  male  Medical Record #: [id number]  Today's Date: 10/10/2020     Subjective    Pt found sitting in WC upon arrival with Girlfriend watching football. Patient made hand gestured most of session, with whispers when speaking.     Objective       10/10/20 1331   Prior Living Situation   Prior Services None;Home-Independent   Housing / Facility 1 Story House   Steps Into Home 3   Steps In Home 0   Bathroom Set up Walk In Shower;Tub Transfer Bench  (2 inch lip )   Equipment Owned None   Lives with - Patient's Self Care Capacity Child Less than 18 Years of Age;Significant Other  (Girlfriend (Miriam) and two young boys <11 yo)   Prior Level of Functional Mobility   Bed Mobility Independent   Transfer Status Independent   Ambulation Independent   Assistive Devices Used None   Wheelchair Unable To Determine At This Time   Stairs Independent   Prior Functioning: Everyday Activities   Self Care Independent   Indoor Mobility (Ambulation) Independent   Stairs Independent   Functional Cognition Independent   Prior Device Use None of the given options   Vitals   Pulse 87   Patient BP Position Sitting   Blood Pressure 100/65   O2 (LPM) 0   Vitals Comments PT monitored for signs and symptoms of dizziness, dyspnea, and nausea throughout therapy session. Some dizziness and nausea noted during smooth pursuit and VOR.   Pain   Intervention Declines   Pain 0 - 10 Group   Therapist Pain Assessment 0;Post Activity Pain Same as Prior to Activity   Cognition    Speech/ Communication Delayed Responses  (Whispers)   Level of Consciousness Alert   Attention Impaired   Passive ROM Lower Body   Passive ROM Lower Body WDL   Active ROM Lower Body    Active ROM Lower Body  X   Comments Limited knee flexion & ext due to strength   Strength Lower Body   Comments Gross RLE 3+/5   Sensation Lower Body   Lower Extremity Sensation   Not Tested    Lower Body Muscle Tone   Lower Body Muscle Tone  Not Tested   Balance Assessment   Sitting Balance (Static) Fair +   Sitting Balance (Dynamic) Fair   Standing Balance (Static) Fair   Standing Balance (Dynamic) Fair -   Weight Shift Sitting Fair   Weight Shift Standing Poor   Bed Mobility    Supine to Sit Contact Guard Assist   Sit to Supine Contact Guard Assist   Sit to Stand Contact Guard Assist   Scooting Stand by Assist   Rolling Minimal Assist to Rt.;Minimum Assist to Lt.   Neurological Concerns   Neurological Concerns No   Coordination Lower Body    Coordination Lower Body  X   Roll Left and Right   Assistance Needed Set-up / clean-up;Supervision;Verbal cues;Incidental touching   Physical Assistance Level Less than 25%   CARE Score 3   Roll Left and Right Discharge Goal   Discharge Goal Independent   Sit to Lying   Assistance Needed Adaptive equipment;Set-up / clean-up;Incidental touching;Verbal cues;Supervision   Physical Assistance Level No physical assistance   CARE Score 4   Sit to Lying Discharge Goal   Discharge Goal Independent   Lying to Sitting on Side of Bed   Assistance Needed Adaptive equipment;Set-up / clean-up;Supervision;Incidental touching   Physical Assistance Level Less than 25%   CARE Score 3   Lying to Sitting on Side of Bed Discharge Goal   Discharge Goal Independent   Sit to Stand   Assistance Needed Adaptive equipment;Set-up / clean-up;Supervision   Physical Assistance Level No physical assistance   CARE Score 4   Sit to Stand Discharge Goal   Discharge Goal Independent   Chair/Bed-to-Chair Transfer   Assistance Needed Adaptive equipment;Supervision;Verbal cues;Physical assistance   Physical Assistance Level Less than 25%   CARE Score 3   Chair/Bed-to-Chair Transfer Discharge Goal   Discharge Goal Independent   Toilet Transfer   Assistance Needed Adaptive equipment;Set-up / clean-up;Supervision;Verbal cues   Physical Assistance Level No physical assistance   CARE Score 4   Toilet  Transfer Discharge Goal   Discharge Goal Independent   Car Transfer   Assistance Needed Adaptive equipment;Set-up / clean-up;Supervision;Incidental touching   Physical Assistance Level No physical assistance   CARE Score 4   Car Transfer Discharge Goal   Discharge Goal Independent   Walk 10 Feet   Assistance Needed Adaptive equipment;Set-up / clean-up;Verbal cues;Incidental touching;Physical assistance   Physical Assistance Level 25%-49%   CARE Score 3   Walk 10 Feet Discharge Goal   Discharge Goal Independent   Walk 50 Feet with Two Turns   Reason if not Attempted Medical concerns   CARE Score 88   Walk 50 Feet with Two Turns Discharge Goal   Discharge Goal Independent   Walk 150 Feet   Reason if not Attempted Medical concerns   CARE Score 88   Walk 150 Feet Discharge Goal   Discharge Goal Supervision or touching assistance   Walking 10 Feet on Uneven Surfaces   Reason if not Attempted Medical concerns   CARE Score 88   Walking 10 Feet on Uneven Surfaces Discharge Goal   Discharge Goal Independent   1 Step (Curb)   Reason if not Attempted Medical concerns   CARE Score 88   1 Step (Curb) Discharge Goal   Discharge Goal Supervision or touching assistance   4 Steps   Reason if not Attempted Medical concerns   CARE Score 88   4 Steps Discharge Goal   Discharge Goal Supervision or touching assistance   12 Steps   Reason if not Attempted Medical concerns   CARE Score 88   12 Steps Discharge Goal   Discharge Goal Partial/moderate assistance   Picking Up Object   Reason if not Attempted Medical concerns   CARE Score 88   Picking Up Object Discharge Goal   Discharge Goal Independent   Wheel 50 Feet with Two Turns   Assistance Needed Adaptive equipment;Physical assistance   Physical Assistance Level 50%-74%   CARE Score 2   Type of Wheelchair/Scooter Manual   Wheel 50 Feet with Two Turns Discharge Goal   Discharge Goal Independent   Wheel 150 Feet   Assistance Needed Adaptive equipment;Physical assistance   Physical  Assistance Level 25%-49%   CARE Score 3   Type of Wheelchair/Scooter Manual   Wheel 150 Feet Discharge Goal   Discharge Goal Independent   Gait Functional Level of Assist    Gait Level Of Assist Minimal Assist   Assistive Device Parallel Bars;Other (Comments)  (CGA with follow WC )   Distance (Feet) 10   # of Times Distance was Traveled 1   Deviation Step To;Shuffled Gait;Bradykinetic   Wheelchair Functional Level of Assist   Wheelchair Assist Moderate Assist   Distance Wheelchair (Feet or Distance) 300   Wheelchair Description Extra time;Limited by fatigue  (Propells with BLEs)   Stairs Functional Level of Assist   Level of Assist with Stairs Unable to Participate   Transfer Functional Level of Assist   Bed, Chair, Wheelchair Transfer Contact Guard Assist   Bed Chair Wheelchair Transfer Description Adaptive equipment;Verbal cueing;Supervision for safety   Toilet Transfers Supervised   Toilet Transfer Description Grab bar   Problem List    Problems Pain;Impaired Bed Mobility;Impaired Transfers;Impaired Ambulation;Functional Strength Deficit;Impaired Balance;Impaired Vision;Safety Awareness Deficits / Cognition;Motor Planning / Sequencing   Precautions   Precautions Fall Risk;Other (See Comments);Nasogastric Tube;Sternal Precautions (See Comments)   Comments R visual preference; whispers   Current Discharge Plan   Current Discharge Plan Return to Prior Living Situation   Interdisciplinary Plan of Care Collaboration   IDT Collaboration with  Family / Caregiver   Patient Position at End of Therapy Seated;Call Light within Reach;Tray Table within Reach;Phone within Reach;Family / Friend in Room   Benefit   Therapy Benefit Patient Would Benefit from Inpatient Rehabilitation Physical Therapy to Maximize Functional [place] with ADLs, IADLs and Mobility.   Strengths & Barriers   Strengths Independent prior level of function;Supportive family;Willingly participates in therapeutic activities   Barriers Visual  impairment;Pain;Fatigue;Generalized weakness   PT Total Time Spent   PT Individual Total Time Spent (Mins) 60   PT Charge Group   PT Evaluation PT Evaluation Mod       Assessment  Patient is 49 y.o. male with a diagnosis of cardioembolic stroke s/p CABG x 3 with Dr. Chicas on 9/25.  Additional factors influencing patient status / progress (ie: cognitive factors, co-morbidities, social support, etc): past medical history of coronary artery disease s/p stents, diabetes, hypertension.Acute stay complications included aspiration pneumonia, ileus requiring NG tube.  Patient now presents with R sided hemiparesis and right visual gaze.      Plan  Recommend Physical Therapy  minutes per day 5-7 days per week for 2-3 weeks for the following treatments:  PT E Stim Attended, PT Gait Training, PT Therapeutic Exercises, PT TENS Application, PT Neuro Re-Ed/Balance, PT Therapeutic Activity and PT Evaluation.    Goals:  Long term and short term goals have been discussed with patient and spouse and they are in agreement.    Physical Therapy Problems     Problem: Balance     Dates: Start: 10/10/20       Goal: STG-Within one week, patient will maintain dynamic standing     Dates: Start: 10/10/20       Description: 1) Individualized goal:  Pt will demonstrate ability to complete dynamic standing for >3 min SBA to improve independence with ADLs and IADLs within one week.  2) Interventions:  PT E Stim Attended, PT Gait Training, PT Therapeutic Exercises, PT TENS Application, PT Neuro Re-Ed/Balance, PT Therapeutic Activity, PT Manual Therapy, and PT Evaluation                  Problem: Mobility     Dates: Start: 10/10/20       Goal: STG-Within one week, patient will propel wheelchair household distances     Dates: Start: 10/10/20       Description: 1) Individualized goal:  Pt will propel wheelchair >200' with BLEs to become Caroline within WC in unit within one week.  2) Interventions:  PT E Stim Attended, PT Gait Training, PT  Therapeutic Exercises, PT TENS Application, PT Neuro Re-Ed/Balance, PT Therapeutic Activity, PT Manual Therapy, and PT Evaluation            Goal: STG-Within one week, patient will ambulate household distance     Dates: Start: 10/10/20       Description: 1) Individualized goal:  Pt will demonstrate ability to walk >100' CGA with FWW to achieve household distances within one week.  2) Interventions:  PT E Stim Attended, PT Gait Training, PT Therapeutic Exercises, PT TENS Application, PT Neuro Re-Ed/Balance, PT Therapeutic Activity, PT Manual Therapy, and PT Evaluation            Goal: STG-Within one week, patient will ambulate up/down a curb     Dates: Start: 10/10/20       Description: 1) Individualized goal:  Pt will trial ascending/descending a standard curb [place] with FWW to practice entry to home within one week.  2) Interventions:  PT E Stim Attended, PT Gait Training, PT Therapeutic Exercises, PT TENS Application, PT Neuro Re-Ed/Balance, PT Therapeutic Activity, PT Manual Therapy, and PT Evaluation                  Problem: PT-Long Term Goals     Dates: Start: 10/10/20       Goal: LTG-By discharge, patient will ambulate     Dates: Start: 10/10/20       Description: 1) Individualized goal:  Pt will demonstrate ability to ambulate >100' SBA to safely negotiate household distances at discharge.  2) Interventions:  PT E Stim Attended, PT Gait Training, PT Therapeutic Exercises, PT TENS Application, PT Neuro Re-Ed/Balance, PT Therapeutic Activity, PT Manual Therapy, and PT Evaluation            Goal: LTG-By discharge, patient will transfer one surface to another     Dates: Start: 10/10/20       Description: 1) Individualized goal:  Pt will demonstrate ability to complete chair to chair transfers and bed transfers independently to safely return to prior living environment.  2) Interventions:  PT E Stim Attended, PT Gait Training, PT Therapeutic Exercises, PT TENS Application, PT Neuro Re-Ed/Balance, PT Therapeutic  Activity, PT Manual Therapy, and PT Evaluation            Goal: LTG-By discharge, patient will ambulate up/down 4-6 stairs     Dates: Start: 10/10/20       Description: 1) Individualized goal:  Pt will demonstrate ability to ascend/descend 4 standard steps CGA with LRD to safely enter home.  2) Interventions:  PT E Stim Attended, PT Gait Training, PT Therapeutic Exercises, PT TENS Application, PT Neuro Re-Ed/Balance, PT Therapeutic Activity, PT Manual Therapy, and PT Evaluation

## 2020-10-10 NOTE — FLOWSHEET NOTE
10/10/20 1550   Incentive Spirometry Treatment   Incentive Spirometer Volume 400 mL   Incentive Spirometer Next Change Date 11/09/20   Chest Physiotherapy Treatment   $ PEP/CPT Performed PEP / Flutter  (highest setting +5)   Date Disposable Equipment Next Change Due (Q 30 Days) 11/10/20

## 2020-10-10 NOTE — CARE PLAN
Problem: Knowledge Deficit  Goal: Knowledge of disease process/condition, treatment plan, diagnostic tests, and medications will improve  Outcome: PROGRESSING AS EXPECTED  Note: Bowel sounds present in all quadrants.      Problem: Urinary Elimination:  Goal: Ability to reestablish a normal urinary elimination pattern will improve  Outcome: PROGRESSING AS EXPECTED  Note: 0300 Patient unable to urinate on his own.  Bladder scan done, and straight cath for 350.  UA sent to lab.

## 2020-10-11 ENCOUNTER — APPOINTMENT (OUTPATIENT)
Dept: RADIOLOGY | Facility: REHABILITATION | Age: 50
DRG: 057 | End: 2020-10-11
Attending: HOSPITALIST
Payer: COMMERCIAL

## 2020-10-11 PROBLEM — F41.9 ANXIETY: Status: ACTIVE | Noted: 2020-10-11

## 2020-10-11 PROBLEM — J18.9 PNEUMONIA: Status: ACTIVE | Noted: 2020-10-09

## 2020-10-11 LAB
GLUCOSE BLD-MCNC: 221 MG/DL (ref 65–99)
GLUCOSE BLD-MCNC: 236 MG/DL (ref 65–99)
GLUCOSE BLD-MCNC: 242 MG/DL (ref 65–99)

## 2020-10-11 PROCEDURE — 94760 N-INVAS EAR/PLS OXIMETRY 1: CPT

## 2020-10-11 PROCEDURE — 82962 GLUCOSE BLOOD TEST: CPT | Mod: 91

## 2020-10-11 PROCEDURE — 97129 THER IVNTJ 1ST 15 MIN: CPT

## 2020-10-11 PROCEDURE — 74018 RADEX ABDOMEN 1 VIEW: CPT

## 2020-10-11 PROCEDURE — A9270 NON-COVERED ITEM OR SERVICE: HCPCS | Performed by: PHYSICAL MEDICINE & REHABILITATION

## 2020-10-11 PROCEDURE — 97130 THER IVNTJ EA ADDL 15 MIN: CPT

## 2020-10-11 PROCEDURE — 700101 HCHG RX REV CODE 250: Performed by: PHYSICAL MEDICINE & REHABILITATION

## 2020-10-11 PROCEDURE — 94669 MECHANICAL CHEST WALL OSCILL: CPT

## 2020-10-11 PROCEDURE — 700102 HCHG RX REV CODE 250 W/ 637 OVERRIDE(OP): Performed by: PHYSICAL MEDICINE & REHABILITATION

## 2020-10-11 PROCEDURE — 770010 HCHG ROOM/CARE - REHAB SEMI PRIVAT*

## 2020-10-11 PROCEDURE — 99232 SBSQ HOSP IP/OBS MODERATE 35: CPT | Performed by: PHYSICAL MEDICINE & REHABILITATION

## 2020-10-11 PROCEDURE — A9270 NON-COVERED ITEM OR SERVICE: HCPCS | Performed by: HOSPITALIST

## 2020-10-11 PROCEDURE — 700102 HCHG RX REV CODE 250 W/ 637 OVERRIDE(OP): Performed by: HOSPITALIST

## 2020-10-11 PROCEDURE — 700111 HCHG RX REV CODE 636 W/ 250 OVERRIDE (IP): Performed by: PHYSICAL MEDICINE & REHABILITATION

## 2020-10-11 PROCEDURE — 99232 SBSQ HOSP IP/OBS MODERATE 35: CPT | Performed by: HOSPITALIST

## 2020-10-11 RX ORDER — GUAIFENESIN/DEXTROMETHORPHAN 100-10MG/5
10 SYRUP ORAL EVERY 6 HOURS PRN
Status: DISCONTINUED | OUTPATIENT
Start: 2020-10-11 | End: 2020-10-14

## 2020-10-11 RX ORDER — INSULIN GLARGINE 100 [IU]/ML
15 INJECTION, SOLUTION SUBCUTANEOUS
Status: DISCONTINUED | OUTPATIENT
Start: 2020-10-11 | End: 2020-10-12

## 2020-10-11 RX ADMIN — TRAMADOL HYDROCHLORIDE 50 MG: 50 TABLET, COATED ORAL at 22:04

## 2020-10-11 RX ADMIN — ACETAMINOPHEN 650 MG: 325 TABLET, FILM COATED ORAL at 18:12

## 2020-10-11 RX ADMIN — LEVOFLOXACIN 500 MG: 250 TABLET, FILM COATED ORAL at 09:36

## 2020-10-11 RX ADMIN — METOCLOPRAMIDE 5 MG: 10 TABLET ORAL at 12:59

## 2020-10-11 RX ADMIN — METOPROLOL TARTRATE 37.5 MG: 25 TABLET, FILM COATED ORAL at 05:35

## 2020-10-11 RX ADMIN — SERTRALINE HYDROCHLORIDE 50 MG: 50 TABLET ORAL at 09:38

## 2020-10-11 RX ADMIN — METRONIDAZOLE 500 MG: 500 TABLET ORAL at 05:34

## 2020-10-11 RX ADMIN — ACETAMINOPHEN 650 MG: 325 TABLET, FILM COATED ORAL at 13:56

## 2020-10-11 RX ADMIN — METRONIDAZOLE 500 MG: 500 TABLET ORAL at 13:56

## 2020-10-11 RX ADMIN — Medication 2000 UNITS: at 09:56

## 2020-10-11 RX ADMIN — METOPROLOL TARTRATE 37.5 MG: 25 TABLET, FILM COATED ORAL at 17:51

## 2020-10-11 RX ADMIN — TERAZOSIN HYDROCHLORIDE 1 MG: 1 CAPSULE ORAL at 22:00

## 2020-10-11 RX ADMIN — METFORMIN HYDROCHLORIDE 1000 MG: 500 TABLET ORAL at 09:37

## 2020-10-11 RX ADMIN — FERROUS SULFATE TAB 325 MG (65 MG ELEMENTAL FE) 325 MG: 325 (65 FE) TAB at 09:38

## 2020-10-11 RX ADMIN — METFORMIN HYDROCHLORIDE 1000 MG: 500 TABLET ORAL at 17:52

## 2020-10-11 RX ADMIN — METRONIDAZOLE 500 MG: 500 TABLET ORAL at 22:00

## 2020-10-11 RX ADMIN — ATORVASTATIN CALCIUM 80 MG: 40 TABLET, FILM COATED ORAL at 22:00

## 2020-10-11 RX ADMIN — ASPIRIN 81 MG CHEWABLE TABLET 81 MG: 81 TABLET CHEWABLE at 09:36

## 2020-10-11 RX ADMIN — METOCLOPRAMIDE 5 MG: 10 TABLET ORAL at 05:34

## 2020-10-11 RX ADMIN — CLOPIDOGREL BISULFATE 75 MG: 75 TABLET ORAL at 09:37

## 2020-10-11 RX ADMIN — POTASSIUM BICARBONATE 25 MEQ: 978 TABLET, EFFERVESCENT ORAL at 09:38

## 2020-10-11 RX ADMIN — OMEPRAZOLE 40 MG: KIT at 09:54

## 2020-10-11 RX ADMIN — AMLODIPINE BESYLATE 10 MG: 5 TABLET ORAL at 05:34

## 2020-10-11 RX ADMIN — LIDOCAINE 1 PATCH: 50 PATCH TOPICAL at 09:54

## 2020-10-11 RX ADMIN — INSULIN GLARGINE 15 UNITS: 100 INJECTION, SOLUTION SUBCUTANEOUS at 10:07

## 2020-10-11 RX ADMIN — LISINOPRIL 10 MG: 5 TABLET ORAL at 05:34

## 2020-10-11 RX ADMIN — METOCLOPRAMIDE 5 MG: 10 TABLET ORAL at 17:53

## 2020-10-11 RX ADMIN — FERROUS SULFATE TAB 325 MG (65 MG ELEMENTAL FE) 325 MG: 325 (65 FE) TAB at 17:53

## 2020-10-11 RX ADMIN — ENOXAPARIN SODIUM 40 MG: 40 INJECTION SUBCUTANEOUS at 22:01

## 2020-10-11 RX ADMIN — POTASSIUM BICARBONATE 25 MEQ: 978 TABLET, EFFERVESCENT ORAL at 22:01

## 2020-10-11 ASSESSMENT — ENCOUNTER SYMPTOMS
NERVOUS/ANXIOUS: 0
FEVER: 0
VOMITING: 0
DIARRHEA: 0
CHILLS: 0
ABDOMINAL PAIN: 0
SHORTNESS OF BREATH: 0
NAUSEA: 0

## 2020-10-11 ASSESSMENT — FIBROSIS 4 INDEX: FIB4 SCORE: 0.35

## 2020-10-11 NOTE — ASSESSMENT & PLAN NOTE
Pt gets anxious sometime and resp rate increases  But VSS and O2 sats ok  Pt seems to get more tired and frustration as the day goes on  Note: on abx for likely pneumonia

## 2020-10-11 NOTE — FLOWSHEET NOTE
10/11/20 1432   Events/Summary/Plan   Events/Summary/Plan 02 spot check, IS   Vital Signs   Pulse 95   Respiration (!) 28  (RN aware)   Pulse Oximetry 94 %   $ Pulse Oximetry (Spot Check) Yes   Respiratory Assessment   Level of Consciousness Alert   Oxygen   O2 Delivery Device None - Room Air

## 2020-10-11 NOTE — PROGRESS NOTES
[facility] Medicine Daily Progress Note    Date of Service  10/11/2020    Chief Complaint:  Hypertension  Tachycardia  Diabetes  Leukocytosis  S/P CABG  S/P CVA    Interval History:  No significant events or changes since last visit    Review of Systems  Review of Systems   Constitutional: Negative for chills and fever.   Respiratory: Negative for shortness of breath.    Cardiovascular: Negative for chest pain.   Gastrointestinal: Negative for abdominal pain, diarrhea, nausea and vomiting.   Psychiatric/Behavioral: The patient is not nervous/anxious.         Physical Exam  Temp:  [36.3 °C (97.3 °F)-37 °C (98.6 °F)] 36.3 °C (97.3 °F)  Pulse:  [74-97] 90  Resp:  [18-23] 18  BP: (100-126)/(65-81) 108/74  SpO2:  [90 %] 90 %    Physical Exam  Vitals signs and nursing note reviewed.   Constitutional:       Appearance: Normal appearance.   HENT:      Head: Atraumatic.   Eyes:      Conjunctiva/sclera: Conjunctivae normal.      Pupils: Pupils are equal, round, and reactive to light.   Neck:      Musculoskeletal: Normal range of motion and neck supple.   Cardiovascular:      Rate and Rhythm: Normal rate and regular rhythm.      Heart sounds: No murmur.   Pulmonary:      Effort: Pulmonary effort is normal.      Breath sounds: No stridor. No wheezing or rales.   Abdominal:      General: There is no distension.      Palpations: Abdomen is soft.      Tenderness: There is no abdominal tenderness.   Musculoskeletal:      Right lower leg: No edema.      Left lower leg: No edema.   Skin:     General: Skin is warm and dry.      Findings: No rash.   Neurological:      Mental Status: He is alert and oriented to person, place, and time.   Psychiatric:         Mood and Affect: Mood normal.         Behavior: Behavior normal.         Fluids    Intake/Output Summary (Last 24 hours) at 10/11/2020 0922  Last data filed at 10/11/2020 0545  Gross per 24 hour   Intake 2240 ml   Output 1450 ml   Net 790 ml       Laboratory  Recent Labs      10/09/20  0010 10/10/20  0549   WBC 14.4* 15.8*   RBC 3.24* 3.28*   HEMOGLOBIN 9.8* 10.0*   HEMATOCRIT 30.4* 31.3*   MCV 93.8 95.4   MCH 30.2 30.5   MCHC 32.2* 31.9*   RDW 48.3 49.1   PLATELETCT 317 473*   MPV 11.0 11.0     Recent Labs     10/09/20  0010 10/10/20  0549   SODIUM 134* 130*   POTASSIUM 4.2 4.5   CHLORIDE 103 100   CO2 20 18*   GLUCOSE 258* 290*   BUN 19 27*   CREATININE 0.77 0.96   CALCIUM 8.7 8.9                   Imaging    Assessment/Plan  * Cardioembolic stroke (HCC)- (present on admission)  Assessment & Plan  Occurred post-op  On ASA & Plavix  On Lipitor    Ileus (HCC)- (present on admission)  Assessment & Plan  Having loose stools recently -- but more mushy and not watery -- likely 2nd to TF's  Afebrile  Has leukocytosis  On Reglan  Will check stool for C. Diff if develops water diarrhea  Will check AXR -- if ok, would consider stopping Reglan  Note: on TF's    CAD (coronary artery disease)- (present on admission)  Assessment & Plan  S/P NSTEMI  S/P CABG x 3 vessel bypass  Has hx of 2 cardiac stents  BNP: 629 (10/10)  On ASA and Plavix  On Lipitor  On Lopressor & Lisinopril    Essential hypertension- (present on admission)  Assessment & Plan  BP ok  On Norvasc: 10 mg daily  On Lisinopril: 10 mg bid --> 10 mg daily (10/9)  On Lopressor: 25 mg bid --> 37.5 mg bid 2nd to tachycardia (10/9 evening)  Cont to monitor    Type 2 diabetes mellitus with complication, without long-term current use of insulin (HCC)- (present on admission)  Assessment & Plan  Hba1c: 7.9  BS still elevated and not much change after Metformin started  On Metformin: 1000 mg bid (10/9 evening)  Will start Lantus: 15 units qam (10/11)  Will avoid long acting oral meds since patient is on TF's  Note: pt was not on a diabetic TF -- changed on 10/10  Note: home meds appear to be Metformin 2000 mg bid (not appropriate dosing) per Epic chart -- but pt can't confirm  Cont to monitor    Anxiety  Assessment & Plan  Pt gets anxious  sometime and resp rate increases  But VSS and O2 sats ok  Pt seems to get more tired and frustration as the day goes on  Note: on abx for likely pneumonia    Iron deficiency anemia  Assessment & Plan  H&H stable with Hb 10.0 (10/10)  Fe: 31, sats 11%  On Fe supplements (10/10)    Hyponatremia  Assessment & Plan  Na: 134 --> 130 (10/10)  Will d/w dietician for TF adjustment  Monitor    Azotemia  Assessment & Plan  Bun: 27 (10/10)  On free water thru NGT: 200 cc q 6 hrs --> 300 cc q6 hrs (10/10)  Monitor    Vitamin D deficiency  Assessment & Plan  Vit D: 17  On supplements     Tachycardia- (present on admission)  Assessment & Plan  HR better recently  On Lopressor: 25 mg bid --> 37.5 mg bid (10/9 evening)  Cont to montor    Pneumonia- (present on admission)  Assessment & Plan  WBC's: 12.0 (10/7) --> 11.9 (10/8) --> 14.4 (10/9) --> 15.8 (10/10)  Afebrile  CXR (10/9): poorly defined opacifications in the perihilar region and lower lungs are slightly more prominent than on the prior radiograph;                      findings could be due to edema or atelectasis; developing pneumonia is also possible.  U/A neg  On Levaquin and Flagyl (thru 10/15)  Leukocytosis may also be a contributing factor from recent surgery  Note: was treated for aspiration pneumonia at [facility] (not sure duration of abx)  Monitor

## 2020-10-11 NOTE — THERAPY
Speech Language Pathology  Daily Treatment     Patient Name: Guerrero Ann  Age:  49 y.o., Sex:  male  Medical Record #: [id number]  Today's Date: 10/11/2020     Precautions  Precautions: Fall Risk, Other (See Comments), Nasogastric Tube, Sternal Precautions (See Comments)  Comments: R visual preference; whispers    Subjective    Pt pleasant and cooperative during tx.  Girlfriend present and supportive.  Pt reported feeling hot, dizzy, and lightheaded.  Nrsg aware.  Pt preferred tx at bedside.     Objective       10/11/20 1331   Cognition   Moderate Attention Moderate (3)   Functional Memory Activities Moderate (3)   Interdisciplinary Plan of Care Collaboration   IDT Collaboration with  Family / Caregiver   Collaboration Comments girlfriend present during session.    SLP Total Time Spent   SLP Individual Total Time Spent (Mins) 60   Treatment Charges   SLP Cognitive Skill Development First 15 Minutes 1   SLP Cognitive Skill Development Additional 15 Minutes 3       Assessment    Memory book introduced this day.  Pt and girlfriend verbalized understanding.  Alternating attn (Pt required to state a name for each letter of the alphabet): 65% independent; 100% given mod verbal cues.  SLP educated SO regarding effortful swallow exercise.  SO verbalized understanding.      Strengths: Able to follow instructions, Independent prior level of function, Pleasant and cooperative, Motivated for self care and independence, Willingly participates in therapeutic activities  Barriers: Aspiration risk, Impaired functional cognition    Plan    MBS scheduled for tomorrow, if pt is appropriate to sit up.  Target word finding, visual scanning and use of memory book.     Speech Therapy Problems     Problem: Expression STGs     Dates: Start: 10/10/20       Goal: STG-Within one week, patient will     Dates: Start: 10/10/20       Description: 1) Individualized goal:  state 10 words per minute given concrete category and  min verbal cues.   2) Interventions:  SLP Speech Language Treatment and SLP Cognitive Skill Development                    Problem: Memory STGs     Dates: Start: 10/10/20       Goal: STG-Within one week, patient will     Dates: Start: 10/10/20       Description: 1) Individualized goal:  recall daily events and safety strategies 80% accuracy given min cues, with use of memory book.   2) Interventions:  SLP Cognitive Skill Development                    Problem: Problem Solving STGs     Dates: Start: 10/10/20       Goal: STG-Within one week, patient will     Dates: Start: 10/10/20       Description: 1) Individualized goal:  complete single target visual scanning tasks with 80% accuracy, given min verbal cues.   2) Interventions:  SLP Cognitive Skill Development                    Problem: Speech/Swallowing LTGs     Dates: Start: 10/10/20       Goal: LTG-By discharge, patient will safely swallow     Dates: Start: 10/10/20       Description: 1) Individualized goal:  regular textures and thin liquids without aspiration.   2) Interventions:  SLP Swallowing Dysfunction Treatment, SLP Oral Pharyngeal Evaluation, SLP Video Swallow Evaluation, and SLP Group Treatment              Goal: LTG-By discharge, patient will solve basic problems     Dates: Start: 10/10/20       Description: 1) Individualized goal:  Alayna for safe discharge home.    2) Interventions:  SLP Speech Language Treatment, SLP Cognitive Skill Development, and SLP Group Treatment                    Problem: Swallowing STGs     Dates: Start: 10/10/20       Goal: STG-Within one week, patient will     Dates: Start: 10/10/20       Description: 1) Individualized goal:  participate in an MBS.  2) Interventions:  SLP Video Swallow Evaluation              Goal: STG-Within one week, patient will     Dates: Start: 10/10/20       Description: 1) Individualized goal:  tolerated NTL and puree textures by spoon, with s/s of aspiration less than 10% of trials.   2)  Interventions:  SLP Swallowing Dysfunction Treatment

## 2020-10-11 NOTE — PROGRESS NOTES
"Rehab Progress Note     Encounter Date: 10/11/2020    CC: Tired    Interval Events (Subjective)  Vital signs stable.  No new labs to review.  No acute events overnight per nursing note review. Continues to be unable to void and being straight cathed.  Large loose BM 10/11.  On senna 2 tabs twice daily and has been held.  Even though patient having large BMs, discussed with patient and significant other who is at bedside.  He is not bothered by the texture of his stool.  He is able to hold a to get to the bathroom.  Does not describe it is completely watery it is soft.  Has stable pain level and continues to be tired but is otherwise doing well.  Would like to get his NG tube out.  No other questions or concerns.    14 point ROS reviewed and negative except as stated above.     Objective:  VITAL SIGNS: /74   Pulse 90   Temp 36.3 °C (97.3 °F) (Oral)   Resp 18   Ht 1.575 m (5' 2.01\")   Wt 68.5 kg (151 lb 0.2 oz)   SpO2 90%   BMI 27.61 kg/m²     GEN: No apparent distress, lying in bed sleeping on room entry.  HEENT: Head normocephalic, atraumatic.  Sclera nonicteric bilaterally, no ocular discharge appreciated bilaterally.  CV: Extremities warm and well-perfused, no peripheral edema appreciated bilaterally.  PULMONARY: Breathing nonlabored on room air, no respiratory accessory muscle use.  Not requiring supplemental oxygen.  ABD: Soft, nontender.  NEURO: Spontaneously woke up during conversation with patient significant other.  Answers questions appropriately.  Logical thought content.  PSYCH: Depressed mood.    Recent Results (from the past 72 hour(s))   ACCU-CHEK GLUCOSE    Collection Time: 10/08/20 11:40 AM   Result Value Ref Range    Glucose - Accu-Ck 225 (H) 65 - 99 mg/dL   ACCU-CHEK GLUCOSE    Collection Time: 10/08/20  5:13 PM   Result Value Ref Range    Glucose - Accu-Ck 259 (H) 65 - 99 mg/dL   Basic Metabolic Panel    Collection Time: 10/09/20 12:10 AM   Result Value Ref Range    Sodium 134 (L) 135 " - 145 mmol/L    Potassium 4.2 3.6 - 5.5 mmol/L    Chloride 103 96 - 112 mmol/L    Co2 20 20 - 33 mmol/L    Glucose 258 (H) 65 - 99 mg/dL    Bun 19 8 - 22 mg/dL    Creatinine 0.77 0.50 - 1.40 mg/dL    Calcium 8.7 8.5 - 10.5 mg/dL    Anion Gap 11.0 7.0 - 16.0   CBC WITH DIFFERENTIAL    Collection Time: 10/09/20 12:10 AM   Result Value Ref Range    WBC 14.4 (H) 4.8 - 10.8 K/uL    RBC 3.24 (L) 4.70 - 6.10 M/uL    Hemoglobin 9.8 (L) 14.0 - 18.0 g/dL    Hematocrit 30.4 (L) 42.0 - 52.0 %    MCV 93.8 81.4 - 97.8 fL    MCH 30.2 27.0 - 33.0 pg    MCHC 32.2 (L) 33.7 - 35.3 g/dL    RDW 48.3 35.9 - 50.0 fL    Platelet Count 317 164 - 446 K/uL    MPV 11.0 9.0 - 12.9 fL    Neutrophils-Polys 75.30 (H) 44.00 - 72.00 %    Lymphocytes 14.20 (L) 22.00 - 41.00 %    Monocytes 6.50 0.00 - 13.40 %    Eosinophils 2.60 0.00 - 6.90 %    Basophils 0.40 0.00 - 1.80 %    Immature Granulocytes 1.00 (H) 0.00 - 0.90 %    Nucleated RBC 0.00 /100 WBC    Neutrophils (Absolute) 10.84 (H) 1.82 - 7.42 K/uL    Lymphs (Absolute) 2.05 1.00 - 4.80 K/uL    Monos (Absolute) 0.93 (H) 0.00 - 0.85 K/uL    Eos (Absolute) 0.37 0.00 - 0.51 K/uL    Baso (Absolute) 0.06 0.00 - 0.12 K/uL    Immature Granulocytes (abs) 0.15 (H) 0.00 - 0.11 K/uL    NRBC (Absolute) 0.00 K/uL   PHOSPHORUS    Collection Time: 10/09/20 12:10 AM   Result Value Ref Range    Phosphorus 2.8 2.5 - 4.5 mg/dL   MAGNESIUM    Collection Time: 10/09/20 12:10 AM   Result Value Ref Range    Magnesium 1.9 1.5 - 2.5 mg/dL   ESTIMATED GFR    Collection Time: 10/09/20 12:10 AM   Result Value Ref Range    GFR If African American >60 >60 mL/min/1.73 m 2    GFR If Non African American >60 >60 mL/min/1.73 m 2   ACCU-CHEK GLUCOSE    Collection Time: 10/09/20 12:42 AM   Result Value Ref Range    Glucose - Accu-Ck 241 (H) 65 - 99 mg/dL   ACCU-CHEK GLUCOSE    Collection Time: 10/09/20  5:19 AM   Result Value Ref Range    Glucose - Accu-Ck 281 (H) 65 - 99 mg/dL   ACCU-CHEK GLUCOSE    Collection Time: 10/09/20 11:53  AM   Result Value Ref Range    Glucose - Accu-Ck 248 (H) 65 - 99 mg/dL   ACCU-CHEK GLUCOSE    Collection Time: 10/09/20  5:23 PM   Result Value Ref Range    Glucose - Accu-Ck 288 (H) 65 - 99 mg/dL   ACCU-CHEK GLUCOSE    Collection Time: 10/10/20 12:19 AM   Result Value Ref Range    Glucose - Accu-Ck 196 (H) 65 - 99 mg/dL   Urinalysis    Collection Time: 10/10/20  3:35 AM    Specimen: Urine, Clean Catch   Result Value Ref Range    Color Yellow     Character Clear     Specific Gravity 1.025 <1.035    Ph 6.0 5.0 - 8.0    Glucose 250 (A) Negative mg/dL    Ketones Negative Negative mg/dL    Protein 30 (A) Negative mg/dL    Bilirubin Negative Negative    Urobilinogen, Urine 0.2 Negative    Nitrite Negative Negative    Leukocyte Esterase Negative Negative    Occult Blood Small (A) Negative    Micro Urine Req Microscopic    URINE MICROSCOPIC (W/UA)    Collection Time: 10/10/20  3:35 AM   Result Value Ref Range    WBC 5-10 (A) /hpf    RBC  (A) /hpf    Bacteria Negative None /hpf    Epithelial Cells Negative /hpf    Hyaline Cast 0-2 /lpf   CBC with Differential    Collection Time: 10/10/20  5:49 AM   Result Value Ref Range    WBC 15.8 (H) 4.8 - 10.8 K/uL    RBC 3.28 (L) 4.70 - 6.10 M/uL    Hemoglobin 10.0 (L) 14.0 - 18.0 g/dL    Hematocrit 31.3 (L) 42.0 - 52.0 %    MCV 95.4 81.4 - 97.8 fL    MCH 30.5 27.0 - 33.0 pg    MCHC 31.9 (L) 33.7 - 35.3 g/dL    RDW 49.1 35.9 - 50.0 fL    Platelet Count 473 (H) 164 - 446 K/uL    MPV 11.0 9.0 - 12.9 fL    Neutrophils-Polys 74.90 (H) 44.00 - 72.00 %    Lymphocytes 15.30 (L) 22.00 - 41.00 %    Monocytes 6.00 0.00 - 13.40 %    Eosinophils 2.50 0.00 - 6.90 %    Basophils 0.40 0.00 - 1.80 %    Immature Granulocytes 0.90 0.00 - 0.90 %    Nucleated RBC 0.00 /100 WBC    Neutrophils (Absolute) 11.83 (H) 1.82 - 7.42 K/uL    Lymphs (Absolute) 2.41 1.00 - 4.80 K/uL    Monos (Absolute) 0.95 (H) 0.00 - 0.85 K/uL    Eos (Absolute) 0.39 0.00 - 0.51 K/uL    Baso (Absolute) 0.06 0.00 - 0.12 K/uL     Immature Granulocytes (abs) 0.14 (H) 0.00 - 0.11 K/uL    NRBC (Absolute) 0.00 K/uL   Comp Metabolic Panel (CMP)    Collection Time: 10/10/20  5:49 AM   Result Value Ref Range    Sodium 130 (L) 135 - 145 mmol/L    Potassium 4.5 3.6 - 5.5 mmol/L    Chloride 100 96 - 112 mmol/L    Co2 18 (L) 20 - 33 mmol/L    Anion Gap 12.0 7.0 - 16.0    Glucose 290 (H) 65 - 99 mg/dL    Bun 27 (H) 8 - 22 mg/dL    Creatinine 0.96 0.50 - 1.40 mg/dL    Calcium 8.9 8.5 - 10.5 mg/dL    AST(SGOT) 22 12 - 45 U/L    ALT(SGPT) 40 2 - 50 U/L    Alkaline Phosphatase 107 (H) 30 - 99 U/L    Total Bilirubin 0.4 0.1 - 1.5 mg/dL    Albumin 3.4 3.2 - 4.9 g/dL    Total Protein 7.2 6.0 - 8.2 g/dL    Globulin 3.8 (H) 1.9 - 3.5 g/dL    A-G Ratio 0.9 g/dL   Magnesium    Collection Time: 10/10/20  5:49 AM   Result Value Ref Range    Magnesium 1.9 1.5 - 2.5 mg/dL   Vitamin D, 25-hydroxy (blood)    Collection Time: 10/10/20  5:49 AM   Result Value Ref Range    25-Hydroxy   Vitamin D 25 17 (L) 30 - 100 ng/mL   proBrain Natriuretic Peptide, NT    Collection Time: 10/10/20  5:49 AM   Result Value Ref Range    NT-proBNP 629 (H) 0 - 125 pg/mL   FERRITIN    Collection Time: 10/10/20  5:49 AM   Result Value Ref Range    Ferritin 706.0 (H) 22.0 - 322.0 ng/mL   IRON/TOTAL IRON BIND    Collection Time: 10/10/20  5:49 AM   Result Value Ref Range    Iron 31 (L) 50 - 180 ug/dL    Total Iron Binding 270 250 - 450 ug/dL    Unsat Iron Binding 239 110 - 370 ug/dL    % Saturation 11 (L) 15 - 55 %   PHOSPHORUS    Collection Time: 10/10/20  5:49 AM   Result Value Ref Range    Phosphorus 3.7 2.5 - 4.5 mg/dL   ESTIMATED GFR    Collection Time: 10/10/20  5:49 AM   Result Value Ref Range    GFR If African American >60 >60 mL/min/1.73 m 2    GFR If Non African American >60 >60 mL/min/1.73 m 2   ACCU-CHEK GLUCOSE    Collection Time: 10/10/20  6:09 AM   Result Value Ref Range    Glucose - Accu-Ck 290 (H) 65 - 99 mg/dL   ACCU-CHEK GLUCOSE    Collection Time: 10/10/20 11:49 AM   Result  Value Ref Range    Glucose - Accu-Ck 256 (H) 65 - 99 mg/dL   ACCU-CHEK GLUCOSE    Collection Time: 10/10/20  5:15 PM   Result Value Ref Range    Glucose - Accu-Ck 200 (H) 65 - 99 mg/dL   ACCU-CHEK GLUCOSE    Collection Time: 10/10/20 10:02 PM   Result Value Ref Range    Glucose - Accu-Ck 186 (H) 65 - 99 mg/dL   ACCU-CHEK GLUCOSE    Collection Time: 10/11/20  7:17 AM   Result Value Ref Range    Glucose - Accu-Ck 221 (H) 65 - 99 mg/dL       Current Facility-Administered Medications   Medication Frequency   • ferrous sulfate tablet 325 mg BID WITH MEALS   • vitamin D (cholecalciferol) tablet 2,000 Units DAILY   • terazosin (HYTRIN) capsule 1 mg Q EVENING   • insulin lispro (HumaLOG) injection 4X/DAY ACHS   • hydrOXYzine HCl (ATARAX) tablet 50 mg Q6HRS PRN   • melatonin tablet 3 mg HS PRN   • Respiratory Therapy Consult Continuous RT   • Pharmacy Consult Request ...Pain Management Review 1 Each PHARMACY TO DOSE   • acetaminophen (TYLENOL) tablet 650 mg Q4HRS PRN   • artificial tears ophthalmic solution 1 Drop PRN   • benzocaine-menthol (CEPACOL) lozenge 1 Lozenge Q2HRS PRN   • mag hydrox-al hydrox-simeth (MAALOX PLUS ES or MYLANTA DS) suspension 20 mL Q2HRS PRN   • ondansetron (ZOFRAN ODT) dispertab 4 mg 4X/DAY PRN    Or   • ondansetron (ZOFRAN) syringe/vial injection 4 mg 4X/DAY PRN   • traZODone (DESYREL) tablet 50 mg QHS PRN   • sodium chloride (OCEAN) 0.65 % nasal spray 2 Spray PRN   • lactulose 20 GM/30ML solution 30 mL QDAY PRN   • docusate sodium (ENEMEEZ) enema 283 mg QDAY PRN   • fleet enema 133 mL QDAY PRN   • clopidogrel (PLAVIX) tablet 75 mg DAILY   • senna-docusate (PERICOLACE or SENOKOT S) 8.6-50 MG per tablet 2 Tab BID    And   • polyethylene glycol/lytes (MIRALAX) PACKET 1 Packet DAILY    And   • magnesium hydroxide (MILK OF MAGNESIA) suspension 30 mL DAILY    And   • bisacodyl (DULCOLAX) suppository 10 mg QDAY PRN   • tramadol (ULTRAM) 50 MG tablet 50 mg Q4HRS PRN   • amLODIPine (NORVASC) tablet 10 mg Q  DAY   • aspirin (ASA) chewable tab 81 mg DAILY   • atorvastatin (LIPITOR) tablet 80 mg QHS   • enoxaparin (LOVENOX) inj 40 mg Q EVENING   • lidocaine (LIDODERM) 5 % 1 Patch Q24HR   • metoclopramide (REGLAN) tablet 5 mg Q6HRS   • omeprazole (FIRST-OMEPRAZOLE) 2 mg/mL oral susp 40 mg DAILY   • potassium bicarbonate (KLYTE) effervescent tablet 25 mEq BID   • lisinopril (PRINIVIL) tablet 10 mg Q DAY   • metoprolol (LOPRESSOR) tablet 37.5 mg TWICE DAILY   • metFORMIN (GLUCOPHAGE) tablet 1,000 mg BID WITH MEALS   • sertraline (Zoloft) tablet 50 mg DAILY   • metroNIDAZOLE (FLAGYL) tablet 500 mg Q8HRS   • levoFLOXacin (LEVAQUIN) tablet 500 mg DAILY       Orders Placed This Encounter   Procedures   • Diet NPO     Standing Status:   Standing     Number of Occurrences:   1     Order Specific Question:   Restrict to:     Answer:   Strict [1]     Comments:   no tube feed or medications   • DIET TUBE FEED     Standing Status:   Standing     Number of Occurrences:   1     Order Specific Question:   Diet     Answer:   Diet Tube Feed [35]     Order Specific Question:   Formula:     Answer:   Diabeticskeira KUMAR [34]     Comments:   goal 350mL QID ( meal times + HS)      Order Specific Question:   Route     Answer:   NG     Order Specific Question:   Select Bolus     Answer:   Other (specify bolus comments in the field to the right) [48]     Comments:   Diabetiskeira KUMAR goal 350mL QID at meal times + HS.  Give free water in between feedings      Order Specific Question:   Additive Frequency     Answer:   QID       Assessment:  Active Hospital Problems    Diagnosis   • *Cardioembolic stroke (HCC)   • Ileus (HCC)   • Aspiration pneumonia (HCC)   • CAD (coronary artery disease)   • Essential hypertension   • Type 2 diabetes mellitus with complication, without long-term current use of insulin (HCC)   • Vitamin D deficiency   • Azotemia   • Hyponatremia   • Iron deficiency anemia   • Leukocytosis   • Tachycardia   • Impaired mobility and  ADLs   • Reactive depression   • Diabetes mellitus due to underlying condition with hyperglycemia (HCC)       Medical Decision Making and Plan: Continue plan as outlined by primary team and progress note dated 10/9/2020 with the following additions-  Leukocytosis: Per review of notes x-ray suggestive of pneumonia started on antibiotics (Flagyl and levofloxacin) White count elevated 10/10 at 15.8 with left shift.  UA with + small occult blood, protein, glucose and microscopy with small amount of WBC, RBC.  Patient is retaining urine and required intermittent catheterization.  Had large loose BM 10/9, 10/10, 10/11.  Hospitalist following.  CAD s/p CABG: Elevated BNP at 629.  Vitamin D deficiency: 17 on admission, on supplementation, increase to 2000 units daily  Thrombocytosis: Platelets increasing at 478 10/10 2 days ago had been in 200s. Hospitalist following.  Hyponatremia: Decreasing at 130 10/10.  Readjust tube feeds per hospitalist.  Bladder: Urinary retention requiring intermittent catheterization, start Hytrin nightly.  Bowel: Loose stools. On Reglan.  H/o ileus.  Recheck KUB and potentially decrease Reglan.  Hospitalist following.  Mood: Continue Zoloft    Appreciate hospitalist assistance with comorbidities.    Total time:  25 minutes.  I spent greater than 50% of the time for patient care and coordination on this date, including unit/floor time, and face-to-face time with the patient as per assessment and plan above.    Marilu Franco D.O.

## 2020-10-11 NOTE — DISCHARGE PLANNING
CASE MANAGEMENT INITIAL ASSESSMENT    Admit Date:  10/9/2020     Spoke with patients significant other to discuss role of case management / discharge planning / team conference. She is alert and able to provide information. Patient did not want to talk at this time.   Patient is a  49 y.o. male transferred from [facility] where he was admitted from 09/21-10/09.    Admitting Physician: Dr. Ng  PCP: Hannah Hernadez  Cardio: Dr. Chicas    Diagnosis: 0001.3 Stroke: Bilateral Involvement  Stroke (cerebrum) (Formerly [facility])    Co-morbidities:   Patient Active Problem List    Diagnosis Date Noted   • Aspiration pneumonia (Formerly [facility]) 10/02/2020     Priority: Medium   • Ileus (Formerly [facility]) 10/02/2020     Priority: Medium   • Lacunar infarct, acute (Formerly [facility]) 09/29/2020     Priority: Medium   • Cardioembolic stroke (Formerly [facility]) 09/29/2020     Priority: Medium   • Altered mental status 09/28/2020     Priority: Medium   • NSTEMI (non-ST elevated myocardial infarction) (Formerly [facility]) 09/22/2020     Priority: Medium   • CAD (coronary artery disease) 12/22/2018     Priority: Medium   • Type 2 diabetes mellitus with complication, without long-term current use of insulin (Formerly [facility]) 05/29/2018     Priority: Medium   • Essential hypertension 05/29/2018     Priority: Medium   • Normochromic normocytic anemia 09/28/2020     Priority: Low   • Vitamin D deficiency 10/10/2020   • Azotemia 10/10/2020   • Hyponatremia 10/10/2020   • Iron deficiency anemia 10/10/2020   • Pneumonia 10/09/2020   • Tachycardia 10/09/2020   • Impaired mobility and ADLs 10/09/2020   • Reactive depression 10/09/2020   • Diabetes mellitus due to underlying condition with hyperglycemia (Formerly [facility]) 10/09/2020   • Acute non-ST elevation myocardial infarction (NSTEMI) (Formerly [facility]) 05/29/2018   • Family history of stroke or transient ischemic attack in mother 05/29/2018     Prior Living Situation:  Housing / Facility: 1 Story House  Lives with - Patient's Self Care Capacity: Child Less than 18 Years of Age, Significant Other    Prior  "Level of Function:  Medication Management: (patient reports he did not take any medications previously)  Finances: Independent  Home Management: Independent  Shopping: Independent  Prior Level Of Mobility: Independent Without Device in Community, Independent Without Device in Home  Driving / Transportation: Driving Independent    Support Systems:  Primary: Miriam Garcia (friend) [phone], Radha Farley (friend) [phone]      Previous Services Utilized:   Equipment Owned: None  Prior Services: None, Home-Independent    Other Information:  Occupation (Pre-Hospital Vocational): Employed Full Time  Primary Payor Source: Other (Comments)([employer])  Primary Care Practitioner : Hannah Hernadez   Other MDs: Dr. Chicas    Patient / Family Goal:  Patient / Family Goal: patient lives in a single level homne with 1 lex. he lives with girlfriend and 2 children. He works full time. Per chart he does not have any DME. PCP: Hannah Hernadez. Goals from case management standpoint, set up follow up care. Patient would like to \"be normal\" again and get back to work.     Additional Case Management Questions:  Have you ever received case management services for yourself or a family member? No    Do you feel you have and an understanding of what services  provide? Yes    Do you have any additional questions regarding case management? No      CASE MANAGEMENT PLAN OF CARE   Individualized Goals:   1. Set up follow up appointments  2. Possible DME  3. Get back to work       Barriers:   1. Emotional status  2. Cognitive deficts    Plan:  1. Continue to follow patient through hospitalization and provide discharge planning in collaboration with patient, family, physicians and ancillary services.     2. Utilize community resources to ensure a safe discharge.       "

## 2020-10-11 NOTE — CARE PLAN
Problem: Urinary Elimination:  Goal: Ability to reestablish a normal urinary elimination pattern will improve  Outcome: PROGRESSING AS EXPECTED  Note: Patient still unable to void on his own.   Bladder scans with ICP.          Problem: Skin Integrity  Goal: Risk for impaired skin integrity will decrease  Outcome: PROGRESSING AS EXPECTED  Note: Surgical incisions to chest and right leg well approximated.  No drainage noted.

## 2020-10-12 ENCOUNTER — APPOINTMENT (OUTPATIENT)
Dept: PAIN MANAGEMENT | Facility: REHABILITATION | Age: 50
DRG: 057 | End: 2020-10-12
Attending: PHYSICAL MEDICINE & REHABILITATION
Payer: COMMERCIAL

## 2020-10-12 ENCOUNTER — APPOINTMENT (OUTPATIENT)
Dept: RADIOLOGY | Facility: REHABILITATION | Age: 50
DRG: 057 | End: 2020-10-12
Attending: PHYSICAL MEDICINE & REHABILITATION
Payer: COMMERCIAL

## 2020-10-12 PROBLEM — D75.839 THROMBOCYTOSIS: Status: ACTIVE | Noted: 2020-10-12

## 2020-10-12 LAB
ANION GAP SERPL CALC-SCNC: 11 MMOL/L (ref 7–16)
BASOPHILS # BLD AUTO: 0.7 % (ref 0–1.8)
BASOPHILS # BLD: 0.08 K/UL (ref 0–0.12)
BUN SERPL-MCNC: 37 MG/DL (ref 8–22)
CALCIUM SERPL-MCNC: 9.1 MG/DL (ref 8.5–10.5)
CHLORIDE SERPL-SCNC: 101 MMOL/L (ref 96–112)
CO2 SERPL-SCNC: 20 MMOL/L (ref 20–33)
CREAT SERPL-MCNC: 1.28 MG/DL (ref 0.5–1.4)
EOSINOPHIL # BLD AUTO: 0.24 K/UL (ref 0–0.51)
EOSINOPHIL NFR BLD: 2.1 % (ref 0–6.9)
ERYTHROCYTE [DISTWIDTH] IN BLOOD BY AUTOMATED COUNT: 47.9 FL (ref 35.9–50)
GLUCOSE BLD-MCNC: 183 MG/DL (ref 65–99)
GLUCOSE BLD-MCNC: 187 MG/DL (ref 65–99)
GLUCOSE BLD-MCNC: 208 MG/DL (ref 65–99)
GLUCOSE BLD-MCNC: 229 MG/DL (ref 65–99)
GLUCOSE BLD-MCNC: 260 MG/DL (ref 65–99)
GLUCOSE SERPL-MCNC: 263 MG/DL (ref 65–99)
HCT VFR BLD AUTO: 29.7 % (ref 42–52)
HGB BLD-MCNC: 9.5 G/DL (ref 14–18)
IMM GRANULOCYTES # BLD AUTO: 0.04 K/UL (ref 0–0.11)
IMM GRANULOCYTES NFR BLD AUTO: 0.4 % (ref 0–0.9)
LYMPHOCYTES # BLD AUTO: 2.09 K/UL (ref 1–4.8)
LYMPHOCYTES NFR BLD: 18.7 % (ref 22–41)
MAGNESIUM SERPL-MCNC: 1.9 MG/DL (ref 1.5–2.5)
MCH RBC QN AUTO: 30.6 PG (ref 27–33)
MCHC RBC AUTO-ENTMCNC: 32 G/DL (ref 33.7–35.3)
MCV RBC AUTO: 95.8 FL (ref 81.4–97.8)
MONOCYTES # BLD AUTO: 0.82 K/UL (ref 0–0.85)
MONOCYTES NFR BLD AUTO: 7.3 % (ref 0–13.4)
NEUTROPHILS # BLD AUTO: 7.92 K/UL (ref 1.82–7.42)
NEUTROPHILS NFR BLD: 70.8 % (ref 44–72)
NRBC # BLD AUTO: 0 K/UL
NRBC BLD-RTO: 0 /100 WBC
PHOSPHATE SERPL-MCNC: 3.7 MG/DL (ref 2.5–4.5)
PLATELET # BLD AUTO: 518 K/UL (ref 164–446)
PMV BLD AUTO: 10.5 FL (ref 9–12.9)
POTASSIUM SERPL-SCNC: 4.3 MMOL/L (ref 3.6–5.5)
RBC # BLD AUTO: 3.1 M/UL (ref 4.7–6.1)
SODIUM SERPL-SCNC: 132 MMOL/L (ref 135–145)
WBC # BLD AUTO: 11.2 K/UL (ref 4.8–10.8)

## 2020-10-12 PROCEDURE — 97110 THERAPEUTIC EXERCISES: CPT

## 2020-10-12 PROCEDURE — 700105 HCHG RX REV CODE 258: Performed by: HOSPITALIST

## 2020-10-12 PROCEDURE — A9270 NON-COVERED ITEM OR SERVICE: HCPCS | Performed by: HOSPITALIST

## 2020-10-12 PROCEDURE — 84100 ASSAY OF PHOSPHORUS: CPT

## 2020-10-12 PROCEDURE — 82962 GLUCOSE BLOOD TEST: CPT

## 2020-10-12 PROCEDURE — 85025 COMPLETE CBC W/AUTO DIFF WBC: CPT

## 2020-10-12 PROCEDURE — 97116 GAIT TRAINING THERAPY: CPT

## 2020-10-12 PROCEDURE — 94669 MECHANICAL CHEST WALL OSCILL: CPT

## 2020-10-12 PROCEDURE — 700102 HCHG RX REV CODE 250 W/ 637 OVERRIDE(OP): Performed by: PHYSICAL MEDICINE & REHABILITATION

## 2020-10-12 PROCEDURE — 36415 COLL VENOUS BLD VENIPUNCTURE: CPT

## 2020-10-12 PROCEDURE — 770010 HCHG ROOM/CARE - REHAB SEMI PRIVAT*

## 2020-10-12 PROCEDURE — 97530 THERAPEUTIC ACTIVITIES: CPT

## 2020-10-12 PROCEDURE — 80048 BASIC METABOLIC PNL TOTAL CA: CPT

## 2020-10-12 PROCEDURE — 700101 HCHG RX REV CODE 250: Performed by: PHYSICAL MEDICINE & REHABILITATION

## 2020-10-12 PROCEDURE — 700102 HCHG RX REV CODE 250 W/ 637 OVERRIDE(OP): Performed by: HOSPITALIST

## 2020-10-12 PROCEDURE — A9270 NON-COVERED ITEM OR SERVICE: HCPCS | Performed by: PHYSICAL MEDICINE & REHABILITATION

## 2020-10-12 PROCEDURE — 83735 ASSAY OF MAGNESIUM: CPT

## 2020-10-12 PROCEDURE — 99233 SBSQ HOSP IP/OBS HIGH 50: CPT | Performed by: PHYSICAL MEDICINE & REHABILITATION

## 2020-10-12 PROCEDURE — 97530 THERAPEUTIC ACTIVITIES: CPT | Mod: CO

## 2020-10-12 PROCEDURE — 99232 SBSQ HOSP IP/OBS MODERATE 35: CPT | Performed by: HOSPITALIST

## 2020-10-12 PROCEDURE — 94760 N-INVAS EAR/PLS OXIMETRY 1: CPT

## 2020-10-12 PROCEDURE — 92526 ORAL FUNCTION THERAPY: CPT

## 2020-10-12 PROCEDURE — 92611 MOTION FLUOROSCOPY/SWALLOW: CPT

## 2020-10-12 PROCEDURE — 700111 HCHG RX REV CODE 636 W/ 250 OVERRIDE (IP): Performed by: PHYSICAL MEDICINE & REHABILITATION

## 2020-10-12 PROCEDURE — 74230 X-RAY XM SWLNG FUNCJ C+: CPT

## 2020-10-12 PROCEDURE — 97110 THERAPEUTIC EXERCISES: CPT | Mod: CO

## 2020-10-12 RX ORDER — BISACODYL 10 MG
10 SUPPOSITORY, RECTAL RECTAL
Status: DISCONTINUED | OUTPATIENT
Start: 2020-10-12 | End: 2020-10-21 | Stop reason: HOSPADM

## 2020-10-12 RX ORDER — SIMETHICONE 80 MG
160 TABLET,CHEWABLE ORAL 4 TIMES DAILY
Status: DISCONTINUED | OUTPATIENT
Start: 2020-10-12 | End: 2020-10-19

## 2020-10-12 RX ORDER — TRAZODONE HYDROCHLORIDE 50 MG/1
50 TABLET ORAL
Status: DISCONTINUED | OUTPATIENT
Start: 2020-10-12 | End: 2020-10-19

## 2020-10-12 RX ORDER — AMOXICILLIN 250 MG
2 CAPSULE ORAL 2 TIMES DAILY PRN
Status: DISCONTINUED | OUTPATIENT
Start: 2020-10-12 | End: 2020-10-21 | Stop reason: HOSPADM

## 2020-10-12 RX ORDER — LANOLIN ALCOHOL/MO/W.PET/CERES
3 CREAM (GRAM) TOPICAL
Status: DISCONTINUED | OUTPATIENT
Start: 2020-10-12 | End: 2020-10-19

## 2020-10-12 RX ORDER — ASCORBIC ACID 500 MG
500 TABLET ORAL
Status: DISCONTINUED | OUTPATIENT
Start: 2020-10-13 | End: 2020-10-14

## 2020-10-12 RX ORDER — SODIUM CHLORIDE 9 MG/ML
1000 INJECTION, SOLUTION INTRAVENOUS ONCE
Status: COMPLETED | OUTPATIENT
Start: 2020-10-12 | End: 2020-10-12

## 2020-10-12 RX ORDER — FERROUS SULFATE 325(65) MG
325 TABLET ORAL
Status: DISCONTINUED | OUTPATIENT
Start: 2020-10-13 | End: 2020-10-21 | Stop reason: HOSPADM

## 2020-10-12 RX ORDER — POLYETHYLENE GLYCOL 3350 17 G/17G
1 POWDER, FOR SOLUTION ORAL
Status: DISCONTINUED | OUTPATIENT
Start: 2020-10-12 | End: 2020-10-21 | Stop reason: HOSPADM

## 2020-10-12 RX ORDER — INSULIN GLARGINE 100 [IU]/ML
16 INJECTION, SOLUTION SUBCUTANEOUS
Status: DISCONTINUED | OUTPATIENT
Start: 2020-10-13 | End: 2020-10-13

## 2020-10-12 RX ORDER — METOCLOPRAMIDE 10 MG/1
5 TABLET ORAL 2 TIMES DAILY
Status: DISCONTINUED | OUTPATIENT
Start: 2020-10-12 | End: 2020-10-16

## 2020-10-12 RX ADMIN — TRAMADOL HYDROCHLORIDE 50 MG: 50 TABLET, COATED ORAL at 05:25

## 2020-10-12 RX ADMIN — METRONIDAZOLE 500 MG: 500 TABLET ORAL at 21:50

## 2020-10-12 RX ADMIN — OMEPRAZOLE 40 MG: KIT at 09:05

## 2020-10-12 RX ADMIN — MELATONIN TAB 3 MG 3 MG: 3 TAB at 21:48

## 2020-10-12 RX ADMIN — METOPROLOL TARTRATE 37.5 MG: 25 TABLET, FILM COATED ORAL at 17:58

## 2020-10-12 RX ADMIN — METOCLOPRAMIDE 5 MG: 10 TABLET ORAL at 21:49

## 2020-10-12 RX ADMIN — SODIUM CHLORIDE 1000 ML: 9 INJECTION, SOLUTION INTRAVENOUS at 16:14

## 2020-10-12 RX ADMIN — ENOXAPARIN SODIUM 40 MG: 40 INJECTION SUBCUTANEOUS at 21:56

## 2020-10-12 RX ADMIN — METRONIDAZOLE 500 MG: 500 TABLET ORAL at 14:50

## 2020-10-12 RX ADMIN — CLOPIDOGREL BISULFATE 75 MG: 75 TABLET ORAL at 09:07

## 2020-10-12 RX ADMIN — LISINOPRIL 10 MG: 5 TABLET ORAL at 05:19

## 2020-10-12 RX ADMIN — SIMETHICONE 160 MG: 80 TABLET, CHEWABLE ORAL at 16:22

## 2020-10-12 RX ADMIN — METOCLOPRAMIDE 5 MG: 10 TABLET ORAL at 12:24

## 2020-10-12 RX ADMIN — METFORMIN HYDROCHLORIDE 1000 MG: 500 TABLET ORAL at 09:05

## 2020-10-12 RX ADMIN — METOCLOPRAMIDE 5 MG: 10 TABLET ORAL at 00:02

## 2020-10-12 RX ADMIN — ACETAMINOPHEN 650 MG: 325 TABLET, FILM COATED ORAL at 00:08

## 2020-10-12 RX ADMIN — LEVOFLOXACIN 500 MG: 250 TABLET, FILM COATED ORAL at 09:06

## 2020-10-12 RX ADMIN — ATORVASTATIN CALCIUM 80 MG: 40 TABLET, FILM COATED ORAL at 21:48

## 2020-10-12 RX ADMIN — METOCLOPRAMIDE 5 MG: 10 TABLET ORAL at 05:20

## 2020-10-12 RX ADMIN — TRAZODONE HYDROCHLORIDE 50 MG: 50 TABLET ORAL at 21:49

## 2020-10-12 RX ADMIN — METRONIDAZOLE 500 MG: 500 TABLET ORAL at 05:21

## 2020-10-12 RX ADMIN — TRAMADOL HYDROCHLORIDE 50 MG: 50 TABLET, COATED ORAL at 21:50

## 2020-10-12 RX ADMIN — AMLODIPINE BESYLATE 10 MG: 5 TABLET ORAL at 05:21

## 2020-10-12 RX ADMIN — SERTRALINE HYDROCHLORIDE 50 MG: 50 TABLET ORAL at 09:06

## 2020-10-12 RX ADMIN — FERROUS SULFATE TAB 325 MG (65 MG ELEMENTAL FE) 325 MG: 325 (65 FE) TAB at 09:07

## 2020-10-12 RX ADMIN — METOPROLOL TARTRATE 37.5 MG: 25 TABLET, FILM COATED ORAL at 05:21

## 2020-10-12 RX ADMIN — TERAZOSIN HYDROCHLORIDE 1 MG: 1 CAPSULE ORAL at 21:50

## 2020-10-12 RX ADMIN — SIMETHICONE 160 MG: 80 TABLET, CHEWABLE ORAL at 21:49

## 2020-10-12 RX ADMIN — POTASSIUM BICARBONATE 25 MEQ: 978 TABLET, EFFERVESCENT ORAL at 09:05

## 2020-10-12 RX ADMIN — INSULIN GLARGINE 15 UNITS: 100 INJECTION, SOLUTION SUBCUTANEOUS at 09:08

## 2020-10-12 RX ADMIN — LIDOCAINE 1 PATCH: 50 PATCH TOPICAL at 09:14

## 2020-10-12 RX ADMIN — Medication 2000 UNITS: at 09:06

## 2020-10-12 RX ADMIN — ASPIRIN 81 MG CHEWABLE TABLET 81 MG: 81 TABLET CHEWABLE at 09:06

## 2020-10-12 ASSESSMENT — ENCOUNTER SYMPTOMS
DIARRHEA: 1
EYES NEGATIVE: 1
PALPITATIONS: 0
FEVER: 0
SHORTNESS OF BREATH: 0
VOMITING: 0
NAUSEA: 0
POLYDIPSIA: 0
COUGH: 0
CHILLS: 0
BRUISES/BLEEDS EASILY: 0
ABDOMINAL PAIN: 0

## 2020-10-12 ASSESSMENT — GAIT ASSESSMENTS
DISTANCE (FEET): 57
ASSISTIVE DEVICE: FRONT WHEEL WALKER
DEVIATION: SHUFFLED GAIT
GAIT LEVEL OF ASSIST: CONTACT GUARD ASSIST

## 2020-10-12 NOTE — PROGRESS NOTES
Hospital Medicine Daily Progress Note      Chief Complaint:  Hypertension  Tachycardia  Diabetes  Leukocytosis  S/P CABG  S/P CVA    Interval History:  Pt continues to have diarrhea.    Review of Systems  Review of Systems   Constitutional: Negative for chills and fever.   HENT: Negative.    Eyes: Negative.    Respiratory: Negative for cough and shortness of breath.    Cardiovascular: Negative for chest pain and palpitations.   Gastrointestinal: Positive for diarrhea. Negative for abdominal pain, nausea and vomiting.   Musculoskeletal:        Wound pain    Skin: Negative for itching and rash.   Endo/Heme/Allergies: Negative for polydipsia. Does not bruise/bleed easily.        Physical Exam  Temp:  [36.3 °C (97.3 °F)-36.8 °C (98.3 °F)] 36.8 °C (98.3 °F)  Pulse:  [] 88  Resp:  [18-25] 18  BP: ()/(56-77) 107/63  SpO2:  [93 %-97 %] 95 %    Physical Exam  Vitals signs reviewed.   Constitutional:       General: He is not in acute distress.     Appearance: Normal appearance. He is not ill-appearing.   HENT:      Head: Normocephalic and atraumatic.      Right Ear: External ear normal.      Left Ear: External ear normal.      Nose: Nose normal.      Mouth/Throat:      Pharynx: Oropharynx is clear.   Eyes:      General:         Right eye: No discharge.         Left eye: No discharge.      Extraocular Movements: Extraocular movements intact.      Conjunctiva/sclera: Conjunctivae normal.   Neck:      Musculoskeletal: Normal range of motion and neck supple.   Cardiovascular:      Rate and Rhythm: Normal rate and regular rhythm.   Pulmonary:      Effort: No respiratory distress.      Breath sounds: No wheezing.      Comments: Decreased BS   Abdominal:      General: Bowel sounds are normal. There is no distension.      Palpations: Abdomen is soft.      Tenderness: There is no abdominal tenderness.   Musculoskeletal:      Right lower leg: No edema.      Left lower leg: No edema.   Skin:     General: Skin is warm and  dry.   Neurological:      Mental Status: He is alert.      Comments: Awake and alert         Fluids    Intake/Output Summary (Last 24 hours) at 10/12/2020 1502  Last data filed at 10/12/2020 1200  Gross per 24 hour   Intake 1550 ml   Output 550 ml   Net 1000 ml       Laboratory  Recent Labs     10/10/20  0549 10/12/20  0621   WBC 15.8* 11.2*   RBC 3.28* 3.10*   HEMOGLOBIN 10.0* 9.5*   HEMATOCRIT 31.3* 29.7*   MCV 95.4 95.8   MCH 30.5 30.6   MCHC 31.9* 32.0*   RDW 49.1 47.9   PLATELETCT 473* 518*   MPV 11.0 10.5     Recent Labs     10/10/20  0549 10/12/20  0621   SODIUM 130* 132*   POTASSIUM 4.5 4.3   CHLORIDE 100 101   CO2 18* 20   GLUCOSE 290* 263*   BUN 27* 37*   CREATININE 0.96 1.28   CALCIUM 8.9 9.1                 Assessment/Plan  * Cardioembolic stroke (HCC)- (present on admission)  Assessment & Plan  S/P tyshawn-op CVA  On ASA, Plavix, and Lipitor    Ileus (HCC)- (present on admission)  Assessment & Plan  Most recent KUB from 10/11/20 is unremarkable  Will taper off Reglan  Discontinue Metformin and K-Lyte  C Dif pending  Start trial of scheduled high dose Simethicone    SONNY (acute kidney injury) (HCC)- (present on admission)  Assessment & Plan  BUN/Cr worsening  Will give cautious IVF  Follow renal function    CAD (coronary artery disease)- (present on admission)  Assessment & Plan  H/O cardiac stent x 2  S/P NSTEMI  S/P CABG x 3 vessels  On ASA, Plavix, Lipitor, Lisinopril, and Metoprolol  Follow BNP    Essential hypertension- (present on admission)  Assessment & Plan  Discontinue Norvasc for low blood pressure trends  Continue Lisinopril and Metoprolol for now  Observe blood pressure trends   Monitor for orthostatic hypotension on Hytrin    Type 2 diabetes mellitus with complication, without long-term current use of insulin (HCC)- (present on admission)  Assessment & Plan  HbA1c 7.9  Discontinue Metformin for diarrhea and SONNY  Increase Lantus  Adjust SSI  Outpt meds include Metformin 2000 mg bid per Epic  records (pt unable to confirm dosing)    Thrombocytosis (HCC)  Assessment & Plan  Likley reactive  Follow Platelet counts    Iron deficiency anemia  Assessment & Plan  Fe 31  Decrease Fe supplements to increase compliance  Add Vit C to Fe to aid absorption    Vitamin D deficiency  Assessment & Plan  Vit D level 17  On supplementation    Reactive depression- (present on admission)  Assessment & Plan  On Zoloft    Tachycardia- (present on admission)  Assessment & Plan  On Metoprolol  Consider w/u for PE    Pneumonia- (present on admission)  Assessment & Plan  On Levaquin and Flagyl per Dr. Rashid    Full Code

## 2020-10-12 NOTE — PROGRESS NOTES
HS fingerstick= 208. Humalog 6units given per sliding scale per MAR. He tolerated HS tube feed bolus via right nare coretrak without any c/o nausea & vomiting. Will continue to monitor.

## 2020-10-12 NOTE — THERAPY
"Physical Therapy   Daily Treatment     Patient Name: Guerrero Ann  Age:  49 y.o., Sex:  male  Medical Record #: [id number]  Today's Date: 10/12/2020     Precautions  Precautions: (P) Fall Risk  Comments: (P) R visual preference; whispers    Subjective    Pt was lying in bed upon arrival; agreeable to tx  SO verified two 6\" platform steps to enter home with no handrails.    Objective  Precautions: sternal, aspiration       10/12/20 0931   Precautions   Precautions Fall Risk   Comments R visual preference; whispers   Gait Functional Level of Assist    Gait Level Of Assist Contact Guard Assist   Assistive Device Front Wheel Walker   Distance (Feet) 57  (57ft x 1 FWW w/c follow CGA)   # of Times Distance was Traveled 1   Deviation Shuffled Gait  (narrow NEL)   Wheelchair Functional Level of Assist   Wheelchair Assist Supervised  (50% VCs for attn to task, obstacle avoidance in open environ)   Distance Wheelchair (Feet or Distance) 50  (50ft x 4)   Wheelchair Description   (B LE, extra time)   Stairs Functional Level of Assist   Level of Assist with Stairs Refused   Transfer Functional Level of Assist   Bed, Chair, Wheelchair Transfer Contact Guard Assist   Bed Chair Wheelchair Transfer Description   (reach pivot transfer, VCs for safety)   Supine Lower Body Exercise   Hip Abduction 2 sets of 15;Bilateral   Hip Adduction  2 sets of 15;Bilateral   Straight Leg Raises 2 sets of 10;Right;Left   Knee to Chest 2 sets of 10;Bilateral   Short Arc Quad 2 sets of 10;Right ;Left   Heel Slide 2 sets of 10;Right;Left   Ankle Pumps 3 sets of 15;Bilateral   Comments VCs for attn to task, TCs for full ROM/ performance/ improved control   Bed Mobility    Supine to Sit Contact Guard Assist   Sit to Supine Contact Guard Assist   Sit to Stand Contact Guard Assist   Scooting Stand by Assist   Rolling Minimal Assist to Rt.   Interdisciplinary Plan of Care Collaboration   IDT Collaboration with  Family / Caregiver "   Patient Position at End of Therapy In Bed;Phone within Reach;Tray Table within Reach;Call Light within Reach;Family / Friend in Room;Other (Comments)  (with Dr. Ng)   Collaboration Comments SO present for session   PT Total Time Spent   PT Individual Total Time Spent (Mins) 60   PT Charge Group   PT Gait Training 2   PT Therapeutic Exercise 1   PT Therapeutic Activities 1     Don/doff shoes with totalA    Education regarding CLOF, POC, goals of PT     Education on parts/ components of w/c    Assessment    Pt requiring VCs for safety and sequencing in STS, with transfers and for obstacle avoidance during mobility. Pt with flat affect and very bed seeking/ low activity tolerance. SO extremely supportive.     Strengths: Independent prior level of function, Supportive family, Willingly participates in therapeutic activities  Barriers: Visual impairment, Pain, Fatigue, Generalized weakness    Plan    AMB with FWW, set up on Vector for dynamic balance/ standing tolerance, attn to task, obstacle avoidance, exposure to open environments, encourage time out of bed, transfer training, bed mobility from standard bed     Physical Therapy Problems     Problem: Balance     Dates: Start: 10/10/20       Goal: STG-Within one week, patient will maintain dynamic standing     Dates: Start: 10/10/20       Description: 1) Individualized goal:  Pt will demonstrate ability to complete dynamic standing for >3 min SBA to improve independence with ADLs and IADLs within one week.  2) Interventions:  PT E Stim Attended, PT Gait Training, PT Therapeutic Exercises, PT TENS Application, PT Neuro Re-Ed/Balance, PT Therapeutic Activity, PT Manual Therapy, and PT Evaluation                  Problem: Mobility     Dates: Start: 10/10/20       Goal: STG-Within one week, patient will propel wheelchair household distances     Dates: Start: 10/10/20       Description: 1) Individualized goal:  Pt will propel wheelchair >200' with BLEs to become Caroline  within WC in unit within one week.  2) Interventions:  PT E Stim Attended, PT Gait Training, PT Therapeutic Exercises, PT TENS Application, PT Neuro Re-Ed/Balance, PT Therapeutic Activity, PT Manual Therapy, and PT Evaluation            Goal: STG-Within one week, patient will ambulate household distance     Dates: Start: 10/10/20       Description: 1) Individualized goal:  Pt will demonstrate ability to walk >100' CGA with FWW to achieve household distances within one week.  2) Interventions:  PT E Stim Attended, PT Gait Training, PT Therapeutic Exercises, PT TENS Application, PT Neuro Re-Ed/Balance, PT Therapeutic Activity, PT Manual Therapy, and PT Evaluation            Goal: STG-Within one week, patient will ambulate up/down a curb     Dates: Start: 10/10/20       Description: 1) Individualized goal:  Pt will trial ascending/descending a standard curb Donny with FWW to practice entry to home within one week.  2) Interventions:  PT E Stim Attended, PT Gait Training, PT Therapeutic Exercises, PT TENS Application, PT Neuro Re-Ed/Balance, PT Therapeutic Activity, PT Manual Therapy, and PT Evaluation                  Problem: PT-Long Term Goals     Dates: Start: 10/10/20       Goal: LTG-By discharge, patient will ambulate     Dates: Start: 10/10/20       Description: 1) Individualized goal:  Pt will demonstrate ability to ambulate >100' SBA to safely negotiate household distances at discharge.  2) Interventions:  PT E Stim Attended, PT Gait Training, PT Therapeutic Exercises, PT TENS Application, PT Neuro Re-Ed/Balance, PT Therapeutic Activity, PT Manual Therapy, and PT Evaluation            Goal: LTG-By discharge, patient will transfer one surface to another     Dates: Start: 10/10/20       Description: 1) Individualized goal:  Pt will demonstrate ability to complete chair to chair transfers and bed transfers independently to safely return to prior living environment.  2) Interventions:  PT E Stim Attended, PT Gait  Training, PT Therapeutic Exercises, PT TENS Application, PT Neuro Re-Ed/Balance, PT Therapeutic Activity, PT Manual Therapy, and PT Evaluation            Goal: LTG-By discharge, patient will ambulate up/down 4-6 stairs     Dates: Start: 10/10/20       Description: 1) Individualized goal:  Pt will demonstrate ability to ascend/descend 4 standard steps CGA with LRD to safely enter home.  2) Interventions:  PT E Stim Attended, PT Gait Training, PT Therapeutic Exercises, PT TENS Application, PT Neuro Re-Ed/Balance, PT Therapeutic Activity, PT Manual Therapy, and PT Evaluation

## 2020-10-12 NOTE — THERAPY
Speech Language Pathology  Daily Treatment     Patient Name: Guerrero Ann  Age:  49 y.o., Sex:  male  Medical Record #: [id number]  Today's Date: 10/12/2020     Precautions  Precautions: Fall Risk  Comments: sternal, aspiration, flat affect/ hypophonation    Subjective    Pt pleasant and cooperative, SO present and supportive.      Objective       10/12/20 1433   SLP Total Time Spent   SLP Individual Total Time Spent (Mins) 30   Treatment Charges   SLP Swallowing Dysfunction Treatment Swallowing Dysfunction Treatment       Assessment    MBSS reviewed at this time. Pt and SO demonstrating appropriate understanding of the information provided and asked appropriate questions throughout. Pt reporting that he did not like the taste of the cookie on the MBSS and that is why he did not swallow it, not because it was too difficult to tolerate. Rec: trials of soft and bite size textures. Discussed decreased impulsivity with ok for straws but refrain from serial swallows, both receptive to education provided at this time.     Strengths: Able to follow instructions, Independent prior level of function, Pleasant and cooperative, Motivated for self care and independence, Willingly participates in therapeutic activities  Barriers: Aspiration risk, Impaired functional cognition    Plan    Trials of soft and bite size with goal for NG removal if pt able to consume adequate PO intake for meals X2    Speech Therapy Problems     Problem: Expression STGs     Dates: Start: 10/10/20       Goal: STG-Within one week, patient will     Dates: Start: 10/10/20       Description: 1) Individualized goal:  state 10 words per minute given concrete category and min verbal cues.   2) Interventions:  SLP Speech Language Treatment and SLP Cognitive Skill Development                    Problem: Memory STGs     Dates: Start: 10/10/20       Goal: STG-Within one week, patient will     Dates: Start: 10/10/20       Description: 1)  Individualized goal:  recall daily events and safety strategies 80% accuracy given min cues, with use of memory book.   2) Interventions:  SLP Cognitive Skill Development                    Problem: Problem Solving STGs     Dates: Start: 10/10/20       Goal: STG-Within one week, patient will     Dates: Start: 10/10/20       Description: 1) Individualized goal:  complete single target visual scanning tasks with 80% accuracy, given min verbal cues.   2) Interventions:  SLP Cognitive Skill Development                    Problem: Speech/Swallowing LTGs     Dates: Start: 10/10/20       Goal: LTG-By discharge, patient will safely swallow     Dates: Start: 10/10/20       Description: 1) Individualized goal:  regular textures and thin liquids without aspiration.   2) Interventions:  SLP Swallowing Dysfunction Treatment, SLP Oral Pharyngeal Evaluation, SLP Video Swallow Evaluation, and SLP Group Treatment              Goal: LTG-By discharge, patient will solve basic problems     Dates: Start: 10/10/20       Description: 1) Individualized goal:  Alayna for safe discharge home.    2) Interventions:  SLP Speech Language Treatment, SLP Cognitive Skill Development, and SLP Group Treatment                    Problem: Swallowing STGs     Dates: Start: 10/10/20       Goal: STG-Within one week, patient will     Dates: Start: 10/10/20       Description: 1) Individualized goal:  participate in an MBS.  2) Interventions:  SLP Video Swallow Evaluation              Goal: STG-Within one week, patient will     Dates: Start: 10/10/20       Description: 1) Individualized goal:  tolerated NTL and puree textures by spoon, with s/s of aspiration less than 10% of trials.   2) Interventions:  SLP Swallowing Dysfunction Treatment

## 2020-10-12 NOTE — PROGRESS NOTES
"Rehab Progress Note     Date of Service: 10/12/2020  Chief Complaint: follow up stroke    Interval Events (Subjective)    Patient seen and examined today in his room. His SO is present. He had his swallow study this morning, which went well, so he's starting on a diet. Patient reports he's not sleeping well, unsure if he's getting sleeping medication. SO reports his mood has improved in the last several days. Patient's only complaint is the NG tube, which he really wants removed. He denies any pain.    Objective:  VITAL SIGNS: /63   Pulse 88   Temp 36.8 °C (98.3 °F) (Oral)   Resp 18   Ht 1.575 m (5' 2.01\")   Wt 68.5 kg (151 lb 0.2 oz)   SpO2 95%   BMI 27.61 kg/m²   Gen: alert, no apparent distress  HEENT: NG in nare  CV: regular rate and rhythm, no murmurs, no peripheral edema  Resp: clear to ascultation bilaterally, normal respiratory effort  GI: soft, non-tender abdomen, bowel sounds present  Neuro: notable for hypophonia, delayed responses  Psych: flat affect    Recent Results (from the past 72 hour(s))   ACCU-CHEK GLUCOSE    Collection Time: 10/09/20  5:23 PM   Result Value Ref Range    Glucose - Accu-Ck 288 (H) 65 - 99 mg/dL   ACCU-CHEK GLUCOSE    Collection Time: 10/10/20 12:19 AM   Result Value Ref Range    Glucose - Accu-Ck 196 (H) 65 - 99 mg/dL   Urinalysis    Collection Time: 10/10/20  3:35 AM    Specimen: Urine, Clean Catch   Result Value Ref Range    Color Yellow     Character Clear     Specific Gravity 1.025 <1.035    Ph 6.0 5.0 - 8.0    Glucose 250 (A) Negative mg/dL    Ketones Negative Negative mg/dL    Protein 30 (A) Negative mg/dL    Bilirubin Negative Negative    Urobilinogen, Urine 0.2 Negative    Nitrite Negative Negative    Leukocyte Esterase Negative Negative    Occult Blood Small (A) Negative    Micro Urine Req Microscopic    URINE MICROSCOPIC (W/UA)    Collection Time: 10/10/20  3:35 AM   Result Value Ref Range    WBC 5-10 (A) /hpf    RBC  (A) /hpf    Bacteria Negative None " /hpf    Epithelial Cells Negative /hpf    Hyaline Cast 0-2 /lpf   CBC with Differential    Collection Time: 10/10/20  5:49 AM   Result Value Ref Range    WBC 15.8 (H) 4.8 - 10.8 K/uL    RBC 3.28 (L) 4.70 - 6.10 M/uL    Hemoglobin 10.0 (L) 14.0 - 18.0 g/dL    Hematocrit 31.3 (L) 42.0 - 52.0 %    MCV 95.4 81.4 - 97.8 fL    MCH 30.5 27.0 - 33.0 pg    MCHC 31.9 (L) 33.7 - 35.3 g/dL    RDW 49.1 35.9 - 50.0 fL    Platelet Count 473 (H) 164 - 446 K/uL    MPV 11.0 9.0 - 12.9 fL    Neutrophils-Polys 74.90 (H) 44.00 - 72.00 %    Lymphocytes 15.30 (L) 22.00 - 41.00 %    Monocytes 6.00 0.00 - 13.40 %    Eosinophils 2.50 0.00 - 6.90 %    Basophils 0.40 0.00 - 1.80 %    Immature Granulocytes 0.90 0.00 - 0.90 %    Nucleated RBC 0.00 /100 WBC    Neutrophils (Absolute) 11.83 (H) 1.82 - 7.42 K/uL    Lymphs (Absolute) 2.41 1.00 - 4.80 K/uL    Monos (Absolute) 0.95 (H) 0.00 - 0.85 K/uL    Eos (Absolute) 0.39 0.00 - 0.51 K/uL    Baso (Absolute) 0.06 0.00 - 0.12 K/uL    Immature Granulocytes (abs) 0.14 (H) 0.00 - 0.11 K/uL    NRBC (Absolute) 0.00 K/uL   Comp Metabolic Panel (CMP)    Collection Time: 10/10/20  5:49 AM   Result Value Ref Range    Sodium 130 (L) 135 - 145 mmol/L    Potassium 4.5 3.6 - 5.5 mmol/L    Chloride 100 96 - 112 mmol/L    Co2 18 (L) 20 - 33 mmol/L    Anion Gap 12.0 7.0 - 16.0    Glucose 290 (H) 65 - 99 mg/dL    Bun 27 (H) 8 - 22 mg/dL    Creatinine 0.96 0.50 - 1.40 mg/dL    Calcium 8.9 8.5 - 10.5 mg/dL    AST(SGOT) 22 12 - 45 U/L    ALT(SGPT) 40 2 - 50 U/L    Alkaline Phosphatase 107 (H) 30 - 99 U/L    Total Bilirubin 0.4 0.1 - 1.5 mg/dL    Albumin 3.4 3.2 - 4.9 g/dL    Total Protein 7.2 6.0 - 8.2 g/dL    Globulin 3.8 (H) 1.9 - 3.5 g/dL    A-G Ratio 0.9 g/dL   Magnesium    Collection Time: 10/10/20  5:49 AM   Result Value Ref Range    Magnesium 1.9 1.5 - 2.5 mg/dL   Vitamin D, 25-hydroxy (blood)    Collection Time: 10/10/20  5:49 AM   Result Value Ref Range    25-Hydroxy   Vitamin D 25 17 (L) 30 - 100 ng/mL    proBrain Natriuretic Peptide, NT    Collection Time: 10/10/20  5:49 AM   Result Value Ref Range    NT-proBNP 629 (H) 0 - 125 pg/mL   FERRITIN    Collection Time: 10/10/20  5:49 AM   Result Value Ref Range    Ferritin 706.0 (H) 22.0 - 322.0 ng/mL   IRON/TOTAL IRON BIND    Collection Time: 10/10/20  5:49 AM   Result Value Ref Range    Iron 31 (L) 50 - 180 ug/dL    Total Iron Binding 270 250 - 450 ug/dL    Unsat Iron Binding 239 110 - 370 ug/dL    % Saturation 11 (L) 15 - 55 %   PHOSPHORUS    Collection Time: 10/10/20  5:49 AM   Result Value Ref Range    Phosphorus 3.7 2.5 - 4.5 mg/dL   ESTIMATED GFR    Collection Time: 10/10/20  5:49 AM   Result Value Ref Range    GFR If African American >60 >60 mL/min/1.73 m 2    GFR If Non African American >60 >60 mL/min/1.73 m 2   ACCU-CHEK GLUCOSE    Collection Time: 10/10/20  6:09 AM   Result Value Ref Range    Glucose - Accu-Ck 290 (H) 65 - 99 mg/dL   ACCU-CHEK GLUCOSE    Collection Time: 10/10/20 11:49 AM   Result Value Ref Range    Glucose - Accu-Ck 256 (H) 65 - 99 mg/dL   ACCU-CHEK GLUCOSE    Collection Time: 10/10/20  5:15 PM   Result Value Ref Range    Glucose - Accu-Ck 200 (H) 65 - 99 mg/dL   ACCU-CHEK GLUCOSE    Collection Time: 10/10/20 10:02 PM   Result Value Ref Range    Glucose - Accu-Ck 186 (H) 65 - 99 mg/dL   ACCU-CHEK GLUCOSE    Collection Time: 10/11/20  7:17 AM   Result Value Ref Range    Glucose - Accu-Ck 221 (H) 65 - 99 mg/dL   ACCU-CHEK GLUCOSE    Collection Time: 10/11/20 12:22 PM   Result Value Ref Range    Glucose - Accu-Ck 242 (H) 65 - 99 mg/dL   ACCU-CHEK GLUCOSE    Collection Time: 10/11/20  5:27 PM   Result Value Ref Range    Glucose - Accu-Ck 236 (H) 65 - 99 mg/dL   ACCU-CHEK GLUCOSE    Collection Time: 10/11/20  9:56 PM   Result Value Ref Range    Glucose - Accu-Ck 208 (H) 65 - 99 mg/dL   CBC WITH DIFFERENTIAL    Collection Time: 10/12/20  6:21 AM   Result Value Ref Range    WBC 11.2 (H) 4.8 - 10.8 K/uL    RBC 3.10 (L) 4.70 - 6.10 M/uL    Hemoglobin  9.5 (L) 14.0 - 18.0 g/dL    Hematocrit 29.7 (L) 42.0 - 52.0 %    MCV 95.8 81.4 - 97.8 fL    MCH 30.6 27.0 - 33.0 pg    MCHC 32.0 (L) 33.7 - 35.3 g/dL    RDW 47.9 35.9 - 50.0 fL    Platelet Count 518 (H) 164 - 446 K/uL    MPV 10.5 9.0 - 12.9 fL    Neutrophils-Polys 70.80 44.00 - 72.00 %    Lymphocytes 18.70 (L) 22.00 - 41.00 %    Monocytes 7.30 0.00 - 13.40 %    Eosinophils 2.10 0.00 - 6.90 %    Basophils 0.70 0.00 - 1.80 %    Immature Granulocytes 0.40 0.00 - 0.90 %    Nucleated RBC 0.00 /100 WBC    Neutrophils (Absolute) 7.92 (H) 1.82 - 7.42 K/uL    Lymphs (Absolute) 2.09 1.00 - 4.80 K/uL    Monos (Absolute) 0.82 0.00 - 0.85 K/uL    Eos (Absolute) 0.24 0.00 - 0.51 K/uL    Baso (Absolute) 0.08 0.00 - 0.12 K/uL    Immature Granulocytes (abs) 0.04 0.00 - 0.11 K/uL    NRBC (Absolute) 0.00 K/uL   Basic Metabolic Panel    Collection Time: 10/12/20  6:21 AM   Result Value Ref Range    Sodium 132 (L) 135 - 145 mmol/L    Potassium 4.3 3.6 - 5.5 mmol/L    Chloride 101 96 - 112 mmol/L    Co2 20 20 - 33 mmol/L    Glucose 263 (H) 65 - 99 mg/dL    Bun 37 (H) 8 - 22 mg/dL    Creatinine 1.28 0.50 - 1.40 mg/dL    Calcium 9.1 8.5 - 10.5 mg/dL    Anion Gap 11.0 7.0 - 16.0   MAGNESIUM    Collection Time: 10/12/20  6:21 AM   Result Value Ref Range    Magnesium 1.9 1.5 - 2.5 mg/dL   PHOSPHORUS    Collection Time: 10/12/20  6:21 AM   Result Value Ref Range    Phosphorus 3.7 2.5 - 4.5 mg/dL   ESTIMATED GFR    Collection Time: 10/12/20  6:21 AM   Result Value Ref Range    GFR If African American >60 >60 mL/min/1.73 m 2    GFR If Non African American 60 >60 mL/min/1.73 m 2   ACCU-CHEK GLUCOSE    Collection Time: 10/12/20  7:34 AM   Result Value Ref Range    Glucose - Accu-Ck 260 (H) 65 - 99 mg/dL   ACCU-CHEK GLUCOSE    Collection Time: 10/12/20 11:05 AM   Result Value Ref Range    Glucose - Accu-Ck 229 (H) 65 - 99 mg/dL       Current Facility-Administered Medications   Medication Frequency   • senna-docusate (PERICOLACE or SENOKOT S) 8.6-50  MG per tablet 2 Tab BID PRN    And   • polyethylene glycol/lytes (MIRALAX) PACKET 1 Packet QDAY PRN    And   • magnesium hydroxide (MILK OF MAGNESIA) suspension 30 mL QDAY PRN    And   • bisacodyl (DULCOLAX) suppository 10 mg QDAY PRN   • simethicone (MYLICON) chewable tab 160 mg 4XDAY   • [START ON 10/13/2020] insulin glargine (Lantus) injection QAM INSULIN   • insulin lispro (HumaLOG) injection 4X/DAY ACHS   • metoclopramide (REGLAN) tablet 5 mg BID   • [START ON 10/13/2020] ferrous sulfate tablet 325 mg QDAY with Breakfast   • [START ON 10/13/2020] ascorbic acid tablet 500 mg QDAY with Breakfast   • NS infusion 1,000 mL Once   • guaiFENesin dextromethorphan (ROBITUSSIN DM) 100-10 MG/5ML syrup 10 mL Q6HRS PRN   • vitamin D (cholecalciferol) tablet 2,000 Units DAILY   • terazosin (HYTRIN) capsule 1 mg Q EVENING   • hydrOXYzine HCl (ATARAX) tablet 50 mg Q6HRS PRN   • melatonin tablet 3 mg HS PRN   • Respiratory Therapy Consult Continuous RT   • Pharmacy Consult Request ...Pain Management Review 1 Each PHARMACY TO DOSE   • acetaminophen (TYLENOL) tablet 650 mg Q4HRS PRN   • artificial tears ophthalmic solution 1 Drop PRN   • benzocaine-menthol (CEPACOL) lozenge 1 Lozenge Q2HRS PRN   • mag hydrox-al hydrox-simeth (MAALOX PLUS ES or MYLANTA DS) suspension 20 mL Q2HRS PRN   • ondansetron (ZOFRAN ODT) dispertab 4 mg 4X/DAY PRN    Or   • ondansetron (ZOFRAN) syringe/vial injection 4 mg 4X/DAY PRN   • traZODone (DESYREL) tablet 50 mg QHS PRN   • sodium chloride (OCEAN) 0.65 % nasal spray 2 Spray PRN   • lactulose 20 GM/30ML solution 30 mL QDAY PRN   • docusate sodium (ENEMEEZ) enema 283 mg QDAY PRN   • fleet enema 133 mL QDAY PRN   • clopidogrel (PLAVIX) tablet 75 mg DAILY   • tramadol (ULTRAM) 50 MG tablet 50 mg Q4HRS PRN   • aspirin (ASA) chewable tab 81 mg DAILY   • atorvastatin (LIPITOR) tablet 80 mg QHS   • enoxaparin (LOVENOX) inj 40 mg Q EVENING   • lidocaine (LIDODERM) 5 % 1 Patch Q24HR   • omeprazole  (FIRST-OMEPRAZOLE) 2 mg/mL oral susp 40 mg DAILY   • lisinopril (PRINIVIL) tablet 10 mg Q DAY   • metoprolol (LOPRESSOR) tablet 37.5 mg TWICE DAILY   • sertraline (Zoloft) tablet 50 mg DAILY   • metroNIDAZOLE (FLAGYL) tablet 500 mg Q8HRS   • levoFLOXacin (LEVAQUIN) tablet 500 mg DAILY       Orders Placed This Encounter   Procedures   • DIET TUBE FEED     Standing Status:   Standing     Number of Occurrences:   1     Order Specific Question:   Diet     Answer:   Diet Tube Feed [35]     Order Specific Question:   Formula:     Answer:   Diabeticsource AC [34]     Comments:   goal 350mL QID ( meal times + HS)      Order Specific Question:   Route     Answer:   NG     Order Specific Question:   Select Bolus     Answer:   Other (specify bolus comments in the field to the right) [48]     Comments:   Diab[place] KUMAR goal 350mL QID at meal times + HS.  Give free water in between feedings      Order Specific Question:   Additive Frequency     Answer:   QID   • Diet Order Diabetic (meds whole 1 at a time with thin.)     Standing Status:   Standing     Number of Occurrences:   1     Order Specific Question:   Diet:     Answer:   Diabetic [3]     Comments:   meds whole 1 at a time with thin.     Order Specific Question:   Texture Modifier     Answer:   Level 5 - Minced & Moist (Dysphagia 2)     Order Specific Question:   Liquid level     Answer:   Level 0 - Thin       Assessment:  Active Hospital Problems    Diagnosis   • *Cardioembolic stroke (HCC)   • Ileus (HCC)   • Aspiration pneumonia (HCC)   • CAD (coronary artery disease)   • Essential hypertension   • Type 2 diabetes mellitus with complication, without long-term current use of insulin (HCC)   • Anxiety   • Vitamin D deficiency   • Azotemia   • Hyponatremia   • Iron deficiency anemia   • Pneumonia   • Tachycardia   • Impaired mobility and ADLs   • Reactive depression   • Diabetes mellitus due to underlying condition with hyperglycemia (HCC)     This patient is a 49 y.o.  male admitted for acute inpatient rehabilitation with Cardioembolic stroke (HCC).    Medical Decision Making and Plan:    BL strokes  Cardio-embolic after CABG  Dysphagia  Cognitive deficits  Continue full rehab program  PT/OT/SLP, 1 hr each discipline, 5 days per week  Continue aspirin, Plavix, and statin for secondary stroke prophylaxis  Outpatient follow up with Dr. Franco in rehab clinic    Reactive depression   Started sertraline 10/10  Dr. Heard consult this week if needed    Insomnia  Schedule melatonin and trazodone    Bowel  Meds as needed  Last BM 10/12    Bladder  Urinary retention  Check PVRs - 415, 278  ICP for over 400 cc - last required 10/11  Scheduled toileting  Started Hytrin 1 mg 10/10, monitor need to increase    DVT prophylaxis  Lovenox    Appreciate assistance of hospitalist with his medical co-morbidities:        Diabetes with hyperglycemia, Lantus, sliding scale insulin  Coronary artery disease status post CABG, aspirin, Plavix, and statin  Hypertension, lisinopril, metoprolol  Leukocytosis, improved, x-ray suggestive of pneumonia, on antibiotics  Status post ileus, titrate Reglan, simethicone   Aspiration pneumonia, started on antibiotics, metronidazole and levofloxacin  Tobacco use, will need counseling during his stay   Hypokalemia, resolved s/p supplementation  Normocytic anemia, ferrous sulfate and Vit C  Hyponatremia, mild monitor  GI prophylaxis, omeprazole  Vit D deficiency, continue supplementation    Total time:  35 minutes.  I spent greater than 50% of the time for patient care, counseling, and coordination on this date, including patient face-to face time, unit/floor time with review of records/pertinent lab data and studies, as well as discussing diagnostic evaluation/work up, planned therapeutic interventions, and future disposition of care, as per the interval events/subjective and the assessment and plan as noted above.      Ankita Ng M.D.   Physical Medicine and  Rehabilitation

## 2020-10-12 NOTE — THERAPY
Speech Language Pathology  Video Swallow     Patient Name:  Guerrero Ann  AGE:  49 y.o., SEX:  male  Medical Record #:  [id number]  Today's Date: 10/12/2020     Objective       10/12/20 0831   History / Background Information   Prior Level of Function for Eating / Swallowing WFL   Diagnosis Dysphagia   Dysphagia Symptoms Warranting Video Swallow overt s/s of aspiration during bedside swallow evaluation    Dentition Intact   Procedure   Patient Seated in  WC   Seated at (Degrees) 90   Views Completed Lateral   Fluoroscopy Time 86.1 seconds   Consistencies / Presentation Method   Mildly Thick (2) - (Nectar Thick) Cup;Teaspoon   Thin (0) Cup;Teaspoon   Pureed (4) Teaspoon   Soft & Bite-Sized (6) - (Dysphagia III) Teaspoon   Regular (7)   (self (cookie))   Barium Tablet Cup   Oral Phase   Regular (7) Ineffective Mastication   Pharyngeal Phase   Thin (0) Reduced Hyo-Laryngeal Elevation;Penetration During Swallow   Compensatory Strategies Attempted   Compensatory Strategies Attempted No   Impression   Dysphagia Present Yes   Oral - Pharyngeal Mild - Moderate Impairment   Prognosis   Prognosis for Improvement Good   Barriers to Improvement lethargy/decreased endurance   Positive Indicators for Improvement limited deficit, able to follow instructions, good motivation.    Recommendations   Diet / Liquid Recommendation Thin (0);Minced & Moist (5) - (Dysphagia II)   Medication Administration  Whole with Liquid Wash  (1 at a time)   Strategies / Precautions Small Bites;Small Sips   Interventions Dysphagia Therapy by SLP;Therapeutic Dining Program for Meals;Pharyngeal - Laryngeal Strengthening Exercises;Patient / Caregiver Education / Training   SLP Contact Information (Name / Extension) MP [phone]   Video Tape Number [id number]   Interdisciplinary Plan of Care Collaboration   IDT Collaboration with  Family / Caregiver;Physician;Nursing   Collaboration Comments regarding results of evaluation.    SLP Total Time Spent    SLP Individual Total Time Spent (Mins) 30   Charge Group   SLP Video Swallow / FEES Videofluoroscopic Evaluation       Assessment    Modified barium swallow study completed this day.  Pt presented with mild-moderate oral, and mild pharyngeal dysphagia.  Oral dysphagia was characterized by ineffective mastication of regular textures, due to lingual weakness and general fatigue.  Pharyngeal dysphagia was characterized by decreased hyolaryngeal excursion, resulting in 1 instance of flash penetration of thin liquids by cup.  No aspiration observed during this study.  Pt tolerated level 2 (NTL), level 4 (puree), level 6 (SBS), and barium tablet, without oral, or pharyngeal residue.      Plan    Recommend initiation of level 5 (MM) and level 0 (thin), straws ok (small sips), meds whole with thin 1 at a time.  Dysphagia therapy to assess diet tolerance, use of strategies, and to progress diet as deemed appropriate.

## 2020-10-12 NOTE — THERAPY
"Occupational Therapy  Daily Treatment     Patient Name: Guerrero Ann  Age:  49 y.o., Sex:  male  Medical Record #: [id number]  Today's Date: 10/12/2020     Precautions  Precautions: (P) Fall Risk  Comments: R visual preference; whispers         Subjective     primarily nodding yes and no.  Verbalized \"no\" with mod encouragement       Objective       10/12/20 0701   Precautions   Precautions Fall Risk   Functional Level of Assist   Bed, Chair, Wheelchair Transfer Stand by Assist  ( poor technique noted   )   Sitting Upper Body Exercises   Chest Press 2 sets of 10;Bilateral   Internal Shoulder Rotation 2 sets of 10;Right ;Left   External Shoulder Rotation 2 sets of 10;Right ;Left   Bicep Curls 2 sets of 10;Right ;Left   Pronation / Supination 2 sets of 10;Right ;Left   Other Exercise 2 sets of 10;Bilateral  arm circles forward and back   2 sets of 10;Right ;Left  knee to shoulder cross overs    Interdisciplinary Plan of Care Collaboration   IDT Collaboration with  Nursing   Patient Position at End of Therapy In Bed;Call Light within Reach;Tray Table within Reach   Collaboration Comments orthostatic BP readings taken    OT Total Time Spent   OT Individual Total Time Spent (Mins) 60   OT Charge Group   OT Therapy Activity 2   OT Therapeutic Exercise  2      transferring  W/c to bed patient \"climbed\" into bed on all four limbs. After transfer complete he nodded yes when asked if had sternal pain.    time spent educating patient regarding sternal precautions and therapy tx activities will include utilizing strategies to maintain precautions and minimize any sternal pain.   Assessment    Slow to arouse  Agreed that he did not sleep well when asked.   Continual gaze to the right    Max encouragement to get out of bed to participate in tx activity this AM   Declined ADL activity  Agreeable to tx activity in gym with  Mod encouragement required   Strengths: Alert and oriented, Adequate strength, " Independent prior level of function, Motivated for self care and independence, Willingly participates in therapeutic activities, Pleasant and cooperative, Supportive family  Barriers: Decreased endurance, Generalized weakness, Impaired balance, Tube feeding, Visual impairment    Plan     ADL , related mobility  Strength/endurance building    Vision assessment    Reinforce sternal  Precaution education       Occupational Therapy Goals     Problem: Bathing     Dates: Start: 10/10/20       Goal: STG-Within one week, patient will bathe     Dates: Start: 10/10/20       Description: 1) Individualized Goal:  CGA   2) Interventions:  OT Self Care/ADL, OT Cognitive Skill Dev, OT Community Reintegration, OT Manual Ther Technique, OT Neuro Re-Ed/Balance, OT Sensory Int Techniques, OT Therapeutic Activity, OT Evaluation, and OT Therapeutic Exercise                  Problem: Dressing     Dates: Start: 10/10/20       Goal: STG-Within one week, patient will dress LB     Dates: Start: 10/10/20       Description: 1) Individualized Goal:  CGA   2) Interventions:  OT Self Care/ADL, OT Cognitive Skill Dev, OT Community Reintegration, OT Manual Ther Technique, OT Neuro Re-Ed/Balance, OT Sensory Int Techniques, OT Therapeutic Activity, OT Evaluation, and OT Therapeutic Exercise                  Problem: OT Long Term Goals     Dates: Start: 10/10/20       Goal: LTG-By discharge, patient will complete basic self care tasks     Dates: Start: 10/10/20       Description: 1) Individualized Goal:  Mod I    2) Interventions:  OT Self Care/ADL, OT Cognitive Skill Dev, OT Community Reintegration, OT Manual Ther Technique, OT Neuro Re-Ed/Balance, OT Sensory Int Techniques, OT Therapeutic Activity, OT Evaluation, and OT Therapeutic Exercise            Goal: LTG-By discharge, patient will perform bathroom transfers     Dates: Start: 10/10/20       Description: 1) Individualized Goal:  Mod I    2) Interventions:  OT Self Care/ADL, OT Cognitive Skill  Dev, OT Community Reintegration, OT Manual Ther Technique, OT Neuro Re-Ed/Balance, OT Sensory Int Techniques, OT Therapeutic Activity, OT Evaluation, and OT Therapeutic Exercise

## 2020-10-12 NOTE — CARE PLAN
Problem: Other Problem (see comments)  Goal: Other Goal  Description: Monitor/Evaluation: Monitor PO intake, TF intake, diet upgrades weight, labs, medication adjustments, skin integrity, GI function, vitals, I/Os, and overall hydration status.  Adjust nutritional POC pending clinical outcomes.    RD following weekly.   Goal: 1.  Maintain adequate oral vs enteral TF nutrient/fluid intake to promote nutrition optimization/healing. 2. Transition to PO pending clinical outcomes.   Outcome: PROGRESSING AS EXPECTED

## 2020-10-12 NOTE — DIETARY
Nutrition Care/ Consult For Tube Feeding/ Free Water/ MST 3     Assessment: Note, this RD received phone call 8/10 from Dr. Rashid requesting review of TF and free water due to hyperglycemia.  This RD adjusted patient to bolus regimen 8/10 via verbal orders.     Admitting Diagnosis:  Cardioembolic stroke s/p NSTEMI and CABGx3 - stay complicated by ileus and aspiration PNA   Pertinent PMH:  coronary artery disease s/p stents, diabetes, hypertension, depression, Anxiety, MI     Additional Information: Patient in therapy during time of evaluation.  No family present at bedside.   Patient with MST of two reporting unsure of weight loss.  Has notably been on tube feeding since 9/30/20.    Per chart, patient tolerating bolus feedings with no issue.  On Reglan currently with liquid stools- MD considering backing off on this.  Note ileus inpatient with elevated BG levels.  No reports or documentation of nausea or vomiting.    Diet upgraded today with patient hopeful to have NG removed per notes.   Unable to obtain full nutrition history.      Tube Feeding: Diabetisource AC 350mL QID + 300mL free water flush q 6 hours providing 1680 kcals, 84 grams/protein, 1148mL free water + flush= 2348mL free water per day   Appetite: unable to assess   Diet: DM / MM5/ TN0   Average PO intake x 3 days: no PO recorded at this time     Labs: Fasting , WBC 11.2, Hgb 9.5/Hct 29.7, Platelets 518, Na+ 132, BUN 37, Glucose 263 , Hgb A1C 7.9%, CHOL 210, , HDL 37   Medications: Amlodipine, Aspirin, Lipitor, Plavix, Lovenox, Iron, 15 Units Lantus AM, SSI, Levaquin, Lisinopril, Lidocaine Patch , Metformin, Reglan, Metoprolol, Flagyl, Omeprazole, KLYTE, Zoloft, Hytrin, Vitamin D   PRN Medications: noted  IVF: n/a     Height: 157.5cm   Weight:  68.5kg   IBW: 53.6 kg   BMI: 27.61- overweight status     Skin: sx incision right leg, sx incision to chest, abdominal wound surgical, no edema per chart review   GI: medium loose BM today- note  Reglan  : WNL   Vitals: , BP 98/69, RA  I/Os: +1610mL x 24 hours     Nutrient Needs:  Kcal: 1500- 1800 kcals/day  BEE= 1435  Protein: 64- 75 grams/day ( 1.2-1.5g/kg IBW)   Fluid: 6239-9962 mL/day - can do more as tolerated     Malnutrition risk: unable to assess but highly unlikely given BMI, TF for prolonged period of time     Diagnosis: Difficulty swallowing r/t secondary dysphagia s/p stroke with aspiration PNA as evidenced by alternate route of nutrition/ hydration/ medications.      Intervention/ Recommendations/POC:  1. Diet upgrades per SLP.  Hold bolus if PO >50% of intake.  Recommend remove NG in next 24 hours if PO >50% of meals.  Continue free water due to azotemia and hypotension and tachycardia.   2.Encourage adequate PO/fluid intake.  3. Nutrition rep to see regarding food prefs/ honor within dietary restrictions (if indicated)  4. Continue current TF regimen, adjust free water to 250mL every 4 hours.       Monitor/Evaluation: Monitor PO intake, TF intake, diet upgrades weight, labs, medication adjustments, skin integrity, GI function, vitals, I/Os, and overall hydration status.  Adjust nutritional POC pending clinical outcomes.    RD following weekly.   Goal: 1.  Maintain adequate oral vs enteral TF nutrient/fluid intake to promote nutrition optimization/healing. 2. Transition to PO pending clinical outcomes.

## 2020-10-12 NOTE — CARE PLAN
Problem: Safety  Goal: Will remain free from injury  Outcome: PROGRESSING AS EXPECTED  Goal: Will remain free from falls  Outcome: PROGRESSING AS EXPECTED     Problem: Bowel/Gastric:  Goal: Normal bowel function is maintained or improved  Outcome: PROGRESSING AS EXPECTED  Had 1 large loose BM this am. All scheduled bowel meds on hold for now.

## 2020-10-12 NOTE — FLOWSHEET NOTE
10/12/20 1125   Events/Summary/Plan   Events/Summary/Plan 02 spot check, PEP   Vital Signs   Pulse (!) 101   Respiration 18   Pulse Oximetry 96 %   $ Pulse Oximetry (Spot Check) Yes   Respiratory Assessment   Level of Consciousness Alert   Oxygen   O2 Delivery Device None - Room Air

## 2020-10-12 NOTE — CARE PLAN
Problem: Safety  Goal: Will remain free from injury  Outcome: PROGRESSING AS EXPECTED  Note: Pt uses call light  appropriately. Waits for assistance and does not attempt self transfer this shift. Able to verbalize needs.      Problem: Bowel/Gastric:  Goal: Normal bowel function is maintained or improved  Outcome: PROGRESSING AS EXPECTED  Note: Pt refused his scheduled senna tonight. He had some loose stools during daytime. Will continue to monitor.     Problem: Urinary Elimination:  Goal: Ability to reestablish a normal urinary elimination pattern will improve  Outcome: PROGRESSING SLOWER THAN EXPECTED  Note: Pt continent of bladder. On bladder meds. He is voiding with high PVR scans >400ml; straight cath'd pt per protocol @ 2230 with 550ml clear connor urine output. Will continue to monitor.

## 2020-10-13 ENCOUNTER — APPOINTMENT (OUTPATIENT)
Dept: RADIOLOGY | Facility: REHABILITATION | Age: 50
DRG: 057 | End: 2020-10-13
Attending: HOSPITALIST
Payer: COMMERCIAL

## 2020-10-13 DIAGNOSIS — I63.9 CARDIOEMBOLIC STROKE (HCC): ICD-10-CM

## 2020-10-13 LAB
ANION GAP SERPL CALC-SCNC: 12 MMOL/L (ref 7–16)
APPEARANCE UR: ABNORMAL
BACTERIA #/AREA URNS HPF: NEGATIVE /HPF
BILIRUB UR QL STRIP.AUTO: NEGATIVE
BUN SERPL-MCNC: 31 MG/DL (ref 8–22)
CALCIUM SERPL-MCNC: 8.9 MG/DL (ref 8.5–10.5)
CHLORIDE SERPL-SCNC: 102 MMOL/L (ref 96–112)
CO2 SERPL-SCNC: 21 MMOL/L (ref 20–33)
COLOR UR: YELLOW
CREAT SERPL-MCNC: 0.95 MG/DL (ref 0.5–1.4)
EPI CELLS #/AREA URNS HPF: NEGATIVE /HPF
ERYTHROCYTE [DISTWIDTH] IN BLOOD BY AUTOMATED COUNT: 47.7 FL (ref 35.9–50)
GLUCOSE BLD-MCNC: 164 MG/DL (ref 65–99)
GLUCOSE BLD-MCNC: 175 MG/DL (ref 65–99)
GLUCOSE BLD-MCNC: 197 MG/DL (ref 65–99)
GLUCOSE BLD-MCNC: 208 MG/DL (ref 65–99)
GLUCOSE SERPL-MCNC: 211 MG/DL (ref 65–99)
GLUCOSE UR STRIP.AUTO-MCNC: NEGATIVE MG/DL
HCT VFR BLD AUTO: 32.2 % (ref 42–52)
HGB BLD-MCNC: 10.2 G/DL (ref 14–18)
HYALINE CASTS #/AREA URNS LPF: ABNORMAL /LPF
KETONES UR STRIP.AUTO-MCNC: NEGATIVE MG/DL
LEUKOCYTE ESTERASE UR QL STRIP.AUTO: NEGATIVE
MCH RBC QN AUTO: 30.3 PG (ref 27–33)
MCHC RBC AUTO-ENTMCNC: 31.7 G/DL (ref 33.7–35.3)
MCV RBC AUTO: 95.5 FL (ref 81.4–97.8)
MICRO URNS: ABNORMAL
NITRITE UR QL STRIP.AUTO: NEGATIVE
NT-PROBNP SERPL IA-MCNC: 197 PG/ML (ref 0–125)
PH UR STRIP.AUTO: 5 [PH] (ref 5–8)
PLATELET # BLD AUTO: 563 K/UL (ref 164–446)
PMV BLD AUTO: 10.4 FL (ref 9–12.9)
POTASSIUM SERPL-SCNC: 4.2 MMOL/L (ref 3.6–5.5)
PROT UR QL STRIP: 30 MG/DL
RBC # BLD AUTO: 3.37 M/UL (ref 4.7–6.1)
RBC # URNS HPF: >150 /HPF
RBC UR QL AUTO: ABNORMAL
SODIUM SERPL-SCNC: 135 MMOL/L (ref 135–145)
SP GR UR STRIP.AUTO: 1.02
URATE CRY #/AREA URNS HPF: POSITIVE /HPF
UROBILINOGEN UR STRIP.AUTO-MCNC: 0.2 MG/DL
WBC # BLD AUTO: 10.4 K/UL (ref 4.8–10.8)
WBC #/AREA URNS HPF: ABNORMAL /HPF

## 2020-10-13 PROCEDURE — A9270 NON-COVERED ITEM OR SERVICE: HCPCS | Performed by: PHYSICAL MEDICINE & REHABILITATION

## 2020-10-13 PROCEDURE — 99233 SBSQ HOSP IP/OBS HIGH 50: CPT | Performed by: PHYSICAL MEDICINE & REHABILITATION

## 2020-10-13 PROCEDURE — 81001 URINALYSIS AUTO W/SCOPE: CPT

## 2020-10-13 PROCEDURE — 700111 HCHG RX REV CODE 636 W/ 250 OVERRIDE (IP): Performed by: PHYSICAL MEDICINE & REHABILITATION

## 2020-10-13 PROCEDURE — 97530 THERAPEUTIC ACTIVITIES: CPT

## 2020-10-13 PROCEDURE — 700102 HCHG RX REV CODE 250 W/ 637 OVERRIDE(OP): Performed by: PHYSICAL MEDICINE & REHABILITATION

## 2020-10-13 PROCEDURE — 94760 N-INVAS EAR/PLS OXIMETRY 1: CPT

## 2020-10-13 PROCEDURE — 74018 RADEX ABDOMEN 1 VIEW: CPT

## 2020-10-13 PROCEDURE — 770010 HCHG ROOM/CARE - REHAB SEMI PRIVAT*

## 2020-10-13 PROCEDURE — 97110 THERAPEUTIC EXERCISES: CPT

## 2020-10-13 PROCEDURE — 85027 COMPLETE CBC AUTOMATED: CPT

## 2020-10-13 PROCEDURE — 82962 GLUCOSE BLOOD TEST: CPT | Mod: 91

## 2020-10-13 PROCEDURE — 36415 COLL VENOUS BLD VENIPUNCTURE: CPT

## 2020-10-13 PROCEDURE — 700101 HCHG RX REV CODE 250: Performed by: PHYSICAL MEDICINE & REHABILITATION

## 2020-10-13 PROCEDURE — 700102 HCHG RX REV CODE 250 W/ 637 OVERRIDE(OP): Performed by: HOSPITALIST

## 2020-10-13 PROCEDURE — A9270 NON-COVERED ITEM OR SERVICE: HCPCS | Performed by: HOSPITALIST

## 2020-10-13 PROCEDURE — 99232 SBSQ HOSP IP/OBS MODERATE 35: CPT | Performed by: HOSPITALIST

## 2020-10-13 PROCEDURE — 83880 ASSAY OF NATRIURETIC PEPTIDE: CPT

## 2020-10-13 PROCEDURE — 80048 BASIC METABOLIC PNL TOTAL CA: CPT

## 2020-10-13 PROCEDURE — 94669 MECHANICAL CHEST WALL OSCILL: CPT

## 2020-10-13 RX ORDER — TERAZOSIN 1 MG/1
2 CAPSULE ORAL EVERY EVENING
Status: DISCONTINUED | OUTPATIENT
Start: 2020-10-13 | End: 2020-10-15

## 2020-10-13 RX ORDER — INSULIN GLARGINE 100 [IU]/ML
18 INJECTION, SOLUTION SUBCUTANEOUS
Status: DISCONTINUED | OUTPATIENT
Start: 2020-10-14 | End: 2020-10-16

## 2020-10-13 RX ADMIN — LEVOFLOXACIN 500 MG: 250 TABLET, FILM COATED ORAL at 08:49

## 2020-10-13 RX ADMIN — TRAMADOL HYDROCHLORIDE 50 MG: 50 TABLET, COATED ORAL at 14:54

## 2020-10-13 RX ADMIN — METOPROLOL TARTRATE 37.5 MG: 25 TABLET, FILM COATED ORAL at 17:51

## 2020-10-13 RX ADMIN — FERROUS SULFATE TAB 325 MG (65 MG ELEMENTAL FE) 325 MG: 325 (65 FE) TAB at 08:49

## 2020-10-13 RX ADMIN — LISINOPRIL 10 MG: 5 TABLET ORAL at 05:09

## 2020-10-13 RX ADMIN — ENOXAPARIN SODIUM 40 MG: 40 INJECTION SUBCUTANEOUS at 21:48

## 2020-10-13 RX ADMIN — LIDOCAINE 1 PATCH: 50 PATCH TOPICAL at 09:02

## 2020-10-13 RX ADMIN — INSULIN GLARGINE 16 UNITS: 100 INJECTION, SOLUTION SUBCUTANEOUS at 08:51

## 2020-10-13 RX ADMIN — METRONIDAZOLE 500 MG: 500 TABLET ORAL at 14:54

## 2020-10-13 RX ADMIN — OXYCODONE HYDROCHLORIDE AND ACETAMINOPHEN 500 MG: 500 TABLET ORAL at 08:50

## 2020-10-13 RX ADMIN — ONDANSETRON 4 MG: 4 TABLET, ORALLY DISINTEGRATING ORAL at 11:22

## 2020-10-13 RX ADMIN — TRAMADOL HYDROCHLORIDE 50 MG: 50 TABLET, COATED ORAL at 05:27

## 2020-10-13 RX ADMIN — TRAZODONE HYDROCHLORIDE 50 MG: 50 TABLET ORAL at 21:47

## 2020-10-13 RX ADMIN — METOCLOPRAMIDE 5 MG: 10 TABLET ORAL at 08:50

## 2020-10-13 RX ADMIN — SIMETHICONE 160 MG: 80 TABLET, CHEWABLE ORAL at 08:50

## 2020-10-13 RX ADMIN — MELATONIN TAB 3 MG 3 MG: 3 TAB at 21:47

## 2020-10-13 RX ADMIN — OMEPRAZOLE 40 MG: KIT at 09:01

## 2020-10-13 RX ADMIN — CLOPIDOGREL BISULFATE 75 MG: 75 TABLET ORAL at 08:49

## 2020-10-13 RX ADMIN — SIMETHICONE 160 MG: 80 TABLET, CHEWABLE ORAL at 14:54

## 2020-10-13 RX ADMIN — Medication 2000 UNITS: at 08:49

## 2020-10-13 RX ADMIN — SIMETHICONE 160 MG: 80 TABLET, CHEWABLE ORAL at 21:46

## 2020-10-13 RX ADMIN — TERAZOSIN HYDROCHLORIDE 2 MG: 1 CAPSULE ORAL at 21:46

## 2020-10-13 RX ADMIN — ATORVASTATIN CALCIUM 80 MG: 40 TABLET, FILM COATED ORAL at 21:46

## 2020-10-13 RX ADMIN — METRONIDAZOLE 500 MG: 500 TABLET ORAL at 22:16

## 2020-10-13 RX ADMIN — SERTRALINE HYDROCHLORIDE 50 MG: 50 TABLET ORAL at 08:50

## 2020-10-13 RX ADMIN — ASPIRIN 81 MG CHEWABLE TABLET 81 MG: 81 TABLET CHEWABLE at 08:50

## 2020-10-13 RX ADMIN — METOCLOPRAMIDE 5 MG: 10 TABLET ORAL at 21:47

## 2020-10-13 RX ADMIN — METRONIDAZOLE 500 MG: 500 TABLET ORAL at 05:09

## 2020-10-13 RX ADMIN — METOPROLOL TARTRATE 37.5 MG: 25 TABLET, FILM COATED ORAL at 05:09

## 2020-10-13 ASSESSMENT — PAIN DESCRIPTION - PAIN TYPE
TYPE: ACUTE PAIN
TYPE: ACUTE PAIN

## 2020-10-13 ASSESSMENT — ENCOUNTER SYMPTOMS
ABDOMINAL PAIN: 0
NAUSEA: 0
POLYDIPSIA: 0
FEVER: 0
BRUISES/BLEEDS EASILY: 0
VOMITING: 0
ROS GI COMMENTS: BLOATING
PALPITATIONS: 0
SHORTNESS OF BREATH: 0
EYES NEGATIVE: 1
COUGH: 0
CHILLS: 0

## 2020-10-13 NOTE — THERAPY
Physical Therapy   Daily Treatment     Patient Name: Guerrero Ann  Age:  49 y.o., Sex:  male  Medical Record #: [id number]  Today's Date: 10/13/2020     Precautions  Precautions: Fall Risk  Comments: cdiff precautions, sternal, aspiration, flat affect/ hypophonation    Subjective  Pt lying in bed. Refused to get out of bed d/t R hamstring pain, abdominal discomfort; ultimately agreeable to R hamstring conservative pain management techniques.     Objective       10/13/20 1401   Precautions   Precautions Fall Risk   Comments cdiff precautions, sternal, aspiration, flat affect/ hypophonation   Supine Lower Body Exercise   Comments PROM R hamstring 60sec x6, STM mobilization to medial hamstring 7 minutes; hot pack applied to R hamstring for pain relief 10 minutes   Bed Mobility    Rolling Supervised  (x4)   Interdisciplinary Plan of Care Collaboration   IDT Collaboration with  Nursing;Physician;Family / Caregiver   Patient Position at End of Therapy In Bed;Other (Comments)  (with xray tech for abdominal xray)   Collaboration Comments re: CLOF, POC, flat affect, refusal to do any OOB exercise d/t R hamstring pain, abdominal discomfort   PT Total Time Spent   PT Individual Total Time Spent (Mins) 30   PT Charge Group   PT Therapeutic Exercise 1   PT Therapeutic Activities 1     Discussion with patient on goals of physical therapy; conservative pain management strategies for R hamstring (self stretching, education on self massage and heat application)    Assessment    Pt self limited this session, with flat affect, limited motivation and poor initiation. Receptive to all conservative pain management strategies. Follows one step commands/ questions but did not respond verbally this session.   Strengths: Independent prior level of function, Supportive family, Willingly participates in therapeutic activities  Barriers: Visual impairment, Pain, Fatigue, Generalized weakness    Plan    AMB with FWW, set up  on Vector for dynamic balance/ standing tolerance, attn to task, obstacle avoidance, exposure to open environments, encourage time out of bed, transfer training, bed mobility from standard bed     Physical Therapy Problems     Problem: Balance     Dates: Start: 10/10/20       Goal: STG-Within one week, patient will maintain dynamic standing     Dates: Start: 10/10/20       Description: 1) Individualized goal:  Pt will demonstrate ability to complete dynamic standing for >3 min SBA to improve independence with ADLs and IADLs within one week.  2) Interventions:  PT E Stim Attended, PT Gait Training, PT Therapeutic Exercises, PT TENS Application, PT Neuro Re-Ed/Balance, PT Therapeutic Activity, PT Manual Therapy, and PT Evaluation                  Problem: Mobility     Dates: Start: 10/10/20       Goal: STG-Within one week, patient will propel wheelchair household distances     Dates: Start: 10/10/20       Description: 1) Individualized goal:  Pt will propel wheelchair >200' with BLEs to become Caroline within WC in unit within one week.  2) Interventions:  PT E Stim Attended, PT Gait Training, PT Therapeutic Exercises, PT TENS Application, PT Neuro Re-Ed/Balance, PT Therapeutic Activity, PT Manual Therapy, and PT Evaluation            Goal: STG-Within one week, patient will ambulate household distance     Dates: Start: 10/10/20       Description: 1) Individualized goal:  Pt will demonstrate ability to walk >100' CGA with FWW to achieve household distances within one week.  2) Interventions:  PT E Stim Attended, PT Gait Training, PT Therapeutic Exercises, PT TENS Application, PT Neuro Re-Ed/Balance, PT Therapeutic Activity, PT Manual Therapy, and PT Evaluation            Goal: STG-Within one week, patient will ambulate up/down a curb     Dates: Start: 10/10/20       Description: 1) Individualized goal:  Pt will trial ascending/descending a standard curb Donny with FWW to practice entry to home within one week.  2)  Interventions:  PT E Stim Attended, PT Gait Training, PT Therapeutic Exercises, PT TENS Application, PT Neuro Re-Ed/Balance, PT Therapeutic Activity, PT Manual Therapy, and PT Evaluation                  Problem: PT-Long Term Goals     Dates: Start: 10/10/20       Goal: LTG-By discharge, patient will ambulate     Dates: Start: 10/10/20       Description: 1) Individualized goal:  Pt will demonstrate ability to ambulate >100' SBA to safely negotiate household distances at discharge.  2) Interventions:  PT E Stim Attended, PT Gait Training, PT Therapeutic Exercises, PT TENS Application, PT Neuro Re-Ed/Balance, PT Therapeutic Activity, PT Manual Therapy, and PT Evaluation            Goal: LTG-By discharge, patient will transfer one surface to another     Dates: Start: 10/10/20       Description: 1) Individualized goal:  Pt will demonstrate ability to complete chair to chair transfers and bed transfers independently to safely return to prior living environment.  2) Interventions:  PT E Stim Attended, PT Gait Training, PT Therapeutic Exercises, PT TENS Application, PT Neuro Re-Ed/Balance, PT Therapeutic Activity, PT Manual Therapy, and PT Evaluation            Goal: LTG-By discharge, patient will ambulate up/down 4-6 stairs     Dates: Start: 10/10/20       Description: 1) Individualized goal:  Pt will demonstrate ability to ascend/descend 4 standard steps CGA with LRD to safely enter home.  2) Interventions:  PT E Stim Attended, PT Gait Training, PT Therapeutic Exercises, PT TENS Application, PT Neuro Re-Ed/Balance, PT Therapeutic Activity, PT Manual Therapy, and PT Evaluation

## 2020-10-13 NOTE — CARE PLAN
Problem: Bathing  Goal: STG-Within one week, patient will bathe  Description: 1) Individualized Goal:  CGA   2) Interventions:  OT Self Care/ADL, OT Cognitive Skill Dev, OT Community Reintegration, OT Manual Ther Technique, OT Neuro Re-Ed/Balance, OT Sensory Int Techniques, OT Therapeutic Activity, OT Evaluation, and OT Therapeutic Exercise    Outcome: NOT MET  Note: Min assist    Decreased strength/endurance , decreased balance ,  vision issues   Continue goal      Problem: Dressing  Goal: STG-Within one week, patient will dress LB  Description: 1) Individualized Goal:  CGA   2) Interventions:  OT Self Care/ADL, OT Cognitive Skill Dev, OT Community Reintegration, OT Manual Ther Technique, OT Neuro Re-Ed/Balance, OT Sensory Int Techniques, OT Therapeutic Activity, OT Evaluation, and OT Therapeutic Exercise    Outcome: NOT MET  Note: Min assist   Decreased strength/endurance , decreased balance ,  vision issues   Continue goal

## 2020-10-13 NOTE — THERAPY
Missed Therapy     Patient Name: Guerrero Ann  Age:  49 y.o., Sex:  male  Medical Record #: [id number]  Today's Date: 10/13/2020    Discussed missed therapy with Dr Ng , Scheduling        10/13/20 5387   Interdisciplinary Plan of Care Collaboration   IDT Collaboration with  Physician  (Scheduling )   Collaboration Comments  patient attempted  participation in tx   limited by  constant nausea and reporting dizziness as well.     Dr Ng approved hold order for this session   Will attempt again  later today if therapy and patient schedule allows.    Therapy Missed   Missed Therapy (Minutes) 60   Reason For Missed Therapy Medical - Patient on Hold from Therapy

## 2020-10-13 NOTE — PROGRESS NOTES
"Rehab Progress Note     Date of Service: 10/13/2020  Chief Complaint: follow up stroke    Interval Events (Subjective)    Patient seen and examined today in his room. He had loose stools yesterday, nausea today, unable to tolerate any tube feeds, missed OT this morning. Also is unable to urinate, required straight cath last night x 2. Patient also with some pain in his right posterior thigh, thought to be from the therapy exercises. Patient with no other complaints.     Objective:  VITAL SIGNS: /72   Pulse 100   Temp 36.6 °C (97.9 °F) (Oral)   Resp 18   Ht 1.575 m (5' 2.01\")   Wt 68.5 kg (151 lb 0.2 oz)   SpO2 95%   BMI 27.61 kg/m²   Gen: alert, no apparent distress  HEENT: NG in nare  CV: regular rate and rhythm, no murmurs, no peripheral edema  Resp: clear to ascultation bilaterally, normal respiratory effort  GI: soft, non-tender abdomen, bowel sounds hyperactive  Psych: flat affect    Recent Results (from the past 72 hour(s))   ACCU-CHEK GLUCOSE    Collection Time: 10/10/20  5:15 PM   Result Value Ref Range    Glucose - Accu-Ck 200 (H) 65 - 99 mg/dL   ACCU-CHEK GLUCOSE    Collection Time: 10/10/20 10:02 PM   Result Value Ref Range    Glucose - Accu-Ck 186 (H) 65 - 99 mg/dL   ACCU-CHEK GLUCOSE    Collection Time: 10/11/20  7:17 AM   Result Value Ref Range    Glucose - Accu-Ck 221 (H) 65 - 99 mg/dL   ACCU-CHEK GLUCOSE    Collection Time: 10/11/20 12:22 PM   Result Value Ref Range    Glucose - Accu-Ck 242 (H) 65 - 99 mg/dL   ACCU-CHEK GLUCOSE    Collection Time: 10/11/20  5:27 PM   Result Value Ref Range    Glucose - Accu-Ck 236 (H) 65 - 99 mg/dL   ACCU-CHEK GLUCOSE    Collection Time: 10/11/20  9:56 PM   Result Value Ref Range    Glucose - Accu-Ck 208 (H) 65 - 99 mg/dL   CBC WITH DIFFERENTIAL    Collection Time: 10/12/20  6:21 AM   Result Value Ref Range    WBC 11.2 (H) 4.8 - 10.8 K/uL    RBC 3.10 (L) 4.70 - 6.10 M/uL    Hemoglobin 9.5 (L) 14.0 - 18.0 g/dL    Hematocrit 29.7 (L) 42.0 - 52.0 %    MCV " 95.8 81.4 - 97.8 fL    MCH 30.6 27.0 - 33.0 pg    MCHC 32.0 (L) 33.7 - 35.3 g/dL    RDW 47.9 35.9 - 50.0 fL    Platelet Count 518 (H) 164 - 446 K/uL    MPV 10.5 9.0 - 12.9 fL    Neutrophils-Polys 70.80 44.00 - 72.00 %    Lymphocytes 18.70 (L) 22.00 - 41.00 %    Monocytes 7.30 0.00 - 13.40 %    Eosinophils 2.10 0.00 - 6.90 %    Basophils 0.70 0.00 - 1.80 %    Immature Granulocytes 0.40 0.00 - 0.90 %    Nucleated RBC 0.00 /100 WBC    Neutrophils (Absolute) 7.92 (H) 1.82 - 7.42 K/uL    Lymphs (Absolute) 2.09 1.00 - 4.80 K/uL    Monos (Absolute) 0.82 0.00 - 0.85 K/uL    Eos (Absolute) 0.24 0.00 - 0.51 K/uL    Baso (Absolute) 0.08 0.00 - 0.12 K/uL    Immature Granulocytes (abs) 0.04 0.00 - 0.11 K/uL    NRBC (Absolute) 0.00 K/uL   Basic Metabolic Panel    Collection Time: 10/12/20  6:21 AM   Result Value Ref Range    Sodium 132 (L) 135 - 145 mmol/L    Potassium 4.3 3.6 - 5.5 mmol/L    Chloride 101 96 - 112 mmol/L    Co2 20 20 - 33 mmol/L    Glucose 263 (H) 65 - 99 mg/dL    Bun 37 (H) 8 - 22 mg/dL    Creatinine 1.28 0.50 - 1.40 mg/dL    Calcium 9.1 8.5 - 10.5 mg/dL    Anion Gap 11.0 7.0 - 16.0   MAGNESIUM    Collection Time: 10/12/20  6:21 AM   Result Value Ref Range    Magnesium 1.9 1.5 - 2.5 mg/dL   PHOSPHORUS    Collection Time: 10/12/20  6:21 AM   Result Value Ref Range    Phosphorus 3.7 2.5 - 4.5 mg/dL   ESTIMATED GFR    Collection Time: 10/12/20  6:21 AM   Result Value Ref Range    GFR If African American >60 >60 mL/min/1.73 m 2    GFR If Non African American 60 >60 mL/min/1.73 m 2   ACCU-CHEK GLUCOSE    Collection Time: 10/12/20  7:34 AM   Result Value Ref Range    Glucose - Accu-Ck 260 (H) 65 - 99 mg/dL   ACCU-CHEK GLUCOSE    Collection Time: 10/12/20 11:05 AM   Result Value Ref Range    Glucose - Accu-Ck 229 (H) 65 - 99 mg/dL   ACCU-CHEK GLUCOSE    Collection Time: 10/12/20  5:35 PM   Result Value Ref Range    Glucose - Accu-Ck 187 (H) 65 - 99 mg/dL   ACCU-CHEK GLUCOSE    Collection Time: 10/12/20  9:48 PM    Result Value Ref Range    Glucose - Accu-Ck 183 (H) 65 - 99 mg/dL   CBC WITHOUT DIFFERENTIAL    Collection Time: 10/13/20  5:56 AM   Result Value Ref Range    WBC 10.4 4.8 - 10.8 K/uL    RBC 3.37 (L) 4.70 - 6.10 M/uL    Hemoglobin 10.2 (L) 14.0 - 18.0 g/dL    Hematocrit 32.2 (L) 42.0 - 52.0 %    MCV 95.5 81.4 - 97.8 fL    MCH 30.3 27.0 - 33.0 pg    MCHC 31.7 (L) 33.7 - 35.3 g/dL    RDW 47.7 35.9 - 50.0 fL    Platelet Count 563 (H) 164 - 446 K/uL    MPV 10.4 9.0 - 12.9 fL   Basic Metabolic Panel    Collection Time: 10/13/20  5:56 AM   Result Value Ref Range    Sodium 135 135 - 145 mmol/L    Potassium 4.2 3.6 - 5.5 mmol/L    Chloride 102 96 - 112 mmol/L    Co2 21 20 - 33 mmol/L    Glucose 211 (H) 65 - 99 mg/dL    Bun 31 (H) 8 - 22 mg/dL    Creatinine 0.95 0.50 - 1.40 mg/dL    Calcium 8.9 8.5 - 10.5 mg/dL    Anion Gap 12.0 7.0 - 16.0   proBrain Natriuretic Peptide, NT    Collection Time: 10/13/20  5:56 AM   Result Value Ref Range    NT-proBNP 197 (H) 0 - 125 pg/mL   ESTIMATED GFR    Collection Time: 10/13/20  5:56 AM   Result Value Ref Range    GFR If African American >60 >60 mL/min/1.73 m 2    GFR If Non African American >60 >60 mL/min/1.73 m 2   ACCU-CHEK GLUCOSE    Collection Time: 10/13/20  8:03 AM   Result Value Ref Range    Glucose - Accu-Ck 197 (H) 65 - 99 mg/dL   ACCU-CHEK GLUCOSE    Collection Time: 10/13/20 11:20 AM   Result Value Ref Range    Glucose - Accu-Ck 208 (H) 65 - 99 mg/dL       Current Facility-Administered Medications   Medication Frequency   • terazosin (HYTRIN) capsule 2 mg Q EVENING   • Pharmacy Consult Request PHARMACY TO DOSE   • senna-docusate (PERICOLACE or SENOKOT S) 8.6-50 MG per tablet 2 Tab BID PRN    And   • polyethylene glycol/lytes (MIRALAX) PACKET 1 Packet QDAY PRN    And   • magnesium hydroxide (MILK OF MAGNESIA) suspension 30 mL QDAY PRN    And   • bisacodyl (DULCOLAX) suppository 10 mg QDAY PRN   • simethicone (MYLICON) chewable tab 160 mg 4XDAY   • insulin glargine (Lantus)  injection QAM INSULIN   • insulin lispro (HumaLOG) injection 4X/DAY ACHS   • metoclopramide (REGLAN) tablet 5 mg BID   • ferrous sulfate tablet 325 mg QDAY with Breakfast   • ascorbic acid tablet 500 mg QDAY with Breakfast   • melatonin tablet 3 mg QHS   • traZODone (DESYREL) tablet 50 mg QHS   • guaiFENesin dextromethorphan (ROBITUSSIN DM) 100-10 MG/5ML syrup 10 mL Q6HRS PRN   • vitamin D (cholecalciferol) tablet 2,000 Units DAILY   • hydrOXYzine HCl (ATARAX) tablet 50 mg Q6HRS PRN   • Respiratory Therapy Consult Continuous RT   • Pharmacy Consult Request ...Pain Management Review 1 Each PHARMACY TO DOSE   • acetaminophen (TYLENOL) tablet 650 mg Q4HRS PRN   • artificial tears ophthalmic solution 1 Drop PRN   • benzocaine-menthol (CEPACOL) lozenge 1 Lozenge Q2HRS PRN   • mag hydrox-al hydrox-simeth (MAALOX PLUS ES or MYLANTA DS) suspension 20 mL Q2HRS PRN   • ondansetron (ZOFRAN ODT) dispertab 4 mg 4X/DAY PRN    Or   • ondansetron (ZOFRAN) syringe/vial injection 4 mg 4X/DAY PRN   • sodium chloride (OCEAN) 0.65 % nasal spray 2 Spray PRN   • lactulose 20 GM/30ML solution 30 mL QDAY PRN   • docusate sodium (ENEMEEZ) enema 283 mg QDAY PRN   • fleet enema 133 mL QDAY PRN   • clopidogrel (PLAVIX) tablet 75 mg DAILY   • tramadol (ULTRAM) 50 MG tablet 50 mg Q4HRS PRN   • aspirin (ASA) chewable tab 81 mg DAILY   • atorvastatin (LIPITOR) tablet 80 mg QHS   • enoxaparin (LOVENOX) inj 40 mg Q EVENING   • lidocaine (LIDODERM) 5 % 1 Patch Q24HR   • omeprazole (FIRST-OMEPRAZOLE) 2 mg/mL oral susp 40 mg DAILY   • lisinopril (PRINIVIL) tablet 10 mg Q DAY   • metoprolol (LOPRESSOR) tablet 37.5 mg TWICE DAILY   • sertraline (Zoloft) tablet 50 mg DAILY   • metroNIDAZOLE (FLAGYL) tablet 500 mg Q8HRS   • levoFLOXacin (LEVAQUIN) tablet 500 mg DAILY       Orders Placed This Encounter   Procedures   • DIET TUBE FEED     Standing Status:   Standing     Number of Occurrences:   1     Order Specific Question:   Diet     Answer:   Diet Tube  Feed [35]     Order Specific Question:   Formula:     Answer:   Diabeticsource AC [34]     Comments:   goal 350mL QID ( meal times + HS)      Order Specific Question:   Route     Answer:   NG     Order Specific Question:   Select Bolus     Answer:   Other (specify bolus comments in the field to the right) [48]     Comments:   Diabetisource AC goal 350mL QID at meal times + HS.  Give free water in between feedings      Order Specific Question:   Additive Frequency     Answer:   QID   • Diet Order Diabetic (meds whole 1 at a time with thin.)     Standing Status:   Standing     Number of Occurrences:   1     Order Specific Question:   Diet:     Answer:   Diabetic [3]     Comments:   meds whole 1 at a time with thin.     Order Specific Question:   Texture Modifier     Answer:   Level 5 - Minced & Moist (Dysphagia 2)     Order Specific Question:   Liquid level     Answer:   Level 0 - Thin       Assessment:  Active Hospital Problems    Diagnosis   • *Cardioembolic stroke (HCC)   • Ileus (HCC)   • Aspiration pneumonia (HCC)   • CAD (coronary artery disease)   • Essential hypertension   • Type 2 diabetes mellitus with complication, without long-term current use of insulin (HCC)   • Anxiety   • Vitamin D deficiency   • Azotemia   • Hyponatremia   • Iron deficiency anemia   • Pneumonia   • Tachycardia   • Impaired mobility and ADLs   • Reactive depression   • Diabetes mellitus due to underlying condition with hyperglycemia (HCC)     This patient is a 49 y.o. male admitted for acute inpatient rehabilitation with Cardioembolic stroke (HCC).    Therapy notes reviewed.     We will have his first weekly conference tomorrow to discuss a discharge date.     Medical Decision Making and Plan:    BL strokes  Cardio-embolic after CABG  Dysphagia  Cognitive deficits  Continue full rehab program  PT/OT/SLP, 1 hr each discipline, 5 days per week  Continue aspirin, Plavix, and statin for secondary stroke prophylaxis  Outpatient follow up  with Dr. Franco in rehab clinic, referral made    Reactive depression   Started sertraline 10/10  Dr. Heard consult for Weds    Insomnia, improved  Scheduled melatonin and trazodone    Bowel  Loose stools  Meds as needed  Last BM 10/12  Check KUB, c diff    Bladder  Urinary retention  Check PVRs - 415, 278  ICP for over 400 cc - last required 10/12  Scheduled toileting  Started Hytrin 1 mg 10/10 --> 2 mg 10/13  Check UA    DVT prophylaxis  Lovenox    Appreciate assistance of hospitalist with his medical co-morbidities:      Diabetes with hyperglycemia, Lantus, sliding scale insulin  Coronary artery disease status post CABG, aspirin, Plavix, and statin  Hypertension, lisinopril, metoprolol  Leukocytosis, improved, x-ray suggestive of pneumonia, on antibiotics  Status post ileus, titrate Reglan, simethicone   Loose stools, check KUB and C diff  Aspiration pneumonia, started on antibiotics, metronidazole and levofloxacin  Tobacco use, will need counseling during his stay   Hypokalemia, resolved s/p supplementation  Normocytic anemia, ferrous sulfate and Vit C  Hyponatremia, mild monitor  GI prophylaxis, omeprazole  Vit D deficiency, continue supplementation    Total time:  36 minutes.  I spent greater than 50% of the time for patient care, counseling, and coordination on this date, including patient face-to face time, unit/floor time with review of records/pertinent lab data and studies, as well as discussing diagnostic evaluation/work up, planned therapeutic interventions, and future disposition of care, as per the interval events/subjective and the assessment and plan as noted above.    I have performed a physical exam, reviewed and updated ROS, as well as the assessment and plan today 10/13/2020. In review of note from 10/12/2020 there are no new changes except as documented above.    Ankita Ng M.D.   Physical Medicine and Rehabilitation

## 2020-10-13 NOTE — FLOWSHEET NOTE
10/13/20 0935   Events/Summary/Plan   Events/Summary/Plan 02 spot check, PEP therapy   Vital Signs   Pulse 100   Respiration 18   Pulse Oximetry 95 %   $ Pulse Oximetry (Spot Check) Yes   Respiratory Assessment   Level of Consciousness Alert   Oxygen   O2 Delivery Device None - Room Air

## 2020-10-13 NOTE — THERAPY
Physical Therapy   Daily Treatment     Patient Name: Guerrero Ann  Age:  49 y.o., Sex:  male  Medical Record #: [id number]  Today's Date: 10/13/2020     Precautions  Precautions: Fall Risk  Comments: cdiff precautions, sternal, aspiration, flat affect/ hypophonation    Subjective    Pt resting in bed, reports R hamstring cramping, nausea / abdominal distention and pain     Objective       10/13/20 1501   Supine Lower Body Exercise   Straight Leg Raises 1 set of 15;Right   Quadriceps Isometrics   (1 set of 5 RLE. )   Comments hamstring stretching RLE 3 x 30 sec.    Bed Mobility    Supine to Sit Contact Guard Assist   Sit to Supine Contact Guard Assist   Sit to Stand Contact Guard Assist   PT Total Time Spent   PT Individual Total Time Spent (Mins) 15   PT Charge Group   PT Therapeutic Activities 1       Assessment    Pt performed sit<>stand x 3 from edge of bed to FWW and CGA, but unable to maintain standing > 30 sec each. Poor activity tolerance/ RLE pain and nausea to limit active participation    Strengths: Independent prior level of function, Supportive family, Willingly participates in therapeutic activities  Barriers: Visual impairment, Pain, Fatigue, Generalized weakness    Plan    Strength/ endurance and gait training with FWW, increase OOB activity tolerance, once c-diff isolation removed include Vector for gait/ balance training    Physical Therapy Problems     Problem: Balance     Dates: Start: 10/10/20       Goal: STG-Within one week, patient will maintain dynamic standing     Dates: Start: 10/10/20       Description: 1) Individualized goal:  Pt will demonstrate ability to complete dynamic standing for >3 min SBA to improve independence with ADLs and IADLs within one week.  2) Interventions:  PT E Stim Attended, PT Gait Training, PT Therapeutic Exercises, PT TENS Application, PT Neuro Re-Ed/Balance, PT Therapeutic Activity, PT Manual Therapy, and PT Evaluation                   Problem: Mobility     Dates: Start: 10/10/20       Goal: STG-Within one week, patient will propel wheelchair household distances     Dates: Start: 10/10/20       Description: 1) Individualized goal:  Pt will propel wheelchair >200' with BLEs to become Caroline within WC in unit within one week.  2) Interventions:  PT E Stim Attended, PT Gait Training, PT Therapeutic Exercises, PT TENS Application, PT Neuro Re-Ed/Balance, PT Therapeutic Activity, PT Manual Therapy, and PT Evaluation            Goal: STG-Within one week, patient will ambulate household distance     Dates: Start: 10/10/20       Description: 1) Individualized goal:  Pt will demonstrate ability to walk >100' CGA with FWW to achieve household distances within one week.  2) Interventions:  PT E Stim Attended, PT Gait Training, PT Therapeutic Exercises, PT TENS Application, PT Neuro Re-Ed/Balance, PT Therapeutic Activity, PT Manual Therapy, and PT Evaluation            Goal: STG-Within one week, patient will ambulate up/down a curb     Dates: Start: 10/10/20       Description: 1) Individualized goal:  Pt will trial ascending/descending a standard curb Donny with FWW to practice entry to home within one week.  2) Interventions:  PT E Stim Attended, PT Gait Training, PT Therapeutic Exercises, PT TENS Application, PT Neuro Re-Ed/Balance, PT Therapeutic Activity, PT Manual Therapy, and PT Evaluation                  Problem: PT-Long Term Goals     Dates: Start: 10/10/20       Goal: LTG-By discharge, patient will ambulate     Dates: Start: 10/10/20       Description: 1) Individualized goal:  Pt will demonstrate ability to ambulate >100' SBA to safely negotiate household distances at discharge.  2) Interventions:  PT E Stim Attended, PT Gait Training, PT Therapeutic Exercises, PT TENS Application, PT Neuro Re-Ed/Balance, PT Therapeutic Activity, PT Manual Therapy, and PT Evaluation            Goal: LTG-By discharge, patient will transfer one surface to another      Dates: Start: 10/10/20       Description: 1) Individualized goal:  Pt will demonstrate ability to complete chair to chair transfers and bed transfers independently to safely return to prior living environment.  2) Interventions:  PT E Stim Attended, PT Gait Training, PT Therapeutic Exercises, PT TENS Application, PT Neuro Re-Ed/Balance, PT Therapeutic Activity, PT Manual Therapy, and PT Evaluation            Goal: LTG-By discharge, patient will ambulate up/down 4-6 stairs     Dates: Start: 10/10/20       Description: 1) Individualized goal:  Pt will demonstrate ability to ascend/descend 4 standard steps CGA with LRD to safely enter home.  2) Interventions:  PT E Stim Attended, PT Gait Training, PT Therapeutic Exercises, PT TENS Application, PT Neuro Re-Ed/Balance, PT Therapeutic Activity, PT Manual Therapy, and PT Evaluation

## 2020-10-13 NOTE — PROGRESS NOTES
Hospital Medicine Daily Progress Note      Chief Complaint:  Hypertension  Tachycardia  Diabetes  Leukocytosis  S/P CABG  S/P CVA    Interval History:  Pt c/o abd bloating today.  No nausea or vomiting.    Review of Systems  Review of Systems   Constitutional: Negative for chills and fever.   HENT: Negative.    Eyes: Negative.    Respiratory: Negative for cough and shortness of breath.    Cardiovascular: Negative for chest pain and palpitations.   Gastrointestinal: Negative for abdominal pain, nausea and vomiting.        Bloating   Musculoskeletal:        Wound pain    Skin: Negative for itching and rash.   Endo/Heme/Allergies: Negative for polydipsia. Does not bruise/bleed easily.        Physical Exam  Temp:  [36.6 °C (97.9 °F)-36.8 °C (98.2 °F)] 36.6 °C (97.9 °F)  Pulse:  [] 100  Resp:  [18] 18  BP: (112-133)/(67-81) 114/72  SpO2:  [92 %-95 %] 95 %    Physical Exam  Vitals signs reviewed.   Constitutional:       General: He is not in acute distress.     Appearance: Normal appearance. He is not ill-appearing.   HENT:      Head: Normocephalic and atraumatic.      Right Ear: External ear normal.      Left Ear: External ear normal.      Nose: Nose normal.      Mouth/Throat:      Pharynx: Oropharynx is clear.   Eyes:      General:         Right eye: No discharge.         Left eye: No discharge.      Extraocular Movements: Extraocular movements intact.      Conjunctiva/sclera: Conjunctivae normal.   Neck:      Musculoskeletal: Normal range of motion and neck supple.   Cardiovascular:      Rate and Rhythm: Normal rate and regular rhythm.   Pulmonary:      Effort: No respiratory distress.      Breath sounds: No wheezing.      Comments: Decreased BS   Abdominal:      General: Bowel sounds are normal. There is distension.      Palpations: Abdomen is soft.      Tenderness: There is no abdominal tenderness.   Musculoskeletal:      Right lower leg: No edema.      Left lower leg: No edema.   Skin:     General: Skin is  warm and dry.   Neurological:      Mental Status: He is alert.      Comments: Awake and alert         Fluids    Intake/Output Summary (Last 24 hours) at 10/13/2020 1540  Last data filed at 10/13/2020 1239  Gross per 24 hour   Intake 560 ml   Output 850 ml   Net -290 ml       Laboratory  Recent Labs     10/12/20  0621 10/13/20  0556   WBC 11.2* 10.4   RBC 3.10* 3.37*   HEMOGLOBIN 9.5* 10.2*   HEMATOCRIT 29.7* 32.2*   MCV 95.8 95.5   MCH 30.6 30.3   MCHC 32.0* 31.7*   RDW 47.9 47.7   PLATELETCT 518* 563*   MPV 10.5 10.4     Recent Labs     10/12/20  0621 10/13/20  0556   SODIUM 132* 135   POTASSIUM 4.3 4.2   CHLORIDE 101 102   CO2 20 21   GLUCOSE 263* 211*   BUN 37* 31*   CREATININE 1.28 0.95   CALCIUM 9.1 8.9                 Assessment/Plan  * Cardioembolic stroke (HCC)- (present on admission)  Assessment & Plan  S/P tyshawn-op CVA  On ASA, Plavix, and Lipitor    Ileus ([facility])- (present on admission)  Assessment & Plan  Abd distended today  Will get F/U KUB  C Dif pending  Continue Reglan and Simethicone    SONNY (acute kidney injury) ([facility])- (present on admission)  Assessment & Plan  BUN/Cr improved w/ IVF  Follow renal function    CAD (coronary artery disease)- (present on admission)  Assessment & Plan  H/O cardiac stent x 2  S/P NSTEMI  S/P CABG x 3 vessels  On ASA, Plavix, Lipitor, Lisinopril, and Metoprolol  BNP improving    Essential hypertension- (present on admission)  Assessment & Plan  On Lisinopril and Metoprolol  Observe blood pressure trends   Monitor for orthostatic hypotension on Hytrin    Type 2 diabetes mellitus with complication, without long-term current use of insulin (HCC)- (present on admission)  Assessment & Plan  HbA1c 7.9  Discontinued Metformin for diarrhea and SONNY  Continue to increase Lantus for hyperglycemia  Also on SSI  Outpt meds include Metformin 2000 mg bid per Epic records (pt unable to confirm dosing)    Thrombocytosis (HCC)  Assessment & Plan  Likley reactive  Follow Platelet  counts    Iron deficiency anemia  Assessment & Plan  Fe 31  Continue Fe and Vit C supplements    Vitamin D deficiency  Assessment & Plan  Vit D level 17  On supplementation    Reactive depression- (present on admission)  Assessment & Plan  On Zoloft    Tachycardia- (present on admission)  Assessment & Plan  On Metoprolol  Consider w/u for PE    Pneumonia- (present on admission)  Assessment & Plan  On Levaquin and Flagyl per Dr. Rashid    Full Code

## 2020-10-13 NOTE — THERAPY
Missed Therapy     Patient Name: Guerrero Ann  Age:  49 y.o., Sex:  male  Medical Record #: [id number]  Today's Date: 10/13/2020    Discussed missed therapy with MD, RN and    Pt reporting nausea, loose bowels, c-diff rule out - med hold to be placed per MD       10/13/20 1103   Therapy Missed   Missed Therapy (Minutes) 60   Reason For Missed Therapy Medical - Patient on Hold from Therapy  (nausea, unable to participate, MD med hold)

## 2020-10-13 NOTE — CARE PLAN
Problem: Expression STGs  Goal: STG-Within one week, patient will  Description: 1) Individualized goal:  state 10 words per minute given concrete category and min verbal cues.   2) Interventions:  SLP Speech Language Treatment and SLP Cognitive Skill Development      Outcome: NOT MET     Problem: Problem Solving STGs  Goal: STG-Within one week, patient will  Description: 1) Individualized goal:  complete single target visual scanning tasks with 80% accuracy, given min verbal cues.   2) Interventions:  SLP Cognitive Skill Development      Outcome: NOT MET     Problem: Memory STGs  Goal: STG-Within one week, patient will  Description: 1) Individualized goal:  recall daily events and safety strategies 80% accuracy given min cues, with use of memory book.   2) Interventions:  SLP Cognitive Skill Development      Outcome: NOT MET     Problem: Swallowing STGs  Goal: STG-Within one week, patient will  Description: 1) Individualized goal:  participate in an MBS.  2) Interventions:  SLP Video Swallow Evaluation      Outcome: MET  Goal: STG-Within one week, patient will  Description: 1) Individualized goal:  tolerated NTL and puree textures by spoon, with s/s of aspiration less than 10% of trials.   2) Interventions:  SLP Swallowing Dysfunction Treatment      Outcome: MET

## 2020-10-13 NOTE — THERAPY
Missed Therapy     Patient Name: Guerrero Ann  Age:  49 y.o., Sex:  male  Medical Record #: [id number]  Today's Date: 10/13/2020    Discussed missed therapy with MD / scheduling and nurse: pt unable to fully participate due to nausea/ abdominal distention/ pain.       10/13/20 1515   Therapy Missed   Missed Therapy (Minutes) 45   Reason For Missed Therapy Medical - Patient on Hold from Therapy  (nausea/ abdominal distention/ pain)

## 2020-10-14 LAB
GLUCOSE BLD-MCNC: 160 MG/DL (ref 65–99)
GLUCOSE BLD-MCNC: 230 MG/DL (ref 65–99)

## 2020-10-14 PROCEDURE — 99232 SBSQ HOSP IP/OBS MODERATE 35: CPT | Performed by: HOSPITALIST

## 2020-10-14 PROCEDURE — 97110 THERAPEUTIC EXERCISES: CPT

## 2020-10-14 PROCEDURE — A9270 NON-COVERED ITEM OR SERVICE: HCPCS | Performed by: PHYSICAL MEDICINE & REHABILITATION

## 2020-10-14 PROCEDURE — 700102 HCHG RX REV CODE 250 W/ 637 OVERRIDE(OP): Performed by: HOSPITALIST

## 2020-10-14 PROCEDURE — A9270 NON-COVERED ITEM OR SERVICE: HCPCS | Performed by: HOSPITALIST

## 2020-10-14 PROCEDURE — 97129 THER IVNTJ 1ST 15 MIN: CPT

## 2020-10-14 PROCEDURE — 770010 HCHG ROOM/CARE - REHAB SEMI PRIVAT*

## 2020-10-14 PROCEDURE — 97530 THERAPEUTIC ACTIVITIES: CPT

## 2020-10-14 PROCEDURE — 700111 HCHG RX REV CODE 636 W/ 250 OVERRIDE (IP): Performed by: PHYSICAL MEDICINE & REHABILITATION

## 2020-10-14 PROCEDURE — 700102 HCHG RX REV CODE 250 W/ 637 OVERRIDE(OP): Performed by: PHYSICAL MEDICINE & REHABILITATION

## 2020-10-14 PROCEDURE — 94669 MECHANICAL CHEST WALL OSCILL: CPT

## 2020-10-14 PROCEDURE — 97535 SELF CARE MNGMENT TRAINING: CPT

## 2020-10-14 PROCEDURE — 99233 SBSQ HOSP IP/OBS HIGH 50: CPT | Performed by: PHYSICAL MEDICINE & REHABILITATION

## 2020-10-14 PROCEDURE — 700101 HCHG RX REV CODE 250: Performed by: PHYSICAL MEDICINE & REHABILITATION

## 2020-10-14 PROCEDURE — 82962 GLUCOSE BLOOD TEST: CPT | Mod: 91

## 2020-10-14 PROCEDURE — 97116 GAIT TRAINING THERAPY: CPT

## 2020-10-14 PROCEDURE — 97130 THER IVNTJ EA ADDL 15 MIN: CPT

## 2020-10-14 RX ORDER — LEVOFLOXACIN 250 MG/1
500 TABLET, FILM COATED ORAL DAILY
Status: COMPLETED | OUTPATIENT
Start: 2020-10-15 | End: 2020-10-15

## 2020-10-14 RX ORDER — VITAMIN B COMPLEX
2000 TABLET ORAL DAILY
Status: DISCONTINUED | OUTPATIENT
Start: 2020-10-15 | End: 2020-10-19

## 2020-10-14 RX ORDER — GUAIFENESIN/DEXTROMETHORPHAN 100-10MG/5
10 SYRUP ORAL EVERY 6 HOURS PRN
Status: DISCONTINUED | OUTPATIENT
Start: 2020-10-14 | End: 2020-10-21 | Stop reason: HOSPADM

## 2020-10-14 RX ORDER — ASCORBIC ACID 500 MG
500 TABLET ORAL
Status: DISCONTINUED | OUTPATIENT
Start: 2020-10-15 | End: 2020-10-19

## 2020-10-14 RX ADMIN — MELATONIN TAB 3 MG 3 MG: 3 TAB at 20:03

## 2020-10-14 RX ADMIN — FERROUS SULFATE TAB 325 MG (65 MG ELEMENTAL FE) 325 MG: 325 (65 FE) TAB at 08:45

## 2020-10-14 RX ADMIN — LIDOCAINE 1 PATCH: 50 PATCH TOPICAL at 08:42

## 2020-10-14 RX ADMIN — ATORVASTATIN CALCIUM 80 MG: 40 TABLET, FILM COATED ORAL at 20:03

## 2020-10-14 RX ADMIN — Medication 2000 UNITS: at 08:45

## 2020-10-14 RX ADMIN — OXYCODONE HYDROCHLORIDE AND ACETAMINOPHEN 500 MG: 500 TABLET ORAL at 08:44

## 2020-10-14 RX ADMIN — SIMETHICONE 160 MG: 80 TABLET, CHEWABLE ORAL at 02:56

## 2020-10-14 RX ADMIN — SERTRALINE HYDROCHLORIDE 50 MG: 50 TABLET ORAL at 08:45

## 2020-10-14 RX ADMIN — INSULIN GLARGINE 18 UNITS: 100 INJECTION, SOLUTION SUBCUTANEOUS at 08:33

## 2020-10-14 RX ADMIN — TRAMADOL HYDROCHLORIDE 50 MG: 50 TABLET, COATED ORAL at 08:49

## 2020-10-14 RX ADMIN — METRONIDAZOLE 500 MG: 500 TABLET ORAL at 05:41

## 2020-10-14 RX ADMIN — ENOXAPARIN SODIUM 40 MG: 40 INJECTION SUBCUTANEOUS at 20:02

## 2020-10-14 RX ADMIN — METOCLOPRAMIDE 5 MG: 10 TABLET ORAL at 08:44

## 2020-10-14 RX ADMIN — TERAZOSIN HYDROCHLORIDE 2 MG: 1 CAPSULE ORAL at 20:02

## 2020-10-14 RX ADMIN — METRONIDAZOLE 500 MG: 500 TABLET ORAL at 14:33

## 2020-10-14 RX ADMIN — LEVOFLOXACIN 500 MG: 250 TABLET, FILM COATED ORAL at 08:45

## 2020-10-14 RX ADMIN — SIMETHICONE 160 MG: 80 TABLET, CHEWABLE ORAL at 20:03

## 2020-10-14 RX ADMIN — METRONIDAZOLE 500 MG: 500 TABLET ORAL at 21:40

## 2020-10-14 RX ADMIN — CLOPIDOGREL BISULFATE 75 MG: 75 TABLET ORAL at 08:43

## 2020-10-14 RX ADMIN — ASPIRIN 81 MG CHEWABLE TABLET 81 MG: 81 TABLET CHEWABLE at 08:45

## 2020-10-14 RX ADMIN — OMEPRAZOLE 40 MG: KIT at 08:41

## 2020-10-14 RX ADMIN — SIMETHICONE 160 MG: 80 TABLET, CHEWABLE ORAL at 08:44

## 2020-10-14 RX ADMIN — LISINOPRIL 10 MG: 5 TABLET ORAL at 05:38

## 2020-10-14 RX ADMIN — SIMETHICONE 160 MG: 80 TABLET, CHEWABLE ORAL at 16:08

## 2020-10-14 RX ADMIN — TRAZODONE HYDROCHLORIDE 50 MG: 50 TABLET ORAL at 20:03

## 2020-10-14 RX ADMIN — METOCLOPRAMIDE 5 MG: 10 TABLET ORAL at 20:03

## 2020-10-14 ASSESSMENT — 10 METER WALK TEST (10METWT)
TRIAL 1: TIME TO WALK 10 METERS: 27.65
TRIAL 2: TIME TO WALK 10 METERS: 23.76
AVERAGE TIME - SECONDS: 24.02
TRIAL 3: TIME TO WALK 10 METERS: 20.66
AVERAGE VELOCITY - METERS PER SECOND: .25

## 2020-10-14 ASSESSMENT — GAIT ASSESSMENTS
ASSISTIVE DEVICE: FRONT WHEEL WALKER
GAIT LEVEL OF ASSIST: CONTACT GUARD ASSIST
GAIT LEVEL OF ASSIST: SUPERVISED
DISTANCE (FEET): 95
ASSISTIVE DEVICE: FRONT WHEEL WALKER
DEVIATION: SHUFFLED GAIT
GAIT LEVEL OF ASSIST: CONTACT GUARD ASSIST
DEVIATION: BRADYKINETIC;SHUFFLED GAIT;DECREASED HEEL STRIKE;DECREASED TOE OFF
DEVIATION: BRADYKINETIC;SHUFFLED GAIT;DECREASED HEEL STRIKE;DECREASED TOE OFF
DISTANCE (FEET): 57
ASSISTIVE DEVICE: FRONT WHEEL WALKER
DISTANCE (FEET): 70

## 2020-10-14 ASSESSMENT — ENCOUNTER SYMPTOMS
BRUISES/BLEEDS EASILY: 0
POLYDIPSIA: 0
CHILLS: 0
NAUSEA: 0
COUGH: 0
EYES NEGATIVE: 1
PALPITATIONS: 0
FEVER: 0
ABDOMINAL PAIN: 0
VOMITING: 0
SHORTNESS OF BREATH: 0

## 2020-10-14 ASSESSMENT — PAIN DESCRIPTION - PAIN TYPE
TYPE: ACUTE PAIN
TYPE: ACUTE PAIN

## 2020-10-14 NOTE — CARE PLAN
Problem: Other Problem (see comments)  Goal: Other Goal  Description: Monitor/Evaluation: Monitor PO intake, TF intake, diet upgrades weight, labs, medication adjustments, skin integrity, GI function, vitals, I/Os, and overall hydration status.  Adjust nutritional POC pending clinical outcomes.    RD following weekly.   Goal: 1.  Maintain adequate oral vs enteral TF nutrient/fluid intake to promote nutrition optimization/healing. 2. Transition to PO pending clinical outcomes.   Outcome: PROGRESSING SLOWER THAN EXPECTED   Pt refusing all tube feed yesterday and today. Loose stools 10/12, now resolved. Per RN, pt did eat about 40% of breakfast. Lunch recorded as 30%. RN strongly encouraging pt to eat and pushing for 50% at dinner tonight. Spoke with pt and offered milkshake/Boost, however, pt refused. Noted pt with two Regular consistency meal trays ordered by SLP for breakfast and lunch tomorrow. Pt with multiple food preferences and dislikes gravy which is required on current diet. Regular consistency may be more appetizing for pt. Continue to encourage PO intake. RD following.

## 2020-10-14 NOTE — THERAPY
Speech Language Pathology  Daily Treatment     Patient Name: Guerrero Ann  Age:  49 y.o., Sex:  male  Medical Record #: [id number]  Today's Date: 10/14/2020     Precautions  Precautions: Fall Risk  Comments: cdiff precautions, sternal, aspiration, flat affect/ hypophonation    Subjective    Per RN, patient consumed only 30% of am meal yesterday and refused all tube feeds and fluids. Encourage PO intake.   Patient was willing to participate.      Objective       10/14/20 0802   Dysphagia    Diet / Liquid Recommendation Thin (0);Minced & Moist (5) - (Dysphagia II)   Nutritional Liquid Intake Rating Scale Non thickened beverages   Nutritional Food Intake Rating Scale Total oral diet with multiple consistencies but requiring special preparation or compensations   Nursing Communication Swallow Precaution Sign Posted at Head of Bed   Interdisciplinary Plan of Care Collaboration   IDT Collaboration with  Nursing  (dietary)   SLP Total Time Spent   SLP Individual Total Time Spent (Mins) 60   Treatment Charges   SLP Cognitive Skill Development First 15 Minutes 1   SLP Cognitive Skill Development Additional 15 Minutes 3       Assessment    Patient refused breakfast tray. Was willing to trial SB/06 and regular textures. Impulsivity with liquids via straw with max cues to follow swallow strategies. Prolonged mastication of solid textures with mild oral residue and oral holding at times. No overt s/sx of aspiration were noted with thins or solids. Appears to tolerate medications whole with liquid wash, however quick fatigue and eventually requested medications via NG.     Strengths: Able to follow instructions, Independent prior level of function, Pleasant and cooperative, Motivated for self care and independence, Willingly participates in therapeutic activities  Barriers: Impaired functional cognition, Impaired carryover of learning, Impaired insight/denial of deficits, Impaired activity  tolerance    Plan    Trial SB/06 and regular textures as tolerated.     Speech Therapy Problems     Problem: Expression STGs     Dates: Start: 10/10/20       Goal: STG-Within one week, patient will     Dates: Start: 10/10/20       Description: 1) Individualized goal:  state 10 words per minute given concrete category and min verbal cues.   2) Interventions:  SLP Speech Language Treatment and SLP Cognitive Skill Development                    Problem: Memory STGs     Dates: Start: 10/10/20       Goal: STG-Within one week, patient will     Dates: Start: 10/10/20       Description: 1) Individualized goal:  recall daily events and safety strategies 80% accuracy given min cues, with use of memory book.   2) Interventions:  SLP Cognitive Skill Development                    Problem: Problem Solving STGs     Dates: Start: 10/10/20       Goal: STG-Within one week, patient will     Dates: Start: 10/10/20       Description: 1) Individualized goal:  complete single target visual scanning tasks with 80% accuracy, given min verbal cues.   2) Interventions:  SLP Cognitive Skill Development                    Problem: Speech/Swallowing LTGs     Dates: Start: 10/10/20       Goal: LTG-By discharge, patient will safely swallow     Dates: Start: 10/10/20       Description: 1) Individualized goal:  regular textures and thin liquids without aspiration.   2) Interventions:  SLP Swallowing Dysfunction Treatment, SLP Oral Pharyngeal Evaluation, SLP Video Swallow Evaluation, and SLP Group Treatment              Goal: LTG-By discharge, patient will solve basic problems     Dates: Start: 10/10/20       Description: 1) Individualized goal:  Alayna for safe discharge home.    2) Interventions:  SLP Speech Language Treatment, SLP Cognitive Skill Development, and SLP Group Treatment                    Problem: Swallowing STGs     Dates: Start: 10/13/20       Goal: STG-Within one week, patient will     Dates: Start: 10/13/20       Description: 1)  Individualized goal:  tolerate soft and bite size textures with thin liquids with no overt s/sx of asp/pen noted provided MIN A  2) Interventions:  SLP Swallowing Dysfunction Treatment, SLP Oral Pharyngeal Evaluation, SLP Video Swallow Evaluation, SLP Self Care / ADL Training , SLP Cognitive Skill Development, and SLP Group Treatment

## 2020-10-14 NOTE — PROGRESS NOTES
Pt unable to tolerate tube feeding and sterile water bolus to tube, pt refused tube feed and sterile water bolus throughout shift, pt ate part of breakfast see chart for detail, pt refused to eat oral lunch and dinner, had reported mild nausea mid day with no emesis, medication given as per order. approx 360 mL of water given throughout shift through tube with medications. Pt refused any further water during shift. Denies further nausea, reports not feeling hungry, Md aware. Will continue to monitor and encourage oral and tube intake.

## 2020-10-14 NOTE — PROGRESS NOTES
Hospital Medicine Daily Progress Note      Chief Complaint:  Hypertension  Tachycardia  Diabetes  Leukocytosis  S/P CABG  S/P CVA    Interval History:  Pt reports abd bloating better today, oral intake improved.    Review of Systems  Review of Systems   Constitutional: Negative for chills and fever.   HENT: Negative.    Eyes: Negative.    Respiratory: Negative for cough and shortness of breath.    Cardiovascular: Negative for chest pain and palpitations.   Gastrointestinal: Negative for abdominal pain, nausea and vomiting.   Musculoskeletal:        Wound pain    Skin: Negative for itching and rash.   Endo/Heme/Allergies: Negative for polydipsia. Does not bruise/bleed easily.        Physical Exam  Temp:  [36.6 °C (97.8 °F)-36.6 °C (97.9 °F)] 36.6 °C (97.9 °F)  Pulse:  [66-98] 66  Resp:  [18] 18  BP: (101-116)/(60-70) 103/62  SpO2:  [91 %-94 %] 91 %    Physical Exam  Vitals signs reviewed.   Constitutional:       General: He is not in acute distress.     Appearance: Normal appearance. He is not ill-appearing.   HENT:      Head: Normocephalic and atraumatic.      Right Ear: External ear normal.      Left Ear: External ear normal.      Nose: Nose normal.      Mouth/Throat:      Pharynx: Oropharynx is clear.   Eyes:      General:         Right eye: No discharge.         Left eye: No discharge.      Extraocular Movements: Extraocular movements intact.      Conjunctiva/sclera: Conjunctivae normal.   Neck:      Musculoskeletal: Normal range of motion and neck supple.   Cardiovascular:      Rate and Rhythm: Normal rate and regular rhythm.   Pulmonary:      Effort: No respiratory distress.      Breath sounds: No wheezing.      Comments: Decreased BS   Abdominal:      General: Bowel sounds are normal.      Palpations: Abdomen is soft.      Tenderness: There is no abdominal tenderness. There is no guarding or rebound.      Comments: Decreased distension   Musculoskeletal:      Right lower leg: No edema.      Left lower leg: No  edema.   Skin:     General: Skin is warm and dry.   Neurological:      Mental Status: He is alert.      Comments: Awake and alert         Fluids    Intake/Output Summary (Last 24 hours) at 10/14/2020 1420  Last data filed at 10/14/2020 1200  Gross per 24 hour   Intake 360 ml   Output 1050 ml   Net -690 ml       Laboratory  Recent Labs     10/12/20  0621 10/13/20  0556   WBC 11.2* 10.4   RBC 3.10* 3.37*   HEMOGLOBIN 9.5* 10.2*   HEMATOCRIT 29.7* 32.2*   MCV 95.8 95.5   MCH 30.6 30.3   MCHC 32.0* 31.7*   RDW 47.9 47.7   PLATELETCT 518* 563*   MPV 10.5 10.4     Recent Labs     10/12/20  0621 10/13/20  0556   SODIUM 132* 135   POTASSIUM 4.3 4.2   CHLORIDE 101 102   CO2 20 21   GLUCOSE 263* 211*   BUN 37* 31*   CREATININE 1.28 0.95   CALCIUM 9.1 8.9                 Assessment/Plan  * Cardioembolic stroke (HCC)- (present on admission)  Assessment & Plan  S/P tyshawn-op CVA  On ASA, Plavix, and Lipitor    Ileus (HCC)- (present on admission)  Assessment & Plan  Has better appetite  Abd exam improved  F/U KUB unremarkable  Diarrhea resolved prior to C Dif sample taken  Continue Simethicone and slow taper Reglan    SONNY (acute kidney injury) (HCC)- (present on admission)  Assessment & Plan  BUN/Cr improved w/ IVF  Follow renal function  Check F/U labs in am     CAD (coronary artery disease)- (present on admission)  Assessment & Plan  H/O cardiac stent x 2  S/P NSTEMI  S/P CABG x 3 vessels  On ASA, Plavix, Lipitor, Lisinopril, and Metoprolol  BNP improving    Essential hypertension- (present on admission)  Assessment & Plan  On Lisinopril and Metoprolol  Decrease B-Bl for bradycardic and hypotensive trends  Monitor for orthostatic hypotension on Hytrin    Type 2 diabetes mellitus with complication, without long-term current use of insulin (HCC)- (present on admission)  Assessment & Plan  HbA1c 7.9  Discontinued Metformin for diarrhea and SONNY  Observe serum glucose trends on Lantus and SSI  Outpt meds include Metformin 2000 mg bid  per Epic records (pt unable to confirm dosing)    Thrombocytosis (HCC)  Assessment & Plan  Likley reactive  Follow Platelet counts  Check F/U labs in am     Iron deficiency anemia  Assessment & Plan  Fe 31  Continue Fe and Vit C supplements    Vitamin D deficiency  Assessment & Plan  Vit D level 17  On supplementation    Reactive depression- (present on admission)  Assessment & Plan  On Zoloft    Tachycardia- (present on admission)  Assessment & Plan  HR improved on Metoprolol  But will decrease dose due to hypotension    Pneumonia- (present on admission)  Assessment & Plan  On Levaquin and Flagyl per Dr. Rashid    Full Code

## 2020-10-14 NOTE — CARE PLAN
Problem: Safety  Goal: Will remain free from injury  Outcome: PROGRESSING AS EXPECTED  Goal: Will remain free from falls  Outcome: PROGRESSING AS EXPECTED   Pt fall precautions and fall prevention education AND pt safety education reinforced, pt has not attempted to self transfer, will continue to reinforce and continue to monitor.

## 2020-10-14 NOTE — THERAPY
Occupational Therapy  Daily Treatment     Patient Name: Guerrero Ann  Age:  49 y.o., Sex:  male  Medical Record #: [id number]  Today's Date: 10/14/2020     Precautions  Precautions: Sternal Precautions (See Comments), Fall Risk, Nasogastric Tube  Comments: Girlfriend (Miriam) cleared to assist patient with bed and toilet txfrs          Subjective    Pt responded to questions via head nodding and hand gestures.  Pt did not speak throughout tx session + girlfriend spoke for him throughout session.     Objective       10/14/20 1301   Precautions   Precautions Sternal Precautions (See Comments);Fall Risk;Nasogastric Tube   Comments Girlfriend (Miriam) cleared to assist patient with bed and toilet txfrs    Vitals   O2 Delivery Device None - Room Air   Non Verbal Descriptors   Non Verbal Scale  Calm   Cognition    Level of Consciousness Alert   Standing Upper Body Exercises   Other Exercises Standing at FluidoBike (upward arc of hand pedals below sternal level) Level 1, 2 mins cycle followed w/ 2 min seated rest break.  Pt completed 10 mins total w/ 5 rest breaks.  CGA via gait belt, no LOB.  Primary limiter fatigue/poor endurance.   Interdisciplinary Plan of Care Collaboration   IDT Collaboration with  Family / Caregiver;Physical Therapist   Patient Position at End of Therapy Seated;Call Light within Reach;Tray Table within Reach;Phone within Reach;Family / Friend in Room   Collaboration Comments CLOF, transition of care to family.   OT Total Time Spent   OT Individual Total Time Spent (Mins) 30   OT Charge Group   OT Therapeutic Exercise  2       Assessment    Pt was alert and cooperative w/ tx.  Tx emphasis on cardio/endurance, standing balance/endurance while observing sternal precautions - see notes above for details.  Standing at FluidoBike, Level 1, top of hand pedal arc below sternal level w/ minimal resistance  Strengths: Alert and oriented, Adequate strength, Independent prior level of  function, Motivated for self care and independence, Willingly participates in therapeutic activities, Pleasant and cooperative, Supportive family  Barriers: Decreased endurance, Generalized weakness, Impaired balance, Tube feeding, Visual impairment    Plan    Patient's girlfriend has been cleared for assisting patient w/ bed and toilet txfrs. Incorporate sternal precautions into ADLs and related mobility,  Strength/endurance building, Vision assessment      Occupational Therapy Goals     Problem: Bathing     Dates: Start: 10/10/20       Goal: STG-Within one week, patient will bathe     Dates: Start: 10/10/20       Description: 1) Individualized Goal:  CGA   2) Interventions:  OT Self Care/ADL, OT Cognitive Skill Dev, OT Community Reintegration, OT Manual Ther Technique, OT Neuro Re-Ed/Balance, OT Sensory Int Techniques, OT Therapeutic Activity, OT Evaluation, and OT Therapeutic Exercise      Note:     Goal Note filed on 10/13/20 1117 by Kelvin Kirkland, C.O.T.A.    Min assist    Decreased strength/endurance , decreased balance ,  vision issues   Continue goal                         Problem: Dressing     Dates: Start: 10/10/20       Goal: STG-Within one week, patient will dress LB     Dates: Start: 10/10/20       Description: 1) Individualized Goal:  CGA   2) Interventions:  OT Self Care/ADL, OT Cognitive Skill Dev, OT Community Reintegration, OT Manual Ther Technique, OT Neuro Re-Ed/Balance, OT Sensory Int Techniques, OT Therapeutic Activity, OT Evaluation, and OT Therapeutic Exercise      Note:     Goal Note filed on 10/13/20 1117 by Kelvin Kirkland, C.O.T.A.    Min assist   Decreased strength/endurance , decreased balance ,  vision issues   Continue goal                         Problem: OT Long Term Goals     Dates: Start: 10/10/20       Goal: LTG-By discharge, patient will complete basic self care tasks     Dates: Start: 10/10/20       Description: 1) Individualized Goal:  Mod I    2) Interventions:  OT Self  Care/ADL, OT Cognitive Skill Dev, OT Community Reintegration, OT Manual Ther Technique, OT Neuro Re-Ed/Balance, OT Sensory Int Techniques, OT Therapeutic Activity, OT Evaluation, and OT Therapeutic Exercise            Goal: LTG-By discharge, patient will perform bathroom transfers     Dates: Start: 10/10/20       Description: 1) Individualized Goal:  Mod I    2) Interventions:  OT Self Care/ADL, OT Cognitive Skill Dev, OT Community Reintegration, OT Manual Ther Technique, OT Neuro Re-Ed/Balance, OT Sensory Int Techniques, OT Therapeutic Activity, OT Evaluation, and OT Therapeutic Exercise

## 2020-10-14 NOTE — CARE PLAN
Problem: Safety  Goal: Will remain free from falls  Outcome: PROGRESSING AS EXPECTED  Intervention: Implement fall precautions  Flowsheets (Taken 10/14/2020 0207)  Bed Alarm: Yes - Alarm On  Environmental Precautions:   Treaded Slipper Socks on Patient   Transferred to [facility] Side   Bed in Low Position  Note: Safety precautions observed, call light in reach, needs anticipated , compliant to safety measures, pt free from fall and injury.     Problem: Bowel/Gastric:  Goal: Will not experience complications related to bowel motility  Outcome: PROGRESSING AS EXPECTED  Intervention: Assess baseline bowel pattern  Note: Pt with cortrak , secured with bridle , intact , placement verified , by auscultation. dues crushed given via Cortrak. Aspiration precautions observed, head of bed elevated during meds pass.       Problem: Pain Management  Goal: Pain level will decrease to patient's comfort goal  Outcome: PROGRESSING AS EXPECTED     Problem: Infection  Goal: Will remain free from infection  Intervention: Give CDC handouts for infection prevention (infection prevention/hand washing, disease specific, and device specific) and document in Education  Note: Pt on special contact Isolation for C dff. No bm noted yet at this time, for stool collection. Due antibiotic given.     Problem: Urinary Elimination:  Goal: Ability to reestablish a normal urinary elimination pattern will improve  Intervention: Assess and monitor for signs and symptoms of urinary retention  Note: Bladder scanned result greater than 400 , straight cath done at midnight drained 550 ml of dark connor urine. Cont monitored.

## 2020-10-14 NOTE — PROGRESS NOTES
"Rehab Progress Note     Date of Service: 10/14/2020  Chief Complaint: follow up stroke    Interval Events (Subjective)    Patient seen and examined today in the hallway.  He is heading to the cafeteria for lunch.  He is feeling better today compared to yesterday.  He still not tolerating tube feeds but he is eating some food.  He has not had any more loose stools.  He still requiring intermittent catheterization for urinary retention.  The psychologist has been consulted for mood management.  Patient has no complaints today.    Objective:  VITAL SIGNS: /62   Pulse 66   Temp 36.6 °C (97.9 °F) (Temporal)   Resp 18   Ht 1.575 m (5' 2.01\")   Wt 68.5 kg (151 lb 0.2 oz)   SpO2 91%   BMI 27.61 kg/m²   Gen: alert, no apparent distress  HEENT: NG in nare  CV: regular rate and rhythm, no murmurs, no peripheral edema  Resp: clear to ascultation bilaterally, normal respiratory effort  GI: soft, non-tender abdomen, bowel sounds present  Neuro: notable for non-focal  Psych: Flat affect, does not talk    Recent Results (from the past 72 hour(s))   ACCU-CHEK GLUCOSE    Collection Time: 10/11/20  5:27 PM   Result Value Ref Range    Glucose - Accu-Ck 236 (H) 65 - 99 mg/dL   ACCU-CHEK GLUCOSE    Collection Time: 10/11/20  9:56 PM   Result Value Ref Range    Glucose - Accu-Ck 208 (H) 65 - 99 mg/dL   CBC WITH DIFFERENTIAL    Collection Time: 10/12/20  6:21 AM   Result Value Ref Range    WBC 11.2 (H) 4.8 - 10.8 K/uL    RBC 3.10 (L) 4.70 - 6.10 M/uL    Hemoglobin 9.5 (L) 14.0 - 18.0 g/dL    Hematocrit 29.7 (L) 42.0 - 52.0 %    MCV 95.8 81.4 - 97.8 fL    MCH 30.6 27.0 - 33.0 pg    MCHC 32.0 (L) 33.7 - 35.3 g/dL    RDW 47.9 35.9 - 50.0 fL    Platelet Count 518 (H) 164 - 446 K/uL    MPV 10.5 9.0 - 12.9 fL    Neutrophils-Polys 70.80 44.00 - 72.00 %    Lymphocytes 18.70 (L) 22.00 - 41.00 %    Monocytes 7.30 0.00 - 13.40 %    Eosinophils 2.10 0.00 - 6.90 %    Basophils 0.70 0.00 - 1.80 %    Immature Granulocytes 0.40 0.00 - 0.90 % "    Nucleated RBC 0.00 /100 WBC    Neutrophils (Absolute) 7.92 (H) 1.82 - 7.42 K/uL    Lymphs (Absolute) 2.09 1.00 - 4.80 K/uL    Monos (Absolute) 0.82 0.00 - 0.85 K/uL    Eos (Absolute) 0.24 0.00 - 0.51 K/uL    Baso (Absolute) 0.08 0.00 - 0.12 K/uL    Immature Granulocytes (abs) 0.04 0.00 - 0.11 K/uL    NRBC (Absolute) 0.00 K/uL   Basic Metabolic Panel    Collection Time: 10/12/20  6:21 AM   Result Value Ref Range    Sodium 132 (L) 135 - 145 mmol/L    Potassium 4.3 3.6 - 5.5 mmol/L    Chloride 101 96 - 112 mmol/L    Co2 20 20 - 33 mmol/L    Glucose 263 (H) 65 - 99 mg/dL    Bun 37 (H) 8 - 22 mg/dL    Creatinine 1.28 0.50 - 1.40 mg/dL    Calcium 9.1 8.5 - 10.5 mg/dL    Anion Gap 11.0 7.0 - 16.0   MAGNESIUM    Collection Time: 10/12/20  6:21 AM   Result Value Ref Range    Magnesium 1.9 1.5 - 2.5 mg/dL   PHOSPHORUS    Collection Time: 10/12/20  6:21 AM   Result Value Ref Range    Phosphorus 3.7 2.5 - 4.5 mg/dL   ESTIMATED GFR    Collection Time: 10/12/20  6:21 AM   Result Value Ref Range    GFR If African American >60 >60 mL/min/1.73 m 2    GFR If Non African American 60 >60 mL/min/1.73 m 2   ACCU-CHEK GLUCOSE    Collection Time: 10/12/20  7:34 AM   Result Value Ref Range    Glucose - Accu-Ck 260 (H) 65 - 99 mg/dL   ACCU-CHEK GLUCOSE    Collection Time: 10/12/20 11:05 AM   Result Value Ref Range    Glucose - Accu-Ck 229 (H) 65 - 99 mg/dL   ACCU-CHEK GLUCOSE    Collection Time: 10/12/20  5:35 PM   Result Value Ref Range    Glucose - Accu-Ck 187 (H) 65 - 99 mg/dL   ACCU-CHEK GLUCOSE    Collection Time: 10/12/20  9:48 PM   Result Value Ref Range    Glucose - Accu-Ck 183 (H) 65 - 99 mg/dL   CBC WITHOUT DIFFERENTIAL    Collection Time: 10/13/20  5:56 AM   Result Value Ref Range    WBC 10.4 4.8 - 10.8 K/uL    RBC 3.37 (L) 4.70 - 6.10 M/uL    Hemoglobin 10.2 (L) 14.0 - 18.0 g/dL    Hematocrit 32.2 (L) 42.0 - 52.0 %    MCV 95.5 81.4 - 97.8 fL    MCH 30.3 27.0 - 33.0 pg    MCHC 31.7 (L) 33.7 - 35.3 g/dL    RDW 47.7 35.9 - 50.0  fL    Platelet Count 563 (H) 164 - 446 K/uL    MPV 10.4 9.0 - 12.9 fL   Basic Metabolic Panel    Collection Time: 10/13/20  5:56 AM   Result Value Ref Range    Sodium 135 135 - 145 mmol/L    Potassium 4.2 3.6 - 5.5 mmol/L    Chloride 102 96 - 112 mmol/L    Co2 21 20 - 33 mmol/L    Glucose 211 (H) 65 - 99 mg/dL    Bun 31 (H) 8 - 22 mg/dL    Creatinine 0.95 0.50 - 1.40 mg/dL    Calcium 8.9 8.5 - 10.5 mg/dL    Anion Gap 12.0 7.0 - 16.0   proBrain Natriuretic Peptide, NT    Collection Time: 10/13/20  5:56 AM   Result Value Ref Range    NT-proBNP 197 (H) 0 - 125 pg/mL   ESTIMATED GFR    Collection Time: 10/13/20  5:56 AM   Result Value Ref Range    GFR If African American >60 >60 mL/min/1.73 m 2    GFR If Non African American >60 >60 mL/min/1.73 m 2   ACCU-CHEK GLUCOSE    Collection Time: 10/13/20  8:03 AM   Result Value Ref Range    Glucose - Accu-Ck 197 (H) 65 - 99 mg/dL   ACCU-CHEK GLUCOSE    Collection Time: 10/13/20 11:20 AM   Result Value Ref Range    Glucose - Accu-Ck 208 (H) 65 - 99 mg/dL   ACCU-CHEK GLUCOSE    Collection Time: 10/13/20  5:23 PM   Result Value Ref Range    Glucose - Accu-Ck 164 (H) 65 - 99 mg/dL   URINALYSIS    Collection Time: 10/13/20  6:50 PM   Result Value Ref Range    Color Yellow     Character Cloudy (A)     Specific Gravity 1.020 <1.035    Ph 5.0 5.0 - 8.0    Glucose Negative Negative mg/dL    Ketones Negative Negative mg/dL    Protein 30 (A) Negative mg/dL    Bilirubin Negative Negative    Urobilinogen, Urine 0.2 Negative    Nitrite Negative Negative    Leukocyte Esterase Negative Negative    Occult Blood Large (A) Negative    Micro Urine Req Microscopic    URINE MICROSCOPIC (W/UA)    Collection Time: 10/13/20  6:50 PM   Result Value Ref Range    WBC 0-2 (A) /hpf    RBC >150 (A) /hpf    Bacteria Negative None /hpf    Epithelial Cells Negative /hpf    Uric Acid Crystal Positive /hpf    Hyaline Cast 0-2 /lpf   ACCU-CHEK GLUCOSE    Collection Time: 10/13/20  9:52 PM   Result Value Ref Range     Glucose - Accu-Ck 175 (H) 65 - 99 mg/dL   ACCU-CHEK GLUCOSE    Collection Time: 10/14/20  7:57 AM   Result Value Ref Range    Glucose - Accu-Ck 160 (H) 65 - 99 mg/dL   ACCU-CHEK GLUCOSE    Collection Time: 10/14/20 11:21 AM   Result Value Ref Range    Glucose - Accu-Ck 230 (H) 65 - 99 mg/dL       Current Facility-Administered Medications   Medication Frequency   • [START ON 10/15/2020] ascorbic acid tablet 500 mg QDAY with Breakfast   • [START ON 10/15/2020] levoFLOXacin (LEVAQUIN) tablet 500 mg DAILY   • [START ON 10/15/2020] vitamin D (cholecalciferol) tablet 2,000 Units DAILY   • guaiFENesin dextromethorphan (ROBITUSSIN DM) 100-10 MG/5ML syrup 10 mL Q6HRS PRN   • terazosin (HYTRIN) capsule 2 mg Q EVENING   • insulin glargine (Lantus) injection QAM INSULIN   • senna-docusate (PERICOLACE or SENOKOT S) 8.6-50 MG per tablet 2 Tab BID PRN    And   • polyethylene glycol/lytes (MIRALAX) PACKET 1 Packet QDAY PRN    And   • magnesium hydroxide (MILK OF MAGNESIA) suspension 30 mL QDAY PRN    And   • bisacodyl (DULCOLAX) suppository 10 mg QDAY PRN   • simethicone (MYLICON) chewable tab 160 mg 4XDAY   • insulin lispro (HumaLOG) injection 4X/DAY ACHS   • metoclopramide (REGLAN) tablet 5 mg BID   • ferrous sulfate tablet 325 mg QDAY with Breakfast   • melatonin tablet 3 mg QHS   • traZODone (DESYREL) tablet 50 mg QHS   • hydrOXYzine HCl (ATARAX) tablet 50 mg Q6HRS PRN   • Respiratory Therapy Consult Continuous RT   • Pharmacy Consult Request ...Pain Management Review 1 Each PHARMACY TO DOSE   • acetaminophen (TYLENOL) tablet 650 mg Q4HRS PRN   • artificial tears ophthalmic solution 1 Drop PRN   • benzocaine-menthol (CEPACOL) lozenge 1 Lozenge Q2HRS PRN   • mag hydrox-al hydrox-simeth (MAALOX PLUS ES or MYLANTA DS) suspension 20 mL Q2HRS PRN   • ondansetron (ZOFRAN ODT) dispertab 4 mg 4X/DAY PRN    Or   • ondansetron (ZOFRAN) syringe/vial injection 4 mg 4X/DAY PRN   • sodium chloride (OCEAN) 0.65 % nasal spray 2 Spray PRN    • lactulose 20 GM/30ML solution 30 mL QDAY PRN   • docusate sodium (ENEMEEZ) enema 283 mg QDAY PRN   • fleet enema 133 mL QDAY PRN   • clopidogrel (PLAVIX) tablet 75 mg DAILY   • tramadol (ULTRAM) 50 MG tablet 50 mg Q4HRS PRN   • aspirin (ASA) chewable tab 81 mg DAILY   • atorvastatin (LIPITOR) tablet 80 mg QHS   • enoxaparin (LOVENOX) inj 40 mg Q EVENING   • lidocaine (LIDODERM) 5 % 1 Patch Q24HR   • omeprazole (FIRST-OMEPRAZOLE) 2 mg/mL oral susp 40 mg DAILY   • lisinopril (PRINIVIL) tablet 10 mg Q DAY   • metoprolol (LOPRESSOR) tablet 37.5 mg TWICE DAILY   • sertraline (Zoloft) tablet 50 mg DAILY   • metroNIDAZOLE (FLAGYL) tablet 500 mg Q8HRS       Orders Placed This Encounter   Procedures   • DIET TUBE FEED     Standing Status:   Standing     Number of Occurrences:   1     Order Specific Question:   Diet     Answer:   Diet Tube Feed [35]     Order Specific Question:   Formula:     Answer:   Diabeticsource AC [34]     Comments:   goal 350mL QID ( meal times + HS)      Order Specific Question:   Route     Answer:   NG     Order Specific Question:   Select Bolus     Answer:   Other (specify bolus comments in the field to the right) [48]     Comments:   Diabetisource AC goal 350mL QID at meal times + HS.  Give free water in between feedings      Order Specific Question:   Additive Frequency     Answer:   QID   • Diet Order Diabetic (meds whole 1 at a time with thin.)     Standing Status:   Standing     Number of Occurrences:   1     Order Specific Question:   Diet:     Answer:   Diabetic [3]     Comments:   meds whole 1 at a time with thin.     Order Specific Question:   Texture Modifier     Answer:   Level 5 - Minced & Moist (Dysphagia 2)     Order Specific Question:   Liquid level     Answer:   Level 0 - Thin       Assessment:  Active Hospital Problems    Diagnosis   • *Cardioembolic stroke (HCC)   • Ileus (HCC)   • Aspiration pneumonia (HCC)   • CAD (coronary artery disease)   • Essential hypertension   • Type  2 diabetes mellitus with complication, without long-term current use of insulin (HCC)   • Anxiety   • Vitamin D deficiency   • Azotemia   • Hyponatremia   • Iron deficiency anemia   • Pneumonia   • Tachycardia   • Impaired mobility and ADLs   • Reactive depression   • Diabetes mellitus due to underlying condition with hyperglycemia (HCC)     This patient is a 49 y.o. male admitted for acute inpatient rehabilitation with Cardioembolic stroke (HCC).    I led and attended the weekly conference today, and agree with the IDT conference documentation and plan of care as noted below.    Date of conference: 10/14/2020    Goals and barriers: See IDT note.    Biggest barriers: impaired motivation, dysphagia, fatigues quickly, self-limiting    CM/social support: SO supportive and can assist 24/7    Anticipated DC date: 10/24    Home health: PT/OT/SLP/RN    Equip: TBD    Follow up: PCP, CT surgery, Dr. Franco rehab clinic      Medical Decision Making and Plan:    BL strokes  Cardio-embolic after CABG  Dysphagia  Cognitive deficits  Non-focal  Continue full rehab program  PT/OT/SLP, 1 hr each discipline, 5 days per week  Continue aspirin, Plavix, and statin for secondary stroke prophylaxis  Outpatient follow up with Dr. Franco in rehab clinic, referral made    Reactive depression?  History of depression/anxiety  Started sertraline 10/10  Dr. Heard consulted 10/14    Insomnia, improved  Scheduled melatonin and trazodone    Bowel  Loose stools, resolved  Meds as needed  Last BM 10/13  KUB OK, c diff negative    Bladder  Urinary retention  Check PVRs - 415, 278  ICP for over 400 cc - last required 10/13  Scheduled toileting  Started Hytrin 1 mg 10/10 --> 2 mg 10/13  Checked UA, negative    DVT prophylaxis  Lovenox    Appreciate assistance of hospitalist with his medical co-morbidities:     Diabetes with hyperglycemia, Lantus, sliding scale insulin  Coronary artery disease status post CABG, aspirin, Plavix, and statin  Hypertension,  lisinopril, metoprolol  Leukocytosis, improved, x-ray suggestive of pneumonia, on antibiotics  Status post ileus, titrate Reglan, simethicone   Loose stools, resolved  Aspiration pneumonia, continue antibiotics, metronidazole and levofloxacin  Tobacco use, will need counseling during his stay   Hypokalemia, resolved s/p supplementation  Normocytic anemia, ferrous sulfate and Vit C  Hyponatremia, mild monitor  GI prophylaxis, omeprazole  Vit D deficiency, continue supplementation    Total time:  40 minutes.  I spent greater than 50% of the time for patient care, counseling, and coordination on this date, including patient face-to face time, unit/floor time with review of records/pertinent lab data and studies, as well as discussing diagnostic evaluation/work up, planned therapeutic interventions, and future disposition of care, as per the interval events/subjective and the assessment and plan as noted above.      Ankita Ng M.D.   Physical Medicine and Rehabilitation

## 2020-10-14 NOTE — THERAPY
Occupational Therapy  Daily Treatment     Patient Name: Guerrero Ann  Age:  49 y.o., Sex:  male  Medical Record #: [id number]  Today's Date: 10/14/2020     Precautions  Precautions: Fall Risk  Comments: Girlfriend (Miriam) cleared to assist patient with bed and toilet txfrs     Subjective    Patient in bed upon arrival. GF (Miriam) bedside and highly encouraging/supportive.      Objective     10/14/20 1001   Precautions   Precautions Fall Risk   Comments Girlfriend (Miriam) cleared to assist patient with bed and toilet txfrs    Vitals   O2 Delivery Device None - Room Air   Cognition    Speech/ Communication Delayed Responses  (whispers )   Level of Consciousness Alert   Functional Level of Assist   Bathing Minimal Assist  (for standing balance,safety,and cues for sternal precautions)   Upper Body Dressing Stand by Assist  (Set-up for donning t-shirt; seated in w/c)   Lower Body Dressing Minimal Assist  (for standing balance during pants over hips pursuit )   Lower Body Dressing Description Grab bar;Increased time;Set-up of equipment;Supervision for safety;Verbal cueing  (VCs for sternal precautions )   Tub / Shower Transfers Minimal Assist  (Min to CGA for wc<>fold down shower bench (stand pivot))   Tub Shower Transfer Description Grab bar;Increased time;Supervision for safety;Set-up of equipment;Initial preparation for task;Verbal cueing   Interdisciplinary Plan of Care Collaboration   IDT Collaboration with  Family / Caregiver;Physician   Patient Position at End of Therapy Seated;Family / Friend in Room;Self Releasing Lap Belt Applied   Collaboration Comments Family training with RACHEL Loving); MD reported that Contact precautions have been DC'd during this session   OT Total Time Spent   OT Individual Total Time Spent (Mins) 30   OT Charge Group   OT Self Care / ADL 2     Initiated family training with patient's girlfriend this session with focus on ADL routine. Reviewed safety  considerations and adaptive techniques for dressing, showering/shower txfrs. Encouraged promoting functional independence by not over-assisting patient with ADL tasks. Emphasized importance of adhering to sternal precautions at this time.      Assessment    Patient tolerated OT session well with focus on family training/ADLs. Patient presents with poor initiation and flat affect however agreeable to participate w/ encouragement (responds very well to girlfriend). Requires min to CGA for standing tasks/functional txfrs due to poor standing balance. Girlfriend demo's ability to assist patient with safely performing functional txfrs and has been cleared to assist patient with completing bed and toilet txfrs in his room.   Strengths: Alert and oriented, Adequate strength, Independent prior level of function, Motivated for self care and independence, Willingly participates in therapeutic activities, Pleasant and cooperative, Supportive family  Barriers: Decreased endurance, Generalized weakness, Impaired balance, Tube feeding, Visual impairment    Plan    Patient's girlfriend has been cleared for assisting patient w/ bed and toilet txfrs. Incorporate sternal precautions into ADLs and related mobility,  Strength/endurance building, Vision assessment         Occupational Therapy Goals     Problem: Bathing     Dates: Start: 10/10/20       Goal: STG-Within one week, patient will bathe     Dates: Start: 10/10/20       Description: 1) Individualized Goal:  CGA   2) Interventions:  OT Self Care/ADL, OT Cognitive Skill Dev, OT Community Reintegration, OT Manual Ther Technique, OT Neuro Re-Ed/Balance, OT Sensory Int Techniques, OT Therapeutic Activity, OT Evaluation, and OT Therapeutic Exercise      Note:     Goal Note filed on 10/13/20 1117 by Kelvin Kirkland C.O.T.A.    Min assist    Decreased strength/endurance , decreased balance ,  vision issues   Continue goal                         Problem: Dressing     Dates: Start:  10/10/20       Goal: STG-Within one week, patient will dress LB     Dates: Start: 10/10/20       Description: 1) Individualized Goal:  CGA   2) Interventions:  OT Self Care/ADL, OT Cognitive Skill Dev, OT Community Reintegration, OT Manual Ther Technique, OT Neuro Re-Ed/Balance, OT Sensory Int Techniques, OT Therapeutic Activity, OT Evaluation, and OT Therapeutic Exercise      Note:     Goal Note filed on 10/13/20 1117 by Kelvin Kirkland C.O.T.A.    Min assist   Decreased strength/endurance , decreased balance ,  vision issues   Continue goal                         Problem: OT Long Term Goals     Dates: Start: 10/10/20       Goal: LTG-By discharge, patient will complete basic self care tasks     Dates: Start: 10/10/20       Description: 1) Individualized Goal:  Mod I    2) Interventions:  OT Self Care/ADL, OT Cognitive Skill Dev, OT Community Reintegration, OT Manual Ther Technique, OT Neuro Re-Ed/Balance, OT Sensory Int Techniques, OT Therapeutic Activity, OT Evaluation, and OT Therapeutic Exercise            Goal: LTG-By discharge, patient will perform bathroom transfers     Dates: Start: 10/10/20       Description: 1) Individualized Goal:  Mod I    2) Interventions:  OT Self Care/ADL, OT Cognitive Skill Dev, OT Community Reintegration, OT Manual Ther Technique, OT Neuro Re-Ed/Balance, OT Sensory Int Techniques, OT Therapeutic Activity, OT Evaluation, and OT Therapeutic Exercise

## 2020-10-14 NOTE — CARE PLAN
Problem: Balance  Goal: STG-Within one week, patient will maintain dynamic standing  Description: 1) Individualized goal:  Pt will demonstrate ability to complete dynamic standing for >3 min SBA to improve independence with ADLs and IADLs within one week.    2) Interventions:  PT E Stim Attended, PT Gait Training, PT Therapeutic Exercises, PT TENS Application, PT Neuro Re-Ed/Balance, PT Therapeutic Activity, PT Manual Therapy, and PT Evaluation    Outcome: NOT MET  Note: < 3 min     Problem: Mobility  Goal: STG-Within one week, patient will ambulate up/down a curb  Description: 1) Individualized goal:  Pt will trial ascending/descending a standard curb Donny with FWW to practice entry to home within one week.    2) Interventions:  PT E Stim Attended, PT Gait Training, PT Therapeutic Exercises, PT TENS Application, PT Neuro Re-Ed/Balance, PT Therapeutic Activity, PT Manual Therapy, and PT Evaluation    Outcome: NOT MET  Note: NT dt safety concerns     Problem: Mobility  Goal: STG-Within one week, patient will propel wheelchair household distances  Description: 1) Individualized goal:  Pt will propel wheelchair >200' with BLEs to become Caroline within WC in unit within one week.    2) Interventions:  PT E Stim Attended, PT Gait Training, PT Therapeutic Exercises, PT TENS Application, PT Neuro Re-Ed/Balance, PT Therapeutic Activity, PT Manual Therapy, and PT Evaluation    Outcome: PROGRESSING AS EXPECTED  Note: < 200 ft  Goal: STG-Within one week, patient will ambulate household distance  Description: 1) Individualized goal:  Pt will demonstrate ability to walk >100' CGA with FWW to achieve household distances within one week.    2) Interventions:  PT E Stim Attended, PT Gait Training, PT Therapeutic Exercises, PT TENS Application, PT Neuro Re-Ed/Balance, PT Therapeutic Activity, PT Manual Therapy, and PT Evaluation    Outcome: PROGRESSING AS EXPECTED  Note: < 100 ft

## 2020-10-15 PROBLEM — F43.21 ADJUSTMENT DISORDER WITH DEPRESSED MOOD: Status: ACTIVE | Noted: 2020-10-15

## 2020-10-15 LAB
ANION GAP SERPL CALC-SCNC: 13 MMOL/L (ref 7–16)
BUN SERPL-MCNC: 22 MG/DL (ref 8–22)
CALCIUM SERPL-MCNC: 9 MG/DL (ref 8.5–10.5)
CHLORIDE SERPL-SCNC: 100 MMOL/L (ref 96–112)
CO2 SERPL-SCNC: 21 MMOL/L (ref 20–33)
CREAT SERPL-MCNC: 1.07 MG/DL (ref 0.5–1.4)
ERYTHROCYTE [DISTWIDTH] IN BLOOD BY AUTOMATED COUNT: 47.1 FL (ref 35.9–50)
GLUCOSE BLD-MCNC: 137 MG/DL (ref 65–99)
GLUCOSE BLD-MCNC: 159 MG/DL (ref 65–99)
GLUCOSE BLD-MCNC: 198 MG/DL (ref 65–99)
GLUCOSE BLD-MCNC: 201 MG/DL (ref 65–99)
GLUCOSE BLD-MCNC: 204 MG/DL (ref 65–99)
GLUCOSE BLD-MCNC: 224 MG/DL (ref 65–99)
GLUCOSE SERPL-MCNC: 155 MG/DL (ref 65–99)
HCT VFR BLD AUTO: 31.8 % (ref 42–52)
HGB BLD-MCNC: 9.9 G/DL (ref 14–18)
MCH RBC QN AUTO: 29.6 PG (ref 27–33)
MCHC RBC AUTO-ENTMCNC: 31.1 G/DL (ref 33.7–35.3)
MCV RBC AUTO: 94.9 FL (ref 81.4–97.8)
PLATELET # BLD AUTO: 502 K/UL (ref 164–446)
PMV BLD AUTO: 10.5 FL (ref 9–12.9)
POTASSIUM SERPL-SCNC: 4.1 MMOL/L (ref 3.6–5.5)
RBC # BLD AUTO: 3.35 M/UL (ref 4.7–6.1)
SODIUM SERPL-SCNC: 134 MMOL/L (ref 135–145)
WBC # BLD AUTO: 9.2 K/UL (ref 4.8–10.8)

## 2020-10-15 PROCEDURE — 94760 N-INVAS EAR/PLS OXIMETRY 1: CPT

## 2020-10-15 PROCEDURE — A9270 NON-COVERED ITEM OR SERVICE: HCPCS | Performed by: HOSPITALIST

## 2020-10-15 PROCEDURE — 700102 HCHG RX REV CODE 250 W/ 637 OVERRIDE(OP): Performed by: HOSPITALIST

## 2020-10-15 PROCEDURE — 700101 HCHG RX REV CODE 250: Performed by: PHYSICAL MEDICINE & REHABILITATION

## 2020-10-15 PROCEDURE — A9270 NON-COVERED ITEM OR SERVICE: HCPCS | Performed by: PHYSICAL MEDICINE & REHABILITATION

## 2020-10-15 PROCEDURE — 97535 SELF CARE MNGMENT TRAINING: CPT | Mod: CO

## 2020-10-15 PROCEDURE — 92526 ORAL FUNCTION THERAPY: CPT

## 2020-10-15 PROCEDURE — 99406 BEHAV CHNG SMOKING 3-10 MIN: CPT | Performed by: PHYSICAL MEDICINE & REHABILITATION

## 2020-10-15 PROCEDURE — 700102 HCHG RX REV CODE 250 W/ 637 OVERRIDE(OP): Performed by: PHYSICAL MEDICINE & REHABILITATION

## 2020-10-15 PROCEDURE — 99232 SBSQ HOSP IP/OBS MODERATE 35: CPT | Performed by: HOSPITALIST

## 2020-10-15 PROCEDURE — 90791 PSYCH DIAGNOSTIC EVALUATION: CPT | Performed by: PSYCHOLOGIST

## 2020-10-15 PROCEDURE — 97112 NEUROMUSCULAR REEDUCATION: CPT

## 2020-10-15 PROCEDURE — 97110 THERAPEUTIC EXERCISES: CPT | Mod: CO

## 2020-10-15 PROCEDURE — 82962 GLUCOSE BLOOD TEST: CPT

## 2020-10-15 PROCEDURE — 700111 HCHG RX REV CODE 636 W/ 250 OVERRIDE (IP): Performed by: PHYSICAL MEDICINE & REHABILITATION

## 2020-10-15 PROCEDURE — 94669 MECHANICAL CHEST WALL OSCILL: CPT

## 2020-10-15 PROCEDURE — 770010 HCHG ROOM/CARE - REHAB SEMI PRIVAT*

## 2020-10-15 PROCEDURE — 85027 COMPLETE CBC AUTOMATED: CPT

## 2020-10-15 PROCEDURE — 97530 THERAPEUTIC ACTIVITIES: CPT

## 2020-10-15 PROCEDURE — 99231 SBSQ HOSP IP/OBS SF/LOW 25: CPT | Mod: 25 | Performed by: PHYSICAL MEDICINE & REHABILITATION

## 2020-10-15 PROCEDURE — 97530 THERAPEUTIC ACTIVITIES: CPT | Mod: CO

## 2020-10-15 PROCEDURE — 80048 BASIC METABOLIC PNL TOTAL CA: CPT

## 2020-10-15 PROCEDURE — 36415 COLL VENOUS BLD VENIPUNCTURE: CPT

## 2020-10-15 RX ORDER — TERAZOSIN 1 MG/1
3 CAPSULE ORAL EVERY EVENING
Status: DISCONTINUED | OUTPATIENT
Start: 2020-10-15 | End: 2020-10-19

## 2020-10-15 RX ADMIN — SERTRALINE HYDROCHLORIDE 50 MG: 50 TABLET ORAL at 09:23

## 2020-10-15 RX ADMIN — METRONIDAZOLE 500 MG: 500 TABLET ORAL at 15:13

## 2020-10-15 RX ADMIN — OXYCODONE HYDROCHLORIDE AND ACETAMINOPHEN 500 MG: 500 TABLET ORAL at 09:24

## 2020-10-15 RX ADMIN — TERAZOSIN HYDROCHLORIDE 3 MG: 1 CAPSULE ORAL at 21:44

## 2020-10-15 RX ADMIN — SIMETHICONE 160 MG: 80 TABLET, CHEWABLE ORAL at 21:44

## 2020-10-15 RX ADMIN — LEVOFLOXACIN 500 MG: 250 TABLET, FILM COATED ORAL at 09:24

## 2020-10-15 RX ADMIN — LIDOCAINE 1 PATCH: 50 PATCH TOPICAL at 09:22

## 2020-10-15 RX ADMIN — TRAZODONE HYDROCHLORIDE 50 MG: 50 TABLET ORAL at 21:45

## 2020-10-15 RX ADMIN — INSULIN GLARGINE 18 UNITS: 100 INJECTION, SOLUTION SUBCUTANEOUS at 09:14

## 2020-10-15 RX ADMIN — LISINOPRIL 10 MG: 5 TABLET ORAL at 05:19

## 2020-10-15 RX ADMIN — CLOPIDOGREL BISULFATE 75 MG: 75 TABLET ORAL at 09:24

## 2020-10-15 RX ADMIN — ATORVASTATIN CALCIUM 80 MG: 40 TABLET, FILM COATED ORAL at 21:44

## 2020-10-15 RX ADMIN — METRONIDAZOLE 500 MG: 500 TABLET ORAL at 05:19

## 2020-10-15 RX ADMIN — METOPROLOL TARTRATE 25 MG: 25 TABLET, FILM COATED ORAL at 18:02

## 2020-10-15 RX ADMIN — MELATONIN TAB 3 MG 3 MG: 3 TAB at 21:45

## 2020-10-15 RX ADMIN — SIMETHICONE 160 MG: 80 TABLET, CHEWABLE ORAL at 15:13

## 2020-10-15 RX ADMIN — ENOXAPARIN SODIUM 40 MG: 40 INJECTION SUBCUTANEOUS at 21:43

## 2020-10-15 RX ADMIN — METOCLOPRAMIDE 5 MG: 10 TABLET ORAL at 09:23

## 2020-10-15 RX ADMIN — Medication 2000 UNITS: at 09:24

## 2020-10-15 RX ADMIN — METRONIDAZOLE 500 MG: 500 TABLET ORAL at 21:44

## 2020-10-15 RX ADMIN — FERROUS SULFATE TAB 325 MG (65 MG ELEMENTAL FE) 325 MG: 325 (65 FE) TAB at 09:24

## 2020-10-15 RX ADMIN — ASPIRIN 81 MG CHEWABLE TABLET 81 MG: 81 TABLET CHEWABLE at 09:24

## 2020-10-15 RX ADMIN — TRAMADOL HYDROCHLORIDE 50 MG: 50 TABLET, COATED ORAL at 22:35

## 2020-10-15 RX ADMIN — SIMETHICONE 160 MG: 80 TABLET, CHEWABLE ORAL at 09:24

## 2020-10-15 RX ADMIN — METOPROLOL TARTRATE 25 MG: 25 TABLET, FILM COATED ORAL at 05:20

## 2020-10-15 RX ADMIN — SIMETHICONE 160 MG: 80 TABLET, CHEWABLE ORAL at 01:36

## 2020-10-15 RX ADMIN — METOCLOPRAMIDE 5 MG: 10 TABLET ORAL at 21:43

## 2020-10-15 RX ADMIN — OMEPRAZOLE 40 MG: KIT at 09:25

## 2020-10-15 ASSESSMENT — BALANCE ASSESSMENTS
STANDING ON ONE LEG: 1
PICK UP OBJECT FROM THE FLOOR FROM A STANDING POSITION: 3
SITTING UNSUPPORTED: 4
STANDING UNSUPPORTED: 0
STANDING UNSUPPORTED ONE FOOT IN FRONT: 0
LONG VERSION TOTAL SCORE (MAX 56): 14
STANDING UNSUPPORTED WITH EYES CLOSED: 0
SITTING TO STANDING: 2
PLACE ALTERNATE FOOT ON STEP OR STOOL WHILE STANDING UNSUPPORTED: 0
LOOK OVER LEFT AND RIGHT SHOULDERS WHILE STANDING: 1
STANDING UNSUPPORTED WITH FEET TOGETHER: 0
TURN 360 DEGREES: 0
TRANSFERS: 1
LONG VERSION TOTAL SCORE (MAX 56): 14
STANDING TO SITTING: 1
REACHING FORWARD WITH OUTSTRETCHED ARM WHILE STANDING: 1

## 2020-10-15 ASSESSMENT — ENCOUNTER SYMPTOMS
POLYDIPSIA: 0
COUGH: 0
ABDOMINAL PAIN: 0
BRUISES/BLEEDS EASILY: 0
EYES NEGATIVE: 1
CHILLS: 0
NAUSEA: 0
SHORTNESS OF BREATH: 0
FEVER: 0
VOMITING: 0
PALPITATIONS: 0

## 2020-10-15 ASSESSMENT — PAIN DESCRIPTION - PAIN TYPE
TYPE: ACUTE PAIN;SURGICAL PAIN
TYPE: ACUTE PAIN;SURGICAL PAIN

## 2020-10-15 ASSESSMENT — ACTIVITIES OF DAILY LIVING (ADL): BED_CHAIR_WHEELCHAIR_TRANSFER_DESCRIPTION: ADAPTIVE EQUIPMENT;VERBAL CUEING

## 2020-10-15 NOTE — THERAPY
"Occupational Therapy  Daily Treatment     Patient Name: Guerrero Ann  Age:  49 y.o., Sex:  male  Medical Record #: [id number]  Today's Date: 10/15/2020     Precautions  Precautions: (P) Fall Risk, Swallow Precautions ( See Comments), Sternal Precautions (See Comments)  Comments: Girlfriend (Miriam) cleared to assist patient with bed and toilet txfrs          Subjective    \"  Yeah.\"  Agreeable to tx      Objective       10/15/20 1345   Precautions   Precautions Fall Risk;Swallow Precautions ( See Comments);Sternal Precautions (See Comments)   Functional Level of Assist   Bed, Chair, Wheelchair Transfer Contact Guard Assist  (to SBA   )   Sitting Upper Body Exercises   Chest Press 2 sets of 10;Bilateral   Front Arm Raise 2 sets of 10;Bilateral  (to 90 degrees )   Internal Shoulder Rotation 2 sets of 10;Right ;Left   External Shoulder Rotation 2 sets of 10;Right ;Left   Bicep Curls 2 sets of 10;Right ;Left   Pronation / Supination 2 sets of 10;Right ;Left   Other Exercise 2 sets of 10;Bilateral  arm circles forward and back    Comments ther ex performed with 2lb weight    Balance   Standing Balance (Static) Fair   Standing Balance (Dynamic) Fair   Comments  standing in // bars  with ladder ball toss x 7 reps x 2 one hand on bar for steady assist  no LOB noted .   reciprocal step and toss  x  7 reps x 4    one slight LOB  with  CGA assist to recover  again performed in // bars with on hand on bars for steady assist.    Interdisciplinary Plan of Care Collaboration   IDT Collaboration with  Family / Caregiver   Patient Position at End of Therapy In Bed;Call Light within Reach;Tray Table within Reach   Collaboration Comments S.O  Miriam present  demonstrated ability to assist patient with bed <-> w./c  transfers .    OT Total Time Spent   OT Individual Total Time Spent (Mins) 60   OT Charge Group   OT Therapy Activity 2   OT Therapeutic Exercise  2       Assessment     much more animated verbal  " and interactive with Miriam christianson this session   Slight increase in verbal responses  To questions asked by this therapist.     Strengths: Alert and oriented, Adequate strength, Independent prior level of function, Motivated for self care and independence, Willingly participates in therapeutic activities, Pleasant and cooperative, Supportive family  Barriers: Decreased endurance, Generalized weakness, Impaired balance, Tube feeding, Visual impairment    Plan    continue  ADL  IADL , related mobility strength/endruance building  Incorporating  Sternal precautions           Occupational Therapy Goals     Problem: Bathing     Dates: Start: 10/10/20       Goal: STG-Within one week, patient will bathe     Dates: Start: 10/10/20       Description: 1) Individualized Goal:  CGA   2) Interventions:  OT Self Care/ADL, OT Cognitive Skill Dev, OT Community Reintegration, OT Manual Ther Technique, OT Neuro Re-Ed/Balance, OT Sensory Int Techniques, OT Therapeutic Activity, OT Evaluation, and OT Therapeutic Exercise      Note:     Goal Note filed on 10/13/20 1117 by Kelvin Kirkland, C.O.T.A.    Min assist    Decreased strength/endurance , decreased balance ,  vision issues   Continue goal                         Problem: Dressing     Dates: Start: 10/10/20       Goal: STG-Within one week, patient will dress LB     Dates: Start: 10/10/20       Description: 1) Individualized Goal:  CGA   2) Interventions:  OT Self Care/ADL, OT Cognitive Skill Dev, OT Community Reintegration, OT Manual Ther Technique, OT Neuro Re-Ed/Balance, OT Sensory Int Techniques, OT Therapeutic Activity, OT Evaluation, and OT Therapeutic Exercise      Note:     Goal Note filed on 10/13/20 1117 by Kelvin Kirkland, C.O.T.A.    Min assist   Decreased strength/endurance , decreased balance ,  vision issues   Continue goal                         Problem: OT Long Term Goals     Dates: Start: 10/10/20       Goal: LTG-By discharge, patient will complete basic self  care tasks     Dates: Start: 10/10/20       Description: 1) Individualized Goal:  Mod I    2) Interventions:  OT Self Care/ADL, OT Cognitive Skill Dev, OT Community Reintegration, OT Manual Ther Technique, OT Neuro Re-Ed/Balance, OT Sensory Int Techniques, OT Therapeutic Activity, OT Evaluation, and OT Therapeutic Exercise            Goal: LTG-By discharge, patient will perform bathroom transfers     Dates: Start: 10/10/20       Description: 1) Individualized Goal:  Mod I    2) Interventions:  OT Self Care/ADL, OT Cognitive Skill Dev, OT Community Reintegration, OT Manual Ther Technique, OT Neuro Re-Ed/Balance, OT Sensory Int Techniques, OT Therapeutic Activity, OT Evaluation, and OT Therapeutic Exercise

## 2020-10-15 NOTE — DISCHARGE PLANNING
CM met with patient to discuss IDT and DC date of 10/24/2020.  Patient answered with head nods to yes and no questions pointing to NG tube.  He had no questions for CM.  CM will continue to follow for DC needs.

## 2020-10-15 NOTE — FLOWSHEET NOTE
10/15/20 0713   Events/Summary/Plan   Events/Summary/Plan pulse ox check and aerobika   Vital Signs   Pulse 63   Respiration 16   Pulse Oximetry 91 %   $ Pulse Oximetry (Spot Check) Yes   Respiratory Assessment   Level of Consciousness Alert   Chest Exam   Work Of Breathing / Effort Mild   Breath Sounds   RUL Breath Sounds Clear   RML Breath Sounds Clear   RLL Breath Sounds Diminished   ROBERT Breath Sounds Clear   LLL Breath Sounds Diminished   Oxygen   O2 Delivery Device None - Room Air

## 2020-10-15 NOTE — THERAPY
Physical Therapy   Daily Treatment     Patient Name: Guerrero Ann  Age:  49 y.o., Sex:  male  Medical Record #: [id number]  Today's Date: 10/14/2020     Precautions  Precautions: Sternal Precautions (See Comments), Fall Risk, Nasogastric Tube  Comments: Girlfriend (Miriam) cleared to assist patient with bed and toilet txfrs     Subjective    Pt nods in agreement to exercise     Objective       10/14/20 1446   Gait Functional Level of Assist    Gait Level Of Assist Supervised   Assistive Device Front Wheel Walker   Distance (Feet) 95  (80)   # of Times Distance was Traveled 2   Deviation Bradykinetic;Shuffled Gait;Decreased Heel Strike;Decreased Toe Off   Standing Lower Body Exercises   Standing Lower Body Exercises Yes   Hamstring Curl 1 set of 15;Bilateral    Hip Extension 1 set of 15;Bilateral    Hip Abduction 1 set of 15;Bilateral   Marching 1 set of 15   Heel Rise 1 set of 15;Bilateral   Toe Rise 1 set of 15;Bilateral   Mini Squat 1 set of 15;Partial   Comments VCs and demo for form and technique - HEP issued for performing in the future at home with UE support and SPV   Bed Mobility    Supine to Sit Minimal Assist   Sit to Supine Supervised   Sit to Stand Supervised   Scooting Supervised   Rolling Supervised   Interdisciplinary Plan of Care Collaboration   Patient Position at End of Therapy In Bed;Call Light within Reach;Tray Table within Reach   PT Total Time Spent   PT Individual Total Time Spent (Mins) 60   PT Charge Group   PT Gait Training 1   PT Therapeutic Exercise 2   PT Therapeutic Activities 1       Pt edu on importance of buy in and active participation in pushing himself in therapy and improving endurance through activity.     Assessment    Pt cont to demo very self limiting behavior - refusing to talk to therapist when asked questions, using gestures instead. Pt refusing to try to perform multiple exercises in a row without a seated rest break between each set. Refusing to  try to walk a lap around gym despite reporting only 2/10 fatigue after walking 80 ft.   Strengths: Independent prior level of function, Supportive family, Willingly participates in therapeutic activities  Barriers: Visual impairment, Pain, Fatigue, Generalized weakness    Plan    Exercise/neuro re-ed, [place], treadmill training for increased activity, activity tolerance, consider Vector for dyn bal challenges no AD, determine Stairs setup,     Physical Therapy Problems     Problem: Balance     Dates: Start: 10/10/20       Goal: STG-Within one week, patient will maintain dynamic standing     Dates: Start: 10/10/20       Description: 1) Individualized goal:  Pt will demonstrate ability to complete dynamic standing for >3 min SBA to improve independence with ADLs and IADLs within one week.  2) Interventions:  PT E Stim Attended, PT Gait Training, PT Therapeutic Exercises, PT TENS Application, PT Neuro Re-Ed/Balance, PT Therapeutic Activity, PT Manual Therapy, and PT Evaluation      Note:     Goal Note filed on 10/14/20 1146 by Byron Tobin, PT    < 3 min                        Problem: Mobility     Dates: Start: 10/10/20       Goal: STG-Within one week, patient will propel wheelchair household distances     Dates: Start: 10/10/20       Description: 1) Individualized goal:  Pt will propel wheelchair >200' with BLEs to become Caroline within WC in unit within one week.  2) Interventions:  PT E Stim Attended, PT Gait Training, PT Therapeutic Exercises, PT TENS Application, PT Neuro Re-Ed/Balance, PT Therapeutic Activity, PT Manual Therapy, and PT Evaluation      Note:     Goal Note filed on 10/14/20 1146 by Byron Tobin, PT    < 200 ft                  Goal: STG-Within one week, patient will ambulate household distance     Dates: Start: 10/10/20       Description: 1) Individualized goal:  Pt will demonstrate ability to walk >100' CGA with FWW to achieve household distances within one week.  2) Interventions:  PT E  Stim Attended, PT Gait Training, PT Therapeutic Exercises, PT TENS Application, PT Neuro Re-Ed/Balance, PT Therapeutic Activity, PT Manual Therapy, and PT Evaluation      Note:     Goal Note filed on 10/14/20 1146 by Byron Tobin, PT    < 100 ft                   Goal: STG-Within one week, patient will ambulate up/down a curb     Dates: Start: 10/10/20       Description: 1) Individualized goal:  Pt will trial ascending/descending a standard curb [place] with FWW to practice entry to home within one week.  2) Interventions:  PT E Stim Attended, PT Gait Training, PT Therapeutic Exercises, PT TENS Application, PT Neuro Re-Ed/Balance, PT Therapeutic Activity, PT Manual Therapy, and PT Evaluation      Note:     Goal Note filed on 10/14/20 1146 by Byron Tobin, PT    NT dt safety concerns                        Problem: PT-Long Term Goals     Dates: Start: 10/10/20       Goal: LTG-By discharge, patient will ambulate     Dates: Start: 10/10/20       Description: 1) Individualized goal:  Pt will demonstrate ability to ambulate >100' SBA to safely negotiate household distances at discharge.  2) Interventions:  PT E Stim Attended, PT Gait Training, PT Therapeutic Exercises, PT TENS Application, PT Neuro Re-Ed/Balance, PT Therapeutic Activity, PT Manual Therapy, and PT Evaluation            Goal: LTG-By discharge, patient will transfer one surface to another     Dates: Start: 10/10/20       Description: 1) Individualized goal:  Pt will demonstrate ability to complete chair to chair transfers and bed transfers independently to safely return to prior living environment.  2) Interventions:  PT E Stim Attended, PT Gait Training, PT Therapeutic Exercises, PT TENS Application, PT Neuro Re-Ed/Balance, PT Therapeutic Activity, PT Manual Therapy, and PT Evaluation            Goal: LTG-By discharge, patient will ambulate up/down 4-6 stairs     Dates: Start: 10/10/20       Description: 1) Individualized goal:  Pt will  demonstrate ability to ascend/descend 4 standard steps CGA with LRD to safely enter home.  2) Interventions:  PT E Stim Attended, PT Gait Training, PT Therapeutic Exercises, PT TENS Application, PT Neuro Re-Ed/Balance, PT Therapeutic Activity, PT Manual Therapy, and PT Evaluation

## 2020-10-15 NOTE — PROGRESS NOTES
Pt refused tube feeding and sterile water throughout shift, pt ate part of breakfast, lunch ,and dinnersee chart for details lunch and dinner, denies any nausea this shift.  approx 120 mL of water given throughout shift through tube with medications. Pt drinking water orally during shift. Will continue to monitor and encourage oral and tube intake.

## 2020-10-15 NOTE — THERAPY
Physical Therapy   Daily Treatment     Patient Name: Guerrero Ann  Age:  49 y.o., Sex:  male  Medical Record #: [id number]  Today's Date: 10/14/2020     Precautions  Precautions: Sternal Precautions (See Comments), Fall Risk, Nasogastric Tube  Comments: Girlfriend (Miriam) cleared to assist patient with bed and toilet txfrs     Subjective    Pt nods yes agreeable to walk     Objective       10/14/20 1231   Gait Functional Level of Assist    Gait Level Of Assist Contact Guard Assist   Assistive Device Front Wheel Walker   Distance (Feet) 70  (35 x 2)   # of Times Distance was Traveled 3   Deviation Bradykinetic;Shuffled Gait;Decreased Heel Strike;Decreased Toe Off   Outcome Measures   Outcome Measures Utilized 10 Meter Walk Test   10 Meter Walk Test   Normal - Trial 1 27.65 seconds   Normal - Trial 2 23.76 seconds   Normal - Trial 3 20.66 seconds   Normal Average Time 24.02 seconds   Normal Average Velocity (m/s) 0.25   PT Total Time Spent   PT Individual Total Time Spent (Mins) 30   PT Charge Group   PT Gait Training 2       Assessment    Pt demos self limiting behavior - walking short distances only and requesting to sit despite not showing signs of fatigue - reports fatigue despite not showing signs.     Strengths: Independent prior level of function, Supportive family, Willingly participates in therapeutic activities  Barriers: Visual impairment, Pain, Fatigue, Generalized weakness    Plan    Exercise/neuro re-ed, [place], treadmill training for increased activity, activity tolerance, consider Vector for dyn bal challenges no AD, determine Stairs setup,         Physical Therapy Problems     Problem: Balance     Dates: Start: 10/10/20       Goal: STG-Within one week, patient will maintain dynamic standing     Dates: Start: 10/10/20       Description: 1) Individualized goal:  Pt will demonstrate ability to complete dynamic standing for >3 min SBA to improve independence with ADLs and IADLs  within one week.  2) Interventions:  PT E Stim Attended, PT Gait Training, PT Therapeutic Exercises, PT TENS Application, PT Neuro Re-Ed/Balance, PT Therapeutic Activity, PT Manual Therapy, and PT Evaluation      Note:     Goal Note filed on 10/14/20 1146 by Byron Tobin, PT    < 3 min                        Problem: Mobility     Dates: Start: 10/10/20       Goal: STG-Within one week, patient will propel wheelchair household distances     Dates: Start: 10/10/20       Description: 1) Individualized goal:  Pt will propel wheelchair >200' with BLEs to become Caroline within WC in unit within one week.  2) Interventions:  PT E Stim Attended, PT Gait Training, PT Therapeutic Exercises, PT TENS Application, PT Neuro Re-Ed/Balance, PT Therapeutic Activity, PT Manual Therapy, and PT Evaluation      Note:     Goal Note filed on 10/14/20 1146 by Byron Tobin, PT    < 200 ft                  Goal: STG-Within one week, patient will ambulate household distance     Dates: Start: 10/10/20       Description: 1) Individualized goal:  Pt will demonstrate ability to walk >100' CGA with FWW to achieve household distances within one week.  2) Interventions:  PT E Stim Attended, PT Gait Training, PT Therapeutic Exercises, PT TENS Application, PT Neuro Re-Ed/Balance, PT Therapeutic Activity, PT Manual Therapy, and PT Evaluation      Note:     Goal Note filed on 10/14/20 1146 by Byron Tobin, PT    < 100 ft                   Goal: STG-Within one week, patient will ambulate up/down a curb     Dates: Start: 10/10/20       Description: 1) Individualized goal:  Pt will trial ascending/descending a standard curb [place] with FWW to practice entry to home within one week.  2) Interventions:  PT E Stim Attended, PT Gait Training, PT Therapeutic Exercises, PT TENS Application, PT Neuro Re-Ed/Balance, PT Therapeutic Activity, PT Manual Therapy, and PT Evaluation      Note:     Goal Note filed on 10/14/20 1146 by Byron Tobin,  PT    NT dt safety concerns                        Problem: PT-Long Term Goals     Dates: Start: 10/10/20       Goal: LTG-By discharge, patient will ambulate     Dates: Start: 10/10/20       Description: 1) Individualized goal:  Pt will demonstrate ability to ambulate >100' SBA to safely negotiate household distances at discharge.  2) Interventions:  PT E Stim Attended, PT Gait Training, PT Therapeutic Exercises, PT TENS Application, PT Neuro Re-Ed/Balance, PT Therapeutic Activity, PT Manual Therapy, and PT Evaluation            Goal: LTG-By discharge, patient will transfer one surface to another     Dates: Start: 10/10/20       Description: 1) Individualized goal:  Pt will demonstrate ability to complete chair to chair transfers and bed transfers independently to safely return to prior living environment.  2) Interventions:  PT E Stim Attended, PT Gait Training, PT Therapeutic Exercises, PT TENS Application, PT Neuro Re-Ed/Balance, PT Therapeutic Activity, PT Manual Therapy, and PT Evaluation            Goal: LTG-By discharge, patient will ambulate up/down 4-6 stairs     Dates: Start: 10/10/20       Description: 1) Individualized goal:  Pt will demonstrate ability to ascend/descend 4 standard steps CGA with LRD to safely enter home.  2) Interventions:  PT E Stim Attended, PT Gait Training, PT Therapeutic Exercises, PT TENS Application, PT Neuro Re-Ed/Balance, PT Therapeutic Activity, PT Manual Therapy, and PT Evaluation

## 2020-10-15 NOTE — PROGRESS NOTES
Hospital Medicine Daily Progress Note      Chief Complaint:  Hypertension  Tachycardia  Diabetes  Leukocytosis  S/P CABG  S/P CVA    Interval History:  Pt slowly feeling better everyday.  Labs reviewed.     Review of Systems  Review of Systems   Constitutional: Negative for chills and fever.   HENT: Negative.    Eyes: Negative.    Respiratory: Negative for cough and shortness of breath.    Cardiovascular: Negative for chest pain and palpitations.   Gastrointestinal: Negative for abdominal pain, nausea and vomiting.   Musculoskeletal:        Wound pain    Skin: Negative for itching and rash.   Endo/Heme/Allergies: Negative for polydipsia. Does not bruise/bleed easily.        Physical Exam  Temp:  [36.6 °C (97.8 °F)-36.9 °C (98.4 °F)] 36.9 °C (98.4 °F)  Pulse:  [62-72] 72  Resp:  [16-18] 18  BP: (100-125)/(55-70) 124/66  SpO2:  [91 %-95 %] 95 %    Physical Exam  Vitals signs reviewed.   Constitutional:       General: He is not in acute distress.     Appearance: Normal appearance. He is not ill-appearing.   HENT:      Head: Normocephalic and atraumatic.      Right Ear: External ear normal.      Left Ear: External ear normal.      Nose: Nose normal.      Mouth/Throat:      Pharynx: Oropharynx is clear.   Eyes:      General:         Right eye: No discharge.         Left eye: No discharge.      Extraocular Movements: Extraocular movements intact.      Conjunctiva/sclera: Conjunctivae normal.   Neck:      Musculoskeletal: Normal range of motion and neck supple.   Cardiovascular:      Rate and Rhythm: Normal rate and regular rhythm.   Pulmonary:      Effort: No respiratory distress.      Breath sounds: No wheezing.      Comments: Decreased BS   Abdominal:      General: Bowel sounds are normal.      Palpations: Abdomen is soft.      Tenderness: There is no abdominal tenderness. There is no guarding or rebound.      Comments: Decreased distension   Musculoskeletal:      Right lower leg: No edema.      Left lower leg: No  edema.   Skin:     General: Skin is warm and dry.   Neurological:      Mental Status: He is alert.      Comments: Awake and alert         Fluids    Intake/Output Summary (Last 24 hours) at 10/15/2020 1527  Last data filed at 10/15/2020 1200  Gross per 24 hour   Intake 870 ml   Output 2130 ml   Net -1260 ml       Laboratory  Recent Labs     10/13/20  0556 10/15/20  0651   WBC 10.4 9.2   RBC 3.37* 3.35*   HEMOGLOBIN 10.2* 9.9*   HEMATOCRIT 32.2* 31.8*   MCV 95.5 94.9   MCH 30.3 29.6   MCHC 31.7* 31.1*   RDW 47.7 47.1   PLATELETCT 563* 502*   MPV 10.4 10.5     Recent Labs     10/13/20  0556 10/15/20  0651   SODIUM 135 134*   POTASSIUM 4.2 4.1   CHLORIDE 102 100   CO2 21 21   GLUCOSE 211* 155*   BUN 31* 22   CREATININE 0.95 1.07   CALCIUM 8.9 9.0                 Assessment/Plan  * Cardioembolic stroke (HCC)- (present on admission)  Assessment & Plan  S/P tyshawn-op CVA  On ASA, Plavix, and Lipitor    Ileus (HCC)- (present on admission)  Assessment & Plan  Has better appetite  Abd exam improved  F/U KUB unremarkable  Diarrhea resolved prior to C Dif sample taken  Continue Simethicone and slow taper Reglan    SONNY (acute kidney injury) (HCC)- (present on admission)  Assessment & Plan  BUN/Cr improved w/ IVF  Follow renal function    CAD (coronary artery disease)- (present on admission)  Assessment & Plan  H/O cardiac stent x 2  S/P NSTEMI  S/P CABG x 3 vessels  On ASA, Plavix, Lipitor, Lisinopril, and Metoprolol  BNP improving    Essential hypertension- (present on admission)  Assessment & Plan  On Lisinopril and Metoprolol  Decreased B-Bl for bradycardic and hypotensive trends  Monitor for orthostatic hypotension on Hytrin    Type 2 diabetes mellitus with complication, without long-term current use of insulin (HCC)- (present on admission)  Assessment & Plan  HbA1c 7.9  Discontinued Metformin for diarrhea and SONNY  May need to increase Lantus  Continue SSI  Outpt meds include Metformin 2000 mg bid per Epic records (pt unable  to confirm dosing)    Thrombocytosis (HCC)  Assessment & Plan  [place]ley reactive  Follow Platelet counts    Iron deficiency anemia  Assessment & Plan  Fe 31  Continue Fe and Vit C supplements    Vitamin D deficiency  Assessment & Plan  Vit D level 17  On supplementation    Reactive depression- (present on admission)  Assessment & Plan  On Zoloft    Tachycardia- (present on admission)  Assessment & Plan  Rapid HR resolved on Metoprolol    Pneumonia- (present on admission)  Assessment & Plan  Completing Levaquin and Flagyl per Dr. Rashid    Full Code    Reviewed w/ RN

## 2020-10-15 NOTE — THERAPY
Speech Language Pathology  Daily Treatment     Patient Name: Guerrero Ann  Age:  49 y.o., Sex:  male  Medical Record #: [id number]  Today's Date: 10/15/2020     Precautions  Precautions: Fall Risk, Swallow Precautions ( See Comments), Sternal Precautions (See Comments)  Comments: Girlfriend (Miriam) cleared to assist patient with bed and toilet txfrs     Subjective    Pt pleasant and cooperative seen for both meals.     Objective       10/15/20 0803   SLP Total Time Spent   SLP Individual Total Time Spent (Mins) 60   Treatment Charges   SLP Swallowing Dysfunction Treatment Swallowing Dysfunction Treatment       Assessment    Pt assessed with regular textures and thin liquids via straws. Pt tolerated well with no overt s/sx of asp/pen noted throughout the meal. Pt did demonstrate rapid rate of intake however did tolerate. Breakfast pt consumed 100% of meal, lunch pt had consumed approx 40% at the end of the session. Pt to remain in t-dine with further encouragement for PO intake with goal to achieve 50% to allow for NG removal.    Strengths: Able to follow instructions, Independent prior level of function, Pleasant and cooperative, Motivated for self care and independence, Willingly participates in therapeutic activities  Barriers: Impaired functional cognition, Impaired carryover of learning, Impaired insight/denial of deficits, Impaired activity tolerance    Plan    Advance to regular textures    Speech Therapy Problems     Problem: Expression STGs     Dates: Start: 10/10/20       Goal: STG-Within one week, patient will     Dates: Start: 10/10/20       Description: 1) Individualized goal:  state 10 words per minute given concrete category and min verbal cues.   2) Interventions:  SLP Speech Language Treatment and SLP Cognitive Skill Development                    Problem: Memory STGs     Dates: Start: 10/10/20       Goal: STG-Within one week, patient will     Dates: Start: 10/10/20        Description: 1) Individualized goal:  recall daily events and safety strategies 80% accuracy given min cues, with use of memory book.   2) Interventions:  SLP Cognitive Skill Development                    Problem: Problem Solving STGs     Dates: Start: 10/10/20       Goal: STG-Within one week, patient will     Dates: Start: 10/10/20       Description: 1) Individualized goal:  complete single target visual scanning tasks with 80% accuracy, given min verbal cues.   2) Interventions:  SLP Cognitive Skill Development                    Problem: Speech/Swallowing LTGs     Dates: Start: 10/10/20       Goal: LTG-By discharge, patient will safely swallow     Dates: Start: 10/10/20       Description: 1) Individualized goal:  regular textures and thin liquids without aspiration.   2) Interventions:  SLP Swallowing Dysfunction Treatment, SLP Oral Pharyngeal Evaluation, SLP Video Swallow Evaluation, and SLP Group Treatment              Goal: LTG-By discharge, patient will solve basic problems     Dates: Start: 10/10/20       Description: 1) Individualized goal:  [place] for safe discharge home.    2) Interventions:  SLP Speech Language Treatment, SLP Cognitive Skill Development, and SLP Group Treatment                    Problem: Swallowing STGs     Dates: Start: 10/13/20       Goal: STG-Within one week, patient will     Dates: Start: 10/13/20       Description: 1) Individualized goal:  tolerate soft and bite size textures with thin liquids with no overt s/sx of asp/pen noted provided MIN A  2) Interventions:  SLP Swallowing Dysfunction Treatment, SLP Oral Pharyngeal Evaluation, SLP Video Swallow Evaluation, SLP Self Care / ADL Training , SLP Cognitive Skill Development, and SLP Group Treatment

## 2020-10-15 NOTE — PROGRESS NOTES
"Rehab Progress Note     Date of Service: 10/15/2020  Chief Complaint: follow up stroke    Interval Events (Subjective)    Patient seen and examined today in his room. His mood is improved today, and he is talking more. His SO is not currently in the room. Patient is still unable to urinate, and cannot even feel when his bladder his full. He reports he had this same problem after his last MI. We discussed his chewing tobacco habit, and that he would like to quit. He has quit in the past and doesn't think it will be hard to stop. Patient reports he was able to eat more for breakfast today. He has no new complaints.       Objective:  VITAL SIGNS: /66   Pulse 72   Temp 36.9 °C (98.4 °F) (Tympanic)   Resp 18   Ht 1.575 m (5' 2.01\")   Wt 68.5 kg (151 lb 0.2 oz)   SpO2 95%   BMI 27.61 kg/m²   Gen: alert, no apparent distress  HEENT: NG in nare  CV: regular rate and rhythm, no murmurs, no peripheral edema  Resp: clear to ascultation bilaterally, normal respiratory effort  GI: soft, non-tender abdomen, bowel sounds present  Neuro: notable for non-focal  Psych: improved mood and verbal output today      Recent Results (from the past 72 hour(s))   ACCU-CHEK GLUCOSE    Collection Time: 10/12/20  5:35 PM   Result Value Ref Range    Glucose - Accu-Ck 187 (H) 65 - 99 mg/dL   ACCU-CHEK GLUCOSE    Collection Time: 10/12/20  9:48 PM   Result Value Ref Range    Glucose - Accu-Ck 183 (H) 65 - 99 mg/dL   CBC WITHOUT DIFFERENTIAL    Collection Time: 10/13/20  5:56 AM   Result Value Ref Range    WBC 10.4 4.8 - 10.8 K/uL    RBC 3.37 (L) 4.70 - 6.10 M/uL    Hemoglobin 10.2 (L) 14.0 - 18.0 g/dL    Hematocrit 32.2 (L) 42.0 - 52.0 %    MCV 95.5 81.4 - 97.8 fL    MCH 30.3 27.0 - 33.0 pg    MCHC 31.7 (L) 33.7 - 35.3 g/dL    RDW 47.7 35.9 - 50.0 fL    Platelet Count 563 (H) 164 - 446 K/uL    MPV 10.4 9.0 - 12.9 fL   Basic Metabolic Panel    Collection Time: 10/13/20  5:56 AM   Result Value Ref Range    Sodium 135 135 - 145 mmol/L    " Potassium 4.2 3.6 - 5.5 mmol/L    Chloride 102 96 - 112 mmol/L    Co2 21 20 - 33 mmol/L    Glucose 211 (H) 65 - 99 mg/dL    Bun 31 (H) 8 - 22 mg/dL    Creatinine 0.95 0.50 - 1.40 mg/dL    Calcium 8.9 8.5 - 10.5 mg/dL    Anion Gap 12.0 7.0 - 16.0   proBrain Natriuretic Peptide, NT    Collection Time: 10/13/20  5:56 AM   Result Value Ref Range    NT-proBNP 197 (H) 0 - 125 pg/mL   ESTIMATED GFR    Collection Time: 10/13/20  5:56 AM   Result Value Ref Range    GFR If African American >60 >60 mL/min/1.73 m 2    GFR If Non African American >60 >60 mL/min/1.73 m 2   ACCU-CHEK GLUCOSE    Collection Time: 10/13/20  8:03 AM   Result Value Ref Range    Glucose - Accu-Ck 197 (H) 65 - 99 mg/dL   ACCU-CHEK GLUCOSE    Collection Time: 10/13/20 11:20 AM   Result Value Ref Range    Glucose - Accu-Ck 208 (H) 65 - 99 mg/dL   ACCU-CHEK GLUCOSE    Collection Time: 10/13/20  5:23 PM   Result Value Ref Range    Glucose - Accu-Ck 164 (H) 65 - 99 mg/dL   URINALYSIS    Collection Time: 10/13/20  6:50 PM   Result Value Ref Range    Color Yellow     Character Cloudy (A)     Specific Gravity 1.020 <1.035    Ph 5.0 5.0 - 8.0    Glucose Negative Negative mg/dL    Ketones Negative Negative mg/dL    Protein 30 (A) Negative mg/dL    Bilirubin Negative Negative    Urobilinogen, Urine 0.2 Negative    Nitrite Negative Negative    Leukocyte Esterase Negative Negative    Occult Blood Large (A) Negative    Micro Urine Req Microscopic    URINE MICROSCOPIC (W/UA)    Collection Time: 10/13/20  6:50 PM   Result Value Ref Range    WBC 0-2 (A) /hpf    RBC >150 (A) /hpf    Bacteria Negative None /hpf    Epithelial Cells Negative /hpf    Uric Acid Crystal Positive /hpf    Hyaline Cast 0-2 /lpf   ACCU-CHEK GLUCOSE    Collection Time: 10/13/20  9:52 PM   Result Value Ref Range    Glucose - Accu-Ck 175 (H) 65 - 99 mg/dL   ACCU-CHEK GLUCOSE    Collection Time: 10/14/20  7:57 AM   Result Value Ref Range    Glucose - Accu-Ck 160 (H) 65 - 99 mg/dL   ACCU-CHEK GLUCOSE     Collection Time: 10/14/20 11:21 AM   Result Value Ref Range    Glucose - Accu-Ck 230 (H) 65 - 99 mg/dL   ACCU-CHEK GLUCOSE    Collection Time: 10/14/20  5:02 PM   Result Value Ref Range    Glucose - Accu-Ck 201 (H) 65 - 99 mg/dL   ACCU-CHEK GLUCOSE    Collection Time: 10/14/20  8:30 PM   Result Value Ref Range    Glucose - Accu-Ck 159 (H) 65 - 99 mg/dL   CBC WITHOUT DIFFERENTIAL    Collection Time: 10/15/20  6:51 AM   Result Value Ref Range    WBC 9.2 4.8 - 10.8 K/uL    RBC 3.35 (L) 4.70 - 6.10 M/uL    Hemoglobin 9.9 (L) 14.0 - 18.0 g/dL    Hematocrit 31.8 (L) 42.0 - 52.0 %    MCV 94.9 81.4 - 97.8 fL    MCH 29.6 27.0 - 33.0 pg    MCHC 31.1 (L) 33.7 - 35.3 g/dL    RDW 47.1 35.9 - 50.0 fL    Platelet Count 502 (H) 164 - 446 K/uL    MPV 10.5 9.0 - 12.9 fL   Basic Metabolic Panel    Collection Time: 10/15/20  6:51 AM   Result Value Ref Range    Sodium 134 (L) 135 - 145 mmol/L    Potassium 4.1 3.6 - 5.5 mmol/L    Chloride 100 96 - 112 mmol/L    Co2 21 20 - 33 mmol/L    Glucose 155 (H) 65 - 99 mg/dL    Bun 22 8 - 22 mg/dL    Creatinine 1.07 0.50 - 1.40 mg/dL    Calcium 9.0 8.5 - 10.5 mg/dL    Anion Gap 13.0 7.0 - 16.0   ESTIMATED GFR    Collection Time: 10/15/20  6:51 AM   Result Value Ref Range    GFR If African American >60 >60 mL/min/1.73 m 2    GFR If Non African American >60 >60 mL/min/1.73 m 2   ACCU-CHEK GLUCOSE    Collection Time: 10/15/20  8:02 AM   Result Value Ref Range    Glucose - Accu-Ck 137 (H) 65 - 99 mg/dL   ACCU-CHEK GLUCOSE    Collection Time: 10/15/20 11:33 AM   Result Value Ref Range    Glucose - Accu-Ck 224 (H) 65 - 99 mg/dL       Current Facility-Administered Medications   Medication Frequency   • terazosin (HYTRIN) capsule 3 mg Q EVENING   • ascorbic acid tablet 500 mg QDAY with Breakfast   • vitamin D (cholecalciferol) tablet 2,000 Units DAILY   • guaiFENesin dextromethorphan (ROBITUSSIN DM) 100-10 MG/5ML syrup 10 mL Q6HRS PRN   • metoprolol (LOPRESSOR) tablet 25 mg TWICE DAILY   • insulin  glargine (Lantus) injection QAM INSULIN   • senna-docusate (PERICOLACE or SENOKOT S) 8.6-50 MG per tablet 2 Tab BID PRN    And   • polyethylene glycol/lytes (MIRALAX) PACKET 1 Packet QDAY PRN    And   • magnesium hydroxide (MILK OF MAGNESIA) suspension 30 mL QDAY PRN    And   • bisacodyl (DULCOLAX) suppository 10 mg QDAY PRN   • simethicone (MYLICON) chewable tab 160 mg 4XDAY   • insulin lispro (HumaLOG) injection 4X/DAY ACHS   • metoclopramide (REGLAN) tablet 5 mg BID   • ferrous sulfate tablet 325 mg QDAY with Breakfast   • melatonin tablet 3 mg QHS   • traZODone (DESYREL) tablet 50 mg QHS   • hydrOXYzine HCl (ATARAX) tablet 50 mg Q6HRS PRN   • Respiratory Therapy Consult Continuous RT   • Pharmacy Consult Request ...Pain Management Review 1 Each PHARMACY TO DOSE   • acetaminophen (TYLENOL) tablet 650 mg Q4HRS PRN   • artificial tears ophthalmic solution 1 Drop PRN   • benzocaine-menthol (CEPACOL) lozenge 1 Lozenge Q2HRS PRN   • mag hydrox-al hydrox-simeth (MAALOX PLUS ES or MYLANTA DS) suspension 20 mL Q2HRS PRN   • ondansetron (ZOFRAN ODT) dispertab 4 mg 4X/DAY PRN    Or   • ondansetron (ZOFRAN) syringe/vial injection 4 mg 4X/DAY PRN   • sodium chloride (OCEAN) 0.65 % nasal spray 2 Spray PRN   • lactulose 20 GM/30ML solution 30 mL QDAY PRN   • docusate sodium (ENEMEEZ) enema 283 mg QDAY PRN   • fleet enema 133 mL QDAY PRN   • clopidogrel (PLAVIX) tablet 75 mg DAILY   • tramadol (ULTRAM) 50 MG tablet 50 mg Q4HRS PRN   • aspirin (ASA) chewable tab 81 mg DAILY   • atorvastatin (LIPITOR) tablet 80 mg QHS   • enoxaparin (LOVENOX) inj 40 mg Q EVENING   • lidocaine (LIDODERM) 5 % 1 Patch Q24HR   • omeprazole (FIRST-OMEPRAZOLE) 2 mg/mL oral susp 40 mg DAILY   • lisinopril (PRINIVIL) tablet 10 mg Q DAY   • sertraline (Zoloft) tablet 50 mg DAILY   • metroNIDAZOLE (FLAGYL) tablet 500 mg Q8HRS       Orders Placed This Encounter   Procedures   • DIET TUBE FEED     Standing Status:   Standing     Number of Occurrences:   1      Order Specific Question:   Diet     Answer:   Diet Tube Feed [35]     Order Specific Question:   Formula:     Answer:   Diabeticsource AC [34]     Comments:   goal 350mL QID ( meal times + HS)      Order Specific Question:   Route     Answer:   NG     Order Specific Question:   Select Bolus     Answer:   Other (specify bolus comments in the field to the right) [48]     Comments:   Diabetisource AC goal 350mL QID at meal times + HS.  Give free water in between feedings      Order Specific Question:   Additive Frequency     Answer:   QID   • Diet Order Diabetic (meds whole 1 at a time with thin.)     Standing Status:   Standing     Number of Occurrences:   1     Order Specific Question:   Diet:     Answer:   Diabetic [3]     Comments:   meds whole 1 at a time with thin.       Assessment:  Active Hospital Problems    Diagnosis   • *Cardioembolic stroke (HCC)   • Ileus (HCC)   • Aspiration pneumonia (HCC)   • CAD (coronary artery disease)   • Essential hypertension   • Type 2 diabetes mellitus with complication, without long-term current use of insulin (HCC)   • Anxiety   • Vitamin D deficiency   • Azotemia   • Hyponatremia   • Iron deficiency anemia   • Pneumonia   • Tachycardia   • Impaired mobility and ADLs   • Reactive depression   • Diabetes mellitus due to underlying condition with hyperglycemia (HCC)     This patient is a 49 y.o. male admitted for acute inpatient rehabilitation with Cardioembolic stroke (HCC).    I led and attended the weekly conference, and agree with the IDT conference documentation and plan of care as noted below.    Date of conference: 10/14/2020    Goals and barriers: See IDT note.    Biggest barriers: impaired motivation, dysphagia, fatigues quickly, self-limiting    CM/social support: SO supportive and can assist 24/7    Anticipated DC date: 10/24    Home health: PT/OT/SLP/RN    Equip: TBD    Follow up: PCP, CT surgery, Dr. Franco rehab clinic      Medical Decision Making and Plan:    COLBY  strokes  Cardio-embolic after CABG  Dysphagia  Cognitive deficits  Non-focal  Continue full rehab program  PT/OT/SLP, 1 hr each discipline, 5 days per week  Continue aspirin, Plavix, and statin for secondary stroke prophylaxis  Outpatient follow up with Dr. Franco in rehab clinic, referral made    Reactive depression?  History of depression/anxiety  Started sertraline 10/10  Dr. Heard consulted 10/14, appreciate assistance    Insomnia, improved  Scheduled melatonin and trazodone    Bowel  Loose stools, resolved  Meds as needed  Last BM 10/13  KUB OK, c diff negative    Bladder  Urinary retention, continues  Replace Quispe for bladder rest  Started Hytrin 1 mg 10/10 --> 2 mg 10/13 --> 3 mg 10/15  Checked UA, negative    DVT prophylaxis  Lovenox    Appreciate assistance of hospitalist with his medical co-morbidities:     Diabetes with hyperglycemia, Lantus, sliding scale insulin  Coronary artery disease status post CABG, aspirin, Plavix, and statin  Hypertension, lisinopril, metoprolol  Leukocytosis, improved, x-ray suggestive of pneumonia, on antibiotics  Status post ileus, titrate Reglan, simethicone   Loose stools, resolved  Aspiration pneumonia, continue antibiotics, metronidazole and levofloxacin  Tobacco use, will need counseling during his stay   Hypokalemia, resolved s/p supplementation  Normocytic anemia, ferrous sulfate and Vit C  Hyponatremia, mild monitor  GI prophylaxis, omeprazole  Vit D deficiency, continue supplementation    Total time:  15 minutes.  I spent greater than 50% of the time for patient care, counseling, and coordination on this date, including patient face-to face time, unit/floor time with review of records/pertinent lab data and studies, as well as discussing diagnostic evaluation/work up, planned therapeutic interventions, and future disposition of care, as per the interval events/subjective and the assessment and plan as noted above.    I had a 5 minute discussion with patient on  10/15/2020 about smoking cessation.  Discussed that smoking is associated with respiratory, cardiovascular, oncologic, and neurologic illnesses.  Patient was provided advice and counseling on different methods of smoking cessation as well as provided supportive listening for psychologic aspect of addiction.       I have performed a physical exam, reviewed and updated ROS, as well as the assessment and plan today 10/15/2020. In review of note from 10/14/2020 there are no new changes except as documented above.          Ankita Ng M.D.   Physical Medicine and Rehabilitation

## 2020-10-15 NOTE — THERAPY
"Occupational Therapy  Daily Treatment     Patient Name: Guerrero Ann  Age:  49 y.o., Sex:  male  Medical Record #: [id number]  Today's Date: 10/15/2020     Precautions  Precautions: (P) Fall Risk, Swallow Precautions ( See Comments), Sternal Precautions (See Comments)  Comments: Girlfriend (Miriam) cleared to assist patient with bed and toilet txfrs          Subjective      \"110%  I ate my food and then had a bowl of fruit.\"  Stated with good vocal  Quality      Objective       10/15/20 0902   Precautions   Precautions Fall Risk;Swallow Precautions ( See Comments);Sternal Precautions (See Comments)   Functional Level of Assist   Grooming Supervision  (wash/dry face and hands  oral care  seated at sink)   Bed, Chair, Wheelchair Transfer Stand by Assist  (cues for safety and technique)   Interdisciplinary Plan of Care Collaboration   IDT Collaboration with  Nursing   Patient Position at End of Therapy In Bed;Call Light within Reach;Tray Table within Reach   Collaboration Comments hand off to nursing for meds administration.    OT Total Time Spent   OT Individual Total Time Spent (Mins) 30   OT Charge Group   OT Self Care / ADL 2          Assessment     declined to walk to the bathroom  Or stand at sink for oral care      performed tasks at w/c level . Functional vision  Presents intact for tasks performed     Strengths: Alert and oriented, Adequate strength, Independent prior level of function, Motivated for self care and independence, Willingly participates in therapeutic activities, Pleasant and cooperative, Supportive family  Barriers: Decreased endurance, Generalized weakness, Impaired balance, Tube feeding, Visual impairment    Plan     continue  ADL  IADL , related mobility strength/endruance building  Incorporating  Sternal precautions      Occupational Therapy Goals     Problem: Bathing     Dates: Start: 10/10/20       Goal: STG-Within one week, patient will bathe     Dates: Start: " 10/10/20       Description: 1) Individualized Goal:  CGA   2) Interventions:  OT Self Care/ADL, OT Cognitive Skill Dev, OT Community Reintegration, OT Manual Ther Technique, OT Neuro Re-Ed/Balance, OT Sensory Int Techniques, OT Therapeutic Activity, OT Evaluation, and OT Therapeutic Exercise      Note:     Goal Note filed on 10/13/20 1117 by Kelvin Kirkland, C.O.T.A.    Min assist    Decreased strength/endurance , decreased balance ,  vision issues   Continue goal                         Problem: Dressing     Dates: Start: 10/10/20       Goal: STG-Within one week, patient will dress LB     Dates: Start: 10/10/20       Description: 1) Individualized Goal:  CGA   2) Interventions:  OT Self Care/ADL, OT Cognitive Skill Dev, OT Community Reintegration, OT Manual Ther Technique, OT Neuro Re-Ed/Balance, OT Sensory Int Techniques, OT Therapeutic Activity, OT Evaluation, and OT Therapeutic Exercise      Note:     Goal Note filed on 10/13/20 1117 by Kelvin Kirkland, C.O.T.A.    Min assist   Decreased strength/endurance , decreased balance ,  vision issues   Continue goal                         Problem: OT Long Term Goals     Dates: Start: 10/10/20       Goal: LTG-By discharge, patient will complete basic self care tasks     Dates: Start: 10/10/20       Description: 1) Individualized Goal:  Mod I    2) Interventions:  OT Self Care/ADL, OT Cognitive Skill Dev, OT Community Reintegration, OT Manual Ther Technique, OT Neuro Re-Ed/Balance, OT Sensory Int Techniques, OT Therapeutic Activity, OT Evaluation, and OT Therapeutic Exercise            Goal: LTG-By discharge, patient will perform bathroom transfers     Dates: Start: 10/10/20       Description: 1) Individualized Goal:  Mod I    2) Interventions:  OT Self Care/ADL, OT Cognitive Skill Dev, OT Community Reintegration, OT Manual Ther Technique, OT Neuro Re-Ed/Balance, OT Sensory Int Techniques, OT Therapeutic Activity, OT Evaluation, and OT Therapeutic Exercise

## 2020-10-15 NOTE — THERAPY
Physical Therapy   Daily Treatment     Patient Name: Guerrero Ann  Age:  49 y.o., Sex:  male  Medical Record #: [id number]  Today's Date: 10/15/2020     Precautions  Precautions: Fall Risk, Swallow Precautions ( See Comments), Sternal Precautions (See Comments)  Comments: Girlfriend (Miriam) cleared to assist patient with bed and toilet txfrs     Subjective    Pt reports he is agreeable to balance assessment      Objective       10/15/20 1031   Transfer Functional Level of Assist   Bed, Chair, Wheelchair Transfer Contact Guard Assist   Bed Chair Wheelchair Transfer Description Adaptive equipment;Verbal cueing   Bed Mobility    Supine to Sit Minimal Assist   Sit to Stand Supervised   Scooting Supervised   Rolling Supervised   Outcome Measures   Outcome Measures Utilized [place] Balance Scale   [place] Balance Scale   Sitting Unsupported (Score 0-4) 4   Change Of Positon: Sitting To Standing (Score 0-4) 2   Change Of Positon: Standing To Sitting (Score 0-4) 1   Transfers (Score 0-4) 1   Standing Unsupported (Score 0-4) 0   Standing With Eyes Closed (Score 0-4) 0   Standing With Feet Together (Score 0-4) 0   Tandem Standing (Score 0-4) 0   Standing On One Leg (Score 0-4) 1   Turning Trunk (Feet Fixed) (Score 0-4) 1   Retrieving Objects From Floor (Score 0-4) 3   Turning 360 Degrees (Score 0-4) 0   Stool Stepping (Score 0-4) 0   Reaching Forward While Standing (Score 0-4) 1   [place] Balance Total Score (0-56) 14   Interdisciplinary Plan of Care Collaboration   Patient Position at End of Therapy Seated  (tdine)   PT Total Time Spent   PT Individual Total Time Spent (Mins) 60   PT Charge Group   PT Neuromuscular Re-Education / Balance 2   PT Therapeutic Activities 2       Assessment    Pt demos high fall risk via [place]. Unclear if pt is self limiting or true balance deficit. C/o dizziness but no BP changes - stable throughout. Pt cont to require assist with bed mob and transfers.     Strengths: Independent  prior level of function, Supportive family, Willingly participates in therapeutic activities  Barriers: Visual impairment, Pain, Fatigue, Generalized weakness    Plan    Exercise/neuro re-ed, treadmill training for increased activity, activity tolerance, consider Vector for dyn bal challenges no AD, determine Stairs setup,     Physical Therapy Problems     Problem: Balance     Dates: Start: 10/10/20       Goal: STG-Within one week, patient will maintain dynamic standing     Dates: Start: 10/10/20       Description: 1) Individualized goal:  Pt will demonstrate ability to complete dynamic standing for >3 min SBA to improve independence with ADLs and IADLs within one week.  2) Interventions:  PT E Stim Attended, PT Gait Training, PT Therapeutic Exercises, PT TENS Application, PT Neuro Re-Ed/Balance, PT Therapeutic Activity, PT Manual Therapy, and PT Evaluation      Note:     Goal Note filed on 10/14/20 1146 by Byron Tobin, PT    < 3 min                        Problem: Mobility     Dates: Start: 10/10/20       Goal: STG-Within one week, patient will propel wheelchair household distances     Dates: Start: 10/10/20       Description: 1) Individualized goal:  Pt will propel wheelchair >200' with BLEs to become Caroline within WC in unit within one week.  2) Interventions:  PT E Stim Attended, PT Gait Training, PT Therapeutic Exercises, PT TENS Application, PT Neuro Re-Ed/Balance, PT Therapeutic Activity, PT Manual Therapy, and PT Evaluation      Note:     Goal Note filed on 10/14/20 1146 by Byron Tobin, PT    < 200 ft                  Goal: STG-Within one week, patient will ambulate household distance     Dates: Start: 10/10/20       Description: 1) Individualized goal:  Pt will demonstrate ability to walk >100' CGA with FWW to achieve household distances within one week.  2) Interventions:  PT E Stim Attended, PT Gait Training, PT Therapeutic Exercises, PT TENS Application, PT Neuro Re-Ed/Balance, PT  Therapeutic Activity, PT Manual Therapy, and PT Evaluation      Note:     Goal Note filed on 10/14/20 1146 by Byron Tobin, PT    < 100 ft                   Goal: STG-Within one week, patient will ambulate up/down a curb     Dates: Start: 10/10/20       Description: 1) Individualized goal:  Pt will trial ascending/descending a standard curb Donny with FWW to practice entry to home within one week.  2) Interventions:  PT E Stim Attended, PT Gait Training, PT Therapeutic Exercises, PT TENS Application, PT Neuro Re-Ed/Balance, PT Therapeutic Activity, PT Manual Therapy, and PT Evaluation      Note:     Goal Note filed on 10/14/20 1146 by Byron Tobin, PT    NT dt safety concerns                        Problem: PT-Long Term Goals     Dates: Start: 10/10/20       Goal: LTG-By discharge, patient will ambulate     Dates: Start: 10/10/20       Description: 1) Individualized goal:  Pt will demonstrate ability to ambulate >100' SBA to safely negotiate household distances at discharge.  2) Interventions:  PT E Stim Attended, PT Gait Training, PT Therapeutic Exercises, PT TENS Application, PT Neuro Re-Ed/Balance, PT Therapeutic Activity, PT Manual Therapy, and PT Evaluation            Goal: LTG-By discharge, patient will transfer one surface to another     Dates: Start: 10/10/20       Description: 1) Individualized goal:  Pt will demonstrate ability to complete chair to chair transfers and bed transfers independently to safely return to prior living environment.  2) Interventions:  PT E Stim Attended, PT Gait Training, PT Therapeutic Exercises, PT TENS Application, PT Neuro Re-Ed/Balance, PT Therapeutic Activity, PT Manual Therapy, and PT Evaluation            Goal: LTG-By discharge, patient will ambulate up/down 4-6 stairs     Dates: Start: 10/10/20       Description: 1) Individualized goal:  Pt will demonstrate ability to ascend/descend 4 standard steps CGA with LRD to safely enter home.  2) Interventions:  PT E  Stim Attended, PT Gait Training, PT Therapeutic Exercises, PT TENS Application, PT Neuro Re-Ed/Balance, PT Therapeutic Activity, PT Manual Therapy, and PT Evaluation

## 2020-10-16 LAB
GLUCOSE BLD-MCNC: 176 MG/DL (ref 65–99)
GLUCOSE BLD-MCNC: 211 MG/DL (ref 65–99)
GLUCOSE BLD-MCNC: 215 MG/DL (ref 65–99)
GLUCOSE BLD-MCNC: 225 MG/DL (ref 65–99)

## 2020-10-16 PROCEDURE — 700102 HCHG RX REV CODE 250 W/ 637 OVERRIDE(OP): Performed by: HOSPITALIST

## 2020-10-16 PROCEDURE — 97129 THER IVNTJ 1ST 15 MIN: CPT

## 2020-10-16 PROCEDURE — 700102 HCHG RX REV CODE 250 W/ 637 OVERRIDE(OP): Performed by: PHYSICAL MEDICINE & REHABILITATION

## 2020-10-16 PROCEDURE — 97110 THERAPEUTIC EXERCISES: CPT | Mod: CO

## 2020-10-16 PROCEDURE — 97530 THERAPEUTIC ACTIVITIES: CPT | Mod: CO

## 2020-10-16 PROCEDURE — A9270 NON-COVERED ITEM OR SERVICE: HCPCS | Performed by: PHYSICAL MEDICINE & REHABILITATION

## 2020-10-16 PROCEDURE — A9270 NON-COVERED ITEM OR SERVICE: HCPCS | Performed by: HOSPITALIST

## 2020-10-16 PROCEDURE — 97130 THER IVNTJ EA ADDL 15 MIN: CPT

## 2020-10-16 PROCEDURE — 770010 HCHG ROOM/CARE - REHAB SEMI PRIVAT*

## 2020-10-16 PROCEDURE — 700111 HCHG RX REV CODE 636 W/ 250 OVERRIDE (IP): Performed by: PHYSICAL MEDICINE & REHABILITATION

## 2020-10-16 PROCEDURE — 99231 SBSQ HOSP IP/OBS SF/LOW 25: CPT | Performed by: PHYSICAL MEDICINE & REHABILITATION

## 2020-10-16 PROCEDURE — 97116 GAIT TRAINING THERAPY: CPT

## 2020-10-16 PROCEDURE — 92526 ORAL FUNCTION THERAPY: CPT

## 2020-10-16 PROCEDURE — 97530 THERAPEUTIC ACTIVITIES: CPT

## 2020-10-16 PROCEDURE — 94669 MECHANICAL CHEST WALL OSCILL: CPT

## 2020-10-16 PROCEDURE — 99232 SBSQ HOSP IP/OBS MODERATE 35: CPT | Performed by: HOSPITALIST

## 2020-10-16 PROCEDURE — 97535 SELF CARE MNGMENT TRAINING: CPT | Mod: CO

## 2020-10-16 PROCEDURE — 700101 HCHG RX REV CODE 250: Performed by: PHYSICAL MEDICINE & REHABILITATION

## 2020-10-16 PROCEDURE — 82962 GLUCOSE BLOOD TEST: CPT | Mod: 91

## 2020-10-16 PROCEDURE — 94760 N-INVAS EAR/PLS OXIMETRY 1: CPT

## 2020-10-16 RX ORDER — METOCLOPRAMIDE 10 MG/1
5 TABLET ORAL DAILY
Status: DISCONTINUED | OUTPATIENT
Start: 2020-10-17 | End: 2020-10-17

## 2020-10-16 RX ORDER — INSULIN GLARGINE 100 [IU]/ML
20 INJECTION, SOLUTION SUBCUTANEOUS
Status: DISCONTINUED | OUTPATIENT
Start: 2020-10-17 | End: 2020-10-18

## 2020-10-16 RX ADMIN — OMEPRAZOLE 40 MG: KIT at 09:00

## 2020-10-16 RX ADMIN — LIDOCAINE 1 PATCH: 50 PATCH TOPICAL at 08:51

## 2020-10-16 RX ADMIN — INSULIN GLARGINE 18 UNITS: 100 INJECTION, SOLUTION SUBCUTANEOUS at 07:32

## 2020-10-16 RX ADMIN — METOCLOPRAMIDE 5 MG: 10 TABLET ORAL at 08:50

## 2020-10-16 RX ADMIN — OXYCODONE HYDROCHLORIDE AND ACETAMINOPHEN 500 MG: 500 TABLET ORAL at 08:50

## 2020-10-16 RX ADMIN — TRAZODONE HYDROCHLORIDE 50 MG: 50 TABLET ORAL at 20:54

## 2020-10-16 RX ADMIN — METRONIDAZOLE 500 MG: 500 TABLET ORAL at 05:16

## 2020-10-16 RX ADMIN — SIMETHICONE 160 MG: 80 TABLET, CHEWABLE ORAL at 08:50

## 2020-10-16 RX ADMIN — SERTRALINE HYDROCHLORIDE 50 MG: 50 TABLET ORAL at 08:50

## 2020-10-16 RX ADMIN — CLOPIDOGREL BISULFATE 75 MG: 75 TABLET ORAL at 08:50

## 2020-10-16 RX ADMIN — SIMETHICONE 160 MG: 80 TABLET, CHEWABLE ORAL at 14:25

## 2020-10-16 RX ADMIN — ATORVASTATIN CALCIUM 80 MG: 40 TABLET, FILM COATED ORAL at 20:54

## 2020-10-16 RX ADMIN — METOPROLOL TARTRATE 25 MG: 25 TABLET, FILM COATED ORAL at 05:16

## 2020-10-16 RX ADMIN — ENOXAPARIN SODIUM 40 MG: 40 INJECTION SUBCUTANEOUS at 20:54

## 2020-10-16 RX ADMIN — LISINOPRIL 10 MG: 5 TABLET ORAL at 05:16

## 2020-10-16 RX ADMIN — TERAZOSIN HYDROCHLORIDE 3 MG: 1 CAPSULE ORAL at 20:54

## 2020-10-16 RX ADMIN — Medication 2000 UNITS: at 08:50

## 2020-10-16 RX ADMIN — ASPIRIN 81 MG CHEWABLE TABLET 81 MG: 81 TABLET CHEWABLE at 08:50

## 2020-10-16 RX ADMIN — MELATONIN TAB 3 MG 3 MG: 3 TAB at 20:54

## 2020-10-16 RX ADMIN — FERROUS SULFATE TAB 325 MG (65 MG ELEMENTAL FE) 325 MG: 325 (65 FE) TAB at 08:50

## 2020-10-16 RX ADMIN — HYDROXYZINE HYDROCHLORIDE 25 MG: 25 TABLET, FILM COATED ORAL at 00:04

## 2020-10-16 RX ADMIN — SIMETHICONE 160 MG: 80 TABLET, CHEWABLE ORAL at 20:54

## 2020-10-16 RX ADMIN — METOPROLOL TARTRATE 25 MG: 25 TABLET, FILM COATED ORAL at 17:56

## 2020-10-16 ASSESSMENT — ENCOUNTER SYMPTOMS
SHORTNESS OF BREATH: 0
VOMITING: 0
COUGH: 0
CHILLS: 0
FEVER: 0
NAUSEA: 0
ABDOMINAL PAIN: 0
POLYDIPSIA: 0
EYES NEGATIVE: 1
BRUISES/BLEEDS EASILY: 0
PALPITATIONS: 0

## 2020-10-16 ASSESSMENT — GAIT ASSESSMENTS
DISTANCE (FEET): 125
DEVIATION: BRADYKINETIC;SHUFFLED GAIT;DECREASED HEEL STRIKE;DECREASED TOE OFF
ASSISTIVE DEVICE: FRONT WHEEL WALKER
GAIT LEVEL OF ASSIST: CONTACT GUARD ASSIST

## 2020-10-16 ASSESSMENT — ACTIVITIES OF DAILY LIVING (ADL)
BED_CHAIR_WHEELCHAIR_TRANSFER_DESCRIPTION: ADAPTIVE EQUIPMENT;VERBAL CUEING;SUPERVISION FOR SAFETY
TOILET_TRANSFER_DESCRIPTION: GRAB BAR

## 2020-10-16 ASSESSMENT — PAIN DESCRIPTION - PAIN TYPE: TYPE: CHRONIC PAIN

## 2020-10-16 NOTE — FLOWSHEET NOTE
10/16/20 1059   Events/Summary/Plan   Events/Summary/Plan spot check and pep done, good effort    Vital Signs   Pulse 76   Respiration 16   Pulse Oximetry 96 %   $ Pulse Oximetry (Spot Check) Yes   Respiratory Assessment   Level of Consciousness Alert   Breath Sounds   RUL Breath Sounds Clear   RML Breath Sounds Clear   RLL Breath Sounds Crackles;Diminished   ROBERT Breath Sounds Clear   LLL Breath Sounds Crackles;Diminished   Oxygen   O2 (LPM) 0   FiO2% 21 %   O2 Delivery Device None - Room Air

## 2020-10-16 NOTE — CARE PLAN
Problem: Other Problem (see comments)  Goal: Other Goal  Description: Monitor/Evaluation: Monitor PO intake, TF intake, diet upgrades weight, labs, medication adjustments, skin integrity, GI function, vitals, I/Os, and overall hydration status.  Adjust nutritional POC pending clinical outcomes.    RD following weekly.   Goal: 1.  Maintain adequate oral vs enteral TF nutrient/fluid intake to promote nutrition optimization/healing. 2. Transition to PO pending clinical outcomes.   10/16/2020 0929 by Alina Dash R.D.  Outcome: MET  10/16/2020 0913 by Alina Dash R.D.  Outcome: PROGRESSING AS EXPECTED   Discussed with Dr. Musa JAVIER to be removed today.

## 2020-10-16 NOTE — PROGRESS NOTES
Pt refused tube feeding and sterile water throughout shift, pt ate part of breakfast, lunch ,and dinner see chart for details, denies any nausea this shift.  approx 180 mL of water given throughout shift through tube with medications. Pt drinking water orally during shift. Will continue to monitor and encourage oral and tube intake.

## 2020-10-16 NOTE — FLOWSHEET NOTE
10/16/20 1059   Chest Physiotherapy Treatment   $ PEP/CPT Performed PEP / Flutter   Date Disposable Equipment Next Change Due (Q 30 Days) 11/10/20

## 2020-10-16 NOTE — CONSULTS
[facility] Behavioral Health  Initial Psychological Consultation      Patient Name: Guerrero Ann  Patient MRN: [id number]  Today's Date: 10/15/2020     Type of session: Initial evaluation  Length of session: 20 minutes  Persons in attendance:Patient and Spouse/Partner    Subjective:     Chief complaint/referral question:    Guerrero Ann is a 49 y.o., White male referred for assessment by Dr. Ng because of concerns about depression and mutism.    History of present illness: Patient is a 49 y.o. male  with a past medical history of coronary artery disease s/p stents, diabetes, hypertension, not taking any medications, admitted to [facility] on 9/21, with chest pain. He was admitted for acute coronary syndrome/NSTEMI, went to cath lab, found to have multivessel coronary artery disease and in-stent stenosis. He is now s/p CABG x 3 with Dr. Chicas on 9/25. Patient with acute blood loss anemia, s/p transfusion, urinary retention, right sided weakness and gaze preference, NIHSS 11, neurology consulted. CTA negative. Negative EEG. MRI with numerous acute/subacute supratentorial and infratentorial infarctions consistent with cardio-embolic etiology. HgbA1c 7.9, LDL unable to be determined due to elevated TGs at 610, ECHO EF 60 % s/p CABG, with severe LVH. COVID negative.     Acute stay complications included aspiration pneumonia, ileus requiring NG tube. Patient pulled out cortrak yesterday, briefly required restraints and Haldol, cortrak replaced.      Patient currently is not very talkative and a lot of the history was verified by his girlfriend Miriam who is at bedside.  Patient reports his goal is to get back to normal.  He is very tearful and his girlfriend reports he was emotional even prior to this open heart surgery and stroke.  Patient does have a history of depression and had been on medications at some point.  He denies a  history of any misha.  He denies any significant weakness numbness or tingling or visual changes from his stroke.  He has an indwelling Quispe catheter.  The hospitalist has been consulted for his medical issues including tachycardia and leukocytosis.    The patient reports symptoms of depression it was difficult at times to understand the patient as he would talk in a whispered tone however towards the end of evaluation it was tested to determine whether or not he was able and allow her he was able to do so.  Also during the interaction with the patient he frequently wanted to have his girlfriend answer the questions but when she was encouraged not to answer he would ultimately answer the question but at times had to be encouraged to speak patient is also experiencing difficulties related to getting off of his NG tube because he is nonverbal to eat.  The patient did identify that he feels like depressive symptoms are negatively impacting his rehab process however the patient was encouraged to look at motivation and has a factor and set small goals.  The patient denies a history of misha, hypomania, auditory visual hallucinations, thought disorder, anxiety disorder, and posttraumatic stress disorder.     Relevant history:    Social History: Patient reported that he was raised by his maternal grand mother and that he has one half brother one half sister.  Patient reports that he was previously  and when he went through a break-up with his wife she alienated him from his children who are now 17 years old and 14 years old.  During that period of alienation the patient reported having suicidal thoughts and he had been prescribed medication to treat his depression including olanzapine 2.5 mg at bedtime.  Patient reported that he discontinued the medication approximately 1 year after the medication was initiated.  The patient denied being seen by a psychiatrist.  The patient has been in a relationship with his  girlfriend for the last 5 months.     Psychiatric History: The patient denies a history of psychiatric hospitalization, suicide attempts, and self-injurious behavior.  The patient did report previously being prescribed psychotropic medications including an antipsychotic for depression.  The patient has not received psychotherapy    Substance Use History: The patient reports that he drinks up to 5 or 6 drinks a couple times per week.  The patient's girlfriend indicated that he drink along.  Patient denies any alcohol related difficulties.       MEDICAL HISTORY    Past medical/surgical history:   Past Medical History:   Diagnosis Date   • Diabetes (HCC)     oral meds only   • Hypertension    • Kidney stones    • MI, old    • Pneumonia    • Psychiatric problem     depression and anxiety   • Stroke (HCC)       Past Surgical History:   Procedure Laterality Date   • MULTIPLE CORONARY ARTERY BYPASS ENDO VEIN HARVEST  9/25/2020    Procedure: CABG, WITH ENDOSCOPIC VEIN PROCUREMENT- X3;  Surgeon: Michael Chicas M.D.;  Location: SURGERY [facility];  Service: Cardiothoracic   • MARGOTH  9/25/2020    Procedure: ECHOCARDIOGRAM, TRANSESOPHAGEAL;  Surgeon: Michael Chicas M.D.;  Location: SURGERY [facility];  Service: Cardiothoracic   • STENT PLACEMENT  2017 1 cardiac stent   • OTHER ORTHOPEDIC SURGERY      rt wrist fracture with fixation 34 years ago          Objective/Observations:    Patient did not present in acute distress. Patient was appropriately groomed. Patient was alert and oriented x4. Eye contact was appropriate. No abnormalities in attention or concentration were noted. No abnormalities of movement present; psychomotor activity was normal. Speech was fluent and slow in rhythm, rate, volume, and tone. Thought processes linear, logical and goal directed. There was no evidence of thought disorder. No auditory or visual hallucinations. Long and short term memory appeared to be intact. Insight, judgment, and impulse  control were deemed to be fair.  Reported mood was “depressed.” Affect was full-ranging and appropriate to thought content and conversation.  Patient denied current suicidal and homicidal ideation in plan, intent, and preparatory behavior.      The patient denied any suicide-related ideation and/or behaviors and intent/plan, denied thoughts about death and dying both in session and during the period since last appointment, or past 2 weeks.    Risk Level: Not Currently at Clinically Significant Risk  Hospitalization is not deemed necessary at this time as the patient does not present a clear or imminent danger to self or others. No indication for pursuing higher level of care. Outpatient management is currently most appropriate and least restrictive level of care.    Current risk:   SUICIDE: Low   Homicide: Not applicable   Self-harm: Not applicable   Relapse: Not applicable   Other:    Safety Plan reviewed? No   If evidence of imminent risk is present, intervention/plan:     Diagnoses:   Problem   Adjustment Disorder With Depressed Mood         Treatment Goal(s)/Objective(s) addressed:     Depression   Goal: Improve overall mood     Shower, dress, and then do something every day   Report feeling happy/better overall mood   Make short and simple “to do” lists and complete three tasks each day   Celebrate little successes each day using positive self-talk   Develop strategies for thought distraction when ruminating on the past  Increase understand of automatic thoughts related to depression  Identify depressive schema and associated automatic thought and develop skills to challenge them.          Progress toward Treatment Goals: No change    Plan:      1) The patient will be seen weekly during his hospital admission  2) Provided feedback to the Attending Physiatrist to increase and encouraged the patient to talk for himself and to limit the role of the significant other and answering questions.  Also discussed with  the nursing staff the importance of helping the patient set small manageable goals such as improving the amount that she eats each meal.  3) Referrals/Consults:  N/A  4) Barriers to Learning:  No  5) Readiness to Learn:  Yes  6) Cultural Concerns:  No  7) Declare these services are medically necessary and appropriate to the patient’s diagnosis and needs  8) The point of contact at the [facility] regarding this evaluation is Dr. Heard, Psychologist.    Christopher Heard III, Ed.D.

## 2020-10-16 NOTE — THERAPY
Speech Language Pathology  Daily Treatment     Patient Name: Guerrero Ann  Age:  49 y.o., Sex:  male  Medical Record #: [id number]  Today's Date: 10/16/2020     Precautions  Precautions: Fall Risk, Swallow Precautions ( See Comments)  Comments: Girlfriend (Miriam) cleared to assist patient with bed and toilet txfrs     Subjective    Pt pleasant and cooperative, SO present and supportive.     Objective       10/16/20 0803   Speech Language Pathologist Assigned   Assigned SLP / Extension LW 60 SPLIT OK   SLP Total Time Spent   SLP Individual Total Time Spent (Mins) 60   Treatment Charges   SLP Swallowing Dysfunction Treatment Swallowing Dysfunction Treatment   SLP Cognitive Skill Development First 15 Minutes 1   SLP Cognitive Skill Development Additional 15 Minutes 1       Assessment    Pt assessed with regular textures and  Thin liquids, pt tolerated well with no overt s/sx of asp/pen noted throughout the meal. Pt cont to be impulsive however tolerated. Rec: removal of NG and d/c from t-dine. Pt then seen at 11:00 for cognitive session. Pt completed o-log with score of 26/30 achieved. Pt cont to attempt to use gestures or speaks in whispered voice, mod cues for phonation. Pt presented with visual scanning task. Pt completed with 54% accuracy indep, pt located all targets on the right side and none on the left. With discussion pt indep able to state that he has a hard time seeing things on the left side, with cues to look left pt increased to 75% accuracy, physical line drawn by SLP on left side required in order for pt to achieve 100%.    Strengths: Able to follow instructions, Independent prior level of function, Pleasant and cooperative, Motivated for self care and independence, Willingly participates in therapeutic activities  Barriers: Impaired functional cognition, Impaired carryover of learning, Impaired insight/denial of deficits, Impaired activity tolerance    Plan    D/C therapeutic  dining, no further swallow intervention required.      Speech Therapy Problems     Problem: Expression STGs     Dates: Start: 10/10/20       Goal: STG-Within one week, patient will     Dates: Start: 10/10/20       Description: 1) Individualized goal:  state 10 words per minute given concrete category and min verbal cues.   2) Interventions:  SLP Speech Language Treatment and SLP Cognitive Skill Development                    Problem: Memory STGs     Dates: Start: 10/10/20       Goal: STG-Within one week, patient will     Dates: Start: 10/10/20       Description: 1) Individualized goal:  recall daily events and safety strategies 80% accuracy given min cues, with use of memory book.   2) Interventions:  SLP Cognitive Skill Development                    Problem: Problem Solving STGs     Dates: Start: 10/10/20       Goal: STG-Within one week, patient will     Dates: Start: 10/10/20       Description: 1) Individualized goal:  complete single target visual scanning tasks with 80% accuracy, given min verbal cues.   2) Interventions:  SLP Cognitive Skill Development                    Problem: Speech/Swallowing LTGs     Dates: Start: 10/10/20       Goal: LTG-By discharge, patient will safely swallow     Dates: Start: 10/10/20       Description: 1) Individualized goal:  regular textures and thin liquids without aspiration.   2) Interventions:  SLP Swallowing Dysfunction Treatment, SLP Oral Pharyngeal Evaluation, SLP Video Swallow Evaluation, and SLP Group Treatment              Goal: LTG-By discharge, patient will solve basic problems     Dates: Start: 10/10/20       Description: 1) Individualized goal:  Alayna for safe discharge home.    2) Interventions:  SLP Speech Language Treatment, SLP Cognitive Skill Development, and SLP Group Treatment                    Problem: Swallowing STGs     Dates: Start: 10/13/20       Goal: STG-Within one week, patient will     Dates: Start: 10/13/20       Description: 1) Individualized  goal:  tolerate soft and bite size textures with thin liquids with no overt s/sx of asp/pen noted provided MIN A  2) Interventions:  SLP Swallowing Dysfunction Treatment, SLP Oral Pharyngeal Evaluation, SLP Video Swallow Evaluation, SLP Self Care / ADL Training , SLP Cognitive Skill Development, and SLP Group Treatment

## 2020-10-16 NOTE — PROGRESS NOTES
"Rehab Progress Note     Date of Service: 10/16/2020  Chief Complaint: follow up stroke    Interval Events (Subjective)    Patient seen and examined today in his room. He was able to eat enough at dinner and breakfast to remove his NG tube today.  He currently is denying any pain.  He continues to have an indwelling Quispe and we discussed the plan for voiding trial next week.  Patient's significant other is present and patient is much less talkative when she is around.  She is aware she needs not to answer questions for him.  Apparently has birthday is next Thursday.  I discussed with the patient if he improves functionally we may be able to move up his discharge date.      Objective:  VITAL SIGNS: /70   Pulse 76   Temp 36.7 °C (98 °F) (Oral)   Resp 16   Ht 1.575 m (5' 2.01\")   Wt 68.5 kg (151 lb 0.2 oz)   SpO2 96%   BMI 27.61 kg/m²   Gen: alert, no apparent distress  CV: regular rate and rhythm, no murmurs, no peripheral edema  Resp: clear to ascultation bilaterally, normal respiratory effort  GI: soft, non-tender abdomen, bowel sounds present  Neuro: notable for non-focal  Psych: Flat affect      Recent Results (from the past 72 hour(s))   ACCU-CHEK GLUCOSE    Collection Time: 10/13/20  5:23 PM   Result Value Ref Range    Glucose - Accu-Ck 164 (H) 65 - 99 mg/dL   URINALYSIS    Collection Time: 10/13/20  6:50 PM   Result Value Ref Range    Color Yellow     Character Cloudy (A)     Specific Gravity 1.020 <1.035    Ph 5.0 5.0 - 8.0    Glucose Negative Negative mg/dL    Ketones Negative Negative mg/dL    Protein 30 (A) Negative mg/dL    Bilirubin Negative Negative    Urobilinogen, Urine 0.2 Negative    Nitrite Negative Negative    Leukocyte Esterase Negative Negative    Occult Blood Large (A) Negative    Micro Urine Req Microscopic    URINE MICROSCOPIC (W/UA)    Collection Time: 10/13/20  6:50 PM   Result Value Ref Range    WBC 0-2 (A) /hpf    RBC >150 (A) /hpf    Bacteria Negative None /hpf    Epithelial " Cells Negative /hpf    Uric Acid Crystal Positive /hpf    Hyaline Cast 0-2 /lpf   ACCU-CHEK GLUCOSE    Collection Time: 10/13/20  9:52 PM   Result Value Ref Range    Glucose - Accu-Ck 175 (H) 65 - 99 mg/dL   ACCU-CHEK GLUCOSE    Collection Time: 10/14/20  7:57 AM   Result Value Ref Range    Glucose - Accu-Ck 160 (H) 65 - 99 mg/dL   ACCU-CHEK GLUCOSE    Collection Time: 10/14/20 11:21 AM   Result Value Ref Range    Glucose - Accu-Ck 230 (H) 65 - 99 mg/dL   ACCU-CHEK GLUCOSE    Collection Time: 10/14/20  5:02 PM   Result Value Ref Range    Glucose - Accu-Ck 201 (H) 65 - 99 mg/dL   ACCU-CHEK GLUCOSE    Collection Time: 10/14/20  8:30 PM   Result Value Ref Range    Glucose - Accu-Ck 159 (H) 65 - 99 mg/dL   CBC WITHOUT DIFFERENTIAL    Collection Time: 10/15/20  6:51 AM   Result Value Ref Range    WBC 9.2 4.8 - 10.8 K/uL    RBC 3.35 (L) 4.70 - 6.10 M/uL    Hemoglobin 9.9 (L) 14.0 - 18.0 g/dL    Hematocrit 31.8 (L) 42.0 - 52.0 %    MCV 94.9 81.4 - 97.8 fL    MCH 29.6 27.0 - 33.0 pg    MCHC 31.1 (L) 33.7 - 35.3 g/dL    RDW 47.1 35.9 - 50.0 fL    Platelet Count 502 (H) 164 - 446 K/uL    MPV 10.5 9.0 - 12.9 fL   Basic Metabolic Panel    Collection Time: 10/15/20  6:51 AM   Result Value Ref Range    Sodium 134 (L) 135 - 145 mmol/L    Potassium 4.1 3.6 - 5.5 mmol/L    Chloride 100 96 - 112 mmol/L    Co2 21 20 - 33 mmol/L    Glucose 155 (H) 65 - 99 mg/dL    Bun 22 8 - 22 mg/dL    Creatinine 1.07 0.50 - 1.40 mg/dL    Calcium 9.0 8.5 - 10.5 mg/dL    Anion Gap 13.0 7.0 - 16.0   ESTIMATED GFR    Collection Time: 10/15/20  6:51 AM   Result Value Ref Range    GFR If African American >60 >60 mL/min/1.73 m 2    GFR If Non African American >60 >60 mL/min/1.73 m 2   ACCU-CHEK GLUCOSE    Collection Time: 10/15/20  8:02 AM   Result Value Ref Range    Glucose - Accu-Ck 137 (H) 65 - 99 mg/dL   ACCU-CHEK GLUCOSE    Collection Time: 10/15/20 11:33 AM   Result Value Ref Range    Glucose - Accu-Ck 224 (H) 65 - 99 mg/dL   ACCU-CHEK GLUCOSE     Collection Time: 10/15/20  5:48 PM   Result Value Ref Range    Glucose - Accu-Ck 204 (H) 65 - 99 mg/dL   ACCU-CHEK GLUCOSE    Collection Time: 10/15/20  9:27 PM   Result Value Ref Range    Glucose - Accu-Ck 198 (H) 65 - 99 mg/dL   ACCU-CHEK GLUCOSE    Collection Time: 10/16/20  7:31 AM   Result Value Ref Range    Glucose - Accu-Ck 176 (H) 65 - 99 mg/dL   ACCU-CHEK GLUCOSE    Collection Time: 10/16/20 11:10 AM   Result Value Ref Range    Glucose - Accu-Ck 225 (H) 65 - 99 mg/dL       Current Facility-Administered Medications   Medication Frequency   • terazosin (HYTRIN) capsule 3 mg Q EVENING   • ascorbic acid tablet 500 mg QDAY with Breakfast   • vitamin D (cholecalciferol) tablet 2,000 Units DAILY   • guaiFENesin dextromethorphan (ROBITUSSIN DM) 100-10 MG/5ML syrup 10 mL Q6HRS PRN   • metoprolol (LOPRESSOR) tablet 25 mg TWICE DAILY   • insulin glargine (Lantus) injection QAM INSULIN   • senna-docusate (PERICOLACE or SENOKOT S) 8.6-50 MG per tablet 2 Tab BID PRN    And   • polyethylene glycol/lytes (MIRALAX) PACKET 1 Packet QDAY PRN    And   • magnesium hydroxide (MILK OF MAGNESIA) suspension 30 mL QDAY PRN    And   • bisacodyl (DULCOLAX) suppository 10 mg QDAY PRN   • simethicone (MYLICON) chewable tab 160 mg 4XDAY   • insulin lispro (HumaLOG) injection 4X/DAY ACHS   • metoclopramide (REGLAN) tablet 5 mg BID   • ferrous sulfate tablet 325 mg QDAY with Breakfast   • melatonin tablet 3 mg QHS   • traZODone (DESYREL) tablet 50 mg QHS   • hydrOXYzine HCl (ATARAX) tablet 50 mg Q6HRS PRN   • Respiratory Therapy Consult Continuous RT   • Pharmacy Consult Request ...Pain Management Review 1 Each PHARMACY TO DOSE   • acetaminophen (TYLENOL) tablet 650 mg Q4HRS PRN   • artificial tears ophthalmic solution 1 Drop PRN   • benzocaine-menthol (CEPACOL) lozenge 1 Lozenge Q2HRS PRN   • mag hydrox-al hydrox-simeth (MAALOX PLUS ES or MYLANTA DS) suspension 20 mL Q2HRS PRN   • ondansetron (ZOFRAN ODT) dispertab 4 mg 4X/DAY PRN    Or    • ondansetron (ZOFRAN) syringe/vial injection 4 mg 4X/DAY PRN   • sodium chloride (OCEAN) 0.65 % nasal spray 2 Spray PRN   • lactulose 20 GM/30ML solution 30 mL QDAY PRN   • docusate sodium (ENEMEEZ) enema 283 mg QDAY PRN   • fleet enema 133 mL QDAY PRN   • clopidogrel (PLAVIX) tablet 75 mg DAILY   • tramadol (ULTRAM) 50 MG tablet 50 mg Q4HRS PRN   • aspirin (ASA) chewable tab 81 mg DAILY   • atorvastatin (LIPITOR) tablet 80 mg QHS   • enoxaparin (LOVENOX) inj 40 mg Q EVENING   • lidocaine (LIDODERM) 5 % 1 Patch Q24HR   • omeprazole (FIRST-OMEPRAZOLE) 2 mg/mL oral susp 40 mg DAILY   • lisinopril (PRINIVIL) tablet 10 mg Q DAY   • sertraline (Zoloft) tablet 50 mg DAILY       Orders Placed This Encounter   Procedures   • Diet Order Diabetic (meds whole 1 at a time with thin.; please see daily for menus)     Standing Status:   Standing     Number of Occurrences:   1     Order Specific Question:   Diet:     Answer:   Diabetic [3]     Comments:   meds whole 1 at a time with thin.; please see daily for menus       Assessment:  Active Hospital Problems    Diagnosis   • *Cardioembolic stroke (HCC)   • Ileus (HCC)   • Aspiration pneumonia (HCC)   • CAD (coronary artery disease)   • Essential hypertension   • Type 2 diabetes mellitus with complication, without long-term current use of insulin (HCC)   • Anxiety   • Vitamin D deficiency   • Azotemia   • Hyponatremia   • Iron deficiency anemia   • Pneumonia   • Tachycardia   • Impaired mobility and ADLs   • Reactive depression   • Diabetes mellitus due to underlying condition with hyperglycemia (HCC)     This patient is a 49 y.o. male admitted for acute inpatient rehabilitation with Cardioembolic stroke (HCC).    I led and attended the weekly conference, and agree with the IDT conference documentation and plan of care as noted below.    Date of conference: 10/14/2020    Goals and barriers: See IDT note.    Biggest barriers: impaired motivation, dysphagia, fatigues quickly,  self-limiting    CM/social support: SO supportive and can assist 24/7    Anticipated DC date: 10/24    Home health: PT/OT/SLP/RN    Equip: TBD    Follow up: PCP, CT surgery, Dr. Franco rehab clinic      Medical Decision Making and Plan:    BL strokes  Cardio-embolic after CABG  Dysphagia, improved  Cognitive deficits  Non-focal  Continue full rehab program  PT/OT/SLP, 1 hr each discipline, 5 days per week  Continue aspirin, Plavix, and statin for secondary stroke prophylaxis  Outpatient follow up with Dr. Franco in rehab clinic, referral made  Remove NG tube today    Reactive depression?  History of depression/anxiety  Started sertraline 10/10  Dr. Heard consulted 10/14, appreciate assistance    Insomnia, improved  Scheduled melatonin and trazodone  Will discontinue as sertraline is titrated up    Bowel  Loose stools, resolved  Meds as needed  Last BM 10/15   KUB OK, c diff negative    Bladder  Urinary retention, continues  Replace Quispe for bladder rest  Started Hytrin 1 mg 10/10 --> 2 mg 10/13 --> 3 mg 10/15  Checked UA, negative  Will do voiding trial next week    DVT prophylaxis  Lovenox    Appreciate assistance of hospitalist with his medical co-morbidities:     Diabetes with hyperglycemia, Lantus, sliding scale insulin  Coronary artery disease status post CABG, aspirin, Plavix, and statin  Hypertension, lisinopril, metoprolol  Leukocytosis, improved, x-ray suggestive of pneumonia, s/p antibiotics  Status post ileus, titrate Reglan, simethicone   Loose stools, resolved  Aspiration pneumonia, continue antibiotics, metronidazole and levofloxacin  Tobacco use, counseling provided 10/15  Hypokalemia, resolved s/p supplementation  Normocytic anemia, ferrous sulfate and Vit C  Hyponatremia, mild monitor  GI prophylaxis, omeprazole  Vit D deficiency, continue supplementation    Total time:  16 minutes.  I spent greater than 50% of the time for patient care, counseling, and coordination on this date, including patient  face-to face time, unit/floor time with review of records/pertinent lab data and studies, as well as discussing diagnostic evaluation/work up, planned therapeutic interventions, and future disposition of care, as per the interval events/subjective and the assessment and plan as noted above.    I have performed a physical exam, reviewed and updated ROS, as well as the assessment and plan today 10/16/2020. In review of note from 10/15/2020 there are no new changes except as documented above.    Ankita Ng M.D.   Physical Medicine and Rehabilitation

## 2020-10-16 NOTE — PROGRESS NOTES
Received patient on bed. Conscious coherent. Patient was able to swallow pills crush and float to apple sauce. Patient NG tube tube patent and infusing well. Patient verbalized no dizziness or weakness. Patient safety and fall precaution. Patient refused to start tube feeding per patient he did eat this dinner. Patient refused to have the water flush

## 2020-10-16 NOTE — CARE PLAN
Problem: Safety  Goal: Will remain free from injury  Outcome: PROGRESSING AS EXPECTED  Note: Patient verbalized will not get up without assist.      Problem: Pain Management  Goal: Pain level will decrease to patient's comfort goal  Outcome: PROGRESSING AS EXPECTED  Note: Patient verbalized been having sternal incision pain. Patient requested to have tramadol po for pain.

## 2020-10-16 NOTE — REHAB-DIETARY IDT TEAM NOTE
Dietary   Nutrition  Dietary Problems     Problem: Other Problem (see comments)     Description: Diagnosis: Difficulty swallowing r/t secondary dysphagia s/p stroke with aspiration PNA as evidenced by alternate route of nutrition/ hydration/ medications.           Goal: Other Goal     Description: Monitor/Evaluation: Monitor PO intake, TF intake, diet upgrades weight, labs, medication adjustments, skin integrity, GI function, vitals, I/Os, and overall hydration status.  Adjust nutritional POC pending clinical outcomes.    RD following weekly.   Goal: 1.  Maintain adequate oral vs enteral TF nutrient/fluid intake to promote nutrition optimization/healing. 2. Transition to PO pending clinical outcomes.                     Patient's diet upgraded to Diabetic.  PO 25-50% of dinner,  70% of lunch, 100% of brekafast 10/15.  Patient refusing tube feedings and free water 10/14 and 10/15 majority of time.   No significant diarrhea complaints.  UOP via catheter is connor to yellow in color.  Weight stable.  MAR noted.   Sx incisions noted.  Vitals WNL.    Recommend pull NGT and discontinue tube feeding at this juncture.  Nutrition rep following daily. RD following.   Section completed by:  Alina Dash R.D.

## 2020-10-16 NOTE — THERAPY
Occupational Therapy  Daily Treatment     Patient Name: Guerrero Ann  Age:  49 y.o., Sex:  male  Medical Record #: [id number]  Today's Date: 10/16/2020     Precautions  Precautions: (P) Fall Risk, Swallow Precautions ( See Comments)  Comments: Girlfriend (Miriam) cleared to assist patient with bed and toilet txfrs          Subjective    Nodding yes  And no  Verbalized yes one time during session      Objective       10/16/20 0831   Precautions   Precautions Fall Risk;Swallow Precautions ( See Comments)   Functional Level of Assist   Eating Supervision  (cues for strategies while taking meds with water )   Upper Body Dressing Supervision  (setup with poor carryover of technique to maintain )   Upper Body Dressing Description   (sternal precautions)   Bed, Chair, Wheelchair Transfer Contact Guard Assist  (w/c to bed.   provided with aerobic step to compensate for )   Bed Chair Wheelchair Transfer Description   (bed height )   Problem Solving Moderate Assist  (playing Triominoes )   Memory Moderate Assist  (regarding rules of play for Triominoes )   Sitting Upper Body Exercises   Upper Extremity Bike Level 1 Resistance  (standing  x 2 minutes   )   Other Exercise Nustep   x 2 minutes x 2   workload  2   slow pace   LEs only.     SBA and cues for techinque for transfer.    Interdisciplinary Plan of Care Collaboration   IDT Collaboration with  Family / Caregiver   Patient Position at End of Therapy In Bed;Call Light within Reach;Tray Table within Reach   Collaboration Comments SBENY Pineda present  and observing  toward end of session    OT Total Time Spent   OT Individual Total Time Spent (Mins) 60   OT Charge Group   OT Self Care / ADL 1   OT Therapy Activity 2   OT Therapeutic Exercise  1      Stood  X  5 minutes for leisure interest activity    Playing Tri ominoes     Mod cues for memory and problem solving    Primary activity of game is numbers matching of which he demonstrated moderate to  max difficulty     Assessment     continued  With fairly flat affect through out session  Reported  Incisional pain  Following UE bike  Exercise   Decreased by end of session   iStrengths: Alert and oriented, Adequate strength, Independent prior level of function, Motivated for self care and independence, Willingly participates in therapeutic activities, Pleasant and cooperative, Supportive family  Barriers: Decreased endurance, Generalized weakness, Impaired balance, Tube feeding, Visual impairment    Plan  continue  ADL  IADL , related mobility strength/endruance building  Incorporating  Sternal precautions              Occupational Therapy Goals     Problem: Bathing     Dates: Start: 10/10/20       Goal: STG-Within one week, patient will bathe     Dates: Start: 10/10/20       Description: 1) Individualized Goal:  CGA   2) Interventions:  OT Self Care/ADL, OT Cognitive Skill Dev, OT Community Reintegration, OT Manual Ther Technique, OT Neuro Re-Ed/Balance, OT Sensory Int Techniques, OT Therapeutic Activity, OT Evaluation, and OT Therapeutic Exercise      Note:     Goal Note filed on 10/13/20 1117 by Kelvin Kirkland, C.O.T.A.    Min assist    Decreased strength/endurance , decreased balance ,  vision issues   Continue goal                         Problem: Dressing     Dates: Start: 10/10/20       Goal: STG-Within one week, patient will dress LB     Dates: Start: 10/10/20       Description: 1) Individualized Goal:  CGA   2) Interventions:  OT Self Care/ADL, OT Cognitive Skill Dev, OT Community Reintegration, OT Manual Ther Technique, OT Neuro Re-Ed/Balance, OT Sensory Int Techniques, OT Therapeutic Activity, OT Evaluation, and OT Therapeutic Exercise      Note:     Goal Note filed on 10/13/20 1117 by Kelvin Kirkland, C.O.T.A.    Min assist   Decreased strength/endurance , decreased balance ,  vision issues   Continue goal                         Problem: OT Long Term Goals     Dates: Start: 10/10/20       Goal: LTG-By  discharge, patient will complete basic self care tasks     Dates: Start: 10/10/20       Description: 1) Individualized Goal:  Mod I    2) Interventions:  OT Self Care/ADL, OT Cognitive Skill Dev, OT Community Reintegration, OT Manual Ther Technique, OT Neuro Re-Ed/Balance, OT Sensory Int Techniques, OT Therapeutic Activity, OT Evaluation, and OT Therapeutic Exercise            Goal: LTG-By discharge, patient will perform bathroom transfers     Dates: Start: 10/10/20       Description: 1) Individualized Goal:  Mod I    2) Interventions:  OT Self Care/ADL, OT Cognitive Skill Dev, OT Community Reintegration, OT Manual Ther Technique, OT Neuro Re-Ed/Balance, OT Sensory Int Techniques, OT Therapeutic Activity, OT Evaluation, and OT Therapeutic Exercise

## 2020-10-16 NOTE — PROGRESS NOTES
Hospital Medicine Daily Progress Note      Chief Complaint:  Hypertension  Tachycardia  Diabetes  Leukocytosis  S/P CABG  S/P CVA    Interval History:  Pt denies abd pain, nausea, vomiting, or diarrhea.    Review of Systems  Review of Systems   Constitutional: Negative for chills and fever.   HENT: Negative.    Eyes: Negative.    Respiratory: Negative for cough and shortness of breath.    Cardiovascular: Negative for chest pain and palpitations.   Gastrointestinal: Negative for abdominal pain, nausea and vomiting.   Musculoskeletal:        Wound pain    Skin: Negative for itching and rash.   Endo/Heme/Allergies: Negative for polydipsia. Does not bruise/bleed easily.        Physical Exam  Temp:  [36.4 °C (97.5 °F)-36.9 °C (98.4 °F)] 36.7 °C (98 °F)  Pulse:  [72-95] 76  Resp:  [16-19] 16  BP: (107-140)/(66-76) 119/70  SpO2:  [94 %-96 %] 96 %    Physical Exam  Vitals signs reviewed.   Constitutional:       General: He is not in acute distress.     Appearance: Normal appearance. He is not ill-appearing.   HENT:      Head: Normocephalic and atraumatic.      Right Ear: External ear normal.      Left Ear: External ear normal.      Nose: Nose normal.      Mouth/Throat:      Pharynx: Oropharynx is clear.   Eyes:      General:         Right eye: No discharge.         Left eye: No discharge.      Extraocular Movements: Extraocular movements intact.      Conjunctiva/sclera: Conjunctivae normal.   Neck:      Musculoskeletal: Normal range of motion and neck supple.   Cardiovascular:      Rate and Rhythm: Normal rate and regular rhythm.   Pulmonary:      Effort: No respiratory distress.      Breath sounds: No wheezing.      Comments: Decreased BS   Abdominal:      General: Bowel sounds are normal.      Palpations: Abdomen is soft.      Tenderness: There is no abdominal tenderness. There is no guarding or rebound.      Comments: Decreased distension   Musculoskeletal:      Right lower leg: No edema.      Left lower leg: No edema.    Skin:     General: Skin is warm and dry.   Neurological:      Mental Status: He is alert.      Comments: Awake and alert         Fluids    Intake/Output Summary (Last 24 hours) at 10/16/2020 1359  Last data filed at 10/16/2020 1000  Gross per 24 hour   Intake 490 ml   Output 1060 ml   Net -570 ml       Laboratory  Recent Labs     10/15/20  0651   WBC 9.2   RBC 3.35*   HEMOGLOBIN 9.9*   HEMATOCRIT 31.8*   MCV 94.9   MCH 29.6   MCHC 31.1*   RDW 47.1   PLATELETCT 502*   MPV 10.5     Recent Labs     10/15/20  0651   SODIUM 134*   POTASSIUM 4.1   CHLORIDE 100   CO2 21   GLUCOSE 155*   BUN 22   CREATININE 1.07   CALCIUM 9.0                 Assessment/Plan  * Cardioembolic stroke (HCC)- (present on admission)  Assessment & Plan  S/P tyshawn-op CVA  On ASA, Plavix, and Lipitor    Ileus (HCC)- (present on admission)  Assessment & Plan  Has better appetite  Abd exam improved  F/U KUB unremarkable  Diarrhea resolved prior to C Dif sample taken  Slowly tapering off Reglan  Continue scheduled high dose Simethicone    SONNY (acute kidney injury) (HCC)- (present on admission)  Assessment & Plan  BUN/Cr improved w/ IVF  Follow renal function  Check F/U labs in am     CAD (coronary artery disease)- (present on admission)  Assessment & Plan  H/O cardiac stent x 2  S/P NSTEMI  S/P CABG x 3 vessels  On ASA, Plavix, Lipitor, Lisinopril, and Metoprolol  BNP improving    Essential hypertension- (present on admission)  Assessment & Plan  On Lisinopril and Metoprolol  Monitor for orthostatic hypotension on Hytrin    Type 2 diabetes mellitus with complication, without long-term current use of insulin (HCC)- (present on admission)  Assessment & Plan  HbA1c 7.9  Discontinued Metformin for diarrhea and SONNY  Will increase Lantus for hyperglycemia  Continue SSI  Outpt meds include Metformin 2000 mg bid per Epic records (pt unable to confirm dosing)    Thrombocytosis (HCC)  Assessment & Plan  Likley reactive  Follow Platelet counts    Iron deficiency  anemia  Assessment & Plan  Fe 31  Continue Fe and Vit C supplements    Vitamin D deficiency  Assessment & Plan  Vit D level 17  On supplementation    Reactive depression- (present on admission)  Assessment & Plan  On Zoloft    Pneumonia- (present on admission)  Assessment & Plan  Completed abx    Full Code

## 2020-10-16 NOTE — THERAPY
"Physical Therapy   Daily Treatment     Patient Name: Guerrero Ann  Age:  49 y.o., Sex:  male  Medical Record #: [id number]  Today's Date: 10/16/2020     Precautions  Precautions: Fall Risk, Swallow Precautions ( See Comments)  Comments: Girlfriend (Miriam) cleared to assist patient with bed and toilet txfrs     Subjective    Pt reports he is agreeable to PT session     Objective       10/16/20 1501   Gait Functional Level of Assist    Gait Level Of Assist Contact Guard Assist   Assistive Device Front Wheel Walker   Distance (Feet) 125   # of Times Distance was Traveled 2   Deviation Bradykinetic;Shuffled Gait;Decreased Heel Strike;Decreased Toe Off  (Cues for increased distance)   Stairs Functional Level of Assist   Level of Assist with Stairs Contact Guard Assist   # of Stairs Climbed 1   Stairs Description Walker;Safety concerns;Supervision for safety;Verbal cueing  (Curb step 6\", cues and demo for sequencing and safety)   Transfer Functional Level of Assist   Bed, Chair, Wheelchair Transfer Contact Guard Assist   Bed Chair Wheelchair Transfer Description Adaptive equipment;Verbal cueing;Supervision for safety   Toilet Transfers Supervised   Toilet Transfer Description Grab bar   Bed Mobility    Supine to Sit Supervised   Sit to Supine Supervised   Sit to Stand Supervised   Scooting Supervised   Rolling Supervised   Interdisciplinary Plan of Care Collaboration   Patient Position at End of Therapy In Bed;Call Light within Reach;Tray Table within Reach;Bed Alarm On   PT Total Time Spent   PT Individual Total Time Spent (Mins) 60   PT Charge Group   PT Gait Training 3   PT Therapeutic Activities 1       Assessment    Pt demos some increased participation this session and increased motivation for exercise. Increased distance with walking this session and performs curb step well at CGA.      Strengths: Independent prior level of function, Supportive family, Willingly participates in therapeutic " activities  Barriers: Visual impairment, Pain, Fatigue, Generalized weakness    Plan    Exercise/neuro re-ed, treadmill training for increased activity, activity tolerance, consider Vector for dyn bal challenges no AD, FT with SO for car transfer, curb step and walking providing CGA    Physical Therapy Problems     Problem: Balance     Dates: Start: 10/10/20       Goal: STG-Within one week, patient will maintain dynamic standing     Dates: Start: 10/10/20       Description: 1) Individualized goal:  Pt will demonstrate ability to complete dynamic standing for >3 min SBA to improve independence with ADLs and IADLs within one week.  2) Interventions:  PT E Stim Attended, PT Gait Training, PT Therapeutic Exercises, PT TENS Application, PT Neuro Re-Ed/Balance, PT Therapeutic Activity, PT Manual Therapy, and PT Evaluation      Note:     Goal Note filed on 10/14/20 1146 by Byron Tobin, PT    < 3 min                        Problem: Mobility     Dates: Start: 10/10/20       Goal: STG-Within one week, patient will propel wheelchair household distances     Dates: Start: 10/10/20       Description: 1) Individualized goal:  Pt will propel wheelchair >200' with BLEs to become Caroline within WC in unit within one week.  2) Interventions:  PT E Stim Attended, PT Gait Training, PT Therapeutic Exercises, PT TENS Application, PT Neuro Re-Ed/Balance, PT Therapeutic Activity, PT Manual Therapy, and PT Evaluation      Note:     Goal Note filed on 10/14/20 1146 by Byron Tobin, PT    < 200 ft                  Goal: STG-Within one week, patient will ambulate household distance     Dates: Start: 10/10/20       Description: 1) Individualized goal:  Pt will demonstrate ability to walk >100' CGA with FWW to achieve household distances within one week.  2) Interventions:  PT E Stim Attended, PT Gait Training, PT Therapeutic Exercises, PT TENS Application, PT Neuro Re-Ed/Balance, PT Therapeutic Activity, PT Manual Therapy, and PT  Evaluation      Note:     Goal Note filed on 10/14/20 1146 by Byron Tobin, PT    < 100 ft                   Goal: STG-Within one week, patient will ambulate up/down a curb     Dates: Start: 10/10/20       Description: 1) Individualized goal:  Pt will trial ascending/descending a standard curb Donny with FWW to practice entry to home within one week.  2) Interventions:  PT E Stim Attended, PT Gait Training, PT Therapeutic Exercises, PT TENS Application, PT Neuro Re-Ed/Balance, PT Therapeutic Activity, PT Manual Therapy, and PT Evaluation      Note:     Goal Note filed on 10/14/20 1146 by Byron Tobin, PT    NT dt safety concerns                        Problem: PT-Long Term Goals     Dates: Start: 10/10/20       Goal: LTG-By discharge, patient will ambulate     Dates: Start: 10/10/20       Description: 1) Individualized goal:  Pt will demonstrate ability to ambulate >100' SBA to safely negotiate household distances at discharge.  2) Interventions:  PT E Stim Attended, PT Gait Training, PT Therapeutic Exercises, PT TENS Application, PT Neuro Re-Ed/Balance, PT Therapeutic Activity, PT Manual Therapy, and PT Evaluation            Goal: LTG-By discharge, patient will transfer one surface to another     Dates: Start: 10/10/20       Description: 1) Individualized goal:  Pt will demonstrate ability to complete chair to chair transfers and bed transfers independently to safely return to prior living environment.  2) Interventions:  PT E Stim Attended, PT Gait Training, PT Therapeutic Exercises, PT TENS Application, PT Neuro Re-Ed/Balance, PT Therapeutic Activity, PT Manual Therapy, and PT Evaluation            Goal: LTG-By discharge, patient will ambulate up/down 4-6 stairs     Dates: Start: 10/10/20       Description: 1) Individualized goal:  Pt will demonstrate ability to ascend/descend 4 standard steps CGA with LRD to safely enter home.  2) Interventions:  PT E Stim Attended, PT Gait Training, PT Therapeutic  Exercises, PT TENS Application, PT Neuro Re-Ed/Balance, PT Therapeutic Activity, PT Manual Therapy, and PT Evaluation

## 2020-10-17 LAB
GLUCOSE BLD-MCNC: 150 MG/DL (ref 65–99)
GLUCOSE BLD-MCNC: 210 MG/DL (ref 65–99)
GLUCOSE BLD-MCNC: 235 MG/DL (ref 65–99)
GLUCOSE BLD-MCNC: 241 MG/DL (ref 65–99)

## 2020-10-17 PROCEDURE — 94760 N-INVAS EAR/PLS OXIMETRY 1: CPT

## 2020-10-17 PROCEDURE — A9270 NON-COVERED ITEM OR SERVICE: HCPCS | Performed by: PHYSICAL MEDICINE & REHABILITATION

## 2020-10-17 PROCEDURE — 94669 MECHANICAL CHEST WALL OSCILL: CPT

## 2020-10-17 PROCEDURE — 700101 HCHG RX REV CODE 250: Performed by: PHYSICAL MEDICINE & REHABILITATION

## 2020-10-17 PROCEDURE — 700111 HCHG RX REV CODE 636 W/ 250 OVERRIDE (IP): Performed by: PHYSICAL MEDICINE & REHABILITATION

## 2020-10-17 PROCEDURE — 700102 HCHG RX REV CODE 250 W/ 637 OVERRIDE(OP): Performed by: PHYSICAL MEDICINE & REHABILITATION

## 2020-10-17 PROCEDURE — 700102 HCHG RX REV CODE 250 W/ 637 OVERRIDE(OP): Performed by: HOSPITALIST

## 2020-10-17 PROCEDURE — 82962 GLUCOSE BLOOD TEST: CPT | Mod: 91

## 2020-10-17 PROCEDURE — A9270 NON-COVERED ITEM OR SERVICE: HCPCS | Performed by: HOSPITALIST

## 2020-10-17 PROCEDURE — 99232 SBSQ HOSP IP/OBS MODERATE 35: CPT | Performed by: HOSPITALIST

## 2020-10-17 PROCEDURE — 770010 HCHG ROOM/CARE - REHAB SEMI PRIVAT*

## 2020-10-17 RX ORDER — OMEPRAZOLE 20 MG/1
40 CAPSULE, DELAYED RELEASE ORAL DAILY
Status: DISCONTINUED | OUTPATIENT
Start: 2020-10-17 | End: 2020-10-21 | Stop reason: HOSPADM

## 2020-10-17 RX ADMIN — FERROUS SULFATE TAB 325 MG (65 MG ELEMENTAL FE) 325 MG: 325 (65 FE) TAB at 08:42

## 2020-10-17 RX ADMIN — ENOXAPARIN SODIUM 40 MG: 40 INJECTION SUBCUTANEOUS at 21:50

## 2020-10-17 RX ADMIN — TERAZOSIN HYDROCHLORIDE 3 MG: 1 CAPSULE ORAL at 21:50

## 2020-10-17 RX ADMIN — MELATONIN TAB 3 MG 3 MG: 3 TAB at 21:50

## 2020-10-17 RX ADMIN — TRAMADOL HYDROCHLORIDE 50 MG: 50 TABLET, COATED ORAL at 21:49

## 2020-10-17 RX ADMIN — LIDOCAINE 1 PATCH: 50 PATCH TOPICAL at 08:43

## 2020-10-17 RX ADMIN — METOPROLOL TARTRATE 25 MG: 25 TABLET, FILM COATED ORAL at 05:18

## 2020-10-17 RX ADMIN — SERTRALINE HYDROCHLORIDE 50 MG: 50 TABLET ORAL at 08:42

## 2020-10-17 RX ADMIN — OMEPRAZOLE 40 MG: 20 CAPSULE, DELAYED RELEASE ORAL at 11:35

## 2020-10-17 RX ADMIN — INSULIN GLARGINE 20 UNITS: 100 INJECTION, SOLUTION SUBCUTANEOUS at 08:37

## 2020-10-17 RX ADMIN — SIMETHICONE 160 MG: 80 TABLET, CHEWABLE ORAL at 09:24

## 2020-10-17 RX ADMIN — Medication 2000 UNITS: at 08:42

## 2020-10-17 RX ADMIN — LISINOPRIL 10 MG: 5 TABLET ORAL at 05:18

## 2020-10-17 RX ADMIN — METOCLOPRAMIDE 5 MG: 10 TABLET ORAL at 08:42

## 2020-10-17 RX ADMIN — TRAZODONE HYDROCHLORIDE 50 MG: 50 TABLET ORAL at 21:50

## 2020-10-17 RX ADMIN — CLOPIDOGREL BISULFATE 75 MG: 75 TABLET ORAL at 08:42

## 2020-10-17 RX ADMIN — HYDROXYZINE HYDROCHLORIDE 50 MG: 25 TABLET, FILM COATED ORAL at 21:50

## 2020-10-17 RX ADMIN — METOPROLOL TARTRATE 25 MG: 25 TABLET, FILM COATED ORAL at 16:57

## 2020-10-17 RX ADMIN — OXYCODONE HYDROCHLORIDE AND ACETAMINOPHEN 500 MG: 500 TABLET ORAL at 08:42

## 2020-10-17 RX ADMIN — SIMETHICONE 160 MG: 80 TABLET, CHEWABLE ORAL at 21:50

## 2020-10-17 RX ADMIN — ASPIRIN 81 MG CHEWABLE TABLET 81 MG: 81 TABLET CHEWABLE at 08:41

## 2020-10-17 RX ADMIN — ATORVASTATIN CALCIUM 80 MG: 40 TABLET, FILM COATED ORAL at 21:50

## 2020-10-17 RX ADMIN — SIMETHICONE 160 MG: 80 TABLET, CHEWABLE ORAL at 14:25

## 2020-10-17 ASSESSMENT — ENCOUNTER SYMPTOMS
POLYDIPSIA: 0
CHILLS: 0
NAUSEA: 0
BRUISES/BLEEDS EASILY: 0
EYES NEGATIVE: 1
VOMITING: 0
ABDOMINAL PAIN: 0
SHORTNESS OF BREATH: 0
COUGH: 0
PALPITATIONS: 0
FEVER: 0

## 2020-10-17 ASSESSMENT — PATIENT HEALTH QUESTIONNAIRE - PHQ9
SUM OF ALL RESPONSES TO PHQ9 QUESTIONS 1 AND 2: 0
2. FEELING DOWN, DEPRESSED, IRRITABLE, OR HOPELESS: NOT AT ALL
1. LITTLE INTEREST OR PLEASURE IN DOING THINGS: NOT AT ALL

## 2020-10-17 ASSESSMENT — PAIN DESCRIPTION - PAIN TYPE: TYPE: ACUTE PAIN;SURGICAL PAIN

## 2020-10-17 NOTE — PROGRESS NOTES
Hospital Medicine Daily Progress Note      Chief Complaint:  Hypertension  Tachycardia  Diabetes  Leukocytosis  S/P CABG  S/P CVA    Interval History:  Pt now eating, Cortrak out.    Review of Systems  Review of Systems   Constitutional: Negative for chills and fever.   HENT: Negative.    Eyes: Negative.    Respiratory: Negative for cough and shortness of breath.    Cardiovascular: Negative for chest pain and palpitations.   Gastrointestinal: Negative for abdominal pain, nausea and vomiting.   Musculoskeletal:        Wound pain    Skin: Negative for itching and rash.   Endo/Heme/Allergies: Negative for polydipsia. Does not bruise/bleed easily.        Physical Exam  Temp:  [36.6 °C (97.9 °F)-36.8 °C (98.3 °F)] 36.7 °C (98.1 °F)  Pulse:  [66-80] 74  Resp:  [18] 18  BP: (110-130)/(60-80) 113/67  SpO2:  [91 %] 91 %    Physical Exam  Vitals signs reviewed.   Constitutional:       General: He is not in acute distress.     Appearance: Normal appearance. He is not ill-appearing.   HENT:      Head: Normocephalic and atraumatic.      Right Ear: External ear normal.      Left Ear: External ear normal.      Nose: Nose normal.      Mouth/Throat:      Pharynx: Oropharynx is clear.   Eyes:      General:         Right eye: No discharge.         Left eye: No discharge.      Extraocular Movements: Extraocular movements intact.      Conjunctiva/sclera: Conjunctivae normal.   Neck:      Musculoskeletal: Normal range of motion and neck supple.   Cardiovascular:      Rate and Rhythm: Normal rate and regular rhythm.   Pulmonary:      Effort: No respiratory distress.      Breath sounds: No wheezing.      Comments: Decreased BS   Abdominal:      General: Bowel sounds are normal. There is no distension.      Palpations: Abdomen is soft.      Tenderness: There is no abdominal tenderness. There is no guarding or rebound.   Musculoskeletal:      Right lower leg: No edema.      Left lower leg: No edema.   Skin:     General: Skin is warm and dry.    Neurological:      Mental Status: He is alert.      Comments: Awake and alert         Fluids    Intake/Output Summary (Last 24 hours) at 10/17/2020 1558  Last data filed at 10/17/2020 1230  Gross per 24 hour   Intake 540 ml   Output 1400 ml   Net -860 ml       Laboratory  Recent Labs     10/15/20  0651   WBC 9.2   RBC 3.35*   HEMOGLOBIN 9.9*   HEMATOCRIT 31.8*   MCV 94.9   MCH 29.6   MCHC 31.1*   RDW 47.1   PLATELETCT 502*   MPV 10.5     Recent Labs     10/15/20  0651   SODIUM 134*   POTASSIUM 4.1   CHLORIDE 100   CO2 21   GLUCOSE 155*   BUN 22   CREATININE 1.07   CALCIUM 9.0                 Assessment/Plan  * Cardioembolic stroke (HCC)- (present on admission)  Assessment & Plan  S/P tyshawn-op CVA  On ASA, Plavix, and Lipitor    Ileus (HCC)- (present on admission)  Assessment & Plan  Has better appetite  Abd exam improved  F/U KUB unremarkable  Diarrhea resolved prior to C Dif sample taken  Taper off Reglan  Continue scheduled high dose Simethicone    SONNY (acute kidney injury) (HCC)- (present on admission)  Assessment & Plan  BUN/Cr improved w/ IVF  Follow renal function    CAD (coronary artery disease)- (present on admission)  Assessment & Plan  H/O cardiac stent x 2  S/P NSTEMI  S/P CABG x 3 vessels  On ASA, Plavix, Lipitor, Lisinopril, and Metoprolol  BNP improving    Essential hypertension- (present on admission)  Assessment & Plan  On Lisinopril and Metoprolol  Monitor for orthostatic hypotension on Hytrin    Type 2 diabetes mellitus with complication, without long-term current use of insulin (HCC)- (present on admission)  Assessment & Plan  HbA1c 7.9  Discontinued Metformin for diarrhea and SONNY  Continue Lantus and SSI for now  Outpt meds include Metformin 2000 mg bid per Epic records (pt unable to confirm dosing)    Thrombocytosis (HCC)  Assessment & Plan  Likley reactive  Follow Platelet counts    Iron deficiency anemia  Assessment & Plan  Fe 31  Continue Fe and Vit C supplements    Vitamin D  deficiency  Assessment & Plan  Vit D level 17  On supplementation    Reactive depression- (present on admission)  Assessment & Plan  On Zoloft    Pneumonia- (present on admission)  Assessment & Plan  Completed abx  Check F/U labs in am     Full Code

## 2020-10-17 NOTE — CARE PLAN
Problem: Communication  Goal: The ability to communicate needs accurately and effectively will improve  Outcome: PROGRESSING AS EXPECTED  Note: Patient mostly communicates non verbally but is able to get needs across clearly and effectively.     Problem: Safety  Goal: Will remain free from injury  Outcome: PROGRESSING AS EXPECTED  Note: No attempts to get oob unassisted. Demonstrates call light use.   Goal: Will remain free from falls  Outcome: PROGRESSING AS EXPECTED     Problem: Venous Thromboembolism (VTW)/Deep Vein Thrombosis (DVT) Prevention:  Goal: Patient will participate in Venous Thrombosis (VTE)/Deep Vein Thrombosis (DVT)Prevention Measures  Outcome: PROGRESSING AS EXPECTED  Flowsheets (Taken 10/17/2020 0437)  Pharmacologic Prophylaxis Used: LMWH: Enoxaparin(Lovenox)     Problem: Bowel/Gastric:  Goal: Normal bowel function is maintained or improved  Outcome: PROGRESSING AS EXPECTED  Goal: Will not experience complications related to bowel motility  Outcome: PROGRESSING AS EXPECTED     Problem: Urinary Elimination:  Goal: Ability to reestablish a normal urinary elimination pattern will improve  Outcome: PROGRESSING AS EXPECTED  Note: Gomez remains in place. Pt educated on care of gomez.      Problem: Skin Integrity  Goal: Risk for impaired skin integrity will decrease  Outcome: PROGRESSING AS EXPECTED     Problem: Pain Management  Goal: Pain level will decrease to patient's comfort goal  Outcome: PROGRESSING AS EXPECTED  Note: Pt denies any pain     Problem: Respiratory:  Goal: Respiratory status will improve  Outcome: PROGRESSING AS EXPECTED

## 2020-10-18 LAB
ANION GAP SERPL CALC-SCNC: 12 MMOL/L (ref 7–16)
BUN SERPL-MCNC: 20 MG/DL (ref 8–22)
CALCIUM SERPL-MCNC: 9.1 MG/DL (ref 8.5–10.5)
CHLORIDE SERPL-SCNC: 102 MMOL/L (ref 96–112)
CO2 SERPL-SCNC: 22 MMOL/L (ref 20–33)
CREAT SERPL-MCNC: 0.85 MG/DL (ref 0.5–1.4)
ERYTHROCYTE [DISTWIDTH] IN BLOOD BY AUTOMATED COUNT: 46.3 FL (ref 35.9–50)
GLUCOSE BLD-MCNC: 172 MG/DL (ref 65–99)
GLUCOSE BLD-MCNC: 178 MG/DL (ref 65–99)
GLUCOSE BLD-MCNC: 208 MG/DL (ref 65–99)
GLUCOSE BLD-MCNC: 283 MG/DL (ref 65–99)
GLUCOSE SERPL-MCNC: 189 MG/DL (ref 65–99)
HCT VFR BLD AUTO: 30.2 % (ref 42–52)
HGB BLD-MCNC: 9.6 G/DL (ref 14–18)
MCH RBC QN AUTO: 29.6 PG (ref 27–33)
MCHC RBC AUTO-ENTMCNC: 31.8 G/DL (ref 33.7–35.3)
MCV RBC AUTO: 93.2 FL (ref 81.4–97.8)
PLATELET # BLD AUTO: 427 K/UL (ref 164–446)
PMV BLD AUTO: 10.3 FL (ref 9–12.9)
POTASSIUM SERPL-SCNC: 4 MMOL/L (ref 3.6–5.5)
RBC # BLD AUTO: 3.24 M/UL (ref 4.7–6.1)
SODIUM SERPL-SCNC: 136 MMOL/L (ref 135–145)
WBC # BLD AUTO: 7.1 K/UL (ref 4.8–10.8)

## 2020-10-18 PROCEDURE — 85027 COMPLETE CBC AUTOMATED: CPT

## 2020-10-18 PROCEDURE — 700102 HCHG RX REV CODE 250 W/ 637 OVERRIDE(OP): Performed by: HOSPITALIST

## 2020-10-18 PROCEDURE — 97530 THERAPEUTIC ACTIVITIES: CPT

## 2020-10-18 PROCEDURE — 36415 COLL VENOUS BLD VENIPUNCTURE: CPT

## 2020-10-18 PROCEDURE — 700102 HCHG RX REV CODE 250 W/ 637 OVERRIDE(OP): Performed by: PHYSICAL MEDICINE & REHABILITATION

## 2020-10-18 PROCEDURE — A9270 NON-COVERED ITEM OR SERVICE: HCPCS | Performed by: PHYSICAL MEDICINE & REHABILITATION

## 2020-10-18 PROCEDURE — 700101 HCHG RX REV CODE 250: Performed by: PHYSICAL MEDICINE & REHABILITATION

## 2020-10-18 PROCEDURE — 770010 HCHG ROOM/CARE - REHAB SEMI PRIVAT*

## 2020-10-18 PROCEDURE — A9270 NON-COVERED ITEM OR SERVICE: HCPCS | Performed by: HOSPITALIST

## 2020-10-18 PROCEDURE — 97129 THER IVNTJ 1ST 15 MIN: CPT

## 2020-10-18 PROCEDURE — 82962 GLUCOSE BLOOD TEST: CPT

## 2020-10-18 PROCEDURE — 97116 GAIT TRAINING THERAPY: CPT

## 2020-10-18 PROCEDURE — 99232 SBSQ HOSP IP/OBS MODERATE 35: CPT | Performed by: HOSPITALIST

## 2020-10-18 PROCEDURE — 97130 THER IVNTJ EA ADDL 15 MIN: CPT

## 2020-10-18 PROCEDURE — 97535 SELF CARE MNGMENT TRAINING: CPT

## 2020-10-18 PROCEDURE — 80048 BASIC METABOLIC PNL TOTAL CA: CPT

## 2020-10-18 PROCEDURE — 700111 HCHG RX REV CODE 636 W/ 250 OVERRIDE (IP): Performed by: PHYSICAL MEDICINE & REHABILITATION

## 2020-10-18 RX ORDER — LISINOPRIL 5 MG/1
5 TABLET ORAL
Status: DISCONTINUED | OUTPATIENT
Start: 2020-10-19 | End: 2020-10-19

## 2020-10-18 RX ORDER — INSULIN GLARGINE 100 [IU]/ML
22 INJECTION, SOLUTION SUBCUTANEOUS
Status: DISCONTINUED | OUTPATIENT
Start: 2020-10-19 | End: 2020-10-21 | Stop reason: HOSPADM

## 2020-10-18 RX ADMIN — OMEPRAZOLE 40 MG: 20 CAPSULE, DELAYED RELEASE ORAL at 07:58

## 2020-10-18 RX ADMIN — Medication 2000 UNITS: at 07:59

## 2020-10-18 RX ADMIN — METOPROLOL TARTRATE 25 MG: 25 TABLET, FILM COATED ORAL at 17:04

## 2020-10-18 RX ADMIN — LISINOPRIL 10 MG: 5 TABLET ORAL at 06:03

## 2020-10-18 RX ADMIN — FERROUS SULFATE TAB 325 MG (65 MG ELEMENTAL FE) 325 MG: 325 (65 FE) TAB at 07:58

## 2020-10-18 RX ADMIN — OXYCODONE HYDROCHLORIDE AND ACETAMINOPHEN 500 MG: 500 TABLET ORAL at 07:58

## 2020-10-18 RX ADMIN — CLOPIDOGREL BISULFATE 75 MG: 75 TABLET ORAL at 07:58

## 2020-10-18 RX ADMIN — INSULIN GLARGINE 20 UNITS: 100 INJECTION, SOLUTION SUBCUTANEOUS at 07:54

## 2020-10-18 RX ADMIN — SERTRALINE HYDROCHLORIDE 50 MG: 50 TABLET ORAL at 07:58

## 2020-10-18 RX ADMIN — ASPIRIN 81 MG CHEWABLE TABLET 81 MG: 81 TABLET CHEWABLE at 07:58

## 2020-10-18 RX ADMIN — ENOXAPARIN SODIUM 40 MG: 40 INJECTION SUBCUTANEOUS at 20:58

## 2020-10-18 RX ADMIN — TERAZOSIN HYDROCHLORIDE 3 MG: 1 CAPSULE ORAL at 20:58

## 2020-10-18 RX ADMIN — METOPROLOL TARTRATE 25 MG: 25 TABLET, FILM COATED ORAL at 06:03

## 2020-10-18 RX ADMIN — SIMETHICONE 160 MG: 80 TABLET, CHEWABLE ORAL at 20:59

## 2020-10-18 RX ADMIN — ATORVASTATIN CALCIUM 80 MG: 40 TABLET, FILM COATED ORAL at 20:59

## 2020-10-18 RX ADMIN — SIMETHICONE 160 MG: 80 TABLET, CHEWABLE ORAL at 07:58

## 2020-10-18 RX ADMIN — TRAMADOL HYDROCHLORIDE 50 MG: 50 TABLET, COATED ORAL at 20:59

## 2020-10-18 RX ADMIN — TRAZODONE HYDROCHLORIDE 50 MG: 50 TABLET ORAL at 20:59

## 2020-10-18 RX ADMIN — MELATONIN TAB 3 MG 3 MG: 3 TAB at 20:59

## 2020-10-18 RX ADMIN — LIDOCAINE 1 PATCH: 50 PATCH TOPICAL at 07:59

## 2020-10-18 RX ADMIN — HYDROXYZINE HYDROCHLORIDE 50 MG: 25 TABLET, FILM COATED ORAL at 20:59

## 2020-10-18 RX ADMIN — SIMETHICONE 160 MG: 80 TABLET, CHEWABLE ORAL at 14:08

## 2020-10-18 ASSESSMENT — ENCOUNTER SYMPTOMS
CHILLS: 0
NAUSEA: 0
VOMITING: 0
BRUISES/BLEEDS EASILY: 0
PALPITATIONS: 0
SHORTNESS OF BREATH: 0
POLYDIPSIA: 0
EYES NEGATIVE: 1
COUGH: 0
FEVER: 0
ABDOMINAL PAIN: 0

## 2020-10-18 ASSESSMENT — PATIENT HEALTH QUESTIONNAIRE - PHQ9
1. LITTLE INTEREST OR PLEASURE IN DOING THINGS: NOT AT ALL
SUM OF ALL RESPONSES TO PHQ9 QUESTIONS 1 AND 2: 0
2. FEELING DOWN, DEPRESSED, IRRITABLE, OR HOPELESS: NOT AT ALL

## 2020-10-18 ASSESSMENT — GAIT ASSESSMENTS
ASSISTIVE DEVICE: PARALLEL BARS;FRONT WHEEL WALKER
GAIT LEVEL OF ASSIST: CONTACT GUARD ASSIST

## 2020-10-18 ASSESSMENT — FIBROSIS 4 INDEX: FIB4 SCORE: 0.39

## 2020-10-18 NOTE — THERAPY
Speech Language Pathology  Daily Treatment     Patient Name: Guerrero Ann  Age:  49 y.o., Sex:  male  Medical Record #: [id number]  Today's Date: 10/18/2020     Precautions  Precautions: Fall Risk, Swallow Precautions ( See Comments), Sternal Precautions (See Comments)  Comments: Girlfriend (Miriam) cleared to assist patient with bed and toilet txfrs     Subjective    Pt pleasant and cooperative during tx.  SO present and supportive.      Objective       10/18/20 1331   Expressive Language   Word Finding Deficits Minimal (4)   Cognition   Visual Scanning / Cancellation Skills Minimal (4)   Interdisciplinary Plan of Care Collaboration   IDT Collaboration with  Family / Caregiver   Collaboration Comments SO present and supportive.    SLP Total Time Spent   SLP Individual Total Time Spent (Mins) 60   Treatment Charges   SLP Cognitive Skill Development First 15 Minutes 1   SLP Cognitive Skill Development Additional 15 Minutes 3       Assessment    Wilson category namin words per minute (avg).  Visual scanning (1 target): 61% with use of left line guide, 100% given min-mod verbal cues.      Strengths: Able to follow instructions, Independent prior level of function, Pleasant and cooperative, Motivated for self care and independence, Willingly participates in therapeutic activities  Barriers: Impaired functional cognition, Impaired carryover of learning, Impaired insight/denial of deficits, Impaired activity tolerance    Plan    Visual scanning, vocal expression/word finding, recall of daily events.     Speech Therapy Problems     Problem: Expression STGs     Dates: Start: 10/10/20       Goal: STG-Within one week, patient will     Dates: Start: 10/10/20       Description: 1) Individualized goal:  state 10 words per minute given concrete category and min verbal cues.   2) Interventions:  SLP Speech Language Treatment and SLP Cognitive Skill Development                    Problem: Memory STGs      Dates: Start: 10/10/20       Goal: STG-Within one week, patient will     Dates: Start: 10/10/20       Description: 1) Individualized goal:  recall daily events and safety strategies 80% accuracy given min cues, with use of memory book.   2) Interventions:  SLP Cognitive Skill Development                    Problem: Problem Solving STGs     Dates: Start: 10/10/20       Goal: STG-Within one week, patient will     Dates: Start: 10/10/20       Description: 1) Individualized goal:  complete single target visual scanning tasks with 80% accuracy, given min verbal cues.   2) Interventions:  SLP Cognitive Skill Development                    Problem: Speech/Swallowing LTGs     Dates: Start: 10/10/20       Goal: LTG-By discharge, patient will safely swallow     Dates: Start: 10/10/20       Description: 1) Individualized goal:  regular textures and thin liquids without aspiration.   2) Interventions:  SLP Swallowing Dysfunction Treatment, SLP Oral Pharyngeal Evaluation, SLP Video Swallow Evaluation, and SLP Group Treatment              Goal: LTG-By discharge, patient will solve basic problems     Dates: Start: 10/10/20       Description: 1) Individualized goal:  Alayna for safe discharge home.    2) Interventions:  SLP Speech Language Treatment, SLP Cognitive Skill Development, and SLP Group Treatment                    Problem: Swallowing STGs     Dates: Start: 10/13/20       Goal: STG-Within one week, patient will     Dates: Start: 10/13/20       Description: 1) Individualized goal:  tolerate soft and bite size textures with thin liquids with no overt s/sx of asp/pen noted provided MIN A  2) Interventions:  SLP Swallowing Dysfunction Treatment, SLP Oral Pharyngeal Evaluation, SLP Video Swallow Evaluation, SLP Self Care / ADL Training , SLP Cognitive Skill Development, and SLP Group Treatment

## 2020-10-18 NOTE — THERAPY
"Physical Therapy   Daily Treatment     Patient Name: Guerrero Ann  Age:  49 y.o., Sex:  male  Medical Record #: [id number]  Today's Date: 10/18/2020     Precautions  Precautions: (P) Fall Risk, Swallow Precautions ( See Comments), Sternal Precautions (See Comments)  Comments: (P) Girlfriend (Miriam) cleared to assist patient with bed and toilet txfrs     Subjective    Pt reports \"I don't want to do therapy\" girlfriend is present get pt to agree.       Objective     10/18/20 1501   Precautions   Precautions Fall Risk;Swallow Precautions ( See Comments);Sternal Precautions (See Comments)   Comments Girlfriend (Miriam) cleared to assist patient with bed and toilet txfrs    Gait Functional Level of Assist    Gait Level Of Assist Contact Guard Assist   Assistive Device Parallel Bars;Front Wheel Walker  (// bars, progress to no device)   Wheelchair Functional Level of Assist   Wheelchair Assist Supervised   Distance Wheelchair (Feet or Distance) 100  (backwards with LE propulsion)   Transfer Functional Level of Assist   Bed, Chair, Wheelchair Transfer Stand by Assist   Bed Mobility    Supine to Sit Supervised   Sit to Supine Supervised   Sit to Stand Supervised   PT Total Time Spent   PT Individual Total Time Spent (Mins) 60   PT Charge Group   PT Gait Training 3   PT Therapeutic Activities 1     Gait training:  - FWW level surface 75 ft SBA assist with equipment   - // bars 40 min fwd ambulation, side ambulation, retro ambulation 20ft each trial no UE support. Pt takes a seated rest break following each attempt.   - over ground no device 10 ft w/c follow     Assessment   Patient does not talk much and looks to girlfriend for answers. Pt was resistance to progressing to over ground no UE support but he did well. Pt has very limited activity tolerance and takes repeated seated rest breaks throughout. He is unsteady on his feet without UE support but progressed well today.     Strengths: Independent " prior level of function, Supportive family, Willingly participates in therapeutic activities  Barriers: Visual impairment, Pain, Fatigue, Generalized weakness    Plan    Continue endurance training, gait training with device and safety during functional mobility, balance without UE support.     Physical Therapy Problems     Problem: Balance     Dates: Start: 10/10/20       Goal: STG-Within one week, patient will maintain dynamic standing     Dates: Start: 10/10/20       Description: 1) Individualized goal:  Pt will demonstrate ability to complete dynamic standing for >3 min SBA to improve independence with ADLs and IADLs within one week.  2) Interventions:  PT E Stim Attended, PT Gait Training, PT Therapeutic Exercises, PT TENS Application, PT Neuro Re-Ed/Balance, PT Therapeutic Activity, PT Manual Therapy, and PT Evaluation      Note:     Goal Note filed on 10/14/20 1146 by Byron Tobin, PT    < 3 min                        Problem: Mobility     Dates: Start: 10/10/20       Goal: STG-Within one week, patient will propel wheelchair household distances     Dates: Start: 10/10/20       Description: 1) Individualized goal:  Pt will propel wheelchair >200' with BLEs to become Caroline within WC in unit within one week.  2) Interventions:  PT E Stim Attended, PT Gait Training, PT Therapeutic Exercises, PT TENS Application, PT Neuro Re-Ed/Balance, PT Therapeutic Activity, PT Manual Therapy, and PT Evaluation      Note:     Goal Note filed on 10/14/20 1146 by Byron Tobin, PT    < 200 ft                  Goal: STG-Within one week, patient will ambulate household distance     Dates: Start: 10/10/20       Description: 1) Individualized goal:  Pt will demonstrate ability to walk >100' CGA with FWW to achieve household distances within one week.  2) Interventions:  PT E Stim Attended, PT Gait Training, PT Therapeutic Exercises, PT TENS Application, PT Neuro Re-Ed/Balance, PT Therapeutic Activity, PT Manual Therapy,  and PT Evaluation      Note:     Goal Note filed on 10/14/20 1146 by Byron Tobin, PT    < 100 ft                   Goal: STG-Within one week, patient will ambulate up/down a curb     Dates: Start: 10/10/20       Description: 1) Individualized goal:  Pt will trial ascending/descending a standard curb Donny with FWW to practice entry to home within one week.  2) Interventions:  PT E Stim Attended, PT Gait Training, PT Therapeutic Exercises, PT TENS Application, PT Neuro Re-Ed/Balance, PT Therapeutic Activity, PT Manual Therapy, and PT Evaluation      Note:     Goal Note filed on 10/14/20 1146 by Byron Tobin, PT    NT dt safety concerns                        Problem: PT-Long Term Goals     Dates: Start: 10/10/20       Goal: LTG-By discharge, patient will ambulate     Dates: Start: 10/10/20       Description: 1) Individualized goal:  Pt will demonstrate ability to ambulate >100' SBA to safely negotiate household distances at discharge.  2) Interventions:  PT E Stim Attended, PT Gait Training, PT Therapeutic Exercises, PT TENS Application, PT Neuro Re-Ed/Balance, PT Therapeutic Activity, PT Manual Therapy, and PT Evaluation            Goal: LTG-By discharge, patient will transfer one surface to another     Dates: Start: 10/10/20       Description: 1) Individualized goal:  Pt will demonstrate ability to complete chair to chair transfers and bed transfers independently to safely return to prior living environment.  2) Interventions:  PT E Stim Attended, PT Gait Training, PT Therapeutic Exercises, PT TENS Application, PT Neuro Re-Ed/Balance, PT Therapeutic Activity, PT Manual Therapy, and PT Evaluation            Goal: LTG-By discharge, patient will ambulate up/down 4-6 stairs     Dates: Start: 10/10/20       Description: 1) Individualized goal:  Pt will demonstrate ability to ascend/descend 4 standard steps CGA with LRD to safely enter home.  2) Interventions:  PT E Stim Attended, PT Gait Training, PT  Therapeutic Exercises, PT TENS Application, PT Neuro Re-Ed/Balance, PT Therapeutic Activity, PT Manual Therapy, and PT Evaluation

## 2020-10-18 NOTE — THERAPY
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 (pt needed to repeat himself 3x due to very low voice volume      Objective       10/18/20 0931   Precautions   Precautions Fall Risk;Swallow Precautions ( See Comments);Sternal Precautions (See Comments)   Comments Girlfriend (Miriam) cleared to assist patient with bed and toilet txfrs    Pain   Intervention Declines   Pain 0 - 10 Group   Pain Rating Scale (NPRS) 0   Therapist Pain Assessment Post Activity Pain Same as Prior to Activity   Cognition    Level of Consciousness Alert   Sleep/Wake Cycle   Sleep & Rest Awake   Functional Level of Assist   Grooming Supervision   Grooming Description Increased time;Supervision for safety;Set-up of equipment;Seated in wheelchair at sink  (brushed teeth, washed face )   Upper Body Dressing Supervision   Upper Body Dressing Description Increased time;Set-up of equipment;Supervision for safety;Verbal cueing  (doff/don pull over sweater and shirt )   Bed, Chair, Wheelchair Transfer Contact Guard Assist   Bed Chair Wheelchair Transfer Description Increased time;Set-up of equipment;Supervision for safety;Squat pivot transfer to wheelchair;Verbal cueing   Interdisciplinary Plan of Care Collaboration   Patient Position at End of Therapy Seated;Self Releasing Lap Belt Applied;Family / Friend in Room  (girlfriend pushed pt back to room)   OT Total Time Spent   OT Individual Total Time Spent (Mins) 60   OT Charge Group   OT Self Care / ADL 2   OT Therapy Activity 2     Pt engaged in static standing at tabletop with functional " reaching and visual scanning/depth perception, sequencing component. Pt only able to tolerate standing ~1-3 minutes at a time 2/2 fatigue. Pt reported that he did not sleep well last night and was very exhausted. Pt completed 2 PVC pipe tree patterns of medium difficulty. Pt required max cues for sequencing first pattern and finding correct pieces/colors in bins. Pt able to complete second pattern with only min cues needed for attending to the L side and using his finger on picture to identify which piece goes next in the sequence. Pt utilizing B hands during task in sitting/standing, pt then able to take first pattern apart and place pipes into the correct bins w/ only 1 v/c needed for correct placement of pieces.     Assessment    Pt tolerated session fair. Pt very tired from not sleeping last night. Pt fully leaning against bed rail upon arrival and needed to scoot to end of bed for supine to sit prior to transferring to w/c. Pt required increased time for H/G tasks seated at sink side. Pt engaged in static standing at tabletop with functional reaching and visual scanning/depth perception, sequencing component. Pt required max cues for first pattern and only min cues for second pattern, noted large improvement with increased time for processing. Cues needed throughout to attend to L side and use finger to assist w/ sequencing pieces in pattern.     Strengths: Alert and oriented, Adequate strength, Independent prior level of function, Motivated for self care and independence, Willingly participates in therapeutic activities, Pleasant and cooperative, Supportive family  Barriers: Decreased endurance, Generalized weakness, Impaired balance, Tube feeding, Visual impairment    Plan    continue  ADL  IADL , related mobility strength/endruance building  Incorporating  Sternal precautions      Occupational Therapy Goals     Problem: Bathing     Dates: Start: 10/10/20       Goal: STG-Within one week, patient will bathe      Dates: Start: 10/10/20       Description: 1) Individualized Goal:  CGA   2) Interventions:  OT Self Care/ADL, OT Cognitive Skill Dev, OT Community Reintegration, OT Manual Ther Technique, OT Neuro Re-Ed/Balance, OT Sensory Int Techniques, OT Therapeutic Activity, OT Evaluation, and OT Therapeutic Exercise      Note:     Goal Note filed on 10/13/20 1117 by Kelvin Kirkland, C.O.T.A.    Min assist    Decreased strength/endurance , decreased balance ,  vision issues   Continue goal                         Problem: Dressing     Dates: Start: 10/10/20       Goal: STG-Within one week, patient will dress LB     Dates: Start: 10/10/20       Description: 1) Individualized Goal:  CGA   2) Interventions:  OT Self Care/ADL, OT Cognitive Skill Dev, OT Community Reintegration, OT Manual Ther Technique, OT Neuro Re-Ed/Balance, OT Sensory Int Techniques, OT Therapeutic Activity, OT Evaluation, and OT Therapeutic Exercise      Note:     Goal Note filed on 10/13/20 1117 by Kelvin Kirkland, C.O.T.A.    Min assist   Decreased strength/endurance , decreased balance ,  vision issues   Continue goal                         Problem: OT Long Term Goals     Dates: Start: 10/10/20       Goal: LTG-By discharge, patient will complete basic self care tasks     Dates: Start: 10/10/20       Description: 1) Individualized Goal:  Mod I    2) Interventions:  OT Self Care/ADL, OT Cognitive Skill Dev, OT Community Reintegration, OT Manual Ther Technique, OT Neuro Re-Ed/Balance, OT Sensory Int Techniques, OT Therapeutic Activity, OT Evaluation, and OT Therapeutic Exercise            Goal: LTG-By discharge, patient will perform bathroom transfers     Dates: Start: 10/10/20       Description: 1) Individualized Goal:  Mod I    2) Interventions:  OT Self Care/ADL, OT Cognitive Skill Dev, OT Community Reintegration, OT Manual Ther Technique, OT Neuro Re-Ed/Balance, OT Sensory Int Techniques, OT Therapeutic Activity, OT Evaluation, and OT Therapeutic  Exercise

## 2020-10-18 NOTE — FLOWSHEET NOTE
10/17/20 1430   Events/Summary/Plan   Events/Summary/Plan 02 check/Flutter   Vital Signs   Pulse 72   Respiration 16   Pulse Oximetry 91 %   $ Pulse Oximetry (Spot Check) Yes   Respiratory Assessment   Level of Consciousness Alert   Oxygen   O2 (LPM) 0

## 2020-10-18 NOTE — FLOWSHEET NOTE
10/17/20 1430   Chest Physiotherapy Treatment   $ PEP/CPT Performed PEP / Flutter   Date Disposable Equipment Next Change Due (Q 30 Days) 11/10/20

## 2020-10-18 NOTE — PROGRESS NOTES
Hospital Medicine Daily Progress Note      Chief Complaint:  Hypertension  Tachycardia  Diabetes  Leukocytosis  S/P CABG  S/P CVA    Interval History:  No overnight events reported but blood pressures trending low.  Labs reviewed.     Review of Systems  Review of Systems   Constitutional: Negative for chills and fever.   HENT: Negative.    Eyes: Negative.    Respiratory: Negative for cough and shortness of breath.    Cardiovascular: Negative for chest pain and palpitations.   Gastrointestinal: Negative for abdominal pain, nausea and vomiting.   Musculoskeletal:        Wound pain    Skin: Negative for itching and rash.   Endo/Heme/Allergies: Negative for polydipsia. Does not bruise/bleed easily.        Physical Exam  Temp:  [36.6 °C (97.9 °F)-36.7 °C (98.1 °F)] 36.6 °C (97.9 °F)  Pulse:  [70-85] 77  Resp:  [16-18] 16  BP: ()/(52-78) 100/60  SpO2:  [91 %] 91 %    Physical Exam  Vitals signs reviewed.   Constitutional:       General: He is not in acute distress.     Appearance: Normal appearance. He is not ill-appearing.   HENT:      Head: Normocephalic and atraumatic.      Right Ear: External ear normal.      Left Ear: External ear normal.      Nose: Nose normal.      Mouth/Throat:      Pharynx: Oropharynx is clear.   Eyes:      General:         Right eye: No discharge.         Left eye: No discharge.      Extraocular Movements: Extraocular movements intact.      Conjunctiva/sclera: Conjunctivae normal.   Neck:      Musculoskeletal: Normal range of motion and neck supple.   Cardiovascular:      Rate and Rhythm: Normal rate and regular rhythm.      Comments: Sternal incision C/D/I  Pulmonary:      Effort: No respiratory distress.      Breath sounds: No wheezing.      Comments: Decreased BS   Abdominal:      General: Bowel sounds are normal. There is no distension.      Palpations: Abdomen is soft.      Tenderness: There is no abdominal tenderness. There is no guarding or rebound.   Musculoskeletal:      Right  lower leg: No edema.      Left lower leg: No edema.   Skin:     General: Skin is warm and dry.   Neurological:      Mental Status: He is alert.      Comments: Resting comfortably         Fluids    Intake/Output Summary (Last 24 hours) at 10/18/2020 1252  Last data filed at 10/18/2020 0900  Gross per 24 hour   Intake 360 ml   Output 3000 ml   Net -2640 ml       Laboratory  Recent Labs     10/18/20  0554   WBC 7.1   RBC 3.24*   HEMOGLOBIN 9.6*   HEMATOCRIT 30.2*   MCV 93.2   MCH 29.6   MCHC 31.8*   RDW 46.3   PLATELETCT 427   MPV 10.3     Recent Labs     10/18/20  0554   SODIUM 136   POTASSIUM 4.0   CHLORIDE 102   CO2 22   GLUCOSE 189*   BUN 20   CREATININE 0.85   CALCIUM 9.1                 Assessment/Plan  * Cardioembolic stroke (HCC)- (present on admission)  Assessment & Plan  S/P tyshawn-op CVA  On ASA, Plavix, and Lipitor    Ileus (HCC)- (present on admission)  Assessment & Plan  Has better appetite  Abd exam improved  F/U KUB unremarkable  Diarrhea resolved prior to C Dif sample taken  Tapered off Reglan  Continue scheduled high dose Simethicone    SONNY (acute kidney injury) (HCC)- (present on admission)  Assessment & Plan  BUN/Cr improved w/ IVF  Following renal function    CAD (coronary artery disease)- (present on admission)  Assessment & Plan  H/O cardiac stent x 2  S/P NSTEMI  S/P CABG x 3 vessels  On ASA, Plavix, Lipitor, Lisinopril, and Metoprolol  BNP improving    Essential hypertension- (present on admission)  Assessment & Plan  On Lisinopril and Metoprolol  Decrease ACE-I for low trending blood pressures  Monitor for orthostatic hypotension on Hytrin    Type 2 diabetes mellitus with complication, without long-term current use of insulin (HCC)- (present on admission)  Assessment & Plan  HbA1c 7.9  Discontinued Metformin for diarrhea and SONNY  Increase Lantus for hyperglycemic trends  Continue SSI for now  Outpt meds include Metformin 2000 mg bid per Epic records (pt unable to confirm  dosing)    Thrombocytosis (HCC)  Assessment & Plan  Jonathanley reactive  Follow Platelet counts    Iron deficiency anemia  Assessment & Plan  Fe 31  Continue Fe and Vit C supplements    Vitamin D deficiency  Assessment & Plan  Vit D level 17  On supplementation    Reactive depression- (present on admission)  Assessment & Plan  On Zoloft    Pneumonia- (present on admission)  Assessment & Plan  Completed abx    Full Code

## 2020-10-18 NOTE — CARE PLAN
Problem: Pain Management  Goal: Pain level will decrease to patient's comfort goal  Outcome: PROGRESSING AS EXPECTED     Problem: Communication  Goal: The ability to communicate needs accurately and effectively will improve  Outcome: PROGRESSING SLOWER THAN EXPECTED     Problem: Urinary Elimination:  Goal: Ability to reestablish a normal urinary elimination pattern will improve  Outcome: PROGRESSING SLOWER THAN EXPECTED

## 2020-10-18 NOTE — CARE PLAN
Problem: Communication  Goal: The ability to communicate needs accurately and effectively will improve  Outcome: PROGRESSING AS EXPECTED  Note: Able to express needs clearly and effectively. Speaking more tonight, voice is more clear than last night patient was whispering.      Problem: Safety  Goal: Will remain free from injury  Outcome: PROGRESSING AS EXPECTED  Note: No attempts to get up unassisted. Demonstrates use of call light and good safety awareness when transferring.   Goal: Will remain free from falls  Outcome: PROGRESSING AS EXPECTED     Problem: Venous Thromboembolism (VTW)/Deep Vein Thrombosis (DVT) Prevention:  Goal: Patient will participate in Venous Thrombosis (VTE)/Deep Vein Thrombosis (DVT)Prevention Measures  Outcome: PROGRESSING AS EXPECTED  Flowsheets (Taken 10/17/2020 6174)  Pharmacologic Prophylaxis Used: LMWH: Enoxaparin(Lovenox)     Problem: Bowel/Gastric:  Goal: Normal bowel function is maintained or improved  Outcome: PROGRESSING AS EXPECTED  Goal: Will not experience complications related to bowel motility  Outcome: PROGRESSING AS EXPECTED     Problem: Urinary Elimination:  Goal: Ability to reestablish a normal urinary elimination pattern will improve  Outcome: PROGRESSING AS EXPECTED  Note: Quispe remains in place.      Problem: Skin Integrity  Goal: Risk for impaired skin integrity will decrease  Outcome: PROGRESSING AS EXPECTED  Note: Chest, abdominal, and rle incisions all healing well and ANDRE. Photos taken.      Problem: Pain Management  Goal: Pain level will decrease to patient's comfort goal  Outcome: PROGRESSING AS EXPECTED  Note: Patient took tramadol x1 for chest/sternal pain. Effective, patient now comfortable.

## 2020-10-19 LAB
GLUCOSE BLD-MCNC: 137 MG/DL (ref 65–99)
GLUCOSE BLD-MCNC: 138 MG/DL (ref 65–99)
GLUCOSE BLD-MCNC: 185 MG/DL (ref 65–99)
GLUCOSE BLD-MCNC: 187 MG/DL (ref 65–99)

## 2020-10-19 PROCEDURE — 97535 SELF CARE MNGMENT TRAINING: CPT | Mod: CO

## 2020-10-19 PROCEDURE — A9270 NON-COVERED ITEM OR SERVICE: HCPCS | Performed by: PHYSICAL MEDICINE & REHABILITATION

## 2020-10-19 PROCEDURE — A9270 NON-COVERED ITEM OR SERVICE: HCPCS | Performed by: HOSPITALIST

## 2020-10-19 PROCEDURE — 770010 HCHG ROOM/CARE - REHAB SEMI PRIVAT*

## 2020-10-19 PROCEDURE — 700111 HCHG RX REV CODE 636 W/ 250 OVERRIDE (IP): Performed by: PHYSICAL MEDICINE & REHABILITATION

## 2020-10-19 PROCEDURE — 97530 THERAPEUTIC ACTIVITIES: CPT | Mod: CO

## 2020-10-19 PROCEDURE — 700101 HCHG RX REV CODE 250: Performed by: PHYSICAL MEDICINE & REHABILITATION

## 2020-10-19 PROCEDURE — 700102 HCHG RX REV CODE 250 W/ 637 OVERRIDE(OP): Performed by: HOSPITALIST

## 2020-10-19 PROCEDURE — 99232 SBSQ HOSP IP/OBS MODERATE 35: CPT | Performed by: PHYSICAL MEDICINE & REHABILITATION

## 2020-10-19 PROCEDURE — 82962 GLUCOSE BLOOD TEST: CPT | Mod: 91

## 2020-10-19 PROCEDURE — 97130 THER IVNTJ EA ADDL 15 MIN: CPT

## 2020-10-19 PROCEDURE — 700102 HCHG RX REV CODE 250 W/ 637 OVERRIDE(OP): Performed by: PHYSICAL MEDICINE & REHABILITATION

## 2020-10-19 PROCEDURE — 99232 SBSQ HOSP IP/OBS MODERATE 35: CPT | Performed by: HOSPITALIST

## 2020-10-19 PROCEDURE — 97530 THERAPEUTIC ACTIVITIES: CPT

## 2020-10-19 PROCEDURE — 97129 THER IVNTJ 1ST 15 MIN: CPT

## 2020-10-19 PROCEDURE — 97116 GAIT TRAINING THERAPY: CPT

## 2020-10-19 RX ORDER — ASPIRIN 81 MG/1
81 TABLET, CHEWABLE ORAL DAILY
Status: DISCONTINUED | OUTPATIENT
Start: 2020-10-20 | End: 2020-10-21 | Stop reason: HOSPADM

## 2020-10-19 RX ORDER — LANOLIN ALCOHOL/MO/W.PET/CERES
6 CREAM (GRAM) TOPICAL
Status: DISCONTINUED | OUTPATIENT
Start: 2020-10-19 | End: 2020-10-21 | Stop reason: HOSPADM

## 2020-10-19 RX ORDER — SIMETHICONE 80 MG
160 TABLET,CHEWABLE ORAL 4 TIMES DAILY PRN
Status: DISCONTINUED | OUTPATIENT
Start: 2020-10-19 | End: 2020-10-21 | Stop reason: HOSPADM

## 2020-10-19 RX ORDER — LISINOPRIL 5 MG/1
2.5 TABLET ORAL
Status: DISCONTINUED | OUTPATIENT
Start: 2020-10-20 | End: 2020-10-21 | Stop reason: HOSPADM

## 2020-10-19 RX ORDER — TRAZODONE HYDROCHLORIDE 50 MG/1
50 TABLET ORAL
Status: DISCONTINUED | OUTPATIENT
Start: 2020-10-19 | End: 2020-10-21 | Stop reason: HOSPADM

## 2020-10-19 RX ORDER — ATORVASTATIN CALCIUM 40 MG/1
80 TABLET, FILM COATED ORAL
Status: DISCONTINUED | OUTPATIENT
Start: 2020-10-19 | End: 2020-10-21 | Stop reason: HOSPADM

## 2020-10-19 RX ORDER — LISINOPRIL 5 MG/1
2.5 TABLET ORAL
Status: DISCONTINUED | OUTPATIENT
Start: 2020-10-20 | End: 2020-10-19

## 2020-10-19 RX ORDER — ASCORBIC ACID 500 MG
500 TABLET ORAL
Status: DISCONTINUED | OUTPATIENT
Start: 2020-10-20 | End: 2020-10-21 | Stop reason: HOSPADM

## 2020-10-19 RX ORDER — VITAMIN B COMPLEX
2000 TABLET ORAL DAILY
Status: DISCONTINUED | OUTPATIENT
Start: 2020-10-20 | End: 2020-10-21 | Stop reason: HOSPADM

## 2020-10-19 RX ORDER — CLOPIDOGREL BISULFATE 75 MG/1
75 TABLET ORAL DAILY
Status: DISCONTINUED | OUTPATIENT
Start: 2020-10-20 | End: 2020-10-21 | Stop reason: HOSPADM

## 2020-10-19 RX ORDER — TERAZOSIN 1 MG/1
3 CAPSULE ORAL EVERY EVENING
Status: DISCONTINUED | OUTPATIENT
Start: 2020-10-19 | End: 2020-10-21 | Stop reason: HOSPADM

## 2020-10-19 RX ADMIN — METFORMIN HYDROCHLORIDE 500 MG: 500 TABLET ORAL at 17:03

## 2020-10-19 RX ADMIN — Medication 2000 UNITS: at 07:38

## 2020-10-19 RX ADMIN — SERTRALINE HYDROCHLORIDE 50 MG: 50 TABLET ORAL at 07:38

## 2020-10-19 RX ADMIN — METOPROLOL TARTRATE 25 MG: 25 TABLET, FILM COATED ORAL at 05:45

## 2020-10-19 RX ADMIN — TRAZODONE HYDROCHLORIDE 50 MG: 50 TABLET ORAL at 22:09

## 2020-10-19 RX ADMIN — INSULIN GLARGINE 22 UNITS: 100 INJECTION, SOLUTION SUBCUTANEOUS at 07:32

## 2020-10-19 RX ADMIN — TERAZOSIN HYDROCHLORIDE 3 MG: 1 CAPSULE ORAL at 22:09

## 2020-10-19 RX ADMIN — CLOPIDOGREL BISULFATE 75 MG: 75 TABLET ORAL at 07:37

## 2020-10-19 RX ADMIN — METOPROLOL TARTRATE 25 MG: 25 TABLET, FILM COATED ORAL at 17:02

## 2020-10-19 RX ADMIN — ENOXAPARIN SODIUM 40 MG: 40 INJECTION SUBCUTANEOUS at 22:09

## 2020-10-19 RX ADMIN — OMEPRAZOLE 40 MG: 20 CAPSULE, DELAYED RELEASE ORAL at 07:37

## 2020-10-19 RX ADMIN — LIDOCAINE 1 PATCH: 50 PATCH TOPICAL at 07:37

## 2020-10-19 RX ADMIN — FERROUS SULFATE TAB 325 MG (65 MG ELEMENTAL FE) 325 MG: 325 (65 FE) TAB at 07:37

## 2020-10-19 RX ADMIN — SIMETHICONE 160 MG: 80 TABLET, CHEWABLE ORAL at 07:37

## 2020-10-19 RX ADMIN — OXYCODONE HYDROCHLORIDE AND ACETAMINOPHEN 500 MG: 500 TABLET ORAL at 07:38

## 2020-10-19 RX ADMIN — LISINOPRIL 5 MG: 5 TABLET ORAL at 05:45

## 2020-10-19 RX ADMIN — ASPIRIN 81 MG CHEWABLE TABLET 81 MG: 81 TABLET CHEWABLE at 07:37

## 2020-10-19 RX ADMIN — MELATONIN 6 MG: at 22:09

## 2020-10-19 RX ADMIN — ATORVASTATIN CALCIUM 80 MG: 40 TABLET, FILM COATED ORAL at 22:09

## 2020-10-19 ASSESSMENT — PATIENT HEALTH QUESTIONNAIRE - PHQ9
SUM OF ALL RESPONSES TO PHQ9 QUESTIONS 1 AND 2: 0
1. LITTLE INTEREST OR PLEASURE IN DOING THINGS: NOT AT ALL
2. FEELING DOWN, DEPRESSED, IRRITABLE, OR HOPELESS: NOT AT ALL

## 2020-10-19 ASSESSMENT — ENCOUNTER SYMPTOMS
SHORTNESS OF BREATH: 0
NAUSEA: 0
POLYDIPSIA: 0
ABDOMINAL PAIN: 0
FEVER: 0
EYES NEGATIVE: 1
VOMITING: 0
COUGH: 0
BRUISES/BLEEDS EASILY: 0
CHILLS: 0
PALPITATIONS: 0

## 2020-10-19 ASSESSMENT — GAIT ASSESSMENTS
ASSISTIVE DEVICE: 4 WHEEL WALKER
DEVIATION: BRADYKINETIC;DECREASED HEEL STRIKE;DECREASED TOE OFF
GAIT LEVEL OF ASSIST: SUPERVISED
DISTANCE (FEET): 300

## 2020-10-19 ASSESSMENT — ACTIVITIES OF DAILY LIVING (ADL): TUB_SHOWER_TRANSFER_DESCRIPTION: GRAB BAR

## 2020-10-19 NOTE — PROGRESS NOTES
Hospital Medicine Daily Progress Note      Chief Complaint:  Hypertension  Tachycardia  Diabetes  Leukocytosis  S/P CABG  S/P CVA    Interval History:  Pt minimally conversant, responds w/ nods and shakes of head.  Wants to go back on Metformin.    Review of Systems  Review of Systems   Constitutional: Negative for chills and fever.   HENT: Negative.    Eyes: Negative.    Respiratory: Negative for cough and shortness of breath.    Cardiovascular: Negative for chest pain and palpitations.   Gastrointestinal: Negative for abdominal pain, nausea and vomiting.   Musculoskeletal:        Wound pain    Skin: Negative for itching and rash.   Endo/Heme/Allergies: Negative for polydipsia. Does not bruise/bleed easily.        Physical Exam  Temp:  [36.2 °C (97.1 °F)-36.7 °C (98.1 °F)] 36.2 °C (97.1 °F)  Pulse:  [67-84] 72  Resp:  [18] 18  BP: (100-118)/(61-72) 100/61  SpO2:  [95 %] 95 %    Physical Exam  Vitals signs reviewed.   Constitutional:       General: He is not in acute distress.     Appearance: Normal appearance. He is not ill-appearing.   HENT:      Head: Normocephalic and atraumatic.      Right Ear: External ear normal.      Left Ear: External ear normal.      Nose: Nose normal.      Mouth/Throat:      Pharynx: Oropharynx is clear.   Eyes:      General:         Right eye: No discharge.         Left eye: No discharge.      Extraocular Movements: Extraocular movements intact.      Conjunctiva/sclera: Conjunctivae normal.   Neck:      Musculoskeletal: Normal range of motion and neck supple.   Cardiovascular:      Rate and Rhythm: Normal rate and regular rhythm.      Comments: Sternal incision C/D/I  Pulmonary:      Effort: No respiratory distress.      Breath sounds: No wheezing.      Comments: Decreased BS   Abdominal:      General: Bowel sounds are normal. There is no distension.      Palpations: Abdomen is soft.      Tenderness: There is no abdominal tenderness. There is no guarding or rebound.   Musculoskeletal:       Right lower leg: No edema.      Left lower leg: No edema.   Skin:     General: Skin is warm and dry.   Neurological:      Mental Status: He is alert.      Comments: Resting comfortably         Fluids    Intake/Output Summary (Last 24 hours) at 10/19/2020 1238  Last data filed at 10/19/2020 0541  Gross per 24 hour   Intake 300 ml   Output 900 ml   Net -600 ml       Laboratory  Recent Labs     10/18/20  0554   WBC 7.1   RBC 3.24*   HEMOGLOBIN 9.6*   HEMATOCRIT 30.2*   MCV 93.2   MCH 29.6   MCHC 31.8*   RDW 46.3   PLATELETCT 427   MPV 10.3     Recent Labs     10/18/20  0554   SODIUM 136   POTASSIUM 4.0   CHLORIDE 102   CO2 22   GLUCOSE 189*   BUN 20   CREATININE 0.85   CALCIUM 9.1                 Assessment/Plan  * Cardioembolic stroke (HCC)- (present on admission)  Assessment & Plan  S/P tyshawn-op CVA  On ASA, Plavix, and Lipitor    Ileus (HCC)- (present on admission)  Assessment & Plan  Has better appetite  Abd exam improved  F/U KUB unremarkable  Diarrhea resolved prior to C Dif sample taken  Tapered off Reglan  Change scheduled Simethicone to prn    SONNY (acute kidney injury) (HCC)- (present on admission)  Assessment & Plan  BUN/Cr improved w/ IVF  Following renal function    CAD (coronary artery disease)- (present on admission)  Assessment & Plan  H/O cardiac stent x 2  S/P NSTEMI  S/P CABG x 3 vessels  On ASA, Plavix, Lipitor, Lisinopril, and Metoprolol  BNP improving    Essential hypertension- (present on admission)  Assessment & Plan  On Lisinopril and Metoprolol  Will decrease ACE-I further for low trending blood pressures  Monitor for orthostatic hypotension on Hytrin    Type 2 diabetes mellitus with complication, without long-term current use of insulin (HCC)- (present on admission)  Assessment & Plan  HbA1c 7.9  Discontinued Metformin last week for diarrhea and SONNY  Restart Metformin per pt request now that symptoms improved  Continue Lantus and SSI for now  Goal is to discharge home on no insulin  Outpt  meds include Metformin 2000 mg bid per Epic records (pt unable to confirm dosing)    Thrombocytosis (HCC)  Assessment & Plan  Likley reactive  Follow Platelet counts    Iron deficiency anemia  Assessment & Plan  Fe 31  Continue Fe and Vit C supplements    Vitamin D deficiency  Assessment & Plan  Vit D level 17  On supplementation    Reactive depression- (present on admission)  Assessment & Plan  On Zoloft    Pneumonia- (present on admission)  Assessment & Plan  Completed abx    Full Code    Discussed w/ pt and Dr. Ng

## 2020-10-19 NOTE — THERAPY
Physical Therapy   Daily Treatment     Patient Name: Guerrero Ann  Age:  49 y.o., Sex:  male  Medical Record #: [id number]  Today's Date: 10/19/2020     Precautions  Precautions: Fall Risk, Swallow Precautions ( See Comments)  Comments:  Girlfriend Miriam  cleared to assist with All ADL and transfers in room     Subjective    Pt reports he is agreeable to FT for PT session     Objective       10/19/20 1031   Gait Functional Level of Assist    Gait Level Of Assist Supervised   Assistive Device 4 Wheel Walker  (Also used outdoors for 150 ft, SPV)   Distance (Feet) 300   # of Times Distance was Traveled 1   Deviation Bradykinetic;Decreased Heel Strike;Decreased Toe Off   Interdisciplinary Plan of Care Collaboration   Patient Position at End of Therapy In Bed;Call Light within Reach;Tray Table within Reach;Family / Friend in Room   PT Total Time Spent   PT Individual Total Time Spent (Mins) 60   PT Charge Group   PT Gait Training 2   PT Therapeutic Activities 2     FT with SO for guarding and providing SPV - CGA for curb step, walking indoors and outdoors, car transfer. SO provided safe CGA and SPV for all activities.     Assessment    Pts SO provides safe CGA and SPV for all activities this session and pt cont to demo improved participation in activities the closer he gets toward DC.    Strengths: Independent prior level of function, Supportive family, Willingly participates in therapeutic activities  Barriers: Visual impairment, Pain, Fatigue, Generalized weakness    Plan    DC, fall edu, 10mwt, william repeat    Physical Therapy Problems     Problem: Balance     Dates: Start: 10/10/20       Goal: STG-Within one week, patient will maintain dynamic standing     Dates: Start: 10/10/20       Description: 1) Individualized goal:  Pt will demonstrate ability to complete dynamic standing for >3 min SBA to improve independence with ADLs and IADLs within one week.  2) Interventions:  PT E Stim Attended, PT  Gait Training, PT Therapeutic Exercises, PT TENS Application, PT Neuro Re-Ed/Balance, PT Therapeutic Activity, PT Manual Therapy, and PT Evaluation      Note:     Goal Note filed on 10/14/20 1146 by Byron Tobin, PT    < 3 min                        Problem: Mobility     Dates: Start: 10/10/20       Goal: STG-Within one week, patient will propel wheelchair household distances     Dates: Start: 10/10/20       Description: 1) Individualized goal:  Pt will propel wheelchair >200' with BLEs to become Caroline within WC in unit within one week.  2) Interventions:  PT E Stim Attended, PT Gait Training, PT Therapeutic Exercises, PT TENS Application, PT Neuro Re-Ed/Balance, PT Therapeutic Activity, PT Manual Therapy, and PT Evaluation      Note:     Goal Note filed on 10/14/20 1146 by Byron Tobin, PT    < 200 ft                  Goal: STG-Within one week, patient will ambulate household distance     Dates: Start: 10/10/20       Description: 1) Individualized goal:  Pt will demonstrate ability to walk >100' CGA with FWW to achieve household distances within one week.  2) Interventions:  PT E Stim Attended, PT Gait Training, PT Therapeutic Exercises, PT TENS Application, PT Neuro Re-Ed/Balance, PT Therapeutic Activity, PT Manual Therapy, and PT Evaluation      Note:     Goal Note filed on 10/14/20 1146 by Byron Tobin, PT    < 100 ft                   Goal: STG-Within one week, patient will ambulate up/down a curb     Dates: Start: 10/10/20       Description: 1) Individualized goal:  Pt will trial ascending/descending a standard curb Donny with FWW to practice entry to home within one week.  2) Interventions:  PT E Stim Attended, PT Gait Training, PT Therapeutic Exercises, PT TENS Application, PT Neuro Re-Ed/Balance, PT Therapeutic Activity, PT Manual Therapy, and PT Evaluation      Note:     Goal Note filed on 10/14/20 1146 by Byron Tobin, PT    NT dt safety concerns                        Problem:  PT-Long Term Goals     Dates: Start: 10/10/20       Goal: LTG-By discharge, patient will ambulate     Dates: Start: 10/10/20       Description: 1) Individualized goal:  Pt will demonstrate ability to ambulate >100' SBA to safely negotiate household distances at discharge.  2) Interventions:  PT E Stim Attended, PT Gait Training, PT Therapeutic Exercises, PT TENS Application, PT Neuro Re-Ed/Balance, PT Therapeutic Activity, PT Manual Therapy, and PT Evaluation            Goal: LTG-By discharge, patient will transfer one surface to another     Dates: Start: 10/10/20       Description: 1) Individualized goal:  Pt will demonstrate ability to complete chair to chair transfers and bed transfers independently to safely return to prior living environment.  2) Interventions:  PT E Stim Attended, PT Gait Training, PT Therapeutic Exercises, PT TENS Application, PT Neuro Re-Ed/Balance, PT Therapeutic Activity, PT Manual Therapy, and PT Evaluation            Goal: LTG-By discharge, patient will ambulate up/down 4-6 stairs     Dates: Start: 10/10/20       Description: 1) Individualized goal:  Pt will demonstrate ability to ascend/descend 4 standard steps CGA with LRD to safely enter home.  2) Interventions:  PT E Stim Attended, PT Gait Training, PT Therapeutic Exercises, PT TENS Application, PT Neuro Re-Ed/Balance, PT Therapeutic Activity, PT Manual Therapy, and PT Evaluation

## 2020-10-19 NOTE — THERAPY
"Occupational Therapy  Daily Treatment     Patient Name: Guerrero Ann  Age:  49 y.o., Sex:  male  Medical Record #: [id number]  Today's Date: 10/19/2020     Precautions  Precautions: (P) Fall Risk, Swallow Precautions ( See Comments)  Comments: (P)  Girlfrienalayna Pineda  cleared to assist with All ADL and transfers in room          Subjective    \"Thor.\"  His response when SO asked him to tell me who came to visit him over over the weekend.      Objective       10/19/20 0931   Precautions   Precautions Fall Risk;Swallow Precautions ( See Comments)   Comments  Girlfrienalayna Pineda  cleared to assist with All ADL and transfers in room    Functional Level of Assist   Grooming Supervision  (standing at sink  for oral care )   Bathing Supervision  (setup/ SBA )   Upper Body Dressing Supervision  (doff/don 2 pull over shirts )   Lower Body Dressing Supervision  (doff/ don underwear, shorts and 2 pairs of socks )   Lower Body Dressing Description   (therapist donned JOSE hose. )   Toileting   (no need to toilet this session )   Bed, Chair, Wheelchair Transfer Stand by Assist   Tub / Shower Transfers Stand by Assist   Tub Shower Transfer Description Grab bar   Memory Supervision  (memory game  5 and 7 matches  no assist   improved)   Memory Description   (vocal calling out of what was on mem. cards )   Interdisciplinary Plan of Care Collaboration   IDT Collaboration with  Family / Caregiver   Patient Position at End of Therapy Seated;Family / Friend in Room   Collaboration Comments S.ROBERT Pineda present and assist with  ADL routine.   she demonstrates ability to provide assist only as needed and promote/ encourage independent task performance  along with  non whispered verbal interaction with her and staff.    OT Total Time Spent   OT Individual Total Time Spent (Mins) 60   OT Charge Group   OT Self Care / ADL 3   OT Therapy Activity 1       Assessment  Improved adherence to sternal precautions   Slight " increase in verbal conversational  interaction   With memory game instructed to loudly state what was present on the cards  He followed through requiring only  2 cues to do so   improved independence and balance noted with ADL and transfer     Strengths: Alert and oriented, Adequate strength, Independent prior level of function, Motivated for self care and independence, Willingly participates in therapeutic activities, Pleasant and cooperative, Supportive family  Barriers: Decreased endurance, Generalized weakness, Impaired balance, Tube feeding, Visual impairment    Plan    continue  ADL  IADL , related mobility strength/endruance building  Incorporating  Sternal precautions      Occupational Therapy Goals     Problem: Bathing     Dates: Start: 10/10/20       Goal: STG-Within one week, patient will bathe     Dates: Start: 10/10/20       Description: 1) Individualized Goal:  CGA   2) Interventions:  OT Self Care/ADL, OT Cognitive Skill Dev, OT Community Reintegration, OT Manual Ther Technique, OT Neuro Re-Ed/Balance, OT Sensory Int Techniques, OT Therapeutic Activity, OT Evaluation, and OT Therapeutic Exercise      Note:     Goal Note filed on 10/13/20 1117 by Kelvin Kirkland, C.O.T.A.    Min assist    Decreased strength/endurance , decreased balance ,  vision issues   Continue goal                         Problem: Dressing     Dates: Start: 10/10/20       Goal: STG-Within one week, patient will dress LB     Dates: Start: 10/10/20       Description: 1) Individualized Goal:  CGA   2) Interventions:  OT Self Care/ADL, OT Cognitive Skill Dev, OT Community Reintegration, OT Manual Ther Technique, OT Neuro Re-Ed/Balance, OT Sensory Int Techniques, OT Therapeutic Activity, OT Evaluation, and OT Therapeutic Exercise      Note:     Goal Note filed on 10/13/20 1117 by Kelvin Kirkland, C.O.T.A.    Min assist   Decreased strength/endurance , decreased balance ,  vision issues   Continue goal                         Problem: OT  Long Term Goals     Dates: Start: 10/10/20       Goal: LTG-By discharge, patient will complete basic self care tasks     Dates: Start: 10/10/20       Description: 1) Individualized Goal:  Mod I    2) Interventions:  OT Self Care/ADL, OT Cognitive Skill Dev, OT Community Reintegration, OT Manual Ther Technique, OT Neuro Re-Ed/Balance, OT Sensory Int Techniques, OT Therapeutic Activity, OT Evaluation, and OT Therapeutic Exercise            Goal: LTG-By discharge, patient will perform bathroom transfers     Dates: Start: 10/10/20       Description: 1) Individualized Goal:  Mod I    2) Interventions:  OT Self Care/ADL, OT Cognitive Skill Dev, OT Community Reintegration, OT Manual Ther Technique, OT Neuro Re-Ed/Balance, OT Sensory Int Techniques, OT Therapeutic Activity, OT Evaluation, and OT Therapeutic Exercise

## 2020-10-19 NOTE — DISCHARGE PLANNING
Patient to DC home on Wednesday 10/21/2020 with significant other, home health; RN, PT, OT, ST, SW, aide and 4WW.  Follow up with PCP, Physiatry, Cardiology Surgeon.      CM met with patient and his significant other to discuss DC planning.  Answered questions.  CM will continue to follow for DC needs.

## 2020-10-19 NOTE — PROGRESS NOTES
"Rehab Progress Note     Date of Service: 10/19/2020  Chief Complaint: follow up stroke    Interval Events (Subjective)    Patient seen and examined today in the therapy gym. He is working with PT. He would like to move his discharge date up to Weds, so that he can be home for his birthday. PT feels that he will be ready. Patient reports he's not sleeping well, despite charting that indicates he was sleeping every hour he was checked on overnight. Discussed with patient removing Quispe today for a voiding trial. His diet has been upgraded and he's eating better.       Objective:  VITAL SIGNS: /61   Pulse 72   Temp 36.2 °C (97.1 °F) (Temporal)   Resp 18   Ht 1.575 m (5' 2.01\")   Wt 59.5 kg (131 lb 2.8 oz)   SpO2 95%   BMI 23.99 kg/m²   Gen: alert, no apparent distress  CV: regular rate and rhythm, no murmurs, no peripheral edema  Resp: clear to ascultation bilaterally, normal respiratory effort  GI: soft, non-tender abdomen, bowel sounds present  Neuro: notable for non-focal  Psych: flat affect      Recent Results (from the past 72 hour(s))   ACCU-CHEK GLUCOSE    Collection Time: 10/16/20  5:27 PM   Result Value Ref Range    Glucose - Accu-Ck 215 (H) 65 - 99 mg/dL   ACCU-CHEK GLUCOSE    Collection Time: 10/16/20  8:53 PM   Result Value Ref Range    Glucose - Accu-Ck 211 (H) 65 - 99 mg/dL   ACCU-CHEK GLUCOSE    Collection Time: 10/17/20  7:53 AM   Result Value Ref Range    Glucose - Accu-Ck 150 (H) 65 - 99 mg/dL   ACCU-CHEK GLUCOSE    Collection Time: 10/17/20 11:05 AM   Result Value Ref Range    Glucose - Accu-Ck 241 (H) 65 - 99 mg/dL   ACCU-CHEK GLUCOSE    Collection Time: 10/17/20  4:37 PM   Result Value Ref Range    Glucose - Accu-Ck 210 (H) 65 - 99 mg/dL   ACCU-CHEK GLUCOSE    Collection Time: 10/17/20  9:44 PM   Result Value Ref Range    Glucose - Accu-Ck 235 (H) 65 - 99 mg/dL   CBC WITHOUT DIFFERENTIAL    Collection Time: 10/18/20  5:54 AM   Result Value Ref Range    WBC 7.1 4.8 - 10.8 K/uL    RBC " 3.24 (L) 4.70 - 6.10 M/uL    Hemoglobin 9.6 (L) 14.0 - 18.0 g/dL    Hematocrit 30.2 (L) 42.0 - 52.0 %    MCV 93.2 81.4 - 97.8 fL    MCH 29.6 27.0 - 33.0 pg    MCHC 31.8 (L) 33.7 - 35.3 g/dL    RDW 46.3 35.9 - 50.0 fL    Platelet Count 427 164 - 446 K/uL    MPV 10.3 9.0 - 12.9 fL   Basic Metabolic Panel    Collection Time: 10/18/20  5:54 AM   Result Value Ref Range    Sodium 136 135 - 145 mmol/L    Potassium 4.0 3.6 - 5.5 mmol/L    Chloride 102 96 - 112 mmol/L    Co2 22 20 - 33 mmol/L    Glucose 189 (H) 65 - 99 mg/dL    Bun 20 8 - 22 mg/dL    Creatinine 0.85 0.50 - 1.40 mg/dL    Calcium 9.1 8.5 - 10.5 mg/dL    Anion Gap 12.0 7.0 - 16.0   ESTIMATED GFR    Collection Time: 10/18/20  5:54 AM   Result Value Ref Range    GFR If African American >60 >60 mL/min/1.73 m 2    GFR If Non African American >60 >60 mL/min/1.73 m 2   ACCU-CHEK GLUCOSE    Collection Time: 10/18/20  7:40 AM   Result Value Ref Range    Glucose - Accu-Ck 172 (H) 65 - 99 mg/dL   ACCU-CHEK GLUCOSE    Collection Time: 10/18/20 11:19 AM   Result Value Ref Range    Glucose - Accu-Ck 178 (H) 65 - 99 mg/dL   ACCU-CHEK GLUCOSE    Collection Time: 10/18/20  4:58 PM   Result Value Ref Range    Glucose - Accu-Ck 283 (H) 65 - 99 mg/dL   ACCU-CHEK GLUCOSE    Collection Time: 10/18/20  9:12 PM   Result Value Ref Range    Glucose - Accu-Ck 208 (H) 65 - 99 mg/dL       Current Facility-Administered Medications   Medication Frequency   • melatonin tablet 6 mg QHS   • insulin glargine (Lantus) injection QAM INSULIN   • lisinopril (PRINIVIL) tablet 5 mg Q DAY   • omeprazole (PRILOSEC) capsule 40 mg DAILY   • terazosin (HYTRIN) capsule 3 mg Q EVENING   • ascorbic acid tablet 500 mg QDAY with Breakfast   • vitamin D (cholecalciferol) tablet 2,000 Units DAILY   • guaiFENesin dextromethorphan (ROBITUSSIN DM) 100-10 MG/5ML syrup 10 mL Q6HRS PRN   • metoprolol (LOPRESSOR) tablet 25 mg TWICE DAILY   • senna-docusate (PERICOLACE or SENOKOT S) 8.6-50 MG per tablet 2 Tab BID PRN     And   • polyethylene glycol/lytes (MIRALAX) PACKET 1 Packet QDAY PRN    And   • magnesium hydroxide (MILK OF MAGNESIA) suspension 30 mL QDAY PRN    And   • bisacodyl (DULCOLAX) suppository 10 mg QDAY PRN   • simethicone (MYLICON) chewable tab 160 mg 4XDAY   • insulin lispro (HumaLOG) injection 4X/DAY ACHS   • ferrous sulfate tablet 325 mg QDAY with Breakfast   • traZODone (DESYREL) tablet 50 mg QHS   • hydrOXYzine HCl (ATARAX) tablet 50 mg Q6HRS PRN   • Respiratory Therapy Consult Continuous RT   • Pharmacy Consult Request ...Pain Management Review 1 Each PHARMACY TO DOSE   • acetaminophen (TYLENOL) tablet 650 mg Q4HRS PRN   • artificial tears ophthalmic solution 1 Drop PRN   • benzocaine-menthol (CEPACOL) lozenge 1 Lozenge Q2HRS PRN   • mag hydrox-al hydrox-simeth (MAALOX PLUS ES or MYLANTA DS) suspension 20 mL Q2HRS PRN   • ondansetron (ZOFRAN ODT) dispertab 4 mg 4X/DAY PRN    Or   • ondansetron (ZOFRAN) syringe/vial injection 4 mg 4X/DAY PRN   • sodium chloride (OCEAN) 0.65 % nasal spray 2 Spray PRN   • lactulose 20 GM/30ML solution 30 mL QDAY PRN   • docusate sodium (ENEMEEZ) enema 283 mg QDAY PRN   • fleet enema 133 mL QDAY PRN   • clopidogrel (PLAVIX) tablet 75 mg DAILY   • tramadol (ULTRAM) 50 MG tablet 50 mg Q4HRS PRN   • aspirin (ASA) chewable tab 81 mg DAILY   • atorvastatin (LIPITOR) tablet 80 mg QHS   • enoxaparin (LOVENOX) inj 40 mg Q EVENING   • lidocaine (LIDODERM) 5 % 1 Patch Q24HR   • sertraline (Zoloft) tablet 50 mg DAILY       Orders Placed This Encounter   Procedures   • Diet Order Diabetic (meds whole 1 at a time with thin.; please see daily for menus)     Standing Status:   Standing     Number of Occurrences:   1     Order Specific Question:   Diet:     Answer:   Diabetic [3]     Comments:   meds whole 1 at a time with thin.; please see daily for menus       Assessment:  Active Hospital Problems    Diagnosis   • *Cardioembolic stroke (HCC)   • Ileus (HCC)   • Aspiration pneumonia (HCC)   • CAD  (coronary artery disease)   • Essential hypertension   • Type 2 diabetes mellitus with complication, without long-term current use of insulin (HCC)   • Anxiety   • Vitamin D deficiency   • Azotemia   • Hyponatremia   • Iron deficiency anemia   • Pneumonia   • Tachycardia   • Impaired mobility and ADLs   • Reactive depression   • Diabetes mellitus due to underlying condition with hyperglycemia (HCC)     This patient is a 49 y.o. male admitted for acute inpatient rehabilitation with Cardioembolic stroke (HCC).    I led and attended the weekly conference, and agree with the IDT conference documentation and plan of care as noted below.    Date of conference: 10/14/2020    Goals and barriers: See IDT note.    Biggest barriers: impaired motivation, dysphagia, fatigues quickly, self-limiting    CM/social support: SO supportive and can assist 24/7    Anticipated DC date: 10/21, move date up so patient can be home for his birthday    Home health: PT/OT/SLP/RN    Equip: FWW    Follow up: PCP, CT surgery, Dr. Franco rehab clinic      Medical Decision Making and Plan:    BL strokes  Cardio-embolic after CABG  Dysphagia, improved  Cognitive deficits  Non-focal  Continue full rehab program  PT/OT/SLP, 1 hr each discipline, 5 days per week  Continue aspirin, Plavix, and statin for secondary stroke prophylaxis  Outpatient follow up with Dr. Franco in rehab clinic, referral made  Removed NG tube 10/16, diet upgraded per speech therapy    Reactive depression?  History of depression/anxiety  Started sertraline 10/10  Dr. Heard consulted 10/14, appreciate assistance  Outpatient follow up    Insomnia, improved  Scheduled melatonin and trazodone, increase melatonin  Will discontinue as sertraline is titrated up after discharge    Bowel  Loose stools, resolved  Meds as needed  Last BM 10/16   KUB OK, c diff negative    Bladder  Urinary retention, continues  Replace Quispe for bladder rest  Started Hytrin 1 mg 10/10 --> 2 mg 10/13 --> 3 mg  10/15  Checked UA, negative  Voiding trial today    DVT prophylaxis  Lovenox    Appreciate assistance of hospitalist with his medical co-morbidities:     Diabetes with hyperglycemia, Lantus, metformin (took at home), sliding scale insulin  Coronary artery disease status post CABG, aspirin, Plavix, and statin  Hypertension, lisinopril, metoprolol  Leukocytosis, resolved, x-ray suggestive of pneumonia, s/p antibiotics  Status post ileus, titrated off Reglan and simethicone   Loose stools, resolved  Aspiration pneumonia, resolved, s/p antibiotics: metronidazole and levofloxacin  Tobacco use, counseling provided 10/15  Hypokalemia, resolved s/p supplementation  Normocytic anemia, ferrous sulfate and Vit C  Hyponatremia, mild monitor  GI prophylaxis, omeprazole  Vit D deficiency, continue supplementation    Total time:  25 minutes.  I spent greater than 50% of the time for patient care, counseling, and coordination on this date, including patient face-to face time, unit/floor time with review of records/pertinent lab data and studies, as well as discussing diagnostic evaluation/work up, planned therapeutic interventions, and future disposition of care, as per the interval events/subjective and the assessment and plan as noted above.    I have performed a physical exam, reviewed and updated ROS, as well as the assessment and plan today 10/19/2020. In review of note from 10/16/2020 there are no new changes except as documented above.          Ankita Ng M.D.   Physical Medicine and Rehabilitation

## 2020-10-19 NOTE — THERAPY
Speech Language Pathology  Daily Treatment     Patient Name: Guerrero Ann  Age:  49 y.o., Sex:  male  Medical Record #: [id number]  Today's Date: 10/19/2020     Precautions  Precautions: Fall Risk, Swallow Precautions ( See Comments)  Comments:  Girlfriend Miriam  cleared to assist with All ADL and transfers in room     Subjective    Pt pleasant and cooperative during tx.  SO present and supportive.      Objective       10/19/20 1501   Expressive Language   Word Finding Deficits Minimal (4)   Written Language Expression   Spelling Accuracy Supervision (5)   Legibility Supervision (5)   Cognition   Visual Scanning / Cancellation Skills Minimal (4)   SLP Total Time Spent   SLP Individual Total Time Spent (Mins) 60   Treatment Charges   SLP Cognitive Skill Development First 15 Minutes 1   SLP Cognitive Skill Development Additional 15 Minutes 3       Assessment    Otego category naming: 10 words per minute.  Visual scannin% independent; 100% Donny.  Pt completed crossword puzzles with 100% accuracy given min verbal cues.  Visual scanning, and crossword puzzles provided for practice at home.      Strengths: Able to follow instructions, Independent prior level of function, Pleasant and cooperative, Motivated for self care and independence, Willingly participates in therapeutic activities  Barriers: Impaired functional cognition, Impaired carryover of learning, Impaired insight/denial of deficits, Impaired activity tolerance    Plan    Complete outcome assessment, prepare for d/c.     Speech Therapy Problems     Problem: Expression STGs     Dates: Start: 10/10/20       Goal: STG-Within one week, patient will     Dates: Start: 10/10/20       Description: 1) Individualized goal:  state 10 words per minute given concrete category and min verbal cues.   2) Interventions:  SLP Speech Language Treatment and SLP Cognitive Skill Development                    Problem: Memory STGs     Dates: Start:  10/10/20       Goal: STG-Within one week, patient will     Dates: Start: 10/10/20       Description: 1) Individualized goal:  recall daily events and safety strategies 80% accuracy given min cues, with use of memory book.   2) Interventions:  SLP Cognitive Skill Development                    Problem: Problem Solving STGs     Dates: Start: 10/10/20       Goal: STG-Within one week, patient will     Dates: Start: 10/10/20       Description: 1) Individualized goal:  complete single target visual scanning tasks with 80% accuracy, given min verbal cues.   2) Interventions:  SLP Cognitive Skill Development                    Problem: Speech/Swallowing LTGs     Dates: Start: 10/10/20       Goal: LTG-By discharge, patient will safely swallow     Dates: Start: 10/10/20       Description: 1) Individualized goal:  regular textures and thin liquids without aspiration.   2) Interventions:  SLP Swallowing Dysfunction Treatment, SLP Oral Pharyngeal Evaluation, SLP Video Swallow Evaluation, and SLP Group Treatment              Goal: LTG-By discharge, patient will solve basic problems     Dates: Start: 10/10/20       Description: 1) Individualized goal:  Alayna for safe discharge home.    2) Interventions:  SLP Speech Language Treatment, SLP Cognitive Skill Development, and SLP Group Treatment                    Problem: Swallowing STGs     Dates: Start: 10/13/20       Goal: STG-Within one week, patient will     Dates: Start: 10/13/20       Description: 1) Individualized goal:  tolerate soft and bite size textures with thin liquids with no overt s/sx of asp/pen noted provided MIN A  2) Interventions:  SLP Swallowing Dysfunction Treatment, SLP Oral Pharyngeal Evaluation, SLP Video Swallow Evaluation, SLP Self Care / ADL Training , SLP Cognitive Skill Development, and SLP Group Treatment

## 2020-10-20 DIAGNOSIS — R33.9 URINARY RETENTION: ICD-10-CM

## 2020-10-20 LAB
GLUCOSE BLD-MCNC: 136 MG/DL (ref 65–99)
GLUCOSE BLD-MCNC: 153 MG/DL (ref 65–99)
GLUCOSE BLD-MCNC: 156 MG/DL (ref 65–99)
GLUCOSE BLD-MCNC: 160 MG/DL (ref 65–99)

## 2020-10-20 PROCEDURE — 700102 HCHG RX REV CODE 250 W/ 637 OVERRIDE(OP): Performed by: PHYSICAL MEDICINE & REHABILITATION

## 2020-10-20 PROCEDURE — 97535 SELF CARE MNGMENT TRAINING: CPT

## 2020-10-20 PROCEDURE — 97116 GAIT TRAINING THERAPY: CPT

## 2020-10-20 PROCEDURE — 97112 NEUROMUSCULAR REEDUCATION: CPT

## 2020-10-20 PROCEDURE — 700111 HCHG RX REV CODE 636 W/ 250 OVERRIDE (IP): Performed by: PHYSICAL MEDICINE & REHABILITATION

## 2020-10-20 PROCEDURE — 700102 HCHG RX REV CODE 250 W/ 637 OVERRIDE(OP): Performed by: HOSPITALIST

## 2020-10-20 PROCEDURE — A9270 NON-COVERED ITEM OR SERVICE: HCPCS | Performed by: PHYSICAL MEDICINE & REHABILITATION

## 2020-10-20 PROCEDURE — 97530 THERAPEUTIC ACTIVITIES: CPT

## 2020-10-20 PROCEDURE — 99232 SBSQ HOSP IP/OBS MODERATE 35: CPT | Performed by: PHYSICAL MEDICINE & REHABILITATION

## 2020-10-20 PROCEDURE — A9270 NON-COVERED ITEM OR SERVICE: HCPCS | Performed by: HOSPITALIST

## 2020-10-20 PROCEDURE — 700101 HCHG RX REV CODE 250: Performed by: PHYSICAL MEDICINE & REHABILITATION

## 2020-10-20 PROCEDURE — 97129 THER IVNTJ 1ST 15 MIN: CPT

## 2020-10-20 PROCEDURE — 82962 GLUCOSE BLOOD TEST: CPT

## 2020-10-20 PROCEDURE — 97130 THER IVNTJ EA ADDL 15 MIN: CPT

## 2020-10-20 PROCEDURE — 97110 THERAPEUTIC EXERCISES: CPT

## 2020-10-20 PROCEDURE — 99232 SBSQ HOSP IP/OBS MODERATE 35: CPT | Performed by: HOSPITALIST

## 2020-10-20 PROCEDURE — 770010 HCHG ROOM/CARE - REHAB SEMI PRIVAT*

## 2020-10-20 RX ORDER — VITAMIN B COMPLEX
1000 TABLET ORAL DAILY
Qty: 30 TAB | Refills: 2 | COMMUNITY
Start: 2020-10-21 | End: 2021-02-09

## 2020-10-20 RX ORDER — LISINOPRIL 2.5 MG/1
2.5 TABLET ORAL DAILY
Qty: 30 TAB | Refills: 2 | Status: SHIPPED | OUTPATIENT
Start: 2020-10-21 | End: 2020-10-21 | Stop reason: SDUPTHER

## 2020-10-20 RX ORDER — FERROUS SULFATE 325(65) MG
325 TABLET ORAL
Qty: 30 TAB | Refills: 2 | COMMUNITY
Start: 2020-10-21 | End: 2021-02-09

## 2020-10-20 RX ORDER — LANOLIN ALCOHOL/MO/W.PET/CERES
6 CREAM (GRAM) TOPICAL
Qty: 60 TAB | Refills: 2 | COMMUNITY
Start: 2020-10-20 | End: 2020-11-05

## 2020-10-20 RX ORDER — HYDROXYZINE 50 MG/1
50 TABLET, FILM COATED ORAL EVERY 8 HOURS PRN
Qty: 90 TAB | Refills: 0 | Status: SHIPPED | OUTPATIENT
Start: 2020-10-20 | End: 2020-10-21 | Stop reason: SDUPTHER

## 2020-10-20 RX ORDER — INSULIN GLARGINE 100 [IU]/ML
22 INJECTION, SOLUTION SUBCUTANEOUS EVERY MORNING
Qty: 1 EACH | Refills: 0 | Status: SHIPPED | OUTPATIENT
Start: 2020-10-20 | End: 2020-10-21 | Stop reason: SDUPTHER

## 2020-10-20 RX ORDER — ASCORBIC ACID 500 MG
500 TABLET ORAL
Qty: 30 TAB | Refills: 2 | COMMUNITY
Start: 2020-10-21 | End: 2021-04-09

## 2020-10-20 RX ORDER — ATORVASTATIN CALCIUM 80 MG/1
80 TABLET, FILM COATED ORAL
Qty: 30 TAB | Refills: 2 | Status: SHIPPED | OUTPATIENT
Start: 2020-10-20 | End: 2020-10-21 | Stop reason: SDUPTHER

## 2020-10-20 RX ORDER — ACETAMINOPHEN 325 MG/1
650 TABLET ORAL EVERY 4 HOURS PRN
Qty: 30 TAB | Refills: 0 | Status: SHIPPED | OUTPATIENT
Start: 2020-10-20 | End: 2020-10-21 | Stop reason: SDUPTHER

## 2020-10-20 RX ORDER — OMEPRAZOLE 40 MG/1
40 CAPSULE, DELAYED RELEASE ORAL DAILY
Qty: 30 CAP | Refills: 2 | Status: SHIPPED | OUTPATIENT
Start: 2020-10-21 | End: 2020-10-21 | Stop reason: SDUPTHER

## 2020-10-20 RX ORDER — CLOPIDOGREL BISULFATE 75 MG/1
75 TABLET ORAL DAILY
Qty: 30 TAB | Refills: 2 | Status: SHIPPED | OUTPATIENT
Start: 2020-10-21 | End: 2020-10-22 | Stop reason: SDUPTHER

## 2020-10-20 RX ORDER — ASPIRIN 81 MG/1
81 TABLET, CHEWABLE ORAL DAILY
Qty: 30 TAB | Refills: 2 | COMMUNITY
Start: 2020-10-20

## 2020-10-20 RX ORDER — TERAZOSIN 1 MG/1
3 CAPSULE ORAL EVERY EVENING
Qty: 90 CAP | Refills: 0 | Status: SHIPPED | OUTPATIENT
Start: 2020-10-20 | End: 2020-10-21 | Stop reason: SDUPTHER

## 2020-10-20 RX ADMIN — TERAZOSIN HYDROCHLORIDE 3 MG: 1 CAPSULE ORAL at 20:15

## 2020-10-20 RX ADMIN — SERTRALINE HYDROCHLORIDE 50 MG: 50 TABLET ORAL at 08:28

## 2020-10-20 RX ADMIN — METOPROLOL TARTRATE 25 MG: 25 TABLET, FILM COATED ORAL at 17:21

## 2020-10-20 RX ADMIN — INSULIN GLARGINE 22 UNITS: 100 INJECTION, SOLUTION SUBCUTANEOUS at 08:32

## 2020-10-20 RX ADMIN — METFORMIN HYDROCHLORIDE 500 MG: 500 TABLET ORAL at 08:26

## 2020-10-20 RX ADMIN — OMEPRAZOLE 40 MG: 20 CAPSULE, DELAYED RELEASE ORAL at 08:26

## 2020-10-20 RX ADMIN — MELATONIN 6 MG: at 20:16

## 2020-10-20 RX ADMIN — ASPIRIN 81 MG CHEWABLE TABLET 81 MG: 81 TABLET CHEWABLE at 08:28

## 2020-10-20 RX ADMIN — ATORVASTATIN CALCIUM 80 MG: 40 TABLET, FILM COATED ORAL at 20:16

## 2020-10-20 RX ADMIN — TRAZODONE HYDROCHLORIDE 50 MG: 50 TABLET ORAL at 20:16

## 2020-10-20 RX ADMIN — OXYCODONE HYDROCHLORIDE AND ACETAMINOPHEN 500 MG: 500 TABLET ORAL at 08:26

## 2020-10-20 RX ADMIN — Medication 2000 UNITS: at 08:28

## 2020-10-20 RX ADMIN — LIDOCAINE 1 PATCH: 50 PATCH TOPICAL at 08:28

## 2020-10-20 RX ADMIN — FERROUS SULFATE TAB 325 MG (65 MG ELEMENTAL FE) 325 MG: 325 (65 FE) TAB at 08:26

## 2020-10-20 RX ADMIN — TRAMADOL HYDROCHLORIDE 50 MG: 50 TABLET, COATED ORAL at 18:19

## 2020-10-20 RX ADMIN — LISINOPRIL 2.5 MG: 5 TABLET ORAL at 06:34

## 2020-10-20 RX ADMIN — CLOPIDOGREL BISULFATE 75 MG: 75 TABLET ORAL at 08:28

## 2020-10-20 RX ADMIN — METOPROLOL TARTRATE 25 MG: 25 TABLET, FILM COATED ORAL at 06:34

## 2020-10-20 RX ADMIN — METFORMIN HYDROCHLORIDE 500 MG: 500 TABLET ORAL at 17:21

## 2020-10-20 RX ADMIN — ENOXAPARIN SODIUM 40 MG: 40 INJECTION SUBCUTANEOUS at 20:19

## 2020-10-20 ASSESSMENT — ENCOUNTER SYMPTOMS
NAUSEA: 0
NERVOUS/ANXIOUS: 0
DIARRHEA: 0
SHORTNESS OF BREATH: 0
ABDOMINAL PAIN: 0
VOMITING: 0
CHILLS: 0
FEVER: 0

## 2020-10-20 ASSESSMENT — 10 METER WALK TEST (10METWT)
TRIAL 2: TIME TO WALK 10 METERS: 13.44
AVERAGE VELOCITY - METERS PER SECOND: .47
AVERAGE TIME - SECONDS: 12.84
TRIAL 1: TIME TO WALK 10 METERS: 12.54
TRIAL 3: TIME TO WALK 10 METERS: 12.55

## 2020-10-20 ASSESSMENT — BALANCE ASSESSMENTS
SITTING TO STANDING: 3
STANDING TO SITTING: 3
STANDING UNSUPPORTED: 3
LONG VERSION TOTAL SCORE (MAX 56): 32
STANDING UNSUPPORTED ONE FOOT IN FRONT: 3
LOOK OVER LEFT AND RIGHT SHOULDERS WHILE STANDING: 1
PICK UP OBJECT FROM THE FLOOR FROM A STANDING POSITION: 3
STANDING UNSUPPORTED WITH EYES CLOSED: 3
LONG VERSION TOTAL SCORE (MAX 56): 32
PLACE ALTERNATE FOOT ON STEP OR STOOL WHILE STANDING UNSUPPORTED: 2
TURN 360 DEGREES: 1
TRANSFERS: 2
SITTING UNSUPPORTED: 4
STANDING ON ONE LEG: 1
REACHING FORWARD WITH OUTSTRETCHED ARM WHILE STANDING: 1
STANDING UNSUPPORTED WITH FEET TOGETHER: 2

## 2020-10-20 ASSESSMENT — ACTIVITIES OF DAILY LIVING (ADL)
TOILET_TRANSFER_LEVEL_OF_ASSIST: REQUIRES SUPERVISION WITH TOILET TRANSFER
SHOWER_TRANSFER_LEVEL_OF_ASSIST: REQUIRES SUPERVISION WITH SHOWER TRANSFER
TOILET_TRANSFER_DESCRIPTION: GRAB BAR;SUPERVISION FOR SAFETY;INCREASED TIME
TOILETING_LEVEL_OF_ASSIST_DESCRIPTION: GRAB BAR;INCREASED TIME;SUPERVISION FOR SAFETY
TOILETING_LEVEL_OF_ASSIST: REQUIRES SUPERVISION WITH TOILETING

## 2020-10-20 NOTE — THERAPY
Speech Language Pathology  Daily Treatment     Patient Name: Guerrero Ann  Age:  49 y.o., Sex:  male  Medical Record #: [id number]  Today's Date: 10/20/2020     Precautions  Precautions: Fall Risk, Swallow Precautions ( See Comments)  Comments:  Girlfriend Miriam  cleared to assist with All ADL and transfers in room     Subjective    Patient was willing to participate. SO present during session.      Objective       10/20/20 1232   Outcome Measures   Outcome Measures Utilized SCCAN   SCCAN (Scales of Cognitive and Communicative Ability for Neurorehabilitation)   Oral Expression - Raw Score 18   Oral Expression - Scale Performance Score 95   Orientation - Raw Score 12   Orientation - Scale Performance Score 100   Memory - Raw Score 12   Memory - Scale Performance Score 63   Speech Comprehension - Raw Score 13   Speech Comprehension - Scale Performance Score 100   Reading Comprehension - Raw Score 8   Reading Comprehension - Scale Performance Score 67   Writing - Raw Score 7   Writing - Scale Performance Score 100   Attention - Raw Score 10   Attention - Scale Performance Score 62   Problem Solving - Raw Score 18   Problem Solving - Scale Performance Score 78   SCCAN Total Raw Score 78   SCCAN Degree of Severity Mild Impairment   SLP Total Time Spent   SLP Individual Total Time Spent (Mins) 30   Treatment Charges   SLP Cognitive Skill Development First 15 Minutes 1   SLP Cognitive Skill Development Additional 15 Minutes 1       Assessment    Patient continues SCCAN initiated at previous session. Patient received a final raw score of 78 indicating mild cognitive deficits. Patient demonstrates improvements across all cognitive domains. Reviewed findings with patient and SO.     Strengths: Able to follow instructions, Independent prior level of function, Pleasant and cooperative, Motivated for self care and independence, Willingly participates in therapeutic activities  Barriers: Impaired  functional cognition, Impaired carryover of learning, Impaired insight/denial of deficits, Impaired activity tolerance    Plan    Prepare for d/c home.     Speech Therapy Problems     Problem: Expression STGs     Dates: Start: 10/10/20       Goal: STG-Within one week, patient will     Dates: Start: 10/10/20       Description: 1) Individualized goal:  state 10 words per minute given concrete category and min verbal cues.   2) Interventions:  SLP Speech Language Treatment and SLP Cognitive Skill Development                    Problem: Memory STGs     Dates: Start: 10/10/20       Goal: STG-Within one week, patient will     Dates: Start: 10/10/20       Description: 1) Individualized goal:  recall daily events and safety strategies 80% accuracy given min cues, with use of memory book.   2) Interventions:  SLP Cognitive Skill Development                    Problem: Problem Solving STGs     Dates: Start: 10/10/20       Goal: STG-Within one week, patient will     Dates: Start: 10/10/20       Description: 1) Individualized goal:  complete single target visual scanning tasks with 80% accuracy, given min verbal cues.   2) Interventions:  SLP Cognitive Skill Development                    Problem: Speech/Swallowing LTGs     Dates: Start: 10/10/20       Goal: LTG-By discharge, patient will safely swallow     Dates: Start: 10/10/20       Description: 1) Individualized goal:  regular textures and thin liquids without aspiration.   2) Interventions:  SLP Swallowing Dysfunction Treatment, SLP Oral Pharyngeal Evaluation, SLP Video Swallow Evaluation, and SLP Group Treatment              Goal: LTG-By discharge, patient will solve basic problems     Dates: Start: 10/10/20       Description: 1) Individualized goal:  Alayna for safe discharge home.    2) Interventions:  SLP Speech Language Treatment, SLP Cognitive Skill Development, and SLP Group Treatment                    Problem: Swallowing STGs     Dates: Start: 10/13/20       Goal:  STG-Within one week, patient will     Dates: Start: 10/13/20       Description: 1) Individualized goal:  tolerate soft and bite size textures with thin liquids with no overt s/sx of asp/pen noted provided MIN A  2) Interventions:  SLP Swallowing Dysfunction Treatment, SLP Oral Pharyngeal Evaluation, SLP Video Swallow Evaluation, SLP Self Care / ADL Training , SLP Cognitive Skill Development, and SLP Group Treatment

## 2020-10-20 NOTE — CARE PLAN
Problem: Communication  Goal: The ability to communicate needs accurately and effectively will improve  Outcome: PROGRESSING AS EXPECTED  Note: Pt is able to communicate his needs effectively to staff.      Problem: Safety  Goal: Will remain free from injury  Outcome: PROGRESSING AS EXPECTED  Note: Pt uses call light consistently for staff assistance. Pt has good safety awareness, no impulsivity observed.      Problem: Infection  Goal: Will remain free from infection  Outcome: PROGRESSING AS EXPECTED  Note: No s/s of infection present

## 2020-10-20 NOTE — CARE PLAN
Problem: Mobility  Goal: STG-Within one week, patient will propel wheelchair household distances  Description: 1) Individualized goal:  Pt will propel wheelchair >200' with BLEs to become Caroline within WC in unit within one week.    2) Interventions:  PT E Stim Attended, PT Gait Training, PT Therapeutic Exercises, PT TENS Application, PT Neuro Re-Ed/Balance, PT Therapeutic Activity, PT Manual Therapy, and PT Evaluation    Outcome: NOT MET  Note: NA     Problem: PT-Long Term Goals  Goal: LTG-By discharge, patient will transfer one surface to another  Description: 1) Individualized goal:  Pt will demonstrate ability to complete chair to chair transfers and bed transfers independently to safely return to prior living environment.    2) Interventions:  PT E Stim Attended, PT Gait Training, PT Therapeutic Exercises, PT TENS Application, PT Neuro Re-Ed/Balance, PT Therapeutic Activity, PT Manual Therapy, and PT Evaluation    Outcome: NOT MET  Note: SPV     Problem: Balance  Goal: STG-Within one week, patient will maintain dynamic standing  Description: 1) Individualized goal:  Pt will demonstrate ability to complete dynamic standing for >3 min SBA to improve independence with ADLs and IADLs within one week.    2) Interventions:  PT E Stim Attended, PT Gait Training, PT Therapeutic Exercises, PT TENS Application, PT Neuro Re-Ed/Balance, PT Therapeutic Activity, PT Manual Therapy, and PT Evaluation    Outcome: MET     Problem: Mobility  Goal: STG-Within one week, patient will ambulate household distance  Description: 1) Individualized goal:  Pt will demonstrate ability to walk >100' CGA with FWW to achieve household distances within one week.    2) Interventions:  PT E Stim Attended, PT Gait Training, PT Therapeutic Exercises, PT TENS Application, PT Neuro Re-Ed/Balance, PT Therapeutic Activity, PT Manual Therapy, and PT Evaluation    Outcome: MET  Goal: STG-Within one week, patient will ambulate up/down a  curb  Description: 1) Individualized goal:  Pt will trial ascending/descending a standard curb Donny with FWW to practice entry to home within one week.    2) Interventions:  PT E Stim Attended, PT Gait Training, PT Therapeutic Exercises, PT TENS Application, PT Neuro Re-Ed/Balance, PT Therapeutic Activity, PT Manual Therapy, and PT Evaluation    Outcome: MET     Problem: PT-Long Term Goals  Goal: LTG-By discharge, patient will ambulate  Description: 1) Individualized goal:  Pt will demonstrate ability to ambulate >100' SBA to safely negotiate household distances at discharge.    2) Interventions:  PT E Stim Attended, PT Gait Training, PT Therapeutic Exercises, PT TENS Application, PT Neuro Re-Ed/Balance, PT Therapeutic Activity, PT Manual Therapy, and PT Evaluation    Outcome: MET  Goal: LTG-By discharge, patient will ambulate up/down 4-6 stairs  Description: 1) Individualized goal:  Pt will demonstrate ability to ascend/descend 4 standard steps CGA with LRD to safely enter home.    2) Interventions:  PT E Stim Attended, PT Gait Training, PT Therapeutic Exercises, PT TENS Application, PT Neuro Re-Ed/Balance, PT Therapeutic Activity, PT Manual Therapy, and PT Evaluation    Outcome: MET

## 2020-10-20 NOTE — CARE PLAN
Problem: Bathing  Goal: STG-Within one week, patient will bathe  Description: 1) Individualized Goal:  CGA   2) Interventions:  OT Self Care/ADL, OT Cognitive Skill Dev, OT Community Reintegration, OT Manual Ther Technique, OT Neuro Re-Ed/Balance, OT Sensory Int Techniques, OT Therapeutic Activity, OT Evaluation, and OT Therapeutic Exercise    Outcome: MET     Problem: Dressing  Goal: STG-Within one week, patient will dress LB  Description: 1) Individualized Goal:  CGA   2) Interventions:  OT Self Care/ADL, OT Cognitive Skill Dev, OT Community Reintegration, OT Manual Ther Technique, OT Neuro Re-Ed/Balance, OT Sensory Int Techniques, OT Therapeutic Activity, OT Evaluation, and OT Therapeutic Exercise    Outcome: MET     Problem: OT Long Term Goals  Goal: LTG-By discharge, patient will complete basic self care tasks  Description: 1) Individualized Goal:  Mod I    2) Interventions:  OT Self Care/ADL, OT Cognitive Skill Dev, OT Community Reintegration, OT Manual Ther Technique, OT Neuro Re-Ed/Balance, OT Sensory Int Techniques, OT Therapeutic Activity, OT Evaluation, and OT Therapeutic Exercise    Outcome: DISCHARGED-GOAL NOT MET  Note: Requires supervision for safety  Goal: LTG-By discharge, patient will perform bathroom transfers  Description: 1) Individualized Goal:  Mod I    2) Interventions:  OT Self Care/ADL, OT Cognitive Skill Dev, OT Community Reintegration, OT Manual Ther Technique, OT Neuro Re-Ed/Balance, OT Sensory Int Techniques, OT Therapeutic Activity, OT Evaluation, and OT Therapeutic Exercise    Outcome: DISCHARGED-GOAL NOT MET  Note: Requires supervision for safety with 4WW and GB

## 2020-10-20 NOTE — PROGRESS NOTES
"Rehab Progress Note     Date of Service: 10/20/2020  Chief Complaint: follow up stroke    Interval Events (Subjective)    Patient seen and examined today in the therapy gym.  He is working with PT.  He is doing his bit of family training as he will be discharged tomorrow which he is happy about.  Unfortunately he has not been able to urinate since his catheter was removed yesterday.  He did require straight catheterization overnight.  Patient reports to me that he did urinate this morning with no post void residuals however this is not confirmed by nursing staff. The patient likely will need to replace a Quispe today and make outpatient referral to urology.      Objective:  VITAL SIGNS: /73   Pulse 74   Temp 36.1 °C (96.9 °F) (Temporal)   Resp 18   Ht 1.575 m (5' 2.01\")   Wt 59.5 kg (131 lb 2.8 oz)   SpO2 93%   BMI 23.99 kg/m²   Gen: alert, no apparent distress  CV: regular rate and rhythm, no murmurs, no peripheral edema, well healed sternal incision  Resp: clear to ascultation bilaterally, normal respiratory effort  GI: soft, non-tender abdomen, bowel sounds present  Neuro: notable for non-focal  Psych: flat affect      Recent Results (from the past 72 hour(s))   ACCU-CHEK GLUCOSE    Collection Time: 10/17/20  4:37 PM   Result Value Ref Range    Glucose - Accu-Ck 210 (H) 65 - 99 mg/dL   ACCU-CHEK GLUCOSE    Collection Time: 10/17/20  9:44 PM   Result Value Ref Range    Glucose - Accu-Ck 235 (H) 65 - 99 mg/dL   CBC WITHOUT DIFFERENTIAL    Collection Time: 10/18/20  5:54 AM   Result Value Ref Range    WBC 7.1 4.8 - 10.8 K/uL    RBC 3.24 (L) 4.70 - 6.10 M/uL    Hemoglobin 9.6 (L) 14.0 - 18.0 g/dL    Hematocrit 30.2 (L) 42.0 - 52.0 %    MCV 93.2 81.4 - 97.8 fL    MCH 29.6 27.0 - 33.0 pg    MCHC 31.8 (L) 33.7 - 35.3 g/dL    RDW 46.3 35.9 - 50.0 fL    Platelet Count 427 164 - 446 K/uL    MPV 10.3 9.0 - 12.9 fL   Basic Metabolic Panel    Collection Time: 10/18/20  5:54 AM   Result Value Ref Range    Sodium " 136 135 - 145 mmol/L    Potassium 4.0 3.6 - 5.5 mmol/L    Chloride 102 96 - 112 mmol/L    Co2 22 20 - 33 mmol/L    Glucose 189 (H) 65 - 99 mg/dL    Bun 20 8 - 22 mg/dL    Creatinine 0.85 0.50 - 1.40 mg/dL    Calcium 9.1 8.5 - 10.5 mg/dL    Anion Gap 12.0 7.0 - 16.0   ESTIMATED GFR    Collection Time: 10/18/20  5:54 AM   Result Value Ref Range    GFR If African American >60 >60 mL/min/1.73 m 2    GFR If Non African American >60 >60 mL/min/1.73 m 2   ACCU-CHEK GLUCOSE    Collection Time: 10/18/20  7:40 AM   Result Value Ref Range    Glucose - Accu-Ck 172 (H) 65 - 99 mg/dL   ACCU-CHEK GLUCOSE    Collection Time: 10/18/20 11:19 AM   Result Value Ref Range    Glucose - Accu-Ck 178 (H) 65 - 99 mg/dL   ACCU-CHEK GLUCOSE    Collection Time: 10/18/20  4:58 PM   Result Value Ref Range    Glucose - Accu-Ck 283 (H) 65 - 99 mg/dL   ACCU-CHEK GLUCOSE    Collection Time: 10/18/20  9:12 PM   Result Value Ref Range    Glucose - Accu-Ck 208 (H) 65 - 99 mg/dL   ACCU-CHEK GLUCOSE    Collection Time: 10/19/20  7:23 AM   Result Value Ref Range    Glucose - Accu-Ck 138 (H) 65 - 99 mg/dL   ACCU-CHEK GLUCOSE    Collection Time: 10/19/20 11:34 AM   Result Value Ref Range    Glucose - Accu-Ck 187 (H) 65 - 99 mg/dL   ACCU-CHEK GLUCOSE    Collection Time: 10/19/20  4:47 PM   Result Value Ref Range    Glucose - Accu-Ck 185 (H) 65 - 99 mg/dL   ACCU-CHEK GLUCOSE    Collection Time: 10/19/20 10:12 PM   Result Value Ref Range    Glucose - Accu-Ck 137 (H) 65 - 99 mg/dL   ACCU-CHEK GLUCOSE    Collection Time: 10/20/20  7:56 AM   Result Value Ref Range    Glucose - Accu-Ck 136 (H) 65 - 99 mg/dL   ACCU-CHEK GLUCOSE    Collection Time: 10/20/20 11:30 AM   Result Value Ref Range    Glucose - Accu-Ck 153 (H) 65 - 99 mg/dL       Current Facility-Administered Medications   Medication Frequency   • melatonin tablet 6 mg QHS   • simethicone (MYLICON) chewable tab 160 mg 4X/DAY PRN   • metFORMIN (GLUCOPHAGE) tablet 500 mg BID WITH MEALS   • ascorbic acid tablet  500 mg QDAY with Breakfast   • aspirin (ASA) chewable tab 81 mg DAILY   • atorvastatin (LIPITOR) tablet 80 mg QHS   • clopidogrel (PLAVIX) tablet 75 mg DAILY   • lisinopril (PRINIVIL) tablet 2.5 mg Q DAY   • metoprolol (LOPRESSOR) tablet 25 mg TWICE DAILY   • sertraline (Zoloft) tablet 50 mg DAILY   • terazosin (HYTRIN) capsule 3 mg Q EVENING   • traZODone (DESYREL) tablet 50 mg QHS   • vitamin D (cholecalciferol) tablet 2,000 Units DAILY   • insulin glargine (Lantus) injection QAM INSULIN   • omeprazole (PRILOSEC) capsule 40 mg DAILY   • guaiFENesin dextromethorphan (ROBITUSSIN DM) 100-10 MG/5ML syrup 10 mL Q6HRS PRN   • senna-docusate (PERICOLACE or SENOKOT S) 8.6-50 MG per tablet 2 Tab BID PRN    And   • polyethylene glycol/lytes (MIRALAX) PACKET 1 Packet QDAY PRN    And   • magnesium hydroxide (MILK OF MAGNESIA) suspension 30 mL QDAY PRN    And   • bisacodyl (DULCOLAX) suppository 10 mg QDAY PRN   • insulin lispro (HumaLOG) injection 4X/DAY ACHS   • ferrous sulfate tablet 325 mg QDAY with Breakfast   • hydrOXYzine HCl (ATARAX) tablet 50 mg Q6HRS PRN   • Respiratory Therapy Consult Continuous RT   • Pharmacy Consult Request ...Pain Management Review 1 Each PHARMACY TO DOSE   • acetaminophen (TYLENOL) tablet 650 mg Q4HRS PRN   • artificial tears ophthalmic solution 1 Drop PRN   • benzocaine-menthol (CEPACOL) lozenge 1 Lozenge Q2HRS PRN   • mag hydrox-al hydrox-simeth (MAALOX PLUS ES or MYLANTA DS) suspension 20 mL Q2HRS PRN   • ondansetron (ZOFRAN ODT) dispertab 4 mg 4X/DAY PRN    Or   • ondansetron (ZOFRAN) syringe/vial injection 4 mg 4X/DAY PRN   • sodium chloride (OCEAN) 0.65 % nasal spray 2 Spray PRN   • lactulose 20 GM/30ML solution 30 mL QDAY PRN   • docusate sodium (ENEMEEZ) enema 283 mg QDAY PRN   • fleet enema 133 mL QDAY PRN   • tramadol (ULTRAM) 50 MG tablet 50 mg Q4HRS PRN   • enoxaparin (LOVENOX) inj 40 mg Q EVENING   • lidocaine (LIDODERM) 5 % 1 Patch Q24HR       Orders Placed This Encounter    Procedures   • Diet Order Diabetic (meds whole 1 at a time with thin.; please see daily for menus)     Standing Status:   Standing     Number of Occurrences:   1     Order Specific Question:   Diet:     Answer:   Diabetic [3]     Comments:   meds whole 1 at a time with thin.; please see daily for menus       Assessment:  Active Hospital Problems    Diagnosis   • *Cardioembolic stroke (HCC)   • Ileus (HCC)   • Aspiration pneumonia (HCC)   • CAD (coronary artery disease)   • Essential hypertension   • Type 2 diabetes mellitus with complication, without long-term current use of insulin (HCC)   • Anxiety   • Vitamin D deficiency   • Azotemia   • Hyponatremia   • Iron deficiency anemia   • Pneumonia   • Tachycardia   • Impaired mobility and ADLs   • Reactive depression   • Diabetes mellitus due to underlying condition with hyperglycemia (HCC)     This patient is a 49 y.o. male admitted for acute inpatient rehabilitation with Cardioembolic stroke (HCC).    I led and attended the weekly conference, and agree with the IDT conference documentation and plan of care as noted below.    Date of conference: 10/14/2020    Goals and barriers: See IDT note.    Biggest barriers: impaired motivation, dysphagia, fatigues quickly, self-limiting    CM/social support: SO supportive and can assist 24/7    Anticipated DC date: 10/21, moved date up so patient can be home for his birthday    Home health: PT/OT/SLP/RN    Equip: FWW    Follow up: PCP, CT surgery, Dr. Franco rehab clinic, urology    Medical Decision Making and Plan:    BL strokes  Cardio-embolic after CABG  Dysphagia, improved  Cognitive deficits  Non-focal  Continue full rehab program  PT/OT/SLP, 1 hr each discipline, 5 days per week  Continue aspirin, Plavix, and statin for secondary stroke prophylaxis  Outpatient follow up with Dr. Franco in rehab clinic, referral made  Removed NG tube 10/16, diet upgraded per speech therapy    Reactive depression?  History of  depression/anxiety  Started sertraline 10/10  Dr. Heard consulted 10/14, appreciate assistance  Outpatient follow up    Insomnia, improved  Scheduled melatonin and trazodone, increase melatonin  Will discontinue as sertraline is titrated up after discharge    Bowel  Loose stools, resolved  Meds as needed  Last BM 10/19  KUB OK, c diff negative    Bladder  Urinary retention, continues  Replace Quispe for bladder rest  Started Hytrin 1 mg 10/10 --> 2 mg 10/13 --> 3 mg 10/15  Checked UA, negative  Voiding trial 10/49 - patient still unable to void, Quispe replaced, referral made to urology    DVT prophylaxis  Lovenox    Appreciate assistance of hospitalist with his medical co-morbidities:     Diabetes with hyperglycemia, Lantus, metformin (took at home), sliding scale insulin  Coronary artery disease status post CABG, aspirin, Plavix, and statin  Hypertension, lisinopril, metoprolol  Leukocytosis, resolved, x-ray suggestive of pneumonia, s/p antibiotics  Status post ileus, titrated off Reglan and simethicone   Loose stools, resolved  Aspiration pneumonia, resolved, s/p antibiotics: metronidazole and levofloxacin  Tobacco use, counseling provided 10/15  Hypokalemia, resolved s/p supplementation  Normocytic anemia, ferrous sulfate and Vit C  Hyponatremia, mild monitor  GI prophylaxis, omeprazole  Vit D deficiency, continue supplementation    Total time:  26 minutes.  I spent greater than 50% of the time for patient care, counseling, and coordination on this date, including patient face-to face time, unit/floor time with review of records/pertinent lab data and studies, as well as discussing diagnostic evaluation/work up, planned therapeutic interventions, and future disposition of care, as per the interval events/subjective and the assessment and plan as noted above.    I have performed a physical exam, reviewed and updated ROS, as well as the assessment and plan today 10/20/2020. In review of note from 10/19/2020 there  are no new changes except as documented above.              Ankita Ng M.D.   Physical Medicine and Rehabilitation

## 2020-10-20 NOTE — THERAPY
Occupational Therapy  Daily Treatment     Patient Name: Guerrero Ann  Age:  49 y.o., Sex:  male  Medical Record #: [id number]  Today's Date: 10/20/2020     Precautions  Precautions: (P) Fall Risk, Sternal Precautions (See Comments)  Comments: (P)  Girlfriend Miriam  cleared to assist with All ADL and transfers in room     Safety   ADL Safety : (P) Requires Supervision for Safety  Bathroom Safety: (P) Requires Supervision for Safety  Comments: (P) pt's SO provided assistance with toileting and transfer.    Subjective    Pt agreeable to OT session. Looking forward to d/c tomorrow.      Objective       10/20/20 1401   Precautions   Precautions Fall Risk;Sternal Precautions (See Comments)   Comments  Girlfriend Miriam  cleared to assist with All ADL and transfers in room    Safety    ADL Safety  Requires Supervision for Safety   Bathroom Safety Requires Supervision for Safety   Comments pt's SO provided assistance with toileting and transfer.   Cognition    Level of Consciousness Alert   Functional Level of Assist   Grooming Supervision;Standing   Grooming Description Increased time;Supervision for safety  (SO provided supervision)   Toileting Supervision   Toileting Description Grab bar;Increased time;Supervision for safety  (SO provided supervision)   Toilet Transfers Supervised   Toilet Transfer Description Grab bar;Supervision for safety;Increased time  (SO provided supervision)   Sitting Upper Body Exercises   Chest Press 3 sets of 10;Bilateral   Front Arm Raise 3 sets of 10;Bilateral   Internal Shoulder Rotation 3 sets of 10;Right ;Left   External Shoulder Rotation 3 sets of 10;Right ;Left   Bicep Curls 3 sets of 10;Bilateral   Pronation / Supination 3 sets of 10;Bilateral   Other Exercise 2# weights used for UB exercises.   Sitting Lower Body Exercises   Nustep Resistance Level 3  (LEs only, 5 min x2, cues for pacing)   Bed Mobility    Supine to Sit Supervised   Sit to Supine Supervised    Scooting Supervised   Rolling Supervised   Interdisciplinary Plan of Care Collaboration   IDT Collaboration with  Family / Caregiver;Nursing   Patient Position at End of Therapy Seated;Family / Friend in Room   Collaboration Comments SO present for session; CLOF, POC   OT Total Time Spent   OT Individual Total Time Spent (Mins) 60   OT Charge Group   OT Self Care / ADL 1   OT Therapy Activity 1   OT Therapeutic Exercise  2     Reviewed home safety and fall prevention strategies, UB HEP, sternal prec, DME/AE needs for ADLs, benefits of ongoing therapies in prep for d/c tomorrow.     Assessment    Pt with flat affect but participatory with encouragement. Pt adhered to sternal prec and SO provided all supervision/assist with ADLs and functional mobility with good carryover of safety strategies. Pt and SO report no concerns for d/c tomorrow.     Strengths: Alert and oriented, Adequate strength, Independent prior level of function, Motivated for self care and independence, Willingly participates in therapeutic activities, Pleasant and cooperative, Supportive family  Barriers: Decreased endurance, Generalized weakness, Impaired balance, Tube feeding, Visual impairment    Plan    Pt to d/c tomorrow with HHOT and family support.    Occupational Therapy Goals     Problem: Bathing     Dates: Start: 10/10/20       Goal: STG-Within one week, patient will bathe     Dates: Start: 10/10/20       Description: 1) Individualized Goal:  CGA   2) Interventions:  OT Self Care/ADL, OT Cognitive Skill Dev, OT Community Reintegration, OT Manual Ther Technique, OT Neuro Re-Ed/Balance, OT Sensory Int Techniques, OT Therapeutic Activity, OT Evaluation, and OT Therapeutic Exercise      Note:     Goal Note filed on 10/13/20 1117 by Kelvin Kirkland C.O.T.A.    Min assist    Decreased strength/endurance , decreased balance ,  vision issues   Continue goal                         Problem: Dressing     Dates: Start: 10/10/20       Goal:  STG-Within one week, patient will dress LB     Dates: Start: 10/10/20       Description: 1) Individualized Goal:  CGA   2) Interventions:  OT Self Care/ADL, OT Cognitive Skill Dev, OT Community Reintegration, OT Manual Ther Technique, OT Neuro Re-Ed/Balance, OT Sensory Int Techniques, OT Therapeutic Activity, OT Evaluation, and OT Therapeutic Exercise      Note:     Goal Note filed on 10/13/20 1117 by Kelvin Kirkland C.O.T.A.    Min assist   Decreased strength/endurance , decreased balance ,  vision issues   Continue goal                         Problem: OT Long Term Goals     Dates: Start: 10/10/20       Goal: LTG-By discharge, patient will complete basic self care tasks     Dates: Start: 10/10/20       Description: 1) Individualized Goal:  Mod I    2) Interventions:  OT Self Care/ADL, OT Cognitive Skill Dev, OT Community Reintegration, OT Manual Ther Technique, OT Neuro Re-Ed/Balance, OT Sensory Int Techniques, OT Therapeutic Activity, OT Evaluation, and OT Therapeutic Exercise            Goal: LTG-By discharge, patient will perform bathroom transfers     Dates: Start: 10/10/20       Description: 1) Individualized Goal:  Mod I    2) Interventions:  OT Self Care/ADL, OT Cognitive Skill Dev, OT Community Reintegration, OT Manual Ther Technique, OT Neuro Re-Ed/Balance, OT Sensory Int Techniques, OT Therapeutic Activity, OT Evaluation, and OT Therapeutic Exercise

## 2020-10-20 NOTE — DISCHARGE PLANNING
DME referral sent to [facility].  HH referral sent to [facility] per choice form.  Awaiting responses.

## 2020-10-20 NOTE — THERAPY
Speech Language Pathology  Daily Treatment     Patient Name: Guerrero Ann  Age:  49 y.o., Sex:  male  Medical Record #: [id number]  Today's Date: 10/20/2020     Precautions  Precautions: Fall Risk, Swallow Precautions ( See Comments)  Comments:  Girlfriend Miriam  cleared to assist with All ADL and transfers in room     Subjective    Pt pleasant and cooperative, GF present and supportive.      Objective       10/20/20 1103   SLP Total Time Spent   SLP Individual Total Time Spent (Mins) 30   Treatment Charges   SLP Cognitive Skill Development First 15 Minutes 1   SLP Cognitive Skill Development Additional 15 Minutes 1       Assessment    Outcome assessment initiated at this time with use of SCCAN however unable to be completed due to time constraints. Pt is demonstrating great progress across all areas. Test to be completed in PM session and results to be documented and reviewed at that time.     Strengths: Able to follow instructions, Independent prior level of function, Pleasant and cooperative, Motivated for self care and independence, Willingly participates in therapeutic activities  Barriers: Impaired functional cognition, Impaired carryover of learning, Impaired insight/denial of deficits, Impaired activity tolerance    Plan    Pt to d/c tomorrow with support and assistance from GF    Speech Therapy Problems     Problem: Expression STGs     Dates: Start: 10/10/20       Goal: STG-Within one week, patient will     Dates: Start: 10/10/20       Description: 1) Individualized goal:  state 10 words per minute given concrete category and min verbal cues.   2) Interventions:  SLP Speech Language Treatment and SLP Cognitive Skill Development                    Problem: Memory STGs     Dates: Start: 10/10/20       Goal: STG-Within one week, patient will     Dates: Start: 10/10/20       Description: 1) Individualized goal:  recall daily events and safety strategies 80% accuracy given min cues, with  use of memory book.   2) Interventions:  SLP Cognitive Skill Development                    Problem: Problem Solving STGs     Dates: Start: 10/10/20       Goal: STG-Within one week, patient will     Dates: Start: 10/10/20       Description: 1) Individualized goal:  complete single target visual scanning tasks with 80% accuracy, given min verbal cues.   2) Interventions:  SLP Cognitive Skill Development                    Problem: Speech/Swallowing LTGs     Dates: Start: 10/10/20       Goal: LTG-By discharge, patient will safely swallow     Dates: Start: 10/10/20       Description: 1) Individualized goal:  regular textures and thin liquids without aspiration.   2) Interventions:  SLP Swallowing Dysfunction Treatment, SLP Oral Pharyngeal Evaluation, SLP Video Swallow Evaluation, and SLP Group Treatment              Goal: LTG-By discharge, patient will solve basic problems     Dates: Start: 10/10/20       Description: 1) Individualized goal:  Alayna for safe discharge home.    2) Interventions:  SLP Speech Language Treatment, SLP Cognitive Skill Development, and SLP Group Treatment                    Problem: Swallowing STGs     Dates: Start: 10/13/20       Goal: STG-Within one week, patient will     Dates: Start: 10/13/20       Description: 1) Individualized goal:  tolerate soft and bite size textures with thin liquids with no overt s/sx of asp/pen noted provided MIN A  2) Interventions:  SLP Swallowing Dysfunction Treatment, SLP Oral Pharyngeal Evaluation, SLP Video Swallow Evaluation, SLP Self Care / ADL Training , SLP Cognitive Skill Development, and SLP Group Treatment

## 2020-10-20 NOTE — DISCHARGE PLANNING
Per Sherron at [facility], referral declined as they are not contracted with patient's insurance.   referral sent to [facility] per choice form.  Awaiting response.

## 2020-10-21 VITALS
SYSTOLIC BLOOD PRESSURE: 111 MMHG | BODY MASS INDEX: 24.14 KG/M2 | WEIGHT: 131.17 LBS | OXYGEN SATURATION: 94 % | RESPIRATION RATE: 18 BRPM | HEIGHT: 62 IN | HEART RATE: 68 BPM | DIASTOLIC BLOOD PRESSURE: 54 MMHG | TEMPERATURE: 98.8 F

## 2020-10-21 LAB
GLUCOSE BLD-MCNC: 122 MG/DL (ref 65–99)
GLUCOSE BLD-MCNC: 222 MG/DL (ref 65–99)

## 2020-10-21 PROCEDURE — 99232 SBSQ HOSP IP/OBS MODERATE 35: CPT | Performed by: HOSPITALIST

## 2020-10-21 PROCEDURE — A9270 NON-COVERED ITEM OR SERVICE: HCPCS | Performed by: PHYSICAL MEDICINE & REHABILITATION

## 2020-10-21 PROCEDURE — 700101 HCHG RX REV CODE 250: Performed by: PHYSICAL MEDICINE & REHABILITATION

## 2020-10-21 PROCEDURE — 82962 GLUCOSE BLOOD TEST: CPT | Mod: 91

## 2020-10-21 PROCEDURE — 99239 HOSP IP/OBS DSCHRG MGMT >30: CPT | Performed by: PHYSICAL MEDICINE & REHABILITATION

## 2020-10-21 PROCEDURE — 700102 HCHG RX REV CODE 250 W/ 637 OVERRIDE(OP): Performed by: PHYSICAL MEDICINE & REHABILITATION

## 2020-10-21 PROCEDURE — A9270 NON-COVERED ITEM OR SERVICE: HCPCS | Performed by: HOSPITALIST

## 2020-10-21 PROCEDURE — 700102 HCHG RX REV CODE 250 W/ 637 OVERRIDE(OP): Performed by: HOSPITALIST

## 2020-10-21 RX ORDER — OMEPRAZOLE 40 MG/1
40 CAPSULE, DELAYED RELEASE ORAL DAILY
Qty: 30 CAP | Refills: 2 | Status: SHIPPED | OUTPATIENT
Start: 2020-10-21 | End: 2020-11-05

## 2020-10-21 RX ORDER — HYDROXYZINE 50 MG/1
50 TABLET, FILM COATED ORAL EVERY 8 HOURS PRN
Qty: 90 TAB | Refills: 0 | Status: SHIPPED | OUTPATIENT
Start: 2020-10-21 | End: 2020-11-05

## 2020-10-21 RX ORDER — LISINOPRIL 2.5 MG/1
2.5 TABLET ORAL DAILY
Qty: 30 TAB | Refills: 2 | Status: SHIPPED | OUTPATIENT
Start: 2020-10-21 | End: 2020-11-05 | Stop reason: SDUPTHER

## 2020-10-21 RX ORDER — INSULIN GLARGINE 100 [IU]/ML
22 INJECTION, SOLUTION SUBCUTANEOUS EVERY MORNING
Qty: 1 EACH | Refills: 0 | Status: SHIPPED | OUTPATIENT
Start: 2020-10-21 | End: 2020-10-22

## 2020-10-21 RX ORDER — TERAZOSIN 1 MG/1
3 CAPSULE ORAL EVERY EVENING
Qty: 90 CAP | Refills: 0 | Status: SHIPPED | OUTPATIENT
Start: 2020-10-21 | End: 2020-11-04

## 2020-10-21 RX ORDER — ACETAMINOPHEN 325 MG/1
650 TABLET ORAL EVERY 4 HOURS PRN
Qty: 30 TAB | Refills: 0 | COMMUNITY
Start: 2020-10-21 | End: 2023-03-20

## 2020-10-21 RX ORDER — ATORVASTATIN CALCIUM 80 MG/1
80 TABLET, FILM COATED ORAL
Qty: 30 TAB | Refills: 2 | Status: SHIPPED | OUTPATIENT
Start: 2020-10-21 | End: 2020-11-05 | Stop reason: SDUPTHER

## 2020-10-21 RX ADMIN — OXYCODONE HYDROCHLORIDE AND ACETAMINOPHEN 500 MG: 500 TABLET ORAL at 08:23

## 2020-10-21 RX ADMIN — SERTRALINE HYDROCHLORIDE 50 MG: 50 TABLET ORAL at 08:23

## 2020-10-21 RX ADMIN — ASPIRIN 81 MG CHEWABLE TABLET 81 MG: 81 TABLET CHEWABLE at 08:23

## 2020-10-21 RX ADMIN — Medication 2000 UNITS: at 08:23

## 2020-10-21 RX ADMIN — OMEPRAZOLE 40 MG: 20 CAPSULE, DELAYED RELEASE ORAL at 08:23

## 2020-10-21 RX ADMIN — CLOPIDOGREL BISULFATE 75 MG: 75 TABLET ORAL at 08:23

## 2020-10-21 RX ADMIN — METOPROLOL TARTRATE 25 MG: 25 TABLET, FILM COATED ORAL at 06:01

## 2020-10-21 RX ADMIN — LISINOPRIL 2.5 MG: 5 TABLET ORAL at 06:01

## 2020-10-21 RX ADMIN — INSULIN GLARGINE 22 UNITS: 100 INJECTION, SOLUTION SUBCUTANEOUS at 07:08

## 2020-10-21 RX ADMIN — METFORMIN HYDROCHLORIDE 500 MG: 500 TABLET ORAL at 08:23

## 2020-10-21 RX ADMIN — FERROUS SULFATE TAB 325 MG (65 MG ELEMENTAL FE) 325 MG: 325 (65 FE) TAB at 08:23

## 2020-10-21 ASSESSMENT — PATIENT HEALTH QUESTIONNAIRE - PHQ9
2. FEELING DOWN, DEPRESSED, IRRITABLE, OR HOPELESS: NOT AT ALL
1. LITTLE INTEREST OR PLEASURE IN DOING THINGS: NOT AT ALL
SUM OF ALL RESPONSES TO PHQ9 QUESTIONS 1 AND 2: 0

## 2020-10-21 ASSESSMENT — ENCOUNTER SYMPTOMS
HEADACHES: 0
FEVER: 0
NAUSEA: 0
DIZZINESS: 0
BLURRED VISION: 0
PALPITATIONS: 0
HALLUCINATIONS: 0
SHORTNESS OF BREATH: 0
VOMITING: 0

## 2020-10-21 NOTE — CARE PLAN
Problem: Speech/Swallowing LTGs  Goal: LTG-By discharge, patient will safely swallow  Description: 1) Individualized goal:  regular textures and thin liquids without aspiration.   2) Interventions:  SLP Swallowing Dysfunction Treatment, SLP Oral Pharyngeal Evaluation, SLP Video Swallow Evaluation, and SLP Group Treatment      Outcome: MET     Problem: Problem Solving STGs  Goal: STG-Within one week, patient will  Description: 1) Individualized goal:  complete single target visual scanning tasks with 80% accuracy, given min verbal cues.   2) Interventions:  SLP Cognitive Skill Development      Outcome: MET     Problem: Swallowing STGs  Goal: STG-Within one week, patient will  Description: 1) Individualized goal:  tolerate soft and bite size textures with thin liquids with no overt s/sx of asp/pen noted provided MIN A  2) Interventions:  SLP Swallowing Dysfunction Treatment, SLP Oral Pharyngeal Evaluation, SLP Video Swallow Evaluation, SLP Self Care / ADL Training , SLP Cognitive Skill Development, and SLP Group Treatment      Outcome: MET     Problem: Speech/Swallowing LTGs  Goal: LTG-By discharge, patient will solve basic problems  Description: 1) Individualized goal:  Alayna for safe discharge home.    2) Interventions:  SLP Speech Language Treatment, SLP Cognitive Skill Development, and SLP Group Treatment      Outcome: DISCHARGED-GOAL NOT MET     Problem: Expression STGs  Goal: STG-Within one week, patient will  Description: 1) Individualized goal:  state 10 words per minute given concrete category and min verbal cues.   2) Interventions:  SLP Speech Language Treatment and SLP Cognitive Skill Development      Outcome: DISCHARGED-GOAL NOT MET     Problem: Memory STGs  Goal: STG-Within one week, patient will  Description: 1) Individualized goal:  recall daily events and safety strategies 80% accuracy given min cues, with use of memory book.   2) Interventions:  SLP Cognitive Skill Development      Outcome:  DISCHARGED-GOAL NOT MET

## 2020-10-21 NOTE — CARE PLAN
Problem: Safety  Goal: Will remain free from injury  Outcome: MET  Note: Pt uses call light consistently for staff assistance. Pt has good safety awareness, no impulsivity observed.      Problem: Infection  Goal: Will remain free from infection  Outcome: MET  Note: No s/s of infection present      Problem: Bowel/Gastric:  Goal: Normal bowel function is maintained or improved  Outcome: MET  Note: Pt is continent of bowel and having BMs at least every other day     Problem: Urinary Elimination:  Goal: Ability to reestablish a normal urinary elimination pattern will improve  Outcome: MET  Note: Pt has been continent of bladder this shift with PVRs under 100ml

## 2020-10-21 NOTE — DISCHARGE PLANNING
Case Management Discharge Instructions        Discharge Location: Home with Home Health.     Agency Name / Address / Phone: [facility] [phone].     Home Health: Registered Nurse, Home Health Aide, Occupational Therapist, Physical Therapist, Speech Therapist, .        DME Provider / Phone: [facility]: [phone].     Medical Equipment Ordered: Four Wheeled Walker.        Follow-up Information:     Cheryl Mayberry P.A.-C.-Primary Care  [address]  [place] [address]  [phone]  On 10/22/2020  9:40am     On 10/23/2020  [facility] [place]: [phone]  [address] [place] [address]  (YOU WILL BE SEEING GABBY HERNANDEZ) PLEASE WEAR MASK.     Marilu Franco D.O.-Physical Medicine and Rehab.  [address]  [place] [address] [phone]  On 11/4/2020  2pm      Michael Chicas M.D.-Cardiology  [address]  [address]  [place] [address]  [phone]  On 11/16/2020  1:15pm  (You will be seeing GABBY Miller).

## 2020-10-21 NOTE — PROGRESS NOTES
Patient discharged to home per order.  Discharge instructions reviewed with patient and his girlfriend; they verbalize understanding and signed copies placed in chart.  Patient has all belongings; signed copy of form in chart.  Patient left facility at about 12:00pm. Have enjoyed working with this pleasant patient.

## 2020-10-21 NOTE — PROGRESS NOTES
This patient, and spouse with patient permission, received individualized medication counseling regarding the current regimen they are receiving in this facility. Potential adverse effects, monitoring parameters, and proper administration were covered in preparation for their discharge.Blood sugar monitoring was discussed as well as the signs and symptoms of hypoglycemia as the patient is currently on Lantus and metformin. This patient is being treated for hypertension and it is recommended that they monitor and record with a blood pressure device. The patient has been instructed to contact their primary care physician if the HR or blood pressure is abnormal. The patient asked relevant questions regarding the medications for which answers were provided. Our pharmacy hours and phone number were provided for follow up questions should they arise.  Zack Maddox  Former[facility]

## 2020-10-21 NOTE — DISCHARGE PLANNING
Per Jennifer at [facility], referral declined as they are not contracted with patient's insurance.   referral sent to [facility] per CM's request.  Awaiting response.

## 2020-10-21 NOTE — PROGRESS NOTES
[facility] Medicine Daily Progress Note    Date of Service  10/21/2020    Chief Complaint:  Hypertension  Diabetes  S/P CABG  S/P CVA    Interval History:  No significant events or changes since last visit    Review of Systems  Review of Systems   Constitutional: Negative for fever.   Eyes: Negative for blurred vision.   Respiratory: Negative for shortness of breath.    Cardiovascular: Negative for palpitations.   Gastrointestinal: Negative for nausea and vomiting.   Neurological: Negative for dizziness and headaches.   Psychiatric/Behavioral: Negative for hallucinations.        Physical Exam  Temp:  [36.1 °C (96.9 °F)-37.1 °C (98.8 °F)] 37.1 °C (98.8 °F)  Pulse:  [74-84] 84  Resp:  [18] 18  BP: (105-127)/(67-83) 127/83  SpO2:  [93 %-95 %] 95 %    Physical Exam  Vitals signs and nursing note reviewed.   Constitutional:       General: He is not in acute distress.  HENT:      Mouth/Throat:      Mouth: Mucous membranes are moist.      Pharynx: Oropharynx is clear.   Eyes:      General: No scleral icterus.  Cardiovascular:      Rate and Rhythm: Normal rate and regular rhythm.      Heart sounds: No murmur.   Pulmonary:      Effort: Pulmonary effort is normal.      Breath sounds: Normal breath sounds. No stridor.   Abdominal:      General: There is no distension.      Palpations: Abdomen is soft.      Tenderness: There is no abdominal tenderness.   Musculoskeletal:      Right lower leg: No edema.      Left lower leg: No edema.   Skin:     General: Skin is warm and dry.      Findings: No rash.   Neurological:      Mental Status: He is alert and oriented to person, place, and time.   Psychiatric:         Mood and Affect: Mood normal.         Behavior: Behavior normal.         Fluids    Intake/Output Summary (Last 24 hours) at 10/21/2020 0751  Last data filed at 10/21/2020 0601  Gross per 24 hour   Intake 240 ml   Output --   Net 240 ml       Laboratory                        Imaging    Assessment/Plan  * Cardioembolic stroke  (HCC)- (present on admission)  Assessment & Plan  Occurred post-op  On ASA & Plavix  On Lipitor    CAD (coronary artery disease)- (present on admission)  Assessment & Plan  S/P NSTEMI  S/P CABG x 3 vessel bypass  Has hx of 2 cardiac stents  BNP: 629 (10/10) --> 197 (10/13)  On ASA and Plavix  On Lipitor  On Lopressor & Lisinopril    Essential hypertension- (present on admission)  Assessment & Plan  BP ok  On Lisinopril: 2.5 mg daily  On Lopressor: 25 mg bid   Cont to monitor    Type 2 diabetes mellitus with complication, without long-term current use of insulin (HCC)- (present on admission)  Assessment & Plan  Hba1c: 7.9  -160  On Metformin: 500 mg bid   On Lantus: 22 units qam   Note: home meds appear to be Metformin 2000 mg bid (not appropriate dosing) per Epic chart -- but pt can't confirm  Cont to monitor    Iron deficiency anemia- (present on admission)  Assessment & Plan  H&H stable  Fe: 31, sats 11%  On Fe supplements     Vitamin D deficiency- (present on admission)  Assessment & Plan  Vit D: 17  On supplements    Reactive depression- (present on admission)  Assessment & Plan  On Zoloft    Pneumonia- (present on admission)  Assessment & Plan  Resolved  Off abx

## 2020-10-21 NOTE — DISCHARGE INSTRUCTIONS
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 Prevention Lifeline  534.847.3559 (266-644-KYQG)  www.suicidepreventionlifeline.org  · National Suicide Hotline  352.158.6194 (800-SUICIDE)  · National [place] of Mental Health  804.367.7892  www.nimh.nih.gov  · National [place] on Mental Illness  525.265.7286  www.bethany.org  · Mental Health [place]  235.357.8962  www.[facility].org    Preventing Falls at Home  A person can fall for many reasons. Older adults may fall because reaction time slows down as we age. Your muscles and joints may get stiff, weak, or less flexible because of illness, medicines, or a physical condition.   Other health problems that make falls more likely include:   · Arthritis  · Dizziness or lightheadedness when you stand up (orthostatic hypotension)   · History of a stroke  · Dizziness  · Anemia  · Certain medicines taken for mental illness or to control blood pressure.   · Problems with balance or gait   · Bladder or urinary problems  · History of falling  · Changes in vision (vision impairment)   · Changes in thinking skills and memory (cognitive impairment)   Injuries from a fall can include serious injuries such as broken bones, dislocated joints, internal bleeding and cuts. Injuries like these can limit your independence.   Prevention tips  To help prevent falls and fall-related injuries, follow  the tips below.    Floors  To make floors safer:   · Put nonskid pads under area rugs.   · Remove small rugs.  · Replace worn floor coverings.   · Tack carpets firmly to each step on carpeted stairs. Put nonskid strips on the edges of uncarpeted stairs.   · Keep floors and stairs free of clutter and cords.   · Arrange furniture so there are clear pathways.   · Clean up any spills right away.   Bathrooms     To make bathrooms safer:   · Install grab bars in the tub or shower.   · Apply nonskid strips or put a nonskid rubber mat in the tub or shower.   · Sit on a bath chair to bathe.   · Use bathmats with nonskid backing.   Lighting  To improve visibility in your home:    · Keep a flashlight in each room. Or put a lamp next to the bed within easy reach.   · Put nightlights in the bedrooms, hallways, kitchen, and bathrooms.   · Make sure all stairways have good lighting.   · Take your time when going up and down stairs.   · Put handrails on both sides of stairs and in walkways for more support. To prevent injury to your wrist or arm, don’t use handrails to pull yourself up.   · Install grab bars to pull yourself up.   · Move or rearrange items that you use often. This will make them easier to find or reach.   · Look at your home to find any safety hazards. Especially look at doorways, walkways, and the driveway. Remove or repair any safety problems that you find.   Other changes to make  · Look around to find any safety hazards. Look closely at doorways, walkways, and the driveway. Remove or repair any safety problems that you find.   · Wear shoes that fit well.  · Take your time when going up and down stairs.  · Put handrails on both sides of stairs and in walkways for more support. To prevent injury to your wrist or arm, don’t use handrails to pull yourself up.   · Install grab bars wherever needed to pull yourself up.   · Arrange items that you use often. This will make them easier to find or reach.     Depression /  Suicide Risk    As you are discharged from this [facility] facility, it is important to learn how to keep safe from harming yourself.    Recognize the warning signs:  · Abrupt changes in personality, positive or negative- including increase in energy   · Giving away possessions  · Change in eating patterns- significant weight changes-  positive or negative  · Change in sleeping patterns- unable to sleep or sleeping all the time   · Unwillingness or inability to communicate  · Depression  · Unusual sadness, discouragement and loneliness  · Talk of wanting to die  · Neglect of personal appearance   · Rebelliousness- reckless behavior  · Withdrawal from people/activities they love  · Confusion- inability to concentrate     If you or a loved one observes any of these behaviors or has concerns about self-harm, here's what you can do:  · Talk about it- your feelings and reasons for harming yourself  · Remove any means that you might use to hurt yourself (examples: pills, rope, extension cords, firearm)  · Get professional help from the community (Mental Health, Substance Abuse, psychological counseling)  · Do not be alone:Call your Safe Contact- someone whom you trust who will be there for you.  · Call your local CRISIS HOTLINE 107-1012 or 068-593-3218  · Call your local Children's Mobile Crisis Response Team [place] (427) 621-9094 or www.[employer]  · Call the toll free National Suicide Prevention Hotlines   · National Suicide Prevention Lifeline 499-431-TBDX (0149)  · National Hope Line Network 800-SUICIDE (870-4436)        Physical Therapy Discharge Instructions for Guererro Ann    10/20/2020    Level of Assist Required for Ambulation: Supervision on Flat Surfaces, Assist for Balance on Curbs, Assist for Balance on Stairs  Device Recommended for Ambulation: 4-Wheeled Walker  Level of Assist Required for Transfers: Supervision  Device Recommended for Transfers: 4-Wheeled  Walker  Home Exercise Program: Refer to Home Exercise Program Handout for Details    Thanks for working hard in PT, it was a pleasure working with you!  Yung Tobin DPT    Occupational Therapy Discharge Instructions for Guerrero Restrepo Seth    10/20/2020    Level of Assist Required for Eating: Requires Supervision with Eating  Level of Assist Required for Grooming: Requires Supervision with Grooming  Level of Assist Required for Dressing: Requires Supervision with Dressing  Level of Assist Required for Toileting: Requires Supervision with Toileting  Level of Assist Required for Toilet Transfer: Requires Supervision with Toilet Transfer  Equipment for Toilet Transfer: Grab Bars by Toilet  Level of Assist Required for Bathing: Requires Supervision with Bathing  Equipment for Bathing: Shower Chair, Grab Bars in Tub / Shower  Level of Assist Required for Shower Transfer: Requires Supervision with Shower Transfer  Equipment for Shower Transfer: Shower Chair, Grab Bars in Tub / Shower  Level of Assist Required for Home Mgmt: Requires Supervision with Home Management  Driving: Please Contact Physician Prior to Driving  Home Exercise Program: Refer to Home Exercise Program Handout for Details      Speech Therapy Discharge Instructions for Guerrero Restrepo Seth    10/21/2020    Diet: Regular (7), Thin (0)  Swallow Strategies: eat slowly, small bites and sips, sit upright for all intake  Supervision: intermittent supervision for safety, assistance with medication management and financial management tasks.  Pt has demonstrated good progress while at [facility]. Pt initially presented with moderate cognitive impairments, at time of d/c with completion of outcome assessment pt has increased his overall percentage in all areas and now presents with MILD cognitive impairments overall. Pt would benefit from assistance with all medication and financial management tasks to ensure accuracy and safety.  Ongoing cognitive intervention is recommended and warranted to cont to address attention, recall and executive functioning skills with goal to achieve PLOF.     Azam, it was a pleasure working with you - take care and be safe!   Mariela Mcadams M.S., CCC-SLP

## 2020-10-21 NOTE — DISCHARGE PLANNING
Place telephone call to [facility] who confirmed that they are not contacted with patient's insurance.    HH referral sent to [facility].  Awaiting response.

## 2020-10-21 NOTE — DISCHARGE SUMMARY
Admission Date: 10/9/2020    Discharge Date: 10/21/2020    Attending Provider: Ankita Ng MD    Admission Diagnosis:   Active Hospital Problems    Diagnosis   • *Cardioembolic stroke (HCC)   • Ileus (HCC)   • Aspiration pneumonia (HCC)   • SONNY (acute kidney injury) (HCC)   • CAD (coronary artery disease)   • Essential hypertension   • Type 2 diabetes mellitus with complication, without long-term current use of insulin (HCC)   • Urinary retention   • Adjustment disorder with depressed mood   • Thrombocytosis (HCC)   • Anxiety   • Vitamin D deficiency   • Azotemia   • Hyponatremia   • Iron deficiency anemia   • Pneumonia   • Tachycardia   • Impaired mobility and ADLs   • Reactive depression   • Diabetes mellitus due to underlying condition with hyperglycemia (HCC)       Discharge Diagnosis:  Active Hospital Problems    Diagnosis   • *Cardioembolic stroke (HCC)   • Ileus (HCC)   • Aspiration pneumonia (HCC)   • SONNY (acute kidney injury) (HCC)   • CAD (coronary artery disease)   • Essential hypertension   • Type 2 diabetes mellitus with complication, without long-term current use of insulin (HCC)   • Urinary retention   • Adjustment disorder with depressed mood   • Thrombocytosis (HCC)   • Anxiety   • Vitamin D deficiency   • Azotemia   • Hyponatremia   • Iron deficiency anemia   • Pneumonia   • Tachycardia   • Impaired mobility and ADLs   • Reactive depression   • Diabetes mellitus due to underlying condition with hyperglycemia (HCC)       HPI per H&P:  Patient is a 49 y.o. male  with a past medical history of coronary artery disease s/p stents, diabetes, hypertension, not taking any medications, admitted to [facility] on 9/21, with chest pain. He was admitted for acute coronary syndrome/NSTEMI, went to cath lab, found to have multivessel coronary artery disease and in-stent stenosis. He is now s/p CABG x 3 with Dr. Chicas on 9/25. Patient with acute blood loss anemia, s/p transfusion, urinary  retention, right sided weakness and gaze preference, NIHSS 11, neurology consulted. CTA negative. Negative EEG. MRI with numerous acute/subacute supratentorial and infratentorial infarctions consistent with cardio-embolic etiology. HgbA1c 7.9, LDL unable to be determined due to elevated TGs at 610, ECHO EF 60 % s/p CABG, with severe LVH. COVID negative.     Acute stay complications included aspiration pneumonia, ileus requiring NG tube. Patient pulled out cortrak yesterday, briefly required restraints and Haldol, cortrak replaced.      Patient currently is not very talkative and a lot of the history was verified by his girlfriend Miriam who is at bedside.  Patient reports his goal is to get back to normal.  He is very tearful and his girlfriend reports he was emotional even prior to this open heart surgery and stroke.  Patient does have a history of depression and had been on medications at some point.  He denies a history of any misha.  He denies any significant weakness numbness or tingling or visual changes from his stroke.  He has an indwelling Quispe catheter.  The hospitalist has been consulted for his medical issues including tachycardia and leukocytosis.      Patient was evaluated by Rehab Medicine physician and Physical Therapy, Occupational Therapy and Speech Therapy and determined to be appropriate for acute inpatient rehab and was transferred to [facility] on 10/9/2020  1:25 PM    Rehab Hospital Course by Problem List:    BL strokes  Cardio-embolic after CABG  Dysphagia, improved  Cognitive deficits  Non-focal  Continue aspirin, Plavix, and statin for secondary stroke prophylaxis  Outpatient follow up with Dr. Franco in rehab clinic, referral made  Removed NG tube 10/16, diet upgraded per speech therapy     Reactive depression?  History of depression/anxiety  Started sertraline 10/10  Dr. Heard consulted 10/14, appreciate assistance     Insomnia, improved  Scheduled melatonin and  trazodone, increase melatonin  Will discontinue as sertraline is titrated up after discharge     Bowel  Loose stools, resolved  Meds as needed  Last BM 10/20  KUB OK, c diff negative     Bladder  Urinary retention, continues  Replace Quispe for bladder rest  Started Hytrin 1 mg 10/10 --> 2 mg 10/13 --> 3 mg 10/15  Checked UA, negative  Voiding trial 10/19 -patient able to void, did not need to replace catheter, referral made to urology     DVT prophylaxis  Lovenox     Appreciated the assistance of hospitalist with his medical co-morbidities:     Diabetes with hyperglycemia, Lantus, metformin (took at home), sliding scale insulin  Coronary artery disease status post CABG, aspirin, Plavix, and statin  Hypertension, lisinopril, metoprolol  Leukocytosis, resolved, x-ray suggestive of pneumonia, s/p antibiotics  Status post ileus, titrated off Reglan and simethicone   Loose stools, resolved  Aspiration pneumonia, resolved, s/p antibiotics: metronidazole and levofloxacin  Tobacco use, counseling provided 10/15  Hypokalemia, resolved s/p supplementation  Normocytic anemia, ferrous sulfate and Vit C  Hyponatremia, mild monitor  GI prophylaxis, omeprazole  Vit D deficiency, continue supplementation    Functional Status at Discharge  Eating:  Supervision(cues for strategies while taking meds with water )  Eating Description:  Supervision for safety, Verbal cueing, Increased time, Modified diet  Grooming:  Supervision, Standing  Grooming Description:  Increased time, Supervision for safety(SO provided supervision)  Bathing:  Supervision(setup/ SBA )  Bathing Description:     Upper Body Dressing:  Supervision(doff/don 2 pull over shirts )  Upper Body Dressing Description:  Increased time, Set-up of equipment, Supervision for safety, Verbal cueing(doff/don pull over sweater and shirt )  Lower Body Dressing:  Supervision(doff/ don underwear, shorts and 2 pairs of socks )  Lower Body Dressing Description:  (therapist justin GARCIA  "hose. )  Discharge Location : Home  Patient Discharging with Assist of: Spouse / Significant Other;Family   Level of Supervision Required: Intermittent Supervision  Recommended Services Upon Discharge: Home Health Occupational Therapy  Long Term Goals Met: 0  Long Term Goals Not Met: 2  Reason(s) for Goals Not Met: pt con't to require supervision for safety with ADLs and bathroom transfers.  Criteria for Termination of Services: Maximum Function Achieved for Inpatient Rehabilitation  Walk:  Supervised  Distance Walked:  300  Number of Times Distance Was Traveled:  1  Assistive Device:  4 Wheel Walker(Also used outdoors for 150 ft, SPV)  Gait Deviation:  Bradykinetic, Decreased Heel Strike, Decreased Toe Off  Wheelchair:  Supervised  Distance Propelled:  100(backwards with LE propulsion)   Wheelchair Description:  ( B LE, extra time)  Stairs Contact Guard Assist  Stairs Description Walker, Safety concerns, Supervision for safety, Verbal cueing(Curb step 6\", cues and demo for sequencing and safety)  Discharge Location: Home  Patient Discharging with Assist of: Spouse / Significant Other  Level of Supervision Required Upon Discharge: Intermittent Supervision  Recommended Equipment for Discharge: 4-Wheeled Walker  Recommeded Services Upon Discharge: Home Health Physical Therapy  Long Term Goals Met: 2  Long Term Goals Not Met: 1  Reason(s) for Goals Not Met: SPV for transfers  Criteria for Termination of Services: Maximum Function Achieved for Inpatient Rehabilitation  Comprehension:  Minimal Assist  Comprehension Description:  Glasses, Verbal cues  Expression:  Minimal Assist  Expression Description:  Verbal cueing  Social Interaction:  Minimal Assist  Social Interaction Description:  Increased time, Verbal cues  Problem Solving:  Moderate Assist(playing [place]es )  Problem Solving Description:  Verbal cueing, Therapy schedule, Bed/chair alarm, Increased time  Memory:  Supervision(memory game  5 and 7 matches  no " assist   improved)  Memory Description:  (vocal calling out of what was on mem. cards )  Progress since Admit: Good  Discharge Location : Home  Patient Discharging with Assist of: Spouse / Significant Other  Level of Supervision Required: Intermittent Supervision  Recommended Services Upon Discharge: Home Health Speech Therapy  Long Term Goals Met: 1/2  Long Term Goals Not Met: 1/2  Reason(s) for Goals Not Met: pt cont to have mild cognitive impairment  Criteria for Termination of Services: Maximum Function Achieved for Inpatient Rehabilitation    IAnkita M.D., personally performed a complete drug regimen review and no potential clinically significant medication issues were identified.     Discharge Medication:     Medication List      START taking these medications      Instructions   acetaminophen 325 MG Tabs  Commonly known as: TYLENOL  Replaces: acetaminophen 325 MG Supp   Take 2 Tabs by mouth every four hours as needed for Moderate Pain.  Dose: 650 mg     ascorbic acid 500 MG tablet  Commonly known as: VITAMIN C  Notes to patient: Supplement   Take 1 Tab by mouth every morning with breakfast.  Dose: 500 mg     ferrous sulfate 325 (65 Fe) MG tablet  Notes to patient: Anemia   Take 1 Tab by mouth every morning with breakfast.  Dose: 325 mg     hydrOXYzine HCl 50 MG Tabs  Commonly known as: ATARAX   Take 1 Tab by mouth every 8 hours as needed for Anxiety (or insomnia).  Dose: 50 mg     Lantus SoloStar 100 UNIT/ML Sopn injection  Generic drug: insulin glargine   Inject 22 Units as instructed every morning.  Dose: 22 Units     melatonin 3 MG Tabs  Notes to patient: For insomnia    Take 2 Tabs by mouth every bedtime.  Dose: 6 mg     omeprazole 40 MG delayed-release capsule  Commonly known as: PRILOSEC  Replaces: omeprazole 2 mg/mL Susp   Take 1 Cap by mouth every day.  Dose: 40 mg     sertraline 50 MG Tabs  Start taking on: October 21, 2020  Commonly known as: Zoloft   Take 1 Tab by mouth every day for  7 days, THEN 2 Tabs every day for 23 days.     terazosin 1 MG Caps  Commonly known as: HYTRIN   Take 3 Caps by mouth every evening.  Dose: 3 mg     vitamin D 1000 Unit (25 mcg) Tabs  Commonly known as: cholecalciferol  Notes to patient: Supplement   Take 1 Tab by mouth every day.  Dose: 1,000 Units        CHANGE how you take these medications      Instructions   lisinopril 2.5 MG Tabs  What changed:   · medication strength  · how much to take  · when to take this  Commonly known as: PRINIVIL   Take 1 Tab by mouth every day.  Dose: 2.5 mg     metFORMIN 500 MG Tabs  What changed:   · how much to take  · when to take this  Commonly known as: GLUCOPHAGE   Take 1 Tab by mouth 2 times a day, with meals.  Dose: 500 mg        CONTINUE taking these medications      Instructions   aspirin 81 MG Chew chewable tablet  Commonly known as: ASA  Notes to patient: Stroke prevention   Take 1 Tab by mouth every day.  Dose: 81 mg     atorvastatin 80 MG tablet  Commonly known as: LIPITOR   Take 1 Tab by mouth every bedtime.  Dose: 80 mg     clopidogrel 75 MG Tabs  Commonly known as: PLAVIX  Notes to patient: Stroke Prevention   Take 1 Tab by mouth every day.  Dose: 75 mg     metoprolol 25 MG Tabs  Commonly known as: LOPRESSOR   Take 1 Tab by mouth 2 Times a Day.  Dose: 25 mg        STOP taking these medications    acetaminophen 325 MG Supp  Commonly known as: TYLENOL  Replaced by: acetaminophen 325 MG Tabs     amantadine 50 MG/5ML Syrp  Commonly known as: SYMMETREL     amLODIPine 10 MG Tabs  Commonly known as: NORVASC     bisacodyl 10 MG Supp  Commonly known as: DULCOLAX     diphenhydrAMINE 25 MG Tabs  Commonly known as: BENADRYL     enoxaparin 40 MG/0.4ML Soln inj  Commonly known as: LOVENOX     HYDROcodone-acetaminophen 5-325 MG Tabs per tablet  Commonly known as: NORCO     insulin lispro 100 UNIT/ML  Commonly known as: HumaLOG     lidocaine 5 % Ptch  Commonly known as: LIDODERM     mag hydrox-al hydrox-simeth 400-400-40 MG/5ML  Susp  Commonly known as: MAALOX PLUS ES or MYLANTA DS     magnesium hydroxide 400 MG/5ML Susp  Commonly known as: MILK OF MAGNESIA     metoclopramide 5 MG tablet  Commonly known as: REGLAN     omeprazole 2 mg/mL Susp  Commonly known as: FIRST-OMEPRAZOLE  Replaced by: omeprazole 40 MG delayed-release capsule     Pharmacy     polyethylene glycol/lytes 17 g Pack  Commonly known as: MIRALAX     potassium bicarbonate 25 MEQ tablet  Commonly known as: KLYTE     promethazine 25 MG Supp  Commonly known as: Phenergan     senna-docusate 8.6-50 MG Tabs  Commonly known as: PERICOLACE or SENOKOT S            Discharge Diet:  Cardiac    Discharge Activity:  Do not return to work or driving until cleared by a physician.         Disposition:  Patient to discharge home with family support and community resources.     Discharge Location: Home with Home Health.     [phone]     Home Health: Registered Nurse, Home Health Aide, Occupational Therapist, Physical Therapist, Speech Therapist, .        DME Provider / Phone: [facility]: [phone].     Medical Equipment Ordered: Four Wheeled Walker.        Follow-up Information:     Cheryl Mayberry P.A.-C.-Primary Care  [address]  [facility] [address]  [place] [address] [phone]  On 10/22/2020  9:40am     On 10/23/2020  [facility] [place]: [phone]:15am  [address] [place] [address]  (YOU WILL BE SEEING GABBY HERNANDEZ) PLEASE WEAR MASK.     Marilu Franco D.O.-Physical Medicine and Rehab.  [address]  [place] [id number] [phone]  On 11/4/2020  2pm      Michael Chicas M.D.-Cardiology  [address]  [address]  [place] [address] [phone]  On 11/16/2020  1:15pm  (You will be seeing GABBY Miller).      Condition on Discharge:  Good    More than 35 minutes was spent on discharging this patient, including face-to-face time, prescription management, and the dictation of this note.    Ankita JENKINS  SUMI Ng    Date of Service: 10/21/2020

## 2020-10-22 ENCOUNTER — OFFICE VISIT (OUTPATIENT)
Dept: MEDICAL GROUP | Facility: PHYSICIAN GROUP | Age: 50
End: 2020-10-22
Payer: COMMERCIAL

## 2020-10-22 VITALS
SYSTOLIC BLOOD PRESSURE: 124 MMHG | TEMPERATURE: 98 F | OXYGEN SATURATION: 99 % | BODY MASS INDEX: 25.47 KG/M2 | RESPIRATION RATE: 16 BRPM | HEART RATE: 70 BPM | DIASTOLIC BLOOD PRESSURE: 82 MMHG | HEIGHT: 62 IN | WEIGHT: 138.4 LBS

## 2020-10-22 DIAGNOSIS — E11.8 TYPE 2 DIABETES MELLITUS WITH COMPLICATION, WITHOUT LONG-TERM CURRENT USE OF INSULIN (HCC): ICD-10-CM

## 2020-10-22 DIAGNOSIS — I21.4 ACUTE NON-ST ELEVATION MYOCARDIAL INFARCTION (NSTEMI) (HCC): ICD-10-CM

## 2020-10-22 DIAGNOSIS — Z95.1 S/P CABG X 3: ICD-10-CM

## 2020-10-22 DIAGNOSIS — I25.10 CORONARY ARTERY DISEASE INVOLVING NATIVE HEART, ANGINA PRESENCE UNSPECIFIED, UNSPECIFIED VESSEL OR LESION TYPE: ICD-10-CM

## 2020-10-22 DIAGNOSIS — I63.9 CARDIOEMBOLIC STROKE (HCC): ICD-10-CM

## 2020-10-22 PROCEDURE — 99214 OFFICE O/P EST MOD 30 MIN: CPT | Performed by: PHYSICIAN ASSISTANT

## 2020-10-22 RX ORDER — AMOXICILLIN 500 MG/1
CAPSULE ORAL
COMMUNITY
Start: 2020-09-14 | End: 2020-10-22

## 2020-10-22 RX ORDER — CLOPIDOGREL BISULFATE 75 MG/1
75 TABLET ORAL DAILY
Qty: 90 TAB | Refills: 1 | Status: SHIPPED | OUTPATIENT
Start: 2020-10-22 | End: 2021-01-27

## 2020-10-22 ASSESSMENT — FIBROSIS 4 INDEX: FIB4 SCORE: 0.4

## 2020-10-22 NOTE — DISCHARGE PLANNING
"Patient DC'd home yesterday.  [facility] had accepted for services however called ALISE Carlos after patient DC'd and stated \"patient too high level of care\" and declined.      CM called patients girlfriend Miriam to let her know referrals would be sent for another home health company.  Miriam was understanding.  CM will call Miriam when acceptance from home health is received.        "

## 2020-10-23 NOTE — DISCHARGE PLANNING
LOUIE LM on patients girlfriends VM to let her know [facility] has accepted for home health and will be reaching out to her.  CM left contact information so Miriam could call them if she does not hear from them by later this am.      CM available as needs arise.

## 2020-11-03 NOTE — PROGRESS NOTES
Chief Complaint   Patient presents with   • Coronary Artery Disease   • MI (Non ST Segment Elevation MI)   • Tachycardia       Subjective:   Guerrero Ann is a 50 y.o. male who presents today for hospital follow-up.    Patient was admitted on 9/21/2020 with complaints of chest pain.  Cardiac catheterization was completed on 9/22/2020 which showed multivessel coronary artery disease, coronary artery bypass grafting was recommended.  Patient underwent CABG x3 (LIMA to LAD, VG OM and PDA) on 9/25/2020 with Dr. Michael Chicas.  Recovery was complicated by CVA, MRI showed numerous acute/subacute supratentorial and infratentorial infarctions consistent with cardioembolic cardiology.  Patient also developed aspirational pneumonia and an ileus requiring NG tube placement.  He was discharged to rehab on 10/9/2020 and discharged from rehab on 10/21/2020.    Other past medical history significant for CAD with RCA stent in 2018, diabetes and hypertension.    Today patient states he is continues to feel better every day. Only complaint is some tenderness at the right lower leg graft incision site. Denies having any other significant concerns or complaints. He is walking 2-2.5 mils per day without difficulty. Does feel that the bypass surgery and CVA has impacted his memory, denies having any other significant deficits.     Past Medical History:   Diagnosis Date   • Diabetes (HCC)     oral meds only   • Hypertension    • Kidney stones    • MI, old    • Pneumonia    • Psychiatric problem     depression and anxiety   • Stroke (HCC)      Past Surgical History:   Procedure Laterality Date   • MULTIPLE CORONARY ARTERY BYPASS ENDO VEIN HARVEST  9/25/2020    Procedure: CABG, WITH ENDOSCOPIC VEIN PROCUREMENT- X3;  Surgeon: Michael Chicas M.D.;  Location: SURGERY [facility];  Service: Cardiothoracic   • MARGOTH  9/25/2020    Procedure: ECHOCARDIOGRAM, TRANSESOPHAGEAL;  Surgeon: Michael Chicas M.D.;  Location:  [facility]GERY [facility];  Service: Cardiothoracic   • STENT PLACEMENT  2017    1 cardiac stent   • OTHER ORTHOPEDIC SURGERY      rt wrist fracture with fixation 34 years ago     Family History   Problem Relation Age of Onset   • Stroke Mother 47        had 3 devastating strokes,  56yo stroke complications   • Heart Disease Mother    • No Known Problems Father         does not know his father.   • Stroke Maternal Grandmother 85   • Heart Disease Maternal Grandmother    • Stroke Maternal Grandfather 54   • Heart Disease Maternal Grandfather      Social History     Socioeconomic History   • Marital status: Single     Spouse name: Not on file   • Number of children: Not on file   • Years of education: Not on file   • Highest education level: Not on file   Occupational History   • Not on file   Social Needs   • Financial resource strain: Not on file   • Food insecurity     Worry: Not on file     Inability: Not on file   • Transportation needs     Medical: Not on file     Non-medical: Not on file   Tobacco Use   • Smoking status: Never Smoker   • Smokeless tobacco: Former User     Types: Chew   Substance and Sexual Activity   • Alcohol use: Not Currently     Comment: 2 per week   • Drug use: No   • Sexual activity: Yes     Partners: Female     Comment: fiance. wrk: [employer] [place] [employer].    Lifestyle   • Physical activity     Days per week: Not on file     Minutes per session: Not on file   • Stress: Not on file   Relationships   • Social connections     Talks on phone: Not on file     Gets together: Not on file     Attends Zoroastrianism service: Not on file     Active member of club or organization: Not on file     Attends meetings of clubs or organizations: Not on file     Relationship status: Not on file   • Intimate partner violence     Fear of current or ex partner: Not on file     Emotionally abused: Not on file     Physically abused: Not on file     Forced sexual activity: Not on file   Other Topics Concern   • Not  on file   Social History Narrative   • Not on file     Allergies   Allergen Reactions   • Isosorbide Unspecified     Hallucinations     Outpatient Encounter Medications as of 11/5/2020   Medication Sig Dispense Refill   • ALLOPURINOL PO Take  by mouth.     • metoprolol (LOPRESSOR) 25 MG Tab Take 1 Tab by mouth 2 Times a Day. 180 Tab 3   • lisinopril (PRINIVIL) 2.5 MG Tab Take 1 Tab by mouth every day. 90 Tab 3   • atorvastatin (LIPITOR) 80 MG tablet Take 1 Tab by mouth every bedtime. 90 Tab 3   • clopidogrel (PLAVIX) 75 MG Tab Take 1 Tab by mouth every day. 90 Tab 1   • metFORMIN (GLUCOPHAGE) 500 MG Tab Take 2 tablets by mouth twice daily. 180 Tab 3   • sertraline (ZOLOFT) 50 MG Tab Take 1 Tab by mouth every day for 7 days, THEN 2 Tabs every day for 23 days. 60 Tab 2   • acetaminophen (TYLENOL) 325 MG Tab Take 2 Tabs by mouth every four hours as needed for Moderate Pain. 30 Tab 0   • aspirin (ASA) 81 MG Chew Tab chewable tablet Take 1 Tab by mouth every day. 30 Tab 2   • ascorbic acid (VITAMIN C) 500 MG tablet Take 1 Tab by mouth every morning with breakfast. 30 Tab 2   • ferrous sulfate 325 (65 Fe) MG tablet Take 1 Tab by mouth every morning with breakfast. 30 Tab 2   • [DISCONTINUED] atorvastatin (LIPITOR) 80 MG tablet Take 1 Tab by mouth every bedtime. 30 Tab 2   • [DISCONTINUED] lisinopril (PRINIVIL) 2.5 MG Tab Take 1 Tab by mouth every day. 30 Tab 2   • [DISCONTINUED] metoprolol (LOPRESSOR) 25 MG Tab Take 1 Tab by mouth 2 Times a Day. 60 Tab 2   • [DISCONTINUED] omeprazole (PRILOSEC) 40 MG delayed-release capsule Take 1 Cap by mouth every day. (Patient not taking: Reported on 11/5/2020) 30 Cap 2   • [DISCONTINUED] hydrOXYzine HCl (ATARAX) 50 MG Tab Take 1 Tab by mouth every 8 hours as needed for Anxiety (or insomnia). (Patient not taking: Reported on 11/5/2020) 90 Tab 0   • [DISCONTINUED] terazosin (HYTRIN) 1 MG Cap Take 3 Caps by mouth every evening. 90 Cap 0   • vitamin D (CHOLECALCIFEROL) 1000 Unit (25 mcg)  "Tab Take 1 Tab by mouth every day. 30 Tab 2   • [DISCONTINUED] melatonin 3 MG Tab Take 2 Tabs by mouth every bedtime. 60 Tab 2     No facility-administered encounter medications on file as of 11/5/2020.      Review of Systems   Constitutional: Negative for malaise/fatigue and weight loss.   Respiratory: Negative for shortness of breath.    Cardiovascular: Negative for chest pain, palpitations, orthopnea, claudication, leg swelling and PND.   Neurological: Negative for dizziness and weakness.        Memory issues   All other systems reviewed and are negative.       Objective:   /80 (BP Location: Left arm, Patient Position: Sitting, BP Cuff Size: Adult)   Pulse 92   Ht 1.575 m (5' 2\")   Wt 62.6 kg (138 lb)   SpO2 98%   BMI 25.24 kg/m²     Physical Exam   Constitutional: He is oriented to person, place, and time. He appears well-developed and well-nourished. No distress.   HENT:   Head: Normocephalic.   Eyes: EOM are normal.   Neck: No JVD present.   Cardiovascular: Normal rate and regular rhythm.   Pulmonary/Chest: Effort normal and breath sounds normal.   Musculoskeletal:         General: No edema.   Neurological: He is alert and oriented to person, place, and time.   Skin: Skin is warm and dry.   Sternotomy incision is healing well without any swelling or erythema.     Right lower extremity graft site incisions are also healing well with no swelling or erythema.    Psychiatric: He has a normal mood and affect. His behavior is normal.     Cardiac Catheterization 9/22/2020:    III. CORONARY ANGIOGRAPHY:  Left Main: Large bifurcating no CAD  Left Anterior Descending: Large continues in a mixed LAD diagonal distribution supplying multiple septals.  There is a an 80% proximal eccentric stenosis.  IFR across this lesion is 0.86 which is abnormal.  Left Circumflex: Moderate caliber nondominant supplies a large first branching obtuse marginal.  This obtuse marginals notable for a 90% proximal stenosis.  Right " Coronary: Large caliber and dominant with a previously placed stent in its proximal and midportion.  There is severe in-stent restenosis of the ostium of 80% and a tandem moderate in-stent restenosis of 50%.  Gives rise to the RPDA and RPL B.  There are PLB is notable for a focal 80% stenosis and the RPDA is notable for a 98% focal stenosis.    CONCLUSIONS:  1.  Multivessel coronary artery disease  2.  Severe ISR previously placed RCA stents  3.  IFR LAD 0.86 due to a proximal stenosis  4.  LVEF 55%     RECOMMENDATIONS:  Recommend multivessel CABG due to diffuse three-vessel CAD and diabetes mellitus as well as failure of previously placed stents with severe in-stent restenosis in a relatively short timeframe.    Echocardiogram 9/22/2020:  CONCLUSIONS  No prior study is available for comparison.   Normal left ventricular systolic function.  Left ventricular ejection fraction is visually estimated to be 60%.  Normal regional wall motion.  Moderate concentric left ventricular hypertrophy.  Enlarged right atrium.  Normal left atrial size.  Trace mitral regurgitation.  The aortic valve is not well visualized.  No aortic stenosis.  Right ventricular systolic pressure is estimated to be 25  mmHg.  Normal pericardium without effusion.    MRI Brain 9/28/2020:  IMPRESSION:    Numerous acute/subacute supratentorial and infratentorial infarcts mostconsistent with cardioembolic etiology.     Several chronic lacunar infarcts.     No acute intracranial hemorrhage.    Assessment:     1. Coronary artery disease involving native coronary artery of native heart without angina pectoris  Comp Metabolic Panel   2. Essential hypertension, benign  Comp Metabolic Panel   3. Mixed hyperlipidemia  LIPID PANEL   4. Iron deficiency anemia due to chronic blood loss  CBC WITHOUT DIFFERENTIAL   5. Vitamin D deficiency  VITAMIN D 25-HYDROXY   6. History of cardioembolic cerebrovascular accident (CVA)  ZIO PATCH MONITOR   7. Cardioembolic stroke  (HCC)     8. Essential hypertension     9. S/P CABG x 3         Medical Decision Making:  Today's Assessment / Status / Plan:     CAD:  -S/P CABG X 3 on 9/25/2020 with Dr. Chicas.  -Denies angina or MIRANDA.  -Preserved LVEF and no WMA per TTE.   -Continue Plavix 75 mg daily, ASA 81 mg daily, Lopressor 25 mg BID, statin therapy and lisinopril 2.5 mg daily.     HTN:  -Stable.    HLD:  -Previously poorly controlled. Admits to not taking statins before his CABG. Continue Atorvastatin 80 mg daily and repeat lipid panel and CMP in 4-6 weeks.     Cardioembolic CVA:  -30 day Biotel monitor to screen for PAF ordered.     Iron Deficiency Anemia:  -On Iron, repeat CBC in 4-6 weeks.     Patient will follow up with Dr. Mead in 3 months or earlier if needed. Encouraged patient to contact our office should any questions or concerns arise in the mean time. Patient understands and agrees with the plan of care.     Future Appointments   Date Time Provider Department Center   11/16/2020  1:15 PM Blanca Cooney P.A.-C. [facility] None   12/8/2020 11:30 AM CARDIAC EVENT MONITOR-[facility] None   1/5/2021 12:30 PM Cheryl Mayberry P.A.-C. [facility]   2/4/2021 12:55 PM Marilu Franco D.O. [facility] None   2/8/2021 11:00 AM Hebert Mead M.D. [facility] None

## 2020-11-04 ENCOUNTER — OFFICE VISIT (OUTPATIENT)
Dept: PHYSICAL MEDICINE AND REHAB | Facility: REHABILITATION | Age: 50
End: 2020-11-04
Payer: MEDICAID

## 2020-11-04 VITALS
DIASTOLIC BLOOD PRESSURE: 68 MMHG | WEIGHT: 155 LBS | HEART RATE: 83 BPM | SYSTOLIC BLOOD PRESSURE: 104 MMHG | BODY MASS INDEX: 28.52 KG/M2 | TEMPERATURE: 98.6 F | HEIGHT: 62 IN | OXYGEN SATURATION: 98 %

## 2020-11-04 DIAGNOSIS — R53.81 DEBILITY: ICD-10-CM

## 2020-11-04 DIAGNOSIS — N31.9 NEUROGENIC BLADDER: ICD-10-CM

## 2020-11-04 DIAGNOSIS — E55.9 VITAMIN D DEFICIENCY: ICD-10-CM

## 2020-11-04 DIAGNOSIS — Z95.1 HX OF CABG: ICD-10-CM

## 2020-11-04 DIAGNOSIS — R45.89 DEPRESSED MOOD: ICD-10-CM

## 2020-11-04 DIAGNOSIS — I63.9 CARDIOEMBOLIC STROKE (HCC): Primary | ICD-10-CM

## 2020-11-04 PROCEDURE — 99214 OFFICE O/P EST MOD 30 MIN: CPT | Performed by: PHYSICAL MEDICINE & REHABILITATION

## 2020-11-04 ASSESSMENT — PATIENT HEALTH QUESTIONNAIRE - PHQ9: CLINICAL INTERPRETATION OF PHQ2 SCORE: 0

## 2020-11-04 ASSESSMENT — ENCOUNTER SYMPTOMS
PALPITATIONS: 0
HEADACHES: 0
BRUISES/BLEEDS EASILY: 0
TINGLING: 0
SHORTNESS OF BREATH: 0
DEPRESSION: 1
WEAKNESS: 1
ORTHOPNEA: 0
DIARRHEA: 0
CHILLS: 0
COUGH: 0
FALLS: 1
CONSTIPATION: 0
SORE THROAT: 0
FEVER: 0
MEMORY LOSS: 0

## 2020-11-04 ASSESSMENT — FIBROSIS 4 INDEX: FIB4 SCORE: 0.4
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
## 2020-11-05 ENCOUNTER — OFFICE VISIT (OUTPATIENT)
Dept: CARDIOLOGY | Facility: MEDICAL CENTER | Age: 50
End: 2020-11-05
Payer: MEDICAID

## 2020-11-05 VITALS
OXYGEN SATURATION: 98 % | SYSTOLIC BLOOD PRESSURE: 122 MMHG | BODY MASS INDEX: 25.4 KG/M2 | HEART RATE: 92 BPM | HEIGHT: 62 IN | DIASTOLIC BLOOD PRESSURE: 80 MMHG | WEIGHT: 138 LBS

## 2020-11-05 DIAGNOSIS — I63.9 CARDIOEMBOLIC STROKE (HCC): ICD-10-CM

## 2020-11-05 DIAGNOSIS — E55.9 VITAMIN D DEFICIENCY: ICD-10-CM

## 2020-11-05 DIAGNOSIS — I25.10 CORONARY ARTERY DISEASE INVOLVING NATIVE CORONARY ARTERY OF NATIVE HEART WITHOUT ANGINA PECTORIS: ICD-10-CM

## 2020-11-05 DIAGNOSIS — Z86.73 HISTORY OF CARDIOEMBOLIC CEREBROVASCULAR ACCIDENT (CVA): ICD-10-CM

## 2020-11-05 DIAGNOSIS — I10 ESSENTIAL HYPERTENSION, BENIGN: ICD-10-CM

## 2020-11-05 DIAGNOSIS — I10 ESSENTIAL HYPERTENSION: ICD-10-CM

## 2020-11-05 DIAGNOSIS — D50.0 IRON DEFICIENCY ANEMIA DUE TO CHRONIC BLOOD LOSS: ICD-10-CM

## 2020-11-05 DIAGNOSIS — E78.2 MIXED HYPERLIPIDEMIA: ICD-10-CM

## 2020-11-05 DIAGNOSIS — Z95.1 S/P CABG X 3: ICD-10-CM

## 2020-11-05 PROCEDURE — 99214 OFFICE O/P EST MOD 30 MIN: CPT | Performed by: NURSE PRACTITIONER

## 2020-11-05 RX ORDER — ATORVASTATIN CALCIUM 80 MG/1
80 TABLET, FILM COATED ORAL
Qty: 90 TAB | Refills: 3 | Status: SHIPPED | OUTPATIENT
Start: 2020-11-05 | End: 2021-02-09 | Stop reason: SDUPTHER

## 2020-11-05 RX ORDER — LISINOPRIL 2.5 MG/1
2.5 TABLET ORAL DAILY
Qty: 90 TAB | Refills: 3 | Status: SHIPPED | OUTPATIENT
Start: 2020-11-05 | End: 2020-12-01

## 2020-11-05 ASSESSMENT — ENCOUNTER SYMPTOMS
WEIGHT LOSS: 0
PND: 0
WEAKNESS: 0
SHORTNESS OF BREATH: 0
CLAUDICATION: 0
DIZZINESS: 0
ORTHOPNEA: 0
PALPITATIONS: 0

## 2020-11-05 ASSESSMENT — FIBROSIS 4 INDEX: FIB4 SCORE: 0.4

## 2020-11-12 NOTE — PROGRESS NOTES
Chief Complaint   Patient presents with   • Establish Care   • Medication Management       HISTORY OF PRESENT ILLNESS: Guerrero So Lauro Ann is an established 50 y.o. male here to discuss the evaluation and management of:    This is a pleasant 50-year-old male here today to establish care.  Patient recently mated to [facility] on 921 with chest pain.  Per chart review patient was admitted for acute coronary syndrome/NSTEMI.  Patient went to the Cath Lab and was found to have multivessel coronary artery disease and in-stent stenosis.  Patient is status post CABG x3. During Hospitalization patient experienced cardioembolic stroke.  Patient was discharged yesterday.  He tells me that he is feeling weak.  He tells me that he will be starting physical therapy in the near future and is working closely with the .  He will also be doing speech therapy.  Patient was found to have type 2 diabetes and was prescribed Metformin 500 mg twice daily and insulin.  Patient states he did not start insulin.  He is inquiring about other treatment options.  Glycohemoglobin on 1/22/2020 was 7.9.      Patient Active Problem List    Diagnosis Date Noted   • Aspiration pneumonia ([facility]) 10/02/2020     Priority: Medium   • Ileus ([facility]) 10/02/2020     Priority: Medium   • Lacunar infarct, acute ([facility]) 09/29/2020     Priority: Medium   • Cardioembolic stroke ([facility]) 09/29/2020     Priority: Medium   • Altered mental status 09/28/2020     Priority: Medium   • SONNY (acute kidney injury) ([facility]) 09/23/2020     Priority: Medium   • NSTEMI (non-ST elevated myocardial infarction) ([facility]) 09/22/2020     Priority: Medium   • Coronary artery disease involving native coronary artery of native heart without angina pectoris 12/22/2018     Priority: Medium   • Type 2 diabetes mellitus with complication, without long-term current use of insulin ([facility]) 05/29/2018     Priority: Medium   • Essential hypertension 05/29/2018      Priority: Medium   • Normochromic normocytic anemia 09/28/2020     Priority: Low   • S/P CABG x 3 11/05/2020   • Urinary retention 10/20/2020   • Adjustment disorder with depressed mood 10/15/2020   • Thrombocytosis ([facility]) 10/12/2020   • Anxiety 10/11/2020   • Vitamin D deficiency 10/10/2020   • Azotemia 10/10/2020   • Hyponatremia 10/10/2020   • Iron deficiency anemia 10/10/2020   • Pneumonia 10/09/2020   • Tachycardia 10/09/2020   • Impaired mobility and ADLs 10/09/2020   • Reactive depression 10/09/2020   • Diabetes mellitus due to underlying condition with hyperglycemia ([facility]) 10/09/2020   • Acute non-ST elevation myocardial infarction (NSTEMI) ([facility]) 05/29/2018   • Family history of stroke or transient ischemic attack in mother 05/29/2018       Allergies:Isosorbide    Current Outpatient Medications   Medication Sig Dispense Refill   • clopidogrel (PLAVIX) 75 MG Tab Take 1 Tab by mouth every day. 90 Tab 1   • metFORMIN (GLUCOPHAGE) 500 MG Tab Take 2 tablets by mouth twice daily. 180 Tab 3   • sertraline (ZOLOFT) 50 MG Tab Take 1 Tab by mouth every day for 7 days, THEN 2 Tabs every day for 23 days. 60 Tab 2   • acetaminophen (TYLENOL) 325 MG Tab Take 2 Tabs by mouth every four hours as needed for Moderate Pain. 30 Tab 0   • aspirin (ASA) 81 MG Chew Tab chewable tablet Take 1 Tab by mouth every day. 30 Tab 2   • ascorbic acid (VITAMIN C) 500 MG tablet Take 1 Tab by mouth every morning with breakfast. 30 Tab 2   • ferrous sulfate 325 (65 Fe) MG tablet Take 1 Tab by mouth every morning with breakfast. 30 Tab 2   • vitamin D (CHOLECALCIFEROL) 1000 Unit (25 mcg) Tab Take 1 Tab by mouth every day. 30 Tab 2   • ALLOPURINOL PO Take  by mouth.     • metoprolol (LOPRESSOR) 25 MG Tab Take 1 Tab by mouth 2 Times a Day. 180 Tab 3   • lisinopril (PRINIVIL) 2.5 MG Tab Take 1 Tab by mouth every day. 90 Tab 3   • atorvastatin (LIPITOR) 80 MG tablet Take 1 Tab by mouth every bedtime. 90 Tab 3     No current facility-administered  "medications for this visit.        Social History     Tobacco Use   • Smoking status: Never Smoker   • Smokeless tobacco: Former User     Types: Chew   Substance Use Topics   • Alcohol use: Not Currently     Comment: 2 per week   • Drug use: No       Family Status   Relation Name Status   • Mo     • Fa  (Not Specified)   • MGMo     • MGFa       Family History   Problem Relation Age of Onset   • Stroke Mother 47        had 3 devastating strokes,  54yo stroke complications   • Heart Disease Mother    • No Known Problems Father         does not know his father.   • Stroke Maternal Grandmother 85   • Heart Disease Maternal Grandmother    • Stroke Maternal Grandfather 54   • Heart Disease Maternal Grandfather        ROS:  Review of Systems   Constitutional: Negative for fever, chills, weight loss and malaise/fatigue.   HENT: Negative for ear pain, nosebleeds, congestion, sore throat and neck pain.    Eyes: Negative for blurred vision.   Respiratory: Negative for cough, sputum production, shortness of breath and wheezing.    Cardiovascular: Negative for chest pain, palpitations, orthopnea and leg swelling.   Gastrointestinal: Negative for heartburn, nausea, vomiting and abdominal pain.   Genitourinary: Negative for dysuria, urgency and frequency.   Musculoskeletal: Negative for myalgias, back pain and joint pain.   Skin: Negative for rash and itching.   Neurological: Negative for dizziness, tingling, tremors, sensory change, focal weakness and headaches.   Endo/Heme/Allergies: Does not bruise/bleed easily.   Psychiatric/Behavioral: Negative for depression, suicidal ideas and memory loss.  The patient is not nervous/anxious and does not have insomnia.    All other systems reviewed and are negative except as in HPI.    Exam: /82 (BP Location: Left arm, Patient Position: Sitting, BP Cuff Size: Adult)   Pulse 70   Temp 36.7 °C (98 °F) (Temporal)   Resp 16   Ht 1.575 m (5' 2\")   Wt 62.8 " kg (138 lb 6.4 oz)   SpO2 99%  Body mass index is 25.31 kg/m².  General: Normal appearing. No distress.  HEENT: Normocephalic. Eyes conjunctiva clear lids without ptosis, ears normal shape and contour.  Neck: Supple without JVD. Thyroid is not enlarged.  Pulmonary: Clear to ausculation.  Normal effort. No rales, ronchi, or wheezing.  Cardiovascular: Regular rate and rhythm with murmur  Abdomen: Soft, nontender, nondistended.   Neurologic: Grossly nonfocal.  Cranial nerves are normal.   Skin: Warm and dry.  No rashes or suspicious skin lesions.  Musculoskeletal: Normal gait. No extremity cyanosis, clubbing, or edema.  Psych: Normal mood and affect. Alert and oriented x3. Judgment and insight is normal.    Medical decision-making and discussion:  1. Coronary artery disease involving native heart, angina presence unspecified, unspecified vessel or lesion type  Chronic stable problem.  Continue current medication regimen.  Refilled Plavix for patient.  Advised patient continue following up with cardiology.  Discussed importance of a healthy diet with patient.    - clopidogrel (PLAVIX) 75 MG Tab; Take 1 Tab by mouth every day.  Dispense: 90 Tab; Refill: 1    2. Cardioembolic stroke (HCC)  Chronic but Stable problem.  Continue current medication regimen.  Continue following up with cardiology.  Continue to monitor.  Discussed emergency room cautions in great detail with patient.    - clopidogrel (PLAVIX) 75 MG Tab; Take 1 Tab by mouth every day.  Dispense: 90 Tab; Refill: 1    3. Type 2 diabetes mellitus with complication, without long-term current use of insulin (HCC)  Discontinued insulin.  Advised patient to increase Metformin 500 mg twice a day to 1000 mg twice a day.  Patient agreed to plan.  Continue working on diet and living an active lifestyle.  Patient will repeat lab work prior to follow-up appointment 3 months.  We will discuss lab results and follow-up appointment.    4. S/P CABG x 3  5. Acute non-ST  elevation myocardial infarction (NSTEMI) (HCC)  Patient is following up with cardiology closely.  Continue medications as prescribed.  Discussed emergency room precautions with patient.  Continue to monitor.    Please note that this dictation was created using voice recognition software. I have made every reasonable attempt to correct obvious errors, but I expect that there are errors of grammar and possibly content that I did not discover before finalizing the note.    Assessment/Plan:  1. Coronary artery disease involving native heart, angina presence unspecified, unspecified vessel or lesion type  clopidogrel (PLAVIX) 75 MG Tab   2. Cardioembolic stroke (HCC)  clopidogrel (PLAVIX) 75 MG Tab   3. Type 2 diabetes mellitus with complication, without long-term current use of insulin (HCC)  metFORMIN (GLUCOPHAGE) 500 MG Tab    MICROALB/CREAT RATIO RAND. UR    HEMOGLOBIN A1C   4. S/P CABG x 3     5. Acute non-ST elevation myocardial infarction (NSTEMI) ([facility])         Return in about 3 months (around 1/22/2021).

## 2020-11-16 ENCOUNTER — OFFICE VISIT (OUTPATIENT)
Dept: CARDIOTHORACIC SURGERY | Facility: MEDICAL CENTER | Age: 50
End: 2020-11-16
Payer: COMMERCIAL

## 2020-11-16 VITALS
SYSTOLIC BLOOD PRESSURE: 132 MMHG | HEART RATE: 73 BPM | DIASTOLIC BLOOD PRESSURE: 80 MMHG | OXYGEN SATURATION: 97 % | WEIGHT: 140 LBS | TEMPERATURE: 98.3 F | HEIGHT: 62 IN | BODY MASS INDEX: 25.76 KG/M2

## 2020-11-16 DIAGNOSIS — Z98.890 POST-OPERATIVE STATE: ICD-10-CM

## 2020-11-16 PROCEDURE — 99024 POSTOP FOLLOW-UP VISIT: CPT | Performed by: PHYSICIAN ASSISTANT

## 2020-11-16 ASSESSMENT — FIBROSIS 4 INDEX: FIB4 SCORE: 0.4

## 2020-11-16 NOTE — PROGRESS NOTES
"CHIEF COMPLAINT: Post-op visit     PROCEDURE: 9/25/2020  Coronary artery bypass grafting x3, left internal   mammary to left anterior descending, reverse saphenous vein graft to the major obtuse marginal branch and the right posterior descending, endoscopic saphenous vein harvesting, extracorporal circulation.    HPI: Mr. Ann returns today for cardiac follow up. He is accompanied by his girlfriend at today's visit. His post operative course was complicated by CVA and ileus.  He was discharge to acute rehab from 10/9-10/21.  He states that his main problem is with memory, which is improving per him and his girlfriend.  He has been in [facility].  He also states he has some right foot numbness   He is able to walk 2.5 miles.  This can aggravate his right foot pain, but is relieved by Tylenol and rest.  Denies dizziness, syncope or near syncope.  No chest pressure or shortness of breath.    /80 (BP Location: Left arm, Patient Position: Sitting, BP Cuff Size: Adult)   Pulse 73   Temp 36.8 °C (98.3 °F) (Temporal)   Ht 1.575 m (5' 2\")   Wt 63.5 kg (140 lb)   SpO2 97%     PHYSICAL EXAM:  CARDIAC: S1, S2, no murmurs, gallops, rub  PULMONARY: CTA bilaterally  SKIN: no edema  INCISIONS: Sternum stable, wounds well healed    PLAN:   We discussed continued post operative sternal precautions as well as driving restrictions moving forward.  He continues to do physical therapy and neuro rehabilitation.  We discussed options for leg pain.  If any weakness/numbness or foot drag make sure to alert neurology.  He has gained 5lbs since his visit with Cardiology - I advised weighing himself daily and alerting if more than 2 lbs overnight.     Continue plavix for 3 months or per your cardiologist's recommendations.    Overall, we are very pleased with his progress and have instructed him to follow-up with us in the future should he have any concerns related to surgery.  Otherwise, we will see him on a PRN basis. " He will continue to follow-up with his cardiologist and primary care physician. He has been informed that any further prescription refills should be done through primary care and/or cardiology.  He acknowledged understanding.      Thank you for allowing us to participate in the care of this very pleasant patient and please let us know if there is any way we may be of further assistance.

## 2020-11-23 ENCOUNTER — NON-PROVIDER VISIT (OUTPATIENT)
Dept: MEDICAL GROUP | Facility: PHYSICIAN GROUP | Age: 50
End: 2020-11-23
Payer: COMMERCIAL

## 2020-11-23 VITALS — SYSTOLIC BLOOD PRESSURE: 146 MMHG | DIASTOLIC BLOOD PRESSURE: 96 MMHG

## 2020-11-30 ENCOUNTER — PATIENT MESSAGE (OUTPATIENT)
Dept: MEDICAL GROUP | Facility: PHYSICIAN GROUP | Age: 50
End: 2020-11-30

## 2020-11-30 ENCOUNTER — PATIENT MESSAGE (OUTPATIENT)
Dept: CARDIOLOGY | Facility: MEDICAL CENTER | Age: 50
End: 2020-11-30

## 2020-11-30 DIAGNOSIS — E11.8 TYPE 2 DIABETES MELLITUS WITH COMPLICATION, WITHOUT LONG-TERM CURRENT USE OF INSULIN (HCC): ICD-10-CM

## 2020-11-30 DIAGNOSIS — I25.10 CORONARY ARTERY DISEASE INVOLVING NATIVE CORONARY ARTERY OF NATIVE HEART WITHOUT ANGINA PECTORIS: ICD-10-CM

## 2020-11-30 DIAGNOSIS — I10 ESSENTIAL HYPERTENSION, BENIGN: ICD-10-CM

## 2020-11-30 DIAGNOSIS — I10 ESSENTIAL HYPERTENSION: ICD-10-CM

## 2020-11-30 RX ORDER — LANCETS 30 GAUGE
EACH MISCELLANEOUS
Qty: 100 EACH | Refills: 3 | Status: SHIPPED | OUTPATIENT
Start: 2020-11-30

## 2020-12-01 RX ORDER — LISINOPRIL 2.5 MG/1
20 TABLET ORAL DAILY
Qty: 120 TAB | Refills: 0 | COMMUNITY
Start: 2020-12-01 | End: 2021-01-27

## 2020-12-01 NOTE — PATIENT COMMUNICATION
KIKE Fitzgerald.  You 1 hour ago (10:26 AM)     Increase lisinopril to 5 mg BID and repeat BMP in 7-10 days.     Fu with me in one month for BP check.     JS          MAR updated, BMP order placed.

## 2020-12-08 ENCOUNTER — NON-PROVIDER VISIT (OUTPATIENT)
Dept: CARDIOLOGY | Facility: MEDICAL CENTER | Age: 50
End: 2020-12-08
Attending: NURSE PRACTITIONER
Payer: COMMERCIAL

## 2020-12-08 ENCOUNTER — TELEPHONE (OUTPATIENT)
Dept: CARDIOLOGY | Facility: MEDICAL CENTER | Age: 50
End: 2020-12-08

## 2020-12-08 DIAGNOSIS — Z86.73 HISTORY OF CARDIOEMBOLIC CEREBROVASCULAR ACCIDENT (CVA): ICD-10-CM

## 2020-12-08 NOTE — TELEPHONE ENCOUNTER
Patient enrolled in the 30 day [employer] Heart monitoring program per Meera Hicks, APRN.  >In office hookup with Baseline transmitted.  >Pending EOS.

## 2020-12-24 DIAGNOSIS — M10.9 GOUT, UNSPECIFIED CAUSE, UNSPECIFIED CHRONICITY, UNSPECIFIED SITE: ICD-10-CM

## 2020-12-24 RX ORDER — ALLOPURINOL 100 MG/1
100 TABLET ORAL DAILY
Qty: 90 TAB | Refills: 3 | Status: SHIPPED | OUTPATIENT
Start: 2020-12-24 | End: 2021-11-04 | Stop reason: SDUPTHER

## 2020-12-29 ENCOUNTER — HOSPITAL ENCOUNTER (OUTPATIENT)
Dept: LAB | Facility: MEDICAL CENTER | Age: 50
End: 2020-12-29
Attending: PHYSICIAN ASSISTANT
Payer: COMMERCIAL

## 2020-12-29 ENCOUNTER — HOSPITAL ENCOUNTER (OUTPATIENT)
Dept: LAB | Facility: MEDICAL CENTER | Age: 50
End: 2020-12-29
Attending: NURSE PRACTITIONER
Payer: COMMERCIAL

## 2020-12-29 DIAGNOSIS — D50.0 IRON DEFICIENCY ANEMIA DUE TO CHRONIC BLOOD LOSS: ICD-10-CM

## 2020-12-29 DIAGNOSIS — E11.8 TYPE 2 DIABETES MELLITUS WITH COMPLICATION, WITHOUT LONG-TERM CURRENT USE OF INSULIN (HCC): ICD-10-CM

## 2020-12-29 DIAGNOSIS — I10 ESSENTIAL HYPERTENSION, BENIGN: ICD-10-CM

## 2020-12-29 DIAGNOSIS — I25.10 CORONARY ARTERY DISEASE INVOLVING NATIVE CORONARY ARTERY OF NATIVE HEART WITHOUT ANGINA PECTORIS: ICD-10-CM

## 2020-12-29 LAB
25(OH)D3 SERPL-MCNC: 40 NG/ML (ref 30–100)
ALBUMIN SERPL BCP-MCNC: 4.5 G/DL (ref 3.2–4.9)
ALBUMIN/GLOB SERPL: 1.4 G/DL
ALP SERPL-CCNC: 101 U/L (ref 30–99)
ALT SERPL-CCNC: 19 U/L (ref 2–50)
ANION GAP SERPL CALC-SCNC: 15 MMOL/L (ref 7–16)
AST SERPL-CCNC: 12 U/L (ref 12–45)
BILIRUB SERPL-MCNC: 0.2 MG/DL (ref 0.1–1.5)
BUN SERPL-MCNC: 18 MG/DL (ref 8–22)
CALCIUM SERPL-MCNC: 9.4 MG/DL (ref 8.5–10.5)
CHLORIDE SERPL-SCNC: 101 MMOL/L (ref 96–112)
CHOLEST SERPL-MCNC: 106 MG/DL (ref 100–199)
CO2 SERPL-SCNC: 22 MMOL/L (ref 20–33)
CREAT SERPL-MCNC: 0.83 MG/DL (ref 0.5–1.4)
CREAT UR-MCNC: 55.17 MG/DL
ERYTHROCYTE [DISTWIDTH] IN BLOOD BY AUTOMATED COUNT: 44.1 FL (ref 35.9–50)
EST. AVERAGE GLUCOSE BLD GHB EST-MCNC: 189 MG/DL
FASTING STATUS PATIENT QL REPORTED: NORMAL
GLOBULIN SER CALC-MCNC: 3.3 G/DL (ref 1.9–3.5)
GLUCOSE SERPL-MCNC: 198 MG/DL (ref 65–99)
HBA1C MFR BLD: 8.2 % (ref 0–5.6)
HCT VFR BLD AUTO: 41.6 % (ref 42–52)
HDLC SERPL-MCNC: 31 MG/DL
HGB BLD-MCNC: 13.4 G/DL (ref 14–18)
LDLC SERPL CALC-MCNC: 42 MG/DL
MCH RBC QN AUTO: 28.9 PG (ref 27–33)
MCHC RBC AUTO-ENTMCNC: 32.2 G/DL (ref 33.7–35.3)
MCV RBC AUTO: 89.7 FL (ref 81.4–97.8)
MICROALBUMIN UR-MCNC: 8.9 MG/DL
MICROALBUMIN/CREAT UR: 161 MG/G (ref 0–30)
PLATELET # BLD AUTO: 268 K/UL (ref 164–446)
PMV BLD AUTO: 11 FL (ref 9–12.9)
POTASSIUM SERPL-SCNC: 4 MMOL/L (ref 3.6–5.5)
PROT SERPL-MCNC: 7.8 G/DL (ref 6–8.2)
RBC # BLD AUTO: 4.64 M/UL (ref 4.7–6.1)
SODIUM SERPL-SCNC: 138 MMOL/L (ref 135–145)
TRIGL SERPL-MCNC: 166 MG/DL (ref 0–149)
WBC # BLD AUTO: 10.2 K/UL (ref 4.8–10.8)

## 2020-12-29 PROCEDURE — 82570 ASSAY OF URINE CREATININE: CPT

## 2020-12-29 PROCEDURE — 80053 COMPREHEN METABOLIC PANEL: CPT

## 2020-12-29 PROCEDURE — 83036 HEMOGLOBIN GLYCOSYLATED A1C: CPT

## 2020-12-29 PROCEDURE — 85027 COMPLETE CBC AUTOMATED: CPT

## 2020-12-29 PROCEDURE — 36415 COLL VENOUS BLD VENIPUNCTURE: CPT

## 2020-12-29 PROCEDURE — 80061 LIPID PANEL: CPT

## 2020-12-29 PROCEDURE — 82043 UR ALBUMIN QUANTITATIVE: CPT

## 2020-12-29 PROCEDURE — 82306 VITAMIN D 25 HYDROXY: CPT

## 2020-12-31 ENCOUNTER — TELEPHONE (OUTPATIENT)
Dept: MEDICAL GROUP | Facility: PHYSICIAN GROUP | Age: 50
End: 2020-12-31

## 2020-12-31 NOTE — TELEPHONE ENCOUNTER
ESTABLISHED PATIENT PRE-VISIT PLANNING     Patient was NOT contacted to complete PVP.     Note: Patient will not be contacted if there is no indication to call.     1.  Reviewed notes from the last few office visits within the medical group: Yes    2.  If any orders were placed at last visit or intended to be done for this visit (i.e. 6 mos follow-up), do we have Results/Consult Notes?         •  Labs - Labs ordered, completed on 12/29/2020 and results are in chart.  Note: If patient appointment is for lab review and patient did not complete labs, check with provider if OK to reschedule patient until labs completed.       •  Imaging - Imaging was not ordered at last office visit.       •  Referrals - No referrals were ordered at last office visit.    3. Is this appointment scheduled as a Hospital Follow-Up? No    4.  Immunizations were updated in Epic using Reconcile Outside Information activity? No    5.  Patient is due for the following Health Maintenance Topics:   Health Maintenance Due   Topic Date Due   • DIABETES MONOFILAMENT / LE EXAM  04/22/1972   • RETINAL SCREENING  10/22/1989   • IMM HEP B VACCINE (1 of 3 - Risk 3-dose series) 10/22/1990   • IMM DTaP/Tdap/Td Vaccine (1 - Tdap) 10/22/1990   • IMM INFLUENZA (1) 09/01/2020       - Patient plans to schedule appointment for Immunizations: FLU, HEPATITIS B and TDAP and Retinal Eye Exam.    6.  AHA (Pulse8) form printed for Provider? N/A

## 2021-01-05 ENCOUNTER — PATIENT MESSAGE (OUTPATIENT)
Dept: CARDIOLOGY | Facility: MEDICAL CENTER | Age: 51
End: 2021-01-05

## 2021-01-05 ENCOUNTER — APPOINTMENT (OUTPATIENT)
Dept: URGENT CARE | Facility: CLINIC | Age: 51
End: 2021-01-05
Payer: COMMERCIAL

## 2021-01-05 ENCOUNTER — APPOINTMENT (OUTPATIENT)
Dept: MEDICAL GROUP | Facility: PHYSICIAN GROUP | Age: 51
End: 2021-01-05
Payer: COMMERCIAL

## 2021-01-05 ENCOUNTER — HOSPITAL ENCOUNTER (EMERGENCY)
Facility: MEDICAL CENTER | Age: 51
End: 2021-01-05
Attending: EMERGENCY MEDICINE
Payer: COMMERCIAL

## 2021-01-05 VITALS
TEMPERATURE: 99.3 F | HEART RATE: 89 BPM | HEIGHT: 62 IN | DIASTOLIC BLOOD PRESSURE: 88 MMHG | OXYGEN SATURATION: 96 % | BODY MASS INDEX: 26.49 KG/M2 | SYSTOLIC BLOOD PRESSURE: 137 MMHG | WEIGHT: 143.96 LBS | RESPIRATION RATE: 20 BRPM

## 2021-01-05 DIAGNOSIS — M79.2 NEUROPATHIC PAIN: ICD-10-CM

## 2021-01-05 DIAGNOSIS — T14.8XXA NERVE INJURY: ICD-10-CM

## 2021-01-05 PROCEDURE — 99282 EMERGENCY DEPT VISIT SF MDM: CPT

## 2021-01-05 RX ORDER — GABAPENTIN 300 MG/1
CAPSULE ORAL
Qty: 90 CAP | Refills: 1 | Status: SHIPPED | OUTPATIENT
Start: 2021-01-05 | End: 2021-02-04

## 2021-01-05 ASSESSMENT — FIBROSIS 4 INDEX: FIB4 SCORE: 0.5

## 2021-01-05 NOTE — ED TRIAGE NOTES
Chief Complaint   Patient presents with   • Post-Op Pain     Pt states that he has had right foot pain since having CABG 9/25/20.  Denies other complaints.  Triage process explained to patient.  Pt instructed to inform RN if any changes or questions arise.  Pt back to waiting room.

## 2021-01-05 NOTE — ED PROVIDER NOTES
"ED Provider Note    CHIEF COMPLAINT  Chief Complaint   Patient presents with   • Post-Op Pain       HPI  Guerrero Ann is a 50 y.o. male who presents with neuropathic pain that is severe keeping him awake at night involving the sole of his right foot.  He was told that his nerve was injured during vein harvest for coronary bypass graft surgery in September.  He now has Medicaid and his primary physician will no longer see him.  He went to urgent care and they would not prescribe medication for his pain.  He has tried Aspercreme without benefit.  He cannot take NSAIDs.  He has diabetes but has no dysesthesias in the left foot.  Denies trauma fever or redness.    REVIEW OF SYSTEMS  Pertinent positives include: Neuropathic pain at right sole of foot, operative injury.  Pertinent negatives include: Fever, redness, swelling, trauma.    PAST MEDICAL HISTORY  Past Medical History:   Diagnosis Date   • Diabetes (HCC)     oral meds only   • Hypertension    • Kidney stones    • MI, old    • Pneumonia    • Psychiatric problem     depression and anxiety   • Stroke (HCC)        SOCIAL HISTORY  Social History     Tobacco Use   • Smoking status: Never Smoker   • Smokeless tobacco: Former User     Types: Chew   Substance Use Topics   • Alcohol use: Not Currently     Comment: 2 per week   • Drug use: No     .    CURRENT MEDICATIONS  Home Medications    **Home medications have not yet been reviewed for this encounter**         ALLERGIES  Allergies   Allergen Reactions   • Isosorbide Unspecified     Hallucinations       PHYSICAL EXAM  VITAL SIGNS: BP (!) 161/99   Pulse 92   Temp 36.7 °C (98.1 °F) (Temporal)   Resp 16   Ht 1.575 m (5' 2\")   Wt 65.3 kg (143 lb 15.4 oz)   SpO2 96%   BMI 26.33 kg/m² . Reviewed and hypertensive  Constitutional :  Well developed, Well nourished, well-appearing.    Cardiovascular: Dorsalis pedis pulse 2+ in the leg, no dependent edema, no varicose veins  Respiratory: Unlabored " respirations  Skin: Warm, dry, no erythema, no rash.   Musculoskeletal: No tenderness to the sole of the foot despite pain    COURSE & MEDICAL DECISION MAKING  This patient presents with right foot neuropathic pain from known nerve injury.  He also has diabetes but it is unlikely his neuropathy is due to this.  Medicaid formulary reviewed.    This patient has borderline or elevated blood pressure as recorded above and was instructed to followup with primary physician for comprehensive blood pressure evaluation and yearly fasting cholesterol assessment according to to CMS protocol.    PLAN:  New Prescriptions    GABAPENTIN (NEURONTIN) 300 MG CAP    Start 1 tablet 3 times a day and every 2 days add an extra tablet until you are taking 2 tablets 3 times a day.     Neuropathic pain handout given    [address] [id number] [phone]        [facility] to Health Care (Healthcare Assistance)  [address] [place] [address], [address]  [facility] [id number] [phone]  Schedule an appointment as soon as possible for a visit   with one of the two clinics      CONDITION:  Good.    FINAL IMPRESSION:  1. Neuropathic pain    2. Nerve injury          Electronically signed by: Armani Avalos M.D., 1/5/2021     (0) independent

## 2021-01-27 ENCOUNTER — OFFICE VISIT (OUTPATIENT)
Dept: CARDIOLOGY | Facility: MEDICAL CENTER | Age: 51
End: 2021-01-27
Payer: COMMERCIAL

## 2021-01-27 VITALS
HEART RATE: 81 BPM | OXYGEN SATURATION: 96 % | SYSTOLIC BLOOD PRESSURE: 160 MMHG | DIASTOLIC BLOOD PRESSURE: 102 MMHG | HEIGHT: 62 IN | RESPIRATION RATE: 24 BRPM | BODY MASS INDEX: 27.12 KG/M2 | WEIGHT: 147.4 LBS

## 2021-01-27 DIAGNOSIS — I10 ESSENTIAL HYPERTENSION, BENIGN: ICD-10-CM

## 2021-01-27 DIAGNOSIS — I25.10 CORONARY ARTERY DISEASE INVOLVING NATIVE HEART, ANGINA PRESENCE UNSPECIFIED, UNSPECIFIED VESSEL OR LESION TYPE: ICD-10-CM

## 2021-01-27 DIAGNOSIS — I63.9 CARDIOEMBOLIC STROKE (HCC): ICD-10-CM

## 2021-01-27 DIAGNOSIS — M79.2 NEUROPATHIC PAIN OF LOWER EXTREMITY, RIGHT: ICD-10-CM

## 2021-01-27 PROCEDURE — 99214 OFFICE O/P EST MOD 30 MIN: CPT | Performed by: INTERNAL MEDICINE

## 2021-01-27 RX ORDER — LISINOPRIL 40 MG/1
40 TABLET ORAL DAILY
Qty: 90 TAB | Refills: 3 | Status: SHIPPED | OUTPATIENT
Start: 2021-01-27 | End: 2021-09-14

## 2021-01-27 RX ORDER — CLOPIDOGREL BISULFATE 75 MG/1
75 TABLET ORAL DAILY
Qty: 90 TAB | Refills: 3 | Status: SHIPPED | OUTPATIENT
Start: 2021-01-27 | End: 2022-02-16 | Stop reason: SDUPTHER

## 2021-01-27 RX ORDER — CALCIUM CITRATE/VITAMIN D3 200MG-6.25
TABLET ORAL
COMMUNITY
Start: 2020-11-30 | End: 2021-01-27 | Stop reason: SDUPTHER

## 2021-01-27 RX ORDER — CALCIUM CITRATE/VITAMIN D3 200MG-6.25
TABLET ORAL
Qty: 100 STRIP | Refills: 3 | Status: SHIPPED | OUTPATIENT
Start: 2021-01-27 | End: 2021-01-29

## 2021-01-27 ASSESSMENT — FIBROSIS 4 INDEX: FIB4 SCORE: 0.5

## 2021-01-28 NOTE — PROGRESS NOTES
Chief Complaint   Patient presents with   • Coronary Artery Disease     F/V Dx: Coronary artery disease involving native coronary artery of native heart without angina pectoris   • MI (Non ST Segment Elevation MI)   • Coronary Artery Bypass Graft (CABG)   • Tachycardia       Subjective:   Guerrero Ann is a 50 y.o. male patient of Dr. Chacon who presents today for fu after CABG. Underwent recent CABG for ACS complicated by post-bypass CVA and neuropathic RLE pain. Taking medicaitons as appropriate, noted BPs 160-180's at home. NO CV symptoms. C/O significant RLE pain at graft harvest site persisting postoperative. Has seen CTS back who he indicated gave no recommendations.    Past Medical History:   Diagnosis Date   • Diabetes (HCC)     oral meds only   • Hypertension    • Kidney stones    • MI, old    • Pneumonia    • Psychiatric problem     depression and anxiety   • Stroke (HCC)      Past Surgical History:   Procedure Laterality Date   • MULTIPLE CORONARY ARTERY BYPASS ENDO VEIN HARVEST  2020    Procedure: CABG, WITH ENDOSCOPIC VEIN PROCUREMENT- X3;  Surgeon: Michael Chicas M.D.;  Location: SURGERY [facility];  Service: Cardiothoracic   • MARGOTH  2020    Procedure: ECHOCARDIOGRAM, TRANSESOPHAGEAL;  Surgeon: Michael Chicas M.D.;  Location: SURGERY [facility];  Service: Cardiothoracic   • STENT PLACEMENT  2017 cardiac stent   • OTHER ORTHOPEDIC SURGERY      rt wrist fracture with fixation 34 years ago     Family History   Problem Relation Age of Onset   • Stroke Mother 47        had 3 devastating strokes,  54yo stroke complications   • Heart Disease Mother    • No Known Problems Father         does not know his father.   • Stroke Maternal Grandmother 85   • Heart Disease Maternal Grandmother    • Stroke Maternal Grandfather 54   • Heart Disease Maternal Grandfather      Social History     Socioeconomic History   • Marital status: Single     Spouse name: Not on file    • Number of children: Not on file   • Years of education: Not on file   • Highest education level: Not on file   Occupational History   • Not on file   Social Needs   • Financial resource strain: Not on file   • Food insecurity     Worry: Not on file     Inability: Not on file   • Transportation needs     Medical: Not on file     Non-medical: Not on file   Tobacco Use   • Smoking status: Never Smoker   • Smokeless tobacco: Former User     Types: Chew   Substance and Sexual Activity   • Alcohol use: Not Currently     Comment: 2 per week   • Drug use: No   • Sexual activity: Yes     Partners: Female     Comment: adriana. wrk: Verónica [place] [employer].    Lifestyle   • Physical activity     Days per week: Not on file     Minutes per session: Not on file   • Stress: Not on file   Relationships   • Social connections     Talks on phone: Not on file     Gets together: Not on file     Attends Taoism service: Not on file     Active member of club or organization: Not on file     Attends meetings of clubs or organizations: Not on file     Relationship status: Not on file   • Intimate partner violence     Fear of current or ex partner: Not on file     Emotionally abused: Not on file     Physically abused: Not on file     Forced sexual activity: Not on file   Other Topics Concern   • Not on file   Social History Narrative   • Not on file     Allergies   Allergen Reactions   • Isosorbide Unspecified     Hallucinations     Outpatient Encounter Medications as of 1/27/2021   Medication Sig Dispense Refill   • lisinopril (PRINIVIL) 40 MG tablet Take 1 Tab by mouth every day. 90 Tab 3   • clopidogrel (PLAVIX) 75 MG Tab Take 1 Tab by mouth every day. 90 Tab 3   • [DISCONTINUED] TRUE METRIX BLOOD GLUCOSE TEST strip USE 1 STRIP TO CHECK GLUCOSE THREE TIMES DAILY AND AS NEEDED SSX HIGH OR LOW SUGAR DX E118     • allopurinol (ZYLOPRIM) 100 MG Tab Take 1 Tab by mouth every day. 90 Tab 3   • Blood Glucose Test Strips Test strips order:  "Test strips for True Metrix meter. Sig: use 3 times daily and prn ssx high or low sugar #100 RF x 3 100 Each 3   • Lancets Lancets order: Lancets for True Metrix meter. Sig: use TID and prn ssx high or low sugar. #100 RF x 3 100 Each 3   • metoprolol (LOPRESSOR) 25 MG Tab Take 1 Tab by mouth 2 Times a Day. 180 Tab 3   • atorvastatin (LIPITOR) 80 MG tablet Take 1 Tab by mouth every bedtime. 90 Tab 3   • metFORMIN (GLUCOPHAGE) 500 MG Tab Take 2 tablets by mouth twice daily. 180 Tab 3   • acetaminophen (TYLENOL) 325 MG Tab Take 2 Tabs by mouth every four hours as needed for Moderate Pain. 30 Tab 0   • aspirin (ASA) 81 MG Chew Tab chewable tablet Take 1 Tab by mouth every day. 30 Tab 2   • ascorbic acid (VITAMIN C) 500 MG tablet Take 1 Tab by mouth every morning with breakfast. 30 Tab 2   • gabapentin (NEURONTIN) 300 MG Cap Start 1 tablet 3 times a day and every 2 days add an extra tablet until you are taking 2 tablets 3 times a day. (Patient not taking: Reported on 1/27/2021) 90 Cap 1   • [DISCONTINUED] lisinopril (PRINIVIL) 2.5 MG Tab Take 20 mg by mouth every day. 120 Tab 0   • [DISCONTINUED] clopidogrel (PLAVIX) 75 MG Tab Take 1 Tab by mouth every day. 90 Tab 1   • ferrous sulfate 325 (65 Fe) MG tablet Take 1 Tab by mouth every morning with breakfast. 30 Tab 2   • vitamin D (CHOLECALCIFEROL) 1000 Unit (25 mcg) Tab Take 1 Tab by mouth every day. 30 Tab 2     No facility-administered encounter medications on file as of 1/27/2021.      Review of Systems   All other systems reviewed and are negative.       Objective:   /102 (BP Location: Left arm, Patient Position: Sitting, BP Cuff Size: Adult)   Pulse 81   Resp (!) 24   Ht 1.575 m (5' 2\")   Wt 66.9 kg (147 lb 6.4 oz)   SpO2 96%   BMI 26.96 kg/m²     Physical Exam   Constitutional: He is oriented to person, place, and time. He appears well-developed and well-nourished. No distress.   HENT:   Head: Normocephalic and atraumatic.   Right Ear: External ear " normal.   Left Ear: External ear normal.   Eyes: Pupils are equal, round, and reactive to light. Conjunctivae and EOM are normal. Right eye exhibits no discharge. Left eye exhibits no discharge. No scleral icterus.   Neck: Normal range of motion. Neck supple. No JVD present. No tracheal deviation present. No thyromegaly present.   Cardiovascular: Normal rate, regular rhythm and intact distal pulses. PMI is not displaced. Exam reveals no gallop and no friction rub.   No murmur heard.  Pulses:       Carotid pulses are 2+ on the right side and 2+ on the left side.       Radial pulses are 2+ on the left side.        Popliteal pulses are 2+ on the right side and 2+ on the left side.        Dorsalis pedis pulses are 2+ on the right side and 2+ on the left side.        Posterior tibial pulses are 2+ on the right side and 2+ on the left side.   Pulmonary/Chest: Effort normal and breath sounds normal. No respiratory distress. He has no wheezes. He has no rales. He exhibits no tenderness.   Abdominal: Soft. Bowel sounds are normal. He exhibits no distension. There is no abdominal tenderness.   Musculoskeletal: Normal range of motion.         General: No tenderness, deformity or edema.   Neurological: He is alert and oriented to person, place, and time. No cranial nerve deficit (cranial nerves II through XII grossly intact). Coordination normal.   Skin: Skin is warm and dry. No rash noted. He is not diaphoretic. No erythema. No pallor.   Psychiatric: He has a normal mood and affect. His behavior is normal. Thought content normal.   Vitals reviewed.      Assessment:     1. Essential hypertension, benign  lisinopril (PRINIVIL) 40 MG tablet   2. Coronary artery disease involving native heart, angina presence unspecified, unspecified vessel or lesion type  clopidogrel (PLAVIX) 75 MG Tab   3. Cardioembolic stroke (HCC)  clopidogrel (PLAVIX) 75 MG Tab   4. Neuropathic pain of lower extremity, right  REFERRAL TO PAIN CLINIC        Medical Decision Making:  Today's Assessment / Status / Plan:     Pain main concern. CV statis well. HTN poorly controlled.     1. Increase lisinopril to 40mg from 20mg qd  2. Continue other mediccal therapy, goal LDL<70  3. Pain clinic referral  4. FU with his consulting physician Dr. Chacon as I have only met him at the time I was asked to perform his procedure by Dr. Chacon.

## 2021-02-04 ENCOUNTER — OFFICE VISIT (OUTPATIENT)
Dept: PHYSICAL MEDICINE AND REHAB | Facility: REHABILITATION | Age: 51
End: 2021-02-04
Payer: COMMERCIAL

## 2021-02-04 VITALS
BODY MASS INDEX: 27.05 KG/M2 | HEIGHT: 62 IN | WEIGHT: 147 LBS | DIASTOLIC BLOOD PRESSURE: 88 MMHG | OXYGEN SATURATION: 97 % | TEMPERATURE: 98.3 F | HEART RATE: 90 BPM | SYSTOLIC BLOOD PRESSURE: 130 MMHG

## 2021-02-04 DIAGNOSIS — M79.2 NEUROPATHIC PAIN: ICD-10-CM

## 2021-02-04 DIAGNOSIS — I63.9 CARDIOEMBOLIC STROKE (HCC): Primary | ICD-10-CM

## 2021-02-04 DIAGNOSIS — R45.89 DEPRESSED MOOD: ICD-10-CM

## 2021-02-04 DIAGNOSIS — Z95.1 HX OF CABG: ICD-10-CM

## 2021-02-04 PROCEDURE — 99215 OFFICE O/P EST HI 40 MIN: CPT | Performed by: PHYSICAL MEDICINE & REHABILITATION

## 2021-02-04 RX ORDER — M-VIT,TX,IRON,MINS/CALC/FOLIC 27MG-0.4MG
1 TABLET ORAL DAILY
COMMUNITY

## 2021-02-04 RX ORDER — GABAPENTIN 800 MG/1
800 TABLET ORAL 3 TIMES DAILY
Qty: 90 TAB | Refills: 0 | Status: SHIPPED | OUTPATIENT
Start: 2021-02-04 | End: 2021-03-04 | Stop reason: SDUPTHER

## 2021-02-04 RX ORDER — DULOXETIN HYDROCHLORIDE 30 MG/1
30 CAPSULE, DELAYED RELEASE ORAL DAILY
Qty: 30 CAP | Refills: 0 | Status: SHIPPED | OUTPATIENT
Start: 2021-02-04 | End: 2021-03-04 | Stop reason: SDUPTHER

## 2021-02-04 ASSESSMENT — ENCOUNTER SYMPTOMS
CHILLS: 0
PALPITATIONS: 0
FOCAL WEAKNESS: 1
DIARRHEA: 0
COUGH: 0
FALLS: 0
FEVER: 0
MEMORY LOSS: 0
SHORTNESS OF BREATH: 0
CONSTIPATION: 0
SORE THROAT: 0
BRUISES/BLEEDS EASILY: 0

## 2021-02-04 ASSESSMENT — PATIENT HEALTH QUESTIONNAIRE - PHQ9
CLINICAL INTERPRETATION OF PHQ2 SCORE: 6
SUM OF ALL RESPONSES TO PHQ QUESTIONS 1-9: 15
5. POOR APPETITE OR OVEREATING: 0 - NOT AT ALL

## 2021-02-04 ASSESSMENT — FIBROSIS 4 INDEX: FIB4 SCORE: 0.5

## 2021-02-04 NOTE — PROGRESS NOTES
[facility]  PM&R Neuro Rehabilitation Clinic  [facility] [place] [address]  Ph: [phone]    FOLLOW UP PATIENT EVALUATION      Patient Name: Guerrero Ann   Patient : 1970  Patient Age: 50 y.o.     Examining Physician: Dr. Marilu Franco, DO    PM&R History to Date - Background Information:  Dates of Admission/Discharge to ARU: 10/9/20-10/21/20    SUBJECTIVE:   Patient Identification: Guerrero Ann is a 50 y.o. male with PMH significant for coronary artery disease s/p stents, diabetes, hypertension, congenital absence of one kidney, type II diabetes and rehabilitation history significant for bilateral cardioembolic ischemic CVA s/p CABG and is presenting to PM&R clinic for a FOLLOW UP OUTPATIENT evaluation with the following chief complaint/s:    Chief Complaint: Neuropathic pain    Accompanied by Today: Girlfriend  History of Present Illness:    - Saw cardiac surgery consultant who stated that they hit nerve as they were obtaining graft for CABG at distal site of scope injury medial right ankle.   - Severe pain in the arch of the foot. Now also getting cramps up his leg.   - Feels like he cannot extend his foot as well because he cant straighten his hamstring.   - At night he wears Nylon stocking which minimally helps.   - Very difficult to wear socks or anything.   - Unable to walk easily. Starting to lose balance because of proprioception.   - Driving still and cramps after driving.   - Gets pins and needles through foot and feels extremely cold and burning.   - Has tried Gabapentin and was up to 900mg three times daily. It does make him sleepy. Having trouble getting this through insurance.  But this does help significantly.  They are having trouble with insurance because he was only given a small supply from the emergency department.  -Patient reports that he has baseline decreased flexibility and range of motion of right knee  extension.  -Patient states that he does feel like he is weaker in that foot.  -Records reviewed: Patient did go to the emergency department 1/5/2021 and prescribed gabapentin 300 mg 3 times a day.    Review of Systems:  Review of Systems   Constitutional: Negative for chills and fever.   HENT: Negative for congestion and sore throat.    Respiratory: Negative for cough and shortness of breath.    Cardiovascular: Negative for palpitations and leg swelling.   Gastrointestinal: Negative for constipation and diarrhea.   Musculoskeletal: Negative for falls and joint pain.        + Right lower extremity muscle spasms   Neurological: Positive for focal weakness (right foot).        Burning pain right foot and ankle.   Endo/Heme/Allergies: Does not bruise/bleed easily.   Psychiatric/Behavioral: Negative for memory loss.      All other pertinent positive review of systems are noted above in HPI.   All other systems reviewed and are negative.    Past Medical History:  Past Medical History:   Diagnosis Date   • Diabetes (HCC)     oral meds only   • Hypertension    • Kidney stones    • MI, old    • Pneumonia    • Psychiatric problem     depression and anxiety   • Stroke (HCC)       Past Surgical History:   Procedure Laterality Date   • MULTIPLE CORONARY ARTERY BYPASS ENDO VEIN HARVEST  9/25/2020    Procedure: CABG, WITH ENDOSCOPIC VEIN PROCUREMENT- X3;  Surgeon: Michael Chicas M.D.;  Location: SURGERY [facility];  Service: Cardiothoracic   • MARGOTH  9/25/2020    Procedure: ECHOCARDIOGRAM, TRANSESOPHAGEAL;  Surgeon: Michael Chicas M.D.;  Location: [facility];  Service: Cardiothoracic   • STENT PLACEMENT  2017 1 cardiac stent   • OTHER ORTHOPEDIC SURGERY      rt wrist fracture with fixation 34 years ago        Current Outpatient Medications:   •  therapeutic multivitamin-minerals (THERAGRAN-M) Tab, Take 1 Tab by mouth every day., Disp: , Rfl:   •  gabapentin (NEURONTIN) 800 MG tablet, Take 1 Tab by mouth 3 times a  day., Disp: 90 Tab, Rfl: 0  •  DULoxetine (CYMBALTA) 30 MG Cap DR Particles, Take 1 Cap by mouth every day., Disp: 30 Cap, Rfl: 0  •  TRUE METRIX BLOOD GLUCOSE TEST strip, USE 1 STRIP TO CHECK GLUCOSE THREE TIMES DAILY AND AS NEEDED FOR HIGH OR LOW SUGAR, Disp: 100 Strip, Rfl: 3  •  lisinopril (PRINIVIL) 40 MG tablet, Take 1 Tab by mouth every day., Disp: 90 Tab, Rfl: 3  •  clopidogrel (PLAVIX) 75 MG Tab, Take 1 Tab by mouth every day., Disp: 90 Tab, Rfl: 3  •  allopurinol (ZYLOPRIM) 100 MG Tab, Take 1 Tab by mouth every day., Disp: 90 Tab, Rfl: 3  •  Blood Glucose Test Strips, Test strips order: Test strips for True Metrix meter. Sig: use 3 times daily and prn ssx high or low sugar #100 RF x 3, Disp: 100 Each, Rfl: 3  •  Lancets, Lancets order: Lancets for True Metrix meter. Sig: use TID and prn ssx high or low sugar. #100 RF x 3, Disp: 100 Each, Rfl: 3  •  metoprolol (LOPRESSOR) 25 MG Tab, Take 1 Tab by mouth 2 Times a Day., Disp: 180 Tab, Rfl: 3  •  atorvastatin (LIPITOR) 80 MG tablet, Take 1 Tab by mouth every bedtime., Disp: 90 Tab, Rfl: 3  •  metFORMIN (GLUCOPHAGE) 500 MG Tab, Take 2 tablets by mouth twice daily., Disp: 180 Tab, Rfl: 3  •  acetaminophen (TYLENOL) 325 MG Tab, Take 2 Tabs by mouth every four hours as needed for Moderate Pain., Disp: 30 Tab, Rfl: 0  •  aspirin (ASA) 81 MG Chew Tab chewable tablet, Take 1 Tab by mouth every day., Disp: 30 Tab, Rfl: 2  •  ascorbic acid (VITAMIN C) 500 MG tablet, Take 1 Tab by mouth every morning with breakfast., Disp: 30 Tab, Rfl: 2  •  ferrous sulfate 325 (65 Fe) MG tablet, Take 1 Tab by mouth every morning with breakfast., Disp: 30 Tab, Rfl: 2  •  vitamin D (CHOLECALCIFEROL) 1000 Unit (25 mcg) Tab, Take 1 Tab by mouth every day., Disp: 30 Tab, Rfl: 2  Allergies   Allergen Reactions   • Isosorbide Unspecified     Hallucinations        Past Social History:  Social History     Socioeconomic History   • Marital status: Single     Spouse name: Not on file   • Number  of children: Not on file   • Years of education: Not on file   • Highest education level: Not on file   Occupational History   • Not on file   Social Needs   • Financial resource strain: Not on file   • Food insecurity     Worry: Not on file     Inability: Not on file   • Transportation needs     Medical: Not on file     Non-medical: Not on file   Tobacco Use   • Smoking status: Never Smoker   • Smokeless tobacco: Former User     Types: Chew   Substance and Sexual Activity   • Alcohol use: Not Currently     Comment: 2 per week   • Drug use: No   • Sexual activity: Yes     Partners: Female     Comment: adriana. wrk: Verónica [place].    Lifestyle   • Physical activity     Days per week: Not on file     Minutes per session: Not on file   • Stress: Not on file   Relationships   • Social connections     Talks on phone: Not on file     Gets together: Not on file     Attends Scientology service: Not on file     Active member of club or organization: Not on file     Attends meetings of clubs or organizations: Not on file     Relationship status: Not on file   • Intimate partner violence     Fear of current or ex partner: Not on file     Emotionally abused: Not on file     Physically abused: Not on file     Forced sexual activity: Not on file   Other Topics Concern   • Not on file   Social History Narrative   • Not on file        Family History:  Family History   Problem Relation Age of Onset   • Stroke Mother 47        had 3 devastating strokes,  54yo stroke complications   • Heart Disease Mother    • No Known Problems Father         does not know his father.   • Stroke Maternal Grandmother 85   • Heart Disease Maternal Grandmother    • Stroke Maternal Grandfather 54   • Heart Disease Maternal Grandfather        Depression and Opioid Screening  PHQ-9:  Depression Screen (PHQ-2/PHQ-9) 10/21/2020 2020 2021   PHQ-2 Total Score 0 - -   PHQ-2 Total Score - 0 6   PHQ-9 Total Score - - -   PHQ-9 Total Score - -  15     Interpretation of PHQ-9 Total Score   Score Severity   1-4 No Depression   5-9 Mild Depression   10-14 Moderate Depression   15-19 Moderately Severe Depression   20-27 Severe Depression     OBJECTIVE:   Vital Signs:  Vitals:    02/04/21 1246   BP: 130/88   Pulse: 90   Temp: 36.8 °C (98.3 °F)   SpO2: 97%        Pertinent Labs:  Lab Results   Component Value Date/Time    SODIUM 138 12/29/2020 08:43 AM    POTASSIUM 4.0 12/29/2020 08:43 AM    CHLORIDE 101 12/29/2020 08:43 AM    CO2 22 12/29/2020 08:43 AM    GLUCOSE 198 (H) 12/29/2020 08:43 AM    BUN 18 12/29/2020 08:43 AM    CREATININE 0.83 12/29/2020 08:43 AM       Lab Results   Component Value Date/Time    HBA1C 8.2 (H) 12/29/2020 08:44 AM       Lab Results   Component Value Date/Time    WBC 10.2 12/29/2020 08:43 AM    RBC 4.64 (L) 12/29/2020 08:43 AM    HEMOGLOBIN 13.4 (L) 12/29/2020 08:43 AM    HEMATOCRIT 41.6 (L) 12/29/2020 08:43 AM    MCV 89.7 12/29/2020 08:43 AM    MCH 28.9 12/29/2020 08:43 AM    MCHC 32.2 (L) 12/29/2020 08:43 AM    MPV 11.0 12/29/2020 08:43 AM    NEUTSPOLYS 70.80 10/12/2020 06:21 AM    LYMPHOCYTES 18.70 (L) 10/12/2020 06:21 AM    MONOCYTES 7.30 10/12/2020 06:21 AM    EOSINOPHILS 2.10 10/12/2020 06:21 AM    BASOPHILS 0.70 10/12/2020 06:21 AM    HYPOCHROMIA 1+ 10/01/2020 05:48 AM    ANISOCYTOSIS 1+ 10/04/2020 05:08 AM       Lab Results   Component Value Date/Time    ASTSGOT 12 12/29/2020 08:43 AM    ALTSGPT 19 12/29/2020 08:43 AM        Physical Exam:   GEN: Intermittently tearful, but no apparent distress.  Appears stated age.  HEENT: No significant facial droop appreciated.  Mask donned.  CV: Extremities warm and well-perfused, no peripheral edema appreciated bilaterally.  PULMONARY: Breathing nonlabored on room air, no respiratory accessory muscle use.  Not requiring supplemental oxygen.  PSYCH: Tearful throughout exam  NEURO: Awake, alert, conversational.    Motor Exam Upper Extremities**  ? Myotome R L   Shoulder Abduction C5 5 5    Elbow flexion C5 5 5   Wrist extension C6 5 5   Elbow extension C7 5 5   Finger flexion C8 5 5   Finger abduction T1 5 5     Motor Exam Lower Extremities  ? Myotome R L   Hip flexion L2 5 5   Knee extension L3 4 5   Ankle dorsiflexion L4 4 5   Toe extension L5 4 5   Ankle plantarflexion S1 4 5     No significant tone appreciated.  Patient lacks approximately 15 degrees of full knee extension which she states is baseline.   Sensation grossly impaired right foot to light touch.    Imaging: No new imaging to review pertinent to today's visit.    ASSESSMENT/PLAN: Guerrero Ann  is a 50 y.o. male with rehabilitation history significant for bilateral cardioembolic ischemic CVA s/p CABG , here for evaluation. The following plan was discussed with the patient who is in agreement.     Visit Diagnoses     ICD-10-CM   1. Cardioembolic stroke (HCC)  I63.9   2. Neuropathic pain  M79.2   3. Depressed mood  R45.89   4. Hx of CABG  Z95.1     Girlfriend helps with history as patient has poor memory from bilateral strokes.    Rehab/Neuro:   1. Bilateral cardioembolic ischemic CVA s/p CABG   -Driving status: Had return to driving, but not driving far distances beyond the house at this time.  -Counseling/education: Counseled to abstain from driving if at all possible given his lack of proprioception and sensory deficits.    Assessment of Current Functional Status: 10/2020 - Patient making good progress.  Currently ambulating approximately 2.2 miles with only mild fatigue at the end.  Tolerating regular diet.  Has cognitive deficits which are most apparent, but physically recovering well. 2/4/21 -has had significant development of neuropathic pain in right foot and ankle which limits his ability to ambulate has he had been prior.    Neuropathic Pain: Right foot and ankle, ongoing at least the past 6 weeks, worsening.  Required visit to the emergency department.  Gabapentin helps but is out.  Sensation grossly  impaired to light touch.  Was told by cardiac surgery in November that intraoperatively he had complication of nerve damage as they removed the scope from the distal medial incision site (most likely tibial).  Patient reports symptoms worse on plantar surface compared to dorsal surface, however, he is obviously in contact with more using the plantar surface of his foot.  -Conservative: Continue warm water baths, compression garments (JOSE hose provided today).  -Medication management: Prescription sent to pharmacy for gabapentin 800 mg 3 times daily.  -Medication management: Prescription sent to pharmacy for Cymbalta 30 mg daily for mood and neuropathic pain  -Referral: Physical medicine and rehabilitation for EMG/NCS for evaluation of tibial/peroneal motor and sensory nerves with comparative study to the left.  -Counseling/education: Discussed neuropathic pain, conservative measures of, pharmacologic management of, procedural and diagnostic work-up.    Neurogenic Bladder: Voiding well.  Volitional, continent.  S/p Hytrin.  -Status: Patient reports difficulty initiating stream  -Med management: Hold on pharmacologic agents at this time per patient preference.  -Continue to monitor.    Mood: Situational depression related to #1 in rehab/neuro section.  Had been weaned off of Zoloft -mood had been okay except for resurgence of neuropathic pain which has caused resurgence of his depressive symptoms.  -Med management: Prescription sent for Cymbalta 30 mg daily (mood and neuropathic pain)    Follow up: 1 month for medication reevaluation    Total time spent was 41 minutes.  Included in this time is the time spent preparing for the visit including record review, my exam and evaluation, counseling and education regarding that which is aforementioned in the assessment and plan, and ordering the appropriate labs, tests, procedures, referrals. Furthermore, including this time as the time spent obtaining history from someone  other than the patient given that the patient has memory impairment from his bilateral strokes. Extensive discussion involved the patient and girlfriend.    Please note that this dictation was created using voice recognition software. I have made every reasonable attempt to correct obvious errors but there may be errors of grammar and content that I may have overlooked prior to finalization of this note.    Dr. Marilu Franco DO, MS  Department of Physical Medicine & Rehabilitation  Neuro Rehabilitation Clinic  [facility]

## 2021-02-09 ENCOUNTER — OFFICE VISIT (OUTPATIENT)
Dept: MEDICAL GROUP | Facility: MEDICAL CENTER | Age: 51
End: 2021-02-09
Attending: FAMILY MEDICINE
Payer: COMMERCIAL

## 2021-02-09 VITALS
SYSTOLIC BLOOD PRESSURE: 128 MMHG | RESPIRATION RATE: 16 BRPM | WEIGHT: 143 LBS | BODY MASS INDEX: 26.31 KG/M2 | TEMPERATURE: 98 F | DIASTOLIC BLOOD PRESSURE: 84 MMHG | OXYGEN SATURATION: 96 % | HEIGHT: 62 IN | HEART RATE: 77 BPM

## 2021-02-09 DIAGNOSIS — I25.10 CORONARY ARTERY DISEASE INVOLVING NATIVE CORONARY ARTERY OF NATIVE HEART WITHOUT ANGINA PECTORIS: ICD-10-CM

## 2021-02-09 DIAGNOSIS — Z86.73 HISTORY OF CARDIOEMBOLIC STROKE: ICD-10-CM

## 2021-02-09 DIAGNOSIS — E11.8 TYPE 2 DIABETES MELLITUS WITH COMPLICATION, WITHOUT LONG-TERM CURRENT USE OF INSULIN (HCC): ICD-10-CM

## 2021-02-09 DIAGNOSIS — D64.9 NORMOCHROMIC NORMOCYTIC ANEMIA: ICD-10-CM

## 2021-02-09 PROBLEM — J18.9 PNEUMONIA: Status: RESOLVED | Noted: 2020-10-09 | Resolved: 2021-02-09

## 2021-02-09 PROBLEM — R41.82 ALTERED MENTAL STATUS: Status: RESOLVED | Noted: 2020-09-28 | Resolved: 2021-02-09

## 2021-02-09 PROBLEM — E87.1 HYPONATREMIA: Status: RESOLVED | Noted: 2020-10-10 | Resolved: 2021-02-09

## 2021-02-09 PROBLEM — E08.65 DIABETES MELLITUS DUE TO UNDERLYING CONDITION WITH HYPERGLYCEMIA (HCC): Status: RESOLVED | Noted: 2020-10-09 | Resolved: 2021-02-09

## 2021-02-09 PROBLEM — K56.7 ILEUS (HCC): Status: RESOLVED | Noted: 2020-10-02 | Resolved: 2021-02-09

## 2021-02-09 PROBLEM — D50.9 IRON DEFICIENCY ANEMIA: Status: RESOLVED | Noted: 2020-10-10 | Resolved: 2021-02-09

## 2021-02-09 PROBLEM — R79.89 AZOTEMIA: Status: RESOLVED | Noted: 2020-10-10 | Resolved: 2021-02-09

## 2021-02-09 PROBLEM — J69.0 ASPIRATION PNEUMONIA (HCC): Status: RESOLVED | Noted: 2020-10-02 | Resolved: 2021-02-09

## 2021-02-09 PROBLEM — D75.839 THROMBOCYTOSIS: Status: RESOLVED | Noted: 2020-10-12 | Resolved: 2021-02-09

## 2021-02-09 PROBLEM — I25.2 HISTORY OF NON-ST ELEVATION MYOCARDIAL INFARCTION (NSTEMI): Status: ACTIVE | Noted: 2018-05-29

## 2021-02-09 PROBLEM — N17.9 AKI (ACUTE KIDNEY INJURY) (HCC): Status: RESOLVED | Noted: 2020-09-23 | Resolved: 2021-02-09

## 2021-02-09 PROCEDURE — 99213 OFFICE O/P EST LOW 20 MIN: CPT | Performed by: FAMILY MEDICINE

## 2021-02-09 PROCEDURE — 99214 OFFICE O/P EST MOD 30 MIN: CPT | Performed by: FAMILY MEDICINE

## 2021-02-09 RX ORDER — ATORVASTATIN CALCIUM 80 MG/1
80 TABLET, FILM COATED ORAL
Qty: 90 TAB | Refills: 3 | Status: SHIPPED | OUTPATIENT
Start: 2021-02-09 | End: 2022-01-03 | Stop reason: CLARIF

## 2021-02-09 RX ORDER — DAPAGLIFLOZIN 5 MG/1
5 TABLET, FILM COATED ORAL DAILY
Qty: 30 TAB | Refills: 5 | Status: SHIPPED | OUTPATIENT
Start: 2021-02-09 | End: 2021-04-09

## 2021-02-09 RX ORDER — VITAMIN B COMPLEX
300 TABLET ORAL DAILY
COMMUNITY
End: 2023-05-01 | Stop reason: SDUPTHER

## 2021-02-09 ASSESSMENT — FIBROSIS 4 INDEX: FIB4 SCORE: 0.5

## 2021-02-09 NOTE — PROGRESS NOTES
Subjective:     CC:    Chief Complaint   Patient presents with   • Establish Care   • Medication Refill       HISTORY OF THE PRESENT ILLNESS: Patient is a 50 y.o. male. This pleasant patient is here today to establish care and discuss the following issues.   His/her prior PCP was Cheryl Mayberry.   Accompanied by girlfriend today    Pt was admitted on 9/21 for ACS/NSTEMI, cath showed multivessel CAD with in stent stenosis, and is now s/p CABG x3 on 9/25/2020. Post op was complicated by neurological changes, found to have Mri with numerous acute/subacute infarcts that was thought to be cardio-embolic. He then went to rehab on 10/9/2020, d/c'd on 10/21/2020. He has since completed PT/OT/speech and has been discharged from these services. He has been seeing physiatry, cardiology, and has already had is post op visit      History of cardioembolic stroke  Was seeing PT/OT/Speech, has been discharged on all therapies   He reports that he is independent of ADLs and IADLs and that cognitively he feels that he is back to normal. His girlfriend agrees.     Type 2 diabetes mellitus with complication, without long-term current use of insulin ([facility])  Most recent a1c 8.2, elevated from previous  Increased metformin to 1000mg bid and doing well, compliant  AM fasting glucoses - around 150  Diet - doesn't watch carb intake but isn't eating that many. Does watch sugary drinks       Allergies: Isosorbide    Current Outpatient Medications Ordered in Epic   Medication Sig Dispense Refill   • Coenzyme Q10 (COQ10) 100 MG Cap Take 300 mg by mouth every day.     • Blood Glucose Test Strips Use one strip to test blood sugar once daily . 100 Each 5   • atorvastatin (LIPITOR) 80 MG tablet Take 1 Tab by mouth every bedtime. 90 Tab 3   • Dapagliflozin Propanediol (FARXIGA) 5 MG Tab Take 5 mg by mouth every day. 30 Tab 5   • therapeutic multivitamin-minerals (THERAGRAN-M) Tab Take 1 Tab by mouth every day.     • gabapentin (NEURONTIN) 800 MG tablet  Take 1 Tab by mouth 3 times a day. 90 Tab 0   • DULoxetine (CYMBALTA) 30 MG Cap DR Particles Take 1 Cap by mouth every day. 30 Cap 0   • lisinopril (PRINIVIL) 40 MG tablet Take 1 Tab by mouth every day. 90 Tab 3   • clopidogrel (PLAVIX) 75 MG Tab Take 1 Tab by mouth every day. 90 Tab 3   • allopurinol (ZYLOPRIM) 100 MG Tab Take 1 Tab by mouth every day. 90 Tab 3   • Lancets Lancets order: Lancets for True Metrix meter. Sig: use TID and prn ssx high or low sugar. #100 RF x 3 100 Each 3   • metoprolol (LOPRESSOR) 25 MG Tab Take 1 Tab by mouth 2 Times a Day. 180 Tab 3   • metFORMIN (GLUCOPHAGE) 500 MG Tab Take 2 tablets by mouth twice daily. 180 Tab 3   • acetaminophen (TYLENOL) 325 MG Tab Take 2 Tabs by mouth every four hours as needed for Moderate Pain. 30 Tab 0   • aspirin (ASA) 81 MG Chew Tab chewable tablet Take 1 Tab by mouth every day. 30 Tab 2   • ascorbic acid (VITAMIN C) 500 MG tablet Take 1 Tab by mouth every morning with breakfast. 30 Tab 2     No current Epic-ordered facility-administered medications on file.        Past Medical History:   Diagnosis Date   • Diabetes (HCC)     oral meds only   • Hypertension    • Kidney stones    • MI, old    • Pneumonia    • Psychiatric problem     depression and anxiety   • Stroke (HCC)        Past Surgical History:   Procedure Laterality Date   • MULTIPLE CORONARY ARTERY BYPASS ENDO VEIN HARVEST  9/25/2020    Procedure: CABG, WITH ENDOSCOPIC VEIN PROCUREMENT- X3;  Surgeon: Michael Chicas M.D.;  Location: SURGERY [facility];  Service: Cardiothoracic   • MARGOTH  9/25/2020    Procedure: ECHOCARDIOGRAM, TRANSESOPHAGEAL;  Surgeon: Michael Chicas M.D.;  Location: SURGERY [facility];  Service: Cardiothoracic   • STENT PLACEMENT  2017    1 cardiac stent   • OTHER ORTHOPEDIC SURGERY      rt wrist fracture with fixation 34 years ago       Social History     Tobacco Use   • Smoking status: Never Smoker   • Smokeless tobacco: Former User     Types: Chew   Substance Use Topics  "  • Alcohol use: Not Currently     Comment: 2 per week   • Drug use: No       Social History     Social History Narrative   • Not on file       Family History   Problem Relation Age of Onset   • Stroke Mother 47        had 3 devastating strokes,  56yo stroke complications   • Heart Disease Mother    • No Known Problems Father         does not know his father.   • Stroke Maternal Grandmother 85   • Heart Disease Maternal Grandmother    • Stroke Maternal Grandfather 54   • Heart Disease Maternal Grandfather        ROS:   Pulm: occ with cough, sometimes food will irritate his throat and make him cough. No choking  CV: no chest pain, no lower extremity edema  GI: no abd pain  : sometimes has to stand there for a bit before he can urinate.         Objective:     Exam: /84 (BP Location: Left arm, Patient Position: Sitting, BP Cuff Size: Adult)   Pulse 77   Temp 36.7 °C (98 °F) (Temporal)   Resp 16   Ht 1.575 m (5' 2\")   Wt 64.9 kg (143 lb)   SpO2 96%  Body mass index is 26.16 kg/m².    General: Normal appearing. No distress.  Head: normocephalic  Eyes:  Eyes conjunctiva clear lids without ptosis  Pulmonary: Clear to ausculation.  Normal effort. No rales, ronchi, or wheezing.  Cardiovascular: Regular rate and rhythm without murmur.   Abdomen: Soft, nontender, nondistended. Normal bowel sounds.  Neurologic: no facial droop, gait normal.   Lymph: No cervical or supraclavicular lymph nodes are palpable  Skin: Warm and dry.  No obvious lesions.  Musculoskeletal: Normal gait. No extremity cyanosis, clubbing, or edema.  Psych: responds with very short answers, not very talkative.       Labs:   Reviewed labs done 20  Hgb increased from 9.6 > 13.4  Glycohemoglobin 8.2  Microalbumin/cr ratio 161  Lipids - LDL at goal  CMP wnl    Assessment & Plan:   50 y.o. male with the following -    1. Type 2 diabetes mellitus with complication, without long-term current use of insulin (HCC)  - Blood Glucose Test Strips; " Use one strip to test blood sugar once daily .  Dispense: 100 Each; Refill: 5  - Dapagliflozin Propanediol (FARXIGA) 5 MG Tab; Take 5 mg by mouth every day.  Dispense: 30 Tab; Refill: 5  - HEMOGLOBIN A1C; Future  - REFERRAL FOR RETINAL SCREENING EXAM  Pt is at max dose of metformin. Will start farxiga for cardiorenal benefits, target A1c < 7. Pt counseled on DM diet and exercise.     2. History of cardioembolic stroke  - atorvastatin (LIPITOR) 80 MG tablet; Take 1 Tab by mouth every bedtime.  Dispense: 90 Tab; Refill: 3  Will need PA for lipitor 80mg. If not approved then will need to go to 40mg and recheck lipids to make sure he maintains LDL goal of ,70    3. Coronary artery disease involving native coronary artery of native heart without angina pectoris  - atorvastatin (LIPITOR) 80 MG tablet; Take 1 Tab by mouth every bedtime.  Dispense: 90 Tab; Refill: 3  Has been following up with specialists     4. Normochromic normocytic anemia  - CBC WITHOUT DIFFERENTIAL; Future  Pt is off of iron supplements, has come up nicely. Recheck labs in about 2 months as it seems that he has adequate iron intake at home.       Return in about 2 months (around 4/9/2021).    Please note that this dictation was created using voice recognition software. I have made every reasonable attempt to correct obvious errors, but I expect that there are errors of grammar and possibly content that I did not discover before finalizing the note.

## 2021-02-09 NOTE — ASSESSMENT & PLAN NOTE
Most recent a1c 8.2, elevated from previous  Increased metformin to 1000mg bid and doing well, compliant  AM fasting glucoses - around 150  Diet - doesn't watch carb intake but isn't eating that many. Does watch sugary drinks

## 2021-02-09 NOTE — ASSESSMENT & PLAN NOTE
Was seeing PT/OT/Speech, has been discharged on all therapies   He reports that he is independent of ADLs and IADLs and that cognitively he feels that he is back to normal. His girlfriend agrees.

## 2021-02-11 DIAGNOSIS — M79.604 RIGHT LEG PAIN: ICD-10-CM

## 2021-02-11 DIAGNOSIS — R29.898 WEAKNESS OF EXTREMITY: ICD-10-CM

## 2021-02-11 DIAGNOSIS — M79.2 NEUROPATHIC PAIN: ICD-10-CM

## 2021-03-04 ENCOUNTER — OFFICE VISIT (OUTPATIENT)
Dept: PHYSICAL MEDICINE AND REHAB | Facility: REHABILITATION | Age: 51
End: 2021-03-04
Payer: COMMERCIAL

## 2021-03-04 VITALS
DIASTOLIC BLOOD PRESSURE: 80 MMHG | BODY MASS INDEX: 27.05 KG/M2 | HEIGHT: 62 IN | WEIGHT: 147 LBS | TEMPERATURE: 97.6 F | OXYGEN SATURATION: 97 % | HEART RATE: 103 BPM | RESPIRATION RATE: 18 BRPM | SYSTOLIC BLOOD PRESSURE: 150 MMHG

## 2021-03-04 DIAGNOSIS — I63.9 CARDIOEMBOLIC STROKE (HCC): Primary | ICD-10-CM

## 2021-03-04 DIAGNOSIS — R45.89 DEPRESSED MOOD: ICD-10-CM

## 2021-03-04 DIAGNOSIS — R29.898 WEAKNESS OF EXTREMITY: ICD-10-CM

## 2021-03-04 DIAGNOSIS — M79.2 NEUROPATHIC PAIN: ICD-10-CM

## 2021-03-04 DIAGNOSIS — M79.604 RIGHT LEG PAIN: ICD-10-CM

## 2021-03-04 DIAGNOSIS — M72.2 BILATERAL PLANTAR FASCIITIS: ICD-10-CM

## 2021-03-04 DIAGNOSIS — F32.9 REACTIVE DEPRESSION (SITUATIONAL): ICD-10-CM

## 2021-03-04 PROCEDURE — 99214 OFFICE O/P EST MOD 30 MIN: CPT | Performed by: PHYSICAL MEDICINE & REHABILITATION

## 2021-03-04 RX ORDER — GABAPENTIN 400 MG/1
400 CAPSULE ORAL
Qty: 30 CAPSULE | Refills: 1 | Status: SHIPPED | OUTPATIENT
Start: 2021-03-04 | End: 2021-04-26 | Stop reason: SDUPTHER

## 2021-03-04 RX ORDER — GABAPENTIN 800 MG/1
800 TABLET ORAL 3 TIMES DAILY
Qty: 90 TABLET | Refills: 1 | Status: SHIPPED | OUTPATIENT
Start: 2021-03-04 | End: 2021-04-26 | Stop reason: SDUPTHER

## 2021-03-04 RX ORDER — DULOXETINE 40 MG/1
30 CAPSULE, DELAYED RELEASE ORAL DAILY
Qty: 30 CAPSULE | Refills: 0 | Status: SHIPPED | OUTPATIENT
Start: 2021-03-04 | End: 2021-03-25 | Stop reason: SDUPTHER

## 2021-03-04 ASSESSMENT — ENCOUNTER SYMPTOMS
FALLS: 0
COUGH: 0
CONSTIPATION: 0
BRUISES/BLEEDS EASILY: 0
PALPITATIONS: 0
SHORTNESS OF BREATH: 0
DIARRHEA: 0
DEPRESSION: 1
FOCAL WEAKNESS: 1
MEMORY LOSS: 0
SORE THROAT: 0
CHILLS: 0
FEVER: 0

## 2021-03-04 ASSESSMENT — FIBROSIS 4 INDEX: FIB4 SCORE: 0.5

## 2021-03-04 NOTE — PROGRESS NOTES
[facility]  [facility]  [facility] [place] [address]  Ph: [phone]    FOLLOW UP PATIENT EVALUATION      Patient Name: Guerrero Ann   Patient : 1970  Patient Age: 50 y.o.     Examining Physician: Dr. Marilu Franco, DO    PM&R History to Date - Background Information:  Dates of Admission/Discharge to ARU: 10/9/20-10/21/20    SUBJECTIVE:   Patient Identification: Guerrero Ann is a 50 y.o. male with PMH significant for coronary artery disease s/p stents, diabetes, hypertension, congenital absence of one kidney, type II diabetes and rehabilitation history significant for bilateral cardioembolic ischemic CVA s/p CABG and is presenting to PM&R clinic for a FOLLOW UP OUTPATIENT evaluation with the following chief complaint/s:    Chief Complaint: Neuropathic pain    Accompanied by Today: Girlfriend  History of Present Illness:    - Mood improved as not crying as often.   - Sometimes motivated to go for walks, but sometimes the pain is too bad.   - Lately not wearing shoes. Foot feels swollen in the ball, heel, arch of the right foot.   - On left the balls of the feet are getting numb as well. Left symptoms started being affected since past couple of months.   - Has EMG/NCS .   - Not sure if symptoms present because still not ambulating.   - One night had full night of sleep and foot didn't bother him, had walked that day.   - Pain is worse at night.   - Not taking as many naps during the day.   - Does not have orthotic, does have some plantar surface pain on right.   - Sees cardiology in April.   - Gets some numbness in the three toes on the left (great and two next to it) and corresponding balls of feet.   - Pain is worse at night. Gets sharp pains through arch. Getting up and walking helps.   - Tried Gabapentin 800/800/1100mg     Review of Systems:  Review of Systems   Constitutional: Negative for chills and fever.    HENT: Negative for congestion and sore throat.    Respiratory: Negative for cough and shortness of breath.    Cardiovascular: Negative for palpitations and leg swelling.   Gastrointestinal: Negative for constipation and diarrhea.   Genitourinary: Negative for dysuria, frequency and urgency.   Musculoskeletal: Negative for falls and joint pain.        + Right lower extremity muscle spasms   Neurological: Positive for focal weakness (right foot, improving.).        Plantar surface pain right foot.    Endo/Heme/Allergies: Does not bruise/bleed easily.   Psychiatric/Behavioral: Positive for depression ( Improved). Negative for memory loss.      All other pertinent positive review of systems are noted above in HPI.   All other systems reviewed and are negative.    Past Medical History:  Past Medical History:   Diagnosis Date   • Diabetes (HCC)     oral meds only   • Hypertension    • Kidney stones    • MI, old    • Pneumonia    • Psychiatric problem     depression and anxiety   • Stroke (HCC)       Past Surgical History:   Procedure Laterality Date   • MULTIPLE CORONARY ARTERY BYPASS ENDO VEIN HARVEST  9/25/2020    Procedure: CABG, WITH ENDOSCOPIC VEIN PROCUREMENT- X3;  Surgeon: Michael Chicas M.D.;  Location: SURGERY [facility];  Service: Cardiothoracic   • MARGOTH  9/25/2020    Procedure: ECHOCARDIOGRAM, TRANSESOPHAGEAL;  Surgeon: Michael Chicas M.D.;  Location: SURGERY [facility];  Service: Cardiothoracic   • STENT PLACEMENT  2017 1 cardiac stent   • OTHER ORTHOPEDIC SURGERY      rt wrist fracture with fixation 34 years ago        Current Outpatient Medications:   •  DULoxetine 40 MG Cap DR Particles, Take 30 mg by mouth every day., Disp: 30 capsule, Rfl: 0  •  gabapentin (NEURONTIN) 800 MG tablet, Take 1 tablet by mouth 3 times a day., Disp: 90 tablet, Rfl: 1  •  gabapentin (NEURONTIN) 400 MG Cap, Take 1 capsule by mouth every bedtime., Disp: 30 capsule, Rfl: 1  •  Coenzyme Q10 (COQ10) 100 MG Cap, Take 300 mg  by mouth every day., Disp: , Rfl:   •  Blood Glucose Test Strips, Use one strip to test blood sugar once daily ., Disp: 100 Each, Rfl: 5  •  atorvastatin (LIPITOR) 80 MG tablet, Take 1 Tab by mouth every bedtime., Disp: 90 Tab, Rfl: 3  •  Dapagliflozin Propanediol (FARXIGA) 5 MG Tab, Take 5 mg by mouth every day., Disp: 30 Tab, Rfl: 5  •  therapeutic multivitamin-minerals (THERAGRAN-M) Tab, Take 1 Tab by mouth every day., Disp: , Rfl:   •  lisinopril (PRINIVIL) 40 MG tablet, Take 1 Tab by mouth every day., Disp: 90 Tab, Rfl: 3  •  clopidogrel (PLAVIX) 75 MG Tab, Take 1 Tab by mouth every day., Disp: 90 Tab, Rfl: 3  •  allopurinol (ZYLOPRIM) 100 MG Tab, Take 1 Tab by mouth every day., Disp: 90 Tab, Rfl: 3  •  Lancets, Lancets order: Lancets for True Metrix meter. Sig: use TID and prn ssx high or low sugar. #100 RF x 3, Disp: 100 Each, Rfl: 3  •  metoprolol (LOPRESSOR) 25 MG Tab, Take 1 Tab by mouth 2 Times a Day., Disp: 180 Tab, Rfl: 3  •  metFORMIN (GLUCOPHAGE) 500 MG Tab, Take 2 tablets by mouth twice daily., Disp: 180 Tab, Rfl: 3  •  acetaminophen (TYLENOL) 325 MG Tab, Take 2 Tabs by mouth every four hours as needed for Moderate Pain., Disp: 30 Tab, Rfl: 0  •  aspirin (ASA) 81 MG Chew Tab chewable tablet, Take 1 Tab by mouth every day., Disp: 30 Tab, Rfl: 2  •  ascorbic acid (VITAMIN C) 500 MG tablet, Take 1 Tab by mouth every morning with breakfast., Disp: 30 Tab, Rfl: 2  Allergies   Allergen Reactions   • Isosorbide Unspecified     Hallucinations        Past Social History:  Social History     Socioeconomic History   • Marital status: Single     Spouse name: Not on file   • Number of children: Not on file   • Years of education: Not on file   • Highest education level: Not on file   Occupational History   • Not on file   Tobacco Use   • Smoking status: Never Smoker   • Smokeless tobacco: Former User     Types: Chew   Substance and Sexual Activity   • Alcohol use: Not Currently     Comment: 2 per week   • Drug  use: No   • Sexual activity: Yes     Partners: Female     Comment: adriana. wrk: Verónica [place] [employer].    Other Topics Concern   • Not on file   Social History Narrative   • Not on file     Social Determinants of Health     Financial Resource Strain:    • Difficulty of Paying Living Expenses:    Food Insecurity:    • Worried About Running Out of Food in the Last Year:    • Ran Out of Food in the Last Year:    Transportation Needs:    • Lack of Transportation (Medical):    • Lack of Transportation (Non-Medical):    Physical Activity:    • Days of Exercise per Week:    • Minutes of Exercise per Session:    Stress:    • Feeling of Stress :    Social Connections:    • Frequency of Communication with Friends and Family:    • Frequency of Social Gatherings with Friends and Family:    • Attends Taoism Services:    • Active Member of Clubs or Organizations:    • Attends Club or Organization Meetings:    • Marital Status:    Intimate Partner Violence:    • Fear of Current or Ex-Partner:    • Emotionally Abused:    • Physically Abused:    • Sexually Abused:         Family History:  Family History   Problem Relation Age of Onset   • Stroke Mother 47        had 3 devastating strokes,  56yo stroke complications   • Heart Disease Mother    • No Known Problems Father         does not know his father.   • Stroke Maternal Grandmother 85   • Heart Disease Maternal Grandmother    • Stroke Maternal Grandfather 54   • Heart Disease Maternal Grandfather        Depression and Opioid Screening  PHQ-9:  Depression Screen (PHQ-2/PHQ-9) 10/21/2020 2020 2021   PHQ-2 Total Score 0 - -   PHQ-2 Total Score - 0 6   PHQ-9 Total Score - - -   PHQ-9 Total Score - - 15     Interpretation of PHQ-9 Total Score   Score Severity   1-4 No Depression   5-9 Mild Depression   10-14 Moderate Depression   15-19 Moderately Severe Depression   20-27 Severe Depression     OBJECTIVE:   Vital Signs:  Vitals:    21 1229   BP: 150/80   Pulse:  (!) 103   Resp: 18   Temp: 36.4 °C (97.6 °F)   SpO2: 97%        Pertinent Labs:  Lab Results   Component Value Date/Time    SODIUM 138 12/29/2020 08:43 AM    POTASSIUM 4.0 12/29/2020 08:43 AM    CHLORIDE 101 12/29/2020 08:43 AM    CO2 22 12/29/2020 08:43 AM    GLUCOSE 198 (H) 12/29/2020 08:43 AM    BUN 18 12/29/2020 08:43 AM    CREATININE 0.83 12/29/2020 08:43 AM       Lab Results   Component Value Date/Time    HBA1C 8.2 (H) 12/29/2020 08:44 AM       Lab Results   Component Value Date/Time    WBC 10.2 12/29/2020 08:43 AM    RBC 4.64 (L) 12/29/2020 08:43 AM    HEMOGLOBIN 13.4 (L) 12/29/2020 08:43 AM    HEMATOCRIT 41.6 (L) 12/29/2020 08:43 AM    MCV 89.7 12/29/2020 08:43 AM    MCH 28.9 12/29/2020 08:43 AM    MCHC 32.2 (L) 12/29/2020 08:43 AM    MPV 11.0 12/29/2020 08:43 AM    NEUTSPOLYS 70.80 10/12/2020 06:21 AM    LYMPHOCYTES 18.70 (L) 10/12/2020 06:21 AM    MONOCYTES 7.30 10/12/2020 06:21 AM    EOSINOPHILS 2.10 10/12/2020 06:21 AM    BASOPHILS 0.70 10/12/2020 06:21 AM    HYPOCHROMIA 1+ 10/01/2020 05:48 AM    ANISOCYTOSIS 1+ 10/04/2020 05:08 AM       Lab Results   Component Value Date/Time    ASTSGOT 12 12/29/2020 08:43 AM    ALTSGPT 19 12/29/2020 08:43 AM        Physical Exam:   GEN: No apparent distress.  HEENT: Mask donned.  CV: Extremities are warm and well-perfused.  There is no significant peripheral edema bilaterally.  There are no skin changes on bilateral feet.  PULMONARY: Breathing nonlabored on room air, no respiratory accessory muscle use.   PSYCH: Mood improved.  NEURO: Awake, alert, conversational.  No significant tone.   No peripheral edema BL  Ambulatory without assistive device, slightly stiff and antalgic gait.  MSK: Pain along plantar fascia bilaterally. Tender to touch.     Imaging: No new imaging to review pertinent to today's visit.    ASSESSMENT/PLAN: Guerrero Ann  is a 50 y.o. male with rehabilitation history significant for bilateral cardioembolic ischemic CVA s/p CABG  , here for evaluation. The following plan was discussed with the patient who is in agreement.     Visit Diagnoses     ICD-10-CM   1. Cardioembolic stroke (HCC)  I63.9   2. Bilateral plantar fasciitis  M72.2   3. Neuropathic pain  M79.2   4. Depressed mood  R45.89   5. Reactive depression (situational)  F32.9   6. Right leg pain  M79.604   7. Weakness of extremity  R29.898        Girlfriend helps with history as patient has poor memory from bilateral strokes.    Rehab/Neuro:   1. Bilateral cardioembolic ischemic CVA s/p CABG   -Continue home exercise program.    Neuropathic Pain: Continued neuropathic pain type symptoms of the right lower extremity, possibly complicated by bilateral plantar fasciitis.  Patient states minimally improving on today's visit.  -Medication management: Prescription for gabapentin 800 mg 3 times daily and 400 mg nightly for a total of 800/800/1200  -Medication management: Prescription for Cymbalta 40 mg daily (increased from 30 mg)  -Procedures: EMG/NCS upcoming April 15.  -Counseling/education: Counseled to pay close attention to when symptom onset is and when it is worsened.    Bilateral plantar surface foot pain: Suspect plantar fasciitis.   -Referral: Orthotist for shoe inserts.    Mood: Situational depression related to #1 in rehab/neuro section.  Mildly improved on Cymbalta 30 mg prescribed at last visit.  -Med management: Prescription for Cymbalta 40 mg daily    Follow up: 1 month for medication reevaluation and follow-up EMG/NCS    Please note that this dictation was created using voice recognition software. I have made every reasonable attempt to correct obvious errors but there may be errors of grammar and content that I may have overlooked prior to finalization of this note.    Dr. Marilu Franco DO, MS  Department of Physical Medicine & Rehabilitation  Neuro Rehabilitation Clinic  [facility]

## 2021-03-25 DIAGNOSIS — R45.89 DEPRESSED MOOD: ICD-10-CM

## 2021-03-25 DIAGNOSIS — E11.8 TYPE 2 DIABETES MELLITUS WITH COMPLICATION, WITHOUT LONG-TERM CURRENT USE OF INSULIN (HCC): ICD-10-CM

## 2021-03-25 DIAGNOSIS — M79.2 NEUROPATHIC PAIN: ICD-10-CM

## 2021-03-25 DIAGNOSIS — F32.9 REACTIVE DEPRESSION (SITUATIONAL): ICD-10-CM

## 2021-03-25 RX ORDER — DULOXETINE 40 MG/1
30 CAPSULE, DELAYED RELEASE ORAL DAILY
Qty: 30 CAPSULE | Refills: 0 | Status: SHIPPED | OUTPATIENT
Start: 2021-03-25 | End: 2021-04-26

## 2021-03-25 NOTE — TELEPHONE ENCOUNTER
Received request via: Patient    Was the patient seen in the last year in this department? Yes    Does the patient have an active prescription (recently filled or refills available) for medication(s) requested? No       Pt is asking for a 3 month supply of metformin.

## 2021-03-29 ENCOUNTER — HOSPITAL ENCOUNTER (OUTPATIENT)
Dept: LAB | Facility: MEDICAL CENTER | Age: 51
End: 2021-03-29
Attending: FAMILY MEDICINE
Payer: COMMERCIAL

## 2021-03-29 DIAGNOSIS — E11.8 TYPE 2 DIABETES MELLITUS WITH COMPLICATION, WITHOUT LONG-TERM CURRENT USE OF INSULIN (HCC): ICD-10-CM

## 2021-03-29 DIAGNOSIS — D64.9 NORMOCHROMIC NORMOCYTIC ANEMIA: ICD-10-CM

## 2021-03-29 LAB
ERYTHROCYTE [DISTWIDTH] IN BLOOD BY AUTOMATED COUNT: 47.8 FL (ref 35.9–50)
EST. AVERAGE GLUCOSE BLD GHB EST-MCNC: 192 MG/DL
HBA1C MFR BLD: 8.3 % (ref 4–5.6)
HCT VFR BLD AUTO: 45.3 % (ref 42–52)
HGB BLD-MCNC: 14.7 G/DL (ref 14–18)
MCH RBC QN AUTO: 30 PG (ref 27–33)
MCHC RBC AUTO-ENTMCNC: 32.5 G/DL (ref 33.7–35.3)
MCV RBC AUTO: 92.4 FL (ref 81.4–97.8)
PLATELET # BLD AUTO: 257 K/UL (ref 164–446)
PMV BLD AUTO: 11.5 FL (ref 9–12.9)
RBC # BLD AUTO: 4.9 M/UL (ref 4.7–6.1)
WBC # BLD AUTO: 12.2 K/UL (ref 4.8–10.8)

## 2021-03-29 PROCEDURE — 83036 HEMOGLOBIN GLYCOSYLATED A1C: CPT

## 2021-03-29 PROCEDURE — 85027 COMPLETE CBC AUTOMATED: CPT

## 2021-03-29 PROCEDURE — 36415 COLL VENOUS BLD VENIPUNCTURE: CPT

## 2021-04-09 ENCOUNTER — OFFICE VISIT (OUTPATIENT)
Dept: MEDICAL GROUP | Facility: MEDICAL CENTER | Age: 51
End: 2021-04-09
Attending: FAMILY MEDICINE
Payer: COMMERCIAL

## 2021-04-09 VITALS
TEMPERATURE: 97.9 F | OXYGEN SATURATION: 98 % | HEIGHT: 62 IN | HEART RATE: 71 BPM | WEIGHT: 156.5 LBS | DIASTOLIC BLOOD PRESSURE: 60 MMHG | SYSTOLIC BLOOD PRESSURE: 120 MMHG | RESPIRATION RATE: 16 BRPM | BODY MASS INDEX: 28.8 KG/M2

## 2021-04-09 DIAGNOSIS — E11.8 TYPE 2 DIABETES MELLITUS WITH COMPLICATION, WITHOUT LONG-TERM CURRENT USE OF INSULIN (HCC): ICD-10-CM

## 2021-04-09 DIAGNOSIS — Z95.1 S/P CABG X 3: ICD-10-CM

## 2021-04-09 PROBLEM — I21.4 NSTEMI (NON-ST ELEVATED MYOCARDIAL INFARCTION) (HCC): Status: ACTIVE | Noted: 2018-06-05

## 2021-04-09 PROBLEM — M48.10 DISH (DIFFUSE IDIOPATHIC SKELETAL HYPEROSTOSIS): Status: ACTIVE | Noted: 2018-09-14

## 2021-04-09 PROCEDURE — 99214 OFFICE O/P EST MOD 30 MIN: CPT | Performed by: FAMILY MEDICINE

## 2021-04-09 PROCEDURE — 99213 OFFICE O/P EST LOW 20 MIN: CPT | Performed by: FAMILY MEDICINE

## 2021-04-09 RX ORDER — DAPAGLIFLOZIN 10 MG/1
10 TABLET, FILM COATED ORAL DAILY
Qty: 30 TABLET | Refills: 5 | Status: SHIPPED | OUTPATIENT
Start: 2021-04-09 | End: 2021-10-18 | Stop reason: SDUPTHER

## 2021-04-09 RX ORDER — CALCIUM CITRATE/VITAMIN D3 200MG-6.25
TABLET ORAL
COMMUNITY
Start: 2021-03-23 | End: 2022-01-06

## 2021-04-09 ASSESSMENT — FIBROSIS 4 INDEX: FIB4 SCORE: 0.52

## 2021-04-09 NOTE — ASSESSMENT & PLAN NOTE
Recent a1c is 8.3  Metformin 1000mg bid   Admits to drinking a 12 ounce soda a day  Pizza once in a while  Overall carb intake is improved since prior to surgery  fastfood intake is decreased   Started working now and is on his feet more  Exercising more now that the weather is better

## 2021-04-09 NOTE — PROGRESS NOTES
Subjective:     CC:   Chief Complaint   Patient presents with   • Follow-Up         HPI:     Type 2 diabetes mellitus with complication, without long-term current use of insulin (HCC)  Recent a1c is 8.3  Metformin 1000mg bid   Admits to drinking a 12 ounce soda a day  Pizza once in a while  Overall carb intake is improved since prior to surgery  fastfood intake is decreased   Started working now and is on his feet more  Exercising more now that the weather is better      S/P CABG x 3  Pt doing well. Mild sorenss of the chest wall      Past Medical History:   Diagnosis Date   • Diabetes (HCC)     oral meds only   • Hypertension    • Kidney stones    • MI, old    • Pneumonia    • Psychiatric problem     depression and anxiety   • Stroke (HCC)        Social History     Tobacco Use   • Smoking status: Never Smoker   • Smokeless tobacco: Former User     Types: Chew   Substance Use Topics   • Alcohol use: Not Currently     Comment: 2 per week   • Drug use: No       Current Outpatient Medications Ordered in Epic   Medication Sig Dispense Refill   • Dapagliflozin Propanediol (FARXIGA) 10 MG Tab Take 10 mg by mouth every day. 30 tablet 5   • TRUE METRIX BLOOD GLUCOSE TEST strip USE 1 STRIP TO CHECK GLUCOSE ONCE DAILY     • DULoxetine HCl 40 MG Cap DR Particles Take 30 mg by mouth every day. 30 capsule 0   • metFORMIN (GLUCOPHAGE) 1000 MG tablet Take 1 tablet by mouth 2 times a day, with meals. 180 tablet 3   • gabapentin (NEURONTIN) 800 MG tablet Take 1 tablet by mouth 3 times a day. 90 tablet 1   • gabapentin (NEURONTIN) 400 MG Cap Take 1 capsule by mouth every bedtime. 30 capsule 1   • Coenzyme Q10 (COQ10) 100 MG Cap Take 300 mg by mouth every day.     • Blood Glucose Test Strips Use one strip to test blood sugar once daily . 100 Each 5   • atorvastatin (LIPITOR) 80 MG tablet Take 1 Tab by mouth every bedtime. 90 Tab 3   • therapeutic multivitamin-minerals (THERAGRAN-M) Tab Take 1 Tab by mouth every day.     • lisinopril  "(PRINIVIL) 40 MG tablet Take 1 Tab by mouth every day. 90 Tab 3   • clopidogrel (PLAVIX) 75 MG Tab Take 1 Tab by mouth every day. 90 Tab 3   • allopurinol (ZYLOPRIM) 100 MG Tab Take 1 Tab by mouth every day. 90 Tab 3   • Lancets Lancets order: Lancets for True Metrix meter. Sig: use TID and prn ssx high or low sugar. #100 RF x 3 100 Each 3   • metoprolol (LOPRESSOR) 25 MG Tab Take 1 Tab by mouth 2 Times a Day. 180 Tab 3   • acetaminophen (TYLENOL) 325 MG Tab Take 2 Tabs by mouth every four hours as needed for Moderate Pain. 30 Tab 0   • aspirin (ASA) 81 MG Chew Tab chewable tablet Take 1 Tab by mouth every day. 30 Tab 2   • ascorbic acid (VITAMIN C) 500 MG tablet Take 1 Tab by mouth every morning with breakfast. 30 Tab 2     No current Epic-ordered facility-administered medications on file.       Allergies:  Isosorbide    ROS:  Pulm: no sob, no cough  CV: no chest pain  GI: no abd pain    Objective:       Exam:  /60 (BP Location: Right arm, Patient Position: Sitting, BP Cuff Size: Adult long)   Pulse 71   Temp 36.6 °C (97.9 °F) (Temporal)   Resp 16   Ht 1.575 m (5' 2\")   Wt 71 kg (156 lb 8 oz)   SpO2 98%   BMI 28.62 kg/m²  Body mass index is 28.62 kg/m².    Gen: No apparent distress.  Neck: Neck is supple   Lungs: Normal effort, CTA bilaterally, no wheezes, rhonchi, or rales  CV: Regular rate and rhythm. No murmurs, rubs, or gallops. No lower extremity edema  Abd: NABS, soft, nontender, nondistended  MSK: Fingers without clubbing or cyanosis. Normal gait   Derm: Warm and dry. No visible rashes  Psy: Alert and oriented, Normal mood and affect. Judgment normal      Labs:   Reviewed most recent labs, glycohemoglobin 8.3 on 3/29/2021    Assessment & Plan:     50 y.o. male with the following -     1. Type 2 diabetes mellitus with complication, without long-term current use of insulin ([facility])  - Dapagliflozin Propanediol (FARXIGA) 10 MG Tab; Take 10 mg by mouth every day.  Dispense: 30 tablet; Refill: " 5  Diabetes has not improved despite increasing Metformin and fark [employer] at last visit.  Will increase Farxiga to maximum dose at 10 mg.  Patient and partner were counseled on importance of diet and exercise.  Patient is hesitant to stop drinking sodas.  Counseled him on carb intake, suggested carbohydrate counting more for self education and knowledge of carb content. Recheck in 3 months     Return in about 3 months (around 7/9/2021).    Please note that this dictation was created using voice recognition software. I have made every reasonable attempt to correct obvious errors, but I expect that there are errors of grammar and possibly content that I did not discover before finalizing the note.

## 2021-04-15 ENCOUNTER — OFFICE VISIT (OUTPATIENT)
Dept: PHYSICAL MEDICINE AND REHAB | Facility: MEDICAL CENTER | Age: 51
End: 2021-04-15
Payer: COMMERCIAL

## 2021-04-15 VITALS
BODY MASS INDEX: 28.44 KG/M2 | SYSTOLIC BLOOD PRESSURE: 128 MMHG | TEMPERATURE: 97.4 F | WEIGHT: 154.54 LBS | DIASTOLIC BLOOD PRESSURE: 64 MMHG | OXYGEN SATURATION: 98 % | HEIGHT: 62 IN | HEART RATE: 68 BPM

## 2021-04-15 DIAGNOSIS — G62.9 PERIPHERAL POLYNEUROPATHY: ICD-10-CM

## 2021-04-15 DIAGNOSIS — M54.16 LUMBAR RADICULOPATHY: ICD-10-CM

## 2021-04-15 PROCEDURE — 95886 MUSC TEST DONE W/N TEST COMP: CPT | Performed by: PHYSICAL MEDICINE & REHABILITATION

## 2021-04-15 PROCEDURE — 95910 NRV CNDJ TEST 7-8 STUDIES: CPT | Performed by: PHYSICAL MEDICINE & REHABILITATION

## 2021-04-15 ASSESSMENT — FIBROSIS 4 INDEX: FIB4 SCORE: 0.52

## 2021-04-15 ASSESSMENT — PAIN SCALES - GENERAL: PAINLEVEL: 7=MODERATE-SEVERE PAIN

## 2021-04-15 NOTE — PROCEDURES
"[facility]  Sports and Spine, Physiatry, EMG  [facility]  [address]  [place] [address]    Test Date:  4/15/2021    Patient: Guerrero Ann : 1970 Physician: Carlito Steele MD   Sex: Male Height: 5' 2\" Ref Phys: Carlito Steele M.D.   MRN#: [id number] Weight: 70.1 kg Technician:      Chief Complaint:  \"No feeling in right foot\"    HPI:    The patient presents for electrodiagnostic evaluation of the right lower extremity.  He feels like his right leg is swollen.  He has a feeling of numbness in the right foot and has difficulty moving his toes.  He also reports some symptoms starting in left foot with tingling in the toes    From what he reports, he is not able to recall events after surgery until Rehab.  He is here with his girlfriend, Miriam, who contributes to the HPI.  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    PMH/PSH/Meds/SH are unchanged from previous visit with Dr. Franco    Past medical history is negative for thryoid disease, cervical or lumbar spine surgery, or known history of cancer.  He has diabetes, but is unsure for how long.  History is positive for heavy alcohol use in the past    FH is negative for known history of neuromuscular disease    Exam:  Gen: Patient is alert and cooperative and in no acute distress.  Paucity of speech  Manual muscle testing reveals no focal motor deficits in the upper or lower extremities bilaterally.  Sensation is decreased right lateral foot and first web space, otherwise intact in lower extremities.  Seated SLR is negative bilaterally  Reflexes are 2+ biceps, triceps, brachioradialis, 3+ patella and achilles bilaterally.    There is no clonus.  Proprioception is intact at the great toes bilaterally.  Gait is steady without assist device or loss of balance  Ext: No edema is noted in the lower extremities and DP pulses are 2+ bilaterally          NCV & EMG " Findings:  Evaluation of the left fibular motor, the right fibular motor, and the right tibial motor nerves showed no response (Ankle).  The left tibial motor nerve showed reduced amplitude (2.2 mV).  The left sural sensory nerve showed no response (Calf).  The right sural sensory nerve showed prolonged distal peak latency (7.8 ms) and decreased conduction velocity (Calf-Lat Mall, 18 m/s).  All remaining nerves (as indicated in the following tables) were within normal limits.      Needle evaluation of the right anterior tibialis muscle showed increased spontaneous activity and diminished recruitment.  The right low lumbosacral paraspinal, the left mid lumbosacral paraspinal, and the left low lumbosacral paraspinal muscles showed slightly increased spontaneous activity.  The right abductor hallucis and the left anterior tibialis muscles showed moderately increased spontaneous activity.  All remaining muscles (as indicated in the following table) showed no evidence of electrical instability.      Impression/Recommendations:    Abnormal study  1. Today's electrodiagnostic findings are suggestive of:  a. Right lumbar radiculopathy, primarily involving L5  b. Left lumbar radiculopathy, primarily involving L5  c. Large fiber generalized peripheral polyneuropathy(PPN)    2. Clinically, his symptoms are consistent with the above findings.  The PPN may be second to DM and/or history of heavy alcohol use.  Recommend imaging of the lumbar spine, patient could benefit from physical therapy and possibly injections.      Thank you very much for asking me to participate in Guerrero Restrepo Seth's care.  Please contact me with any questions or concerns.    Carlito Steele MD  Physical Medicine and Rehabilitation  Interventional Spine and Sports Physiatry  [facility]      CC: Dr. Marilu Franco        Nerve Conduction Studies  Anti Sensory Summary Table     Stim Site NR Peak (ms) Norm Peak (ms) P-T Amp (µV)  Norm P-T Amp Site1 Site2 Delta-P (ms) Dist (cm) Octavio (m/s) Norm Octavio (m/s)   Left Sural Anti Sensory (Lat Mall)   Calf NR  <4.0  >5.0 Calf Lat Mall  14.0  >35   Right Sural Anti Sensory (Lat Mall)   Calf    7.8 <4.0 13.6 >5.0 Calf Lat Mall 7.8 14.0 18 >35   Site 2    11.8  26.2            Motor Summary Table     Stim Site NR Onset (ms) Norm Onset (ms) O-P Amp (mV) Norm O-P Amp Site1 Site2 Delta-0 (ms) Dist (cm) Octavio (m/s) Norm Octavio (m/s)   Right Dp Br Fibular Motor (AntTibialis)   Fib Head    3.1 <4.2 0.6  Poplit Fib Head 2.0 10.0 50 >40.5   Poplit    5.1 <5.7 0.2          Left Fibular Motor (Ext Dig Brev)   Ankle NR  <6.1  >2.5         Right Fibular Motor (Ext Dig Brev)    28 C right sural   Ankle NR  <6.1  >2.5         Left Tibial Motor (Abd Mayberry Brev)   Ankle    4.3 <6.1 2.2 >3.0 Knee Ankle 8.1 34.5 43 >35   Knee    12.4  1.4          Right Tibial Motor (Abd Mayberry Brev)   Ankle NR  <6.1  >3.0           EMG+     Side Muscle Nerve Root Ins Act Fibs Psw Amp Dur Poly Recrt Int Pat Other Comment   Right AntTibialis Dp Br Fibular L4-5 Nml 3+ 3+ Nml Nml 0 Reduced Nml None    Right Gastroc Tibial S1-2 Nml Nml Nml Nml Nml 0 Nml Nml None    Right VastusMed Femoral L2-4 Nml Nml Nml Nml Nml 0 Nml Nml None    Right RectFemoris Femoral L2-4 Nml Nml Nml Nml Nml 0 Nml Nml None    Right BicepsFemS Sciatic L5-S1 Nml Nml Nml Nml Nml 0 Nml Nml None    Right TensorFascLat SupGluteal L4-5, S1 Nml Nml Nml Nml Nml 0 Nml Nml None    Right Lumbo Parasp Mid Rami L3-4 Nml Nml Nml      None    Right Lumbo Parasp Low Rami L5-S1 Nml 1+ 1+      None    Left AbdHallucis MedPlantar S1-2 Nml Nml Nml Nml Nml 0 Nml Nml None    Right AbdHallucis MedPlantar S1-2 Nml 2+ 2+ Nml Nml 0 Nml Nml None    Left AntTibialis Dp Br Fibular L4-5 Nml 2+ 2+ Nml Nml 0 Nml Nml None    Left Gastroc Tibial S1-2 Nml Nml Nml Nml Nml 0 Nml Nml None    Left VastusMed Femoral L2-4 Nml Nml Nml Nml Nml 0 Nml Nml None    Left RectFemoris Femoral L2-4 Nml Nml Nml Nml Nml 0 Nml Nml  None    Left BicepsFemS Sciatic L5-S1 Nml Nml Nml Nml Nml 0 Nml Nml None    Left TensorFascLat SupGluteal L4-5, S1 Nml Nml Nml Nml Nml 0 Nml Nml None    Left Lumbo Parasp Mid Rami L3-4 Nml 1+ 1+      None    Left Lumbo Parasp Low Rami L5-S1 Nml 1+ 1+      None            Waveforms:

## 2021-04-20 ENCOUNTER — APPOINTMENT (OUTPATIENT)
Dept: PHYSICAL MEDICINE AND REHAB | Facility: REHABILITATION | Age: 51
End: 2021-04-20
Payer: COMMERCIAL

## 2021-04-26 ENCOUNTER — OFFICE VISIT (OUTPATIENT)
Dept: PHYSICAL MEDICINE AND REHAB | Facility: REHABILITATION | Age: 51
End: 2021-04-26
Payer: COMMERCIAL

## 2021-04-26 VITALS
BODY MASS INDEX: 28.17 KG/M2 | HEART RATE: 70 BPM | DIASTOLIC BLOOD PRESSURE: 74 MMHG | WEIGHT: 154 LBS | SYSTOLIC BLOOD PRESSURE: 128 MMHG | RESPIRATION RATE: 18 BRPM | TEMPERATURE: 97.6 F | OXYGEN SATURATION: 97 %

## 2021-04-26 DIAGNOSIS — M79.671 BILATERAL PAIN OF LEG AND FOOT: Primary | ICD-10-CM

## 2021-04-26 DIAGNOSIS — R39.11 URINARY HESITANCY: ICD-10-CM

## 2021-04-26 DIAGNOSIS — M79.2 NEUROPATHIC PAIN: ICD-10-CM

## 2021-04-26 DIAGNOSIS — M79.672 BILATERAL PAIN OF LEG AND FOOT: Primary | ICD-10-CM

## 2021-04-26 DIAGNOSIS — M79.605 BILATERAL PAIN OF LEG AND FOOT: Primary | ICD-10-CM

## 2021-04-26 DIAGNOSIS — M54.16 LUMBAR RADICULOPATHY: ICD-10-CM

## 2021-04-26 DIAGNOSIS — M79.604 BILATERAL PAIN OF LEG AND FOOT: Primary | ICD-10-CM

## 2021-04-26 PROCEDURE — 99214 OFFICE O/P EST MOD 30 MIN: CPT | Performed by: PHYSICAL MEDICINE & REHABILITATION

## 2021-04-26 RX ORDER — GABAPENTIN 800 MG/1
800 TABLET ORAL 3 TIMES DAILY
Qty: 90 TABLET | Refills: 2 | Status: SHIPPED | OUTPATIENT
Start: 2021-04-26 | End: 2021-06-07 | Stop reason: SDUPTHER

## 2021-04-26 RX ORDER — GABAPENTIN 400 MG/1
400 CAPSULE ORAL 3 TIMES DAILY
Qty: 90 CAPSULE | Refills: 2 | Status: SHIPPED | OUTPATIENT
Start: 2021-04-26 | End: 2021-06-07 | Stop reason: SDUPTHER

## 2021-04-26 RX ORDER — TAMSULOSIN HYDROCHLORIDE 0.4 MG/1
0.4 CAPSULE ORAL
Qty: 30 CAPSULE | Refills: 2 | Status: SHIPPED | OUTPATIENT
Start: 2021-04-26 | End: 2022-01-06

## 2021-04-26 RX ORDER — DULOXETIN HYDROCHLORIDE 60 MG/1
60 CAPSULE, DELAYED RELEASE ORAL DAILY
Qty: 30 CAPSULE | Refills: 2 | Status: SHIPPED | OUTPATIENT
Start: 2021-04-26 | End: 2021-06-07 | Stop reason: SDUPTHER

## 2021-04-26 ASSESSMENT — ENCOUNTER SYMPTOMS
SHORTNESS OF BREATH: 0
MEMORY LOSS: 0
PALPITATIONS: 0
DIARRHEA: 0
BRUISES/BLEEDS EASILY: 0
CHILLS: 0
FALLS: 0
SORE THROAT: 0
COUGH: 0
CONSTIPATION: 0
FEVER: 0

## 2021-04-26 ASSESSMENT — FIBROSIS 4 INDEX: FIB4 SCORE: 0.52

## 2021-04-26 NOTE — PROGRESS NOTES
[facility]  PM&R Neuro Rehabilitation Clinic  [facility] [place] [address]  Ph: [phone]    FOLLOW UP PATIENT EVALUATION      Patient Name: Guerrero Ann   Patient : 1970  Patient Age: 50 y.o.     Examining Physician: Dr. Marilu Franco, DO    PM&R History to Date - Background Information:  Dates of Admission/Discharge to ARU: 10/9/20-10/21/20    SUBJECTIVE:   Patient Identification: Guerrero Ann is a 50 y.o. male with PMH significant for coronary artery disease s/p stents, diabetes, hypertension, congenital absence of one kidney, type II diabetes and rehabilitation history significant for bilateral cardioembolic ischemic CVA s/p CABG and is presenting to PM&R clinic for a FOLLOW UP OUTPATIENT evaluation with the following chief complaint/s:    Chief Complaint: Neuropathic pain    Accompanied by Today: Girlfriend  History of Present Illness:    - Had EMG/NCS.   - Had inserts but were very expensive.   - Old shoes helped as well. Can roll foot better.   - Doesn't have the jolts that he used to have. Do not occur during day.   - Also better at night and is sleeping through the night.   - Started working as  and is doing okay with that.   - Has some balance issues when bending over.   - Some days are better than others.   - Did have improvement after increase at night.     Review of Systems:  Review of Systems   Constitutional: Negative for chills and fever.   HENT: Negative for congestion and sore throat.    Respiratory: Negative for cough and shortness of breath.    Cardiovascular: Negative for palpitations and leg swelling.   Gastrointestinal: Negative for constipation and diarrhea.   Genitourinary:        + ED   Musculoskeletal: Negative for falls and joint pain.        Foot pain improved.   Neurological: Positive for tingling.        Significant neuropathic pain, though improved from prior.   Endo/Heme/Allergies: Does not bruise/bleed  easily.   Psychiatric/Behavioral: Negative for memory loss.        Mood improved.      All other pertinent positive review of systems are noted above in HPI.   All other systems reviewed and are negative.    Past Medical History:  Past Medical History:   Diagnosis Date   • Diabetes (HCC)     oral meds only   • Hypertension    • Kidney stones    • MI, old    • Pneumonia    • Psychiatric problem     depression and anxiety   • Stroke (HCC)       Past Surgical History:   Procedure Laterality Date   • MULTIPLE CORONARY ARTERY BYPASS ENDO VEIN HARVEST  9/25/2020    Procedure: CABG, WITH ENDOSCOPIC VEIN PROCUREMENT- X3;  Surgeon: Michael Chicas M.D.;  Location: SURGERY [facility];  Service: Cardiothoracic   • MARGOTH  9/25/2020    Procedure: ECHOCARDIOGRAM, TRANSESOPHAGEAL;  Surgeon: Michael Chicas M.D.;  Location: SURGERY [facility];  Service: Cardiothoracic   • STENT PLACEMENT  2017 1 cardiac stent   • OTHER ORTHOPEDIC SURGERY      rt wrist fracture with fixation 34 years ago        Current Outpatient Medications:   •  gabapentin (NEURONTIN) 800 MG tablet, Take 1 tablet by mouth 3 times a day., Disp: 90 tablet, Rfl: 2  •  gabapentin (NEURONTIN) 400 MG Cap, Take 1 capsule by mouth 3 times a day., Disp: 90 capsule, Rfl: 2  •  DULoxetine (CYMBALTA) 60 MG Cap DR Particles delayed-release capsule, Take 1 capsule by mouth every day., Disp: 30 capsule, Rfl: 2  •  tamsulosin (FLOMAX) 0.4 MG capsule, Take 1 capsule by mouth at bedtime., Disp: 30 capsule, Rfl: 2  •  TRUE METRIX BLOOD GLUCOSE TEST strip, USE 1 STRIP TO CHECK GLUCOSE ONCE DAILY, Disp: , Rfl:   •  Dapagliflozin Propanediol (FARXIGA) 10 MG Tab, Take 10 mg by mouth every day., Disp: 30 tablet, Rfl: 5  •  metFORMIN (GLUCOPHAGE) 1000 MG tablet, Take 1 tablet by mouth 2 times a day, with meals., Disp: 180 tablet, Rfl: 3  •  Coenzyme Q10 (COQ10) 100 MG Cap, Take 300 mg by mouth every day., Disp: , Rfl:   •  Blood Glucose Test Strips, Use one strip to test blood sugar  once daily ., Disp: 100 Each, Rfl: 5  •  atorvastatin (LIPITOR) 80 MG tablet, Take 1 Tab by mouth every bedtime., Disp: 90 Tab, Rfl: 3  •  therapeutic multivitamin-minerals (THERAGRAN-M) Tab, Take 1 Tab by mouth every day., Disp: , Rfl:   •  lisinopril (PRINIVIL) 40 MG tablet, Take 1 Tab by mouth every day., Disp: 90 Tab, Rfl: 3  •  clopidogrel (PLAVIX) 75 MG Tab, Take 1 Tab by mouth every day., Disp: 90 Tab, Rfl: 3  •  allopurinol (ZYLOPRIM) 100 MG Tab, Take 1 Tab by mouth every day., Disp: 90 Tab, Rfl: 3  •  Lancets, Lancets order: Lancets for True Metrix meter. Sig: use TID and prn ssx high or low sugar. #100 RF x 3, Disp: 100 Each, Rfl: 3  •  metoprolol (LOPRESSOR) 25 MG Tab, Take 1 Tab by mouth 2 Times a Day., Disp: 180 Tab, Rfl: 3  •  acetaminophen (TYLENOL) 325 MG Tab, Take 2 Tabs by mouth every four hours as needed for Moderate Pain., Disp: 30 Tab, Rfl: 0  •  aspirin (ASA) 81 MG Chew Tab chewable tablet, Take 1 Tab by mouth every day., Disp: 30 Tab, Rfl: 2  Allergies   Allergen Reactions   • Isosorbide Unspecified     Hallucinations        Past Social History:  Social History     Socioeconomic History   • Marital status: Single     Spouse name: Not on file   • Number of children: Not on file   • Years of education: Not on file   • Highest education level: Not on file   Occupational History   • Not on file   Tobacco Use   • Smoking status: Never Smoker   • Smokeless tobacco: Former User     Types: Chew   Substance and Sexual Activity   • Alcohol use: Not Currently     Comment: 2 per week   • Drug use: No   • Sexual activity: Yes     Partners: Female     Comment: adriana. wrk: Verónica [place].    Other Topics Concern   • Not on file   Social History Narrative   • Not on file     Social Determinants of Health     Financial Resource Strain:    • Difficulty of Paying Living Expenses:    Food Insecurity:    • Worried About Running Out of Food in the Last Year:    • Ran Out of Food in the Last Year:     Transportation Needs:    • Lack of Transportation (Medical):    • Lack of Transportation (Non-Medical):    Physical Activity:    • Days of Exercise per Week:    • Minutes of Exercise per Session:    Stress:    • Feeling of Stress :    Social Connections:    • Frequency of Communication with Friends and Family:    • Frequency of Social Gatherings with Friends and Family:    • Attends [employer] Services:    • Active Member of Clubs or Organizations:    • Attends Club or Organization Meetings:    • Marital Status:    Intimate Partner Violence:    • Fear of Current or Ex-Partner:    • Emotionally Abused:    • Physically Abused:    • Sexually Abused:         Family History:  Family History   Problem Relation Age of Onset   • Stroke Mother 47        had 3 devastating strokes,  54yo stroke complications   • Heart Disease Mother    • No Known Problems Father         does not know his father.   • Stroke Maternal Grandmother 85   • Heart Disease Maternal Grandmother    • Stroke Maternal Grandfather 54   • Heart Disease Maternal Grandfather        Depression and Opioid Screening  PHQ-9:  Depression Screen (PHQ-2/PHQ-9) 10/21/2020 2020 2021   PHQ-2 Total Score 0 - -   PHQ-2 Total Score - 0 6   PHQ-9 Total Score - - -   PHQ-9 Total Score - - 15     Interpretation of PHQ-9 Total Score   Score Severity   1-4 No Depression   5-9 Mild Depression   10-14 Moderate Depression   15-19 Moderately Severe Depression   20-27 Severe Depression     OBJECTIVE:   Vital Signs:  Vitals:    21 1656   BP: 128/74   Pulse: 70   Resp: 18   Temp: 36.4 °C (97.6 °F)   SpO2: 97%        Pertinent Labs:  Lab Results   Component Value Date/Time    SODIUM 138 2020 08:43 AM    POTASSIUM 4.0 2020 08:43 AM    CHLORIDE 101 2020 08:43 AM    CO2 22 2020 08:43 AM    GLUCOSE 198 (H) 2020 08:43 AM    BUN 18 2020 08:43 AM    CREATININE 0.83 2020 08:43 AM       Lab Results   Component Value Date/Time     HBA1C 8.3 (H) 03/29/2021 10:20 AM       Lab Results   Component Value Date/Time    WBC 12.2 (H) 03/29/2021 10:20 AM    RBC 4.90 03/29/2021 10:20 AM    HEMOGLOBIN 14.7 03/29/2021 10:20 AM    HEMATOCRIT 45.3 03/29/2021 10:20 AM    MCV 92.4 03/29/2021 10:20 AM    MCH 30.0 03/29/2021 10:20 AM    MCHC 32.5 (L) 03/29/2021 10:20 AM    MPV 11.5 03/29/2021 10:20 AM    NEUTSPOLYS 70.80 10/12/2020 06:21 AM    LYMPHOCYTES 18.70 (L) 10/12/2020 06:21 AM    MONOCYTES 7.30 10/12/2020 06:21 AM    EOSINOPHILS 2.10 10/12/2020 06:21 AM    BASOPHILS 0.70 10/12/2020 06:21 AM    HYPOCHROMIA 1+ 10/01/2020 05:48 AM    ANISOCYTOSIS 1+ 10/04/2020 05:08 AM       Lab Results   Component Value Date/Time    ASTSGOT 12 12/29/2020 08:43 AM    ALTSGPT 19 12/29/2020 08:43 AM        Physical Exam:   GEN: No apparent distress  HEENT: Head normocephalic, atraumatic.  Sclera nonicteric bilaterally, no ocular discharge appreciated bilaterally.  PULMONARY: Breathing nonlabored on room air, no respiratory accessory muscle use.  Not requiring supplemental oxygen.  SKIN: No appreciable skin breakdown on exposed areas of skin.  PSYCH: Mood and affect within normal limits.  Patient more jovial than prior.  NEURO: Awake alert. Conversational.  Logical thought content.  Ambulatory without assistive device.    Imaging: No new imaging to review pertinent to today's visit.    ASSESSMENT/PLAN: Guerrero Ann  is a 50 y.o. male with rehabilitation history significant for bilateral cardioembolic ischemic CVA s/p CABG , here for evaluation. The following plan was discussed with the patient who is in agreement.     Visit Diagnoses     ICD-10-CM   1. Bilateral pain of leg and foot  M79.604    M79.605    M79.671    M79.672   2. Neuropathic pain  M79.2   3. Lumbar radiculopathy  M54.16   4. Urinary hesitancy  R39.11        Girlfriend helps with history as patient has poor memory from bilateral strokes.    Rehab/Neuro:   1. Bilateral cardioembolic  ischemic CVA s/p CABG   -Therapy: Continue home exercise program  -Occupation: Has started a full-time job as a .    Neuropathic Pain: Continued neuropathic pain type symptoms of the right lower extremity, possibly complicated by bilateral plantar fasciitis.  Plantar fasciitis symptoms improved with orthotic and wearing his old shoes which have more give.  Neuropathic pain mildly improved, able to sleep better 4/26, however continues to be significantly impairing..  -Medication management: Patient has tolerated gabapentin thus far without side effects.  Recommend increasing from 800/800/1200 to 1200/1200/1200.  If pain still uncontrolled may consider additional agents and/or transition to Lyrica.  -Medication management: Prescription for Cymbalta 60 mg daily (increased from 40 mg daily)  -Procedures: EMG/NCS -polyperipheral neuropathy in addition to possible bilateral L5 radic.  Recommended MRI  -Imaging: MRI L-spine ordered today    Bilateral plantar surface foot pain: Suspect plantar fasciitis.  Improved with orthotic and wearing old shoes which have more gave and are not as stiff as the new shoes.  -Referral: Referral to orthotist, but out-of-pocket cost was very expensive.  Using alternative orthotics at this time which have helped symptoms.  Would continue this to avoid excessive cost.    Mood: Situational depression related to #1 in rehab/neuro section.  Improved on exam today.  Patient understandably still has a lot of situational distress related to where he had been functionally compared to where he is now but overall mood is stable.  -Med management: Prescription for Cymbalta 60 mg daily more so for neuropathic pain but also to see if mood can be improved even more.    Urinary Hesitancy:   -Medication side effect: Do not think this is a medication side effect as we went over the potential side effects of both gabapentin and Cymbalta.  Reviewed these in [employer] and neither has side effect of  urinary hesitancy.  -Prescription: Flomax 0.4 mg at night.  Counseled on potential side effect of lowering blood pressure and to monitor for dizziness.  -Has open referral to urology.  Also had complained of erectile dysfunction which would warrant urology evaluation.  Patient to follow-up with them if he would like to.    Follow up: 6 weeks medication follow-up    Please note that this dictation was created using voice recognition software. I have made every reasonable attempt to correct obvious errors but there may be errors of grammar and content that I may have overlooked prior to finalization of this note.    Dr. Marilu Franco DO, MS  Department of Physical Medicine & Rehabilitation  Neuro Rehabilitation Clinic  [facility]

## 2021-04-27 ENCOUNTER — OFFICE VISIT (OUTPATIENT)
Dept: CARDIOLOGY | Facility: MEDICAL CENTER | Age: 51
End: 2021-04-27
Payer: COMMERCIAL

## 2021-04-27 VITALS
HEART RATE: 86 BPM | DIASTOLIC BLOOD PRESSURE: 80 MMHG | RESPIRATION RATE: 14 BRPM | OXYGEN SATURATION: 98 % | SYSTOLIC BLOOD PRESSURE: 124 MMHG | WEIGHT: 155.6 LBS | BODY MASS INDEX: 28.63 KG/M2 | HEIGHT: 62 IN

## 2021-04-27 DIAGNOSIS — M79.2 NEUROPATHIC PAIN OF LOWER EXTREMITY, RIGHT: ICD-10-CM

## 2021-04-27 DIAGNOSIS — I25.10 CORONARY ARTERY DISEASE INVOLVING NATIVE CORONARY ARTERY OF NATIVE HEART WITHOUT ANGINA PECTORIS: ICD-10-CM

## 2021-04-27 DIAGNOSIS — E11.8 TYPE 2 DIABETES MELLITUS WITH COMPLICATION, WITHOUT LONG-TERM CURRENT USE OF INSULIN (HCC): ICD-10-CM

## 2021-04-27 DIAGNOSIS — I63.9 CARDIOEMBOLIC STROKE (HCC): ICD-10-CM

## 2021-04-27 DIAGNOSIS — I10 ESSENTIAL HYPERTENSION, BENIGN: ICD-10-CM

## 2021-04-27 DIAGNOSIS — Z95.1 S/P CABG X 3: ICD-10-CM

## 2021-04-27 DIAGNOSIS — I51.7 LEFT VENTRICULAR HYPERTROPHY: ICD-10-CM

## 2021-04-27 PROCEDURE — 99214 OFFICE O/P EST MOD 30 MIN: CPT | Performed by: INTERNAL MEDICINE

## 2021-04-27 ASSESSMENT — ENCOUNTER SYMPTOMS: TINGLING: 1

## 2021-04-27 ASSESSMENT — FIBROSIS 4 INDEX: FIB4 SCORE: 0.52

## 2021-04-27 NOTE — PROGRESS NOTES
Chief Complaint   Patient presents with   • Coronary Artery Disease     F/V Dx: Coronary artery disease involving native coronary artery of native heart without angina pectoris   • MI (Non ST Segment Elevation MI)     F/V Dx: History of non-ST elevation myocardial infarction (NSTEMI)   • Coronary Artery Bypass Graft (CABG)     F/V Dx: S/P CABG x 3       Subjective:   Guerrero Ann is a 50 y.o. male patient of Dr. Chacon who presents today for fu after CABG. Underwent recent CABG for ACS complicated by post-bypass CVA and neuropathic RLE pain. Taking medicaitons as appropriate, noted BPs 160-180's at home. NO CV symptoms. C/O significant RLE pain at graft harvest site persisting postoperative. Has seen CTS back who he indicated gave no recommendations.    In the interim he returns in follow-up with me again due to a scheduling error but prefers to stay with me going forward anyway.  Adjusted medical therapy at last visit which has improved his blood pressure significantly.  He has been referred to pain specialist who is helping manage his neuropathic pain.  He has no cardiovascular complaints.    Past Medical History:   Diagnosis Date   • Diabetes (HCC)     oral meds only   • Hypertension    • Kidney stones    • MI, old    • Pneumonia    • Psychiatric problem     depression and anxiety   • Stroke (HCC)      Past Surgical History:   Procedure Laterality Date   • MULTIPLE CORONARY ARTERY BYPASS ENDO VEIN HARVEST  9/25/2020    Procedure: CABG, WITH ENDOSCOPIC VEIN PROCUREMENT- X3;  Surgeon: Michael Chicas M.D.;  Location: [facility];  Service: Cardiothoracic   • MARGOTH  9/25/2020    Procedure: ECHOCARDIOGRAM, TRANSESOPHAGEAL;  Surgeon: Michael Chicas M.D.;  Location: [facility];  Service: Cardiothoracic   • STENT PLACEMENT  2017    1 cardiac stent   • OTHER ORTHOPEDIC SURGERY      rt wrist fracture with fixation 34 years ago     Family History   Problem Relation Age of Onset    • Stroke Mother 47        had 3 devastating strokes,  54yo stroke complications   • Heart Disease Mother    • No Known Problems Father         does not know his father.   • Stroke Maternal Grandmother 85   • Heart Disease Maternal Grandmother    • Stroke Maternal Grandfather 54   • Heart Disease Maternal Grandfather      Social History     Socioeconomic History   • Marital status: Single     Spouse name: Not on file   • Number of children: Not on file   • Years of education: Not on file   • Highest education level: Not on file   Occupational History   • Not on file   Tobacco Use   • Smoking status: Never Smoker   • Smokeless tobacco: Former User     Types: Chew   Substance and Sexual Activity   • Alcohol use: Not Currently     Comment: 2 per week   • Drug use: No   • Sexual activity: Yes     Partners: Female     Comment: fiance. wrk: Verónica Latham Cutting.    Other Topics Concern   • Not on file   Social History Narrative   • Not on file     Social Determinants of Health     Financial Resource Strain:    • Difficulty of Paying Living Expenses:    Food Insecurity:    • Worried About Running Out of Food in the Last Year:    • Ran Out of Food in the Last Year:    Transportation Needs:    • Lack of Transportation (Medical):    • Lack of Transportation (Non-Medical):    Physical Activity:    • Days of Exercise per Week:    • Minutes of Exercise per Session:    Stress:    • Feeling of Stress :    Social Connections:    • Frequency of Communication with Friends and Family:    • Frequency of Social Gatherings with Friends and Family:    • Attends [employer] Services:    • Active Member of Clubs or Organizations:    • Attends Club or Organization Meetings:    • Marital Status:    Intimate Partner Violence:    • Fear of Current or Ex-Partner:    • Emotionally Abused:    • Physically Abused:    • Sexually Abused:      Allergies   Allergen Reactions   • Isosorbide Unspecified     Hallucinations     Outpatient Encounter  Medications as of 4/27/2021   Medication Sig Dispense Refill   • gabapentin (NEURONTIN) 800 MG tablet Take 1 tablet by mouth 3 times a day. 90 tablet 2   • gabapentin (NEURONTIN) 400 MG Cap Take 1 capsule by mouth 3 times a day. 90 capsule 2   • DULoxetine (CYMBALTA) 60 MG Cap DR Particles delayed-release capsule Take 1 capsule by mouth every day. 30 capsule 2   • tamsulosin (FLOMAX) 0.4 MG capsule Take 1 capsule by mouth at bedtime. 30 capsule 2   • TRUE METRIX BLOOD GLUCOSE TEST strip USE 1 STRIP TO CHECK GLUCOSE ONCE DAILY     • Dapagliflozin Propanediol (FARXIGA) 10 MG Tab Take 10 mg by mouth every day. 30 tablet 5   • metFORMIN (GLUCOPHAGE) 1000 MG tablet Take 1 tablet by mouth 2 times a day, with meals. 180 tablet 3   • Coenzyme Q10 (COQ10) 100 MG Cap Take 300 mg by mouth every day.     • Blood Glucose Test Strips Use one strip to test blood sugar once daily . 100 Each 5   • atorvastatin (LIPITOR) 80 MG tablet Take 1 Tab by mouth every bedtime. 90 Tab 3   • therapeutic multivitamin-minerals (THERAGRAN-M) Tab Take 1 Tab by mouth every day.     • lisinopril (PRINIVIL) 40 MG tablet Take 1 Tab by mouth every day. 90 Tab 3   • clopidogrel (PLAVIX) 75 MG Tab Take 1 Tab by mouth every day. 90 Tab 3   • allopurinol (ZYLOPRIM) 100 MG Tab Take 1 Tab by mouth every day. 90 Tab 3   • Lancets Lancets order: Lancets for True Metrix meter. Sig: use TID and prn ssx high or low sugar. #100 RF x 3 100 Each 3   • metoprolol (LOPRESSOR) 25 MG Tab Take 1 Tab by mouth 2 Times a Day. 180 Tab 3   • acetaminophen (TYLENOL) 325 MG Tab Take 2 Tabs by mouth every four hours as needed for Moderate Pain. 30 Tab 0   • aspirin (ASA) 81 MG Chew Tab chewable tablet Take 1 Tab by mouth every day. 30 Tab 2     No facility-administered encounter medications on file as of 4/27/2021.     Review of Systems   All other systems reviewed and are negative.       Objective:   /80 (BP Location: Left arm, Patient Position: Sitting, BP Cuff Size:  "Adult)   Pulse 86   Resp 14   Ht 1.575 m (5' 2\")   Wt 70.6 kg (155 lb 9.6 oz)   SpO2 98%   BMI 28.46 kg/m²     Physical Exam   Constitutional: He is oriented to person, place, and time. He appears well-developed and well-nourished. No distress.   HENT:   Head: Normocephalic and atraumatic.   Right Ear: External ear normal.   Left Ear: External ear normal.   Eyes: Pupils are equal, round, and reactive to light. Conjunctivae and EOM are normal. Right eye exhibits no discharge. Left eye exhibits no discharge. No scleral icterus.   Neck: No JVD present. No tracheal deviation present. No thyromegaly present.   Cardiovascular: Normal rate, regular rhythm and intact distal pulses. PMI is not displaced. Exam reveals no gallop and no friction rub.   No murmur heard.  Pulses:       Carotid pulses are 2+ on the right side and 2+ on the left side.       Radial pulses are 2+ on the left side.        Popliteal pulses are 2+ on the right side and 2+ on the left side.        Dorsalis pedis pulses are 2+ on the right side and 2+ on the left side.        Posterior tibial pulses are 2+ on the right side and 2+ on the left side.   Pulmonary/Chest: Effort normal and breath sounds normal. No respiratory distress. He has no wheezes. He has no rales. He exhibits no tenderness.   Abdominal: Soft. Bowel sounds are normal. He exhibits no distension. There is no abdominal tenderness.   Musculoskeletal:         General: No tenderness, deformity or edema. Normal range of motion.      Cervical back: Normal range of motion and neck supple.   Neurological: He is alert and oriented to person, place, and time. No cranial nerve deficit (cranial nerves II through XII grossly intact). Coordination normal.   Skin: Skin is warm and dry. No rash noted. He is not diaphoretic. No erythema. No pallor.   Psychiatric: He has a normal mood and affect. His behavior is normal. Thought content normal.   Vitals reviewed.    LABS:  Lab Results   Component Value " Date/Time    CHOLSTRLTOT 106 12/29/2020 08:43 AM    LDL 42 12/29/2020 08:43 AM    HDL 31 (A) 12/29/2020 08:43 AM    TRIGLYCERIDE 166 (H) 12/29/2020 08:43 AM       Lab Results   Component Value Date/Time    WBC 12.2 (H) 03/29/2021 10:20 AM    RBC 4.90 03/29/2021 10:20 AM    HEMOGLOBIN 14.7 03/29/2021 10:20 AM    HEMATOCRIT 45.3 03/29/2021 10:20 AM    MCV 92.4 03/29/2021 10:20 AM    NEUTSPOLYS 70.80 10/12/2020 06:21 AM    LYMPHOCYTES 18.70 (L) 10/12/2020 06:21 AM    MONOCYTES 7.30 10/12/2020 06:21 AM    EOSINOPHILS 2.10 10/12/2020 06:21 AM    BASOPHILS 0.70 10/12/2020 06:21 AM    HYPOCHROMIA 1+ 10/01/2020 05:48 AM    ANISOCYTOSIS 1+ 10/04/2020 05:08 AM     Lab Results   Component Value Date/Time    SODIUM 138 12/29/2020 08:43 AM    POTASSIUM 4.0 12/29/2020 08:43 AM    CHLORIDE 101 12/29/2020 08:43 AM    CO2 22 12/29/2020 08:43 AM    GLUCOSE 198 (H) 12/29/2020 08:43 AM    BUN 18 12/29/2020 08:43 AM    CREATININE 0.83 12/29/2020 08:43 AM     Lab Results   Component Value Date    HBA1C 8.3 (H) 03/29/2021      Lab Results   Component Value Date/Time    ALKPHOSPHAT 101 (H) 12/29/2020 08:43 AM    ASTSGOT 12 12/29/2020 08:43 AM    ALTSGPT 19 12/29/2020 08:43 AM    TBILIRUBIN 0.2 12/29/2020 08:43 AM      Lab Results   Component Value Date/Time    BNPBTYPENAT 11 12/21/2018 10:05 PM      No results found for: TSH  Lab Results   Component Value Date/Time    PROTHROMBTM 17.0 (H) 09/25/2020 02:21 PM    INR 1.34 (H) 09/25/2020 02:21 PM            Assessment:     1. Essential hypertension, benign  EC-ECHOCARDIOGRAM COMPLETE W/O CONT   2. Coronary artery disease involving native coronary artery of native heart without angina pectoris     3. Neuropathic pain of lower extremity, right     4. Cardioembolic stroke (HCC)     5. S/P CABG x 3  EC-ECHOCARDIOGRAM COMPLETE W/O CONT   6. Type 2 diabetes mellitus with complication, without long-term current use of insulin (HCC)     7. Left ventricular hypertrophy  EC-ECHOCARDIOGRAM COMPLETE W/O  CONT       Medical Decision Making:  Today's Assessment / Status / Plan:     Pain is improving.  No cardiovascular concerns.  Does have a history of very severe LVH prior to bypass and recommend repeat echocardiogram to follow this.  Continue medical therapy.  Goal LDL less than 70.  Optimize blood pressure therapy.  Currently blood pressure is optimized.  Increase physical activity as tolerated.  Should his triglycerides remain elevated Vascepa would be a reasonable addition to his therapy.  We will follow-up after his diabetes is under better control in 6 months.

## 2021-06-02 ENCOUNTER — TELEPHONE (OUTPATIENT)
Dept: PHYSICAL MEDICINE AND REHAB | Facility: REHABILITATION | Age: 51
End: 2021-06-02

## 2021-06-07 ENCOUNTER — OFFICE VISIT (OUTPATIENT)
Dept: PHYSICAL MEDICINE AND REHAB | Facility: REHABILITATION | Age: 51
End: 2021-06-07
Payer: COMMERCIAL

## 2021-06-07 VITALS
SYSTOLIC BLOOD PRESSURE: 116 MMHG | HEART RATE: 70 BPM | OXYGEN SATURATION: 98 % | DIASTOLIC BLOOD PRESSURE: 70 MMHG | RESPIRATION RATE: 18 BRPM | TEMPERATURE: 97.4 F

## 2021-06-07 DIAGNOSIS — M54.16 LUMBAR RADICULOPATHY: ICD-10-CM

## 2021-06-07 DIAGNOSIS — M79.2 NEUROPATHIC PAIN: ICD-10-CM

## 2021-06-07 DIAGNOSIS — M79.605 BILATERAL PAIN OF LEG AND FOOT: ICD-10-CM

## 2021-06-07 DIAGNOSIS — M79.604 BILATERAL PAIN OF LEG AND FOOT: ICD-10-CM

## 2021-06-07 DIAGNOSIS — M79.672 BILATERAL PAIN OF LEG AND FOOT: ICD-10-CM

## 2021-06-07 DIAGNOSIS — G62.9 PERIPHERAL POLYNEUROPATHY: ICD-10-CM

## 2021-06-07 DIAGNOSIS — M79.671 BILATERAL PAIN OF LEG AND FOOT: ICD-10-CM

## 2021-06-07 DIAGNOSIS — I63.9 CARDIOEMBOLIC STROKE (HCC): Primary | ICD-10-CM

## 2021-06-07 PROCEDURE — 99214 OFFICE O/P EST MOD 30 MIN: CPT | Performed by: PHYSICAL MEDICINE & REHABILITATION

## 2021-06-07 RX ORDER — GABAPENTIN 800 MG/1
800 TABLET ORAL 3 TIMES DAILY
Qty: 90 TABLET | Refills: 2 | Status: SHIPPED | OUTPATIENT
Start: 2021-06-07 | End: 2021-09-09 | Stop reason: SDUPTHER

## 2021-06-07 RX ORDER — GABAPENTIN 400 MG/1
400 CAPSULE ORAL 3 TIMES DAILY
Qty: 90 CAPSULE | Refills: 2 | Status: SHIPPED | OUTPATIENT
Start: 2021-06-07 | End: 2021-09-09 | Stop reason: SDUPTHER

## 2021-06-07 RX ORDER — DULOXETIN HYDROCHLORIDE 60 MG/1
60 CAPSULE, DELAYED RELEASE ORAL DAILY
Qty: 30 CAPSULE | Refills: 2 | Status: SHIPPED | OUTPATIENT
Start: 2021-06-07 | End: 2021-09-09 | Stop reason: SDUPTHER

## 2021-06-07 ASSESSMENT — ENCOUNTER SYMPTOMS
TINGLING: 1
FOCAL WEAKNESS: 0
PALPITATIONS: 0
BRUISES/BLEEDS EASILY: 0
DIARRHEA: 0
CHILLS: 0
FALLS: 0
FEVER: 0
CONSTIPATION: 0
SHORTNESS OF BREATH: 0
COUGH: 0

## 2021-06-15 ENCOUNTER — TELEPHONE (OUTPATIENT)
Dept: CARDIOLOGY | Facility: MEDICAL CENTER | Age: 51
End: 2021-06-15

## 2021-06-15 NOTE — TELEPHONE ENCOUNTER
---- Message -----  From: [facility]  Sent: 6/14/2021   1:19 PM PDT  To: Emma Hart L.P.N.                                 I have uploaded all the pertinent notes for this patient thru his insurance for Echocardiogram ordered and they have called needing a peer to peer.     This case cannot be approved based on clinical information received. If your physician would like a peer to peer consult, call [phone]   Use tracking # [id number]. The patient is scheduled on 6-23-21.Thank You

## 2021-06-15 NOTE — TELEPHONE ENCOUNTER
I called and spoke to the cardiologist doing the review.  Because there has been no significant clinical change since he had his last Echo/MARGOTH in Sept, they are denying the coverage for this test.  I left a message with the patient regarding message and I will let Dr. HART know and get back to him.

## 2021-06-17 ENCOUNTER — HOSPITAL ENCOUNTER (OUTPATIENT)
Dept: LAB | Facility: MEDICAL CENTER | Age: 51
End: 2021-06-17
Attending: PHYSICAL MEDICINE & REHABILITATION
Payer: COMMERCIAL

## 2021-06-17 DIAGNOSIS — M79.2 NEUROPATHIC PAIN: ICD-10-CM

## 2021-06-17 LAB — VIT B12 SERPL-MCNC: 607 PG/ML (ref 211–911)

## 2021-06-17 PROCEDURE — 82607 VITAMIN B-12: CPT

## 2021-06-17 PROCEDURE — 36415 COLL VENOUS BLD VENIPUNCTURE: CPT

## 2021-06-23 ENCOUNTER — APPOINTMENT (OUTPATIENT)
Dept: CARDIOLOGY | Facility: MEDICAL CENTER | Age: 51
End: 2021-06-23
Attending: INTERNAL MEDICINE
Payer: COMMERCIAL

## 2021-06-24 DIAGNOSIS — B35.4 TINEA CORPORIS: ICD-10-CM

## 2021-06-24 RX ORDER — KETOCONAZOLE 20 MG/G
CREAM TOPICAL
Qty: 60 G | Refills: 0 | Status: SHIPPED | OUTPATIENT
Start: 2021-06-24 | End: 2022-01-06

## 2021-07-06 ENCOUNTER — OFFICE VISIT (OUTPATIENT)
Dept: MEDICAL GROUP | Facility: MEDICAL CENTER | Age: 51
End: 2021-07-06
Attending: FAMILY MEDICINE
Payer: COMMERCIAL

## 2021-07-06 VITALS
SYSTOLIC BLOOD PRESSURE: 132 MMHG | WEIGHT: 168.7 LBS | DIASTOLIC BLOOD PRESSURE: 80 MMHG | TEMPERATURE: 97.9 F | BODY MASS INDEX: 31.04 KG/M2 | RESPIRATION RATE: 18 BRPM | HEART RATE: 74 BPM | OXYGEN SATURATION: 96 % | HEIGHT: 62 IN

## 2021-07-06 DIAGNOSIS — E11.8 TYPE 2 DIABETES MELLITUS WITH COMPLICATION, WITHOUT LONG-TERM CURRENT USE OF INSULIN (HCC): ICD-10-CM

## 2021-07-06 DIAGNOSIS — R07.9 CHEST PAIN, UNSPECIFIED TYPE: ICD-10-CM

## 2021-07-06 LAB
HBA1C MFR BLD: 8.9 % (ref 0–5.6)
INT CON NEG: NEGATIVE
INT CON POS: POSITIVE

## 2021-07-06 PROCEDURE — 83036 HEMOGLOBIN GLYCOSYLATED A1C: CPT | Performed by: FAMILY MEDICINE

## 2021-07-06 PROCEDURE — 99214 OFFICE O/P EST MOD 30 MIN: CPT | Performed by: FAMILY MEDICINE

## 2021-07-06 PROCEDURE — 99213 OFFICE O/P EST LOW 20 MIN: CPT | Performed by: FAMILY MEDICINE

## 2021-07-06 RX ORDER — SILDENAFIL CITRATE 20 MG/1
TABLET ORAL
COMMUNITY
End: 2022-01-06

## 2021-07-06 RX ORDER — SEMAGLUTIDE 1.34 MG/ML
0.25 INJECTION, SOLUTION SUBCUTANEOUS
Qty: 1.5 ML | Refills: 3 | Status: SHIPPED | OUTPATIENT
Start: 2021-07-06 | End: 2021-11-02

## 2021-07-06 ASSESSMENT — FIBROSIS 4 INDEX: FIB4 SCORE: 0.52

## 2021-07-06 NOTE — ASSESSMENT & PLAN NOTE
Pt reports over the last 2 weeks he has started to have midline chest pain.   He states it feels like after his CABG, like a tearing sensation when he had to use the pillow to push back on the chest. He only experiences it when he sneezes. Nothing else brings on the chest pain, including coughing, deep breaths, activity, walking around, restocking shelves. Denies associated SOB, diaphoresis, nausea/vomiting. Usually hurts after he sneezes and resolves immediately.   Location is midline, where his abdomen meets his chest.

## 2021-07-06 NOTE — ASSESSMENT & PLAN NOTE
A1c today is 8.9%  He states that he has been eating more ice cream and cake due to a lot of birthday parties.  Has gained about 13lbs since last visit, reports new job is not as active as he was before.   Soda intake has decreased, intake of cookies has decreased.   Does report eating pretzels, watermelon, cutie oranges   He states that his foot is keeping him from exercising.

## 2021-07-06 NOTE — PROGRESS NOTES
Subjective:     CC:   Chief Complaint   Patient presents with   • Follow-Up     diabetes         HPI:     Type 2 diabetes mellitus with complication, without long-term current use of insulin ([facility])  A1c today is 8.9%  He states that he has been eating more ice cream and cake due to a lot of birthday parties.  Has gained about 13lbs since last visit, reports new job is not as active as he was before.   Soda intake has decreased, intake of cookies has decreased.   Does report eating pretzels, watermelon, cutie oranges   He states that his foot is keeping him from exercising.     Chest pain  Pt reports over the last 2 weeks he has started to have midline chest pain.   He states it feels like after his CABG, like a tearing sensation when he had to use the pillow to push back on the chest. He only experiences it when he sneezes. Nothing else brings on the chest pain, including coughing, deep breaths, activity, walking around, restocking shelves. Denies associated SOB, diaphoresis, nausea/vomiting. Usually hurts after he sneezes and resolves immediately.   Location is midline, where his abdomen meets his chest.         Past Medical History:   Diagnosis Date   • Diabetes ([facility])     oral meds only   • Hypertension    • Kidney stones    • MI, old    • Pneumonia    • Psychiatric problem     depression and anxiety   • Stroke ([facility])        Social History     Tobacco Use   • Smoking status: Never Smoker   • Smokeless tobacco: Former User     Types: Chew   Vaping Use   • Vaping Use: Never used   Substance Use Topics   • Alcohol use: Not Currently     Comment: 2 per week   • Drug use: No       Current Outpatient Medications Ordered in Epic   Medication Sig Dispense Refill   • Semaglutide,0.25 or 0.5MG/DOS, (OZEMPIC, 0.25 OR 0.5 MG/DOSE,) 2 MG/1.5ML Solution Pen-injector Inject 0.25 mg under the skin every 7 days. 1.5 mL 3   • sildenafil (REVATIO) 20 MG tablet sildenafil (pulmonary hypertension) 20 mg tablet   TAKE 2 5 TABLETS BY  MOUTH APPROXIMATELY 45 MINUTES BEFORE SEXUAL ACTIVITY TAKE ON AN EMPTY STOMACH     • ketoconazole (NIZORAL) 2 % Cream Apply fingertip amount to affected area daily for 2 weeks 60 g 0   • gabapentin (NEURONTIN) 800 MG tablet Take 1 tablet by mouth 3 times a day. 90 tablet 2   • gabapentin (NEURONTIN) 400 MG Cap Take 1 capsule by mouth 3 times a day. 90 capsule 2   • DULoxetine (CYMBALTA) 60 MG Cap DR Particles delayed-release capsule Take 1 capsule by mouth every day. 30 capsule 2   • tamsulosin (FLOMAX) 0.4 MG capsule Take 1 capsule by mouth at bedtime. 30 capsule 2   • TRUE METRIX BLOOD GLUCOSE TEST strip USE 1 STRIP TO CHECK GLUCOSE ONCE DAILY     • Dapagliflozin Propanediol (FARXIGA) 10 MG Tab Take 10 mg by mouth every day. 30 tablet 5   • metFORMIN (GLUCOPHAGE) 1000 MG tablet Take 1 tablet by mouth 2 times a day, with meals. 180 tablet 3   • Coenzyme Q10 (COQ10) 100 MG Cap Take 300 mg by mouth every day.     • Blood Glucose Test Strips Use one strip to test blood sugar once daily . 100 Each 5   • atorvastatin (LIPITOR) 80 MG tablet Take 1 Tab by mouth every bedtime. 90 Tab 3   • therapeutic multivitamin-minerals (THERAGRAN-M) Tab Take 1 Tab by mouth every day.     • lisinopril (PRINIVIL) 40 MG tablet Take 1 Tab by mouth every day. 90 Tab 3   • clopidogrel (PLAVIX) 75 MG Tab Take 1 Tab by mouth every day. 90 Tab 3   • allopurinol (ZYLOPRIM) 100 MG Tab Take 1 Tab by mouth every day. 90 Tab 3   • Lancets Lancets order: Lancets for True Metrix meter. Sig: use TID and prn ssx high or low sugar. #100 RF x 3 100 Each 3   • metoprolol (LOPRESSOR) 25 MG Tab Take 1 Tab by mouth 2 Times a Day. 180 Tab 3   • acetaminophen (TYLENOL) 325 MG Tab Take 2 Tabs by mouth every four hours as needed for Moderate Pain. 30 Tab 0   • aspirin (ASA) 81 MG Chew Tab chewable tablet Take 1 Tab by mouth every day. 30 Tab 2     No current Epic-ordered facility-administered medications on file.       Allergies:  Isosorbide    ROS:  ENT: chronic  "sore throat  Pulm: no sob, no cough  CV: CP per HPI  GI: no abd pain    Objective:       Exam:  /80 (BP Location: Left arm, Patient Position: Sitting, BP Cuff Size: Adult)   Pulse 74   Temp 36.6 °C (97.9 °F) (Temporal)   Resp 18   Ht 1.575 m (5' 2\")   Wt 76.5 kg (168 lb 11.2 oz)   SpO2 96%   BMI 30.86 kg/m²  Body mass index is 30.86 kg/m².    Gen: No apparent distress.  Lungs: Normal effort, CTA bilaterally, no wheezes, rhonchi, or rales  CV: Regular rate and rhythm. No murmurs, rubs, or gallops. No lower extremity edema. Chest is nontender to palpation.   Abd: NABS, soft, nontender, nondistended  Derm: Warm and dry. No visible rashes  Psy: Alert and oriented, Normal mood and affect. Judgment normal      Labs:   A1c today is 8.9  EKG done today and reviewed - no ST elevations, no new q waves, no new t wave changes    Assessment & Plan:     50 y.o. male with the following -     1. Type 2 diabetes mellitus with complication, without long-term current use of insulin ([facility])  - POCT  A1C  - Semaglutide,0.25 or 0.5MG/DOS, (OZEMPIC, 0.25 OR 0.5 MG/DOSE,) 2 MG/1.5ML Solution Pen-injector; Inject 0.25 mg under the skin every 7 days.  Dispense: 1.5 mL; Refill: 3  Starting on ozempic. Hopefully will be able to help with weight loss. He reports that activity is difficult due to nerve pain in the foot. Motivation also seems to be difficult for him. I have advised obtaining some glucoses - fasting AM and prior to dinner. Does not have to be daily but having some values will help us titrate the medication. Exercise as able. Again counseled on diet management and given handouts on carb management    2. Chest pain, unspecified type  - EKG - Clinic Performed  Chest pain does not sound cardiac related. EKG is reassuring, similar compared to EKG done s/p CABG in 09/2020. Could be due to weight gain putting pressure on his rib cage and giving him soreness where he had his sternotomy. I have advised them to let their " cardiologist know as well.         Return in about 1 month (around 8/6/2021) for f/u dm.    Please note that this dictation was created using voice recognition software. I have made every reasonable attempt to correct obvious errors, but I expect that there are errors of grammar and possibly content that I did not discover before finalizing the note.

## 2021-08-13 ENCOUNTER — OFFICE VISIT (OUTPATIENT)
Dept: URGENT CARE | Facility: CLINIC | Age: 51
End: 2021-08-13
Payer: COMMERCIAL

## 2021-08-13 VITALS
HEIGHT: 62 IN | RESPIRATION RATE: 16 BRPM | SYSTOLIC BLOOD PRESSURE: 110 MMHG | WEIGHT: 158.2 LBS | TEMPERATURE: 98.2 F | OXYGEN SATURATION: 97 % | BODY MASS INDEX: 29.11 KG/M2 | HEART RATE: 91 BPM | DIASTOLIC BLOOD PRESSURE: 70 MMHG

## 2021-08-13 DIAGNOSIS — R04.0 EPISTAXIS: ICD-10-CM

## 2021-08-13 PROCEDURE — 30901 CONTROL OF NOSEBLEED: CPT | Mod: 50 | Performed by: PHYSICIAN ASSISTANT

## 2021-08-13 ASSESSMENT — ENCOUNTER SYMPTOMS
DIZZINESS: 0
FEVER: 0
HEADACHES: 0
SORE THROAT: 0
BLURRED VISION: 0
SHORTNESS OF BREATH: 0
COUGH: 0
CHILLS: 0

## 2021-08-13 ASSESSMENT — FIBROSIS 4 INDEX: FIB4 SCORE: 0.52

## 2021-08-13 NOTE — PROGRESS NOTES
"Subjective:   Guerrero Zack Ann  is a 50 y.o. male who presents for Epistaxis (all day, x2 days)      Epistaxis   The bleeding has been from both nares.   Patient presents urgent care complaining of nosebleed times through the day yesterday.  States he blew his nose yesterday morning and had had a difficult time stopping the bleeding thereafter.  He states he has tried packing gauze in his nose, tipping his head back, ice pack on back of neck.  Patient does have a history of CABG and takes Plavix as well as a daily baby aspirin.  He denies history of nosebleeds.  He states he has had gauze packed in his nose overnight and has not tried removing it this morning.  Unsure if nose is still bleeding.  Denies other complaints.  He denies significant nasal congestion or recent allergy medications.  Denies new over-the-counter medications tried.  He denies headaches dizziness chest pain or visual changes.    Review of Systems   Constitutional: Negative for chills and fever.   HENT: Positive for nosebleeds. Negative for congestion, ear pain and sore throat.    Eyes: Negative for blurred vision.   Respiratory: Negative for cough and shortness of breath.    Cardiovascular: Negative for chest pain.   Skin: Negative for rash.   Neurological: Negative for dizziness and headaches.       Allergies   Allergen Reactions   • Isosorbide Unspecified     Hallucinations        Objective:   /70 (BP Location: Left arm, Patient Position: Sitting, BP Cuff Size: Adult long)   Pulse 91   Temp 36.8 °C (98.2 °F) (Temporal)   Resp 16   Ht 1.575 m (5' 2\")   Wt 71.8 kg (158 lb 3.2 oz)   SpO2 97%   BMI 28.94 kg/m²     Physical Exam  Vitals and nursing note reviewed.   Constitutional:       General: He is not in acute distress.     Appearance: He is well-developed. He is not diaphoretic.   HENT:      Head: Normocephalic and atraumatic.      Right Ear: External ear normal.      Left Ear: External ear normal.      Nose: " Nose normal. No nasal deformity, septal deviation, signs of injury, laceration, nasal tenderness, mucosal edema, congestion or rhinorrhea.      Right Nostril: No foreign body, epistaxis ( Dried blood, no active bleeding), septal hematoma or occlusion.      Left Nostril: No foreign body, epistaxis, septal hematoma or occlusion.   Eyes:      General: No scleral icterus.        Right eye: No discharge.         Left eye: No discharge.      Conjunctiva/sclera: Conjunctivae normal.   Pulmonary:      Effort: Pulmonary effort is normal. No respiratory distress.   Musculoskeletal:         General: Normal range of motion.      Cervical back: Neck supple.   Skin:     General: Skin is warm and dry.      Coloration: Skin is not pale.   Neurological:      Mental Status: He is alert and oriented to person, place, and time.      Coordination: Coordination normal.       Procedure:  Patient's nose is packed on both right and left side with Esa-Synephrine soaked gauze and nose clamp is applied.  Patient is instructed to wait a full 10 minutes with compression over nose. Upon reevaluation patient is a complete resolution of epistaxis to bilateral nostrils.      Assessment/Plan:   1. Epistaxis  -Reviewed techniques of treatment of epistaxis, recommendation for consistent compression, sent with Esa-Synephrine and nose clamp  -Return to clinic with lack of resolution or progression of symptoms.  -ER precautions with any worsening symptoms are reviewed with patient/caregiver and they do express understanding    I have worn an N95 mask, gloves and eye protection for the entire encounter with this patient.     Differential diagnosis, natural history, supportive care, and indications for immediate follow-up discussed.

## 2021-08-13 NOTE — PROGRESS NOTES
[facility] Medicine Daily Progress Note    Date of Service  10/20/2020    Chief Complaint:  Hypertension  Diabetes  S/P CABG  S/P CVA    Interval History:  No significant events or changes since last visit    Review of Systems  Review of Systems   Constitutional: Negative for chills and fever.   Respiratory: Negative for shortness of breath.    Cardiovascular: Negative for chest pain.   Gastrointestinal: Negative for abdominal pain, diarrhea, nausea and vomiting.   Psychiatric/Behavioral: The patient is not nervous/anxious.         Physical Exam  Temp:  [36.1 °C (97 °F)-36.8 °C (98.3 °F)] 36.1 °C (97 °F)  Pulse:  [65-81] 81  Resp:  [18] 18  BP: ()/(53-73) 106/71  SpO2:  [92 %-96 %] 96 %    Physical Exam  Vitals signs and nursing note reviewed.   Constitutional:       Appearance: Normal appearance.   HENT:      Head: Atraumatic.   Eyes:      Conjunctiva/sclera: Conjunctivae normal.      Pupils: Pupils are equal, round, and reactive to light.   Neck:      Musculoskeletal: Normal range of motion and neck supple.   Cardiovascular:      Rate and Rhythm: Normal rate and regular rhythm.      Heart sounds: No murmur.   Pulmonary:      Effort: Pulmonary effort is normal.      Breath sounds: No stridor. No wheezing or rales.   Abdominal:      General: There is no distension.      Palpations: Abdomen is soft.      Tenderness: There is no abdominal tenderness.   Musculoskeletal:      Right lower leg: No edema.      Left lower leg: No edema.   Skin:     General: Skin is warm and dry.      Findings: No rash.   Neurological:      Mental Status: He is alert and oriented to person, place, and time.   Psychiatric:         Mood and Affect: Mood normal.         Behavior: Behavior normal.         Fluids    Intake/Output Summary (Last 24 hours) at 10/20/2020 0816  Last data filed at 10/20/2020 0632  Gross per 24 hour   Intake 240 ml   Output 600 ml   Net -360 ml       Laboratory  Recent Labs     10/18/20  0554   WBC 7.1   RBC 3.24*  Restraints removed     HEMOGLOBIN 9.6*   HEMATOCRIT 30.2*   MCV 93.2   MCH 29.6   MCHC 31.8*   RDW 46.3   PLATELETCT 427   MPV 10.3     Recent Labs     10/18/20  0554   SODIUM 136   POTASSIUM 4.0   CHLORIDE 102   CO2 22   GLUCOSE 189*   BUN 20   CREATININE 0.85   CALCIUM 9.1                   Imaging    Assessment/Plan  * Cardioembolic stroke (HCC)- (present on admission)  Assessment & Plan  Occurred post-op  On ASA & Plavix  On Lipitor    CAD (coronary artery disease)- (present on admission)  Assessment & Plan  S/P NSTEMI  S/P CABG x 3 vessel bypass  Has hx of 2 cardiac stents  BNP: 629 (10/10) --> 197 (10/13)  On ASA and Plavix  On Lipitor  On Lopressor & Lisinopril    Essential hypertension- (present on admission)  Assessment & Plan  BP ok  On Lisinopril: 2.5 mg daily  On Lopressor: 25 mg bid   Cont to monitor    Type 2 diabetes mellitus with complication, without long-term current use of insulin (HCC)- (present on admission)  Assessment & Plan  Hba1c: 7.9  -187  On Metformin: 500 mg bid (10/9 evening)  On Lantus: 22 units qam (10/11)  Note: home meds appear to be Metformin 2000 mg bid (not appropriate dosing) per Epic chart -- but pt can't confirm  Cont to monitor    Iron deficiency anemia  Assessment & Plan  H&H stable  Fe: 31, sats 11%  On Fe supplements     Vitamin D deficiency  Assessment & Plan  Vit D: 17  On supplements    Reactive depression- (present on admission)  Assessment & Plan  On Zoloft    Pneumonia- (present on admission)  Assessment & Plan  Resolved  Off abx

## 2021-08-31 ENCOUNTER — OFFICE VISIT (OUTPATIENT)
Dept: MEDICAL GROUP | Facility: MEDICAL CENTER | Age: 51
End: 2021-08-31
Attending: FAMILY MEDICINE
Payer: COMMERCIAL

## 2021-08-31 VITALS
RESPIRATION RATE: 16 BRPM | SYSTOLIC BLOOD PRESSURE: 134 MMHG | TEMPERATURE: 97.4 F | BODY MASS INDEX: 29.06 KG/M2 | WEIGHT: 157.9 LBS | DIASTOLIC BLOOD PRESSURE: 70 MMHG | HEART RATE: 74 BPM | OXYGEN SATURATION: 95 % | HEIGHT: 62 IN

## 2021-08-31 DIAGNOSIS — R21 RASH AND NONSPECIFIC SKIN ERUPTION: ICD-10-CM

## 2021-08-31 DIAGNOSIS — B35.1 ONYCHOMYCOSIS: ICD-10-CM

## 2021-08-31 DIAGNOSIS — E11.8 TYPE 2 DIABETES MELLITUS WITH COMPLICATION, WITHOUT LONG-TERM CURRENT USE OF INSULIN (HCC): ICD-10-CM

## 2021-08-31 DIAGNOSIS — L60.9 NAIL PROBLEM: ICD-10-CM

## 2021-08-31 LAB — GLUCOSE BLD-MCNC: 185 MG/DL (ref 70–100)

## 2021-08-31 PROCEDURE — 82962 GLUCOSE BLOOD TEST: CPT | Mod: 91 | Performed by: FAMILY MEDICINE

## 2021-08-31 PROCEDURE — 82962 GLUCOSE BLOOD TEST: CPT | Performed by: FAMILY MEDICINE

## 2021-08-31 PROCEDURE — 99213 OFFICE O/P EST LOW 20 MIN: CPT | Performed by: FAMILY MEDICINE

## 2021-08-31 PROCEDURE — 99214 OFFICE O/P EST MOD 30 MIN: CPT | Performed by: FAMILY MEDICINE

## 2021-08-31 RX ORDER — TERBINAFINE HYDROCHLORIDE 250 MG/1
250 TABLET ORAL DAILY
Qty: 42 TABLET | Refills: 0 | Status: SHIPPED | OUTPATIENT
Start: 2021-08-31 | End: 2022-01-06

## 2021-08-31 ASSESSMENT — FIBROSIS 4 INDEX: FIB4 SCORE: 0.52

## 2021-08-31 NOTE — ASSESSMENT & PLAN NOTE
On metformin 1000mg bid, farxiga 10mg daily, ozempic 0.25mg weekly  Started on ozempic 0.25mg once a week about 2 months ago  Initially was having some GI upset, now with just some gas around administration  BG - 1 hr after dinner 101. Otherwise pt admits not checking very often  Watching carb intake more so than previous and has cut sodas down    About 10 lbs down from last visit   Last ate meal 6 hours ago, nectarine about 3-4 hours ago

## 2021-09-01 NOTE — ASSESSMENT & PLAN NOTE
Right hand, middle finger.  He does not have a fingernail, is wondering what the issue is.  He does state that the nail does grow back, however is not attached to the nailbed, is very brittle and flaky.

## 2021-09-01 NOTE — ASSESSMENT & PLAN NOTE
Patient was given topical ketoconazole couple of months ago for a small rash on the left medial thigh, this did resolve that rash.  Most recently he has developed a new rash on his right lateral chest wall, mid axillary line.  He states that it is itchy.  He did try to put the ketoconazole on it, has been using this for the last week, however he does not think that it has been helping too much.  No new soaps, laundry detergent, lotions that he can think of.

## 2021-09-01 NOTE — PROGRESS NOTES
Subjective:     CC:   Chief Complaint   Patient presents with   • Diabetes Follow-up   • Abrasion     middle finger rt, random, no injuries, no signs of infection   • Medication Problem     Reaction to cream, rash on left side         HPI:     Type 2 diabetes mellitus with complication, without long-term current use of insulin (HCC)  On metformin 1000mg bid, farxiga 10mg daily, ozempic 0.25mg weekly  Started on ozempic 0.25mg once a week about 2 months ago  Initially was having some GI upset, now with just some gas around administration  BG - 1 hr after dinner 101. Otherwise pt admits not checking very often  Watching carb intake more so than previous and has cut sodas down    About 10 lbs down from last visit   Last ate meal 6 hours ago, nectarine about 3-4 hours ago     Rash and nonspecific skin eruption  Patient was given topical ketoconazole couple of months ago for a small rash on the left medial thigh, this did resolve that rash.  Most recently he has developed a new rash on his right lateral chest wall, mid axillary line.  He states that it is itchy.  He did try to put the ketoconazole on it, has been using this for the last week, however he does not think that it has been helping too much.  No new soaps, laundry detergent, lotions that he can think of.      Past Medical History:   Diagnosis Date   • Diabetes (HCC)     oral meds only   • Hypertension    • Kidney stones    • MI, old    • Pneumonia    • Psychiatric problem     depression and anxiety   • Stroke (HCC)        Social History     Tobacco Use   • Smoking status: Never Smoker   • Smokeless tobacco: Former User     Types: Chew   Vaping Use   • Vaping Use: Never used   Substance Use Topics   • Alcohol use: Yes     Comment: 2 per week   • Drug use: No       Current Outpatient Medications Ordered in Epic   Medication Sig Dispense Refill   • terbinafine (LAMISIL) 250 MG Tab Take 1 Tablet by mouth every day. 42 Tablet 0   • Semaglutide,0.25 or 0.5MG/DOS,  (OZEMPIC, 0.25 OR 0.5 MG/DOSE,) 2 MG/1.5ML Solution Pen-injector Inject 0.25 mg under the skin every 7 days. 1.5 mL 3   • sildenafil (REVATIO) 20 MG tablet sildenafil (pulmonary hypertension) 20 mg tablet   TAKE 2 5 TABLETS BY MOUTH APPROXIMATELY 45 MINUTES BEFORE SEXUAL ACTIVITY TAKE ON AN EMPTY STOMACH     • ketoconazole (NIZORAL) 2 % Cream Apply fingertip amount to affected area daily for 2 weeks 60 g 0   • gabapentin (NEURONTIN) 800 MG tablet Take 1 tablet by mouth 3 times a day. 90 tablet 2   • gabapentin (NEURONTIN) 400 MG Cap Take 1 capsule by mouth 3 times a day. 90 capsule 2   • DULoxetine (CYMBALTA) 60 MG Cap DR Particles delayed-release capsule Take 1 capsule by mouth every day. 30 capsule 2   • tamsulosin (FLOMAX) 0.4 MG capsule Take 1 capsule by mouth at bedtime. 30 capsule 2   • TRUE METRIX BLOOD GLUCOSE TEST strip USE 1 STRIP TO CHECK GLUCOSE ONCE DAILY     • Dapagliflozin Propanediol (FARXIGA) 10 MG Tab Take 10 mg by mouth every day. 30 tablet 5   • metFORMIN (GLUCOPHAGE) 1000 MG tablet Take 1 tablet by mouth 2 times a day, with meals. 180 tablet 3   • Coenzyme Q10 (COQ10) 100 MG Cap Take 300 mg by mouth every day.     • Blood Glucose Test Strips Use one strip to test blood sugar once daily . 100 Each 5   • atorvastatin (LIPITOR) 80 MG tablet Take 1 Tab by mouth every bedtime. 90 Tab 3   • therapeutic multivitamin-minerals (THERAGRAN-M) Tab Take 1 Tab by mouth every day.     • lisinopril (PRINIVIL) 40 MG tablet Take 1 Tab by mouth every day. 90 Tab 3   • clopidogrel (PLAVIX) 75 MG Tab Take 1 Tab by mouth every day. 90 Tab 3   • allopurinol (ZYLOPRIM) 100 MG Tab Take 1 Tab by mouth every day. 90 Tab 3   • Lancets Lancets order: Lancets for True Metrix meter. Sig: use TID and prn ssx high or low sugar. #100 RF x 3 100 Each 3   • metoprolol (LOPRESSOR) 25 MG Tab Take 1 Tab by mouth 2 Times a Day. 180 Tab 3   • acetaminophen (TYLENOL) 325 MG Tab Take 2 Tabs by mouth every four hours as needed for  "Moderate Pain. 30 Tab 0   • aspirin (ASA) 81 MG Chew Tab chewable tablet Take 1 Tab by mouth every day. 30 Tab 2     No current Epic-ordered facility-administered medications on file.       Allergies:  Isosorbide        Objective:       Exam:  /70 (BP Location: Right arm, Patient Position: Sitting, BP Cuff Size: Adult)   Pulse 74   Temp 36.3 °C (97.4 °F) (Temporal)   Resp 16   Ht 1.575 m (5' 2\")   Wt 71.6 kg (157 lb 14.4 oz)   SpO2 95%   BMI 28.88 kg/m²  Body mass index is 28.88 kg/m².    Gen: No apparent distress.  Neck: Neck is supple   Lungs: Normal effort  MSK: Fingers without clubbing or cyanosis. Normal gait   Derm: Maculopapular rash on the right chest wall, almost midaxillary line.  Erythematous base.  On the right hand, third finger has a nail that is missing.  Psy: Alert and oriented, Normal mood and affect. Judgment normal      Labs:   Point-of-care glucose today is 185    Assessment & Plan:     50 y.o. male with the following -     1. Type 2 diabetes mellitus with complication, without long-term current use of insulin ([facility])  - POCT Glucose  Patient has not been checking at home blood glucoses, sugar today is 185.  We will keep everything the same as he is still adjusting to the Ozempic.  We will have him return in 1 month, will have A1c done with glucose log.  At that point can consider increasing Ozempic.    2. Rash and nonspecific skin eruption  Will have patient try topical antihistamine, can also try over-the-counter hydrocortisone 1%.  If these flareup this rash and is an antifungal, the oral Lamisil should take care of it.    3. Onychomycosis  - terbinafine (LAMISIL) 250 MG Tab; Take 1 Tablet by mouth every day.  Dispense: 42 Tablet; Refill: 0  We will treat for onychomycosis of the fingernail for 6 weeks.        Return in about 1 month (around 9/30/2021) for f/u dm.    Please note that this dictation was created using voice recognition software. I have made every reasonable attempt to " correct obvious errors, but I expect that there are errors of grammar and possibly content that I did not discover before finalizing the note.

## 2021-09-07 ENCOUNTER — PATIENT MESSAGE (OUTPATIENT)
Dept: MEDICAL GROUP | Facility: MEDICAL CENTER | Age: 51
End: 2021-09-07

## 2021-09-09 ENCOUNTER — OFFICE VISIT (OUTPATIENT)
Dept: PHYSICAL MEDICINE AND REHAB | Facility: REHABILITATION | Age: 51
End: 2021-09-09
Payer: COMMERCIAL

## 2021-09-09 VITALS
TEMPERATURE: 97.4 F | RESPIRATION RATE: 18 BRPM | HEART RATE: 97 BPM | OXYGEN SATURATION: 98 % | DIASTOLIC BLOOD PRESSURE: 64 MMHG | SYSTOLIC BLOOD PRESSURE: 92 MMHG

## 2021-09-09 DIAGNOSIS — M79.605 BILATERAL PAIN OF LEG AND FOOT: ICD-10-CM

## 2021-09-09 DIAGNOSIS — M79.672 BILATERAL PAIN OF LEG AND FOOT: ICD-10-CM

## 2021-09-09 DIAGNOSIS — M79.604 RIGHT LEG PAIN: ICD-10-CM

## 2021-09-09 DIAGNOSIS — M54.16 LUMBAR RADICULOPATHY: ICD-10-CM

## 2021-09-09 DIAGNOSIS — M79.604 BILATERAL PAIN OF LEG AND FOOT: ICD-10-CM

## 2021-09-09 DIAGNOSIS — M79.671 BILATERAL PAIN OF LEG AND FOOT: ICD-10-CM

## 2021-09-09 DIAGNOSIS — M79.2 NEUROPATHIC PAIN: Primary | ICD-10-CM

## 2021-09-09 PROCEDURE — 99214 OFFICE O/P EST MOD 30 MIN: CPT | Performed by: PHYSICAL MEDICINE & REHABILITATION

## 2021-09-09 RX ORDER — GABAPENTIN 400 MG/1
400 CAPSULE ORAL 3 TIMES DAILY
Qty: 90 CAPSULE | Refills: 2 | Status: SHIPPED | OUTPATIENT
Start: 2021-09-09 | End: 2021-12-02

## 2021-09-09 RX ORDER — GABAPENTIN 800 MG/1
800 TABLET ORAL 3 TIMES DAILY
Qty: 90 TABLET | Refills: 2 | Status: SHIPPED | OUTPATIENT
Start: 2021-09-09 | End: 2021-12-02

## 2021-09-09 RX ORDER — DULOXETIN HYDROCHLORIDE 60 MG/1
60 CAPSULE, DELAYED RELEASE ORAL DAILY
Qty: 30 CAPSULE | Refills: 2 | Status: SHIPPED | OUTPATIENT
Start: 2021-09-09 | End: 2021-12-02 | Stop reason: SDUPTHER

## 2021-09-09 NOTE — PROGRESS NOTES
[facility]  [facility]  [address] [place] [address]  Ph: [phone]    FOLLOW UP PATIENT EVALUATION      Patient Name: Guerrero Ann   Patient : 1970  Patient Age: 50 y.o.     Examining Physician: Dr. Marilu Franco, DO    PM&R History to Date - Background Information:  Dates of Admission/Discharge to ARU: 10/9/20-10/21/20    SUBJECTIVE:   Patient Identification: Guerrero Ann is a 50 y.o. male with PMH significant for coronary artery disease s/p stents, diabetes, hypertension, congenital absence of one kidney, type II diabetes and rehabilitation history significant for bilateral cardioembolic ischemic CVA s/p CABG 10/2020 and is presenting to PM&R clinic for a FOLLOW UP OUTPATIENT evaluation with the following chief complaint/s:    Chief Complaint: Continued nerve pain    History of Present Illness:    - Pain continues to be within the right foot up laterally. Still has some pain/numbness in the balls of his toes.   - Minimally improved if any since last time.   - Still taking Gabapentin 1200mg TID  - Still taking Cymbalta 60mg daily  - Labs reviewed: Normal Vit B 12, normal TSH 2020.   - Still working because this is the only job that he is able to do with the pain that he has in his other deficits.  - Strength is intact.  Burning neuropathic pain continues to be his primary issue.    Review of Systems:  All other pertinent positive review of systems are noted above in HPI.   All other systems reviewed and are negative.    Past Medical History:  Past Medical History:   Diagnosis Date   • Diabetes (HCC)     oral meds only   • Hypertension    • Kidney stones    • MI, old    • Pneumonia    • Psychiatric problem     depression and anxiety   • Stroke (HCC)       Past Surgical History:   Procedure Laterality Date   • MULTIPLE CORONARY ARTERY BYPASS ENDO VEIN HARVEST  2020    Procedure: CABG, WITH ENDOSCOPIC VEIN PROCUREMENT-  X3;  Surgeon: Michael Chicas M.D.;  Location: SURGERY [facility];  Service: Cardiothoracic   • MARGOTH  9/25/2020    Procedure: ECHOCARDIOGRAM, TRANSESOPHAGEAL;  Surgeon: Michael Chicas M.D.;  Location: SURGERY [facility];  Service: Cardiothoracic   • STENT PLACEMENT  2017 1 cardiac stent   • OTHER ORTHOPEDIC SURGERY      rt wrist fracture with fixation 34 years ago        Current Outpatient Medications:   •  gabapentin (NEURONTIN) 800 MG tablet, Take 1 Tablet by mouth 3 times a day., Disp: 90 Tablet, Rfl: 2  •  gabapentin (NEURONTIN) 400 MG Cap, Take 1 Capsule by mouth 3 times a day., Disp: 90 Capsule, Rfl: 2  •  DULoxetine (CYMBALTA) 60 MG Cap DR Particles delayed-release capsule, Take 1 Capsule by mouth every day., Disp: 30 Capsule, Rfl: 2  •  terbinafine (LAMISIL) 250 MG Tab, Take 1 Tablet by mouth every day., Disp: 42 Tablet, Rfl: 0  •  Semaglutide,0.25 or 0.5MG/DOS, (OZEMPIC, 0.25 OR 0.5 MG/DOSE,) 2 MG/1.5ML Solution Pen-injector, Inject 0.25 mg under the skin every 7 days., Disp: 1.5 mL, Rfl: 3  •  sildenafil (REVATIO) 20 MG tablet, sildenafil (pulmonary hypertension) 20 mg tablet  TAKE 2 5 TABLETS BY MOUTH APPROXIMATELY 45 MINUTES BEFORE SEXUAL ACTIVITY TAKE ON AN EMPTY STOMACH, Disp: , Rfl:   •  ketoconazole (NIZORAL) 2 % Cream, Apply fingertip amount to affected area daily for 2 weeks, Disp: 60 g, Rfl: 0  •  tamsulosin (FLOMAX) 0.4 MG capsule, Take 1 capsule by mouth at bedtime., Disp: 30 capsule, Rfl: 2  •  TRUE METRIX BLOOD GLUCOSE TEST strip, USE 1 STRIP TO CHECK GLUCOSE ONCE DAILY, Disp: , Rfl:   •  Dapagliflozin Propanediol (FARXIGA) 10 MG Tab, Take 10 mg by mouth every day., Disp: 30 tablet, Rfl: 5  •  metFORMIN (GLUCOPHAGE) 1000 MG tablet, Take 1 tablet by mouth 2 times a day, with meals., Disp: 180 tablet, Rfl: 3  •  Coenzyme Q10 (COQ10) 100 MG Cap, Take 300 mg by mouth every day., Disp: , Rfl:   •  Blood Glucose Test Strips, Use one strip to test blood sugar once daily ., Disp: 100 Each, Rfl:  5  •  atorvastatin (LIPITOR) 80 MG tablet, Take 1 Tab by mouth every bedtime., Disp: 90 Tab, Rfl: 3  •  therapeutic multivitamin-minerals (THERAGRAN-M) Tab, Take 1 Tab by mouth every day., Disp: , Rfl:   •  lisinopril (PRINIVIL) 40 MG tablet, Take 1 Tab by mouth every day., Disp: 90 Tab, Rfl: 3  •  clopidogrel (PLAVIX) 75 MG Tab, Take 1 Tab by mouth every day., Disp: 90 Tab, Rfl: 3  •  allopurinol (ZYLOPRIM) 100 MG Tab, Take 1 Tab by mouth every day., Disp: 90 Tab, Rfl: 3  •  Lancets, Lancets order: Lancets for True Metrix meter. Sig: use TID and prn ssx high or low sugar. #100 RF x 3, Disp: 100 Each, Rfl: 3  •  metoprolol (LOPRESSOR) 25 MG Tab, Take 1 Tab by mouth 2 Times a Day., Disp: 180 Tab, Rfl: 3  •  acetaminophen (TYLENOL) 325 MG Tab, Take 2 Tabs by mouth every four hours as needed for Moderate Pain., Disp: 30 Tab, Rfl: 0  •  aspirin (ASA) 81 MG Chew Tab chewable tablet, Take 1 Tab by mouth every day., Disp: 30 Tab, Rfl: 2  Allergies   Allergen Reactions   • Isosorbide Unspecified     Hallucinations        Past Social History:  Social History     Socioeconomic History   • Marital status: Single     Spouse name: Not on file   • Number of children: Not on file   • Years of education: Not on file   • Highest education level: Not on file   Occupational History   • Not on file   Tobacco Use   • Smoking status: Never Smoker   • Smokeless tobacco: Former User     Types: Chew   Vaping Use   • Vaping Use: Never used   Substance and Sexual Activity   • Alcohol use: Yes     Comment: 2 per week   • Drug use: No   • Sexual activity: Yes     Partners: Female     Comment: adriana. wrk: Verónica [place].    Other Topics Concern   • Not on file   Social History Narrative   • Not on file     Social Determinants of Health     Financial Resource Strain:    • Difficulty of Paying Living Expenses:    Food Insecurity:    • Worried About Running Out of Food in the Last Year:    • Ran Out of Food in the Last Year:     Transportation Needs:    • Lack of Transportation (Medical):    • Lack of Transportation (Non-Medical):    Physical Activity:    • Days of Exercise per Week:    • Minutes of Exercise per Session:    Stress:    • Feeling of Stress :    Social Connections:    • Frequency of Communication with Friends and Family:    • Frequency of Social Gatherings with Friends and Family:    • Attends Denominational Services:    • Active Member of Clubs or Organizations:    • Attends Club or Organization Meetings:    • Marital Status:    Intimate Partner Violence:    • Fear of Current or Ex-Partner:    • Emotionally Abused:    • Physically Abused:    • Sexually Abused:         Family History:  Family History   Problem Relation Age of Onset   • Stroke Mother 47        had 3 devastating strokes,  54yo stroke complications   • Heart Disease Mother    • No Known Problems Father         does not know his father.   • Stroke Maternal Grandmother 85   • Heart Disease Maternal Grandmother    • Stroke Maternal Grandfather 54   • Heart Disease Maternal Grandfather        Depression and Opioid Screening  PHQ-9:  Depression Screen (PHQ-2/PHQ-9) 10/21/2020 2020 2021   PHQ-2 Total Score 0 - -   PHQ-2 Total Score - 0 6   PHQ-9 Total Score - - -   PHQ-9 Total Score - - 15     Interpretation of PHQ-9 Total Score   Score Severity   1-4 No Depression   5-9 Mild Depression   10-14 Moderate Depression   15-19 Moderately Severe Depression   20-27 Severe Depression     OBJECTIVE:   Vital Signs:  Vitals:    21 1530   BP: (!) 92/64   Pulse:    Resp:    Temp:    SpO2:         Pertinent Labs:  Lab Results   Component Value Date/Time    SODIUM 138 2020 08:43 AM    POTASSIUM 4.0 2020 08:43 AM    CHLORIDE 101 2020 08:43 AM    CO2 22 2020 08:43 AM    GLUCOSE 198 (H) 2020 08:43 AM    BUN 18 2020 08:43 AM    CREATININE 0.83 2020 08:43 AM       Lab Results   Component Value Date/Time    HBA1C 8.9 (A) 2021  03:40 PM       Lab Results   Component Value Date/Time    WBC 12.2 (H) 03/29/2021 10:20 AM    RBC 4.90 03/29/2021 10:20 AM    HEMOGLOBIN 14.7 03/29/2021 10:20 AM    HEMATOCRIT 45.3 03/29/2021 10:20 AM    MCV 92.4 03/29/2021 10:20 AM    MCH 30.0 03/29/2021 10:20 AM    MCHC 32.5 (L) 03/29/2021 10:20 AM    MPV 11.5 03/29/2021 10:20 AM    NEUTSPOLYS 70.80 10/12/2020 06:21 AM    LYMPHOCYTES 18.70 (L) 10/12/2020 06:21 AM    MONOCYTES 7.30 10/12/2020 06:21 AM    EOSINOPHILS 2.10 10/12/2020 06:21 AM    BASOPHILS 0.70 10/12/2020 06:21 AM    HYPOCHROMIA 1+ 10/01/2020 05:48 AM    ANISOCYTOSIS 1+ 10/04/2020 05:08 AM       Lab Results   Component Value Date/Time    ASTSGOT 12 12/29/2020 08:43 AM    ALTSGPT 19 12/29/2020 08:43 AM        Physical Exam:   GEN: No apparent distress  HEENT: Head normocephalic, atraumatic.  Sclera nonicteric bilaterally, no ocular discharge appreciated bilaterally.  CV: Extremities warm and well-perfused, no peripheral edema appreciated bilaterally.  PULMONARY: Breathing nonlabored on room air, no respiratory accessory muscle use.  Not requiring supplemental oxygen.  SKIN: No appreciable skin breakdown on exposed areas of skin.  Scar appreciated at medial aspect proximal lower leg.  PSYCH: Slightly depressed affect.  NEURO: Awake alert.  Conversational.  Logical thought content.  Answers questions appropriately.  Ambulatory without assistive device  Sensory impairment along sural distribution.  Patient reports allodynia during exam.    Motor Exam Lower Extremities  ? Myotome R L   Hip flexion L2 5 5   Knee extension L3 5 5   Ankle dorsiflexion L4 5 5   Toe extension L5 5 5   Ankle plantarflexion S1 5 5     No clonus bilateral ankles.    EMG/NCS 4/15/2021  Impression/Recommendations:  Abnormal study  1. Today's electrodiagnostic findings are suggestive of:  a. Right lumbar radiculopathy, primarily involving L5  b. Left lumbar radiculopathy, primarily involving L5  c. Large fiber generalized peripheral  polyneuropathy(PPN)     2. Clinically, his symptoms are consistent with the above findings.  The PPN may be second to DM and/or history of heavy alcohol use.  Recommend imaging of the lumbar spine, patient could benefit from physical therapy and possibly injections.         ASSESSMENT/PLAN: Guerrero Ann  is a 50 y.o. male with rehabilitation history significant for bilateral cardioembolic ischemic CVA s/p CABG , here for evaluation. The following plan was discussed with the patient who is in agreement.     Visit Diagnoses     ICD-10-CM   1. Neuropathic pain  M79.2   2. Bilateral pain of leg and foot  M79.604    M79.605    M79.671    M79.672   3. Right leg pain  M79.604   4. Lumbar radiculopathy  M54.16        Rehab/Neuro:   1. Bilateral cardioembolic ischemic CVA s/p CABG   -Occupation: Continues full-time job as a .  Working difficult due to continued neuropathic pain.    Neuropathic Pain: Continued neuropathic pain in bilateral LE's. Numbness of LLE in L5 distribution affecting top of left foot/toes distally and plantar surface. Right LE with painful numbness affecting bilateral malleoli region and extends proximally laterally in a sural nerve distribution. Improved to some degree on Gabapentin and Cymbalta, still painful at night. EMG/NCS 4/2021 + for possible bilateral L5 radiculopathy. RLE symptoms not in distribution congruent with greater saphenous nerve damage 2/2 vein grafting. Continue to significantly affect QOL. Attempted to fix with orthotics if component of plantar fasciitis causing pain. Inefficacious.   -Medication management: Prescription for gabapentin 1200 mg 3 times daily.  This was refilled today.  Can consider Lyrica in the future if needed.  -Medication management: Prescription for Cymbalta 60 mg daily, 2 refills.  -Procedures: EMG/NCS 4/2021-polyperipheral neuropathy in addition to possible bilateral L5 radic.  Recommended MRI  -Imaging: MRI L-spine ordered,  denied by insurance.  Important to rule out radiculopathy as cause of symptoms given quality of life is significantly affected.  EMG/NCS positive for potential bilateral L5 radiculopathy.  Laboratory work-up negative.    Mood: Emotional lability improved.  Continued depression due to significant neuropathic pain in the right lower extremity.  Left lower extremity is experiencing pain as well but not as severely as the right.  -Med management: Refill Duloxetine 60mg daily    Follow up: 2 months for imaging review.    Please note that this dictation was created using voice recognition software. I have made every reasonable attempt to correct obvious errors but there may be errors of grammar and content that I may have overlooked prior to finalization of this note.    Dr. Marilu Franco DO, MS  Department of Physical Medicine & Rehabilitation  Neuro Rehabilitation Clinic  [facility]

## 2021-09-14 ENCOUNTER — OFFICE VISIT (OUTPATIENT)
Dept: CARDIOLOGY | Facility: MEDICAL CENTER | Age: 51
End: 2021-09-14
Payer: COMMERCIAL

## 2021-09-14 VITALS
BODY MASS INDEX: 28.78 KG/M2 | DIASTOLIC BLOOD PRESSURE: 78 MMHG | WEIGHT: 156.4 LBS | OXYGEN SATURATION: 92 % | HEART RATE: 75 BPM | SYSTOLIC BLOOD PRESSURE: 122 MMHG | HEIGHT: 62 IN | RESPIRATION RATE: 16 BRPM

## 2021-09-14 DIAGNOSIS — E78.49 OTHER HYPERLIPIDEMIA: ICD-10-CM

## 2021-09-14 DIAGNOSIS — I25.10 CORONARY ARTERY DISEASE INVOLVING NATIVE CORONARY ARTERY OF NATIVE HEART WITHOUT ANGINA PECTORIS: ICD-10-CM

## 2021-09-14 DIAGNOSIS — Z95.1 S/P CABG X 3: ICD-10-CM

## 2021-09-14 DIAGNOSIS — I10 ESSENTIAL HYPERTENSION: ICD-10-CM

## 2021-09-14 DIAGNOSIS — E11.8 TYPE 2 DIABETES MELLITUS WITH COMPLICATION, WITHOUT LONG-TERM CURRENT USE OF INSULIN (HCC): ICD-10-CM

## 2021-09-14 DIAGNOSIS — Z86.73 HISTORY OF CARDIOEMBOLIC STROKE: ICD-10-CM

## 2021-09-14 DIAGNOSIS — I10 ESSENTIAL HYPERTENSION, BENIGN: ICD-10-CM

## 2021-09-14 PROBLEM — F32.9 REACTIVE DEPRESSION: Status: RESOLVED | Noted: 2020-10-09 | Resolved: 2021-09-14

## 2021-09-14 PROBLEM — I21.4 NSTEMI (NON-ST ELEVATED MYOCARDIAL INFARCTION) (HCC): Status: RESOLVED | Noted: 2018-06-05 | Resolved: 2021-09-14

## 2021-09-14 PROBLEM — D64.9 NORMOCHROMIC NORMOCYTIC ANEMIA: Status: RESOLVED | Noted: 2020-09-28 | Resolved: 2021-09-14

## 2021-09-14 PROBLEM — I25.2 HISTORY OF NON-ST ELEVATION MYOCARDIAL INFARCTION (NSTEMI): Status: RESOLVED | Noted: 2018-05-29 | Resolved: 2021-09-14

## 2021-09-14 PROBLEM — Z82.3 FAMILY HISTORY OF STROKE OR TRANSIENT ISCHEMIC ATTACK IN MOTHER: Status: RESOLVED | Noted: 2018-05-29 | Resolved: 2021-09-14

## 2021-09-14 PROBLEM — R21 RASH AND NONSPECIFIC SKIN ERUPTION: Status: RESOLVED | Noted: 2021-08-31 | Resolved: 2021-09-14

## 2021-09-14 PROBLEM — R33.9 URINARY RETENTION: Status: RESOLVED | Noted: 2020-10-20 | Resolved: 2021-09-14

## 2021-09-14 PROBLEM — L60.9 NAIL PROBLEM: Status: RESOLVED | Noted: 2021-08-31 | Resolved: 2021-09-14

## 2021-09-14 PROBLEM — R07.9 CHEST PAIN: Status: RESOLVED | Noted: 2021-07-06 | Resolved: 2021-09-14

## 2021-09-14 PROCEDURE — 99214 OFFICE O/P EST MOD 30 MIN: CPT | Performed by: NURSE PRACTITIONER

## 2021-09-14 RX ORDER — LISINOPRIL 40 MG/1
40 TABLET ORAL EVERY EVENING
Qty: 90 TABLET | Refills: 3 | COMMUNITY
Start: 2021-09-14 | End: 2022-02-14 | Stop reason: SDUPTHER

## 2021-09-14 ASSESSMENT — ENCOUNTER SYMPTOMS
FALLS: 1
MYALGIAS: 0
DIZZINESS: 0
ABDOMINAL PAIN: 0
PND: 0
CLAUDICATION: 0
ORTHOPNEA: 0
DEPRESSION: 1
NERVOUS/ANXIOUS: 1
SHORTNESS OF BREATH: 0
FEVER: 0
COUGH: 0
PALPITATIONS: 0

## 2021-09-14 ASSESSMENT — FIBROSIS 4 INDEX: FIB4 SCORE: 0.52

## 2021-09-14 NOTE — PROGRESS NOTES
Chief Complaint   Patient presents with   • Coronary Artery Disease     Dx: Coronary artery disease involving native coronary artery of native heart without angina pectoris   • Coronary Artery Bypass Graft (CABG)     Dx: S/P CABG x 3       Subjective     Guerrero Zack Ann is a 50 y.o. male who presents today for 6 month follow up for his history of CABG.    He is a patient of Dr. Mead in our office. 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    He presents today with his wife. He continues to work with the psychiatrist on his neuropathy and mobility. He has frequent falls due to his R foot not working properly. He had a fall last night and hit his nose.    He overall from a cardiac standpoint feels well. He does have some atypical chest pain that is present intermittently upon palpation and movement.    He has no chest pain, shortness of breath, edema, dizziness/lightheadedness, or palpitations.    Past Medical History:   Diagnosis Date   • Diabetes (HCC)     oral meds only   • Hypertension    • Kidney stones    • MI, old    • Pneumonia    • Psychiatric problem     depression and anxiety   • Stroke (HCC)      Past Surgical History:   Procedure Laterality Date   • MULTIPLE CORONARY ARTERY BYPASS ENDO VEIN HARVEST  9/25/2020    Procedure: CABG, WITH ENDOSCOPIC VEIN PROCUREMENT- X3;  Surgeon: Michael Chicas M.D.;  Location: [facility];  Service: Cardiothoracic   • MARGOTH  9/25/2020    Procedure: ECHOCARDIOGRAM, TRANSESOPHAGEAL;  Surgeon: Michael Chicas M.D.;  Location: [facility];  Service: Cardiothoracic   • STENT PLACEMENT  2017    1 cardiac stent   • OTHER ORTHOPEDIC SURGERY      rt wrist fracture with fixation 34 years ago     Family History   Problem Relation Age of Onset   • Stroke Mother 47         had 3 devastating strokes,  54yo stroke complications   • Heart Disease Mother    • No Known Problems Father         does not know his father.   • Stroke Maternal Grandmother 85   • Heart Disease Maternal Grandmother    • Stroke Maternal Grandfather 54   • Heart Disease Maternal Grandfather      Social History     Socioeconomic History   • Marital status: Single     Spouse name: Not on file   • Number of children: Not on file   • Years of education: Not on file   • Highest education level: Not on file   Occupational History   • Not on file   Tobacco Use   • Smoking status: Never Smoker   • Smokeless tobacco: Former User     Types: Chew   Vaping Use   • Vaping Use: Never used   Substance and Sexual Activity   • Alcohol use: Yes     Comment: 2 per week   • Drug use: No   • Sexual activity: Yes     Partners: Female     Comment: fiance. wrk: Verónica Pingree Cutting.    Other Topics Concern   • Not on file   Social History Narrative   • Not on file     Social Determinants of Health     Financial Resource Strain:    • Difficulty of Paying Living Expenses:    Food Insecurity:    • Worried About Running Out of Food in the Last Year:    • Ran Out of Food in the Last Year:    Transportation Needs:    • Lack of Transportation (Medical):    • Lack of Transportation (Non-Medical):    Physical Activity:    • Days of Exercise per Week:    • Minutes of Exercise per Session:    Stress:    • Feeling of Stress :    Social Connections:    • Frequency of Communication with Friends and Family:    • Frequency of Social Gatherings with Friends and Family:    • Attends [employer] Services:    • Active Member of Clubs or Organizations:    • Attends Club or Organization Meetings:    • Marital Status:    Intimate Partner Violence:    • Fear of Current or Ex-Partner:    • Emotionally Abused:    • Physically Abused:    • Sexually Abused:      Allergies   Allergen Reactions   • Isosorbide Unspecified     Hallucinations     Outpatient  Encounter Medications as of 9/14/2021   Medication Sig Dispense Refill   • metoprolol tartrate (LOPRESSOR) 25 MG Tab Take 1 Tablet by mouth 2 times a day. 180 Tablet 3   • lisinopril (PRINIVIL) 40 MG tablet Take 1 Tablet by mouth every evening. 90 Tablet 3   • gabapentin (NEURONTIN) 800 MG tablet Take 1 Tablet by mouth 3 times a day. 90 Tablet 2   • gabapentin (NEURONTIN) 400 MG Cap Take 1 Capsule by mouth 3 times a day. 90 Capsule 2   • DULoxetine (CYMBALTA) 60 MG Cap DR Particles delayed-release capsule Take 1 Capsule by mouth every day. 30 Capsule 2   • terbinafine (LAMISIL) 250 MG Tab Take 1 Tablet by mouth every day. 42 Tablet 0   • Semaglutide,0.25 or 0.5MG/DOS, (OZEMPIC, 0.25 OR 0.5 MG/DOSE,) 2 MG/1.5ML Solution Pen-injector Inject 0.25 mg under the skin every 7 days. 1.5 mL 3   • sildenafil (REVATIO) 20 MG tablet sildenafil (pulmonary hypertension) 20 mg tablet   TAKE 2 5 TABLETS BY MOUTH APPROXIMATELY 45 MINUTES BEFORE SEXUAL ACTIVITY TAKE ON AN EMPTY STOMACH     • ketoconazole (NIZORAL) 2 % Cream Apply fingertip amount to affected area daily for 2 weeks 60 g 0   • tamsulosin (FLOMAX) 0.4 MG capsule Take 1 capsule by mouth at bedtime. 30 capsule 2   • TRUE METRIX BLOOD GLUCOSE TEST strip USE 1 STRIP TO CHECK GLUCOSE ONCE DAILY     • Dapagliflozin Propanediol (FARXIGA) 10 MG Tab Take 10 mg by mouth every day. 30 tablet 5   • metFORMIN (GLUCOPHAGE) 1000 MG tablet Take 1 tablet by mouth 2 times a day, with meals. 180 tablet 3   • Coenzyme Q10 (COQ10) 100 MG Cap Take 300 mg by mouth every day.     • Blood Glucose Test Strips Use one strip to test blood sugar once daily . 100 Each 5   • atorvastatin (LIPITOR) 80 MG tablet Take 1 Tab by mouth every bedtime. 90 Tab 3   • therapeutic multivitamin-minerals (THERAGRAN-M) Tab Take 1 Tab by mouth every day.     • clopidogrel (PLAVIX) 75 MG Tab Take 1 Tab by mouth every day. 90 Tab 3   • allopurinol (ZYLOPRIM) 100 MG Tab Take 1 Tab by mouth every day. 90 Tab 3   •  "Lancets Lancets order: Lancets for True Metrix meter. Sig: use TID and prn ssx high or low sugar. #100 RF x 3 100 Each 3   • acetaminophen (TYLENOL) 325 MG Tab Take 2 Tabs by mouth every four hours as needed for Moderate Pain. 30 Tab 0   • aspirin (ASA) 81 MG Chew Tab chewable tablet Take 1 Tab by mouth every day. 30 Tab 2   • [DISCONTINUED] lisinopril (PRINIVIL) 40 MG tablet Take 1 Tab by mouth every day. 90 Tab 3   • [DISCONTINUED] metoprolol (LOPRESSOR) 25 MG Tab Take 1 Tab by mouth 2 Times a Day. 180 Tab 3     No facility-administered encounter medications on file as of 9/14/2021.     Review of Systems   Constitutional: Positive for malaise/fatigue. Negative for fever.   Respiratory: Negative for cough and shortness of breath.    Cardiovascular: Negative for chest pain, palpitations, orthopnea, claudication, leg swelling and PND.        Chest pain palpable at times on left side of chest wall   Gastrointestinal: Negative for abdominal pain.   Musculoskeletal: Positive for falls. Negative for myalgias.   Neurological: Negative for dizziness.   Psychiatric/Behavioral: Positive for depression. The patient is nervous/anxious.               Objective     /78 (BP Location: Left arm, Patient Position: Sitting, BP Cuff Size: Adult)   Pulse 75   Resp 16   Ht 1.575 m (5' 2\")   Wt 70.9 kg (156 lb 6.4 oz)   SpO2 92%   BMI 28.61 kg/m²     Physical Exam  Vitals and nursing note reviewed.   Constitutional:       Appearance: Normal appearance. He is well-developed and normal weight.   HENT:      Head: Normocephalic and atraumatic.   Neck:      Vascular: No JVD.   Cardiovascular:      Rate and Rhythm: Normal rate and regular rhythm.      Pulses: Normal pulses.      Heart sounds: Normal heart sounds.   Pulmonary:      Effort: Pulmonary effort is normal.      Breath sounds: Normal breath sounds.   Musculoskeletal:         General: Normal range of motion.   Skin:     General: Skin is warm and dry.      Capillary Refill: " Capillary refill takes less than 2 seconds.   Neurological:      General: No focal deficit present.      Mental Status: He is alert and oriented to person, place, and time. Mental status is at baseline.   Psychiatric:         Attention and Perception: Attention normal.         Mood and Affect: Mood normal. Affect is flat.         Speech: Speech normal.         Behavior: Behavior normal.         Thought Content: Thought content normal.         Cognition and Memory: Cognition and memory normal.         Judgment: Judgment normal.                Assessment & Plan     1. Coronary artery disease involving native coronary artery of native heart without angina pectoris  CBC WITHOUT DIFFERENTIAL    EC-ECHOCARDIOGRAM COMPLETE W/O CONT   2. Essential hypertension  CBC WITHOUT DIFFERENTIAL   3. Type 2 diabetes mellitus with complication, without long-term current use of insulin (HCC)     4. History of cardioembolic stroke     5. S/P CABG x 3  EC-ECHOCARDIOGRAM COMPLETE W/O CONT   6. Essential hypertension, benign  lisinopril (PRINIVIL) 40 MG tablet   7. Other hyperlipidemia  Comp Metabolic Panel    Lipid Profile       Medical Decision Making: Today's Assessment/Status/Plan:        1. CAD with CABG   -no angina or MIRANDA  -atypical chest pain sounds MSK related, consider sternal wire involvement but patient wants to wait to see how the pain progresses over time  -cont asa, statin lifelong  -follow post-op echo, re-ordered for clinical structure and review  -no echo since post-op state, review EF   -post-op neuropathy in R foot, managed by pyshiatrist     2. HTN  -good control on lisinopril and metoprolol  -move lisinopril to PM for fatigue during daytime  -BP goal <130/80 consistently    3. HLD  -statin  -LDL goal <70 with CAD  -repeat lipid and cmp yearly, slips given to patients wife    4. DM type II  -HgbA1c around 8-9  -followed by PCP    5. CVA post CABG  -flat depressed affect  -cont asa, statin  -check echo as above    6.  Depression/anxiety  -managed by PCP    Patient is to follow up with Marilu ERICKSON in 6 months with labs and echo.

## 2021-09-28 ENCOUNTER — OFFICE VISIT (OUTPATIENT)
Dept: MEDICAL GROUP | Facility: MEDICAL CENTER | Age: 51
End: 2021-09-28
Attending: PHYSICIAN ASSISTANT
Payer: COMMERCIAL

## 2021-09-28 VITALS
TEMPERATURE: 99 F | DIASTOLIC BLOOD PRESSURE: 84 MMHG | RESPIRATION RATE: 17 BRPM | SYSTOLIC BLOOD PRESSURE: 118 MMHG | OXYGEN SATURATION: 93 % | HEIGHT: 62 IN | HEART RATE: 80 BPM | BODY MASS INDEX: 29.53 KG/M2 | WEIGHT: 160.5 LBS

## 2021-09-28 DIAGNOSIS — E11.8 TYPE 2 DIABETES MELLITUS WITH COMPLICATION, WITHOUT LONG-TERM CURRENT USE OF INSULIN (HCC): ICD-10-CM

## 2021-09-28 LAB
HBA1C MFR BLD: 6.7 % (ref 0–5.6)
INT CON NEG: NEGATIVE
INT CON POS: POSITIVE

## 2021-09-28 PROCEDURE — 99213 OFFICE O/P EST LOW 20 MIN: CPT | Performed by: PHYSICIAN ASSISTANT

## 2021-09-28 PROCEDURE — 83036 HEMOGLOBIN GLYCOSYLATED A1C: CPT | Performed by: PHYSICIAN ASSISTANT

## 2021-09-28 PROCEDURE — 99214 OFFICE O/P EST MOD 30 MIN: CPT | Performed by: PHYSICIAN ASSISTANT

## 2021-09-28 ASSESSMENT — FIBROSIS 4 INDEX: FIB4 SCORE: 0.52

## 2021-09-28 NOTE — PROGRESS NOTES
Chief Complaint   Patient presents with   • Diabetes Follow-up     Subjective:     HPI:   Guerrero Ann is a 50 y.o. male here to discuss the evaluation and management of:    Type 2 diabetes mellitus with complication, without long-term current use of insulin (HCC)  Guerrero presents today for follow-up.  He has been taking Metformin 1000 mg twice daily, Farxiga 10 mg daily and Ozempic 0.25 mg every week and has tolerated these medications well without any known side effects.  He has been watching his carbohydrate intake and going on walks daily.  He has been recording his blood sugars at home and his fasting levels are as follows: 121, 124, 152, 136, 105, 115, 135.  A1c repeated in clinic today was 6.7%.    ROS  See HPI.    Allergies   Allergen Reactions   • Isosorbide Unspecified     Hallucinations     Current medicines (including changes today)  Current Outpatient Medications   Medication Sig Dispense Refill   • metoprolol tartrate (LOPRESSOR) 25 MG Tab Take 1 Tablet by mouth 2 times a day. 180 Tablet 3   • lisinopril (PRINIVIL) 40 MG tablet Take 1 Tablet by mouth every evening. 90 Tablet 3   • gabapentin (NEURONTIN) 800 MG tablet Take 1 Tablet by mouth 3 times a day. 90 Tablet 2   • gabapentin (NEURONTIN) 400 MG Cap Take 1 Capsule by mouth 3 times a day. 90 Capsule 2   • DULoxetine (CYMBALTA) 60 MG Cap DR Particles delayed-release capsule Take 1 Capsule by mouth every day. 30 Capsule 2   • terbinafine (LAMISIL) 250 MG Tab Take 1 Tablet by mouth every day. 42 Tablet 0   • Semaglutide,0.25 or 0.5MG/DOS, (OZEMPIC, 0.25 OR 0.5 MG/DOSE,) 2 MG/1.5ML Solution Pen-injector Inject 0.25 mg under the skin every 7 days. 1.5 mL 3   • sildenafil (REVATIO) 20 MG tablet sildenafil (pulmonary hypertension) 20 mg tablet   TAKE 2 5 TABLETS BY MOUTH APPROXIMATELY 45 MINUTES BEFORE SEXUAL ACTIVITY TAKE ON AN EMPTY STOMACH     • ketoconazole (NIZORAL) 2 % Cream Apply fingertip amount to affected area daily for 2  weeks 60 g 0   • tamsulosin (FLOMAX) 0.4 MG capsule Take 1 capsule by mouth at bedtime. 30 capsule 2   • TRUE METRIX BLOOD GLUCOSE TEST strip USE 1 STRIP TO CHECK GLUCOSE ONCE DAILY     • Dapagliflozin Propanediol (FARXIGA) 10 MG Tab Take 10 mg by mouth every day. 30 tablet 5   • metFORMIN (GLUCOPHAGE) 1000 MG tablet Take 1 tablet by mouth 2 times a day, with meals. 180 tablet 3   • Coenzyme Q10 (COQ10) 100 MG Cap Take 300 mg by mouth every day.     • Blood Glucose Test Strips Use one strip to test blood sugar once daily . 100 Each 5   • atorvastatin (LIPITOR) 80 MG tablet Take 1 Tab by mouth every bedtime. 90 Tab 3   • therapeutic multivitamin-minerals (THERAGRAN-M) Tab Take 1 Tab by mouth every day.     • clopidogrel (PLAVIX) 75 MG Tab Take 1 Tab by mouth every day. 90 Tab 3   • allopurinol (ZYLOPRIM) 100 MG Tab Take 1 Tab by mouth every day. 90 Tab 3   • Lancets Lancets order: Lancets for True Metrix meter. Sig: use TID and prn ssx high or low sugar. #100 RF x 3 100 Each 3   • acetaminophen (TYLENOL) 325 MG Tab Take 2 Tabs by mouth every four hours as needed for Moderate Pain. 30 Tab 0   • aspirin (ASA) 81 MG Chew Tab chewable tablet Take 1 Tab by mouth every day. 30 Tab 2     No current facility-administered medications for this visit.       Social History     Tobacco Use   • Smoking status: Never Smoker   • Smokeless tobacco: Former User     Types: Chew   Vaping Use   • Vaping Use: Never used   Substance Use Topics   • Alcohol use: Yes     Comment: 2 per week   • Drug use: No       Patient Active Problem List    Diagnosis Date Noted   • S/P CABG x 3 11/05/2020   • Adjustment disorder with depressed mood 10/15/2020   • Anxiety 10/11/2020   • Vitamin D deficiency 10/10/2020   • History of cardioembolic stroke 09/29/2020   • Coronary artery disease involving native coronary artery of native heart without angina pectoris 12/22/2018   • DISH (diffuse idiopathic skeletal hyperostosis) 09/14/2018   • Type 2 diabetes  "mellitus with complication, without long-term current use of insulin (HCC) 2018   • Essential hypertension 2018       Family History   Problem Relation Age of Onset   • Stroke Mother 47        had 3 devastating strokes,  56yo stroke complications   • Heart Disease Mother    • No Known Problems Father         does not know his father.   • Stroke Maternal Grandmother 85   • Heart Disease Maternal Grandmother    • Stroke Maternal Grandfather 54   • Heart Disease Maternal Grandfather         Objective:     /84 (BP Location: Left arm, Patient Position: Sitting, BP Cuff Size: Adult)   Pulse 80   Temp 37.2 °C (99 °F) (Temporal)   Resp 17   Ht 1.575 m (5' 2\")   Wt 72.8 kg (160 lb 8 oz)   SpO2 93%  Body mass index is 29.36 kg/m².    Physical Exam:  Physical Exam  Vitals reviewed.   Constitutional:       Appearance: Normal appearance.   HENT:      Head: Normocephalic.      Right Ear: External ear normal.      Left Ear: External ear normal.   Cardiovascular:      Rate and Rhythm: Normal rate and regular rhythm.      Heart sounds: Normal heart sounds.   Pulmonary:      Effort: Pulmonary effort is normal.      Breath sounds: Normal breath sounds.   Musculoskeletal:      Cervical back: Normal range of motion.   Neurological:      General: No focal deficit present.      Mental Status: He is alert and oriented to person, place, and time.   Psychiatric:         Mood and Affect: Mood normal.         Behavior: Behavior normal.       Assessment and Plan:     The following treatment plan was discussed:    1. Type 2 diabetes mellitus with complication, without long-term current use of insulin (HCC)  - This is a chronic improved condition.  - POCT  A1C was 6.7% --well-controlled.  - Plan: Continue on current diabetes medication regimen without any changes.  Patient reports that his insurance will be changing at the end of the October and he will need to reestablish care with a new PCP.  He is requesting for his " prescriptions to be renewed prior to this change so that he does not run out of his medications before establishing care with a new provider.  His significant other who accompanies him during his visit today will reach out via [employer] to remind us to refill this medications.  Encouraged the patient to continue with lifestyle interventions.     Any change or worsening of signs or symptoms, patient encouraged to follow-up or report to emergency room for further evaluation. Patient verbalizes understanding and agrees.    Follow-Up: Return if symptoms worsen or fail to improve.      PLEASE NOTE: This dictation was created using voice recognition software. I have made every reasonable attempt to correct obvious errors, but I expect that there are errors of grammar and possibly content that I did not discover before finalizing the note.

## 2021-09-29 ENCOUNTER — HOSPITAL ENCOUNTER (OUTPATIENT)
Dept: RADIOLOGY | Facility: MEDICAL CENTER | Age: 51
End: 2021-09-29
Attending: PHYSICAL MEDICINE & REHABILITATION
Payer: COMMERCIAL

## 2021-09-29 DIAGNOSIS — M79.604 RIGHT LEG PAIN: ICD-10-CM

## 2021-09-29 DIAGNOSIS — M79.604 BILATERAL PAIN OF LEG AND FOOT: ICD-10-CM

## 2021-09-29 DIAGNOSIS — M79.672 BILATERAL PAIN OF LEG AND FOOT: ICD-10-CM

## 2021-09-29 DIAGNOSIS — M79.671 BILATERAL PAIN OF LEG AND FOOT: ICD-10-CM

## 2021-09-29 DIAGNOSIS — M79.605 BILATERAL PAIN OF LEG AND FOOT: ICD-10-CM

## 2021-09-29 DIAGNOSIS — M54.16 LUMBAR RADICULOPATHY: ICD-10-CM

## 2021-09-29 DIAGNOSIS — M79.2 NEUROPATHIC PAIN: ICD-10-CM

## 2021-09-29 PROCEDURE — 72148 MRI LUMBAR SPINE W/O DYE: CPT

## 2021-09-29 NOTE — ASSESSMENT & PLAN NOTE
Guerrero presents today for follow-up.  He has been taking Metformin 1000 mg twice daily, Farxiga 10 mg daily and Ozempic 0.25 mg every week and has tolerated these medications well without any known side effects.  He has been watching his carbohydrate intake and going on walks daily.  He has been recording his blood sugars at home and his fasting levels are as follows: 121, 124, 152, 136, 105, 115, 135.  A1c repeated in clinic today was 6.7%.

## 2021-10-04 ENCOUNTER — TELEPHONE (OUTPATIENT)
Dept: PHYSICAL MEDICINE AND REHAB | Facility: REHABILITATION | Age: 51
End: 2021-10-04

## 2021-10-11 ENCOUNTER — OFFICE VISIT (OUTPATIENT)
Dept: PHYSICAL MEDICINE AND REHAB | Facility: REHABILITATION | Age: 51
End: 2021-10-11
Payer: COMMERCIAL

## 2021-10-11 VITALS
SYSTOLIC BLOOD PRESSURE: 118 MMHG | WEIGHT: 150 LBS | TEMPERATURE: 97.1 F | BODY MASS INDEX: 27.6 KG/M2 | HEIGHT: 62 IN | RESPIRATION RATE: 16 BRPM | DIASTOLIC BLOOD PRESSURE: 70 MMHG | HEART RATE: 77 BPM | OXYGEN SATURATION: 99 %

## 2021-10-11 DIAGNOSIS — M79.2 NEUROPATHIC PAIN: ICD-10-CM

## 2021-10-11 DIAGNOSIS — M79.671 BILATERAL PAIN OF LEG AND FOOT: ICD-10-CM

## 2021-10-11 DIAGNOSIS — M79.605 BILATERAL PAIN OF LEG AND FOOT: ICD-10-CM

## 2021-10-11 DIAGNOSIS — M79.604 BILATERAL PAIN OF LEG AND FOOT: ICD-10-CM

## 2021-10-11 DIAGNOSIS — M79.672 BILATERAL PAIN OF LEG AND FOOT: ICD-10-CM

## 2021-10-11 DIAGNOSIS — I63.9 CARDIOEMBOLIC STROKE (HCC): Primary | ICD-10-CM

## 2021-10-11 PROCEDURE — 99214 OFFICE O/P EST MOD 30 MIN: CPT | Performed by: PHYSICAL MEDICINE & REHABILITATION

## 2021-10-11 ASSESSMENT — FIBROSIS 4 INDEX: FIB4 SCORE: 0.52

## 2021-10-11 NOTE — PROGRESS NOTES
[facility]  [facility]  [address] [place] [address]  Ph: [phone]    FOLLOW UP PATIENT EVALUATION      Patient Name: Guerrero Ann   Patient : 1970  Patient Age: 50 y.o.     Examining Physician: Dr. Marilu Franco, DO    PM&R History to Date - Background Information:  Dates of Admission/Discharge to ARU: 10/9/20-10/21/20    SUBJECTIVE:   Patient Identification: Guerrero Ann is a 50 y.o. male with PMH significant for coronary artery disease s/p stents, diabetes, hypertension, congenital absence of one kidney, type II diabetes and rehabilitation history significant for bilateral cardioembolic ischemic CVA s/p CABG 10/2020 and is presenting to PM&R clinic for a FOLLOW UP OUTPATIENT evaluation with the following chief complaint/s:    Chief Complaint: Imaging follow-up    History of Present Illness:    -Patient continues to have pain.  Pain is impeding his proprioception and he had a bad fall over the weekend.  -Still continues to take gabapentin 1200 mg 3 times daily.  -Still taking Cymbalta 60 mg daily.  -MRI reviewed with patient today.  -Patient states that pain continues to be worse with pressure changes in the weather.  -Interested in interventional pain/spine referral for other potential solutions    Review of Systems:  All other pertinent positive review of systems are noted above in HPI.   All other systems reviewed and are negative.    Past Medical History:  Past Medical History:   Diagnosis Date   • Diabetes (HCC)     oral meds only   • Hypertension    • Kidney stones    • MI, old    • Pneumonia    • Psychiatric problem     depression and anxiety   • Stroke (HCC)       Past Surgical History:   Procedure Laterality Date   • MULTIPLE CORONARY ARTERY BYPASS ENDO VEIN HARVEST  2020    Procedure: CABG, WITH ENDOSCOPIC VEIN PROCUREMENT- X3;  Surgeon: Michael Chicas M.D.;  Location: SURGERY [facility];  Service:  Cardiothoracic   • MARGOTH  9/25/2020    Procedure: ECHOCARDIOGRAM, TRANSESOPHAGEAL;  Surgeon: Michael Chicas M.D.;  Location: SURGERY [facility];  Service: Cardiothoracic   • STENT PLACEMENT  2017 1 cardiac stent   • OTHER ORTHOPEDIC SURGERY      rt wrist fracture with fixation 34 years ago        Current Outpatient Medications:   •  metoprolol tartrate (LOPRESSOR) 25 MG Tab, Take 1 Tablet by mouth 2 times a day., Disp: 180 Tablet, Rfl: 3  •  lisinopril (PRINIVIL) 40 MG tablet, Take 1 Tablet by mouth every evening., Disp: 90 Tablet, Rfl: 3  •  gabapentin (NEURONTIN) 800 MG tablet, Take 1 Tablet by mouth 3 times a day., Disp: 90 Tablet, Rfl: 2  •  gabapentin (NEURONTIN) 400 MG Cap, Take 1 Capsule by mouth 3 times a day., Disp: 90 Capsule, Rfl: 2  •  DULoxetine (CYMBALTA) 60 MG Cap DR Particles delayed-release capsule, Take 1 Capsule by mouth every day., Disp: 30 Capsule, Rfl: 2  •  terbinafine (LAMISIL) 250 MG Tab, Take 1 Tablet by mouth every day., Disp: 42 Tablet, Rfl: 0  •  Semaglutide,0.25 or 0.5MG/DOS, (OZEMPIC, 0.25 OR 0.5 MG/DOSE,) 2 MG/1.5ML Solution Pen-injector, Inject 0.25 mg under the skin every 7 days., Disp: 1.5 mL, Rfl: 3  •  sildenafil (REVATIO) 20 MG tablet, sildenafil (pulmonary hypertension) 20 mg tablet  TAKE 2 5 TABLETS BY MOUTH APPROXIMATELY 45 MINUTES BEFORE SEXUAL ACTIVITY TAKE ON AN EMPTY STOMACH, Disp: , Rfl:   •  ketoconazole (NIZORAL) 2 % Cream, Apply fingertip amount to affected area daily for 2 weeks, Disp: 60 g, Rfl: 0  •  tamsulosin (FLOMAX) 0.4 MG capsule, Take 1 capsule by mouth at bedtime., Disp: 30 capsule, Rfl: 2  •  TRUE METRIX BLOOD GLUCOSE TEST strip, USE 1 STRIP TO CHECK GLUCOSE ONCE DAILY, Disp: , Rfl:   •  Dapagliflozin Propanediol (FARXIGA) 10 MG Tab, Take 10 mg by mouth every day., Disp: 30 tablet, Rfl: 5  •  metFORMIN (GLUCOPHAGE) 1000 MG tablet, Take 1 tablet by mouth 2 times a day, with meals., Disp: 180 tablet, Rfl: 3  •  Coenzyme Q10 (COQ10) 100 MG Cap, Take 300 mg  by mouth every day., Disp: , Rfl:   •  Blood Glucose Test Strips, Use one strip to test blood sugar once daily ., Disp: 100 Each, Rfl: 5  •  atorvastatin (LIPITOR) 80 MG tablet, Take 1 Tab by mouth every bedtime., Disp: 90 Tab, Rfl: 3  •  therapeutic multivitamin-minerals (THERAGRAN-M) Tab, Take 1 Tab by mouth every day., Disp: , Rfl:   •  clopidogrel (PLAVIX) 75 MG Tab, Take 1 Tab by mouth every day., Disp: 90 Tab, Rfl: 3  •  allopurinol (ZYLOPRIM) 100 MG Tab, Take 1 Tab by mouth every day., Disp: 90 Tab, Rfl: 3  •  Lancets, Lancets order: Lancets for True Metrix meter. Sig: use TID and prn ssx high or low sugar. #100 RF x 3, Disp: 100 Each, Rfl: 3  •  acetaminophen (TYLENOL) 325 MG Tab, Take 2 Tabs by mouth every four hours as needed for Moderate Pain., Disp: 30 Tab, Rfl: 0  •  aspirin (ASA) 81 MG Chew Tab chewable tablet, Take 1 Tab by mouth every day., Disp: 30 Tab, Rfl: 2  Allergies   Allergen Reactions   • Isosorbide Unspecified     Hallucinations        Past Social History:  Social History     Socioeconomic History   • Marital status: Single     Spouse name: Not on file   • Number of children: Not on file   • Years of education: Not on file   • Highest education level: Not on file   Occupational History   • Not on file   Tobacco Use   • Smoking status: Never Smoker   • Smokeless tobacco: Former User     Types: Chew   Vaping Use   • Vaping Use: Never used   Substance and Sexual Activity   • Alcohol use: Yes     Comment: 2 per week   • Drug use: No   • Sexual activity: Yes     Partners: Female     Comment: fiance. wrk: Verónica Westfall [place].    Other Topics Concern   • Not on file   Social History Narrative   • Not on file     Social Determinants of Health     Financial Resource Strain:    • Difficulty of Paying Living Expenses:    Food Insecurity:    • Worried About Running Out of Food in the Last Year:    • Ran Out of Food in the Last Year:    Transportation Needs:    • Lack of Transportation (Medical):     • Lack of Transportation (Non-Medical):    Physical Activity:    • Days of Exercise per Week:    • Minutes of Exercise per Session:    Stress:    • Feeling of Stress :    Social Connections:    • Frequency of Communication with Friends and Family:    • Frequency of Social Gatherings with Friends and Family:    • Attends Yarsanism Services:    • Active Member of Clubs or Organizations:    • Attends Club or Organization Meetings:    • Marital Status:    Intimate Partner Violence:    • Fear of Current or Ex-Partner:    • Emotionally Abused:    • Physically Abused:    • Sexually Abused:         Family History:  Family History   Problem Relation Age of Onset   • Stroke Mother 47        had 3 devastating strokes,  56yo stroke complications   • Heart Disease Mother    • No Known Problems Father         does not know his father.   • Stroke Maternal Grandmother 85   • Heart Disease Maternal Grandmother    • Stroke Maternal Grandfather 54   • Heart Disease Maternal Grandfather        Depression and Opioid Screening  PHQ-9:  Depression Screen (PHQ-2/PHQ-9) 10/21/2020 2020 2021   PHQ-2 Total Score 0 - -   PHQ-2 Total Score - 0 6   PHQ-9 Total Score - - -   PHQ-9 Total Score - - 15     Interpretation of PHQ-9 Total Score   Score Severity   1-4 No Depression   5-9 Mild Depression   10-14 Moderate Depression   15-19 Moderately Severe Depression   20-27 Severe Depression     OBJECTIVE:   Vital Signs:  Vitals:    10/11/21 0816   BP: 118/70   Pulse: 77   Resp: 16   Temp: 36.2 °C (97.1 °F)   SpO2: 99%        Pertinent Labs:  Lab Results   Component Value Date/Time    SODIUM 138 2020 08:43 AM    POTASSIUM 4.0 2020 08:43 AM    CHLORIDE 101 2020 08:43 AM    CO2 22 2020 08:43 AM    GLUCOSE 198 (H) 2020 08:43 AM    BUN 18 2020 08:43 AM    CREATININE 0.83 2020 08:43 AM       Lab Results   Component Value Date/Time    HBA1C 6.7 (A) 2021 04:11 PM       Lab Results   Component  Value Date/Time    WBC 12.2 (H) 03/29/2021 10:20 AM    RBC 4.90 03/29/2021 10:20 AM    HEMOGLOBIN 14.7 03/29/2021 10:20 AM    HEMATOCRIT 45.3 03/29/2021 10:20 AM    MCV 92.4 03/29/2021 10:20 AM    MCH 30.0 03/29/2021 10:20 AM    MCHC 32.5 (L) 03/29/2021 10:20 AM    MPV 11.5 03/29/2021 10:20 AM    NEUTSPOLYS 70.80 10/12/2020 06:21 AM    LYMPHOCYTES 18.70 (L) 10/12/2020 06:21 AM    MONOCYTES 7.30 10/12/2020 06:21 AM    EOSINOPHILS 2.10 10/12/2020 06:21 AM    BASOPHILS 0.70 10/12/2020 06:21 AM    HYPOCHROMIA 1+ 10/01/2020 05:48 AM    ANISOCYTOSIS 1+ 10/04/2020 05:08 AM       Lab Results   Component Value Date/Time    ASTSGOT 12 12/29/2020 08:43 AM    ALTSGPT 19 12/29/2020 08:43 AM        Physical Exam:   GEN: No apparent distress  HEENT: Head normocephalic, atraumatic.  Sclera nonicteric bilaterally, no ocular discharge appreciated bilaterally.  CV: Extremities warm and well-perfused, no peripheral edema appreciated bilaterally.  PULMONARY: Breathing nonlabored on room air, no respiratory accessory muscle use.  Not requiring supplemental oxygen.  SKIN: No appreciable skin breakdown on exposed areas of skin.  PSYCH: Flat affect  NEURO: Awake alert.  Conversational.  Logical thought content.    Motor Exam Lower Extremities  ? Myotome R L   Hip flexion L2 5 5   Knee extension L3 5 5   Ankle dorsiflexion L4 5 5   Toe extension L5 5 5   Ankle plantarflexion S1 5 5     Ambulatory without assistive device.    EMG/NCS 4/15/2021  Impression/Recommendations:  Abnormal study  1. Today's electrodiagnostic findings are suggestive of:  a. Right lumbar radiculopathy, primarily involving L5  b. Left lumbar radiculopathy, primarily involving L5  c. Large fiber generalized peripheral polyneuropathy(PPN)     2. Clinically, his symptoms are consistent with the above findings.  The PPN may be second to DM and/or history of heavy alcohol use.  Recommend imaging of the lumbar spine, patient could benefit from physical therapy and possibly  injections.         Imaging:  MRI L Spine 9/29/21  Findings by level:  T12/L1: Normal disc signal. No stenosis  L1/2: Normal disc signal. No stenosis  L2/3: Mild disc desiccation. No stenosis  L3/4: Disc desiccation. Mild ligamentum flavum redundancy. No stenosis  L4/5: Moderate disc height loss with a left paracentral protrusion. Moderate spurring laterally. Mild facet arthropathy. This results in moderate foraminal and mild lateral recess stenoses  L5/S1: Mild disc bulge and height loss with desiccation. Mild facet hypertrophy. No significant central stenosis. Mild-moderate left foraminal stenosis     IMPRESSION:  Caudal lumbar degenerative disc disease and facet arthropathy results in at most moderate foraminal and mild lateral recess stenoses at L4/5  Atretic left kidney with dilated and tortuous ureter indicating chronic distal obstruction that is not included in the field-of-view    ASSESSMENT/PLAN: Guerrero Ann  is a 50 y.o. male with rehabilitation history significant for bilateral cardioembolic ischemic CVA s/p CABG , here for evaluation. The following plan was discussed with the patient who is in agreement.     Visit Diagnoses     ICD-10-CM   1. Cardioembolic stroke (HCC)  I63.9   2. Bilateral pain of leg and foot  M79.604    M79.605    M79.671    M79.672   3. Neuropathic pain  M79.2      Significant other assists with history.    Rehab/Neuro:   1. Bilateral cardioembolic ischemic CVA s/p CABG   -Status: Neuro stable.    Neuropathic Pain: Continued neuropathic pain in bilateral LE's. Numbness of LLE in L5 distribution affecting top of left foot/toes distally and plantar surface. Right LE with painful numbness affecting bilateral malleoli region and extends proximally laterally in a sural nerve distribution. Improved to some degree on Gabapentin and Cymbalta, still painful at night. EMG/NCS 4/2021 + for possible bilateral L5 radiculopathy. RLE symptoms not in distribution congruent  with greater saphenous nerve damage 2/2 vein grafting. Continue to significantly affect QOL. Attempted to fix with orthotics if component of plantar fasciitis causing pain. Inefficacious.   -Medication management: Continue gabapentin 1200 mg 3 times daily.  -Medication management: Continue Cymbalta 60 mg daily  -Procedures: EMG/NCS 4/2021-polyperipheral neuropathy in addition to possible bilateral L5 radic.  Recommended MRI -moderate left foraminal stenosis L4-5, L5-S1  -Imaging: EMG/NCS positive for potential bilateral L5 radiculopathy.  Laboratory work-up negative.  MRI with left foraminal stenosis.  -Referral: Interventional pain and spine for possible solutions for neuropathic pain.  -Counseled on fall prevention.    Mood: Emotional lability improved.  Continued depression due to significant neuropathic pain in the right lower extremity.  Left lower extremity is experiencing pain as well but not as severely as the right.  -Med management: Continue duloxetine 60 mg daily    Follow up: 2 months follow-up pain referral as well as refill medications.  Can transition to PCP if need be.    Total time spent was 22 minutes.  Included in this time is the time spent preparing for the visit including record review, my exam and evaluation, counseling and education regarding that which is aforementioned in the assessment and plan. Time was spent ordering the appropriate labs, tests, procedures, referrals, medications. Included this time as the time spent obtaining history from someone other than the patient. Discussion involved the patient and wife.    Please note that this dictation was created using voice recognition software. I have made every reasonable attempt to correct obvious errors but there may be errors of grammar and content that I may have overlooked prior to finalization of this note.    Dr. Marilu Franco DO, MS  Department of Physical Medicine & Rehabilitation  Neuro Rehabilitation Clinic  [facility]

## 2021-10-15 ENCOUNTER — PATIENT MESSAGE (OUTPATIENT)
Dept: MEDICAL GROUP | Facility: MEDICAL CENTER | Age: 51
End: 2021-10-15

## 2021-10-15 DIAGNOSIS — E11.8 TYPE 2 DIABETES MELLITUS WITH COMPLICATION, WITHOUT LONG-TERM CURRENT USE OF INSULIN (HCC): ICD-10-CM

## 2021-10-18 ENCOUNTER — PATIENT MESSAGE (OUTPATIENT)
Dept: MEDICAL GROUP | Facility: MEDICAL CENTER | Age: 51
End: 2021-10-18

## 2021-10-18 DIAGNOSIS — E11.8 TYPE 2 DIABETES MELLITUS WITH COMPLICATION, WITHOUT LONG-TERM CURRENT USE OF INSULIN (HCC): ICD-10-CM

## 2021-10-18 RX ORDER — ROSUVASTATIN CALCIUM 40 MG/1
40 TABLET, COATED ORAL DAILY
Qty: 30 TABLET | Refills: 6 | Status: SHIPPED | OUTPATIENT
Start: 2021-10-18 | End: 2022-05-31 | Stop reason: SDUPTHER

## 2021-10-18 RX ORDER — DAPAGLIFLOZIN 10 MG/1
10 TABLET, FILM COATED ORAL DAILY
Qty: 30 TABLET | Refills: 5 | Status: SHIPPED | OUTPATIENT
Start: 2021-10-18 | End: 2022-01-03 | Stop reason: CLARIF

## 2021-10-30 DIAGNOSIS — E11.8 TYPE 2 DIABETES MELLITUS WITH COMPLICATION, WITHOUT LONG-TERM CURRENT USE OF INSULIN (HCC): ICD-10-CM

## 2021-11-02 ENCOUNTER — TELEPHONE (OUTPATIENT)
Dept: MEDICAL GROUP | Facility: MEDICAL CENTER | Age: 51
End: 2021-11-02

## 2021-11-02 RX ORDER — SEMAGLUTIDE 1.34 MG/ML
INJECTION, SOLUTION SUBCUTANEOUS
Qty: 2 ML | Refills: 0 | Status: SHIPPED | OUTPATIENT
Start: 2021-11-02 | End: 2022-01-03 | Stop reason: SDUPTHER

## 2021-11-02 NOTE — TELEPHONE ENCOUNTER
FINAL PRIOR AUTHORIZATION STATUS:    1.  Name of Medication & Dose: True metrix blood glucose test strips      2. Prior Auth Status: Approved through 365 days     3. Action Taken: Pharmacy Notified: no Patient Notified: no    Decision scanned into media

## 2021-11-04 DIAGNOSIS — M10.9 GOUT, UNSPECIFIED CAUSE, UNSPECIFIED CHRONICITY, UNSPECIFIED SITE: ICD-10-CM

## 2021-11-04 RX ORDER — ALLOPURINOL 100 MG/1
100 TABLET ORAL DAILY
Qty: 90 TABLET | Refills: 3 | Status: SHIPPED | OUTPATIENT
Start: 2021-11-04 | End: 2023-05-01 | Stop reason: SDUPTHER

## 2021-12-01 ENCOUNTER — TELEPHONE (OUTPATIENT)
Dept: MEDICAL GROUP | Facility: MEDICAL CENTER | Age: 51
End: 2021-12-01

## 2021-12-01 NOTE — TELEPHONE ENCOUNTER
MEDICATION PRIOR AUTHORIZATION NEEDED:    1. Name of Medication: Farxiga 10MG Tablet    2. Requested By (Name of Pharmacy): [facility]     3. Is insurance on file current? Yes.    4. What is the name & phone number of the 3rd party payor?     PA NOT REQUIRED FOR:   MetFORMIN HCl  Lantus SoloStar  Levemir FlexTouch    PA or Change Med?

## 2021-12-02 ENCOUNTER — OFFICE VISIT (OUTPATIENT)
Dept: PHYSICAL MEDICINE AND REHAB | Facility: REHABILITATION | Age: 51
End: 2021-12-02
Payer: COMMERCIAL

## 2021-12-02 VITALS
SYSTOLIC BLOOD PRESSURE: 142 MMHG | HEIGHT: 62 IN | DIASTOLIC BLOOD PRESSURE: 86 MMHG | BODY MASS INDEX: 27.44 KG/M2 | OXYGEN SATURATION: 99 % | TEMPERATURE: 97.6 F | HEART RATE: 69 BPM

## 2021-12-02 DIAGNOSIS — M79.2 NEUROPATHIC PAIN: Primary | ICD-10-CM

## 2021-12-02 DIAGNOSIS — M79.671 BILATERAL PAIN OF LEG AND FOOT: ICD-10-CM

## 2021-12-02 DIAGNOSIS — M79.604 BILATERAL PAIN OF LEG AND FOOT: ICD-10-CM

## 2021-12-02 DIAGNOSIS — M79.604 RIGHT LEG PAIN: ICD-10-CM

## 2021-12-02 DIAGNOSIS — M79.605 BILATERAL PAIN OF LEG AND FOOT: ICD-10-CM

## 2021-12-02 DIAGNOSIS — M79.672 BILATERAL PAIN OF LEG AND FOOT: ICD-10-CM

## 2021-12-02 PROCEDURE — 99214 OFFICE O/P EST MOD 30 MIN: CPT | Performed by: PHYSICAL MEDICINE & REHABILITATION

## 2021-12-02 RX ORDER — DULOXETIN HYDROCHLORIDE 60 MG/1
60 CAPSULE, DELAYED RELEASE ORAL DAILY
Qty: 90 CAPSULE | Refills: 1 | Status: ON HOLD | OUTPATIENT
Start: 2021-12-02 | End: 2022-01-09

## 2021-12-02 RX ORDER — GABAPENTIN 600 MG/1
1200 TABLET ORAL 3 TIMES DAILY
Qty: 540 TABLET | Refills: 1 | Status: SHIPPED | OUTPATIENT
Start: 2021-12-02 | End: 2022-01-06

## 2021-12-02 NOTE — PROGRESS NOTES
[facility]  PM&R Neuro Rehabilitation Clinic  [facility] [place] [address]  Ph: [phone]    FOLLOW UP PATIENT EVALUATION      Patient Name: Guerrero Ann   Patient : 1970  Patient Age: 51 y.o.     Examining Physician: Dr. Marilu Franco, DO    PM&R History to Date - Background Information:  Dates of Admission/Discharge to ARU: 10/9/20-10/21/20    SUBJECTIVE:   Patient Identification: Guerrero Ann is a 51 y.o. male with PMH significant for coronary artery disease s/p stents, diabetes, hypertension, congenital absence of one kidney, type II diabetes and rehabilitation history significant for bilateral cardioembolic ischemic CVA s/p CABG 10/2020 and is presenting to PM&R clinic for a FOLLOW UP OUTPATIENT evaluation with the following chief complaint/s:    Chief Complaint: Medication refill.    History of Present Illness:    -Was unable to connect with pain management.  Referral has not been authorized and is still pending.  -Still interested in speaking with them regarding specific solutions that might be offered.  -Currently pain seems somewhat improved and that it is okay some days and unmanageable other days.  -Looking into getting new shoes to assist him with ambulation given that he has to go up and down a small incline  -Work is stable.  -Has a ankle stability brace which is significantly helping him.  Had trouble pushing off of the ground and felt like it was putting a lot of pressure on his toes but this is improved with the brace.  -Stable on gabapentin 1200 mg 3 times daily but having trouble getting the 400 mg tablets.  -Continues on Cymbalta 60 mg daily.    Review of Systems:  All other pertinent positive review of systems are noted above in HPI.   All other systems reviewed and are negative.    Past Medical History:  Past Medical History:   Diagnosis Date   • Diabetes (HCC)     oral meds only   • Hypertension    • Kidney stones    • MI,  old    • Pneumonia    • Psychiatric problem     depression and anxiety   • Stroke (HCC)       Past Surgical History:   Procedure Laterality Date   • MULTIPLE CORONARY ARTERY BYPASS ENDO VEIN HARVEST  9/25/2020    Procedure: CABG, WITH ENDOSCOPIC VEIN PROCUREMENT- X3;  Surgeon: Michael Chicas M.D.;  Location: SURGERY [facility];  Service: Cardiothoracic   • MARGOTH  9/25/2020    Procedure: ECHOCARDIOGRAM, TRANSESOPHAGEAL;  Surgeon: Michael Chicas M.D.;  Location: SURGERY [facility];  Service: Cardiothoracic   • STENT PLACEMENT  2017    1 cardiac stent   • OTHER ORTHOPEDIC SURGERY      rt wrist fracture with fixation 34 years ago        Current Outpatient Medications:   •  DULoxetine (CYMBALTA) 60 MG Cap DR Particles delayed-release capsule, Take 1 Capsule by mouth every day., Disp: 90 Capsule, Rfl: 1  •  gabapentin (NEURONTIN) 600 MG tablet, Take 2 Tablets by mouth 3 times a day., Disp: 540 Tablet, Rfl: 1  •  allopurinol (ZYLOPRIM) 100 MG Tab, Take 1 Tablet by mouth every day., Disp: 90 Tablet, Rfl: 3  •  OZEMPIC, 0.25 OR 0.5 MG/DOSE, 2 MG/1.5ML Solution Pen-injector, INJECT 0.25 MG SUBCUTANEOUSLY ONCE A WEEK, Disp: 2 mL, Rfl: 0  •  FARXIGA 10 MG Tab, Take 1 tablet by mouth once daily, Disp: 30 Tablet, Rfl: 2  •  rosuvastatin (CRESTOR) 40 MG tablet, Take 1 Tablet by mouth every day., Disp: 30 Tablet, Rfl: 6  •  Dapagliflozin Propanediol (FARXIGA) 10 MG Tab, Take 10 mg by mouth every day., Disp: 30 Tablet, Rfl: 5  •  metoprolol tartrate (LOPRESSOR) 25 MG Tab, Take 1 Tablet by mouth 2 times a day., Disp: 180 Tablet, Rfl: 3  •  lisinopril (PRINIVIL) 40 MG tablet, Take 1 Tablet by mouth every evening., Disp: 90 Tablet, Rfl: 3  •  terbinafine (LAMISIL) 250 MG Tab, Take 1 Tablet by mouth every day., Disp: 42 Tablet, Rfl: 0  •  sildenafil (REVATIO) 20 MG tablet, sildenafil (pulmonary hypertension) 20 mg tablet  TAKE 2 5 TABLETS BY MOUTH APPROXIMATELY 45 MINUTES BEFORE SEXUAL ACTIVITY TAKE ON AN EMPTY STOMACH, Disp: , Rfl:    •  ketoconazole (NIZORAL) 2 % Cream, Apply fingertip amount to affected area daily for 2 weeks, Disp: 60 g, Rfl: 0  •  tamsulosin (FLOMAX) 0.4 MG capsule, Take 1 capsule by mouth at bedtime., Disp: 30 capsule, Rfl: 2  •  TRUE METRIX BLOOD GLUCOSE TEST strip, USE 1 STRIP TO CHECK GLUCOSE ONCE DAILY, Disp: , Rfl:   •  metFORMIN (GLUCOPHAGE) 1000 MG tablet, Take 1 tablet by mouth 2 times a day, with meals., Disp: 180 tablet, Rfl: 3  •  Coenzyme Q10 (COQ10) 100 MG Cap, Take 300 mg by mouth every day., Disp: , Rfl:   •  Blood Glucose Test Strips, Use one strip to test blood sugar once daily ., Disp: 100 Each, Rfl: 5  •  atorvastatin (LIPITOR) 80 MG tablet, Take 1 Tab by mouth every bedtime., Disp: 90 Tab, Rfl: 3  •  therapeutic multivitamin-minerals (THERAGRAN-M) Tab, Take 1 Tab by mouth every day., Disp: , Rfl:   •  clopidogrel (PLAVIX) 75 MG Tab, Take 1 Tab by mouth every day., Disp: 90 Tab, Rfl: 3  •  Lancets, Lancets order: Lancets for True Metrix meter. Sig: use TID and prn ssx high or low sugar. #100 RF x 3, Disp: 100 Each, Rfl: 3  •  acetaminophen (TYLENOL) 325 MG Tab, Take 2 Tabs by mouth every four hours as needed for Moderate Pain., Disp: 30 Tab, Rfl: 0  •  aspirin (ASA) 81 MG Chew Tab chewable tablet, Take 1 Tab by mouth every day., Disp: 30 Tab, Rfl: 2  Allergies   Allergen Reactions   • Isosorbide Unspecified     Hallucinations        Past Social History:  Social History     Socioeconomic History   • Marital status: Single     Spouse name: Not on file   • Number of children: Not on file   • Years of education: Not on file   • Highest education level: Not on file   Occupational History   • Not on file   Tobacco Use   • Smoking status: Never Smoker   • Smokeless tobacco: Former User     Types: Chew   Vaping Use   • Vaping Use: Never used   Substance and Sexual Activity   • Alcohol use: Yes     Comment: 2 per week   • Drug use: No   • Sexual activity: Yes     Partners: Female     Comment: iliana motta: [place]  [place].    Other Topics Concern   • Not on file   Social History Narrative   • Not on file     Social Determinants of Health     Financial Resource Strain:    • Difficulty of Paying Living Expenses: Not on file   Food Insecurity:    • Worried About Running Out of Food in the Last Year: Not on file   • Ran Out of Food in the Last Year: Not on file   Transportation Needs:    • Lack of Transportation (Medical): Not on file   • Lack of Transportation (Non-Medical): Not on file   Physical Activity:    • Days of Exercise per Week: Not on file   • Minutes of Exercise per Session: Not on file   Stress:    • Feeling of Stress : Not on file   Social Connections:    • Frequency of Communication with Friends and Family: Not on file   • Frequency of Social Gatherings with Friends and Family: Not on file   • Attends [employer] Services: Not on file   • Active Member of Clubs or Organizations: Not on file   • Attends Club or Organization Meetings: Not on file   • Marital Status: Not on file   Intimate Partner Violence:    • Fear of Current or Ex-Partner: Not on file   • Emotionally Abused: Not on file   • Physically Abused: Not on file   • Sexually Abused: Not on file   Housing Stability:    • Unable to Pay for Housing in the Last Year: Not on file   • Number of Places Lived in the Last Year: Not on file   • Unstable Housing in the Last Year: Not on file        Family History:  Family History   Problem Relation Age of Onset   • Stroke Mother 47        had 3 devastating strokes,  54yo stroke complications   • Heart Disease Mother    • No Known Problems Father         does not know his father.   • Stroke Maternal Grandmother 85   • Heart Disease Maternal Grandmother    • Stroke Maternal Grandfather 54   • Heart Disease Maternal Grandfather        Depression and Opioid Screening  PHQ-9:  Depression Screen (PHQ-2/PHQ-9) 10/21/2020 2020 2021   PHQ-2 Total Score 0 - -   PHQ-2 Total Score - 0 6   PHQ-9 Total Score -  - -   PHQ-9 Total Score - - 15     Interpretation of PHQ-9 Total Score   Score Severity   1-4 No Depression   5-9 Mild Depression   10-14 Moderate Depression   15-19 Moderately Severe Depression   20-27 Severe Depression     OBJECTIVE:   Vital Signs:  Vitals:    12/02/21 1007   BP: 142/86   Pulse: 69   Temp: 36.4 °C (97.6 °F)   SpO2: 99%        Pertinent Labs:  Lab Results   Component Value Date/Time    SODIUM 138 12/29/2020 08:43 AM    POTASSIUM 4.0 12/29/2020 08:43 AM    CHLORIDE 101 12/29/2020 08:43 AM    CO2 22 12/29/2020 08:43 AM    GLUCOSE 198 (H) 12/29/2020 08:43 AM    BUN 18 12/29/2020 08:43 AM    CREATININE 0.83 12/29/2020 08:43 AM       Lab Results   Component Value Date/Time    HBA1C 6.7 (A) 09/28/2021 04:11 PM       Lab Results   Component Value Date/Time    WBC 12.2 (H) 03/29/2021 10:20 AM    RBC 4.90 03/29/2021 10:20 AM    HEMOGLOBIN 14.7 03/29/2021 10:20 AM    HEMATOCRIT 45.3 03/29/2021 10:20 AM    MCV 92.4 03/29/2021 10:20 AM    MCH 30.0 03/29/2021 10:20 AM    MCHC 32.5 (L) 03/29/2021 10:20 AM    MPV 11.5 03/29/2021 10:20 AM    NEUTSPOLYS 70.80 10/12/2020 06:21 AM    LYMPHOCYTES 18.70 (L) 10/12/2020 06:21 AM    MONOCYTES 7.30 10/12/2020 06:21 AM    EOSINOPHILS 2.10 10/12/2020 06:21 AM    BASOPHILS 0.70 10/12/2020 06:21 AM    HYPOCHROMIA 1+ 10/01/2020 05:48 AM    ANISOCYTOSIS 1+ 10/04/2020 05:08 AM       Lab Results   Component Value Date/Time    ASTSGOT 12 12/29/2020 08:43 AM    ALTSGPT 19 12/29/2020 08:43 AM        Physical Exam:   GEN: No apparent distress  HEENT: Head normocephalic, atraumatic.  Sclera nonicteric bilaterally, no ocular discharge appreciated bilaterally.  CV: Extremities warm and well-perfused, no peripheral edema appreciated bilaterally.  PULMONARY: Breathing nonlabored on room air, no respiratory accessory muscle use.  Not requiring supplemental oxygen.  SKIN: No appreciable skin breakdown on exposed areas of skin.  PSYCH: Mood and affect within normal limits.  Smiles more  frequently than prior.  NEURO: Awake alert.  Conversational.  Logical thought content.  Ambulatory without assistive device.  Supportive right ankle brace in place.    EMG/NCS 4/15/2021  Impression/Recommendations:  Abnormal study  1. Today's electrodiagnostic findings are suggestive of:  a. Right lumbar radiculopathy, primarily involving L5  b. Left lumbar radiculopathy, primarily involving L5  c. Large fiber generalized peripheral polyneuropathy(PPN)     2. Clinically, his symptoms are consistent with the above findings.  The PPN may be second to DM and/or history of heavy alcohol use.  Recommend imaging of the lumbar spine, patient could benefit from physical therapy and possibly injections.       Imaging:  MRI L Spine 9/29/21  Findings by level:  T12/L1: Normal disc signal. No stenosis  L1/2: Normal disc signal. No stenosis  L2/3: Mild disc desiccation. No stenosis  L3/4: Disc desiccation. Mild ligamentum flavum redundancy. No stenosis  L4/5: Moderate disc height loss with a left paracentral protrusion. Moderate spurring laterally. Mild facet arthropathy. This results in moderate foraminal and mild lateral recess stenoses  L5/S1: Mild disc bulge and height loss with desiccation. Mild facet hypertrophy. No significant central stenosis. Mild-moderate left foraminal stenosis     IMPRESSION:  Caudal lumbar degenerative disc disease and facet arthropathy results in at most moderate foraminal and mild lateral recess stenoses at L4/5  Atretic left kidney with dilated and tortuous ureter indicating chronic distal obstruction that is not included in the field-of-view    ASSESSMENT/PLAN: Guerrero Ann  is a 50 y.o. male with rehabilitation history significant for bilateral cardioembolic ischemic CVA s/p CABG , here for evaluation. The following plan was discussed with the patient who is in agreement.     Visit Diagnoses     ICD-10-CM   1. Neuropathic pain  M79.2   2. Bilateral pain of leg and foot   M79.604    M79.605    M79.671    M79.672   3. Right leg pain  M79.604      Significant other assists with history.    Rehab/Neuro:   1. Bilateral cardioembolic ischemic CVA s/p CABG   -Status: Neuro stable.  Mildly improving neuropathic pain of the right lower extremity in that it is more intermittent now as opposed to continuous.    Neuropathic Pain: Continued neuropathic pain in bilateral LE's. Numbness of LLE in L5 distribution affecting top of left foot/toes distally and plantar surface. Right LE with painful numbness affecting bilateral malleoli region and extends proximally laterally in a sural nerve distribution. Improved to some degree on Gabapentin and Cymbalta, still painful at night. EMG/NCS 4/2021 + for possible bilateral L5 radiculopathy. RLE symptoms not in distribution congruent with greater saphenous nerve damage 2/2 vein grafting. Continue to significantly affect QOL. Attempted to fix with orthotics if component of plantar fasciitis causing pain. Inefficacious. 12/2/21 now wearing right ankle stabilization brace which is helping gait.  Does not assist with pain.  -Med management: Continue gabapentin 1200 mg 3 times daily.  Prescription changed to 600 mg tablets today.  -Med management: Continue Cymbalta 60 mg daily.  -Procedures: EMG/NCS 4/2021-polyperipheral neuropathy in addition to possible bilateral L5 radic.  Recommended MRI -moderate left foraminal stenosis L4-5, L5-S1  -Imaging: EMG/NCS positive for potential bilateral L5 radiculopathy.  Laboratory work-up negative.  MRI with left foraminal stenosis.  -Referral: Interventional pain and spine for possible solutions for neuropathic pain.  Referral never authorized, status is pending.  Coordinate with referral specialist to push this through for authorization.  Patient still interested in pain options.    Mood: Emotional lability improved.  Neuropathic pain still severely present some days but is much better other days.  -Med management:  Prescription for Cymbalta 60 mg daily.    Follow up: 6 months medication refill, follow-up pain management.  Can transfer to PCP if needed.    Please note that this dictation was created using voice recognition software. I have made every reasonable attempt to correct obvious errors but there may be errors of grammar and content that I may have overlooked prior to finalization of this note.    Dr. Marilu Franco DO, MS  Department of Physical Medicine & Rehabilitation  Neuro Rehabilitation Clinic  [facility]

## 2021-12-02 NOTE — TELEPHONE ENCOUNTER
DOCUMENTATION OF PAR STATUS:    1. Name of Medication & Dose: Farxiga 10mg tab      2. Name of Prescription Coverage Company & phone #: [employer]    3. Date Prior Auth Submitted: 12/01/2021    4. What information was given to obtain insurance decision? idd-10 code, med history, clinical questions    5. Prior Auth Status? Pending    6. Patient Notified: N\A

## 2021-12-09 DIAGNOSIS — E11.8 TYPE 2 DIABETES MELLITUS WITH COMPLICATION, WITHOUT LONG-TERM CURRENT USE OF INSULIN (HCC): ICD-10-CM

## 2021-12-09 RX ORDER — DAPAGLIFLOZIN 10 MG/1
1 TABLET, FILM COATED ORAL DAILY
Qty: 30 TABLET | Refills: 2 | Status: SHIPPED | OUTPATIENT
Start: 2021-12-09 | End: 2022-01-03 | Stop reason: CLARIF

## 2021-12-15 ENCOUNTER — TELEPHONE (OUTPATIENT)
Dept: MEDICAL GROUP | Facility: MEDICAL CENTER | Age: 51
End: 2021-12-15

## 2021-12-15 NOTE — TELEPHONE ENCOUNTER
FINAL PRIOR AUTHORIZATION STATUS:    1.  Name of Medication & Dose: Farxiga 10mg Tab    2. Prior Auth Status: Denied.  Reason: Please see scanned document for denial reasons.     3. Action Taken: Pharmacy Notified: N\A Patient Notified: N\A

## 2022-01-03 ENCOUNTER — HOSPITAL ENCOUNTER (OUTPATIENT)
Dept: LAB | Facility: MEDICAL CENTER | Age: 52
End: 2022-01-03
Attending: STUDENT IN AN ORGANIZED HEALTH CARE EDUCATION/TRAINING PROGRAM
Payer: COMMERCIAL

## 2022-01-03 ENCOUNTER — OFFICE VISIT (OUTPATIENT)
Dept: MEDICAL GROUP | Facility: PHYSICIAN GROUP | Age: 52
End: 2022-01-03
Payer: COMMERCIAL

## 2022-01-03 VITALS
BODY MASS INDEX: 29.11 KG/M2 | SYSTOLIC BLOOD PRESSURE: 136 MMHG | HEART RATE: 80 BPM | DIASTOLIC BLOOD PRESSURE: 80 MMHG | HEIGHT: 62 IN | RESPIRATION RATE: 16 BRPM | OXYGEN SATURATION: 96 % | TEMPERATURE: 97.6 F | WEIGHT: 158.2 LBS

## 2022-01-03 DIAGNOSIS — E11.8 TYPE 2 DIABETES MELLITUS WITH COMPLICATION, WITHOUT LONG-TERM CURRENT USE OF INSULIN (HCC): ICD-10-CM

## 2022-01-03 DIAGNOSIS — Z12.11 SCREENING FOR COLON CANCER: ICD-10-CM

## 2022-01-03 DIAGNOSIS — F43.21 ADJUSTMENT DISORDER WITH DEPRESSED MOOD: ICD-10-CM

## 2022-01-03 DIAGNOSIS — I10 ESSENTIAL HYPERTENSION: ICD-10-CM

## 2022-01-03 DIAGNOSIS — L30.0 NUMMULAR ECZEMA: ICD-10-CM

## 2022-01-03 DIAGNOSIS — F41.9 ANXIETY: ICD-10-CM

## 2022-01-03 DIAGNOSIS — N52.8 OTHER MALE ERECTILE DYSFUNCTION: ICD-10-CM

## 2022-01-03 PROBLEM — Z87.442 HISTORY OF NEPHROLITHIASIS: Status: ACTIVE | Noted: 2022-01-03

## 2022-01-03 LAB
CREAT UR-MCNC: 188.03 MG/DL
MICROALBUMIN UR-MCNC: 63 MG/DL
MICROALBUMIN/CREAT UR: 335 MG/G (ref 0–30)

## 2022-01-03 PROCEDURE — 82043 UR ALBUMIN QUANTITATIVE: CPT

## 2022-01-03 PROCEDURE — 82570 ASSAY OF URINE CREATININE: CPT

## 2022-01-03 PROCEDURE — 99214 OFFICE O/P EST MOD 30 MIN: CPT | Performed by: STUDENT IN AN ORGANIZED HEALTH CARE EDUCATION/TRAINING PROGRAM

## 2022-01-03 RX ORDER — TRIAMCINOLONE ACETONIDE 1 MG/G
1 CREAM TOPICAL 2 TIMES DAILY
Qty: 45 G | Refills: 0 | Status: SHIPPED | OUTPATIENT
Start: 2022-01-03 | End: 2022-01-06

## 2022-01-03 RX ORDER — SEMAGLUTIDE 1.34 MG/ML
INJECTION, SOLUTION SUBCUTANEOUS
Qty: 2 ML | Refills: 0 | Status: SHIPPED | OUTPATIENT
Start: 2022-01-03 | End: 2022-02-15 | Stop reason: SDUPTHER

## 2022-01-03 ASSESSMENT — PATIENT HEALTH QUESTIONNAIRE - PHQ9
CLINICAL INTERPRETATION OF PHQ2 SCORE: 6
SUM OF ALL RESPONSES TO PHQ QUESTIONS 1-9: 10
5. POOR APPETITE OR OVEREATING: 0 - NOT AT ALL

## 2022-01-03 ASSESSMENT — FIBROSIS 4 INDEX: FIB4 SCORE: 0.54

## 2022-01-03 NOTE — PROGRESS NOTES
Subjective:     CC:  Diagnoses of Type 2 diabetes mellitus with complication, without long-term current use of insulin (HCC), Essential hypertension, Anxiety, Adjustment disorder with depressed mood, Nummular eczema, Screening for colon cancer, and Other male erectile dysfunction were pertinent to this visit.    HISTORY OF THE PRESENT ILLNESS: Patient is a 51 y.o. male. This pleasant patient is here today to establish care.  He is escorted today by his wife. His prior PCP was Alison Perez MD.    1.  Type 2 diabetes mellitus with complication, without long-term current use of insulin (HCC)  Currently treated with Metformin 1000 mg 2 times daily.  Is also being treated with Ozempic 0.25 mg subcutaneously every 7 days, and Farxiga 10 mg daily.  He was on this regimen that he was able to get a hemoglobin A1c of 6.7%.  His insurance will no longer cover his Farxiga will however cover Jardiance so we will switch to 10 mg daily and reevaluate.  Patient has 1 month remaining supply of Ozempic it is unclear whether or not this can be continued with his new insurance, we will consider Trulicity.    2.  Hypertension  Longstanding.  Treated with lisinopril 40 mg daily.  No side effects to treatment.  We are at goal today at 136/80.    3.  Anxiety/adjustment disorder with depressed mood.  Currently using duloxetine 60 mg daily.  This was initially prescribed for neuropathy however this has managed the patient's anxiety and depressed mood adequately.    4.  Nummular eczema  New complaint of right thoracic and right forearm rash.  Patient has tried some hydrocortisone 1% cream without good effect.    5.  Erectile dysfunction  Patient continues to suffer erectile dysfunction despite use of up to 80 mg of sildenafil.    Active Diagnosis:    Patient Active Problem List   Diagnosis   • Type 2 diabetes mellitus with complication, without long-term current use of insulin (HCC)   • Essential hypertension   • Coronary artery disease  involving native coronary artery of native heart without angina pectoris   • History of cardioembolic stroke   • Vitamin D deficiency   • Anxiety   • Adjustment disorder with depressed mood   • S/P CABG x 3   • DISH (diffuse idiopathic skeletal hyperostosis)   • History of nephrolithiasis   • Nummular eczema   • Other male erectile dysfunction          Current Outpatient Medications Ordered in Epic   Medication Sig Dispense Refill   • Empagliflozin 10 MG Tab Take 10 mg by mouth every day. 90 Tablet 0   • Semaglutide,0.25 or 0.5MG/DOS, (OZEMPIC, 0.25 OR 0.5 MG/DOSE,) 2 MG/1.5ML Solution Pen-injector INJECT 0.25 MG SUBCUTANEOUSLY ONCE A WEEK 2 mL 0   • triamcinolone acetonide (KENALOG) 0.1 % Cream Apply 1 Application topically 2 times a day. 45 g 0   • DULoxetine (CYMBALTA) 60 MG Cap DR Particles delayed-release capsule Take 1 Capsule by mouth every day. 90 Capsule 1   • gabapentin (NEURONTIN) 600 MG tablet Take 2 Tablets by mouth 3 times a day. 540 Tablet 1   • allopurinol (ZYLOPRIM) 100 MG Tab Take 1 Tablet by mouth every day. 90 Tablet 3   • metoprolol tartrate (LOPRESSOR) 25 MG Tab Take 1 Tablet by mouth 2 times a day. 180 Tablet 3   • lisinopril (PRINIVIL) 40 MG tablet Take 1 Tablet by mouth every evening. 90 Tablet 3   • terbinafine (LAMISIL) 250 MG Tab Take 1 Tablet by mouth every day. 42 Tablet 0   • sildenafil (REVATIO) 20 MG tablet sildenafil (pulmonary hypertension) 20 mg tablet   TAKE 2 5 TABLETS BY MOUTH APPROXIMATELY 45 MINUTES BEFORE SEXUAL ACTIVITY TAKE ON AN EMPTY STOMACH     • TRUE METRIX BLOOD GLUCOSE TEST strip USE 1 STRIP TO CHECK GLUCOSE ONCE DAILY     • metFORMIN (GLUCOPHAGE) 1000 MG tablet Take 1 tablet by mouth 2 times a day, with meals. 180 tablet 3   • Coenzyme Q10 (COQ10) 100 MG Cap Take 300 mg by mouth every day.     • Blood Glucose Test Strips Use one strip to test blood sugar once daily . 100 Each 5   • therapeutic multivitamin-minerals (THERAGRAN-M) Tab Take 1 Tab by mouth every day.    "  • clopidogrel (PLAVIX) 75 MG Tab Take 1 Tab by mouth every day. 90 Tab 3   • Lancets Lancets order: Lancets for True Metrix meter. Sig: use TID and prn ssx high or low sugar. #100 RF x 3 100 Each 3   • acetaminophen (TYLENOL) 325 MG Tab Take 2 Tabs by mouth every four hours as needed for Moderate Pain. 30 Tab 0   • aspirin (ASA) 81 MG Chew Tab chewable tablet Take 1 Tab by mouth every day. 30 Tab 2   • rosuvastatin (CRESTOR) 40 MG tablet Take 1 Tablet by mouth every day. 30 Tablet 6   • ketoconazole (NIZORAL) 2 % Cream Apply fingertip amount to affected area daily for 2 weeks (Patient not taking: Reported on 1/3/2022) 60 g 0   • tamsulosin (FLOMAX) 0.4 MG capsule Take 1 capsule by mouth at bedtime. (Patient not taking: Reported on 1/3/2022) 30 capsule 2     No current Epic-ordered facility-administered medications on file.     ROS:   Gen: No fevers/chills, no changes in weight  HEENT: No changes in vision/hearing, sore throat.  Pulm: No cough, unexplained SOB.  CV: No chest pain/pressure, no palpitations  GI: No nausea/vomiting, no diarrhea  : No dysuria/nocturia  MSk: No myalgias  Skin: No rash/skin changes  Neuro: No headaches, no numbness/tingling  Heme/Lymph: no easy bruising      Objective:     Exam: /80 (BP Location: Left arm, Patient Position: Sitting, BP Cuff Size: Adult)   Pulse 80   Temp 36.4 °C (97.6 °F) (Temporal)   Resp 16   Ht 1.575 m (5' 2\")   Wt 71.8 kg (158 lb 3.2 oz)   SpO2 96%  Body mass index is 28.94 kg/m².    General: Normal appearing. No distress.  HEENT: Normocephalic. Eyes conjunctiva clear lids without ptosis, pupils equal and reactive to light accommodation. Ears normal shape and contour, canals are clear bilaterally, tympanic membranes are benign, nasal mucosa benign, oropharynx is without erythema, edema or exudates.   Neck: Supple without JVD. Thyroid is not enlarged.  Pulmonary: Clear to ausculation.  Normal effort. No rales, ronchi, or wheezing.  Cardiovascular: " Regular rate and rhythm without murmur. Radial pulses are intact and equal bilaterally.  Abdomen: Nondistended.  Neurologic: Grossly nonfocal.  CN II through XII intact.  Lymph: No cervical or supraclavicular lymph nodes are palpable  Skin: Warm and dry.  2 oval scaly patches without maceration, 1 on the right thorax, 1 on the right arm.  Musculoskeletal: Normal gait. No extremity cyanosis, clubbing, or edema.  Psych: Normal mood and affect. Alert and oriented x3. Judgment and insight are normal.    Monofilament testing with a 10 gram force: sensation intact: decreased bilaterally  Visual Inspection: Feet with maceration, ulcers, fissures. Right great toe injury near nail margin. Healing well.  Pedal pulses: decreased bilaterally    A chaperone was offered to the patient during today's exam. Patient declined chaperone.    Labs:   10/28/2021:  -Glycolated hemoglobin 6.7    3/29/2021:  -CBC, increased WBCs 12.2    12/29/2020:  -Microalbumin to creatinine ratio 161    Assessment & Plan:   51 y.o. male with the following -    1. Type 2 diabetes mellitus with complication, without long-term current use of insulin (HCC)  -Chronic, stable.  Well treated without side effects.  -Continue Metformin 1000 mg twice daily  -DC Farxiga, substitute Jardiance 10 mg daily.  Dispense 90.  Refill 0.  -Continue Ozempic 0.25 mg weekly.  Dispense 2 mL.  Refill 0.  We will maintain this as long as insurance will continue to cover otherwise we will find a substitute, possibly Trulicity.  -Recheck hemoglobin A1c in 6 months.  -Microalbumin to creatinine ratio.    2.  Hypertension  -Chronic, stable.  Well treated without side effects.  -Continue lisinopril 40 mg daily.    3.  Anxiety/adjustment disorder with depressed mood.  -Chronic, stable.  Well treated without side effects.  -Continue duloxetine 60 mg daily.    4.  Nummular eczema  -Chronic, in exacerbation.  -Triamcinolone cream 0.1% to be applied to affected areas twice daily.  Dispense  45 g.  Refill 0.  -I have asked the patient to use an emollient cream over the affected areas.    5.  Erectile dysfunction  -Chronic, unstable.  -Refer to urology.    6.  Healthcare maintenance  -Patient did not desire vaccines today.  -We did discuss diet/exercise/healthy weight maintenance in the context of his comorbidities.  I recommended biannual dentistry visits and the use of seatbelts.      Return in about 6 months (around 7/3/2022).    Please note that this dictation was created using voice recognition software. I have made every reasonable attempt to correct obvious errors, but I expect that there are errors of grammar and possibly content that I did not discover before finalizing the note.      Tim Castillo PA-C 1/3/2022

## 2022-01-05 ENCOUNTER — HOSPITAL ENCOUNTER (INPATIENT)
Facility: MEDICAL CENTER | Age: 52
LOS: 3 days | DRG: 872 | End: 2022-01-09
Attending: EMERGENCY MEDICINE | Admitting: STUDENT IN AN ORGANIZED HEALTH CARE EDUCATION/TRAINING PROGRAM
Payer: COMMERCIAL

## 2022-01-05 ENCOUNTER — TELEPHONE (OUTPATIENT)
Dept: MEDICAL GROUP | Facility: MEDICAL CENTER | Age: 52
End: 2022-01-05

## 2022-01-05 ENCOUNTER — APPOINTMENT (OUTPATIENT)
Dept: RADIOLOGY | Facility: MEDICAL CENTER | Age: 52
DRG: 872 | End: 2022-01-05
Attending: EMERGENCY MEDICINE
Payer: COMMERCIAL

## 2022-01-05 DIAGNOSIS — F41.9 ANXIETY: ICD-10-CM

## 2022-01-05 DIAGNOSIS — R45.851 SUICIDAL IDEATION: ICD-10-CM

## 2022-01-05 DIAGNOSIS — E11.42 DIABETIC POLYNEUROPATHY ASSOCIATED WITH TYPE 2 DIABETES MELLITUS (HCC): ICD-10-CM

## 2022-01-05 DIAGNOSIS — N30.00 ACUTE CYSTITIS WITHOUT HEMATURIA: ICD-10-CM

## 2022-01-05 DIAGNOSIS — I16.0 HYPERTENSIVE URGENCY: ICD-10-CM

## 2022-01-05 DIAGNOSIS — F43.21 ADJUSTMENT DISORDER WITH DEPRESSED MOOD: ICD-10-CM

## 2022-01-05 DIAGNOSIS — I10 HYPERTENSION, UNSPECIFIED TYPE: ICD-10-CM

## 2022-01-05 LAB
ALBUMIN SERPL BCP-MCNC: 5.1 G/DL (ref 3.2–4.9)
ALBUMIN/GLOB SERPL: 1.3 G/DL
ALP SERPL-CCNC: 121 U/L (ref 30–99)
ALT SERPL-CCNC: 14 U/L (ref 2–50)
ANION GAP SERPL CALC-SCNC: 18 MMOL/L (ref 7–16)
AST SERPL-CCNC: 17 U/L (ref 12–45)
BASOPHILS # BLD AUTO: 0.7 % (ref 0–1.8)
BASOPHILS # BLD: 0.09 K/UL (ref 0–0.12)
BILIRUB SERPL-MCNC: 0.6 MG/DL (ref 0.1–1.5)
BUN SERPL-MCNC: 10 MG/DL (ref 8–22)
CALCIUM SERPL-MCNC: 10.5 MG/DL (ref 8.5–10.5)
CHLORIDE SERPL-SCNC: 95 MMOL/L (ref 96–112)
CO2 SERPL-SCNC: 21 MMOL/L (ref 20–33)
CREAT SERPL-MCNC: 0.96 MG/DL (ref 0.5–1.4)
EKG IMPRESSION: NORMAL
EOSINOPHIL # BLD AUTO: 0.3 K/UL (ref 0–0.51)
EOSINOPHIL NFR BLD: 2.4 % (ref 0–6.9)
ERYTHROCYTE [DISTWIDTH] IN BLOOD BY AUTOMATED COUNT: 44.6 FL (ref 35.9–50)
GLOBULIN SER CALC-MCNC: 3.8 G/DL (ref 1.9–3.5)
GLUCOSE SERPL-MCNC: 190 MG/DL (ref 65–99)
HCT VFR BLD AUTO: 43 % (ref 42–52)
HGB BLD-MCNC: 14.3 G/DL (ref 14–18)
IMM GRANULOCYTES # BLD AUTO: 0.04 K/UL (ref 0–0.11)
IMM GRANULOCYTES NFR BLD AUTO: 0.3 % (ref 0–0.9)
LYMPHOCYTES # BLD AUTO: 1.79 K/UL (ref 1–4.8)
LYMPHOCYTES NFR BLD: 14.6 % (ref 22–41)
MCH RBC QN AUTO: 30.6 PG (ref 27–33)
MCHC RBC AUTO-ENTMCNC: 33.3 G/DL (ref 33.7–35.3)
MCV RBC AUTO: 91.9 FL (ref 81.4–97.8)
MONOCYTES # BLD AUTO: 1.45 K/UL (ref 0–0.85)
MONOCYTES NFR BLD AUTO: 11.8 % (ref 0–13.4)
NEUTROPHILS # BLD AUTO: 8.59 K/UL (ref 1.82–7.42)
NEUTROPHILS NFR BLD: 70.2 % (ref 44–72)
NRBC # BLD AUTO: 0 K/UL
NRBC BLD-RTO: 0 /100 WBC
PLATELET # BLD AUTO: 206 K/UL (ref 164–446)
PMV BLD AUTO: 10.5 FL (ref 9–12.9)
POC BREATHALIZER: 0 PERCENT (ref 0–0.01)
POTASSIUM SERPL-SCNC: 4.2 MMOL/L (ref 3.6–5.5)
PROT SERPL-MCNC: 8.9 G/DL (ref 6–8.2)
RBC # BLD AUTO: 4.68 M/UL (ref 4.7–6.1)
SODIUM SERPL-SCNC: 134 MMOL/L (ref 135–145)
TROPONIN T SERPL-MCNC: 16 NG/L (ref 6–19)
WBC # BLD AUTO: 12.3 K/UL (ref 4.8–10.8)

## 2022-01-05 PROCEDURE — 93005 ELECTROCARDIOGRAM TRACING: CPT

## 2022-01-05 PROCEDURE — 84484 ASSAY OF TROPONIN QUANT: CPT

## 2022-01-05 PROCEDURE — 83036 HEMOGLOBIN GLYCOSYLATED A1C: CPT

## 2022-01-05 PROCEDURE — 90791 PSYCH DIAGNOSTIC EVALUATION: CPT

## 2022-01-05 PROCEDURE — 80053 COMPREHEN METABOLIC PANEL: CPT

## 2022-01-05 PROCEDURE — 99285 EMERGENCY DEPT VISIT HI MDM: CPT

## 2022-01-05 PROCEDURE — 85025 COMPLETE CBC W/AUTO DIFF WBC: CPT

## 2022-01-05 PROCEDURE — 700105 HCHG RX REV CODE 258: Performed by: EMERGENCY MEDICINE

## 2022-01-05 PROCEDURE — 71045 X-RAY EXAM CHEST 1 VIEW: CPT

## 2022-01-05 PROCEDURE — 93005 ELECTROCARDIOGRAM TRACING: CPT | Performed by: EMERGENCY MEDICINE

## 2022-01-05 PROCEDURE — 96375 TX/PRO/DX INJ NEW DRUG ADDON: CPT

## 2022-01-05 PROCEDURE — 36415 COLL VENOUS BLD VENIPUNCTURE: CPT

## 2022-01-05 PROCEDURE — 700111 HCHG RX REV CODE 636 W/ 250 OVERRIDE (IP): Performed by: EMERGENCY MEDICINE

## 2022-01-05 PROCEDURE — 85379 FIBRIN DEGRADATION QUANT: CPT

## 2022-01-05 PROCEDURE — 302970 POC BREATHALIZER: Performed by: EMERGENCY MEDICINE

## 2022-01-05 RX ORDER — HYDRALAZINE HYDROCHLORIDE 20 MG/ML
20 INJECTION INTRAMUSCULAR; INTRAVENOUS EVERY 6 HOURS PRN
Status: DISCONTINUED | OUTPATIENT
Start: 2022-01-05 | End: 2022-01-09 | Stop reason: HOSPADM

## 2022-01-05 RX ORDER — LORAZEPAM 2 MG/ML
1 INJECTION INTRAMUSCULAR ONCE
Status: COMPLETED | OUTPATIENT
Start: 2022-01-05 | End: 2022-01-05

## 2022-01-05 RX ORDER — SODIUM CHLORIDE 9 MG/ML
1000 INJECTION, SOLUTION INTRAVENOUS ONCE
Status: COMPLETED | OUTPATIENT
Start: 2022-01-05 | End: 2022-01-05

## 2022-01-05 RX ADMIN — LORAZEPAM 1 MG: 2 INJECTION INTRAMUSCULAR; INTRAVENOUS at 22:09

## 2022-01-05 RX ADMIN — SODIUM CHLORIDE 1000 ML: 9 INJECTION, SOLUTION INTRAVENOUS at 23:00

## 2022-01-05 RX ADMIN — HYDRALAZINE HYDROCHLORIDE 20 MG: 20 INJECTION INTRAMUSCULAR; INTRAVENOUS at 21:08

## 2022-01-05 ASSESSMENT — FIBROSIS 4 INDEX: FIB4 SCORE: 0.54

## 2022-01-05 NOTE — TELEPHONE ENCOUNTER
FINAL PRIOR AUTHORIZATION STATUS:    1.  Name of Medication & Dose: Farxiga 10 mg tablet     2. Prior Auth Status: Denied.  Reason: Please see scanned document for denial reasons    3. Action Taken: Pharmacy Notified: N\A Patient Notified: N\A

## 2022-01-06 ENCOUNTER — APPOINTMENT (OUTPATIENT)
Dept: RADIOLOGY | Facility: MEDICAL CENTER | Age: 52
DRG: 872 | End: 2022-01-06
Attending: STUDENT IN AN ORGANIZED HEALTH CARE EDUCATION/TRAINING PROGRAM
Payer: COMMERCIAL

## 2022-01-06 PROBLEM — I16.0 HYPERTENSIVE URGENCY: Status: ACTIVE | Noted: 2022-01-06

## 2022-01-06 PROBLEM — E11.9 DM2 (DIABETES MELLITUS, TYPE 2) (HCC): Status: ACTIVE | Noted: 2018-05-29

## 2022-01-06 LAB
ALBUMIN SERPL BCP-MCNC: 4.1 G/DL (ref 3.2–4.9)
ALBUMIN/GLOB SERPL: 1.2 G/DL
ALP SERPL-CCNC: 93 U/L (ref 30–99)
ALT SERPL-CCNC: 12 U/L (ref 2–50)
AMPHET UR QL SCN: NEGATIVE
ANION GAP SERPL CALC-SCNC: 13 MMOL/L (ref 7–16)
ANION GAP SERPL CALC-SCNC: 15 MMOL/L (ref 7–16)
APPEARANCE UR: CLEAR
AST SERPL-CCNC: 14 U/L (ref 12–45)
BACTERIA #/AREA URNS HPF: ABNORMAL /HPF
BARBITURATES UR QL SCN: NEGATIVE
BASOPHILS # BLD AUTO: 0.7 % (ref 0–1.8)
BASOPHILS # BLD AUTO: 0.7 % (ref 0–1.8)
BASOPHILS # BLD: 0.09 K/UL (ref 0–0.12)
BASOPHILS # BLD: 0.1 K/UL (ref 0–0.12)
BENZODIAZ UR QL SCN: NEGATIVE
BILIRUB SERPL-MCNC: 0.6 MG/DL (ref 0.1–1.5)
BILIRUB UR QL STRIP.AUTO: NEGATIVE
BUN SERPL-MCNC: 10 MG/DL (ref 8–22)
BUN SERPL-MCNC: 11 MG/DL (ref 8–22)
BZE UR QL SCN: NEGATIVE
CALCIUM SERPL-MCNC: 8.7 MG/DL (ref 8.5–10.5)
CALCIUM SERPL-MCNC: 9.1 MG/DL (ref 8.5–10.5)
CANNABINOIDS UR QL SCN: NEGATIVE
CHLORIDE SERPL-SCNC: 100 MMOL/L (ref 96–112)
CHLORIDE SERPL-SCNC: 100 MMOL/L (ref 96–112)
CHOLEST SERPL-MCNC: 81 MG/DL (ref 100–199)
CO2 SERPL-SCNC: 18 MMOL/L (ref 20–33)
CO2 SERPL-SCNC: 22 MMOL/L (ref 20–33)
COLOR UR: YELLOW
CREAT SERPL-MCNC: 1.11 MG/DL (ref 0.5–1.4)
CREAT SERPL-MCNC: 1.13 MG/DL (ref 0.5–1.4)
D DIMER PPP IA.FEU-MCNC: 0.41 UG/ML (FEU) (ref 0–0.5)
EOSINOPHIL # BLD AUTO: 0.12 K/UL (ref 0–0.51)
EOSINOPHIL # BLD AUTO: 0.13 K/UL (ref 0–0.51)
EOSINOPHIL NFR BLD: 0.9 % (ref 0–6.9)
EOSINOPHIL NFR BLD: 1 % (ref 0–6.9)
EPI CELLS #/AREA URNS HPF: NEGATIVE /HPF
ERYTHROCYTE [DISTWIDTH] IN BLOOD BY AUTOMATED COUNT: 43.2 FL (ref 35.9–50)
ERYTHROCYTE [DISTWIDTH] IN BLOOD BY AUTOMATED COUNT: 44.2 FL (ref 35.9–50)
EST. AVERAGE GLUCOSE BLD GHB EST-MCNC: 166 MG/DL
GLOBULIN SER CALC-MCNC: 3.5 G/DL (ref 1.9–3.5)
GLUCOSE BLD-MCNC: 147 MG/DL (ref 65–99)
GLUCOSE BLD-MCNC: 154 MG/DL (ref 65–99)
GLUCOSE BLD-MCNC: 196 MG/DL (ref 65–99)
GLUCOSE BLD-MCNC: 211 MG/DL (ref 65–99)
GLUCOSE SERPL-MCNC: 185 MG/DL (ref 65–99)
GLUCOSE SERPL-MCNC: 211 MG/DL (ref 65–99)
GLUCOSE UR STRIP.AUTO-MCNC: 500 MG/DL
HBA1C MFR BLD: 7.4 % (ref 4–5.6)
HCT VFR BLD AUTO: 37.5 % (ref 42–52)
HCT VFR BLD AUTO: 40.7 % (ref 42–52)
HDLC SERPL-MCNC: 29 MG/DL
HGB BLD-MCNC: 13.1 G/DL (ref 14–18)
HGB BLD-MCNC: 13.7 G/DL (ref 14–18)
HYALINE CASTS #/AREA URNS LPF: ABNORMAL /LPF
IMM GRANULOCYTES # BLD AUTO: 0.04 K/UL (ref 0–0.11)
IMM GRANULOCYTES # BLD AUTO: 0.05 K/UL (ref 0–0.11)
IMM GRANULOCYTES NFR BLD AUTO: 0.3 % (ref 0–0.9)
IMM GRANULOCYTES NFR BLD AUTO: 0.4 % (ref 0–0.9)
INR PPP: 1.1 (ref 0.87–1.13)
KETONES UR STRIP.AUTO-MCNC: NEGATIVE MG/DL
LACTATE BLD-SCNC: 1.1 MMOL/L (ref 0.5–2)
LACTATE BLD-SCNC: 1.7 MMOL/L (ref 0.5–2)
LACTATE BLD-SCNC: 1.9 MMOL/L (ref 0.5–2)
LDLC SERPL CALC-MCNC: 32 MG/DL
LEUKOCYTE ESTERASE UR QL STRIP.AUTO: ABNORMAL
LYMPHOCYTES # BLD AUTO: 1.88 K/UL (ref 1–4.8)
LYMPHOCYTES # BLD AUTO: 2.05 K/UL (ref 1–4.8)
LYMPHOCYTES NFR BLD: 13.9 % (ref 22–41)
LYMPHOCYTES NFR BLD: 14.7 % (ref 22–41)
MAGNESIUM SERPL-MCNC: 1.4 MG/DL (ref 1.5–2.5)
MAGNESIUM SERPL-MCNC: 2 MG/DL (ref 1.5–2.5)
MCH RBC QN AUTO: 30.7 PG (ref 27–33)
MCH RBC QN AUTO: 31.1 PG (ref 27–33)
MCHC RBC AUTO-ENTMCNC: 33.7 G/DL (ref 33.7–35.3)
MCHC RBC AUTO-ENTMCNC: 34.9 G/DL (ref 33.7–35.3)
MCV RBC AUTO: 89.1 FL (ref 81.4–97.8)
MCV RBC AUTO: 91.3 FL (ref 81.4–97.8)
METHADONE UR QL SCN: NEGATIVE
MICRO URNS: ABNORMAL
MONOCYTES # BLD AUTO: 1.76 K/UL (ref 0–0.85)
MONOCYTES # BLD AUTO: 1.94 K/UL (ref 0–0.85)
MONOCYTES NFR BLD AUTO: 13 % (ref 0–13.4)
MONOCYTES NFR BLD AUTO: 13.9 % (ref 0–13.4)
NEUTROPHILS # BLD AUTO: 9.6 K/UL (ref 1.82–7.42)
NEUTROPHILS # BLD AUTO: 9.68 K/UL (ref 1.82–7.42)
NEUTROPHILS NFR BLD: 69.5 % (ref 44–72)
NEUTROPHILS NFR BLD: 71 % (ref 44–72)
NITRITE UR QL STRIP.AUTO: NEGATIVE
NRBC # BLD AUTO: 0 K/UL
NRBC # BLD AUTO: 0 K/UL
NRBC BLD-RTO: 0 /100 WBC
NRBC BLD-RTO: 0 /100 WBC
OPIATES UR QL SCN: NEGATIVE
OXYCODONE UR QL SCN: NEGATIVE
PCP UR QL SCN: NEGATIVE
PH UR STRIP.AUTO: 8 [PH] (ref 5–8)
PHOSPHATE SERPL-MCNC: 1.8 MG/DL (ref 2.5–4.5)
PHOSPHATE SERPL-MCNC: 1.9 MG/DL (ref 2.5–4.5)
PLATELET # BLD AUTO: 184 K/UL (ref 164–446)
PLATELET # BLD AUTO: 195 K/UL (ref 164–446)
PMV BLD AUTO: 10.6 FL (ref 9–12.9)
PMV BLD AUTO: 10.8 FL (ref 9–12.9)
POTASSIUM SERPL-SCNC: 3.6 MMOL/L (ref 3.6–5.5)
POTASSIUM SERPL-SCNC: 4 MMOL/L (ref 3.6–5.5)
PROCALCITONIN SERPL-MCNC: <0.05 NG/ML
PROPOXYPH UR QL SCN: NEGATIVE
PROT SERPL-MCNC: 7.6 G/DL (ref 6–8.2)
PROT UR QL STRIP: 100 MG/DL
PROTHROMBIN TIME: 13.9 SEC (ref 12–14.6)
RBC # BLD AUTO: 4.21 M/UL (ref 4.7–6.1)
RBC # BLD AUTO: 4.46 M/UL (ref 4.7–6.1)
RBC # URNS HPF: ABNORMAL /HPF
RBC UR QL AUTO: NEGATIVE
SODIUM SERPL-SCNC: 133 MMOL/L (ref 135–145)
SODIUM SERPL-SCNC: 135 MMOL/L (ref 135–145)
SP GR UR STRIP.AUTO: 1.01
TRIGL SERPL-MCNC: 100 MG/DL (ref 0–149)
TSH SERPL DL<=0.005 MIU/L-ACNC: 1.1 UIU/ML (ref 0.38–5.33)
UROBILINOGEN UR STRIP.AUTO-MCNC: 1 MG/DL
WBC # BLD AUTO: 13.5 K/UL (ref 4.8–10.8)
WBC # BLD AUTO: 13.9 K/UL (ref 4.8–10.8)
WBC #/AREA URNS HPF: ABNORMAL /HPF

## 2022-01-06 PROCEDURE — 96366 THER/PROPH/DIAG IV INF ADDON: CPT

## 2022-01-06 PROCEDURE — 99221 1ST HOSP IP/OBS SF/LOW 40: CPT | Mod: GC | Performed by: PSYCHIATRY & NEUROLOGY

## 2022-01-06 PROCEDURE — 85025 COMPLETE CBC W/AUTO DIFF WBC: CPT

## 2022-01-06 PROCEDURE — 700101 HCHG RX REV CODE 250: Performed by: STUDENT IN AN ORGANIZED HEALTH CARE EDUCATION/TRAINING PROGRAM

## 2022-01-06 PROCEDURE — 80053 COMPREHEN METABOLIC PANEL: CPT

## 2022-01-06 PROCEDURE — 96367 TX/PROPH/DG ADDL SEQ IV INF: CPT

## 2022-01-06 PROCEDURE — 700111 HCHG RX REV CODE 636 W/ 250 OVERRIDE (IP): Performed by: STUDENT IN AN ORGANIZED HEALTH CARE EDUCATION/TRAINING PROGRAM

## 2022-01-06 PROCEDURE — 87040 BLOOD CULTURE FOR BACTERIA: CPT | Mod: 91

## 2022-01-06 PROCEDURE — 80061 LIPID PANEL: CPT

## 2022-01-06 PROCEDURE — 83605 ASSAY OF LACTIC ACID: CPT | Mod: 91

## 2022-01-06 PROCEDURE — 82962 GLUCOSE BLOOD TEST: CPT | Mod: 91

## 2022-01-06 PROCEDURE — 700105 HCHG RX REV CODE 258: Performed by: STUDENT IN AN ORGANIZED HEALTH CARE EDUCATION/TRAINING PROGRAM

## 2022-01-06 PROCEDURE — 96372 THER/PROPH/DIAG INJ SC/IM: CPT

## 2022-01-06 PROCEDURE — 36415 COLL VENOUS BLD VENIPUNCTURE: CPT

## 2022-01-06 PROCEDURE — 96376 TX/PRO/DX INJ SAME DRUG ADON: CPT

## 2022-01-06 PROCEDURE — A9270 NON-COVERED ITEM OR SERVICE: HCPCS | Performed by: STUDENT IN AN ORGANIZED HEALTH CARE EDUCATION/TRAINING PROGRAM

## 2022-01-06 PROCEDURE — 87086 URINE CULTURE/COLONY COUNT: CPT

## 2022-01-06 PROCEDURE — 84145 PROCALCITONIN (PCT): CPT

## 2022-01-06 PROCEDURE — 83735 ASSAY OF MAGNESIUM: CPT | Mod: 91

## 2022-01-06 PROCEDURE — 99223 1ST HOSP IP/OBS HIGH 75: CPT | Performed by: STUDENT IN AN ORGANIZED HEALTH CARE EDUCATION/TRAINING PROGRAM

## 2022-01-06 PROCEDURE — 96365 THER/PROPH/DIAG IV INF INIT: CPT

## 2022-01-06 PROCEDURE — 85610 PROTHROMBIN TIME: CPT

## 2022-01-06 PROCEDURE — 81001 URINALYSIS AUTO W/SCOPE: CPT

## 2022-01-06 PROCEDURE — 770020 HCHG ROOM/CARE - TELE (206)

## 2022-01-06 PROCEDURE — 84100 ASSAY OF PHOSPHORUS: CPT | Mod: 91

## 2022-01-06 PROCEDURE — 80307 DRUG TEST PRSMV CHEM ANLYZR: CPT

## 2022-01-06 PROCEDURE — 87077 CULTURE AEROBIC IDENTIFY: CPT

## 2022-01-06 PROCEDURE — 84443 ASSAY THYROID STIM HORMONE: CPT

## 2022-01-06 PROCEDURE — 700102 HCHG RX REV CODE 250 W/ 637 OVERRIDE(OP): Performed by: STUDENT IN AN ORGANIZED HEALTH CARE EDUCATION/TRAINING PROGRAM

## 2022-01-06 PROCEDURE — 87186 SC STD MICRODIL/AGAR DIL: CPT

## 2022-01-06 PROCEDURE — 80048 BASIC METABOLIC PNL TOTAL CA: CPT

## 2022-01-06 PROCEDURE — 70450 CT HEAD/BRAIN W/O DYE: CPT

## 2022-01-06 RX ORDER — LABETALOL HYDROCHLORIDE 5 MG/ML
10 INJECTION, SOLUTION INTRAVENOUS EVERY 4 HOURS PRN
Status: DISCONTINUED | OUTPATIENT
Start: 2022-01-06 | End: 2022-01-09 | Stop reason: HOSPADM

## 2022-01-06 RX ORDER — LORAZEPAM 2 MG/ML
1 INJECTION INTRAMUSCULAR
Status: DISCONTINUED | OUTPATIENT
Start: 2022-01-06 | End: 2022-01-08

## 2022-01-06 RX ORDER — PROCHLORPERAZINE EDISYLATE 5 MG/ML
5-10 INJECTION INTRAMUSCULAR; INTRAVENOUS EVERY 4 HOURS PRN
Status: DISCONTINUED | OUTPATIENT
Start: 2022-01-06 | End: 2022-01-09 | Stop reason: HOSPADM

## 2022-01-06 RX ORDER — GAUZE BANDAGE 2" X 2"
100 BANDAGE TOPICAL DAILY
Status: DISCONTINUED | OUTPATIENT
Start: 2022-01-07 | End: 2022-01-09 | Stop reason: HOSPADM

## 2022-01-06 RX ORDER — LORAZEPAM 2 MG/ML
1.5 INJECTION INTRAMUSCULAR
Status: DISCONTINUED | OUTPATIENT
Start: 2022-01-06 | End: 2022-01-08

## 2022-01-06 RX ORDER — GUAIFENESIN/DEXTROMETHORPHAN 100-10MG/5
10 SYRUP ORAL EVERY 6 HOURS PRN
Status: DISCONTINUED | OUTPATIENT
Start: 2022-01-06 | End: 2022-01-09 | Stop reason: HOSPADM

## 2022-01-06 RX ORDER — MAGNESIUM SULFATE HEPTAHYDRATE 40 MG/ML
2 INJECTION, SOLUTION INTRAVENOUS ONCE
Status: COMPLETED | OUTPATIENT
Start: 2022-01-06 | End: 2022-01-06

## 2022-01-06 RX ORDER — LORAZEPAM 2 MG/ML
3 INJECTION INTRAMUSCULAR ONCE
Status: COMPLETED | OUTPATIENT
Start: 2022-01-06 | End: 2022-01-06

## 2022-01-06 RX ORDER — LORAZEPAM 1 MG/1
1 TABLET ORAL EVERY 4 HOURS PRN
Status: DISCONTINUED | OUTPATIENT
Start: 2022-01-06 | End: 2022-01-08

## 2022-01-06 RX ORDER — SODIUM CHLORIDE, SODIUM LACTATE, POTASSIUM CHLORIDE, CALCIUM CHLORIDE 600; 310; 30; 20 MG/100ML; MG/100ML; MG/100ML; MG/100ML
INJECTION, SOLUTION INTRAVENOUS CONTINUOUS
Status: DISCONTINUED | OUTPATIENT
Start: 2022-01-06 | End: 2022-01-06

## 2022-01-06 RX ORDER — ACETAMINOPHEN 325 MG/1
650 TABLET ORAL EVERY 6 HOURS PRN
Status: DISCONTINUED | OUTPATIENT
Start: 2022-01-06 | End: 2022-01-09 | Stop reason: HOSPADM

## 2022-01-06 RX ORDER — SODIUM CHLORIDE 9 MG/ML
INJECTION, SOLUTION INTRAVENOUS CONTINUOUS
Status: DISCONTINUED | OUTPATIENT
Start: 2022-01-06 | End: 2022-01-08

## 2022-01-06 RX ORDER — POLYETHYLENE GLYCOL 3350 17 G/17G
1 POWDER, FOR SOLUTION ORAL
Status: DISCONTINUED | OUTPATIENT
Start: 2022-01-06 | End: 2022-01-09 | Stop reason: HOSPADM

## 2022-01-06 RX ORDER — ASPIRIN 81 MG/1
81 TABLET, CHEWABLE ORAL DAILY
Status: DISCONTINUED | OUTPATIENT
Start: 2022-01-06 | End: 2022-01-09 | Stop reason: HOSPADM

## 2022-01-06 RX ORDER — LORAZEPAM 2 MG/ML
2 INJECTION INTRAMUSCULAR
Status: DISCONTINUED | OUTPATIENT
Start: 2022-01-06 | End: 2022-01-08

## 2022-01-06 RX ORDER — METOPROLOL TARTRATE 1 MG/ML
5 INJECTION, SOLUTION INTRAVENOUS
Status: DISCONTINUED | OUTPATIENT
Start: 2022-01-06 | End: 2022-01-06

## 2022-01-06 RX ORDER — GABAPENTIN 400 MG/1
1200 CAPSULE ORAL 3 TIMES DAILY
Status: DISCONTINUED | OUTPATIENT
Start: 2022-01-06 | End: 2022-01-09 | Stop reason: HOSPADM

## 2022-01-06 RX ORDER — PROMETHAZINE HYDROCHLORIDE 25 MG/1
12.5-25 TABLET ORAL EVERY 4 HOURS PRN
Status: DISCONTINUED | OUTPATIENT
Start: 2022-01-06 | End: 2022-01-09 | Stop reason: HOSPADM

## 2022-01-06 RX ORDER — DEXTROSE MONOHYDRATE 25 G/50ML
50 INJECTION, SOLUTION INTRAVENOUS
Status: DISCONTINUED | OUTPATIENT
Start: 2022-01-06 | End: 2022-01-09 | Stop reason: HOSPADM

## 2022-01-06 RX ORDER — METOPROLOL TARTRATE 50 MG/1
50 TABLET, FILM COATED ORAL 2 TIMES DAILY
Status: DISCONTINUED | OUTPATIENT
Start: 2022-01-06 | End: 2022-01-09 | Stop reason: HOSPADM

## 2022-01-06 RX ORDER — GABAPENTIN 400 MG/1
400 CAPSULE ORAL 3 TIMES DAILY
Status: ON HOLD | COMMUNITY
End: 2022-01-09

## 2022-01-06 RX ORDER — FOLIC ACID 1 MG/1
1 TABLET ORAL DAILY
Status: DISCONTINUED | OUTPATIENT
Start: 2022-01-07 | End: 2022-01-09 | Stop reason: HOSPADM

## 2022-01-06 RX ORDER — ROSUVASTATIN CALCIUM 20 MG/1
40 TABLET, COATED ORAL DAILY
Status: DISCONTINUED | OUTPATIENT
Start: 2022-01-06 | End: 2022-01-09 | Stop reason: HOSPADM

## 2022-01-06 RX ORDER — AMOXICILLIN 250 MG
2 CAPSULE ORAL 2 TIMES DAILY
Status: DISCONTINUED | OUTPATIENT
Start: 2022-01-06 | End: 2022-01-09 | Stop reason: HOSPADM

## 2022-01-06 RX ORDER — DULOXETIN HYDROCHLORIDE 60 MG/1
60 CAPSULE, DELAYED RELEASE ORAL DAILY
Status: DISCONTINUED | OUTPATIENT
Start: 2022-01-06 | End: 2022-01-07

## 2022-01-06 RX ORDER — LORAZEPAM 2 MG/1
2 TABLET ORAL
Status: DISCONTINUED | OUTPATIENT
Start: 2022-01-06 | End: 2022-01-08

## 2022-01-06 RX ORDER — ONDANSETRON 4 MG/1
4 TABLET, ORALLY DISINTEGRATING ORAL EVERY 4 HOURS PRN
Status: DISCONTINUED | OUTPATIENT
Start: 2022-01-06 | End: 2022-01-09 | Stop reason: HOSPADM

## 2022-01-06 RX ORDER — LORAZEPAM 2 MG/ML
0.5 INJECTION INTRAMUSCULAR EVERY 4 HOURS PRN
Status: DISCONTINUED | OUTPATIENT
Start: 2022-01-06 | End: 2022-01-08

## 2022-01-06 RX ORDER — BISACODYL 10 MG
10 SUPPOSITORY, RECTAL RECTAL
Status: DISCONTINUED | OUTPATIENT
Start: 2022-01-06 | End: 2022-01-09 | Stop reason: HOSPADM

## 2022-01-06 RX ORDER — LORAZEPAM 2 MG/1
4 TABLET ORAL
Status: DISCONTINUED | OUTPATIENT
Start: 2022-01-06 | End: 2022-01-08

## 2022-01-06 RX ORDER — GABAPENTIN 800 MG/1
800 TABLET ORAL 3 TIMES DAILY
Status: ON HOLD | COMMUNITY
End: 2022-01-09

## 2022-01-06 RX ORDER — ONDANSETRON 2 MG/ML
4 INJECTION INTRAMUSCULAR; INTRAVENOUS EVERY 4 HOURS PRN
Status: DISCONTINUED | OUTPATIENT
Start: 2022-01-06 | End: 2022-01-09 | Stop reason: HOSPADM

## 2022-01-06 RX ORDER — PROMETHAZINE HYDROCHLORIDE 25 MG/1
12.5-25 SUPPOSITORY RECTAL EVERY 4 HOURS PRN
Status: DISCONTINUED | OUTPATIENT
Start: 2022-01-06 | End: 2022-01-09 | Stop reason: HOSPADM

## 2022-01-06 RX ORDER — ROSUVASTATIN CALCIUM 20 MG/1
40 TABLET, COATED ORAL DAILY
Status: DISCONTINUED | OUTPATIENT
Start: 2022-01-06 | End: 2022-01-06

## 2022-01-06 RX ORDER — LORAZEPAM 0.5 MG/1
0.5 TABLET ORAL EVERY 4 HOURS PRN
Status: DISCONTINUED | OUTPATIENT
Start: 2022-01-06 | End: 2022-01-08

## 2022-01-06 RX ORDER — METOPROLOL TARTRATE 1 MG/ML
5 INJECTION, SOLUTION INTRAVENOUS
Status: DISCONTINUED | OUTPATIENT
Start: 2022-01-06 | End: 2022-01-09 | Stop reason: HOSPADM

## 2022-01-06 RX ORDER — LISINOPRIL 20 MG/1
40 TABLET ORAL EVERY EVENING
Status: DISCONTINUED | OUTPATIENT
Start: 2022-01-06 | End: 2022-01-09 | Stop reason: HOSPADM

## 2022-01-06 RX ORDER — CLOPIDOGREL BISULFATE 75 MG/1
75 TABLET ORAL DAILY
Status: DISCONTINUED | OUTPATIENT
Start: 2022-01-06 | End: 2022-01-09 | Stop reason: HOSPADM

## 2022-01-06 RX ADMIN — CLOPIDOGREL BISULFATE 75 MG: 75 TABLET ORAL at 06:13

## 2022-01-06 RX ADMIN — DULOXETINE HYDROCHLORIDE 60 MG: 60 CAPSULE, DELAYED RELEASE ORAL at 06:13

## 2022-01-06 RX ADMIN — LISINOPRIL 40 MG: 20 TABLET ORAL at 17:40

## 2022-01-06 RX ADMIN — METOPROLOL TARTRATE 50 MG: 50 TABLET, FILM COATED ORAL at 17:40

## 2022-01-06 RX ADMIN — INSULIN HUMAN 3 UNITS: 100 INJECTION, SOLUTION PARENTERAL at 10:16

## 2022-01-06 RX ADMIN — CEFTRIAXONE SODIUM 2 G: 2 INJECTION, POWDER, FOR SOLUTION INTRAMUSCULAR; INTRAVENOUS at 02:05

## 2022-01-06 RX ADMIN — GABAPENTIN 1200 MG: 400 CAPSULE ORAL at 17:39

## 2022-01-06 RX ADMIN — ROSUVASTATIN CALCIUM 40 MG: 20 TABLET, FILM COATED ORAL at 15:32

## 2022-01-06 RX ADMIN — ACETAMINOPHEN 650 MG: 325 TABLET, FILM COATED ORAL at 20:05

## 2022-01-06 RX ADMIN — GABAPENTIN 1200 MG: 400 CAPSULE ORAL at 12:36

## 2022-01-06 RX ADMIN — GABAPENTIN 1200 MG: 400 CAPSULE ORAL at 06:13

## 2022-01-06 RX ADMIN — ASPIRIN 81 MG: 81 TABLET, CHEWABLE ORAL at 06:14

## 2022-01-06 RX ADMIN — SODIUM CHLORIDE: 9 INJECTION, SOLUTION INTRAVENOUS at 20:54

## 2022-01-06 RX ADMIN — SODIUM CHLORIDE, POTASSIUM CHLORIDE, SODIUM LACTATE AND CALCIUM CHLORIDE: 600; 310; 30; 20 INJECTION, SOLUTION INTRAVENOUS at 06:14

## 2022-01-06 RX ADMIN — LORAZEPAM 3 MG: 2 INJECTION INTRAMUSCULAR; INTRAVENOUS at 00:21

## 2022-01-06 RX ADMIN — THIAMINE HYDROCHLORIDE: 100 INJECTION, SOLUTION INTRAMUSCULAR; INTRAVENOUS at 02:30

## 2022-01-06 RX ADMIN — METOPROLOL TARTRATE 25 MG: 25 TABLET, FILM COATED ORAL at 06:13

## 2022-01-06 RX ADMIN — MAGNESIUM SULFATE HEPTAHYDRATE 2 G: 40 INJECTION, SOLUTION INTRAVENOUS at 06:17

## 2022-01-06 RX ADMIN — INSULIN HUMAN 2 UNITS: 100 INJECTION, SOLUTION PARENTERAL at 21:02

## 2022-01-06 ASSESSMENT — LIFESTYLE VARIABLES
ON A TYPICAL DAY WHEN YOU DRINK ALCOHOL HOW MANY DRINKS DO YOU HAVE: 7
TOTAL SCORE: 1
HAVE YOU EVER FELT YOU SHOULD CUT DOWN ON YOUR DRINKING: YES
TREMOR: TREMOR NOT VISIBLE BUT CAN BE FELT, FINGERTIP TO FINGERTIP
PAROXYSMAL SWEATS: NO SWEAT VISIBLE
HEADACHE, FULLNESS IN HEAD: NOT PRESENT
AVERAGE NUMBER OF DAYS PER WEEK YOU HAVE A DRINK CONTAINING ALCOHOL: 3
TOTAL SCORE: 4
ALCOHOL_USE: YES
HAVE PEOPLE ANNOYED YOU BY CRITICIZING YOUR DRINKING: YES
EVER HAD A DRINK FIRST THING IN THE MORNING TO STEADY YOUR NERVES TO GET RID OF A HANGOVER: YES
CONSUMPTION TOTAL: POSITIVE
NAUSEA AND VOMITING: NO NAUSEA AND NO VOMITING
HEADACHE, FULLNESS IN HEAD: NOT PRESENT
ORIENTATION AND CLOUDING OF SENSORIUM: ORIENTED AND CAN DO SERIAL ADDITIONS
VISUAL DISTURBANCES: NOT PRESENT
AGITATION: NORMAL ACTIVITY
AUDITORY DISTURBANCES: NOT PRESENT
VISUAL DISTURBANCES: NOT PRESENT
TREMOR: TREMOR NOT VISIBLE BUT CAN BE FELT, FINGERTIP TO FINGERTIP
TOTAL SCORE: 4
VISUAL DISTURBANCES: NOT PRESENT
TOTAL SCORE: 4
ORIENTATION AND CLOUDING OF SENSORIUM: ORIENTED AND CAN DO SERIAL ADDITIONS
AUDITORY DISTURBANCES: NOT PRESENT
AGITATION: NORMAL ACTIVITY
ANXIETY: NO ANXIETY (AT EASE)
TREMOR: TREMOR NOT VISIBLE BUT CAN BE FELT, FINGERTIP TO FINGERTIP
PAROXYSMAL SWEATS: NO SWEAT VISIBLE
NAUSEA AND VOMITING: NO NAUSEA AND NO VOMITING
AUDITORY DISTURBANCES: NOT PRESENT
AGITATION: NORMAL ACTIVITY
TOTAL SCORE: 1
TOTAL SCORE: 1
HOW MANY TIMES IN THE PAST YEAR HAVE YOU HAD 5 OR MORE DRINKS IN A DAY: 3
EVER FELT BAD OR GUILTY ABOUT YOUR DRINKING: YES
PAROXYSMAL SWEATS: NO SWEAT VISIBLE
NAUSEA AND VOMITING: NO NAUSEA AND NO VOMITING
ANXIETY: NO ANXIETY (AT EASE)
DOES PATIENT WANT TO STOP DRINKING: NO
ORIENTATION AND CLOUDING OF SENSORIUM: ORIENTED AND CAN DO SERIAL ADDITIONS
ANXIETY: NO ANXIETY (AT EASE)
HEADACHE, FULLNESS IN HEAD: NOT PRESENT

## 2022-01-06 ASSESSMENT — COGNITIVE AND FUNCTIONAL STATUS - GENERAL
DRESSING REGULAR UPPER BODY CLOTHING: A LITTLE
WALKING IN HOSPITAL ROOM: A LOT
SUGGESTED CMS G CODE MODIFIER DAILY ACTIVITY: CK
DAILY ACTIVITIY SCORE: 16
CLIMB 3 TO 5 STEPS WITH RAILING: A LOT
DRESSING REGULAR LOWER BODY CLOTHING: A LOT
TURNING FROM BACK TO SIDE WHILE IN FLAT BAD: A LITTLE
EATING MEALS: A LITTLE
TOILETING: A LITTLE
MOVING FROM LYING ON BACK TO SITTING ON SIDE OF FLAT BED: A LOT
PERSONAL GROOMING: A LITTLE
MOVING TO AND FROM BED TO CHAIR: A LOT
HELP NEEDED FOR BATHING: A LOT

## 2022-01-06 ASSESSMENT — PATIENT HEALTH QUESTIONNAIRE - PHQ9
6. FEELING BAD ABOUT YOURSELF - OR THAT YOU ARE A FAILURE OR HAVE LET YOURSELF OR YOUR FAMILY DOWN: NOT AL ALL
5. POOR APPETITE OR OVEREATING: NOT AT ALL
SUM OF ALL RESPONSES TO PHQ9 QUESTIONS 1 AND 2: 4
3. TROUBLE FALLING OR STAYING ASLEEP OR SLEEPING TOO MUCH: NOT AT ALL
SUM OF ALL RESPONSES TO PHQ QUESTIONS 1-9: 4
9. THOUGHTS THAT YOU WOULD BE BETTER OFF DEAD, OR OF HURTING YOURSELF: NOT AT ALL
8. MOVING OR SPEAKING SO SLOWLY THAT OTHER PEOPLE COULD HAVE NOTICED. OR THE OPPOSITE, BEING SO FIGETY OR RESTLESS THAT YOU HAVE BEEN MOVING AROUND A LOT MORE THAN USUAL: NOT AT ALL
4. FEELING TIRED OR HAVING LITTLE ENERGY: NOT AT ALL
7. TROUBLE CONCENTRATING ON THINGS, SUCH AS READING THE NEWSPAPER OR WATCHING TELEVISION: NOT AT ALL
2. FEELING DOWN, DEPRESSED, IRRITABLE, OR HOPELESS: MORE THAN HALF THE DAYS
1. LITTLE INTEREST OR PLEASURE IN DOING THINGS: MORE THAN HALF THE DAYS

## 2022-01-06 ASSESSMENT — FIBROSIS 4 INDEX
FIB4 SCORE: 1.1
FIB4 SCORE: 1.23

## 2022-01-06 ASSESSMENT — PAIN DESCRIPTION - PAIN TYPE: TYPE: ACUTE PAIN

## 2022-01-06 ASSESSMENT — ENCOUNTER SYMPTOMS: NERVOUS/ANXIOUS: 1

## 2022-01-06 NOTE — H&P
Hospital Medicine History & Physical Note    Date of Service  1/6/2022    Primary Care Physician  Tim Castillo P.A.-C.    Consultants      Code Status  Prior    Chief Complaint  Chief Complaint   Patient presents with   • Blood Pressure Problem     Pt was at a routine appt with PCP on Monday and BP was high, was told to check it daily since then. Today, /118,  at home. Denies SOB, CP, vision changes. C/o HA and dizziness x yesterday.       History of Presenting Illness  Guerrero Ann is a 51 y.o. male with a past medical hx of DM2, Htn, MI,  who presented 1/5/2022 for evaluation of hypertension.  Patient states that he has been having issues of hypertension over the last few days, but is taking his medication.  He has no fever chills or vomiting, and no chest pain or shortness of breath.  Patient states that he has noticed that he has blood pressure issues since he measures it every morning, and he has been unable to get his diabetes medications due to not approved by his insurance problems.     Notable findings include: tachycardia to 130's, glucose 190, Na 134, WBC 12.3, /106, RR 33    Chest xray showing: LUNGS: The lungs are clear.   HEART and MEDIASTINUM: enlarged. There has been previous sternotomy.   Pleura: There are no pleural effusion or pneumothoraces.   Osseous structures: No significant bony abnormality.    I discussed the plan of care with patient.    Review of Systems  Review of Systems   Psychiatric/Behavioral: The patient is nervous/anxious.    All other systems reviewed and are negative.      Past Medical History   has a past medical history of Diabetes ([facility]), CABG (09/2020), Hypertension, Kidney stones, MI, old, Pneumonia, Psychiatric problem, and Stroke ([facility]).    Surgical History   has a past surgical history that includes other orthopedic surgery; stent placement (2017); multiple coronary artery bypass endo vein harvest (9/25/2020); and benjamin  (9/25/2020).     Family History  family history includes Heart Disease in his maternal grandfather, maternal grandmother, and mother; No Known Problems in his father; Stroke (age of onset: 47) in his mother; Stroke (age of onset: 54) in his maternal grandfather; Stroke (age of onset: 85) in his maternal grandmother.   Family history reviewed with patient. There is no family history that is pertinent to the chief complaint.     Social History   reports that he has never smoked. His smokeless tobacco use includes chew. He reports current alcohol use of about 2.4 oz of alcohol per week. He reports that he does not use drugs.    Allergies  Allergies   Allergen Reactions   • Isosorbide Unspecified     Hallucinations       Medications  Prior to Admission Medications   Prescriptions Last Dose Informant Patient Reported? Taking?   Blood Glucose Test Strips   No No   Sig: Use one strip to test blood sugar once daily .   Coenzyme Q10 (COQ10) 100 MG Cap   Yes No   Sig: Take 300 mg by mouth every day.   DULoxetine (CYMBALTA) 60 MG Cap DR Particles delayed-release capsule   No No   Sig: Take 1 Capsule by mouth every day.   Empagliflozin 10 MG Tab   No No   Sig: Take 10 mg by mouth every day.   Lancets   No No   Sig: Lancets order: Lancets for True Metrix meter. Sig: use TID and prn ssx high or low sugar. #100 RF x 3   Semaglutide,0.25 or 0.5MG/DOS, (OZEMPIC, 0.25 OR 0.5 MG/DOSE,) 2 MG/1.5ML Solution Pen-injector   No No   Sig: INJECT 0.25 MG SUBCUTANEOUSLY ONCE A WEEK   TRUE METRIX BLOOD GLUCOSE TEST strip   Yes No   Sig: USE 1 STRIP TO CHECK GLUCOSE ONCE DAILY   acetaminophen (TYLENOL) 325 MG Tab   Yes No   Sig: Take 2 Tabs by mouth every four hours as needed for Moderate Pain.   allopurinol (ZYLOPRIM) 100 MG Tab   No No   Sig: Take 1 Tablet by mouth every day.   aspirin (ASA) 81 MG Chew Tab chewable tablet   Yes No   Sig: Take 1 Tab by mouth every day.   clopidogrel (PLAVIX) 75 MG Tab   No No   Sig: Take 1 Tab by mouth every  day.   gabapentin (NEURONTIN) 600 MG tablet   No No   Sig: Take 2 Tablets by mouth 3 times a day.   ketoconazole (NIZORAL) 2 % Cream   No No   Sig: Apply fingertip amount to affected area daily for 2 weeks   Patient not taking: Reported on 1/3/2022   lisinopril (PRINIVIL) 40 MG tablet   Yes No   Sig: Take 1 Tablet by mouth every evening.   metFORMIN (GLUCOPHAGE) 1000 MG tablet   No No   Sig: Take 1 tablet by mouth 2 times a day, with meals.   metoprolol tartrate (LOPRESSOR) 25 MG Tab   No No   Sig: Take 1 Tablet by mouth 2 times a day.   rosuvastatin (CRESTOR) 40 MG tablet   No No   Sig: Take 1 Tablet by mouth every day.   sildenafil (REVATIO) 20 MG tablet   Yes No   Sig: sildenafil (pulmonary hypertension) 20 mg tablet   TAKE 2 5 TABLETS BY MOUTH APPROXIMATELY 45 MINUTES BEFORE SEXUAL ACTIVITY TAKE ON AN EMPTY STOMACH   tamsulosin (FLOMAX) 0.4 MG capsule   No No   Sig: Take 1 capsule by mouth at bedtime.   Patient not taking: Reported on 1/3/2022   terbinafine (LAMISIL) 250 MG Tab   No No   Sig: Take 1 Tablet by mouth every day.   therapeutic multivitamin-minerals (THERAGRAN-M) Tab   Yes No   Sig: Take 1 Tab by mouth every day.   triamcinolone acetonide (KENALOG) 0.1 % Cream   No No   Sig: Apply 1 Application topically 2 times a day.      Facility-Administered Medications: None       Physical Exam  Temp:  [36.3 °C (97.4 °F)-37.3 °C (99.1 °F)] 37.3 °C (99.1 °F)  Pulse:  [] 131  Resp:  [16-33] 26  BP: (149-209)/() 164/96  SpO2:  [93 %-98 %] 94 %  Blood Pressure: (!) 168/98   Temperature: 37.3 °C (99.1 °F)   Pulse: (!) 126   Respiration: (!) 26   Pulse Oximetry: 93 %       Physical Exam     Constitutional: Resting comfortably in NAD wife is bedside.  HENT: Normocephalic, no obvious evidence of acute trauma.  Eyes: No scleral icterus. Normal conjunctiva   Neck: Comfortable movement without any obvious restriction in the range of motion.  Cardiovascular: tachycardia  Thorax & Lungs: No respiratory distress.  No wheezing, rales or rhonchi heard on ausculation.  there is no obvious chest wall tenderness. I appreciate normal air movement throughout.   Abdomen: The abdomen is not visibly distended. Upon palpation, I find it to be without tenderness.  No mass appreciated.  Skin: The exposed portions of skin reveal no obvious rash or other abnormalities.   Extremities/Musculoskeletal: no lower extremity edema with no asymmetry.  Neurologic: Alert & oriented. No focal deficits observed.   Psychiatric: Normal affect appropriate for the clinical situation.    Laboratory:  Recent Labs     01/05/22 2009   WBC 12.3*   RBC 4.68*   HEMOGLOBIN 14.3   HEMATOCRIT 43.0   MCV 91.9   MCH 30.6   MCHC 33.3*   RDW 44.6   PLATELETCT 206   MPV 10.5     Recent Labs     01/05/22 2009   SODIUM 134*   POTASSIUM 4.2   CHLORIDE 95*   CO2 21   GLUCOSE 190*   BUN 10   CREATININE 0.96   CALCIUM 10.5     Recent Labs     01/05/22 2009   ALTSGPT 14   ASTSGOT 17   ALKPHOSPHAT 121*   TBILIRUBIN 0.6   GLUCOSE 190*         No results for input(s): NTPROBNP in the last 72 hours.      Recent Labs     01/05/22  2009   TROPONINT 16       Imaging:  DX-CHEST-PORTABLE (1 VIEW)   Final Result      No acute cardiopulmonary abnormality identified.        Assessment/Plan:  I anticipate this patient will require at least two midnights for appropriate medical management, necessitating inpatient admission.    Hypertensive urgency- (present on admission)  Assessment & Plan  Antihypertensives prn  Admitted with telemetry  Troponin ordered      Sepsis (HCC)  Assessment & Plan  This is Sepsis Present on admission  SIRS criteria identified on my evaluation include: Leukocytosis, with WBC greater than 12,000  Source is unk  Sepsis protocol initiated  Fluid resuscitation ordered per protocol  IV antibiotics as appropriate for source of sepsis  While organ dysfunction may be noted elsewhere in this problem list or in the chart, degree of organ dysfunction does not meet CMS  criteria for severe sepsis          Essential hypertension- (present on admission)  Assessment & Plan  Continue home meds  Admitted with telemetry  Continue to monitor    DM2 (diabetes mellitus, type 2) (HCC)  Assessment & Plan  ISS   A1C   accuchecks  Glucose 190 on admission      VTE prophylaxis: SCDs/TEDs

## 2022-01-06 NOTE — ED PROVIDER NOTES
ED Provider Note    CHIEF COMPLAINT  Chief Complaint   Patient presents with   • Blood Pressure Problem     Pt was at a routine appt with PCP on Monday and BP was high, was told to check it daily since then. Today, /118,  at home. Denies SOB, CP, vision changes. C/o HA and dizziness x yesterday.       HPI  Guerrero Ann is a 51 y.o. male here for evaluation of hypertension.  Patient states that he has been having issues of hypertension over the last few days, but is taking his medication.  He has no fever chills or vomiting, and no chest pain or shortness of breath.  Patient states that he has noticed that he has blood pressure issues since he measures it every morning, but denies any headache or other concerns at this time.      ROS  See HPI for further details, o/w negative.     PAST MEDICAL HISTORY   has a past medical history of Diabetes ([facility]), CABG (09/2020), Hypertension, Kidney stones, MI, old, Pneumonia, Psychiatric problem, and Stroke ([facility]).    SOCIAL HISTORY  Social History     Tobacco Use   • Smoking status: Never Smoker   • Smokeless tobacco: Current User     Types: Chew   Vaping Use   • Vaping Use: Never used   Substance and Sexual Activity   • Alcohol use: Yes     Alcohol/week: 2.4 oz     Types: 4 Cans of beer per week     Comment: 4/week    • Drug use: No   • Sexual activity: Not Currently     Partners: Female     Comment: adriana. wrk: Verónica [place].        Family History  No bleeding disorders noted    SURGICAL HISTORY   has a past surgical history that includes other orthopedic surgery; stent placement (2017); multiple coronary artery bypass endo vein harvest (9/25/2020); and benjamin (9/25/2020).    CURRENT MEDICATIONS  Home Medications    **Home medications have not yet been reviewed for this encounter**         ALLERGIES  Allergies   Allergen Reactions   • Isosorbide Unspecified     Hallucinations       REVIEW OF SYSTEMS  See HPI for further details.  Review of systems as above, otherwise all other systems are negative.     PHYSICAL EXAM  Constitutional: Well developed, well nourished. No acute distress.  HEENT: Normocephalic, atraumatic. Posterior pharynx clear and moist.  Eyes:  EOMI. Normal sclera.  Neck: Supple, Full range of motion, nontender.  Chest/Pulmonary: clear to ausculation. Symmetrical expansion.   Cardio: Regular rate and rhythm with no murmur.   Abdomen: Soft, nontender. No peritoneal signs. No guarding. No palpable masses.  Musculoskeletal: No deformity, no edema, neurovascular intact.   Neuro: Clear speech, appropriate, cooperative, cranial nerves II-XII grossly intact.  Psych: Normal mood and affect    Results for orders placed or performed during the hospital encounter of 01/05/22   CBC with Differential   Result Value Ref Range    WBC 12.3 (H) 4.8 - 10.8 K/uL    RBC 4.68 (L) 4.70 - 6.10 M/uL    Hemoglobin 14.3 14.0 - 18.0 g/dL    Hematocrit 43.0 42.0 - 52.0 %    MCV 91.9 81.4 - 97.8 fL    MCH 30.6 27.0 - 33.0 pg    MCHC 33.3 (L) 33.7 - 35.3 g/dL    RDW 44.6 35.9 - 50.0 fL    Platelet Count 206 164 - 446 K/uL    MPV 10.5 9.0 - 12.9 fL    Neutrophils-Polys 70.20 44.00 - 72.00 %    Lymphocytes 14.60 (L) 22.00 - 41.00 %    Monocytes 11.80 0.00 - 13.40 %    Eosinophils 2.40 0.00 - 6.90 %    Basophils 0.70 0.00 - 1.80 %    Immature Granulocytes 0.30 0.00 - 0.90 %    Nucleated RBC 0.00 /100 WBC    Neutrophils (Absolute) 8.59 (H) 1.82 - 7.42 K/uL    Lymphs (Absolute) 1.79 1.00 - 4.80 K/uL    Monos (Absolute) 1.45 (H) 0.00 - 0.85 K/uL    Eos (Absolute) 0.30 0.00 - 0.51 K/uL    Baso (Absolute) 0.09 0.00 - 0.12 K/uL    Immature Granulocytes (abs) 0.04 0.00 - 0.11 K/uL    NRBC (Absolute) 0.00 K/uL   Complete Metabolic Panel (CMP)   Result Value Ref Range    Sodium 134 (L) 135 - 145 mmol/L    Potassium 4.2 3.6 - 5.5 mmol/L    Chloride 95 (L) 96 - 112 mmol/L    Co2 21 20 - 33 mmol/L    Anion Gap 18.0 (H) 7.0 - 16.0    Glucose 190 (H) 65 - 99 mg/dL    Bun  10 8 - 22 mg/dL    Creatinine 0.96 0.50 - 1.40 mg/dL    Calcium 10.5 8.5 - 10.5 mg/dL    AST(SGOT) 17 12 - 45 U/L    ALT(SGPT) 14 2 - 50 U/L    Alkaline Phosphatase 121 (H) 30 - 99 U/L    Total Bilirubin 0.6 0.1 - 1.5 mg/dL    Albumin 5.1 (H) 3.2 - 4.9 g/dL    Total Protein 8.9 (H) 6.0 - 8.2 g/dL    Globulin 3.8 (H) 1.9 - 3.5 g/dL    A-G Ratio 1.3 g/dL   Troponin   Result Value Ref Range    Troponin T 16 6 - 19 ng/L   ESTIMATED GFR   Result Value Ref Range    GFR If African American >60 >60 mL/min/1.73 m 2    GFR If Non African American >60 >60 mL/min/1.73 m 2   D-DIMER   Result Value Ref Range    D-Dimer Screen 0.41 0.00 - 0.50 ug/mL (FEU)   Lactic Acid -STAT Once   Result Value Ref Range    Lactic Acid 1.7 0.5 - 2.0 mmol/L   Magnesium   Result Value Ref Range    Magnesium 1.4 (L) 1.5 - 2.5 mg/dL   Phosphorus   Result Value Ref Range    Phosphorus 1.8 (L) 2.5 - 4.5 mg/dL   HEMOGLOBIN A1C   Result Value Ref Range    Glycohemoglobin 7.4 (H) 4.0 - 5.6 %    Est Avg Glucose 166 mg/dL   POC BREATHALIZER   Result Value Ref Range    POC Breathalizer 0.00 0.00 - 0.01 Percent   EKG   Result Value Ref Range    Report       [facility]    Test Date:  2022  Pt Name:    PETER TRONCOSO              Department: ER  MRN:        [id number]                      Room:  Gender:     Male                         Technician: [id number]  :        1970                   Requested By:ER TRIAGE PROTOCOL  Order #:    [id number]                    Benjamin MD:    Measurements  Intervals                                Axis  Rate:       105                          P:          49  IN:         151                          QRS:        87  QRSD:       92                           T:          -45  QT:         311  QTc:        412    Interpretive Statements  Sinus tachycardia  Probable left atrial enlargement  LVH with secondary repolarization abnormality  Inferior infarct, age indeterminate  Anterior ST  elevation, probably due to LVH  Compared to ECG 09/26/2020 05:11:53  Left ventricular hypertrophy now present  Early repolarization now present  Sinus rhythm no longer present  Myocardial  infarct finding still present  ST (T wave) deviation still present       DX-CHEST-PORTABLE (1 VIEW)   Final Result      No acute cardiopulmonary abnormality identified.        Ekg;  Sinus tach at 105. No st elevation, no st depression, qtc 412.  Compared to 9/26/20    PROCEDURES     MEDICAL RECORD  I have reviewed patient's medical record and pertinent results are listed.    COURSE & MEDICAL DECISION MAKING  I have reviewed any medical record information, laboratory studies and radiographic results as noted above.    HYDRATION: Based on the patient's presentation of Dehydration the patient was given IV fluids. IV Hydration was used because oral hydration was not adequate alone. Upon recheck following hydration, the patient was improved.    3:25 AM  The pt will be admitted for his increasing tachycardia, which has occurred while here. He has a negative d dimer, so I do not suspect pe.  He does drink alcohol, so this could be early detox as well.       FINAL IMPRESSION  1. Hypertension, unspecified type     2.      Tachycardia         Electronically signed by: Min Montgomery D.O., 1/5/2022 11:35 PM

## 2022-01-06 NOTE — PROGRESS NOTES
Patient transported to floor from ed. Patient A&Ox4 but delayed responses and frequently falling asleep during conversation. Plan of care discussed with patient. girlfriend at bedside. Patient oriented to floor and surroundings. Patient currently on room air. VSS. Patient is not expressing any complaints of pain at this time. Tele box placed. Monitor room notified. 2 rn skin check performed. Patient educated to call for assistance before ambulating. Patient education regarding fall risk. Call light within reach. Fall precautions in place.

## 2022-01-06 NOTE — PROGRESS NOTES
4 Eyes Skin Assessment Completed by JOEY Prasad and JOEY Martin.    Head WDL  Ears WDL  Nose WDL  Mouth WDL  Neck WDL  Breast/Chest WDL healed scar  Shoulder Blades WDL  Spine WDL  (R) Arm/Elbow/Hand WDL  (L) Arm/Elbow/Hand WDL  Abdomen WDL  Groin WDL  Scrotum/Coccyx/Buttocks Redness and Blanching  (R) Leg Redness and Incision old healed graft site  (L) Leg WDL  (R) Heel/Foot/Toe WDL  (L) Heel/Foot/Toe WDL          Devices In Places Tele Box, Blood Pressure Cuff and Pulse Ox      Interventions In Place Pillows and Pressure Redistribution Mattress    Possible Skin Injury No    Pictures Uploaded Into Epic N/A  Wound Consult Placed N/A  RN Wound Prevention Protocol Ordered No

## 2022-01-06 NOTE — PROGRESS NOTES
Patient was admitted earlier this morning. Please refer H&P for details    51 y.o. male with a past medical hx of DM2, HTN, CAD s/p CABG 9/2021 on DAPT,  who presented 1/5/2022 for evaluation of hypertension urgency. He is also an  alcohol user.   Of note, he did mention SI when he was initially admitted at ED. Behavior health was consulted, not yet meeting criteria for a legal hold, but rec psych consult    Here he was noted tachycardia and hypertensive with bp 209/106 on admission.   EKG reviewed NSR  UDS neg. UA 20-50 wbc. Procal negative. TSH normal. Wbc 12.3. Phos 1.8. Mag 1.4. AG 18. A1c 7.4    Plans  - He is still tachycardia, no chest pain, D-dimer negative, less likely PE. TSH normal. He does use alcohol, likely early detox?, cont [place] protocol, tele monitoring  - Given leukocytosis and pos UTI, cont abx. Follow up urine/blood culture  - Psych consulted  - Optimizing Bp  - Replace electrolytes  - Check echo

## 2022-01-06 NOTE — ED NOTES
Lactic drawn and sent. Pt reports last drink was Saturday, reports he usually drink 6-10 beers on Saturday

## 2022-01-06 NOTE — ED TRIAGE NOTES
"Guerrero Ann  51 y.o. male  Chief Complaint   Patient presents with   • Blood Pressure Problem     Pt was at a routine appt with PCP on Monday and BP was high, was told to check it daily since then. Today, /118,  at home. Denies SOB, CP, vision changes. C/o HA and dizziness x yesterday.       Hx CABG x 3 and CVA in Sept and congenital single kidney.    Patient ambulatory to triage with wife for above complaint. Pt endorsed SI without plan or intent and began crying when questioned. Pt states that a nerve was nicked during CABG and pt's foot and abilities \"will never be the same.\" Pt's wife endorsed mood changes and statements of \"not wanting to be here\" since surgery. CN aware.    Patient is alert and oriented, speaking in full sentences, following commands, and responding appropriately to questions. Educated on triage process and instructed patient to alert staff to any changes in condition or worsening symptoms.     (-) COVID vaccine      BP (!) 207/125   Pulse 99   Temp 36.3 °C (97.4 °F) (Temporal)   Resp 16   Ht 1.575 m (5' 2\")   Wt 74.1 kg (163 lb 5.8 oz)   SpO2 98%   BMI 29.88 kg/m²       "
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
## 2022-01-06 NOTE — ED NOTES
PT sitting upright in bed, awake, speaking without signs of distress. Girlfriend present at bedside. Denies other needs.

## 2022-01-06 NOTE — DISCHARGE PLANNING
[facility]  Crisis/Safety Plan    Name:  Guerrero Ann  MRN:  [id number]  Date:  2022    Warning signs that a crisis may be developing for me or I may be at risk:  1) feeling much more depressed  2) becoming more anxious  3) developing any thoughts of self harm    Coping strategies I can use on my own (relaxation, physical activity, etc):  1) warm baths or shower  2) listen to music  3) watch tv, play with the kids    Ways I can make my environment safe:  1) no access to any firearms  2) secure meds  3) secure sharps    Things I want to tell myself when I feel a crisis developin) try to stay calm  2) remember there is out there  3) get help before a crisis develops    People I can contact for support or distraction (and their phone numbers):  1) Miriam [phone] (h) or [phone] (c)  2)   3)    If I’m not able to reach my support people, or the above strategies don’t help, I can contact the following professionals, agencies, or hotlines:  1) Crisis Call Center ():  1-745-746-4263 -OR- (891) [phone]  2) Crisis Text Line ():  Text CARE TO 923568  3)   4)     Armani Baird R.N.

## 2022-01-06 NOTE — ASSESSMENT & PLAN NOTE
Uncontrolled, Continue home meds Lisinopril, Metoprolol  Add amlodipine   Prn antihypertensives   Continue to monitor

## 2022-01-06 NOTE — ASSESSMENT & PLAN NOTE
This is Sepsis Present on admission  SIRS criteria identified on my evaluation include: Fever, with temperature greater than 101 deg F and Leukocytosis, with WBC greater than 12,000  Source is UTI  Sepsis protocol initiated  Fluid resuscitation ordered per protocol  IV antibiotics as appropriate for source of sepsis  While organ dysfunction may be noted elsewhere in this problem list or in the chart, degree of organ dysfunction does not meet CMS criteria for severe sepsis    UA pos UTI  urine culture growing enterococcus, stop rocephin and switch to macrobid x 7 days   blood culture negative to date
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
## 2022-01-06 NOTE — ASSESSMENT & PLAN NOTE
Antihypertensives prn  Admitted with telemetry  Echo without changes, EF 65%  Amlodipine added   Monitor and adjust as needed

## 2022-01-07 ENCOUNTER — APPOINTMENT (OUTPATIENT)
Dept: CARDIOLOGY | Facility: MEDICAL CENTER | Age: 52
DRG: 872 | End: 2022-01-07
Attending: STUDENT IN AN ORGANIZED HEALTH CARE EDUCATION/TRAINING PROGRAM
Payer: COMMERCIAL

## 2022-01-07 ENCOUNTER — APPOINTMENT (OUTPATIENT)
Dept: RADIOLOGY | Facility: MEDICAL CENTER | Age: 52
DRG: 872 | End: 2022-01-07
Attending: STUDENT IN AN ORGANIZED HEALTH CARE EDUCATION/TRAINING PROGRAM
Payer: COMMERCIAL

## 2022-01-07 PROBLEM — R45.851 SUICIDAL IDEATION: Status: ACTIVE | Noted: 2022-01-07

## 2022-01-07 LAB
ANION GAP SERPL CALC-SCNC: 11 MMOL/L (ref 7–16)
BASOPHILS # BLD AUTO: 0.8 % (ref 0–1.8)
BASOPHILS # BLD: 0.09 K/UL (ref 0–0.12)
BUN SERPL-MCNC: 15 MG/DL (ref 8–22)
CALCIUM SERPL-MCNC: 8.5 MG/DL (ref 8.5–10.5)
CHLORIDE SERPL-SCNC: 103 MMOL/L (ref 96–112)
CO2 SERPL-SCNC: 21 MMOL/L (ref 20–33)
CREAT SERPL-MCNC: 1.01 MG/DL (ref 0.5–1.4)
EOSINOPHIL # BLD AUTO: 0.31 K/UL (ref 0–0.51)
EOSINOPHIL NFR BLD: 2.7 % (ref 0–6.9)
ERYTHROCYTE [DISTWIDTH] IN BLOOD BY AUTOMATED COUNT: 45.2 FL (ref 35.9–50)
FLUAV RNA SPEC QL NAA+PROBE: NEGATIVE
FLUBV RNA SPEC QL NAA+PROBE: NEGATIVE
GLUCOSE BLD-MCNC: 139 MG/DL (ref 65–99)
GLUCOSE BLD-MCNC: 150 MG/DL (ref 65–99)
GLUCOSE BLD-MCNC: 166 MG/DL (ref 65–99)
GLUCOSE BLD-MCNC: 198 MG/DL (ref 65–99)
GLUCOSE BLD-MCNC: 212 MG/DL (ref 65–99)
GLUCOSE SERPL-MCNC: 208 MG/DL (ref 65–99)
HCT VFR BLD AUTO: 36.9 % (ref 42–52)
HGB BLD-MCNC: 12.2 G/DL (ref 14–18)
IMM GRANULOCYTES # BLD AUTO: 0.05 K/UL (ref 0–0.11)
IMM GRANULOCYTES NFR BLD AUTO: 0.4 % (ref 0–0.9)
LACTATE BLD-SCNC: 0.9 MMOL/L (ref 0.5–2)
LV EJECT FRACT  99904: 65
LV EJECT FRACT MOD 2C 99903: 67.77
LV EJECT FRACT MOD 4C 99902: 67.6
LV EJECT FRACT MOD BP 99901: 67.16
LYMPHOCYTES # BLD AUTO: 1.78 K/UL (ref 1–4.8)
LYMPHOCYTES NFR BLD: 15.6 % (ref 22–41)
MCH RBC QN AUTO: 30.2 PG (ref 27–33)
MCHC RBC AUTO-ENTMCNC: 33.1 G/DL (ref 33.7–35.3)
MCV RBC AUTO: 91.3 FL (ref 81.4–97.8)
MONOCYTES # BLD AUTO: 1.49 K/UL (ref 0–0.85)
MONOCYTES NFR BLD AUTO: 13 % (ref 0–13.4)
NEUTROPHILS # BLD AUTO: 7.71 K/UL (ref 1.82–7.42)
NEUTROPHILS NFR BLD: 67.5 % (ref 44–72)
NRBC # BLD AUTO: 0 K/UL
NRBC BLD-RTO: 0 /100 WBC
PLATELET # BLD AUTO: 158 K/UL (ref 164–446)
PMV BLD AUTO: 10.7 FL (ref 9–12.9)
POTASSIUM SERPL-SCNC: 3.8 MMOL/L (ref 3.6–5.5)
RBC # BLD AUTO: 4.04 M/UL (ref 4.7–6.1)
RSV RNA SPEC QL NAA+PROBE: NEGATIVE
SARS-COV-2 RNA RESP QL NAA+PROBE: NOTDETECTED
SODIUM SERPL-SCNC: 135 MMOL/L (ref 135–145)
SPECIMEN SOURCE: NORMAL
WBC # BLD AUTO: 11.4 K/UL (ref 4.8–10.8)

## 2022-01-07 PROCEDURE — 700102 HCHG RX REV CODE 250 W/ 637 OVERRIDE(OP): Performed by: STUDENT IN AN ORGANIZED HEALTH CARE EDUCATION/TRAINING PROGRAM

## 2022-01-07 PROCEDURE — 36415 COLL VENOUS BLD VENIPUNCTURE: CPT

## 2022-01-07 PROCEDURE — A9270 NON-COVERED ITEM OR SERVICE: HCPCS | Performed by: STUDENT IN AN ORGANIZED HEALTH CARE EDUCATION/TRAINING PROGRAM

## 2022-01-07 PROCEDURE — 85025 COMPLETE CBC W/AUTO DIFF WBC: CPT

## 2022-01-07 PROCEDURE — 770020 HCHG ROOM/CARE - TELE (206)

## 2022-01-07 PROCEDURE — 93306 TTE W/DOPPLER COMPLETE: CPT | Mod: 26 | Performed by: INTERNAL MEDICINE

## 2022-01-07 PROCEDURE — 700111 HCHG RX REV CODE 636 W/ 250 OVERRIDE (IP): Performed by: STUDENT IN AN ORGANIZED HEALTH CARE EDUCATION/TRAINING PROGRAM

## 2022-01-07 PROCEDURE — 83605 ASSAY OF LACTIC ACID: CPT

## 2022-01-07 PROCEDURE — 80048 BASIC METABOLIC PNL TOTAL CA: CPT

## 2022-01-07 PROCEDURE — 0241U HCHG SARS-COV-2 COVID-19 NFCT DS RESP RNA 4 TRGT MIC: CPT

## 2022-01-07 PROCEDURE — 99232 SBSQ HOSP IP/OBS MODERATE 35: CPT | Mod: GC | Performed by: PSYCHIATRY & NEUROLOGY

## 2022-01-07 PROCEDURE — 700105 HCHG RX REV CODE 258: Performed by: STUDENT IN AN ORGANIZED HEALTH CARE EDUCATION/TRAINING PROGRAM

## 2022-01-07 PROCEDURE — 99233 SBSQ HOSP IP/OBS HIGH 50: CPT | Performed by: STUDENT IN AN ORGANIZED HEALTH CARE EDUCATION/TRAINING PROGRAM

## 2022-01-07 PROCEDURE — C9803 HOPD COVID-19 SPEC COLLECT: HCPCS | Performed by: STUDENT IN AN ORGANIZED HEALTH CARE EDUCATION/TRAINING PROGRAM

## 2022-01-07 PROCEDURE — 82962 GLUCOSE BLOOD TEST: CPT

## 2022-01-07 PROCEDURE — 93306 TTE W/DOPPLER COMPLETE: CPT

## 2022-01-07 PROCEDURE — 97162 PT EVAL MOD COMPLEX 30 MIN: CPT

## 2022-01-07 RX ORDER — LORAZEPAM 0.5 MG/1
0.5 TABLET ORAL
Status: DISCONTINUED | OUTPATIENT
Start: 2022-01-07 | End: 2022-01-08

## 2022-01-07 RX ADMIN — GABAPENTIN 1200 MG: 400 CAPSULE ORAL at 17:07

## 2022-01-07 RX ADMIN — METOPROLOL TARTRATE 50 MG: 50 TABLET, FILM COATED ORAL at 05:57

## 2022-01-07 RX ADMIN — CEFTRIAXONE SODIUM 2 G: 2 INJECTION, POWDER, FOR SOLUTION INTRAMUSCULAR; INTRAVENOUS at 01:09

## 2022-01-07 RX ADMIN — ACETAMINOPHEN 650 MG: 325 TABLET, FILM COATED ORAL at 20:14

## 2022-01-07 RX ADMIN — ASPIRIN 81 MG: 81 TABLET, CHEWABLE ORAL at 05:57

## 2022-01-07 RX ADMIN — LISINOPRIL 40 MG: 20 TABLET ORAL at 17:06

## 2022-01-07 RX ADMIN — GABAPENTIN 1200 MG: 400 CAPSULE ORAL at 12:28

## 2022-01-07 RX ADMIN — THERA TABS 1 TABLET: TAB at 05:57

## 2022-01-07 RX ADMIN — SODIUM CHLORIDE: 9 INJECTION, SOLUTION INTRAVENOUS at 17:18

## 2022-01-07 RX ADMIN — INSULIN HUMAN 2 UNITS: 100 INJECTION, SOLUTION PARENTERAL at 06:03

## 2022-01-07 RX ADMIN — FOLIC ACID 1 MG: 1 TABLET ORAL at 05:57

## 2022-01-07 RX ADMIN — INSULIN HUMAN 2 UNITS: 100 INJECTION, SOLUTION PARENTERAL at 12:24

## 2022-01-07 RX ADMIN — GABAPENTIN 1200 MG: 400 CAPSULE ORAL at 05:57

## 2022-01-07 RX ADMIN — METOPROLOL TARTRATE 50 MG: 50 TABLET, FILM COATED ORAL at 17:07

## 2022-01-07 RX ADMIN — Medication 100 MG: at 05:57

## 2022-01-07 RX ADMIN — ROSUVASTATIN CALCIUM 40 MG: 20 TABLET, FILM COATED ORAL at 05:57

## 2022-01-07 RX ADMIN — DULOXETINE HYDROCHLORIDE 60 MG: 60 CAPSULE, DELAYED RELEASE ORAL at 05:57

## 2022-01-07 RX ADMIN — CLOPIDOGREL BISULFATE 75 MG: 75 TABLET ORAL at 05:57

## 2022-01-07 ASSESSMENT — LIFESTYLE VARIABLES
TREMOR: TREMOR NOT VISIBLE BUT CAN BE FELT, FINGERTIP TO FINGERTIP
VISUAL DISTURBANCES: NOT PRESENT
ANXIETY: MILDLY ANXIOUS
PAROXYSMAL SWEATS: NO SWEAT VISIBLE
AUDITORY DISTURBANCES: NOT PRESENT
VISUAL DISTURBANCES: NOT PRESENT
NAUSEA AND VOMITING: NO NAUSEA AND NO VOMITING
ORIENTATION AND CLOUDING OF SENSORIUM: ORIENTED AND CAN DO SERIAL ADDITIONS
TOTAL SCORE: 2
AGITATION: NORMAL ACTIVITY
ORIENTATION AND CLOUDING OF SENSORIUM: ORIENTED AND CAN DO SERIAL ADDITIONS
HEADACHE, FULLNESS IN HEAD: NOT PRESENT
PAROXYSMAL SWEATS: NO SWEAT VISIBLE
PAROXYSMAL SWEATS: NO SWEAT VISIBLE
HEADACHE, FULLNESS IN HEAD: NOT PRESENT
TOTAL SCORE: 2
NAUSEA AND VOMITING: NO NAUSEA AND NO VOMITING
PAROXYSMAL SWEATS: NO SWEAT VISIBLE
ANXIETY: MILDLY ANXIOUS
VISUAL DISTURBANCES: NOT PRESENT
ORIENTATION AND CLOUDING OF SENSORIUM: ORIENTED AND CAN DO SERIAL ADDITIONS
AUDITORY DISTURBANCES: NOT PRESENT
ORIENTATION AND CLOUDING OF SENSORIUM: ORIENTED AND CAN DO SERIAL ADDITIONS
TOTAL SCORE: 2
AGITATION: NORMAL ACTIVITY
ANXIETY: MILDLY ANXIOUS
TOTAL SCORE: 1
NAUSEA AND VOMITING: NO NAUSEA AND NO VOMITING
VISUAL DISTURBANCES: NOT PRESENT
AUDITORY DISTURBANCES: NOT PRESENT
TREMOR: NO TREMOR
HEADACHE, FULLNESS IN HEAD: NOT PRESENT
HEADACHE, FULLNESS IN HEAD: NOT PRESENT
TREMOR: NO TREMOR
AGITATION: NORMAL ACTIVITY
NAUSEA AND VOMITING: NO NAUSEA AND NO VOMITING
NAUSEA AND VOMITING: NO NAUSEA AND NO VOMITING
TOTAL SCORE: 1
PAROXYSMAL SWEATS: NO SWEAT VISIBLE
ANXIETY: *
AUDITORY DISTURBANCES: NOT PRESENT
NAUSEA AND VOMITING: NO NAUSEA AND NO VOMITING
HEADACHE, FULLNESS IN HEAD: NOT PRESENT
VISUAL DISTURBANCES: VERY MILD SENSITIVITY
TOTAL SCORE: 1
TREMOR: TREMOR NOT VISIBLE BUT CAN BE FELT, FINGERTIP TO FINGERTIP
ORIENTATION AND CLOUDING OF SENSORIUM: ORIENTED AND CAN DO SERIAL ADDITIONS
AUDITORY DISTURBANCES: NOT PRESENT
ANXIETY: MILDLY ANXIOUS
TREMOR: TREMOR NOT VISIBLE BUT CAN BE FELT, FINGERTIP TO FINGERTIP
PAROXYSMAL SWEATS: NO SWEAT VISIBLE
AGITATION: NORMAL ACTIVITY
VISUAL DISTURBANCES: NOT PRESENT
TREMOR: NO TREMOR
AGITATION: NORMAL ACTIVITY
ORIENTATION AND CLOUDING OF SENSORIUM: ORIENTED AND CAN DO SERIAL ADDITIONS
AUDITORY DISTURBANCES: NOT PRESENT
AGITATION: NORMAL ACTIVITY
HEADACHE, FULLNESS IN HEAD: NOT PRESENT
TREMOR: NO TREMOR
ANXIETY: MILDLY ANXIOUS
TOTAL SCORE: 3
AUDITORY DISTURBANCES: NOT PRESENT
NAUSEA AND VOMITING: NO NAUSEA AND NO VOMITING
ANXIETY: MILDLY ANXIOUS
AGITATION: NORMAL ACTIVITY
PAROXYSMAL SWEATS: NO SWEAT VISIBLE
ORIENTATION AND CLOUDING OF SENSORIUM: ORIENTED AND CAN DO SERIAL ADDITIONS
HEADACHE, FULLNESS IN HEAD: NOT PRESENT
VISUAL DISTURBANCES: NOT PRESENT

## 2022-01-07 ASSESSMENT — PAIN DESCRIPTION - PAIN TYPE
TYPE: ACUTE PAIN
TYPE: ACUTE PAIN

## 2022-01-07 ASSESSMENT — ENCOUNTER SYMPTOMS
VOMITING: 0
GASTROINTESTINAL NEGATIVE: 1
RESPIRATORY NEGATIVE: 1
HEADACHES: 1
NECK PAIN: 0
CONSTITUTIONAL NEGATIVE: 1
MYALGIAS: 0
NAUSEA: 0
EYES NEGATIVE: 1
WEAKNESS: 1
CHILLS: 1
DEPRESSION: 1
CARDIOVASCULAR NEGATIVE: 1
MUSCULOSKELETAL NEGATIVE: 1
FEVER: 1

## 2022-01-07 ASSESSMENT — COGNITIVE AND FUNCTIONAL STATUS - GENERAL
SUGGESTED CMS G CODE MODIFIER DAILY ACTIVITY: CK
TOILETING: A LITTLE
MOVING TO AND FROM BED TO CHAIR: A LOT
TURNING FROM BACK TO SIDE WHILE IN FLAT BAD: A LITTLE
CLIMB 3 TO 5 STEPS WITH RAILING: A LOT
MOBILITY SCORE: 24
HELP NEEDED FOR BATHING: A LITTLE
STANDING UP FROM CHAIR USING ARMS: A LOT
DRESSING REGULAR UPPER BODY CLOTHING: A LITTLE
MOVING FROM LYING ON BACK TO SITTING ON SIDE OF FLAT BED: A LOT
SUGGESTED CMS G CODE MODIFIER MOBILITY: CL
DAILY ACTIVITIY SCORE: 18
MOBILITY SCORE: 14
WALKING IN HOSPITAL ROOM: A LITTLE
PERSONAL GROOMING: A LITTLE
DRESSING REGULAR LOWER BODY CLOTHING: A LOT
SUGGESTED CMS G CODE MODIFIER MOBILITY: CH

## 2022-01-07 ASSESSMENT — GAIT ASSESSMENTS
GAIT LEVEL OF ASSIST: SUPERVISED
DEVIATION: INCREASED BASE OF SUPPORT;BRADYKINETIC
DISTANCE (FEET): 350

## 2022-01-07 NOTE — CARE PLAN
Problem: Optimal Care for Alcohol Withdrawal  Goal: Optimal Care for the alcohol withdrawal patient  Outcome: Progressing     Problem: Psychosocial  Goal: Patient's level of anxiety will decrease  1/6/2022 1743 by Juanito Malik, R.N.  Outcome: Progressing  1/6/2022 1613 by Juanito Malik RDULCE MARIA.  Outcome: Progressing  Goal: Spiritual and cultural needs incorporated into hospitalization  Outcome: Progressing   The patient is Stable - Low risk of patient condition declining or worsening         Progress made toward(s) clinical / shift goals:  CIWA    Patient is not progressing towards the following goals:

## 2022-01-07 NOTE — ASSESSMENT & PLAN NOTE
Hx of, post CABG CVA  On DAPT  Rapid called for pupil asymmetry and right sided deficits, Head CT notes no acute intracranial hemorrhage. There has been interval development of an area of encephalomalacia in the right posterior parietal-occipital region. There has been interval progression of multiple bilateral white matter hypodensities with differential diagnosis of small vessel ischemic change, demyelination, or gliosis.  MRI was ordered and pending   suspect stroke recrudesce as he is at his baseline neurological status

## 2022-01-07 NOTE — PROGRESS NOTES
Neuro rapid response nurse called to bedside for concern of neuro changes. NIHSS 2 for lethargy and mild ataxia in LUE. Hospitalist on call notified and head CT ordered.

## 2022-01-07 NOTE — ASSESSMENT & PLAN NOTE
Noted on admission. Hx of depression  Psych following: Pt does not meet criteria for legal hold at this time. Patient might benefit from psychotherapy when he is able to participate. Cont Cymbalta

## 2022-01-07 NOTE — PROGRESS NOTES
Report received, pt care assumed, tele box on and rate verified. VSS and pt is on room air. Pt aaox4, no signs of distress noted at this time. POC discussed with pt and verbalizes no questions. Pt c/o of no pain at this time. Pt denies any additional needs at this time. Bed in lowest position, bed alarm on, pt educated on fall risk and verbalized understanding, call light within reach, will continue with plan of care.

## 2022-01-07 NOTE — PROGRESS NOTES
20:25 Upon assessment of pt, this RN noticed pupillary changes and LUE ataxia. Pt also presented with HR above 140s and a fever of 103.4 F. MD updated and RRT RN Cassi asked to come to bedside. NIH done to rule out stroke. Per Pt he had a fall on New Years day. Ct head ordered.     2126: When going down to CT pt's O2 sat was down to 80%. Pt transported with RN and put on 3.5 L O2 via NC. Stat Lactic ordered and all morning labs due to be drawn earlier     22:00 Pt stable since arrival back onto floor.

## 2022-01-07 NOTE — CARE PLAN
Problem: Provide Safe Environment  Goal: Suicide environmental safety, protocols, policies, and practices will be implemented  Outcome: Progressing  Flowsheets (Taken 1/6/2022 2683)  Safety Interventions:   Patient Room Close to Nursing Station   Patient Wearing Hospital Clothing     Problem: Hemodynamics  Goal: Patient's hemodynamics, fluid balance and neurologic status will be stable or improve  Outcome: Progressing  Note: Pt was tachycardic up to 140s with sbp in 150's upon assessment. MD made aware. Pt's vitals stable now      Problem: Fall Risk  Goal: Patient will remain free from falls  Outcome: Progressing  Note: All fall precautions in place. Pt educated on need to call    The patient is Watcher - Medium risk of patient condition declining or worsening    Shift Goals  Clinical Goals: monitor pupils, monitor hemodynamics, monitor fever   Patient Goals: rest    Progress made toward(s) clinical / shift goals:  Pts hemodynamics improved from beginning of shift. Neuro remains intact     Patient is not progressing towards the following goals:

## 2022-01-07 NOTE — PROGRESS NOTES
[facility] Medicine Daily Progress Note    Date of Service  1/7/2022    Chief Complaint  Guerrero Ann is a 51 y.o. male admitted 1/5/2022 with hypertensive urgency    Hospital Course  51 y.o. male with a past medical hx of DM2, HTN, CAD s/p CABG 9/2021 on DAPT, complicated by post bypass CVA, who presented 1/5/2022 for evaluation of hypertension urgency. He is also an alcohol user.   He did mention SI when he was initially admitted at ED. Behavior health was consulted, not yet meeting criteria for a legal hold. Psych consulted, rec cont Cymbalta.     Interval Problem Update  Patient was noted to have high grade fever with temp 103.4 last night. On IVF. Lactic acid normal  Head CT reviewed. Given his hx of cardioembolic stroke in 2020. Ordered Brain MRI.   Ordered COVID  PT/OT    I have personally seen and examined the patient at bedside. I discussed the plan of care with patient, bedside RN, charge RN and .    Consultants/Specialty  Psychiatry    Code Status  Full Code    Disposition  Patient is not medically cleared for discharge.   Anticipate discharge to TBD.  I have placed the appropriate orders for post-discharge needs.    Review of Systems  Review of Systems   Constitutional: Positive for chills, fever and malaise/fatigue.   Cardiovascular: Negative for chest pain.   Gastrointestinal: Negative for nausea and vomiting.   Genitourinary: Negative for dysuria and urgency.   Musculoskeletal: Negative for myalgias and neck pain.   Neurological: Positive for weakness.   All other systems reviewed and are negative.       Physical Exam  Temp:  [37 °C (98.6 °F)-39.7 °C (103.4 °F)] 37.1 °C (98.8 °F)  Pulse:  [] 87  Resp:  [14-20] 14  BP: (136-167)/() 147/95  SpO2:  [92 %-97 %] 92 %    Physical Exam  Vitals and nursing note reviewed.   Constitutional:       Appearance: Normal appearance. He is ill-appearing.   HENT:      Head: Normocephalic and atraumatic.      Mouth/Throat:       Mouth: Mucous membranes are dry.      Pharynx: Oropharynx is clear.   Eyes:      Pupils: Pupils are equal, round, and reactive to light.   Neck:      Vascular: No carotid bruit.   Cardiovascular:      Rate and Rhythm: Normal rate and regular rhythm.   Pulmonary:      Effort: Pulmonary effort is normal. No respiratory distress.      Breath sounds: Normal breath sounds. No wheezing.   Abdominal:      General: Abdomen is flat. Bowel sounds are normal. There is no distension.      Palpations: Abdomen is soft. There is no mass.      Tenderness: There is no abdominal tenderness.   Musculoskeletal:         General: No tenderness. Normal range of motion.      Cervical back: Neck supple.   Skin:     General: Skin is warm and dry.   Neurological:      General: No focal deficit present.      Mental Status: He is alert and oriented to person, place, and time.      Cranial Nerves: No cranial nerve deficit.      Sensory: No sensory deficit.      Comments: Follows commands   Psychiatric:         Mood and Affect: Mood normal.         Behavior: Behavior normal.         Fluids    Intake/Output Summary (Last 24 hours) at 1/7/2022 1436  Last data filed at 1/7/2022 0800  Gross per 24 hour   Intake 420 ml   Output 1300 ml   Net -880 ml       Laboratory  Recent Labs     01/06/22  1201 01/06/22  2200 01/07/22  0920   WBC 13.9* 13.5* 11.4*   RBC 4.46* 4.21* 4.04*   HEMOGLOBIN 13.7* 13.1* 12.2*   HEMATOCRIT 40.7* 37.5* 36.9*   MCV 91.3 89.1 91.3   MCH 30.7 31.1 30.2   MCHC 33.7 34.9 33.1*   RDW 44.2 43.2 45.2   PLATELETCT 184 195 158*   MPV 10.6 10.8 10.7     Recent Labs     01/06/22  1201 01/06/22  2200 01/07/22  0920   SODIUM 135 133* 135   POTASSIUM 4.0 3.6 3.8   CHLORIDE 100 100 103   CO2 22 18* 21   GLUCOSE 211* 185* 208*   BUN 10 11 15   CREATININE 1.11 1.13 1.01   CALCIUM 8.7 9.1 8.5     Recent Labs     01/06/22  0105   INR 1.10         Recent Labs     01/06/22  2200   TRIGLYCERIDE 100   HDL 29*   LDL 32        Imaging  EC-ECHOCARDIOGRAM COMPLETE W/O CONT   Final Result      CT-HEAD W/O   Final Result      1.  There is no acute intracranial hemorrhage.   2.  There has been interval development of an area of encephalomalacia in the right posterior parietal-occipital region.   3.  There has been interval progression of multiple bilateral white matter hypodensities with differential diagnosis of small vessel ischemic change, demyelination, or gliosis.   4.  There has been interval progression of paranasal sinus disease.         DX-CHEST-PORTABLE (1 VIEW)   Final Result      No acute cardiopulmonary abnormality identified.      MR-BRAIN-W/O    (Results Pending)        Assessment/Plan  * Sepsis (HCC)  Assessment & Plan  This is Sepsis Present on admission  SIRS criteria identified on my evaluation include: Fever, with temperature greater than 101 deg F and Leukocytosis, with WBC greater than 12,000  Source is UTI  Sepsis protocol initiated  Fluid resuscitation ordered per protocol  IV antibiotics as appropriate for source of sepsis  While organ dysfunction may be noted elsewhere in this problem list or in the chart, degree of organ dysfunction does not meet CMS criteria for severe sepsis    UA pos UTI  Follow urine culture and blood culture  Abx ceftriaxone        Suicidal ideation  Assessment & Plan  Noted on admission. Hx of depression  Psych following: Pt does not meet criteria for legal hold at this time. Patient might benefit from psychotherapy when he is able to participate. Cont Cymbalta    Hypertensive urgency- (present on admission)  Assessment & Plan  Antihypertensives prn  Admitted with telemetry  Troponin ordered      S/P CABG x 3- (present on admission)  Assessment & Plan  Hx of CABG 2020  Cont DAPT, statin, metoprolol    History of cardioembolic stroke- (present on admission)  Assessment & Plan  Hx of, post CABG CVA  On DAPT  Head CT notes no acute intracranial hemorrhage. There has been interval development  of an area of encephalomalacia in the right posterior parietal-occipital region. There has been interval progression of multiple bilateral white matter hypodensities with differential diagnosis of small vessel ischemic change, demyelination, or gliosis.  Will check MRI    Essential hypertension- (present on admission)  Assessment & Plan  Continue home meds Lisinopril, Metoprolol  Continue to monitor    DM2 (diabetes mellitus, type 2) ([facility])  Assessment & Plan  ISS   A1C 7.4  accuchecks       VTE prophylaxis: enoxaparin ppx    I have performed a physical exam and reviewed and updated ROS and Plan today (1/7/2022). In review of yesterday's note (1/6/2022), there are no changes except as documented above.

## 2022-01-07 NOTE — CARE PLAN
Problem: Knowledge Deficit - Standard  Goal: Patient and family/care givers will demonstrate understanding of plan of care, disease process/condition, diagnostic tests and medications  Outcome: Progressing  Note: Patient is educated of disease process and condition. Treatment team has included patient in plan of care. All medications indications and side effects are explained. Patient is encouraged to ask questions. Patient indicates understanding.      Problem: Seizure Precautions  Goal: Implementation of seizure precautions  Outcome: Progressing  Note: All seizure precautions in place.     Problem: Psychosocial  Goal: Patient's level of anxiety will decrease  Flowsheets (Taken 1/7/2022 6034)  Decrease Anxiety Level: Collaborated with patient to identify and develop coping strategies  Patient Behaviors: Flat Affect  Note: Patient is assessed appropriately. Patient is helped to identify factors for stress and anxiety. Patient is taught to deep breathe and other forms of stress/anxiety management. Patient verbalizes understanding. Will continue to monitor.    The patient is Stable - Low risk of patient condition declining or worsening    Shift Goals: monitor hemodynamics, monitor for return feverfever   Patient Goals: rest    Progress made toward(s) clinical / shift goals:  ongoing    Patient is not progressing towards the following goals:
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
## 2022-01-08 ENCOUNTER — APPOINTMENT (OUTPATIENT)
Dept: RADIOLOGY | Facility: MEDICAL CENTER | Age: 52
DRG: 872 | End: 2022-01-08
Attending: STUDENT IN AN ORGANIZED HEALTH CARE EDUCATION/TRAINING PROGRAM
Payer: COMMERCIAL

## 2022-01-08 LAB
ANION GAP SERPL CALC-SCNC: 15 MMOL/L (ref 7–16)
BACTERIA UR CULT: ABNORMAL
BACTERIA UR CULT: ABNORMAL
BASOPHILS # BLD AUTO: 0.9 % (ref 0–1.8)
BASOPHILS # BLD: 0.11 K/UL (ref 0–0.12)
BUN SERPL-MCNC: 13 MG/DL (ref 8–22)
CALCIUM SERPL-MCNC: 8.3 MG/DL (ref 8.5–10.5)
CHLORIDE SERPL-SCNC: 105 MMOL/L (ref 96–112)
CO2 SERPL-SCNC: 17 MMOL/L (ref 20–33)
CREAT SERPL-MCNC: 0.96 MG/DL (ref 0.5–1.4)
EOSINOPHIL # BLD AUTO: 0.61 K/UL (ref 0–0.51)
EOSINOPHIL NFR BLD: 5 % (ref 0–6.9)
ERYTHROCYTE [DISTWIDTH] IN BLOOD BY AUTOMATED COUNT: 43.9 FL (ref 35.9–50)
GLUCOSE BLD-MCNC: 139 MG/DL (ref 65–99)
GLUCOSE BLD-MCNC: 160 MG/DL (ref 65–99)
GLUCOSE BLD-MCNC: 211 MG/DL (ref 65–99)
GLUCOSE BLD-MCNC: 70 MG/DL (ref 65–99)
GLUCOSE SERPL-MCNC: 127 MG/DL (ref 65–99)
HCT VFR BLD AUTO: 37.6 % (ref 42–52)
HGB BLD-MCNC: 12.6 G/DL (ref 14–18)
IMM GRANULOCYTES # BLD AUTO: 0.04 K/UL (ref 0–0.11)
IMM GRANULOCYTES NFR BLD AUTO: 0.3 % (ref 0–0.9)
LYMPHOCYTES # BLD AUTO: 2.53 K/UL (ref 1–4.8)
LYMPHOCYTES NFR BLD: 20.9 % (ref 22–41)
MCH RBC QN AUTO: 30.4 PG (ref 27–33)
MCHC RBC AUTO-ENTMCNC: 33.5 G/DL (ref 33.7–35.3)
MCV RBC AUTO: 90.8 FL (ref 81.4–97.8)
MONOCYTES # BLD AUTO: 1.6 K/UL (ref 0–0.85)
MONOCYTES NFR BLD AUTO: 13.2 % (ref 0–13.4)
NEUTROPHILS # BLD AUTO: 7.22 K/UL (ref 1.82–7.42)
NEUTROPHILS NFR BLD: 59.7 % (ref 44–72)
NRBC # BLD AUTO: 0 K/UL
NRBC BLD-RTO: 0 /100 WBC
PHOSPHATE SERPL-MCNC: 3.4 MG/DL (ref 2.5–4.5)
PLATELET # BLD AUTO: 205 K/UL (ref 164–446)
PMV BLD AUTO: 10.5 FL (ref 9–12.9)
POTASSIUM SERPL-SCNC: 4 MMOL/L (ref 3.6–5.5)
RBC # BLD AUTO: 4.14 M/UL (ref 4.7–6.1)
SIGNIFICANT IND 70042: ABNORMAL
SITE SITE: ABNORMAL
SODIUM SERPL-SCNC: 137 MMOL/L (ref 135–145)
SOURCE SOURCE: ABNORMAL
WBC # BLD AUTO: 12.1 K/UL (ref 4.8–10.8)

## 2022-01-08 PROCEDURE — 770020 HCHG ROOM/CARE - TELE (206)

## 2022-01-08 PROCEDURE — 700105 HCHG RX REV CODE 258: Performed by: STUDENT IN AN ORGANIZED HEALTH CARE EDUCATION/TRAINING PROGRAM

## 2022-01-08 PROCEDURE — 700102 HCHG RX REV CODE 250 W/ 637 OVERRIDE(OP): Performed by: STUDENT IN AN ORGANIZED HEALTH CARE EDUCATION/TRAINING PROGRAM

## 2022-01-08 PROCEDURE — 82962 GLUCOSE BLOOD TEST: CPT

## 2022-01-08 PROCEDURE — 85025 COMPLETE CBC W/AUTO DIFF WBC: CPT

## 2022-01-08 PROCEDURE — 99232 SBSQ HOSP IP/OBS MODERATE 35: CPT | Performed by: STUDENT IN AN ORGANIZED HEALTH CARE EDUCATION/TRAINING PROGRAM

## 2022-01-08 PROCEDURE — 80048 BASIC METABOLIC PNL TOTAL CA: CPT

## 2022-01-08 PROCEDURE — A9270 NON-COVERED ITEM OR SERVICE: HCPCS | Performed by: STUDENT IN AN ORGANIZED HEALTH CARE EDUCATION/TRAINING PROGRAM

## 2022-01-08 PROCEDURE — 84100 ASSAY OF PHOSPHORUS: CPT

## 2022-01-08 PROCEDURE — 700111 HCHG RX REV CODE 636 W/ 250 OVERRIDE (IP): Performed by: STUDENT IN AN ORGANIZED HEALTH CARE EDUCATION/TRAINING PROGRAM

## 2022-01-08 PROCEDURE — 70551 MRI BRAIN STEM W/O DYE: CPT

## 2022-01-08 PROCEDURE — 36415 COLL VENOUS BLD VENIPUNCTURE: CPT

## 2022-01-08 RX ORDER — LORAZEPAM 1 MG/1
1 TABLET ORAL ONCE
Status: COMPLETED | OUTPATIENT
Start: 2022-01-08 | End: 2022-01-08

## 2022-01-08 RX ORDER — NITROFURANTOIN 25; 75 MG/1; MG/1
100 CAPSULE ORAL 2 TIMES DAILY WITH MEALS
Status: DISCONTINUED | OUTPATIENT
Start: 2022-01-08 | End: 2022-01-09 | Stop reason: HOSPADM

## 2022-01-08 RX ORDER — AMLODIPINE BESYLATE 5 MG/1
5 TABLET ORAL
Status: DISCONTINUED | OUTPATIENT
Start: 2022-01-08 | End: 2022-01-09 | Stop reason: HOSPADM

## 2022-01-08 RX ADMIN — DULOXETINE HYDROCHLORIDE 90 MG: 60 CAPSULE, DELAYED RELEASE ORAL at 05:06

## 2022-01-08 RX ADMIN — METOPROLOL TARTRATE 50 MG: 50 TABLET, FILM COATED ORAL at 16:50

## 2022-01-08 RX ADMIN — SODIUM CHLORIDE: 9 INJECTION, SOLUTION INTRAVENOUS at 03:16

## 2022-01-08 RX ADMIN — THERA TABS 1 TABLET: TAB at 05:05

## 2022-01-08 RX ADMIN — CEFTRIAXONE SODIUM 2 G: 2 INJECTION, POWDER, FOR SOLUTION INTRAMUSCULAR; INTRAVENOUS at 01:17

## 2022-01-08 RX ADMIN — LORAZEPAM 1 MG: 1 TABLET ORAL at 14:44

## 2022-01-08 RX ADMIN — GABAPENTIN 1200 MG: 400 CAPSULE ORAL at 12:11

## 2022-01-08 RX ADMIN — Medication 100 MG: at 05:05

## 2022-01-08 RX ADMIN — GABAPENTIN 1200 MG: 400 CAPSULE ORAL at 16:50

## 2022-01-08 RX ADMIN — FOLIC ACID 1 MG: 1 TABLET ORAL at 05:06

## 2022-01-08 RX ADMIN — GABAPENTIN 1200 MG: 400 CAPSULE ORAL at 05:05

## 2022-01-08 RX ADMIN — NITROFURANTOIN MONOHYDRATE/MACROCRYSTALLINE 100 MG: 25; 75 CAPSULE ORAL at 16:49

## 2022-01-08 RX ADMIN — CLOPIDOGREL BISULFATE 75 MG: 75 TABLET ORAL at 05:06

## 2022-01-08 RX ADMIN — ROSUVASTATIN CALCIUM 40 MG: 20 TABLET, FILM COATED ORAL at 05:05

## 2022-01-08 RX ADMIN — ENOXAPARIN SODIUM 40 MG: 40 INJECTION SUBCUTANEOUS at 12:11

## 2022-01-08 RX ADMIN — METOPROLOL TARTRATE 50 MG: 50 TABLET, FILM COATED ORAL at 05:06

## 2022-01-08 RX ADMIN — INSULIN HUMAN 2 UNITS: 100 INJECTION, SOLUTION PARENTERAL at 16:48

## 2022-01-08 RX ADMIN — NITROFURANTOIN MONOHYDRATE/MACROCRYSTALLINE 100 MG: 25; 75 CAPSULE ORAL at 12:10

## 2022-01-08 RX ADMIN — AMLODIPINE BESYLATE 5 MG: 5 TABLET ORAL at 12:10

## 2022-01-08 RX ADMIN — ASPIRIN 81 MG: 81 TABLET, CHEWABLE ORAL at 05:06

## 2022-01-08 RX ADMIN — LISINOPRIL 40 MG: 20 TABLET ORAL at 16:49

## 2022-01-08 ASSESSMENT — ENCOUNTER SYMPTOMS
SHORTNESS OF BREATH: 0
FEVER: 1
SORE THROAT: 0
NERVOUS/ANXIOUS: 0
NAUSEA: 0
DIZZINESS: 0
WEAKNESS: 1
FOCAL WEAKNESS: 1
COUGH: 0
DOUBLE VISION: 0
DEPRESSION: 1
BLURRED VISION: 0
MYALGIAS: 0
SENSORY CHANGE: 1
FLANK PAIN: 0

## 2022-01-08 ASSESSMENT — FIBROSIS 4 INDEX: FIB4 SCORE: 0.99

## 2022-01-08 ASSESSMENT — PAIN DESCRIPTION - PAIN TYPE
TYPE: ACUTE PAIN
TYPE: ACUTE PAIN

## 2022-01-08 NOTE — THERAPY
Physical Therapy   Initial Evaluation     Patient Name: Guerrero Ann  Age:  51 y.o., Sex:  male  Medical Record #: [id number]  Today's Date: 1/7/2022     Precautions  Precautions: Other (See Comments)  Comments: Sz precautions and SI    Assessment  Patient is 51 y.o. male presenting for hypertensive urgency, GLF on 1/1/22, and ETOH withdrawal.  He lives w/ his wife who works during the day and able to provide limited support, the pt currently works as a  where he is able to sit while he works (see flowsheet for PLOF and home set up).  Currently, the pt is able to demo bed mobility spv w/ hob flat, STS spv w/ no AD, and gait distance 350' using no AD via slow jessica, limited toe clearance, and wide NEL w/ no LOB observed, distance limited by therapist.  Pt demo's ability to ascend/descend stairs to access home as displayed by SLS >3s BLE w/ no UE support.  Recommend pt dc home w/ OP PT f/u for higher level balance deficits given current functional independence demo'd.  Will not follow, please re consult should there be a change in the pt's condition.     Plan    Recommend Physical Therapy for Evaluation only    DC Equipment Recommendations: None  Discharge Recommendations: Recommend outpatient physical therapy services to address higher level deficits       Subjective/Objective       01/07/22 1605   Prior Living Situation   Prior Services Home-Independent   Housing / Facility 1 Story House   Steps Into Home 2   Steps In Home 0   Equipment Owned Single Point Cane   Lives with - Patient's Self Care Capacity Spouse   Comments pt lives w/ wife who works, pt previously independent w/ functional mobility tasks   Prior Level of Functional Mobility   Bed Mobility Independent   Transfer Status Independent   Ambulation Independent   Distance Ambulation (Feet)   (community distances)   Assistive Devices Used None   Stairs Independent   History of Falls   History of Falls Yes   Date of Last Fall    (reason for admit)   Cognition    Cognition / Consciousness X   Level of Consciousness Alert   Comments pt w/ very flat affect, cooperative to participate in eval   Passive ROM Lower Body   Passive ROM Lower Body WDL   Active ROM Lower Body    Active ROM Lower Body  WDL   Comments observed during functional mobility   Strength Lower Body   Lower Body Strength  WDL   Comments as above   Sensation Lower Body   Lower Extremity Sensation   X   Comments RLE sensation diminished to LT below ankle j, pt reports this is baseline d/t previous sx procedure   Coordination Lower Body    Coordination Lower Body  WDL   Vision   Vision Comments reports no changes in vision   Balance Assessment   Sitting Balance (Static) Fair +   Sitting Balance (Dynamic) Fair +   Standing Balance (Static) Fair   Standing Balance (Dynamic) Fair   Weight Shift Sitting Good   Weight Shift Standing Fair   Comments stand w/ no AD   Gait Analysis   Gait Level Of Assist Supervised   Assistive Device None   Distance (Feet) 350   # of Times Distance was Traveled 1   Deviation Increased Base Of Support;Bradykinetic   # of Stairs Climbed 0   Weight Bearing Status no restrictions   Comments distance limited by therapist   Bed Mobility    Supine to Sit Supervised   Sit to Supine Supervised   Scooting Supervised   Rolling Supervised   Comments hob flat, no bed rails   Functional Mobility   Sit to Stand Supervised   Bed, Chair, Wheelchair Transfer Supervised   Transfer Method Stand Step   Mobility STS eob w/ no AD   Activity Tolerance   Sitting Edge of Bed 10min   Standing 15min   Edema / Skin Assessment   Edema / Skin  Not Assessed   Education Group   Education Provided Role of Physical Therapist   Role of Physical Therapist Patient Response Patient;Acceptance;Explanation;Demonstration;Action Demonstration;Significant Other   Additional Comments pt and pt's SO receptive of edu provided   Problem List    Problems Decreased Activity Tolerance   Session  Information   Date / Session Number  1/7- eval only

## 2022-01-08 NOTE — HOSPITAL COURSE
51-year-old male with past medical history of type 2 diabetes, hypertension, coronary artery disease status post CABG in September 2021 on dual antiplatelet therapy who presented to the hospital on 1/5/2022 for evaluation of hypertensive urgency.  Patient also noted suicidal ideation on admission thus behavioral health was consulted no legal hold was in place patient was seen by psych and his home dose of duloxetine was improved he currently denies SI or HI.  While here patient was noted to have tachycardia and hypertension with blood pressure up to 209/106.  EKG showed normal sinus rhythm.  UDS was negative.  A UA was done which showed 20-50 WBCs as well as many bacteria and leuk esterase.  He was started on Rocephin for urinary tract infection.  During his admission patient was noted to have right-sided deficits and possible pupil asymmetry thus he underwent CT which showed evidence of his previous cardioembolic stroke MRI was ordered for further evaluation.

## 2022-01-08 NOTE — CARE PLAN
Problem: Knowledge Deficit - Standard  Goal: Patient and family/care givers will demonstrate understanding of plan of care, disease process/condition, diagnostic tests and medications  Outcome: Progressing     Problem: Optimal Care for Alcohol Withdrawal  Goal: Optimal Care for the alcohol withdrawal patient  Outcome: Progressing  Note: CIWA protocol in use. Patient remains free from distress.      Problem: Seizure Precautions  Goal: Implementation of seizure precautions  Outcome: Progressing     Problem: Lifestyle Changes  Goal: Patient's ability to identify lifestyle changes and available resources to help reduce recurrence of condition will improve  Outcome: Progressing     Problem: Psychosocial  Goal: Patient's level of anxiety will decrease  Outcome: Progressing  Flowsheets  Taken 1/8/2022 0952 by Kathleen Meyers RStacieN.  Decrease Anxiety Level: Collaborated with Behavorial Health Team  Taken 1/7/2022 2000 by Jemma Kirkland RDULCE MARIA.  Patient Behaviors: Flat Affect   The patient is Stable - Low risk of patient condition declining or worsening    Shift Goals  Clinical Goals: Pt remains hemodynamically stable, monitor fever  Patient Goals: rest    Progress made toward(s) clinical / shift goals:  ongoing      Patient is not progressing towards the following goals:

## 2022-01-08 NOTE — CARE PLAN
Problem: Respiratory  Goal: Patient will achieve/maintain optimum respiratory ventilation and gas exchange  Outcome: Progressing  Note: Pt remains on room air this shift      Problem: Fall Risk  Goal: Patient will remain free from falls  Outcome: Progressing  Note: All fall precautions in place. Pt updated to call before getting up. Bed alarm on    The patient is Watcher - Medium risk of patient condition declining or worsening    Shift Goals  Clinical Goals: Pt remains hemodynamically stable, monitor fever  Patient Goals: rest    Progress made toward(s) clinical / shift goals:  Pt remains hemodynamically stable this shift, fever reduced with acetaminophen     Patient is not progressing towards the following goals:

## 2022-01-08 NOTE — PROGRESS NOTES
[facility] Medicine Daily Progress Note    Date of Service  1/8/2022    Chief Complaint  Guerrero Ann is a 51 y.o. male admitted 1/5/2022 with hypertensive urgency     Hospital Course  51-year-old male with past medical history of type 2 diabetes, hypertension, coronary artery disease status post CABG in September 2021 on dual antiplatelet therapy who presented to the hospital on 1/5/2022 for evaluation of hypertensive urgency.  Patient also noted suicidal ideation on admission thus behavioral health was consulted no legal hold was in place patient was seen by psych and his home dose of duloxetine was improved he currently denies SI or HI.  While here patient was noted to have tachycardia and hypertension with blood pressure up to 209/106.  EKG showed normal sinus rhythm.  UDS was negative.  A UA was done which showed 20-50 WBCs as well as many bacteria and leuk esterase.  He was started on Rocephin for urinary tract infection.  During his admission patient was noted to have right-sided deficits and possible pupil asymmetry thus he underwent CT which showed evidence of his previous cardioembolic stroke MRI was ordered for further evaluation.        Interval Problem Update  Patient seen and examined, sleeping awakes easily to name, no new complaints, feels at his baseline   - UA growing Enterococcus Faecalis, no urinary symptoms, will stop Rocephin and transition to oral Macrobid for total 7 day course   - Tmax 101.2 in last 24 hours, will get LE US given leg pain, fever. Pt started on lovenox for DVT prophylaxis   - MRI pending, Patient is currently at his neurological functional baseline  - CIWA scores <5, no use of CIWA in 24 hours, will discontinue   - BP improved but not at goal, add amlodipine 5mg and dc IVF    Plan on possible DC home tomorrow if patient is fever free x 24 hours and pending MRI results     I have personally seen and examined the patient at bedside. I discussed the plan of  care with patient and bedside RN.    Consultants/Specialty  psychiatry    Code Status  Full Code    Disposition  Patient is not medically cleared for discharge.   Anticipate discharge to to home with close outpatient follow-up.  I have placed the appropriate orders for post-discharge needs.    Review of Systems  Review of Systems   Constitutional: Positive for fever and malaise/fatigue.   HENT: Negative for congestion and sore throat.    Eyes: Negative for blurred vision and double vision.   Respiratory: Negative for cough and shortness of breath.    Cardiovascular: Negative for chest pain and leg swelling.   Gastrointestinal: Negative for nausea.   Genitourinary: Negative for dysuria, flank pain, hematuria and urgency.   Musculoskeletal: Negative for myalgias.   Neurological: Positive for sensory change, focal weakness and weakness. Negative for dizziness.   Psychiatric/Behavioral: Positive for depression. Negative for suicidal ideas. The patient is not nervous/anxious.         Physical Exam  Temp:  [36.3 °C (97.3 °F)-38.4 °C (101.2 °F)] 36.3 °C (97.3 °F)  Pulse:  [74-97] 79  Resp:  [16] 16  BP: (127-147)/() 145/97  SpO2:  [90 %-93 %] 92 %    Physical Exam  Vitals and nursing note reviewed.   Constitutional:       General: He is not in acute distress.     Appearance: He is obese. He is not ill-appearing or toxic-appearing.   HENT:      Head: Normocephalic and atraumatic.      Mouth/Throat:      Mouth: Mucous membranes are moist.      Pharynx: Oropharynx is clear.   Eyes:      Extraocular Movements: Extraocular movements intact.      Conjunctiva/sclera: Conjunctivae normal.   Cardiovascular:      Rate and Rhythm: Normal rate and regular rhythm.      Pulses: Normal pulses.   Pulmonary:      Effort: Pulmonary effort is normal.      Breath sounds: Normal breath sounds.   Abdominal:      General: Bowel sounds are normal. There is no distension.      Palpations: Abdomen is soft.   Musculoskeletal:         General:  Normal range of motion.      Cervical back: Neck supple.   Skin:     General: Skin is warm.   Neurological:      General: No focal deficit present.      Mental Status: He is alert and oriented to person, place, and time.   Psychiatric:      Comments: Flat, slowed mentation         Fluids    Intake/Output Summary (Last 24 hours) at 1/8/2022 1016  Last data filed at 1/8/2022 0831  Gross per 24 hour   Intake 300 ml   Output 600 ml   Net -300 ml       Laboratory  Recent Labs     01/06/22 2200 01/07/22  0920 01/08/22  0658   WBC 13.5* 11.4* 12.1*   RBC 4.21* 4.04* 4.14*   HEMOGLOBIN 13.1* 12.2* 12.6*   HEMATOCRIT 37.5* 36.9* 37.6*   MCV 89.1 91.3 90.8   MCH 31.1 30.2 30.4   MCHC 34.9 33.1* 33.5*   RDW 43.2 45.2 43.9   PLATELETCT 195 158* 205   MPV 10.8 10.7 10.5     Recent Labs     01/06/22 2200 01/07/22  0920 01/08/22  0400   SODIUM 133* 135 137   POTASSIUM 3.6 3.8 4.0   CHLORIDE 100 103 105   CO2 18* 21 17*   GLUCOSE 185* 208* 127*   BUN 11 15 13   CREATININE 1.13 1.01 0.96   CALCIUM 9.1 8.5 8.3*     Recent Labs     01/06/22  0105   INR 1.10         Recent Labs     01/06/22  2200   TRIGLYCERIDE 100   HDL 29*   LDL 32       Imaging  EC-ECHOCARDIOGRAM COMPLETE W/O CONT   Final Result      CT-HEAD W/O   Final Result      1.  There is no acute intracranial hemorrhage.   2.  There has been interval development of an area of encephalomalacia in the right posterior parietal-occipital region.   3.  There has been interval progression of multiple bilateral white matter hypodensities with differential diagnosis of small vessel ischemic change, demyelination, or gliosis.   4.  There has been interval progression of paranasal sinus disease.         DX-CHEST-PORTABLE (1 VIEW)   Final Result      No acute cardiopulmonary abnormality identified.      MR-BRAIN-W/O    (Results Pending)   US-EXTREMITY VENOUS LOWER BILAT    (Results Pending)        Assessment/Plan  * Sepsis (HCC)  Assessment & Plan  This is Sepsis Present on  admission  SIRS criteria identified on my evaluation include: Fever, with temperature greater than 101 deg F and Leukocytosis, with WBC greater than 12,000  Source is UTI  Sepsis protocol initiated  Fluid resuscitation ordered per protocol  IV antibiotics as appropriate for source of sepsis  While organ dysfunction may be noted elsewhere in this problem list or in the chart, degree of organ dysfunction does not meet CMS criteria for severe sepsis    UA pos UTI  urine culture growing enterococcus, stop rocephin and switch to macrobid x 7 days   blood culture negative to date        Suicidal ideation  Assessment & Plan  Noted on admission. Hx of depression  Psych following: Pt does not meet criteria for legal hold at this time. Patient might benefit from psychotherapy when he is able to participate. Cont Cymbalta    Hypertensive urgency- (present on admission)  Assessment & Plan  Antihypertensives prn  Admitted with telemetry  Echo without changes, EF 65%  Amlodipine added   Monitor and adjust as needed      S/P CABG x 3- (present on admission)  Assessment & Plan  Hx of CABG 2020  Cont DAPT, statin, metoprolol    History of cardioembolic stroke- (present on admission)  Assessment & Plan  Hx of, post CABG CVA  On DAPT  Rapid called for pupil asymmetry and right sided deficits, Head CT notes no acute intracranial hemorrhage. There has been interval development of an area of encephalomalacia in the right posterior parietal-occipital region. There has been interval progression of multiple bilateral white matter hypodensities with differential diagnosis of small vessel ischemic change, demyelination, or gliosis.  MRI was ordered and pending   suspect stroke recrudesce as he is at his baseline neurological status     Essential hypertension- (present on admission)  Assessment & Plan  Uncontrolled, Continue home meds Lisinopril, Metoprolol  Add amlodipine   Prn antihypertensives   Continue to monitor    DM2 (diabetes  mellitus, type 2) (HCC)  Assessment & Plan  ISS   A1C 7.4  accuchecks  Resume Emagliflozin on discharge       VTE prophylaxis: enoxaparin ppx    I have performed a physical exam and reviewed and updated ROS and Plan today (1/8/2022). In review of yesterday's note (1/7/2022), there are no changes except as documented above.

## 2022-01-09 ENCOUNTER — APPOINTMENT (OUTPATIENT)
Dept: RADIOLOGY | Facility: MEDICAL CENTER | Age: 52
DRG: 872 | End: 2022-01-09
Attending: STUDENT IN AN ORGANIZED HEALTH CARE EDUCATION/TRAINING PROGRAM
Payer: COMMERCIAL

## 2022-01-09 VITALS
HEIGHT: 62 IN | TEMPERATURE: 97.9 F | DIASTOLIC BLOOD PRESSURE: 101 MMHG | HEART RATE: 72 BPM | RESPIRATION RATE: 18 BRPM | OXYGEN SATURATION: 97 % | SYSTOLIC BLOOD PRESSURE: 143 MMHG | BODY MASS INDEX: 29.94 KG/M2 | WEIGHT: 162.7 LBS

## 2022-01-09 PROBLEM — A41.9 SEPSIS (HCC): Status: RESOLVED | Noted: 2020-10-02 | Resolved: 2022-01-09

## 2022-01-09 PROBLEM — I16.0 HYPERTENSIVE URGENCY: Status: RESOLVED | Noted: 2022-01-06 | Resolved: 2022-01-09

## 2022-01-09 LAB
EKG IMPRESSION: NORMAL
GLUCOSE BLD-MCNC: 134 MG/DL (ref 65–99)
GLUCOSE BLD-MCNC: 190 MG/DL (ref 65–99)

## 2022-01-09 PROCEDURE — 93970 EXTREMITY STUDY: CPT | Mod: 26 | Performed by: INTERNAL MEDICINE

## 2022-01-09 PROCEDURE — 93005 ELECTROCARDIOGRAM TRACING: CPT | Performed by: STUDENT IN AN ORGANIZED HEALTH CARE EDUCATION/TRAINING PROGRAM

## 2022-01-09 PROCEDURE — 700102 HCHG RX REV CODE 250 W/ 637 OVERRIDE(OP): Performed by: STUDENT IN AN ORGANIZED HEALTH CARE EDUCATION/TRAINING PROGRAM

## 2022-01-09 PROCEDURE — 99222 1ST HOSP IP/OBS MODERATE 55: CPT | Performed by: PSYCHIATRY & NEUROLOGY

## 2022-01-09 PROCEDURE — 99239 HOSP IP/OBS DSCHRG MGMT >30: CPT | Performed by: STUDENT IN AN ORGANIZED HEALTH CARE EDUCATION/TRAINING PROGRAM

## 2022-01-09 PROCEDURE — 93970 EXTREMITY STUDY: CPT

## 2022-01-09 PROCEDURE — 93010 ELECTROCARDIOGRAM REPORT: CPT | Performed by: INTERNAL MEDICINE

## 2022-01-09 PROCEDURE — 82962 GLUCOSE BLOOD TEST: CPT

## 2022-01-09 PROCEDURE — A9270 NON-COVERED ITEM OR SERVICE: HCPCS | Performed by: STUDENT IN AN ORGANIZED HEALTH CARE EDUCATION/TRAINING PROGRAM

## 2022-01-09 PROCEDURE — 700111 HCHG RX REV CODE 636 W/ 250 OVERRIDE (IP): Performed by: STUDENT IN AN ORGANIZED HEALTH CARE EDUCATION/TRAINING PROGRAM

## 2022-01-09 RX ORDER — NITROFURANTOIN 25; 75 MG/1; MG/1
100 CAPSULE ORAL 2 TIMES DAILY WITH MEALS
Qty: 10 CAPSULE | Refills: 0 | Status: SHIPPED | OUTPATIENT
Start: 2022-01-09 | End: 2022-01-14

## 2022-01-09 RX ORDER — DULOXETIN HYDROCHLORIDE 30 MG/1
90 CAPSULE, DELAYED RELEASE ORAL DAILY
Qty: 90 CAPSULE | Refills: 3 | Status: SHIPPED | OUTPATIENT
Start: 2022-01-10 | End: 2022-02-15 | Stop reason: SDUPTHER

## 2022-01-09 RX ORDER — AMLODIPINE BESYLATE 5 MG/1
5 TABLET ORAL DAILY
Qty: 30 TABLET | Refills: 3 | Status: SHIPPED | OUTPATIENT
Start: 2022-01-10 | End: 2022-05-09 | Stop reason: SDUPTHER

## 2022-01-09 RX ORDER — METOPROLOL TARTRATE 50 MG/1
50 TABLET, FILM COATED ORAL 2 TIMES DAILY
Qty: 60 TABLET | Refills: 3 | Status: SHIPPED | OUTPATIENT
Start: 2022-01-09 | End: 2022-05-09 | Stop reason: SDUPTHER

## 2022-01-09 RX ORDER — GABAPENTIN 400 MG/1
1200 CAPSULE ORAL 3 TIMES DAILY
Qty: 270 CAPSULE | Refills: 3 | Status: SHIPPED | OUTPATIENT
Start: 2022-01-09 | End: 2022-02-15 | Stop reason: SDUPTHER

## 2022-01-09 RX ADMIN — DULOXETINE HYDROCHLORIDE 90 MG: 60 CAPSULE, DELAYED RELEASE ORAL at 05:25

## 2022-01-09 RX ADMIN — FOLIC ACID 1 MG: 1 TABLET ORAL at 05:26

## 2022-01-09 RX ADMIN — ASPIRIN 81 MG: 81 TABLET, CHEWABLE ORAL at 05:28

## 2022-01-09 RX ADMIN — AMLODIPINE BESYLATE 5 MG: 5 TABLET ORAL at 05:26

## 2022-01-09 RX ADMIN — GABAPENTIN 1200 MG: 400 CAPSULE ORAL at 12:11

## 2022-01-09 RX ADMIN — Medication 100 MG: at 05:25

## 2022-01-09 RX ADMIN — GABAPENTIN 1200 MG: 400 CAPSULE ORAL at 05:26

## 2022-01-09 RX ADMIN — METOPROLOL TARTRATE 50 MG: 50 TABLET, FILM COATED ORAL at 05:25

## 2022-01-09 RX ADMIN — INSULIN HUMAN 2 UNITS: 100 INJECTION, SOLUTION PARENTERAL at 11:44

## 2022-01-09 RX ADMIN — CLOPIDOGREL BISULFATE 75 MG: 75 TABLET ORAL at 05:26

## 2022-01-09 RX ADMIN — NITROFURANTOIN MONOHYDRATE/MACROCRYSTALLINE 100 MG: 25; 75 CAPSULE ORAL at 08:10

## 2022-01-09 RX ADMIN — ENOXAPARIN SODIUM 40 MG: 40 INJECTION SUBCUTANEOUS at 05:26

## 2022-01-09 RX ADMIN — ROSUVASTATIN CALCIUM 40 MG: 20 TABLET, FILM COATED ORAL at 05:28

## 2022-01-09 RX ADMIN — THERA TABS 1 TABLET: TAB at 05:25

## 2022-01-09 ASSESSMENT — PAIN DESCRIPTION - PAIN TYPE: TYPE: ACUTE PAIN

## 2022-01-09 NOTE — CARE PLAN
Problem: Hemodynamics  Goal: Patient's hemodynamics, fluid balance and neurologic status will be stable or improve  Outcome: Progressing  Note: Hemodynamics remain stable this shift      Problem: Fall Risk  Goal: Patient will remain free from falls  Outcome: Progressing  Note: Pt has bed alarm in place. Will call, but cannot hold it so gets up. Pt educated that he needs to wait for assistance due to staggering gait at times.    The patient is Watcher - Medium risk of patient condition declining or worsening    Shift Goals  Clinical Goals: pt remain free from injury  Patient Goals: sleep    Progress made toward(s) clinical / shift goals:  Pt has all fall precautions in place. Educated on need for assistance     Patient is not progressing towards the following goals:

## 2022-01-09 NOTE — CONSULTS
BRIEF INPATIENT BEHAVIORAL HEALTH NOTE      Received IP consult to behavioral health.  Due to the volume of consults and transfers to higher levels of care, patient will be seen 1/9/21.  Thank you.    Jeanna Taveras [facility]  Behavioral Health Therapist

## 2022-01-09 NOTE — DISCHARGE INSTRUCTIONS
Discharge Instructions    Discharged to home by car with relative. Discharged via wheelchair, hospital escort: Yes.  Special equipment needed: Not Applicable    Be sure to schedule a follow-up appointment with your primary care doctor or any specialists as instructed.     Discharge Plan:   Diet Plan: Discussed  Activity Level: Discussed  Confirmed Follow up Appointment: Patient to Call and Schedule Appointment  Confirmed Symptoms Management: Discussed  Medication Reconciliation Updated: Yes  Influenza Vaccine Indication: Patient Refuses    I understand that a diet low in cholesterol, fat, and sodium is recommended for good health. Unless I have been given specific instructions below for another diet, I accept this instruction as my diet prescription.   Other diet: As tolerated      Special Instructions: None    · Is patient discharged on Warfarin / Coumadin?   No   Hypertension, Adult  Hypertension is another name for high blood pressure. High blood pressure forces your heart to work harder to pump blood. This can cause problems over time.  There are two numbers in a blood pressure reading. There is a top number (systolic) over a bottom number (diastolic). It is best to have a blood pressure that is below 120/80. Healthy choices can help lower your blood pressure, or you may need medicine to help lower it.  What are the causes?  The cause of this condition is not known. Some conditions may be related to high blood pressure.  What increases the risk?  · Smoking.  · Having type 2 diabetes mellitus, high cholesterol, or both.  · Not getting enough exercise or physical activity.  · Being overweight.  · Having too much fat, sugar, calories, or salt (sodium) in your diet.  · Drinking too much alcohol.  · Having long-term (chronic) kidney disease.  · Having a family history of high blood pressure.  · Age. Risk increases with age.  · Race. You may be at higher risk if you are .  · Gender. Men are at higher  risk than women before age 45. After age 65, women are at higher risk than men.  · Having obstructive sleep apnea.  · Stress.  What are the signs or symptoms?  · High blood pressure may not cause symptoms. Very high blood pressure (hypertensive crisis) may cause:  ? Headache.  ? Feelings of worry or nervousness (anxiety).  ? Shortness of breath.  ? Nosebleed.  ? A feeling of being sick to your stomach (nausea).  ? Throwing up (vomiting).  ? Changes in how you see.  ? Very bad chest pain.  ? Seizures.  How is this treated?  · This condition is treated by making healthy lifestyle changes, such as:  ? Eating healthy foods.  ? Exercising more.  ? Drinking less alcohol.  · Your health care provider may prescribe medicine if lifestyle changes are not enough to get your blood pressure under control, and if:  ? Your top number is above 130.  ? Your bottom number is above 80.  · Your personal target blood pressure may vary.  Follow these instructions at home:  Eating and drinking    · If told, follow the DASH eating plan. To follow this plan:  ? Fill one half of your plate at each meal with fruits and vegetables.  ? Fill one fourth of your plate at each meal with whole grains. Whole grains include whole-wheat pasta, brown rice, and whole-grain bread.  ? Eat or drink low-fat dairy products, such as skim milk or low-fat yogurt.  ? Fill one fourth of your plate at each meal with low-fat (lean) proteins. Low-fat proteins include fish, chicken without skin, eggs, beans, and tofu.  ? Avoid fatty meat, cured and processed meat, or chicken with skin.  ? Avoid pre-made or processed food.  · Eat less than 1,500 mg of salt each day.  · Do not drink alcohol if:  ? Your doctor tells you not to drink.  ? You are pregnant, may be pregnant, or are planning to become pregnant.  · If you drink alcohol:  ? Limit how much you use to:  § 0-1 drink a day for women.  § 0-2 drinks a day for men.  ? Be aware of how much alcohol is in your drink. In  the [place] one drink equals one 12 oz bottle of beer (355 mL), one 5 oz glass of wine (148 mL), or one 1½ oz glass of hard liquor (44 mL).  Lifestyle    · Work with your doctor to stay at a healthy weight or to lose weight. Ask your doctor what the best weight is for you.  · Get at least 30 minutes of exercise most days of the week. This may include walking, swimming, or biking.  · Get at least 30 minutes of exercise that strengthens your muscles (resistance exercise) at least 3 days a week. This may include lifting weights or doing Pilates.  · Do not use any products that contain nicotine or tobacco, such as cigarettes, e-cigarettes, and chewing tobacco. If you need help quitting, ask your doctor.  · Check your blood pressure at home as told by your doctor.  · Keep all follow-up visits as told by your doctor. This is important.  Medicines  · Take over-the-counter and prescription medicines only as told by your doctor. Follow directions carefully.  · Do not skip doses of blood pressure medicine. The medicine does not work as well if you skip doses. Skipping doses also puts you at risk for problems.  · Ask your doctor about side effects or reactions to medicines that you should watch for.  Contact a doctor if you:  · Think you are having a reaction to the medicine you are taking.  · Have headaches that keep coming back (recurring).  · Feel dizzy.  · Have swelling in your ankles.  · Have trouble with your vision.  Get help right away if you:  · Get a very bad headache.  · Start to feel mixed up (confused).  · Feel weak or numb.  · Feel faint.  · Have very bad pain in your:  ? Chest.  ? Belly (abdomen).  · Throw up more than once.  · Have trouble breathing.  Summary  · Hypertension is another name for high blood pressure.  · High blood pressure forces your heart to work harder to pump blood.  · For most people, a normal blood pressure is less than 120/80.  · Making healthy choices can help lower blood pressure. If your  blood pressure does not get lower with healthy choices, you may need to take medicine.  This information is not intended to replace advice given to you by your health care provider. Make sure you discuss any questions you have with your health care provider.  Document Released: 06/05/2009 Document Revised: 08/28/2019 Document Reviewed: 08/28/2019  [employer] Patient Education © 2020 [employer]      Depression / Suicide Risk    As you are discharged from this [facility] facility, it is important to learn how to keep safe from harming yourself.    Recognize the warning signs:  · Abrupt changes in personality, positive or negative- including increase in energy   · Giving away possessions  · Change in eating patterns- significant weight changes-  positive or negative  · Change in sleeping patterns- unable to sleep or sleeping all the time   · Unwillingness or inability to communicate  · Depression  · Unusual sadness, discouragement and loneliness  · Talk of wanting to die  · Neglect of personal appearance   · Rebelliousness- reckless behavior  · Withdrawal from people/activities they love  · Confusion- inability to concentrate     If you or a loved one observes any of these behaviors or has concerns about self-harm, here's what you can do:  · Talk about it- your feelings and reasons for harming yourself  · Remove any means that you might use to hurt yourself (examples: pills, rope, extension cords, firearm)  · Get professional help from the community (Mental Health, Substance Abuse, psychological counseling)  · Do not be alone:Call your Safe Contact- someone whom you trust who will be there for you.  · Call your local CRISIS HOTLINE 283-7317 or 558-910-3381  · Call your local Children's Mobile Crisis Response Team [place] (373) 674-2904 or www.[employer]  · Call the toll free National Suicide Prevention Hotlines   · National Suicide Prevention Lifeline 210-843-SYRM (5344)  · National [employer] Network  800-SUICIDE (159-8306)    Take medications as directed  Complete your total antibiotic course   Follow up with your PCP to monitor medication adjustments

## 2022-01-09 NOTE — CONSULTS
Neurology Initial Consult H&P  Neurohospitalist Service, [facility]    Referring Physician: Leonela Holder M.D.    Chief Complaint   Patient presents with   • Blood Pressure Problem     Pt was at a routine appt with PCP on Monday and BP was high, was told to check it daily since then. Today, /118,  at home. Denies SOB, CP, vision changes. C/o HA and dizziness x yesterday.       HPI: Guerrero Ann is a 51 year old man with type 2 diabetes, hypertension, CABG, history of prior cardioembolic stroke with no residual symptoms, hyperlipidemia- presenting to [facility] with headache and dizziness, and found to be in hypertensive urgency.  He had an episode of L side weakness in setting of tachycardia and fever- now resolved, but prompted an MRI brain that revealed possible new infarcts.  Neurology consulted to assist with further management.  He reports his prior stroke was related to cardiac surgery in September 2020.  At that time, he had L > R side weakness.  He reports compliance with medications at home which includes ASA, plavix, statin, and anti-hypertensives. On ROS he reports no constitutional symptoms.       Review of systems: In addition to what is detailed in the HPI above, all other systems reviewed and are negative.    Past Medical History:    has a past medical history of Diabetes ([facility]), CABG (09/2020), Hypertension, Kidney stones, MI, old, Pneumonia, Psychiatric problem, and Stroke ([facility]).    FHx:  family history includes Heart Disease in his maternal grandfather, maternal grandmother, and mother; No Known Problems in his father; Stroke (age of onset: 47) in his mother; Stroke (age of onset: 54) in his maternal grandfather; Stroke (age of onset: 85) in his maternal grandmother.    SHx:   reports that he has never smoked. His smokeless tobacco use includes chew. He reports current alcohol use of about 2.4 oz of alcohol per week. He reports that he does  not use drugs.    Allergies:  Allergies   Allergen Reactions   • Isosorbide Unspecified     Hallucinations       Medications:    Current Facility-Administered Medications:   •  nitrofurantoin (MACROBID) capsule 100 mg, 100 mg, Oral, BID WITH MEALS, Leonela Holder M.D., 100 mg at 01/09/22 0810  •  amLODIPine (NORVASC) tablet 5 mg, 5 mg, Oral, Q DAY, Leonela Holder M.D., 5 mg at 01/09/22 0526  •  enoxaparin (LOVENOX) inj 40 mg, 40 mg, Subcutaneous, DAILY, Leonela Holder M.D., 40 mg at 01/09/22 0526  •  DULoxetine (CYMBALTA) capsule 90 mg, 90 mg, Oral, DAILY, Jeremias Lane M.D., 90 mg at 01/09/22 0525  •  aspirin (ASA) chewable tab 81 mg, 81 mg, Oral, DAILY, Veronica Pelaez M.D., 81 mg at 01/09/22 0528  •  clopidogrel (PLAVIX) tablet 75 mg, 75 mg, Oral, DAILY, Veronica Pelaez M.D., 75 mg at 01/09/22 0526  •  lisinopril (PRINIVIL) tablet 40 mg, 40 mg, Oral, Q EVENING, Veronica Pelaez M.D., 40 mg at 01/08/22 1649  •  gabapentin (NEURONTIN) capsule 1,200 mg, 1,200 mg, Oral, TID, Veronica Pelaez M.D., 1,200 mg at 01/09/22 0526  •  senna-docusate (PERICOLACE or SENOKOT S) 8.6-50 MG per tablet 2 Tablet, 2 Tablet, Oral, BID **AND** polyethylene glycol/lytes (MIRALAX) PACKET 1 Packet, 1 Packet, Oral, QDAY PRN **AND** magnesium hydroxide (MILK OF MAGNESIA) suspension 30 mL, 30 mL, Oral, QDAY PRN **AND** bisacodyl (DULCOLAX) suppository 10 mg, 10 mg, Rectal, QDAY PRN, Veronica Pelaez M.D.  •  acetaminophen (Tylenol) tablet 650 mg, 650 mg, Oral, Q6HRS PRN, Veronica Pelaez M.D., 650 mg at 01/07/22 2014  •  [COMPLETED] detox IV 1000 mL (D5LR + magnesium 1g + thiamine 100 mg) infusion, , Intravenous, Once, Stopped at 01/06/22 0516 **AND** thiamine (Vitamin B-1) tablet 100 mg, 100 mg, Oral, DAILY, 100 mg at 01/09/22 0525 **AND** multivitamin (THERAGRAN) tablet 1 Tablet, 1 Tablet, Oral, DAILY, 1 Tablet at 01/09/22 0525 **AND** folic acid (FOLVITE) tablet 1 mg, 1 mg, Oral, DAILY, Veronica Pelaez M.D., 1 mg at  01/09/22 0526  •  guaiFENesin dextromethorphan (ROBITUSSIN DM) 100-10 MG/5ML syrup 10 mL, 10 mL, Oral, Q6HRS PRN, Veronica Pelaez M.D.  •  ondansetron (ZOFRAN) syringe/vial injection 4 mg, 4 mg, Intravenous, Q4HRS PRN, Veronica Pelaez M.D.  •  ondansetron (ZOFRAN ODT) dispertab 4 mg, 4 mg, Oral, Q4HRS PRN, Veronica Pelaez M.D.  •  promethazine (PHENERGAN) tablet 12.5-25 mg, 12.5-25 mg, Oral, Q4HRS PRN, Veronica Pelaez M.D.  •  promethazine (PHENERGAN) suppository 12.5-25 mg, 12.5-25 mg, Rectal, Q4HRS PRN, Veronica Pelaez M.D.  •  prochlorperazine (COMPAZINE) injection 5-10 mg, 5-10 mg, Intravenous, Q4HRS PRN, Veronica Pelaez M.D.  •  insulin regular (HumuLIN R,NovoLIN R) injection, 2-9 Units, Subcutaneous, 4X/DAY ACHS, 2 Units at 01/08/22 1648 **AND** POC blood glucose manual result, , , Q AC AND BEDTIME(S) **AND** NOTIFY MD and PharmD, , , Once **AND** Administer 20 grams of glucose (approximately 8 ounces of fruit juice) every 15 minutes PRN FSBG less than 70 mg/dL, , , PRN **AND** dextrose 50% (D50W) injection 50 mL, 50 mL, Intravenous, Q15 MIN PRN, Veronica Pelaez M.D.  •  labetalol (NORMODYNE/TRANDATE) injection 10 mg, 10 mg, Intravenous, Q4HRS PRN, Veronica Pelaez M.D.  •  Metoprolol Tartrate (LOPRESSOR) injection 5 mg, 5 mg, Intravenous, Q5 MIN PRN, Veronica Pelaez M.D.  •  metoprolol tartrate (LOPRESSOR) tablet 50 mg, 50 mg, Oral, BID, Avila Oliveira M.D., 50 mg at 01/09/22 0525  •  rosuvastatin (CRESTOR) tablet 40 mg, 40 mg, Oral, DAILY, Shreyas Romano M.D., 40 mg at 01/09/22 0528  •  hydrALAZINE (APRESOLINE) injection 20 mg, 20 mg, Intravenous, Q6HRS PRN, Min Montgomery D.OStacie, 20 mg at 01/05/22 2104    Physical Examination:     General: Patient is awake and in no acute distress  Eye: Examination of optic disks not indicated at this time given acuity of consult  Neck: There is normal range of motion  CV: regular rate   Extremities:  clear, dry, intact, without peripheral  "edema    NEUROLOGICAL EXAM:     /101 Comment: Rn notified   Pulse 72   Temp 36.6 °C (97.9 °F) (Oral)   Resp 18   Ht 1.575 m (5' 2\")   Wt 73.8 kg (162 lb 11.2 oz)   SpO2 97%   BMI 29.76 kg/m²       Mental status: Awake, alert and fully oriented  Speech and language: Speech is clear and fluent. The patient is able to name and repeat, and follow commands  Cranial nerve exam: Visual fields are full. There is no nystagmus. Extraocular muscles are intact. Face is symmetric.  Motor exam: There is sustained antigravity with no downward drift in bilateral arms and legs.  There is no pronator drift. Tone is normal. No abnormal movements were seen on exam.  Sensory exam: Reacts to tactile in all 4 extremities, no neglect to double stim   Coordination: No ataxia on bilateral finger-to-nose testing  Gait: Deferred due to patient preference      NIHSS: National Institutes of Health Stroke Scale    [0] 1a:Level of Consciousness    0-alert 1-drowsy   2-stupor   3-coma  [0] 1b:LOC Questions                  0-both  1-one      2-neither  [0] 1c:LOC Commands                   0-both  1-one      2-neither  [0] 2: Best Gaze                     0-nl    1-partial  2-forced  [0] 3: Visual Fields                   0-nl    1-partial  2-complete 3-bilat  [0] 4: Facial Paresis                0-nl    1-minor    2-partial  3-full  MOTOR                       0-nl  [0] 5: Right Arm           1-drift  [0] 6: Left Arm             2-some effort vs gravity  [0] 7: Right Leg           3-no effort vs gravity  [0] 8: Left Leg             4-no movement                             x-untestable  [0] 9: Limb Ataxia                    0-abs   1-1_limb   2-2+_limbs       x-untestable  [0] 10:Sensory                        0-nl    1-partial  2-dense  [0] 11:Best Language/Aphasia         0-nl    1-mild/mod 2-severe   3-mute  [0] 12:Dysarthria                     0-nl    1-mild/mod 2-severe       x-untestable  [0] 13:Neglect/Inattention           "  0-none  1-partial  2-complete  [0] TOTAL      Objective Data:    Labs:  Lab Results   Component Value Date/Time    PROTHROMBTM 13.9 01/06/2022 01:05 AM    INR 1.10 01/06/2022 01:05 AM      Lab Results   Component Value Date/Time    WBC 12.1 (H) 01/08/2022 06:58 AM    RBC 4.14 (L) 01/08/2022 06:58 AM    HEMOGLOBIN 12.6 (L) 01/08/2022 06:58 AM    HEMATOCRIT 37.6 (L) 01/08/2022 06:58 AM    MCV 90.8 01/08/2022 06:58 AM    MCH 30.4 01/08/2022 06:58 AM    MCHC 33.5 (L) 01/08/2022 06:58 AM    MPV 10.5 01/08/2022 06:58 AM    NEUTSPOLYS 59.70 01/08/2022 06:58 AM    LYMPHOCYTES 20.90 (L) 01/08/2022 06:58 AM    MONOCYTES 13.20 01/08/2022 06:58 AM    EOSINOPHILS 5.00 01/08/2022 06:58 AM    BASOPHILS 0.90 01/08/2022 06:58 AM    HYPOCHROMIA 1+ 10/01/2020 05:48 AM    ANISOCYTOSIS 1+ 10/04/2020 05:08 AM      Lab Results   Component Value Date/Time    SODIUM 137 01/08/2022 04:00 AM    POTASSIUM 4.0 01/08/2022 04:00 AM    CHLORIDE 105 01/08/2022 04:00 AM    CO2 17 (L) 01/08/2022 04:00 AM    GLUCOSE 127 (H) 01/08/2022 04:00 AM    BUN 13 01/08/2022 04:00 AM    CREATININE 0.96 01/08/2022 04:00 AM      Lab Results   Component Value Date/Time    CHOLSTRLTOT 81 (L) 01/06/2022 10:00 PM    LDL 32 01/06/2022 10:00 PM    HDL 29 (A) 01/06/2022 10:00 PM    TRIGLYCERIDE 100 01/06/2022 10:00 PM       Lab Results   Component Value Date/Time    ALKPHOSPHAT 93 01/06/2022 10:00 PM    ASTSGOT 14 01/06/2022 10:00 PM    ALTSGPT 12 01/06/2022 10:00 PM    TBILIRUBIN 0.6 01/06/2022 10:00 PM        Imaging/Testing:    I interpreted and/or reviewed the patient's neuroimaging    MR-BRAIN-W/O   Final Result      1.  2 cortical foci of more acute infarction within a chronic infarct in the right parietal occipital region.   2.  Multiple, scattered remote infarcts.   3.  Moderate to severe white matter disease, which can be seen with microvascular ischemic changes, gliosis, or demyelination.   4.  Left thalamic, chronic microhemorrhage.      EC-ECHOCARDIOGRAM  COMPLETE W/O CONT   Final Result      CT-HEAD W/O   Final Result      1.  There is no acute intracranial hemorrhage.   2.  There has been interval development of an area of encephalomalacia in the right posterior parietal-occipital region.   3.  There has been interval progression of multiple bilateral white matter hypodensities with differential diagnosis of small vessel ischemic change, demyelination, or gliosis.   4.  There has been interval progression of paranasal sinus disease.         DX-CHEST-PORTABLE (1 VIEW)   Final Result      No acute cardiopulmonary abnormality identified.      US-EXTREMITY VENOUS LOWER BILAT    (Results Pending)       Assessment and Plan:  Guerrero Ann is a 51 year old man with multiple vascular risk factors and history of strokes after CABG, presenting with hypertensive urgency, with episode of L side weakness now resolved.  MRI brain does reveal DWI positivity in a prior infarct bed, however without ADC correlate.  Given location is within prior embolic infarct- I suspect this signal is more related to T2 shine through due to gliosis and not new ischemia.  Furthermore, it be extraordinary for emboli to go precisely to the same distal cortical artery as prior.  As such, I do not recommend additional stroke diagnostics or changes in secondary stroke prevention regimen at this time.  Vascular imaging in Sept 2020 did not reveal extensive cervical or intracranial plaque burden.  I suspect that his episode in setting of tachycardia and fever is more likely recrudescence, and not transient ischemia.    Problem list:  1.  History of stroke  2.  Transient neurologic symptoms  3.  Hypertension  4.  Hyperlipidemia  5.  Type 2 diabetes    Plan:   - q4h neurochecks while admitted   - continue ASA 81mg daily, plavix 75mg daily    - continue home rosuvastatin for LDL goal < 70   - long-term BP goal is 110-130/60-80; titrate PO medications to goal   - DM management for  HgbA1c goal < 7    The evaluation of the patient, and recommended management, was discussed with the attending hospitalist.     Tim Quevedo MD  Neurohospitalist, Acute Care Services

## 2022-01-09 NOTE — PROGRESS NOTES
Handoff report received. Assumed patient care. PT is reclined in bed, AAOx4, no complaints of pain or discomfor. Tele box is on, POC discussed with PT and all questions addressed. Safety precautions in place. Call light and personal belongings within reach. Educated to call for assistance if needed.

## 2022-01-11 LAB
BACTERIA BLD CULT: NORMAL
BACTERIA BLD CULT: NORMAL
SIGNIFICANT IND 70042: NORMAL
SIGNIFICANT IND 70042: NORMAL
SITE SITE: NORMAL
SITE SITE: NORMAL
SOURCE SOURCE: NORMAL
SOURCE SOURCE: NORMAL

## 2022-01-21 ENCOUNTER — OFFICE VISIT (OUTPATIENT)
Dept: MEDICAL GROUP | Facility: PHYSICIAN GROUP | Age: 52
End: 2022-01-21
Payer: COMMERCIAL

## 2022-01-21 ENCOUNTER — HOSPITAL ENCOUNTER (OUTPATIENT)
Facility: MEDICAL CENTER | Age: 52
End: 2022-01-21
Attending: STUDENT IN AN ORGANIZED HEALTH CARE EDUCATION/TRAINING PROGRAM
Payer: COMMERCIAL

## 2022-01-21 VITALS
DIASTOLIC BLOOD PRESSURE: 88 MMHG | RESPIRATION RATE: 18 BRPM | SYSTOLIC BLOOD PRESSURE: 128 MMHG | OXYGEN SATURATION: 97 % | HEIGHT: 62 IN | WEIGHT: 158 LBS | BODY MASS INDEX: 29.08 KG/M2 | HEART RATE: 67 BPM | TEMPERATURE: 97.6 F

## 2022-01-21 DIAGNOSIS — R30.0 DYSURIA: ICD-10-CM

## 2022-01-21 DIAGNOSIS — I10 ESSENTIAL HYPERTENSION: ICD-10-CM

## 2022-01-21 LAB
APPEARANCE UR: NORMAL
BILIRUB UR STRIP-MCNC: NEGATIVE MG/DL
COLOR UR AUTO: YELLOW
GLUCOSE UR STRIP.AUTO-MCNC: 250 MG/DL
KETONES UR STRIP.AUTO-MCNC: NEGATIVE MG/DL
LEUKOCYTE ESTERASE UR QL STRIP.AUTO: NORMAL
NITRITE UR QL STRIP.AUTO: NEGATIVE
PH UR STRIP.AUTO: 6 [PH] (ref 5–8)
PROT UR QL STRIP: 100 MG/DL
RBC UR QL AUTO: NORMAL
SP GR UR STRIP.AUTO: 1.02
UROBILINOGEN UR STRIP-MCNC: 0.2 MG/DL

## 2022-01-21 PROCEDURE — 99213 OFFICE O/P EST LOW 20 MIN: CPT | Performed by: STUDENT IN AN ORGANIZED HEALTH CARE EDUCATION/TRAINING PROGRAM

## 2022-01-21 PROCEDURE — 81002 URINALYSIS NONAUTO W/O SCOPE: CPT | Performed by: STUDENT IN AN ORGANIZED HEALTH CARE EDUCATION/TRAINING PROGRAM

## 2022-01-21 PROCEDURE — 87086 URINE CULTURE/COLONY COUNT: CPT

## 2022-01-21 PROCEDURE — 87077 CULTURE AEROBIC IDENTIFY: CPT

## 2022-01-21 PROCEDURE — 87186 SC STD MICRODIL/AGAR DIL: CPT

## 2022-01-21 ASSESSMENT — FIBROSIS 4 INDEX: FIB4 SCORE: 0.99

## 2022-01-21 NOTE — PROGRESS NOTES
CC:  Diagnoses of Dysuria and Essential hypertension were pertinent to this visit.    HISTORY OF THE PRESENT ILLNESS: Patient is a 51 y.o. male.  He is once again accompanied by his wife.  This pleasant patient is here today for ER follow for hypertension and UTI.    Mr. Ann reported to our [facility] emergency department on 1/5/2022 for greatly elevated systolic blood pressures.  He was found to have a urinary tract infection with susceptible Enterococcus facialis which was treated with nitrofurantoin.    He is here today for ER follow-up and continued burning with urination.    1. Essential hypertension  Current regimen of metoprolol 50 mg twice daily, lisinopril 40 mg daily, amlodipine 5 mg daily.  He does provide a blood pressure log today, average of today's office reading with the last 7 days of home readings shows an average systolic blood pressure of 143 and an average diastolic blood pressure of 92.  He has been fully compliant with his medications.    2.  Urinary tract infection.  Patient admits to continued burning with urination.  He denies frequency, color change, foul odor.  He denies fevers or flank pain.  He did complete his entire course of nitrofurantoin.      No problem-specific Assessment & Plan notes found for this encounter.      Current Outpatient Medications Ordered in Epic   Medication Sig Dispense Refill   • DULoxetine (CYMBALTA) 30 MG Cap DR Particles Take 3 Capsules by mouth every day. 90 Capsule 3   • gabapentin (NEURONTIN) 400 MG Cap Take 3 Capsules by mouth 3 times a day. 270 Capsule 3   • metoprolol tartrate (LOPRESSOR) 50 MG Tab Take 1 Tablet by mouth 2 times a day. 60 Tablet 3   • amLODIPine (NORVASC) 5 MG Tab Take 1 Tablet by mouth every day. 30 Tablet 3   • Empagliflozin 10 MG Tab Take 10 mg by mouth every day. 90 Tablet 0   • Semaglutide,0.25 or 0.5MG/DOS, (OZEMPIC, 0.25 OR 0.5 MG/DOSE,) 2 MG/1.5ML Solution Pen-injector INJECT 0.25 MG SUBCUTANEOUSLY ONCE A WEEK (Patient taking  "differently: INJECT 0.25 MG SUBCUTANEOUSLY ONCE A WEEK ON FRIDAY) 2 mL 0   • allopurinol (ZYLOPRIM) 100 MG Tab Take 1 Tablet by mouth every day. 90 Tablet 3   • rosuvastatin (CRESTOR) 40 MG tablet Take 1 Tablet by mouth every day. 30 Tablet 6   • lisinopril (PRINIVIL) 40 MG tablet Take 1 Tablet by mouth every evening. 90 Tablet 3   • metFORMIN (GLUCOPHAGE) 1000 MG tablet Take 1 tablet by mouth 2 times a day, with meals. 180 tablet 3   • Coenzyme Q10 (COQ10) 100 MG Cap Take 300 mg by mouth every day.     • Blood Glucose Test Strips Use one strip to test blood sugar once daily . 100 Each 5   • therapeutic multivitamin-minerals (THERAGRAN-M) Tab Take 1 Tab by mouth every day.     • clopidogrel (PLAVIX) 75 MG Tab Take 1 Tab by mouth every day. 90 Tab 3   • Lancets Lancets order: Lancets for True Metrix meter. Sig: use TID and prn ssx high or low sugar. #100 RF x 3 100 Each 3   • acetaminophen (TYLENOL) 325 MG Tab Take 2 Tabs by mouth every four hours as needed for Moderate Pain. 30 Tab 0   • aspirin (ASA) 81 MG Chew Tab chewable tablet Take 1 Tab by mouth every day. 30 Tab 2     No current Epic-ordered facility-administered medications on file.     ROS:   Gen: no fevers/chills, unplanned changes in weight  Pulm: no sob, no cough  CV: no chest pain/pressure, no palpitations  : See HPI.  MSk: no myalgias  Skin: no rash  Neuro: no headaches, no numbness/tingling    Objective:     Exam: /88 (BP Location: Left arm, Patient Position: Sitting, BP Cuff Size: Adult)   Pulse 67   Temp 36.4 °C (97.6 °F) (Temporal)   Resp 18   Ht 1.575 m (5' 2\")   Wt 71.7 kg (158 lb)   SpO2 97%  Body mass index is 28.9 kg/m².    General: Normal appearing. No distress.  Neck: Supple without JVD or bruit.   Cardiovascular: Regular rate and rhythm without murmur. Radial pulses are intact and equal bilaterally.  Skin: Warm and dry.  No obvious lesions.    A chaperone was offered to the patient during today's exam. Patient declined " chaperone.    Labs:   1/6/2022:  -Urine microbiology positive for e. facialis susceptible to nitrofurantoin.    1/21/2022:  -POCT urinalysis, glucose 250, bilirubin negative, ketones negative, specific gravity 1.025, blood small, pH 6.0, protein 100 mg/dL, urobilinogen 0.2, nitrate negative, leukocyte esterase small.    Assessment & Plan:   51 y.o. male with the following -    1. Hypertension  -Chronic with recent exacerbation.  -Continue metoprolol 50 mg twice daily  -Continue lisinopril 40 mg daily  -Continue amlodipine 5 mg daily  -I have asked him to provide his blood pressure log on and he weeks where he has consistent systolics greater than 140 or diastolics greater than 90.    2. Dysuria  -Acute.  -The burning with urination and leukocyte esterase may be the result of recent urinary catheter use.  We will hold off on antimicrobial therapy until we have returned on urine microbiology.  -POCT urinalysis  -Urine microbiology to confirm cure.      Of note, due to insurance issues the patient still is not on his Jardiance.  We have started a prior authorization process today to get the patient on this important medication.      Return if symptoms worsen or fail to improve.    Please note that this dictation was created using voice recognition software. I have made every reasonable attempt to correct obvious errors, but I expect that there are errors of grammar and possibly content that I did not discover before finalizing the note.    Tim Castillo PA-C 1/21/2022

## 2022-01-24 ENCOUNTER — PATIENT MESSAGE (OUTPATIENT)
Dept: MEDICAL GROUP | Facility: PHYSICIAN GROUP | Age: 52
End: 2022-01-24

## 2022-01-24 LAB
BACTERIA UR CULT: ABNORMAL
BACTERIA UR CULT: ABNORMAL
SIGNIFICANT IND 70042: ABNORMAL
SITE SITE: ABNORMAL
SOURCE SOURCE: ABNORMAL

## 2022-01-24 RX ORDER — NITROFURANTOIN 25; 75 MG/1; MG/1
100 CAPSULE ORAL 2 TIMES DAILY
Qty: 10 CAPSULE | Refills: 0 | Status: SHIPPED | OUTPATIENT
Start: 2022-01-24 | End: 2022-03-25

## 2022-02-14 ENCOUNTER — PATIENT MESSAGE (OUTPATIENT)
Dept: CARDIOLOGY | Facility: MEDICAL CENTER | Age: 52
End: 2022-02-14

## 2022-02-14 DIAGNOSIS — I10 ESSENTIAL HYPERTENSION, BENIGN: ICD-10-CM

## 2022-02-14 RX ORDER — LISINOPRIL 40 MG/1
40 TABLET ORAL EVERY EVENING
Qty: 90 TABLET | Refills: 2 | Status: SHIPPED | OUTPATIENT
Start: 2022-02-14 | End: 2023-05-01 | Stop reason: SDUPTHER

## 2022-02-15 ENCOUNTER — PATIENT MESSAGE (OUTPATIENT)
Dept: CARDIOLOGY | Facility: MEDICAL CENTER | Age: 52
End: 2022-02-15
Payer: COMMERCIAL

## 2022-02-15 DIAGNOSIS — E11.8 TYPE 2 DIABETES MELLITUS WITH COMPLICATION, WITHOUT LONG-TERM CURRENT USE OF INSULIN (HCC): ICD-10-CM

## 2022-02-15 DIAGNOSIS — F43.21 ADJUSTMENT DISORDER WITH DEPRESSED MOOD: ICD-10-CM

## 2022-02-15 DIAGNOSIS — E11.42 DIABETIC POLYNEUROPATHY ASSOCIATED WITH TYPE 2 DIABETES MELLITUS (HCC): ICD-10-CM

## 2022-02-15 DIAGNOSIS — I25.10 CORONARY ARTERY DISEASE INVOLVING NATIVE HEART: ICD-10-CM

## 2022-02-15 DIAGNOSIS — I63.9 CARDIOEMBOLIC STROKE (HCC): ICD-10-CM

## 2022-02-15 DIAGNOSIS — F41.9 ANXIETY: ICD-10-CM

## 2022-02-15 DIAGNOSIS — R45.851 SUICIDAL IDEATION: ICD-10-CM

## 2022-02-15 RX ORDER — SEMAGLUTIDE 1.34 MG/ML
INJECTION, SOLUTION SUBCUTANEOUS
Qty: 2 ML | Refills: 5 | Status: SHIPPED | OUTPATIENT
Start: 2022-02-15 | End: 2023-05-01 | Stop reason: SDUPTHER

## 2022-02-15 RX ORDER — DULOXETIN HYDROCHLORIDE 30 MG/1
30 CAPSULE, DELAYED RELEASE ORAL 3 TIMES DAILY
Qty: 270 CAPSULE | Refills: 3 | Status: SHIPPED | OUTPATIENT
Start: 2022-02-15 | End: 2022-03-25

## 2022-02-15 RX ORDER — GABAPENTIN 400 MG/1
1200 CAPSULE ORAL 3 TIMES DAILY
Qty: 270 CAPSULE | Refills: 3 | Status: SHIPPED | OUTPATIENT
Start: 2022-02-15 | End: 2022-03-01 | Stop reason: SDUPTHER

## 2022-02-16 RX ORDER — CLOPIDOGREL BISULFATE 75 MG/1
75 TABLET ORAL DAILY
Qty: 90 TABLET | Refills: 2 | Status: SHIPPED | OUTPATIENT
Start: 2022-02-16 | End: 2022-02-28

## 2022-02-17 ENCOUNTER — TELEPHONE (OUTPATIENT)
Dept: MEDICAL GROUP | Facility: PHYSICIAN GROUP | Age: 52
End: 2022-02-17
Payer: COMMERCIAL

## 2022-02-27 DIAGNOSIS — I25.10 CORONARY ARTERY DISEASE INVOLVING NATIVE HEART: ICD-10-CM

## 2022-02-27 DIAGNOSIS — I63.9 CARDIOEMBOLIC STROKE (HCC): ICD-10-CM

## 2022-02-28 RX ORDER — CLOPIDOGREL BISULFATE 75 MG/1
TABLET ORAL
Qty: 30 TABLET | Refills: 0 | Status: SHIPPED | OUTPATIENT
Start: 2022-02-28 | End: 2023-05-01 | Stop reason: SDUPTHER

## 2022-03-01 DIAGNOSIS — E11.42 DIABETIC POLYNEUROPATHY ASSOCIATED WITH TYPE 2 DIABETES MELLITUS (HCC): ICD-10-CM

## 2022-03-01 RX ORDER — GABAPENTIN 400 MG/1
1200 CAPSULE ORAL 3 TIMES DAILY
Qty: 270 CAPSULE | Refills: 3 | Status: SHIPPED | OUTPATIENT
Start: 2022-03-01 | End: 2022-04-12 | Stop reason: SDUPTHER

## 2022-03-03 ENCOUNTER — TELEPHONE (OUTPATIENT)
Dept: MEDICAL GROUP | Facility: PHYSICIAN GROUP | Age: 52
End: 2022-03-03
Payer: COMMERCIAL

## 2022-03-04 DIAGNOSIS — R45.851 SUICIDAL IDEATION: ICD-10-CM

## 2022-03-04 DIAGNOSIS — F43.21 ADJUSTMENT DISORDER WITH DEPRESSED MOOD: ICD-10-CM

## 2022-03-04 DIAGNOSIS — E11.42 DIABETIC POLYNEUROPATHY ASSOCIATED WITH TYPE 2 DIABETES MELLITUS (HCC): ICD-10-CM

## 2022-03-04 DIAGNOSIS — F41.9 ANXIETY: ICD-10-CM

## 2022-03-08 ENCOUNTER — TELEPHONE (OUTPATIENT)
Dept: MEDICAL GROUP | Facility: PHYSICIAN GROUP | Age: 52
End: 2022-03-08
Payer: COMMERCIAL

## 2022-03-08 NOTE — TELEPHONE ENCOUNTER
MEDICATION PRIOR AUTHORIZATION NEEDED:    1. Name of Medication: Gabapentin     2. Requested By (Name of Pharmacy): [facility]      3. Is insurance on file current? Yes    4. What is the name & phone number of the 3rd party payor? Theresa            DOCUMENTATION OF PAR STATUS:    1. Name of Medication & Dose: Gabapentin      2. Name of Prescription Coverage Company & phone #: [employer]     3. Date Prior Auth Submitted: 3/8/2022    4. What information was given to obtain insurance decision? MyChart encounter notes     5. Prior Auth Status? Pending    6. Patient Notified: no       SW/CM Discharge Plan  Informed patient is ready for discharge. Patient’s discharge destination is [facility]. Patient to be picked up by EMS 1045.  Patient/interested person has been counseled for post hospitalization care.  Patient agrees and understands goals and plan. Initial implementation of the patient’s discharge plan has been arranged, including any devices/equipment needed for discharge. Discharge plan communicated to MD, RN, Receiving Facility/Agency and son.        After Visit Summary - Transition Report Information  Receiving Agency/Facility: [facility]   Receiving Agency/Facility phone number: [phone]  Receiving Agency/Facility fax number: [phone]  Receiving Agency/Facility address: [address]   Receiving Agency/Facility city/state: [place], [place]   Receiving Agency/Facility Type: Skilled Nursing Facility - Subacute

## 2022-03-21 ENCOUNTER — TELEPHONE (OUTPATIENT)
Dept: MEDICAL GROUP | Facility: PHYSICIAN GROUP | Age: 52
End: 2022-03-21
Payer: COMMERCIAL

## 2022-03-21 NOTE — TELEPHONE ENCOUNTER
MEDICATION PRIOR AUTHORIZATION NEEDED:    1. Name of Medication: Gabapentin 400Mg     2. Requested By (Name of Pharmacy): [facility]      3. Is insurance on file current? Yes     4. What is the name & phone number of the 3rd party payor? Theresa     DOCUMENTATION OF PAR STATUS:    1. Name of Medication & Dose: Gabapentin 400MG      2. Name of Prescription Coverage Company & phone #: [employer]     3. Date Prior Auth Submitted: 3/21/2022    4. What information was given to obtain insurance decision? [employer] notes     5. Prior Auth Status? Pending    6. Patient Notified: N\A

## 2022-03-22 RX ORDER — DULOXETIN HYDROCHLORIDE 60 MG/1
60 CAPSULE, DELAYED RELEASE ORAL DAILY
COMMUNITY
End: 2022-04-04 | Stop reason: SDUPTHER

## 2022-03-23 ENCOUNTER — TELEPHONE (OUTPATIENT)
Dept: MEDICAL GROUP | Facility: PHYSICIAN GROUP | Age: 52
End: 2022-03-23
Payer: COMMERCIAL

## 2022-03-23 NOTE — TELEPHONE ENCOUNTER
FINAL PRIOR AUTHORIZATION STATUS:    1.  Name of Medication & Dose: Jardiance 10 mg Tablet     2. Prior Auth Status: Denied.  Reason: Did not meet the INS guidelines for approval    3. Action Taken: Pharmacy Notified: yes Patient Notified: yes      Is there a different medication you wish to Rx?  See scanned denial doc in media

## 2022-03-25 ENCOUNTER — OFFICE VISIT (OUTPATIENT)
Dept: CARDIOLOGY | Facility: MEDICAL CENTER | Age: 52
End: 2022-03-25
Payer: COMMERCIAL

## 2022-03-25 VITALS
SYSTOLIC BLOOD PRESSURE: 114 MMHG | BODY MASS INDEX: 29.26 KG/M2 | RESPIRATION RATE: 16 BRPM | WEIGHT: 159 LBS | HEIGHT: 62 IN | OXYGEN SATURATION: 100 % | DIASTOLIC BLOOD PRESSURE: 76 MMHG | HEART RATE: 73 BPM

## 2022-03-25 DIAGNOSIS — Z86.73 HISTORY OF CARDIOEMBOLIC STROKE: ICD-10-CM

## 2022-03-25 DIAGNOSIS — I25.10 CORONARY ARTERY DISEASE INVOLVING NATIVE CORONARY ARTERY OF NATIVE HEART WITHOUT ANGINA PECTORIS: ICD-10-CM

## 2022-03-25 DIAGNOSIS — E11.69 TYPE 2 DIABETES MELLITUS WITH OTHER SPECIFIED COMPLICATION, WITHOUT LONG-TERM CURRENT USE OF INSULIN (HCC): ICD-10-CM

## 2022-03-25 DIAGNOSIS — I10 ESSENTIAL HYPERTENSION: ICD-10-CM

## 2022-03-25 DIAGNOSIS — Z95.1 S/P CABG X 3: ICD-10-CM

## 2022-03-25 PROCEDURE — 99214 OFFICE O/P EST MOD 30 MIN: CPT | Performed by: NURSE PRACTITIONER

## 2022-03-25 RX ORDER — TADALAFIL 20 MG/1
TABLET ORAL
COMMUNITY
Start: 2022-02-10 | End: 2023-05-01

## 2022-03-25 RX ORDER — GABAPENTIN 600 MG/1
1200 TABLET ORAL 3 TIMES DAILY
COMMUNITY
Start: 2022-01-19 | End: 2022-03-25

## 2022-03-25 ASSESSMENT — ENCOUNTER SYMPTOMS
COUGH: 0
ABDOMINAL PAIN: 0
FALLS: 0
SHORTNESS OF BREATH: 0
FEVER: 0
PALPITATIONS: 0
ORTHOPNEA: 0
PND: 0
DEPRESSION: 0
MYALGIAS: 0
SENSORY CHANGE: 1
NERVOUS/ANXIOUS: 0
DIZZINESS: 0
CLAUDICATION: 0

## 2022-03-25 ASSESSMENT — FIBROSIS 4 INDEX: FIB4 SCORE: 0.99

## 2022-03-25 NOTE — PROGRESS NOTES
Chief Complaint   Patient presents with   • Coronary Artery Disease     F/V Dx: Coronary artery disease involving native coronary artery of native heart without angina pectoris   • Hypertension     F/V Dx; Essential hypertension, benign   • Coronary Artery Bypass Graft (CABG)     F/V Dx: S/P CABG x 3     Subjective     Guerrero Ann is a 50 y.o. male who presents today for follow up.    He is a patient of Dr. Mead in our office. 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    He presents today with his wife. He continues to work with the psychiatrist and pain specialist on his neuropathy and mobility. He has a neuro-stimulator trial in place and this is helping.    He overall from a cardiac standpoint feels well.     He has no chest pain, shortness of breath, edema, dizziness/lightheadedness, or palpitations.    Past Medical History:   Diagnosis Date   • Diabetes (HCC)     oral meds only   • Hx of CABG 09/2020    x 3   • Hypertension    • Kidney stones    • MI, old    • Pneumonia    • Psychiatric problem     depression and anxiety   • Stroke (HCC)      Past Surgical History:   Procedure Laterality Date   • MULTIPLE CORONARY ARTERY BYPASS ENDO VEIN HARVEST  2020    Procedure: CABG, WITH ENDOSCOPIC VEIN PROCUREMENT- X3;  Surgeon: Michael Chicas M.D.;  Location: SURGERY [facility];  Service: Cardiothoracic   • MARGOTH  2020    Procedure: ECHOCARDIOGRAM, TRANSESOPHAGEAL;  Surgeon: Michael Chicas M.D.;  Location: SURGERY [facility];  Service: Cardiothoracic   • STENT PLACEMENT  2017    1 cardiac stent   • OTHER ORTHOPEDIC SURGERY      rt wrist fracture with fixation 34 years ago     Family History   Problem Relation Age of Onset   • Stroke Mother 47        had 3 devastating strokes,  56yo stroke complications    • Heart Disease Mother    • No Known Problems Father         does not know his father.   • Stroke Maternal Grandmother 85   • Heart Disease Maternal Grandmother    • Stroke Maternal Grandfather 54   • Heart Disease Maternal Grandfather      Social History     Socioeconomic History   • Marital status: Single     Spouse name: Not on file   • Number of children: Not on file   • Years of education: Not on file   • Highest education level: Associate degree: occupational, technical, or vocational program   Occupational History   • Not on file   Tobacco Use   • Smoking status: Never Smoker   • Smokeless tobacco: Former User     Types: Chew   Vaping Use   • Vaping Use: Never used   Substance and Sexual Activity   • Alcohol use: Yes     Alcohol/week: 2.4 oz     Types: 4 Cans of beer per week     Comment: 4/week    • Drug use: No   • Sexual activity: Not Currently     Partners: Female     Comment: adriana. wrk: Verónica Naches Cutting.    Other Topics Concern   • Not on file   Social History Narrative   • Not on file     Social Determinants of Health     Financial Resource Strain: Low Risk    • Difficulty of Paying Living Expenses: Not hard at all   Food Insecurity: No Food Insecurity   • Worried About Running Out of Food in the Last Year: Never true   • Ran Out of Food in the Last Year: Never true   Transportation Needs: No Transportation Needs   • Lack of Transportation (Medical): No   • Lack of Transportation (Non-Medical): No   Physical Activity: Inactive   • Days of Exercise per Week: 0 days   • Minutes of Exercise per Session: 0 min   Stress: No Stress Concern Present   • Feeling of Stress : Not at all   Social Connections: Moderately Isolated   • Frequency of Communication with Friends and Family: More than three times a week   • Frequency of Social Gatherings with Friends and Family: Twice a week   • Attends Alevism Services: Never   • Active Member of Clubs or Organizations: No   • Attends Club or Organization  Meetings: Never   • Marital Status: Living with partner   Intimate Partner Violence: Not on file   Housing Stability: Low Risk    • Unable to Pay for Housing in the Last Year: No   • Number of Places Lived in the Last Year: 1   • Unstable Housing in the Last Year: No     Allergies   Allergen Reactions   • Isosorbide Unspecified     Hallucinations     Outpatient Encounter Medications as of 3/25/2022   Medication Sig Dispense Refill   • DULoxetine (CYMBALTA) 60 MG Cap DR Particles delayed-release capsule Take 60 mg by mouth every day.     • gabapentin (NEURONTIN) 400 MG Cap Take 3 Capsules by mouth 3 times a day. 270 Capsule 3   • clopidogrel (PLAVIX) 75 MG Tab Take 1 tablet by mouth once daily 30 Tablet 0   • Semaglutide,0.25 or 0.5MG/DOS, (OZEMPIC, 0.25 OR 0.5 MG/DOSE,) 2 MG/1.5ML Solution Pen-injector INJECT 0.25 MG SUBCUTANEOUSLY ONCE A WEEK 2 mL 5   • metformin (GLUCOPHAGE) 1000 MG tablet Take 1 Tablet by mouth 2 times a day with meals. 180 Tablet 3   • Empagliflozin 10 MG Tab Take 10 mg by mouth every day. 90 Tablet 3   • lisinopril (PRINIVIL) 40 MG tablet Take 1 Tablet by mouth every evening. 90 Tablet 2   • amLODIPine (NORVASC) 5 MG Tab Take 1 Tablet by mouth every day. 30 Tablet 3   • allopurinol (ZYLOPRIM) 100 MG Tab Take 1 Tablet by mouth every day. 90 Tablet 3   • rosuvastatin (CRESTOR) 40 MG tablet Take 1 Tablet by mouth every day. 30 Tablet 6   • Coenzyme Q10 (COQ10) 100 MG Cap Take 300 mg by mouth every day.     • therapeutic multivitamin-minerals (THERAGRAN-M) Tab Take 1 Tab by mouth every day.     • acetaminophen (TYLENOL) 325 MG Tab Take 2 Tabs by mouth every four hours as needed for Moderate Pain. 30 Tab 0   • aspirin (ASA) 81 MG Chew Tab chewable tablet Take 1 Tab by mouth every day. 30 Tab 2   • tadalafil (CIALIS) 20 MG tablet TAKE ONE TABLET BY MOUTH APPROXIMATELY 2 HOURS PRIOR TO SEXUAL ACTIVITY     • [DISCONTINUED] gabapentin (NEURONTIN) 600 MG tablet Take 1,200 mg by mouth 3 times a day.     •  "[DISCONTINUED] DULoxetine (CYMBALTA) 30 MG Cap DR Particles Take 1 Capsule by mouth in the morning, at noon, and at bedtime. (Patient not taking: No sig reported) 270 Capsule 3   • [DISCONTINUED] nitrofurantoin (MACROBID) 100 MG Cap Take 1 Capsule by mouth 2 times a day. 10 Capsule 0   • metoprolol tartrate (LOPRESSOR) 50 MG Tab Take 1 Tablet by mouth 2 times a day. 60 Tablet 3   • Blood Glucose Test Strips Use one strip to test blood sugar once daily . 100 Each 5   • Lancets Lancets order: Lancets for True Metrix meter. Sig: use TID and prn ssx high or low sugar. #100 RF x 3 100 Each 3     No facility-administered encounter medications on file as of 3/25/2022.     Review of Systems   Constitutional: Negative for fever and malaise/fatigue.   Respiratory: Negative for cough and shortness of breath.    Cardiovascular: Negative for chest pain, palpitations, orthopnea, claudication, leg swelling and PND.   Gastrointestinal: Negative for abdominal pain.   Musculoskeletal: Negative for falls and myalgias.   Neurological: Positive for sensory change. Negative for dizziness.        Neuropathy in right leg-now with neuropathic treatment trial   Psychiatric/Behavioral: Negative for depression. The patient is not nervous/anxious.               Objective     /76 (BP Location: Left arm, Patient Position: Sitting, BP Cuff Size: Adult)   Pulse 73   Resp 16   Ht 1.575 m (5' 2\")   Wt 72.1 kg (159 lb)   SpO2 100%   BMI 29.08 kg/m²     Physical Exam  Vitals and nursing note reviewed.   Constitutional:       Appearance: Normal appearance. He is well-developed and normal weight.   HENT:      Head: Normocephalic and atraumatic.   Neck:      Vascular: No JVD.   Cardiovascular:      Rate and Rhythm: Normal rate and regular rhythm.      Pulses: Normal pulses.      Heart sounds: Normal heart sounds.   Pulmonary:      Effort: Pulmonary effort is normal.      Breath sounds: Normal breath sounds.   Musculoskeletal:         General: " Normal range of motion.   Skin:     General: Skin is warm and dry.      Capillary Refill: Capillary refill takes less than 2 seconds.   Neurological:      General: No focal deficit present.      Mental Status: He is alert and oriented to person, place, and time. Mental status is at baseline.   Psychiatric:         Attention and Perception: Attention normal.         Mood and Affect: Mood normal. Affect is flat.         Speech: Speech normal.         Behavior: Behavior normal.         Thought Content: Thought content normal.         Cognition and Memory: Cognition and memory normal.         Judgment: Judgment normal.                Assessment & Plan     1. Type 2 diabetes mellitus with other specified complication, without long-term current use of insulin (HCC)     2. Essential hypertension     3. Coronary artery disease involving native coronary artery of native heart without angina pectoris  Comp Metabolic Panel    Lipid Profile   4. History of cardioembolic stroke     5. S/P CABG x 3         Medical Decision Making: Today's Assessment/Status/Plan:        1. CAD with CABG   -no angina or MIRANDA  -cont asa, statin lifelong  -echo shows normal EF  -post-op neuropathy in R foot, managed by pyshiatrist     2. HTN  -good control on lisinopril, metoprolol, and amlodipine  -BP goal <130/80 consistently    3. HLD  -statin  -LDL goal <70 with CAD  -repeat lipid and cmp     4. DM type II  -followed by PCP    5. CVA post CABG  -flat depressed affect  -cont asa, plavix, and statin    6. Depression/anxiety  -managed by PCP    Patient is to follow up with Marilu ERICKSON in 6 months with labs

## 2022-04-04 ENCOUNTER — PATIENT MESSAGE (OUTPATIENT)
Dept: PHYSICAL MEDICINE AND REHAB | Facility: REHABILITATION | Age: 52
End: 2022-04-04
Payer: COMMERCIAL

## 2022-04-04 DIAGNOSIS — I63.9 CARDIOEMBOLIC STROKE (HCC): ICD-10-CM

## 2022-04-04 DIAGNOSIS — M79.2 NEUROPATHIC PAIN: ICD-10-CM

## 2022-04-04 RX ORDER — DULOXETIN HYDROCHLORIDE 60 MG/1
60 CAPSULE, DELAYED RELEASE ORAL DAILY
Qty: 30 CAPSULE | Refills: 1 | Status: SHIPPED | OUTPATIENT
Start: 2022-04-04 | End: 2022-06-16 | Stop reason: SDUPTHER

## 2022-04-05 ENCOUNTER — TELEPHONE (OUTPATIENT)
Dept: MEDICAL GROUP | Facility: PHYSICIAN GROUP | Age: 52
End: 2022-04-05
Payer: COMMERCIAL

## 2022-04-05 NOTE — TELEPHONE ENCOUNTER
FINAL PRIOR AUTHORIZATION STATUS:    1.  Name of Medication & Dose: Gabapentin 400 mg     2. Prior Auth Status: Denied.  Reason: Amount exceeds daily allotted of 6 capsules/day for 30 days    3. Action Taken: Pharmacy Notified: yes Patient Notified: no    Did you want to try another medication?

## 2022-04-12 DIAGNOSIS — E11.42 DIABETIC POLYNEUROPATHY ASSOCIATED WITH TYPE 2 DIABETES MELLITUS (HCC): ICD-10-CM

## 2022-04-12 RX ORDER — GABAPENTIN 400 MG/1
1200 CAPSULE ORAL 3 TIMES DAILY
Qty: 270 CAPSULE | Refills: 0 | Status: SHIPPED | OUTPATIENT
Start: 2022-04-12 | End: 2022-04-14 | Stop reason: SDUPTHER

## 2022-04-14 ENCOUNTER — TELEPHONE (OUTPATIENT)
Dept: PHYSICAL MEDICINE AND REHAB | Facility: REHABILITATION | Age: 52
End: 2022-04-14
Payer: COMMERCIAL

## 2022-04-14 RX ORDER — GABAPENTIN 600 MG/1
1200 TABLET ORAL 3 TIMES DAILY
Qty: 180 TABLET | Refills: 11 | Status: SHIPPED | OUTPATIENT
Start: 2022-04-14 | End: 2023-03-20

## 2022-04-22 NOTE — TELEPHONE ENCOUNTER
PCP stated Pt INS is covering other medication at this time, so will continue course until the situation changes status.

## 2022-05-08 ENCOUNTER — PATIENT MESSAGE (OUTPATIENT)
Dept: CARDIOLOGY | Facility: MEDICAL CENTER | Age: 52
End: 2022-05-08
Payer: COMMERCIAL

## 2022-05-08 DIAGNOSIS — I16.0 HYPERTENSIVE URGENCY: ICD-10-CM

## 2022-05-09 RX ORDER — METOPROLOL TARTRATE 50 MG/1
50 TABLET, FILM COATED ORAL 2 TIMES DAILY
Qty: 180 TABLET | Refills: 2 | Status: SHIPPED | OUTPATIENT
Start: 2022-05-09 | End: 2023-05-01 | Stop reason: SDUPTHER

## 2022-05-09 RX ORDER — AMLODIPINE BESYLATE 5 MG/1
5 TABLET ORAL DAILY
Qty: 90 TABLET | Refills: 2 | Status: SHIPPED | OUTPATIENT
Start: 2022-05-09 | End: 2023-05-01 | Stop reason: SDUPTHER

## 2022-05-11 ENCOUNTER — HOSPITAL ENCOUNTER (OUTPATIENT)
Dept: RADIOLOGY | Facility: MEDICAL CENTER | Age: 52
End: 2022-05-11
Attending: PAIN MEDICINE
Payer: COMMERCIAL

## 2022-05-11 ENCOUNTER — HOSPITAL ENCOUNTER (OUTPATIENT)
Dept: LAB | Facility: MEDICAL CENTER | Age: 52
End: 2022-05-11
Attending: PAIN MEDICINE
Payer: COMMERCIAL

## 2022-05-11 ENCOUNTER — HOSPITAL ENCOUNTER (OUTPATIENT)
Dept: CARDIOLOGY | Facility: MEDICAL CENTER | Age: 52
End: 2022-05-11
Attending: PAIN MEDICINE
Payer: COMMERCIAL

## 2022-05-11 DIAGNOSIS — Z01.818 PREOP EXAMINATION: ICD-10-CM

## 2022-05-11 LAB
ALBUMIN SERPL BCP-MCNC: 4.8 G/DL (ref 3.2–4.9)
ALBUMIN/GLOB SERPL: 1.4 G/DL
ALP SERPL-CCNC: 113 U/L (ref 30–99)
ALT SERPL-CCNC: 26 U/L (ref 2–50)
ANION GAP SERPL CALC-SCNC: 14 MMOL/L (ref 7–16)
APPEARANCE UR: CLEAR
AST SERPL-CCNC: 22 U/L (ref 12–45)
BILIRUB SERPL-MCNC: 0.4 MG/DL (ref 0.1–1.5)
BILIRUB UR QL STRIP.AUTO: NEGATIVE
BUN SERPL-MCNC: 20 MG/DL (ref 8–22)
CALCIUM SERPL-MCNC: 10.2 MG/DL (ref 8.4–10.2)
CHLORIDE SERPL-SCNC: 97 MMOL/L (ref 96–112)
CO2 SERPL-SCNC: 24 MMOL/L (ref 20–33)
COLOR UR: YELLOW
CREAT SERPL-MCNC: 1.24 MG/DL (ref 0.5–1.4)
EKG IMPRESSION: NORMAL
EPI CELLS #/AREA URNS HPF: ABNORMAL /HPF
ERYTHROCYTE [DISTWIDTH] IN BLOOD BY AUTOMATED COUNT: 44.3 FL (ref 35.9–50)
FASTING STATUS PATIENT QL REPORTED: NORMAL
GFR SERPLBLD CREATININE-BSD FMLA CKD-EPI: 71 ML/MIN/1.73 M 2
GLOBULIN SER CALC-MCNC: 3.4 G/DL (ref 1.9–3.5)
GLUCOSE SERPL-MCNC: 328 MG/DL (ref 65–99)
GLUCOSE UR STRIP.AUTO-MCNC: >=1000 MG/DL
HCT VFR BLD AUTO: 43.4 % (ref 42–52)
HGB BLD-MCNC: 14.5 G/DL (ref 14–18)
INR PPP: 1.09 (ref 0.87–1.13)
KETONES UR STRIP.AUTO-MCNC: NEGATIVE MG/DL
LEUKOCYTE ESTERASE UR QL STRIP.AUTO: NEGATIVE
MCH RBC QN AUTO: 30 PG (ref 27–33)
MCHC RBC AUTO-ENTMCNC: 33.4 G/DL (ref 33.7–35.3)
MCV RBC AUTO: 89.7 FL (ref 81.4–97.8)
MICRO URNS: ABNORMAL
MUCOUS THREADS #/AREA URNS HPF: ABNORMAL /HPF
NITRITE UR QL STRIP.AUTO: NEGATIVE
PH UR STRIP.AUTO: 6 [PH] (ref 5–8)
PLATELET # BLD AUTO: 229 K/UL (ref 164–446)
PMV BLD AUTO: 11.5 FL (ref 9–12.9)
POTASSIUM SERPL-SCNC: 4.4 MMOL/L (ref 3.6–5.5)
PROT SERPL-MCNC: 8.2 G/DL (ref 6–8.2)
PROT UR QL STRIP: NEGATIVE MG/DL
PROTHROMBIN TIME: 13.3 SEC (ref 12–14.6)
RBC # BLD AUTO: 4.84 M/UL (ref 4.7–6.1)
RBC # URNS HPF: ABNORMAL /HPF
RBC UR QL AUTO: ABNORMAL
SODIUM SERPL-SCNC: 135 MMOL/L (ref 135–145)
SP GR UR STRIP.AUTO: 1.01
SPERM #/AREA URNS HPF: ABNORMAL /HPF
UNIDENT CRYS URNS QL MICRO: ABNORMAL /HPF
WBC # BLD AUTO: 8.2 K/UL (ref 4.8–10.8)
WBC #/AREA URNS HPF: ABNORMAL /HPF

## 2022-05-11 PROCEDURE — 86769 SARS-COV-2 COVID-19 ANTIBODY: CPT

## 2022-05-11 PROCEDURE — 87186 SC STD MICRODIL/AGAR DIL: CPT

## 2022-05-11 PROCEDURE — 85027 COMPLETE CBC AUTOMATED: CPT

## 2022-05-11 PROCEDURE — 93010 ELECTROCARDIOGRAM REPORT: CPT | Performed by: INTERNAL MEDICINE

## 2022-05-11 PROCEDURE — 87086 URINE CULTURE/COLONY COUNT: CPT

## 2022-05-11 PROCEDURE — 81001 URINALYSIS AUTO W/SCOPE: CPT

## 2022-05-11 PROCEDURE — 87077 CULTURE AEROBIC IDENTIFY: CPT

## 2022-05-11 PROCEDURE — 71046 X-RAY EXAM CHEST 2 VIEWS: CPT

## 2022-05-11 PROCEDURE — 80053 COMPREHEN METABOLIC PANEL: CPT

## 2022-05-11 PROCEDURE — 85610 PROTHROMBIN TIME: CPT

## 2022-05-11 PROCEDURE — 36415 COLL VENOUS BLD VENIPUNCTURE: CPT

## 2022-05-11 PROCEDURE — 93005 ELECTROCARDIOGRAM TRACING: CPT

## 2022-05-13 LAB — SARS-COV-2 IGG SERPLBLD QL IA.RAPID: NEGATIVE

## 2022-05-31 DIAGNOSIS — E11.8 TYPE 2 DIABETES MELLITUS WITH COMPLICATION, WITHOUT LONG-TERM CURRENT USE OF INSULIN (HCC): ICD-10-CM

## 2022-05-31 RX ORDER — ROSUVASTATIN CALCIUM 40 MG/1
40 TABLET, COATED ORAL DAILY
Qty: 90 TABLET | Refills: 3 | Status: SHIPPED | OUTPATIENT
Start: 2022-05-31 | End: 2022-08-22 | Stop reason: SDUPTHER

## 2022-06-16 ENCOUNTER — OFFICE VISIT (OUTPATIENT)
Dept: PHYSICAL MEDICINE AND REHAB | Facility: REHABILITATION | Age: 52
End: 2022-06-16
Payer: COMMERCIAL

## 2022-06-16 VITALS
OXYGEN SATURATION: 92 % | HEART RATE: 77 BPM | TEMPERATURE: 97.4 F | DIASTOLIC BLOOD PRESSURE: 88 MMHG | RESPIRATION RATE: 18 BRPM | SYSTOLIC BLOOD PRESSURE: 112 MMHG

## 2022-06-16 DIAGNOSIS — M79.2 NEUROPATHIC PAIN: ICD-10-CM

## 2022-06-16 DIAGNOSIS — I63.9 CARDIOEMBOLIC STROKE (HCC): Primary | ICD-10-CM

## 2022-06-16 PROCEDURE — 99214 OFFICE O/P EST MOD 30 MIN: CPT | Performed by: PHYSICAL MEDICINE & REHABILITATION

## 2022-06-16 RX ORDER — DULOXETIN HYDROCHLORIDE 60 MG/1
60 CAPSULE, DELAYED RELEASE ORAL DAILY
Qty: 90 CAPSULE | Refills: 1 | Status: SHIPPED | OUTPATIENT
Start: 2022-06-16 | End: 2022-11-21

## 2022-06-16 NOTE — PROGRESS NOTES
[facility]  [facility]  [address] [place] [address]  Ph: [phone]    FOLLOW UP PATIENT EVALUATION      Patient Name: Guerrero Ann   Patient : 1970  Patient Age: 51 y.o.     Examining Physician: Dr. Marilu Franco, DO    PM&R History to Date - Background Information:  Dates of Admission/Discharge to ARU: 10/9/20-10/21/20    SUBJECTIVE:   Patient Identification: Guerrero Ann is a 51 y.o. male with PMH significant for coronary artery disease s/p stents, diabetes, hypertension, congenital absence of one kidney, type II diabetes and rehabilitation history significant for bilateral cardioembolic ischemic CVA s/p CABG 10/2020 and is presenting to PM&R clinic for a FOLLOW UP OUTPATIENT evaluation with the following chief complaint/s:    Chief Complaint: Medication Refill.     History of Present Illness:    - Patient had been able to see the [facility].   - He did have a procedure which helped with his pain, but insurance would not authorize his final procedure.   - Will have reji planted permanently. It does help with his pain.   - Working at [employer] as a manager.   - The Gabapentin is helping to a degree.   - He has ankle stability brace which is helping.   - Was able to do a lot more once he had the reji implanted. States could walk on ice and rocks. Was able to ride a bike. He was walking faster.   - Hes tired of the chronic pain.     Review of Systems:  All other pertinent positive review of systems are noted above in HPI.   All other systems reviewed and are negative.    Past Medical History:  Past Medical History:   Diagnosis Date   • Hx of CABG 09/2020    x 3   • Diabetes (HCC)     oral meds only   • Hypertension    • Kidney stones    • MI, old    • Pneumonia    • Psychiatric problem     depression and anxiety   • Stroke (HCC)       Past Surgical History:   Procedure Laterality Date   • MULTIPLE  CORONARY ARTERY BYPASS ENDO VEIN HARVEST  9/25/2020    Procedure: CABG, WITH ENDOSCOPIC VEIN PROCUREMENT- X3;  Surgeon: Michael Chicas M.D.;  Location: SURGERY [facility];  Service: Cardiothoracic   • MARGOTH  9/25/2020    Procedure: ECHOCARDIOGRAM, TRANSESOPHAGEAL;  Surgeon: Michael Chicas M.D.;  Location: SURGERY [facility];  Service: Cardiothoracic   • STENT PLACEMENT  2017    1 cardiac stent   • OTHER ORTHOPEDIC SURGERY      rt wrist fracture with fixation 34 years ago        Current Outpatient Medications:   •  DULoxetine (CYMBALTA) 60 MG Cap DR Particles delayed-release capsule, Take 1 Capsule by mouth every day., Disp: 90 Capsule, Rfl: 1  •  rosuvastatin (CRESTOR) 40 MG tablet, Take 1 Tablet by mouth every day., Disp: 90 Tablet, Rfl: 3  •  gabapentin (NEURONTIN) 600 MG tablet, Take 2 Tablets by mouth 3 times a day., Disp: 180 Tablet, Rfl: 11  •  amLODIPine (NORVASC) 5 MG Tab, Take 1 Tablet by mouth every day., Disp: 90 Tablet, Rfl: 2  •  metoprolol tartrate (LOPRESSOR) 50 MG Tab, Take 1 Tablet by mouth 2 times a day., Disp: 180 Tablet, Rfl: 2  •  tadalafil (CIALIS) 20 MG tablet, TAKE ONE TABLET BY MOUTH APPROXIMATELY 2 HOURS PRIOR TO SEXUAL ACTIVITY, Disp: , Rfl:   •  clopidogrel (PLAVIX) 75 MG Tab, Take 1 tablet by mouth once daily, Disp: 30 Tablet, Rfl: 0  •  Semaglutide,0.25 or 0.5MG/DOS, (OZEMPIC, 0.25 OR 0.5 MG/DOSE,) 2 MG/1.5ML Solution Pen-injector, INJECT 0.25 MG SUBCUTANEOUSLY ONCE A WEEK, Disp: 2 mL, Rfl: 5  •  metformin (GLUCOPHAGE) 1000 MG tablet, Take 1 Tablet by mouth 2 times a day with meals., Disp: 180 Tablet, Rfl: 3  •  Empagliflozin 10 MG Tab, Take 10 mg by mouth every day., Disp: 90 Tablet, Rfl: 3  •  lisinopril (PRINIVIL) 40 MG tablet, Take 1 Tablet by mouth every evening., Disp: 90 Tablet, Rfl: 2  •  allopurinol (ZYLOPRIM) 100 MG Tab, Take 1 Tablet by mouth every day., Disp: 90 Tablet, Rfl: 3  •  Coenzyme Q10 (COQ10) 100 MG Cap, Take 300 mg by mouth every day., Disp: , Rfl:   •  Blood  Glucose Test Strips, Use one strip to test blood sugar once daily ., Disp: 100 Each, Rfl: 5  •  therapeutic multivitamin-minerals (THERAGRAN-M) Tab, Take 1 Tab by mouth every day., Disp: , Rfl:   •  Lancets, Lancets order: Lancets for True Metrix meter. Sig: use TID and prn ssx high or low sugar. #100 RF x 3, Disp: 100 Each, Rfl: 3  •  acetaminophen (TYLENOL) 325 MG Tab, Take 2 Tabs by mouth every four hours as needed for Moderate Pain., Disp: 30 Tab, Rfl: 0  •  aspirin (ASA) 81 MG Chew Tab chewable tablet, Take 1 Tab by mouth every day., Disp: 30 Tab, Rfl: 2  Allergies   Allergen Reactions   • Isosorbide Unspecified     Hallucinations        Past Social History:  Social History     Socioeconomic History   • Marital status: Single     Spouse name: Not on file   • Number of children: Not on file   • Years of education: Not on file   • Highest education level: Associate degree: occupational, technical, or vocational program   Occupational History   • Not on file   Tobacco Use   • Smoking status: Never Smoker   • Smokeless tobacco: Former User     Types: Chew     Quit date: 01/2022   Vaping Use   • Vaping Use: Never used   Substance and Sexual Activity   • Alcohol use: Yes     Alcohol/week: 2.4 oz     Types: 4 Cans of beer per week     Comment: 4/week    • Drug use: No   • Sexual activity: Not Currently     Partners: Female     Comment: fibety. wrk: Verónica Hiko Cutting.    Other Topics Concern   • Not on file   Social History Narrative   • Not on file     Social Determinants of Health     Financial Resource Strain: Low Risk    • Difficulty of Paying Living Expenses: Not hard at all   Food Insecurity: No Food Insecurity   • Worried About Running Out of Food in the Last Year: Never true   • Ran Out of Food in the Last Year: Never true   Transportation Needs: No Transportation Needs   • Lack of Transportation (Medical): No   • Lack of Transportation (Non-Medical): No   Physical Activity: Inactive   • Days of Exercise  per Week: 0 days   • Minutes of Exercise per Session: 0 min   Stress: No Stress Concern Present   • Feeling of Stress : Not at all   Social Connections: Moderately Isolated   • Frequency of Communication with Friends and Family: More than three times a week   • Frequency of Social Gatherings with Friends and Family: Twice a week   • Attends Druze Services: Never   • Active Member of Clubs or Organizations: No   • Attends Club or Organization Meetings: Never   • Marital Status: Living with partner   Intimate Partner Violence: Not on file   Housing Stability: Low Risk    • Unable to Pay for Housing in the Last Year: No   • Number of Places Lived in the Last Year: 1   • Unstable Housing in the Last Year: No        Family History:  Family History   Problem Relation Age of Onset   • Stroke Mother 47        had 3 devastating strokes,  56yo stroke complications   • Heart Disease Mother    • No Known Problems Father         does not know his father.   • Stroke Maternal Grandmother 85   • Heart Disease Maternal Grandmother    • Stroke Maternal Grandfather 54   • Heart Disease Maternal Grandfather        Depression and Opioid Screening  PHQ-9:  Depression Screen (PHQ-2/PHQ-9) 2021 1/3/2022 2022   PHQ-2 Total Score - - 4   PHQ-2 Total Score 6 6 -   PHQ-9 Total Score - - 4   PHQ-9 Total Score 15 10 -     Interpretation of PHQ-9 Total Score   Score Severity   1-4 No Depression   5-9 Mild Depression   10-14 Moderate Depression   15-19 Moderately Severe Depression   20-27 Severe Depression     OBJECTIVE:   Vital Signs:  Vitals:    22 0752   BP: 112/88   Pulse: 77   Resp: 18   Temp: 36.3 °C (97.4 °F)   SpO2: 92%        Pertinent Labs:  Lab Results   Component Value Date/Time    SODIUM 135 2022 03:53 PM    POTASSIUM 4.4 2022 03:53 PM    CHLORIDE 97 2022 03:53 PM    CO2 24 2022 03:53 PM    GLUCOSE 328 (H) 2022 03:53 PM    BUN 20 2022 03:53 PM    CREATININE 1.24 2022  03:53 PM       Lab Results   Component Value Date/Time    HBA1C 7.4 (H) 01/05/2022 08:05 PM       Lab Results   Component Value Date/Time    WBC 8.2 05/11/2022 03:53 PM    RBC 4.84 05/11/2022 03:53 PM    HEMOGLOBIN 14.5 05/11/2022 03:53 PM    HEMATOCRIT 43.4 05/11/2022 03:53 PM    MCV 89.7 05/11/2022 03:53 PM    MCH 30.0 05/11/2022 03:53 PM    MCHC 33.4 (L) 05/11/2022 03:53 PM    MPV 11.5 05/11/2022 03:53 PM    NEUTSPOLYS 59.70 01/08/2022 06:58 AM    LYMPHOCYTES 20.90 (L) 01/08/2022 06:58 AM    MONOCYTES 13.20 01/08/2022 06:58 AM    EOSINOPHILS 5.00 01/08/2022 06:58 AM    BASOPHILS 0.90 01/08/2022 06:58 AM    HYPOCHROMIA 1+ 10/01/2020 05:48 AM    ANISOCYTOSIS 1+ 10/04/2020 05:08 AM       Lab Results   Component Value Date/Time    ASTSGOT 22 05/11/2022 03:53 PM    ALTSGPT 26 05/11/2022 03:53 PM        Physical Exam:   GEN: No apparent distress  HEENT: Head normocephalic, atraumatic.  Sclera nonicteric bilaterally, no ocular discharge appreciated bilaterally.  CV: Extremities warm and well-perfused, no peripheral edema appreciated bilaterally.  PULMONARY: Breathing nonlabored on room air, no respiratory accessory muscle use.  Not requiring supplemental oxygen.  ABD: Soft, nontender.  SKIN: No appreciable skin breakdown on exposed areas of skin.  PSYCH: Mood and affect within normal limits.  NEURO: Awake alert.  Conversational.  Logical thought content.      ASSESSMENT/PLAN: Guerrero Ann  is a 51 y.o. male with rehabilitation history significant for bilateral cardioembolic ischemic CVA s/p CABG , here for evaluation. The following plan was discussed with the patient who is in agreement.     Visit Diagnoses     ICD-10-CM   1. Cardioembolic stroke (HCC)  I63.9   2. Neuropathic pain  M79.2        Rehab/Neuro:   1. Bilateral cardioembolic ischemic CVA s/p CABG   -Status: Neuro stable, but is getting tired of being in chronic pain.    Neuropathic Pain: Continued neuropathic pain in bilateral LE's.  Numbness of LLE in L5 distribution affecting top of left foot/toes distally and plantar surface. Right LE with painful numbness affecting bilateral malleoli region and extends proximally laterally in a sural nerve distribution. Improved to some degree on Gabapentin and Cymbalta, still painful at night. EMG/NCS 4/2021 + for possible bilateral L5 radiculopathy. RLE symptoms not in distribution congruent with greater saphenous nerve damage 2/2 vein grafting. Continue to significantly affect QOL. Attempted to fix with orthotics if component of plantar fasciitis causing pain. Inefficacious. 12/2/21 now wearing right ankle stabilization brace which is helping gait.  Does not assist with pain.  -Med management: Continue gabapentin 1200 mg daily.  -Med management: Continue Cymbalta 60 mg daily.  New prescription provided today.  -Procedures: EMG/NCS 4/2021-polyperipheral neuropathy in addition to possible bilateral L5 radic.  Recommended MRI -moderate left foraminal stenosis L4-5, L5-S1  -Imaging: EMG/NCS positive for potential bilateral L5 radiculopathy.  Laboratory work-up negative.  MRI with left foraminal stenosis.  -Consultants: Following with [facility].  He did have a procedure which significantly help his pain and he is waiting for insurance authorization for permanent placement.  - Request records [facility]    Mood: Stable on Cymbalta.   -Med management: Prescription for Cymbalta 60mg daily, 6 mo supply.     Follow up: 6 month follow up for medication refill.     Please note that this dictation was created using voice recognition software. I have made every reasonable attempt to correct obvious errors but there may be errors of grammar and content that I may have overlooked prior to finalization of this note.    Dr. Marilu Franco DO, MS  Department of Physical Medicine & Rehabilitation  Neuro Rehabilitation Clinic  [facility]

## 2022-07-12 ENCOUNTER — HOSPITAL ENCOUNTER (OUTPATIENT)
Dept: LAB | Facility: MEDICAL CENTER | Age: 52
End: 2022-07-12
Attending: PAIN MEDICINE
Payer: COMMERCIAL

## 2022-07-12 ENCOUNTER — HOSPITAL ENCOUNTER (OUTPATIENT)
Dept: RADIOLOGY | Facility: MEDICAL CENTER | Age: 52
End: 2022-07-12
Attending: PAIN MEDICINE
Payer: COMMERCIAL

## 2022-07-12 ENCOUNTER — HOSPITAL ENCOUNTER (OUTPATIENT)
Dept: CARDIOLOGY | Facility: MEDICAL CENTER | Age: 52
End: 2022-07-12
Attending: PAIN MEDICINE
Payer: COMMERCIAL

## 2022-07-12 DIAGNOSIS — Z01.818 PREOP EXAMINATION: ICD-10-CM

## 2022-07-12 DIAGNOSIS — Z01.810 PRE-OPERATIVE CARDIOVASCULAR EXAMINATION: Primary | ICD-10-CM

## 2022-07-12 LAB
ALBUMIN SERPL BCP-MCNC: 4.7 G/DL (ref 3.2–4.9)
ALBUMIN/GLOB SERPL: 1.6 G/DL
ALP SERPL-CCNC: 156 U/L (ref 30–99)
ALT SERPL-CCNC: 173 U/L (ref 2–50)
ANION GAP SERPL CALC-SCNC: 15 MMOL/L (ref 7–16)
APPEARANCE UR: ABNORMAL
AST SERPL-CCNC: 150 U/L (ref 12–45)
BACTERIA #/AREA URNS HPF: ABNORMAL /HPF
BASOPHILS # BLD AUTO: 1.4 % (ref 0–1.8)
BASOPHILS # BLD: 0.11 K/UL (ref 0–0.12)
BILIRUB SERPL-MCNC: 0.5 MG/DL (ref 0.1–1.5)
BILIRUB UR QL STRIP.AUTO: NEGATIVE
BUN SERPL-MCNC: 11 MG/DL (ref 8–22)
CALCIUM SERPL-MCNC: 9.2 MG/DL (ref 8.4–10.2)
CHLORIDE SERPL-SCNC: 106 MMOL/L (ref 96–112)
CO2 SERPL-SCNC: 20 MMOL/L (ref 20–33)
COLOR UR: YELLOW
CREAT SERPL-MCNC: 1.55 MG/DL (ref 0.5–1.4)
EKG IMPRESSION: NORMAL
EOSINOPHIL # BLD AUTO: 0.69 K/UL (ref 0–0.51)
EOSINOPHIL NFR BLD: 8.9 % (ref 0–6.9)
EPI CELLS #/AREA URNS HPF: ABNORMAL /HPF
ERYTHROCYTE [DISTWIDTH] IN BLOOD BY AUTOMATED COUNT: 42.5 FL (ref 35.9–50)
FASTING STATUS PATIENT QL REPORTED: NORMAL
GFR SERPLBLD CREATININE-BSD FMLA CKD-EPI: 54 ML/MIN/1.73 M 2
GLOBULIN SER CALC-MCNC: 3 G/DL (ref 1.9–3.5)
GLUCOSE SERPL-MCNC: 247 MG/DL (ref 65–99)
GLUCOSE UR STRIP.AUTO-MCNC: >=1000 MG/DL
GRAN CASTS #/AREA URNS LPF: ABNORMAL /LPF
HCT VFR BLD AUTO: 40.2 % (ref 42–52)
HGB BLD-MCNC: 13.6 G/DL (ref 14–18)
IMM GRANULOCYTES # BLD AUTO: 0.01 K/UL (ref 0–0.11)
IMM GRANULOCYTES NFR BLD AUTO: 0.1 % (ref 0–0.9)
INR PPP: 1.15 (ref 0.87–1.13)
KETONES UR STRIP.AUTO-MCNC: NEGATIVE MG/DL
LEUKOCYTE ESTERASE UR QL STRIP.AUTO: NEGATIVE
LYMPHOCYTES # BLD AUTO: 2.43 K/UL (ref 1–4.8)
LYMPHOCYTES NFR BLD: 31.4 % (ref 22–41)
MCH RBC QN AUTO: 30 PG (ref 27–33)
MCHC RBC AUTO-ENTMCNC: 33.8 G/DL (ref 33.7–35.3)
MCV RBC AUTO: 88.7 FL (ref 81.4–97.8)
MICRO URNS: ABNORMAL
MONOCYTES # BLD AUTO: 0.75 K/UL (ref 0–0.85)
MONOCYTES NFR BLD AUTO: 9.7 % (ref 0–13.4)
MUCOUS THREADS #/AREA URNS HPF: ABNORMAL /HPF
NEUTROPHILS # BLD AUTO: 3.74 K/UL (ref 1.82–7.42)
NEUTROPHILS NFR BLD: 48.5 % (ref 44–72)
NITRITE UR QL STRIP.AUTO: NEGATIVE
NRBC # BLD AUTO: 0 K/UL
NRBC BLD-RTO: 0 /100 WBC
PH UR STRIP.AUTO: 5 [PH] (ref 5–8)
PLATELET # BLD AUTO: 186 K/UL (ref 164–446)
PMV BLD AUTO: 11.2 FL (ref 9–12.9)
POTASSIUM SERPL-SCNC: 3.4 MMOL/L (ref 3.6–5.5)
PROT SERPL-MCNC: 7.7 G/DL (ref 6–8.2)
PROT UR QL STRIP: 100 MG/DL
PROTHROMBIN TIME: 14.2 SEC (ref 12–14.6)
RBC # BLD AUTO: 4.53 M/UL (ref 4.7–6.1)
RBC # URNS HPF: ABNORMAL /HPF
RBC UR QL AUTO: ABNORMAL
SODIUM SERPL-SCNC: 141 MMOL/L (ref 135–145)
SP GR UR STRIP.AUTO: 1.01
UNIDENT CRYS URNS QL MICRO: ABNORMAL /HPF
WBC # BLD AUTO: 7.7 K/UL (ref 4.8–10.8)
WBC #/AREA URNS HPF: ABNORMAL /HPF

## 2022-07-12 PROCEDURE — 93010 ELECTROCARDIOGRAM REPORT: CPT | Performed by: STUDENT IN AN ORGANIZED HEALTH CARE EDUCATION/TRAINING PROGRAM

## 2022-07-12 PROCEDURE — 85610 PROTHROMBIN TIME: CPT

## 2022-07-12 PROCEDURE — 85027 COMPLETE CBC AUTOMATED: CPT

## 2022-07-12 PROCEDURE — 71046 X-RAY EXAM CHEST 2 VIEWS: CPT

## 2022-07-12 PROCEDURE — 93005 ELECTROCARDIOGRAM TRACING: CPT

## 2022-07-12 PROCEDURE — 87077 CULTURE AEROBIC IDENTIFY: CPT

## 2022-07-12 PROCEDURE — 87086 URINE CULTURE/COLONY COUNT: CPT

## 2022-07-12 PROCEDURE — 87186 SC STD MICRODIL/AGAR DIL: CPT

## 2022-07-12 PROCEDURE — 83036 HEMOGLOBIN GLYCOSYLATED A1C: CPT

## 2022-07-12 PROCEDURE — 81001 URINALYSIS AUTO W/SCOPE: CPT

## 2022-07-12 PROCEDURE — 36415 COLL VENOUS BLD VENIPUNCTURE: CPT

## 2022-07-12 PROCEDURE — 85025 COMPLETE CBC W/AUTO DIFF WBC: CPT

## 2022-07-12 PROCEDURE — 80053 COMPREHEN METABOLIC PANEL: CPT

## 2022-07-14 ENCOUNTER — PRE-ADMISSION TESTING (OUTPATIENT)
Dept: ADMISSIONS | Facility: MEDICAL CENTER | Age: 52
End: 2022-07-14
Attending: PAIN MEDICINE
Payer: COMMERCIAL

## 2022-07-14 DIAGNOSIS — Z01.812 PRE-OPERATIVE LABORATORY EXAMINATION: ICD-10-CM

## 2022-07-14 LAB
BACTERIA UR CULT: ABNORMAL
BACTERIA UR CULT: ABNORMAL
EST. AVERAGE GLUCOSE BLD GHB EST-MCNC: 186 MG/DL
HBA1C MFR BLD: 8.1 % (ref 4–5.6)
SIGNIFICANT IND 70042: ABNORMAL
SITE SITE: ABNORMAL
SOURCE SOURCE: ABNORMAL

## 2022-07-14 PROCEDURE — 83036 HEMOGLOBIN GLYCOSYLATED A1C: CPT

## 2022-07-14 PROCEDURE — 36415 COLL VENOUS BLD VENIPUNCTURE: CPT

## 2022-07-14 RX ORDER — CEPHALEXIN 250 MG/1
CAPSULE ORAL
COMMUNITY
Start: 2022-05-11 | End: 2023-03-20

## 2022-07-14 RX ORDER — GABAPENTIN 400 MG/1
1200 CAPSULE ORAL 3 TIMES DAILY
COMMUNITY
Start: 2022-05-30 | End: 2023-02-02

## 2022-07-14 RX ORDER — COVID-19 ANTIGEN TEST
220 KIT MISCELLANEOUS EVERY EVENING
COMMUNITY
End: 2023-05-01

## 2022-07-14 ASSESSMENT — FIBROSIS 4 INDEX: FIB4 SCORE: 3.07

## 2022-07-14 NOTE — PREPROCEDURE INSTRUCTIONS
Pre-admit appointment completed. Pt states all instructions given are understood. Medications the patient will take the morning of surgery per anesthesia protocol: Metoprolol. Instructed to take other prescribed medications through the day before surgery.    Denies anesthesia complications

## 2022-07-15 ENCOUNTER — ANESTHESIA EVENT (OUTPATIENT)
Dept: SURGERY | Facility: MEDICAL CENTER | Age: 52
End: 2022-07-15
Payer: COMMERCIAL

## 2022-07-15 ENCOUNTER — HOSPITAL ENCOUNTER (OUTPATIENT)
Facility: MEDICAL CENTER | Age: 52
End: 2022-07-15
Attending: PAIN MEDICINE | Admitting: PAIN MEDICINE
Payer: COMMERCIAL

## 2022-07-15 ENCOUNTER — ANESTHESIA (OUTPATIENT)
Dept: SURGERY | Facility: MEDICAL CENTER | Age: 52
End: 2022-07-15
Payer: COMMERCIAL

## 2022-07-15 ENCOUNTER — APPOINTMENT (OUTPATIENT)
Dept: RADIOLOGY | Facility: MEDICAL CENTER | Age: 52
End: 2022-07-15
Attending: PAIN MEDICINE
Payer: COMMERCIAL

## 2022-07-15 VITALS
DIASTOLIC BLOOD PRESSURE: 94 MMHG | BODY MASS INDEX: 28.84 KG/M2 | RESPIRATION RATE: 16 BRPM | WEIGHT: 152.78 LBS | HEIGHT: 61 IN | TEMPERATURE: 96.4 F | HEART RATE: 53 BPM | OXYGEN SATURATION: 97 % | SYSTOLIC BLOOD PRESSURE: 144 MMHG

## 2022-07-15 LAB — GLUCOSE BLD STRIP.AUTO-MCNC: 111 MG/DL (ref 65–99)

## 2022-07-15 PROCEDURE — 160009 HCHG ANES TIME/MIN: Performed by: PAIN MEDICINE

## 2022-07-15 PROCEDURE — 00400 ANES INTEGUMENTARY SYS NOS: CPT | Performed by: ANESTHESIOLOGY

## 2022-07-15 PROCEDURE — 700101 HCHG RX REV CODE 250: Performed by: ANESTHESIOLOGY

## 2022-07-15 PROCEDURE — 160048 HCHG OR STATISTICAL LEVEL 1-5: Performed by: PAIN MEDICINE

## 2022-07-15 PROCEDURE — 700105 HCHG RX REV CODE 258: Performed by: PAIN MEDICINE

## 2022-07-15 PROCEDURE — 160025 RECOVERY II MINUTES (STATS): Performed by: PAIN MEDICINE

## 2022-07-15 PROCEDURE — 82962 GLUCOSE BLOOD TEST: CPT

## 2022-07-15 PROCEDURE — 160036 HCHG PACU - EA ADDL 30 MINS PHASE I: Performed by: PAIN MEDICINE

## 2022-07-15 PROCEDURE — 160002 HCHG RECOVERY MINUTES (STAT): Performed by: PAIN MEDICINE

## 2022-07-15 PROCEDURE — 160041 HCHG SURGERY MINUTES - EA ADDL 1 MIN LEVEL 4: Performed by: PAIN MEDICINE

## 2022-07-15 PROCEDURE — 700101 HCHG RX REV CODE 250: Performed by: PAIN MEDICINE

## 2022-07-15 PROCEDURE — 700105 HCHG RX REV CODE 258: Performed by: ANESTHESIOLOGY

## 2022-07-15 PROCEDURE — 160046 HCHG PACU - 1ST 60 MINS PHASE II: Performed by: PAIN MEDICINE

## 2022-07-15 PROCEDURE — L8679 IMP NEUROSTI PLS GN ANY TYPE: HCPCS | Performed by: PAIN MEDICINE

## 2022-07-15 PROCEDURE — 700111 HCHG RX REV CODE 636 W/ 250 OVERRIDE (IP): Performed by: ANESTHESIOLOGY

## 2022-07-15 PROCEDURE — 502000 HCHG MISC OR IMPLANTS RC 0278: Performed by: PAIN MEDICINE

## 2022-07-15 PROCEDURE — 160035 HCHG PACU - 1ST 60 MINS PHASE I: Performed by: PAIN MEDICINE

## 2022-07-15 PROCEDURE — 73590 X-RAY EXAM OF LOWER LEG: CPT | Mod: RT

## 2022-07-15 PROCEDURE — 160029 HCHG SURGERY MINUTES - 1ST 30 MINS LEVEL 4: Performed by: PAIN MEDICINE

## 2022-07-15 RX ORDER — LABETALOL HYDROCHLORIDE 5 MG/ML
5 INJECTION, SOLUTION INTRAVENOUS
Status: DISCONTINUED | OUTPATIENT
Start: 2022-07-15 | End: 2022-07-15 | Stop reason: HOSPADM

## 2022-07-15 RX ORDER — HYDROMORPHONE HYDROCHLORIDE 1 MG/ML
0.1 INJECTION, SOLUTION INTRAMUSCULAR; INTRAVENOUS; SUBCUTANEOUS
Status: DISCONTINUED | OUTPATIENT
Start: 2022-07-15 | End: 2022-07-15 | Stop reason: HOSPADM

## 2022-07-15 RX ORDER — DEXTROSE MONOHYDRATE 25 G/50ML
12.5 INJECTION, SOLUTION INTRAVENOUS
Status: DISCONTINUED | OUTPATIENT
Start: 2022-07-15 | End: 2022-07-15 | Stop reason: HOSPADM

## 2022-07-15 RX ORDER — MEPERIDINE HYDROCHLORIDE 25 MG/ML
12.5 INJECTION INTRAMUSCULAR; INTRAVENOUS; SUBCUTANEOUS
Status: DISCONTINUED | OUTPATIENT
Start: 2022-07-15 | End: 2022-07-15 | Stop reason: HOSPADM

## 2022-07-15 RX ORDER — CEFAZOLIN SODIUM 1 G/3ML
INJECTION, POWDER, FOR SOLUTION INTRAMUSCULAR; INTRAVENOUS PRN
Status: DISCONTINUED | OUTPATIENT
Start: 2022-07-15 | End: 2022-07-15 | Stop reason: SURG

## 2022-07-15 RX ORDER — SODIUM CHLORIDE, SODIUM LACTATE, POTASSIUM CHLORIDE, CALCIUM CHLORIDE 600; 310; 30; 20 MG/100ML; MG/100ML; MG/100ML; MG/100ML
INJECTION, SOLUTION INTRAVENOUS CONTINUOUS
Status: DISCONTINUED | OUTPATIENT
Start: 2022-07-15 | End: 2022-07-15 | Stop reason: HOSPADM

## 2022-07-15 RX ORDER — LIDOCAINE HYDROCHLORIDE 10 MG/ML
INJECTION, SOLUTION INFILTRATION; PERINEURAL
Status: DISCONTINUED | OUTPATIENT
Start: 2022-07-15 | End: 2022-07-15 | Stop reason: HOSPADM

## 2022-07-15 RX ORDER — BUPIVACAINE HYDROCHLORIDE AND EPINEPHRINE 2.5; 5 MG/ML; UG/ML
INJECTION, SOLUTION EPIDURAL; INFILTRATION; INTRACAUDAL; PERINEURAL
Status: DISCONTINUED | OUTPATIENT
Start: 2022-07-15 | End: 2022-07-15 | Stop reason: HOSPADM

## 2022-07-15 RX ORDER — LIDOCAINE HYDROCHLORIDE 20 MG/ML
INJECTION, SOLUTION EPIDURAL; INFILTRATION; INTRACAUDAL; PERINEURAL PRN
Status: DISCONTINUED | OUTPATIENT
Start: 2022-07-15 | End: 2022-07-15 | Stop reason: SURG

## 2022-07-15 RX ORDER — OXYCODONE HCL 5 MG/5 ML
5 SOLUTION, ORAL ORAL
Status: DISCONTINUED | OUTPATIENT
Start: 2022-07-15 | End: 2022-07-15 | Stop reason: HOSPADM

## 2022-07-15 RX ORDER — HYDROMORPHONE HYDROCHLORIDE 1 MG/ML
0.4 INJECTION, SOLUTION INTRAMUSCULAR; INTRAVENOUS; SUBCUTANEOUS
Status: DISCONTINUED | OUTPATIENT
Start: 2022-07-15 | End: 2022-07-15 | Stop reason: HOSPADM

## 2022-07-15 RX ORDER — HYDRALAZINE HYDROCHLORIDE 20 MG/ML
5 INJECTION INTRAMUSCULAR; INTRAVENOUS
Status: DISCONTINUED | OUTPATIENT
Start: 2022-07-15 | End: 2022-07-15 | Stop reason: HOSPADM

## 2022-07-15 RX ORDER — ONDANSETRON 2 MG/ML
4 INJECTION INTRAMUSCULAR; INTRAVENOUS
Status: DISCONTINUED | OUTPATIENT
Start: 2022-07-15 | End: 2022-07-15 | Stop reason: HOSPADM

## 2022-07-15 RX ORDER — SODIUM CHLORIDE, SODIUM LACTATE, POTASSIUM CHLORIDE, CALCIUM CHLORIDE 600; 310; 30; 20 MG/100ML; MG/100ML; MG/100ML; MG/100ML
INJECTION, SOLUTION INTRAVENOUS
Status: DISCONTINUED | OUTPATIENT
Start: 2022-07-15 | End: 2022-07-15 | Stop reason: SURG

## 2022-07-15 RX ORDER — ALBUTEROL SULFATE 2.5 MG/3ML
2.5 SOLUTION RESPIRATORY (INHALATION)
Status: DISCONTINUED | OUTPATIENT
Start: 2022-07-15 | End: 2022-07-15 | Stop reason: HOSPADM

## 2022-07-15 RX ORDER — SODIUM CHLORIDE, SODIUM LACTATE, POTASSIUM CHLORIDE, CALCIUM CHLORIDE 600; 310; 30; 20 MG/100ML; MG/100ML; MG/100ML; MG/100ML
INJECTION, SOLUTION INTRAVENOUS CONTINUOUS
Status: ACTIVE | OUTPATIENT
Start: 2022-07-15 | End: 2022-07-15

## 2022-07-15 RX ORDER — OXYCODONE HCL 5 MG/5 ML
10 SOLUTION, ORAL ORAL
Status: DISCONTINUED | OUTPATIENT
Start: 2022-07-15 | End: 2022-07-15 | Stop reason: HOSPADM

## 2022-07-15 RX ORDER — HALOPERIDOL 5 MG/ML
1 INJECTION INTRAMUSCULAR
Status: DISCONTINUED | OUTPATIENT
Start: 2022-07-15 | End: 2022-07-15 | Stop reason: HOSPADM

## 2022-07-15 RX ORDER — HYDROMORPHONE HYDROCHLORIDE 1 MG/ML
0.2 INJECTION, SOLUTION INTRAMUSCULAR; INTRAVENOUS; SUBCUTANEOUS
Status: DISCONTINUED | OUTPATIENT
Start: 2022-07-15 | End: 2022-07-15 | Stop reason: HOSPADM

## 2022-07-15 RX ADMIN — SODIUM CHLORIDE, POTASSIUM CHLORIDE, SODIUM LACTATE AND CALCIUM CHLORIDE: 600; 310; 30; 20 INJECTION, SOLUTION INTRAVENOUS at 09:21

## 2022-07-15 RX ADMIN — SODIUM CHLORIDE, POTASSIUM CHLORIDE, SODIUM LACTATE AND CALCIUM CHLORIDE: 600; 310; 30; 20 INJECTION, SOLUTION INTRAVENOUS at 08:02

## 2022-07-15 RX ADMIN — CEFAZOLIN 2 G: 330 INJECTION, POWDER, FOR SOLUTION INTRAMUSCULAR; INTRAVENOUS at 09:31

## 2022-07-15 RX ADMIN — PROPOFOL 50 MG: 10 INJECTION, EMULSION INTRAVENOUS at 09:31

## 2022-07-15 RX ADMIN — LIDOCAINE HYDROCHLORIDE 100 MG: 20 INJECTION, SOLUTION EPIDURAL; INFILTRATION; INTRACAUDAL at 09:31

## 2022-07-15 RX ADMIN — LIDOCAINE HYDROCHLORIDE 0.5 ML: 10 INJECTION, SOLUTION EPIDURAL; INFILTRATION; INTRACAUDAL; PERINEURAL at 08:02

## 2022-07-15 ASSESSMENT — FIBROSIS 4 INDEX: FIB4 SCORE: 3.07

## 2022-07-15 NOTE — ANESTHESIA POSTPROCEDURE EVALUATION
Patient: Guerrero Ann    Procedure Summary     Date: 07/15/22 Room / Location:  OR  / SURGERY [facility]    Anesthesia Start: 0927 Anesthesia Stop: 1043    Procedure: PERIPHERAL NERVE STIMULATOR IMPLANT, RIGHT  POSTERIOR TIBIAL PERIPHERAL NERVE STIMULATOR IMPLANT (Right Leg Lower) Diagnosis: (PERIPHERAL POLYNEUROPATHY)    Surgeons: Live Miranda M.D. Responsible Provider: Balta Gutierres M.D.    Anesthesia Type: MAC ASA Status: 3          Final Anesthesia Type: MAC  Last vitals  BP   Blood Pressure: (!) 144/94    Temp   (!) 35.8 °C (96.4 °F)    Pulse   (!) 53   Resp   16    SpO2   97 %      Anesthesia Post Evaluation    Patient location during evaluation: PACU  Patient participation: complete - patient participated  Level of consciousness: awake and alert    Airway patency: patent  Anesthetic complications: no  Cardiovascular status: hemodynamically stable  Respiratory status: acceptable  Hydration status: euvolemic    PONV: none          No complications documented.     Nurse Pain Score: 0 (NPRS)

## 2022-07-15 NOTE — ANESTHESIA POSTPROCEDURE EVALUATION
Patient: Guerrero Ann    Procedure Summary     Date: 07/15/22 Room / Location:  OR  / SURGERY [facility]    Anesthesia Start: 0927 Anesthesia Stop: 1043    Procedure: PERIPHERAL NERVE STIMULATOR IMPLANT, RIGHT  POSTERIOR TIBIAL PERIPHERAL NERVE STIMULATOR IMPLANT (Right Leg Lower) Diagnosis: (PERIPHERAL POLYNEUROPATHY)    Surgeons: Live Miranda M.D. Responsible Provider: Balta Gutierres M.D.    Anesthesia Type: MAC ASA Status: 3          Final Anesthesia Type: MAC  Last vitals  BP   Blood Pressure: (!) 140/87    Temp   36.6 °C (97.9 °F)    Pulse   (!) 55   Resp   16    SpO2   100 %      Anesthesia Post Evaluation    Patient location during evaluation: PACU  Patient participation: complete - patient participated  Level of consciousness: awake and alert    Airway patency: patent  Anesthetic complications: no  Cardiovascular status: hemodynamically stable  Respiratory status: acceptable  Hydration status: euvolemic    PONV: none          No complications documented.     Nurse Pain Score: 0 (NPRS)

## 2022-07-15 NOTE — ANESTHESIA TIME REPORT
Anesthesia Start and Stop Event Times     Date Time Event    7/15/2022 0857 Ready for Procedure     0927 Anesthesia Start     1043 Anesthesia Stop        Responsible Staff  07/15/22    Name Role Begin End    Balta Gutierres M.D. Anesth 0927 1043        Overtime Reason:  no overtime (within assigned shift)    Comments:

## 2022-07-15 NOTE — OP REPORT
OPERATIVE NOTE:    Guerrero Ann   7/15/2022              PRE-OP DIAGNOSIS: NEUROPATHIC PAIN            POST-OP DIAGNOSIS: NEUROPATHIC PAIN            PROCEDURE: Procedure(s) and Anesthesia Type:     * PERIPHERAL NERVE STIMULATOR IMPLANT, RIGHT  POSTERIOR TIBIAL PERIPHERAL NERVE STIMULATOR IMPLANT - MAC            SURGEON: Surgeon(s) and Role:     * Live Miranda M.D. - Primary           Anesthesiologist: Balta Gutierres M.D.  Circulator: CONNOR Mcknight Circulator: Gail Pagan R.N.  Relief Scrub: Michael Espinoza  Scrub Person: Michael Bermudez               ESTIMATED BLOOD LOSS: Minimal                              IMPLANTS: Peripheral Nerve Stimulator, Stimwave            COMPLICATIONS: None            DISPOSITION: PACU            TECHNIQUE:            Following full written informed consent, the patient was escorted to the surgical suite and placed in the supine position.  A preprocedure timeout was performed in the presence of surgical staff confirming the patient's identification procedure site and side as well as allergy verification.    The device packaging containing the electrode array was unpacked and kept in the sterile field.  An optimal trajectory targeting the posterior tibial nerve was identified under dynamic ultrasound guidance.  Using a skin marker, a 1 cm line was marked over the needle entry location proximal to the medial malleolus on the right side.  The skin and deeper tissues were anesthetized using mixture of 1% lidocaine with epinephrine and 0.25% bupivacaine.  Thereafter the introducer was atraumatically directed under ultrasound guidance towards the superior aspect of the posterior tibial nerve.  The final placement was less than 1 cm above the nerve.  The patient was then awakened where mapping was performed.anesthesia was then deepened and our attention was focused on tunneling the lead cephalad to the  pocket.  A 1 cm incision was made where  a  pocket was then created.  The tubing attached to the electrode array was mated with the  element which is a coiled  placed in the  pocket incision.  Copious irrigation was performed at both incision sites.  Both incisions were closed with Dermabond. Fluoroscopic imaging showed the stimulator did not traverse the joint line. The patient was then taken to the PACU, he tolerated the procedure very well.  They were given postprocedure instructions, patient will be seen for follow-up in the clinic on Monday.

## 2022-07-15 NOTE — ASSESSMENT & PLAN NOTE
Continue dual antiplatelet therapy aspirin/Plavix  Continue statin  Metoprolol and lisinopril resumed  Continue risk factor modification long-term  No significant atrial fibrillation identified so far   Spontaneous, unlabored and symmetrical

## 2022-07-15 NOTE — OR SURGEON
Immediate Post OP Note          PRE-OP DIAGNOSIS: NEUROPATHIC PAIN  POST-OP DIAGNOSIS: NEUROPATHIC PAIN    Procedure(s):  PERIPHERAL NERVE STIMULATOR IMPLANT, RIGHT  POSTERIOR TIBIAL PERIPHERAL NERVE STIMULATOR IMPLANT - Wound Class: Clean    Surgeon(s):  Live Miranda M.D.    Anesthesiologist/Type of Anesthesia:  Anesthesiologist: Balta Gutierres M.D./GIL    Surgical Staff:  Circulator: Ileana Mascorro R.N.  Relief Circulator: Gail Pagan R.N.  Relief Scrub: Michael Espinoza  Scrub Person: Michael Bermudez    Specimens removed if any:  * No specimens in log *    Estimated Blood Loss: None    Findings: None    Complications: None        7/15/2022 10:38 AM Live Miranda M.D.

## 2022-07-15 NOTE — ANESTHESIA PREPROCEDURE EVALUATION
Case: [id number] Date/Time: 07/15/22 0845    Procedure: PERIPHERAL NERVE STIMULATOR IMPLANT, RIGHT  POSTERIOR TIBIAL PERIPHERAL NERVE STIMULATOR IMPLANT    Pre-op diagnosis: PERIPHERAL POLYNEUROPATHY    Location:  OR  / SURGERY [facility]    Surgeons: Live Miranda M.D.      SONNY or progressing CKD  Transaminitis  Uncontrolled DM    Relevant Problems   NEURO   (positive) History of cardioembolic stroke      CARDIAC   (positive) Coronary artery disease involving native coronary artery of native heart without angina pectoris   (positive) Essential hypertension   (positive) S/P CABG x 3      ENDO   (positive) DM2 (diabetes mellitus, type 2) (HCC)      Other   (positive) Adjustment disorder with depressed mood   (positive) DISH (diffuse idiopathic skeletal hyperostosis)       Physical Exam    Airway   Mallampati: II  TM distance: >3 FB  Neck ROM: full       Cardiovascular - normal exam  Rhythm: regular  Rate: normal  (-) murmur     Dental - normal exam           Pulmonary - normal exam  Breath sounds clear to auscultation     Abdominal    Neurological - normal exam                 Anesthesia Plan    ASA 3   ASA physical status 3 criteria: diabetes - poorly controlled and alcohol and/or substance dependence or abuse    Plan - MAC               Induction: intravenous      Pertinent diagnostic labs and testing reviewed    Informed Consent:    Anesthetic plan and risks discussed with patient and spouse.      Pt and spouse notified of worsening renal function, of transaminitis and of uncontrolled hyperglycemia.  They were encouraged to seek primary care assistance for further intervention.  Pt was notified that risk of future CAD is increased given the lab values and that a future CABG may not be an option given previous CABG.  Pt and spouse expressed understanding.

## 2022-07-15 NOTE — DISCHARGE INSTRUCTIONS
If any questions arise, call your provider.  If your provider is not available, please feel free to call the Surgical Center at [phone].    MEDICATIONS: Resume taking daily medication.  Take prescribed pain medication with food.  If no medication is prescribed, you may take non-aspirin pain medication if needed.  PAIN MEDICATION CAN BE VERY CONSTIPATING.  Take a stool softener or laxative such as senokot, pericolace, or milk of magnesia if needed.    Last pain medication given at     What to Expect Post Anesthesia    Rest and take it easy for the first 24 hours.  A responsible adult is recommended to remain with you during that time.  It is normal to feel sleepy.  We encourage you to not do anything that requires balance, judgment or coordination.    FOR 24 HOURS DO NOT:  Drive, operate machinery or run household appliances.  Drink beer or alcoholic beverages.  Make important decisions or sign legal documents.    To avoid nausea, slowly advance diet as tolerated, avoiding spicy or greasy foods for the first day.  Add more substantial food to your diet according to your provider's instructions.  Babies can be fed formula or breast milk as soon as they are hungry.  INCREASE FLUIDS AND FIBER TO AVOID CONSTIPATION.    MILD FLU-LIKE SYMPTOMS ARE NORMAL.  YOU MAY EXPERIENCE GENERALIZED MUSCLE ACHES, THROAT IRRITATION, HEADACHE AND/OR SOME NAUSEA.      Discharge Instructions:    ACTIVITIES:   No activity restrictions. May bear weight on right leg.     DRIVING:   You may drive whenever you are off pain medications and are able to perform the activities needed to drive, i.e. turning, bending, twisting, wearing a seat belt, etc.    BATHING:   You may get the wound wet at any time after leaving the hospital. You may shower, but do not submerge in a bath or a pool until you after your first postoperative visit.      BOWEL FUNCTION:  Prescription pain medication may cause constipation. If you are having problems, use what  you normally would or call your provider for suggestions. It also helps to stay regular by including fiber in your diet (for example: bran or fruits and vegetables) and drink plenty of liquids (water, juice, etc.).

## 2022-07-15 NOTE — OR NURSING
1041: pt to PACU from OR. Report received from anesthesia and RN. Pt on 6 L O2 via mask, breathing spontaneous and nonlabored. RLE incisions x 2 C/D/I, RLE cold and pulse palpable, pt endorsing same sensation RLE and LLE. Pt easilly roused and answering questions, no pain or nausea.     1055: pt's blood sugar checked, initial reading was 34 but pt did not report s/s hypoglycemia. Rechecked, second reading was 111.     1102: report given to Alison COOK.     1117: reassumed care of pt.     1130: RLE unchanged, no pain or nausea. pt meets criteria for stage 2, report called to Jonny COOK.     1157: pt discharged to stage 2 via [facility] by CNA.

## 2022-08-03 ENCOUNTER — TELEPHONE (OUTPATIENT)
Dept: MEDICAL GROUP | Facility: PHYSICIAN GROUP | Age: 52
End: 2022-08-03
Payer: COMMERCIAL

## 2022-08-03 NOTE — TELEPHONE ENCOUNTER
MEDICATION PRIOR AUTHORIZATION NEEDED:    1. Name of Medication: Rosuvastatin    2. Requested By (Name of Pharmacy): CoverMyMeds     3. Is insurance on file current? yes    4. What is the name & phone number of the 3rd party payor? [facility] or [facility]?    Key: [id number]  Last Name: Seth  : 1970

## 2022-08-22 DIAGNOSIS — E11.8 TYPE 2 DIABETES MELLITUS WITH COMPLICATION, WITHOUT LONG-TERM CURRENT USE OF INSULIN (HCC): ICD-10-CM

## 2022-08-22 RX ORDER — ROSUVASTATIN CALCIUM 40 MG/1
40 TABLET, COATED ORAL DAILY
Qty: 90 TABLET | Refills: 3 | Status: SHIPPED | OUTPATIENT
Start: 2022-08-22 | End: 2023-05-01 | Stop reason: SDUPTHER

## 2022-09-30 ENCOUNTER — APPOINTMENT (OUTPATIENT)
Dept: CARDIOLOGY | Facility: MEDICAL CENTER | Age: 52
End: 2022-09-30
Payer: COMMERCIAL

## 2022-11-21 ENCOUNTER — HOSPITAL ENCOUNTER (OUTPATIENT)
Dept: LAB | Facility: MEDICAL CENTER | Age: 52
End: 2022-11-21
Attending: PHYSICAL MEDICINE & REHABILITATION
Payer: COMMERCIAL

## 2022-11-21 ENCOUNTER — OFFICE VISIT (OUTPATIENT)
Dept: PHYSICAL MEDICINE AND REHAB | Facility: MEDICAL CENTER | Age: 52
End: 2022-11-21
Payer: COMMERCIAL

## 2022-11-21 VITALS
RESPIRATION RATE: 16 BRPM | DIASTOLIC BLOOD PRESSURE: 90 MMHG | SYSTOLIC BLOOD PRESSURE: 142 MMHG | OXYGEN SATURATION: 97 % | TEMPERATURE: 98.6 F | HEART RATE: 76 BPM

## 2022-11-21 DIAGNOSIS — G62.9 PERIPHERAL POLYNEUROPATHY: ICD-10-CM

## 2022-11-21 DIAGNOSIS — Z87.440 HISTORY OF UTI: ICD-10-CM

## 2022-11-21 DIAGNOSIS — M79.2 NEUROPATHIC PAIN: ICD-10-CM

## 2022-11-21 DIAGNOSIS — N23 KIDNEY PAIN: ICD-10-CM

## 2022-11-21 DIAGNOSIS — I63.9 CARDIOEMBOLIC STROKE (HCC): Primary | ICD-10-CM

## 2022-11-21 DIAGNOSIS — R45.89 DEPRESSED MOOD: ICD-10-CM

## 2022-11-21 LAB
ALBUMIN SERPL BCP-MCNC: 4.9 G/DL (ref 3.2–4.9)
ALBUMIN/GLOB SERPL: 1.3 G/DL
ALP SERPL-CCNC: 134 U/L (ref 30–99)
ALT SERPL-CCNC: 16 U/L (ref 2–50)
ANION GAP SERPL CALC-SCNC: 14 MMOL/L (ref 7–16)
APPEARANCE UR: CLEAR
AST SERPL-CCNC: 24 U/L (ref 12–45)
BILIRUB SERPL-MCNC: 0.4 MG/DL (ref 0.1–1.5)
BILIRUB UR QL STRIP.AUTO: NEGATIVE
BUN SERPL-MCNC: 12 MG/DL (ref 8–22)
CALCIUM SERPL-MCNC: 9.7 MG/DL (ref 8.4–10.2)
CHLORIDE SERPL-SCNC: 104 MMOL/L (ref 96–112)
CO2 SERPL-SCNC: 24 MMOL/L (ref 20–33)
COLOR UR: YELLOW
CREAT SERPL-MCNC: 1.1 MG/DL (ref 0.5–1.4)
FASTING STATUS PATIENT QL REPORTED: NORMAL
GFR SERPLBLD CREATININE-BSD FMLA CKD-EPI: 81 ML/MIN/1.73 M 2
GLOBULIN SER CALC-MCNC: 3.8 G/DL (ref 1.9–3.5)
GLUCOSE SERPL-MCNC: 115 MG/DL (ref 65–99)
GLUCOSE UR STRIP.AUTO-MCNC: NEGATIVE MG/DL
KETONES UR STRIP.AUTO-MCNC: NEGATIVE MG/DL
LEUKOCYTE ESTERASE UR QL STRIP.AUTO: NEGATIVE
MICRO URNS: NORMAL
NITRITE UR QL STRIP.AUTO: NEGATIVE
PH UR STRIP.AUTO: 6 [PH] (ref 5–8)
POTASSIUM SERPL-SCNC: 4.3 MMOL/L (ref 3.6–5.5)
PROT SERPL-MCNC: 8.7 G/DL (ref 6–8.2)
PROT UR QL STRIP: NEGATIVE MG/DL
RBC UR QL AUTO: NEGATIVE
SODIUM SERPL-SCNC: 142 MMOL/L (ref 135–145)
SP GR UR STRIP.AUTO: 1.01

## 2022-11-21 PROCEDURE — 99214 OFFICE O/P EST MOD 30 MIN: CPT | Performed by: PHYSICAL MEDICINE & REHABILITATION

## 2022-11-21 PROCEDURE — 80053 COMPREHEN METABOLIC PANEL: CPT

## 2022-11-21 PROCEDURE — 36415 COLL VENOUS BLD VENIPUNCTURE: CPT

## 2022-11-21 PROCEDURE — 81003 URINALYSIS AUTO W/O SCOPE: CPT

## 2022-11-21 RX ORDER — DULOXETIN HYDROCHLORIDE 60 MG/1
60 CAPSULE, DELAYED RELEASE ORAL DAILY
Qty: 90 CAPSULE | Refills: 1 | Status: SHIPPED | OUTPATIENT
Start: 2022-11-21 | End: 2023-05-01 | Stop reason: SDUPTHER

## 2022-11-21 NOTE — PROGRESS NOTES
[facility]  [facility]  [address] [place] [address]  Ph: [phone]    FOLLOW UP PATIENT EVALUATION      Patient Name: Guerrero Ann   Patient : 1970  Patient Age: 52 y.o.     Examining Physician: Dr. Marilu Franco, DO    PM&R History to Date - Background Information:  Dates of Admission/Discharge to ARU: 10/9/20-10/21/20    SUBJECTIVE:   Patient Identification: Guerrero Ann is a 52 y.o. male with PMH significant for coronary artery disease s/p stents, diabetes, hypertension, congenital absence of one kidney, type II diabetes and rehabilitation history significant for bilateral cardioembolic ischemic CVA s/p CABG 10/2020 and is presenting to PM&R clinic for a FOLLOW UP OUTPATIENT evaluation with the following chief complaint/s:    Chief Complaint: Medication Evaluation    History of Present Illness:    -Has not been doing well lately.  -He really has a lot of regret about having had his cardiac surgery since he has had so many complications since then.  -He did finally have the final procedure with pain management but it has not helped his pain a lot.  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  -He decided to leave his wife due to the fact that it was unfair to her that he has had such a personality change due to his chronic pain.  -Has been having some back pain located where his kidney is.  -Only has 1 kidney on the right which is where he is having pain.  -Records reviewed he did have a UTI back in July.  His creatinine was mildly elevated as were his LFTs.    Review of Systems:  All other pertinent positive review of systems are noted above in HPI.   All other systems reviewed and are negative.    Past Medical History:  Past Medical History:   Diagnosis Date    Hx of CABG 09/2020    x 3    Diabetes (HCC)     oral meds only    Heart valve disease     High cholesterol     Hypertension     Kidney stones      MI, old     Pneumonia     Psychiatric problem     depression and anxiety    Stroke (HCC)       Past Surgical History:   Procedure Laterality Date    INSERTION, PERIPHERAL NERVE STIMULATOR, LOWER EXTREMITY Right 7/15/2022    Procedure: PERIPHERAL NERVE STIMULATOR IMPLANT, RIGHT  POSTERIOR TIBIAL PERIPHERAL NERVE STIMULATOR IMPLANT;  Surgeon: Live Miranda M.D.;  Location: SURGERY [facility];  Service: Pain Management    MULTIPLE CORONARY ARTERY BYPASS ENDO VEIN HARVEST  9/25/2020    Procedure: CABG, WITH ENDOSCOPIC VEIN PROCUREMENT- X3;  Surgeon: Michael Chicas M.D.;  Location: SURGERY [facility];  Service: Cardiothoracic    MARGOTH  9/25/2020    Procedure: ECHOCARDIOGRAM, TRANSESOPHAGEAL;  Surgeon: Michael Chicas M.D.;  Location: SURGERY [facility];  Service: Cardiothoracic    STENT PLACEMENT  2017    1 cardiac stent    OTHER ORTHOPEDIC SURGERY      rt wrist fracture with fixation 34 years ago        Current Outpatient Medications:     DULoxetine (CYMBALTA) 60 MG Cap DR Particles delayed-release capsule, Take 1 Capsule by mouth every day., Disp: 90 Capsule, Rfl: 1    rosuvastatin (CRESTOR) 40 MG tablet, Take 1 Tablet by mouth every day., Disp: 90 Tablet, Rfl: 3    gabapentin (NEURONTIN) 400 MG Cap, Take 1,200 mg by mouth 3 times a day., Disp: , Rfl:     Naproxen Sodium 220 MG Cap, Take 220 mg by mouth every evening., Disp: , Rfl:     amLODIPine (NORVASC) 5 MG Tab, Take 1 Tablet by mouth every day., Disp: 90 Tablet, Rfl: 2    metoprolol tartrate (LOPRESSOR) 50 MG Tab, Take 1 Tablet by mouth 2 times a day., Disp: 180 Tablet, Rfl: 2    gabapentin (NEURONTIN) 600 MG tablet, Take 2 Tablets by mouth 3 times a day., Disp: 180 Tablet, Rfl: 11    tadalafil (CIALIS) 20 MG tablet, TAKE ONE TABLET BY MOUTH APPROXIMATELY 2 HOURS PRIOR TO SEXUAL ACTIVITY, Disp: , Rfl:     clopidogrel (PLAVIX) 75 MG Tab, Take 1 tablet by mouth once daily, Disp: 30 Tablet, Rfl: 0    Semaglutide,0.25 or 0.5MG/DOS, (OZEMPIC, 0.25 OR 0.5  MG/DOSE,) 2 MG/1.5ML Solution Pen-injector, INJECT 0.25 MG SUBCUTANEOUSLY ONCE A WEEK, Disp: 2 mL, Rfl: 5    metformin (GLUCOPHAGE) 1000 MG tablet, Take 1 Tablet by mouth 2 times a day with meals., Disp: 180 Tablet, Rfl: 3    Empagliflozin 10 MG Tab, Take 10 mg by mouth every day., Disp: 90 Tablet, Rfl: 3    lisinopril (PRINIVIL) 40 MG tablet, Take 1 Tablet by mouth every evening., Disp: 90 Tablet, Rfl: 2    allopurinol (ZYLOPRIM) 100 MG Tab, Take 1 Tablet by mouth every day., Disp: 90 Tablet, Rfl: 3    Coenzyme Q10 (COQ10) 100 MG Cap, Take 300 mg by mouth every day., Disp: , Rfl:     Blood Glucose Test Strips, Use one strip to test blood sugar once daily ., Disp: 100 Each, Rfl: 5    therapeutic multivitamin-minerals (THERAGRAN-M) Tab, Take 1 Tab by mouth every day., Disp: , Rfl:     Lancets, Lancets order: Lancets for True Metrix meter. Sig: use TID and prn ssx high or low sugar. #100 RF x 3, Disp: 100 Each, Rfl: 3    acetaminophen (TYLENOL) 325 MG Tab, Take 2 Tabs by mouth every four hours as needed for Moderate Pain., Disp: 30 Tab, Rfl: 0    aspirin (ASA) 81 MG Chew Tab chewable tablet, Take 1 Tab by mouth every day., Disp: 30 Tab, Rfl: 2    cephALEXin (KEFLEX) 250 MG Cap, TAKE 1 CAPSULE BY MOUTH 4 TIMES DAILY START TAKING NIGHT BEFORE THE PROCEDURE (Patient not taking: Reported on 11/21/2022), Disp: , Rfl:   Allergies   Allergen Reactions    Isosorbide Unspecified     Hallucinations        Past Social History:  Social History     Socioeconomic History    Marital status: Single     Spouse name: Not on file    Number of children: Not on file    Years of education: Not on file    Highest education level: Associate degree: occupational, technical, or vocational program   Occupational History    Not on file   Tobacco Use    Smoking status: Never    Smokeless tobacco: Former     Types: Chew     Quit date: 01/2022   Vaping Use    Vaping Use: Never used   Substance and Sexual Activity    Alcohol use: Yes      Alcohol/week: 2.4 oz     Types: 4 Cans of beer per week     Comment: 4/week     Drug use: No    Sexual activity: Not Currently     Partners: Female     Comment: adriana. wrk: Verónica Nubieber Cutting.    Other Topics Concern    Not on file   Social History Narrative    Not on file     Social Determinants of Health     Financial Resource Strain: Low Risk     Difficulty of Paying Living Expenses: Not hard at all   Food Insecurity: No Food Insecurity    Worried About Running Out of Food in the Last Year: Never true    Ran Out of Food in the Last Year: Never true   Transportation Needs: No Transportation Needs    Lack of Transportation (Medical): No    Lack of Transportation (Non-Medical): No   Physical Activity: Inactive    Days of Exercise per Week: 0 days    Minutes of Exercise per Session: 0 min   Stress: No Stress Concern Present    Feeling of Stress : Not at all   Social Connections: Moderately Isolated    Frequency of Communication with Friends and Family: More than three times a week    Frequency of Social Gatherings with Friends and Family: Twice a week    Attends Sikh Services: Never    Active Member of Clubs or Organizations: No    Attends Club or Organization Meetings: Never    Marital Status: Living with partner   Intimate Partner Violence: Not on file   Housing Stability: Low Risk     Unable to Pay for Housing in the Last Year: No    Number of Places Lived in the Last Year: 1    Unstable Housing in the Last Year: No        Family History:  Family History   Problem Relation Age of Onset    Stroke Mother 47        had 3 devastating strokes,  54yo stroke complications    Heart Disease Mother     No Known Problems Father         does not know his father.    Stroke Maternal Grandmother 85    Heart Disease Maternal Grandmother     Stroke Maternal Grandfather 54    Heart Disease Maternal Grandfather        Depression and Opioid Screening  PHQ-9:      2021    12:55 PM 1/3/2022    11:30 AM 2022      3:40 PM   Depression Screen (PHQ-2/PHQ-9)   PHQ-2 Total Score   4   PHQ-2 Total Score 6 6    PHQ-9 Total Score   4   PHQ-9 Total Score 15 10      Interpretation of PHQ-9 Total Score   Score Severity   1-4 No Depression   5-9 Mild Depression   10-14 Moderate Depression   15-19 Moderately Severe Depression   20-27 Severe Depression     OBJECTIVE:   Vital Signs:  Vitals:    11/21/22 1121   BP: (!) 142/90   Pulse: 76   Resp: 16   Temp: 37 °C (98.6 °F)   SpO2: 97%        Pertinent Labs:  Lab Results   Component Value Date/Time    SODIUM 141 07/12/2022 09:29 AM    POTASSIUM 3.4 (L) 07/12/2022 09:29 AM    CHLORIDE 106 07/12/2022 09:29 AM    CO2 20 07/12/2022 09:29 AM    GLUCOSE 247 (H) 07/12/2022 09:29 AM    BUN 11 07/12/2022 09:29 AM    CREATININE 1.55 (H) 07/12/2022 09:29 AM       Lab Results   Component Value Date/Time    HBA1C 8.1 (H) 07/12/2022 09:29 AM       Lab Results   Component Value Date/Time    WBC 7.7 07/12/2022 09:29 AM    RBC 4.53 (L) 07/12/2022 09:29 AM    HEMOGLOBIN 13.6 (L) 07/12/2022 09:29 AM    HEMATOCRIT 40.2 (L) 07/12/2022 09:29 AM    MCV 88.7 07/12/2022 09:29 AM    MCH 30.0 07/12/2022 09:29 AM    MCHC 33.8 07/12/2022 09:29 AM    MPV 11.2 07/12/2022 09:29 AM    NEUTSPOLYS 48.50 07/12/2022 09:29 AM    LYMPHOCYTES 31.40 07/12/2022 09:29 AM    MONOCYTES 9.70 07/12/2022 09:29 AM    EOSINOPHILS 8.90 (H) 07/12/2022 09:29 AM    BASOPHILS 1.40 07/12/2022 09:29 AM    HYPOCHROMIA 1+ 10/01/2020 05:48 AM    ANISOCYTOSIS 1+ 10/04/2020 05:08 AM       Lab Results   Component Value Date/Time    ASTSGOT 150 (H) 07/12/2022 09:29 AM    ALTSGPT 173 (H) 07/12/2022 09:29 AM        Physical Exam:   GEN: No apparent distress  HEENT: Head normocephalic, atraumatic.  Sclera nonicteric bilaterally, no ocular discharge appreciated bilaterally.  CV: Extremities warm and well-perfused, no peripheral edema appreciated bilaterally.  PULMONARY: Breathing nonlabored on room air, no respiratory accessory muscle use.  Not requiring  supplemental oxygen.  SKIN: No appreciable skin breakdown on exposed areas of skin.  PSYCH: Tearful.  NEURO: Awake alert.  Conversational.  Logical thought content.  Ambulatory without assistive device.      ASSESSMENT/PLAN: Guerrero Ann  is a 52 y.o. male with rehabilitation history significant for bilateral cardioembolic ischemic CVA s/p CABG , here for evaluation. The following plan was discussed with the patient who is in agreement.     Visit Diagnoses     ICD-10-CM   1. Cardioembolic stroke (HCC)  I63.9   2. Neuropathic pain  M79.2   3. Peripheral polyneuropathy  G62.9   4. Depressed mood  R45.89   5. Kidney pain  N23   6. History of UTI  Z87.440        Rehab/Neuro:   Bilateral cardioembolic ischemic CVA s/p CABG   -Status: Neuro stable, continues to have severe chronic pain.    Neuropathic Pain: Continued neuropathic pain in bilateral LE's. Numbness of LLE in L5 distribution affecting top of left foot/toes distally and plantar surface. Right LE with painful numbness affecting bilateral malleoli region and extends proximally laterally in a sural nerve distribution. Improved to some degree on Gabapentin and Cymbalta, still painful at night. EMG/NCS 4/2021 + for possible bilateral L5 radiculopathy. RLE symptoms not in distribution congruent with greater saphenous nerve damage 2/2 vein grafting. Continue to significantly affect QOL. Attempted to fix with orthotics if component of plantar fasciitis causing pain. Inefficacious. 12/2/21 now wearing right ankle stabilization brace which is helping gait.  Does not assist with pain. EMG/NCS 4/2021-polyperipheral neuropathy in addition to possible bilateral L5 radic.  Recommended MRI -moderate left foraminal stenosis L4-5, L5-S1  -Med management: Continue gabapentin 1200 mg 3 times daily.  - Counseled extensively not to go over this as this is the max.  -Med management: Continue Cymbalta 60 mg daily.  New prescription provided today.    Mood:  Worsened lately.  - Med management: Continue Cymbalta  - Referral to psychology    Back pain:  -Concerned that this could be referred from his kidney, only has 1 kidney on the right.  - Urinalysis  - CMP (follow kidney function and liver function)  -No medication changes today until follow-up lab results.    Referral for patient to reestablish with a PCP.    Follow up: 6 month follow up for medication refill.     Please note that this dictation was created using voice recognition software. I have made every reasonable attempt to correct obvious errors but there may be errors of grammar and content that I may have overlooked prior to finalization of this note.    Dr. Marilu Franco DO, MS  Department of Physical Medicine & Rehabilitation  Neuro Rehabilitation Clinic  [facility]

## 2022-11-23 ENCOUNTER — TELEPHONE (OUTPATIENT)
Dept: HEALTH INFORMATION MANAGEMENT | Facility: OTHER | Age: 52
End: 2022-11-23
Payer: COMMERCIAL

## 2022-11-28 DIAGNOSIS — I63.9 CARDIOEMBOLIC STROKE (HCC): ICD-10-CM

## 2023-01-04 ENCOUNTER — TELEPHONE (OUTPATIENT)
Dept: MEDICAL GROUP | Facility: PHYSICIAN GROUP | Age: 53
End: 2023-01-04
Payer: COMMERCIAL

## 2023-01-04 NOTE — TELEPHONE ENCOUNTER
Prior auth was not needed for this medication but pharmacy does nt have it in stock and will call jazmyn

## 2023-02-02 RX ORDER — GABAPENTIN 400 MG/1
CAPSULE ORAL
Qty: 270 CAPSULE | Refills: 2 | Status: SHIPPED | OUTPATIENT
Start: 2023-02-02 | End: 2023-05-01 | Stop reason: SDUPTHER

## 2023-03-20 ENCOUNTER — OFFICE VISIT (OUTPATIENT)
Dept: PHYSICAL MEDICINE AND REHAB | Facility: MEDICAL CENTER | Age: 53
End: 2023-03-20
Payer: COMMERCIAL

## 2023-03-20 VITALS
BODY MASS INDEX: 28.52 KG/M2 | RESPIRATION RATE: 15 BRPM | TEMPERATURE: 98 F | OXYGEN SATURATION: 98 % | WEIGHT: 155 LBS | SYSTOLIC BLOOD PRESSURE: 120 MMHG | HEART RATE: 81 BPM | HEIGHT: 62 IN | DIASTOLIC BLOOD PRESSURE: 82 MMHG

## 2023-03-20 DIAGNOSIS — M79.2 NEUROPATHIC PAIN: ICD-10-CM

## 2023-03-20 DIAGNOSIS — I63.9 CARDIOEMBOLIC STROKE (HCC): Primary | ICD-10-CM

## 2023-03-20 DIAGNOSIS — R45.89 DEPRESSED MOOD: ICD-10-CM

## 2023-03-20 PROCEDURE — 99213 OFFICE O/P EST LOW 20 MIN: CPT | Performed by: PHYSICAL MEDICINE & REHABILITATION

## 2023-03-20 ASSESSMENT — PATIENT HEALTH QUESTIONNAIRE - PHQ9
CLINICAL INTERPRETATION OF PHQ2 SCORE: 3
5. POOR APPETITE OR OVEREATING: 2 - MORE THAN HALF THE DAYS
SUM OF ALL RESPONSES TO PHQ QUESTIONS 1-9: 16

## 2023-03-20 ASSESSMENT — FIBROSIS 4 INDEX: FIB4 SCORE: 1.645161290322580645

## 2023-03-20 NOTE — PROGRESS NOTES
[facility]  [facility]  [address] [place] [address]  Ph: [phone]    FOLLOW UP PATIENT EVALUATION      Patient Name: Guerrero Ann   Patient : 1970  Patient Age: 52 y.o.     Examining Physician: Dr. Marilu Franco, DO    PM&R History to Date - Background Information:  Dates of Admission/Discharge to ARU: 10/9/20-10/21/20    SUBJECTIVE:   Patient Identification: Guerrero Ann is a 52 y.o. male with PMH significant for coronary artery disease s/p stents, diabetes, hypertension, congenital absence of one kidney, type II diabetes and rehabilitation history significant for bilateral cardioembolic ischemic CVA s/p CABG 10/2020 and is presenting to PM&R clinic for a FOLLOW UP OUTPATIENT evaluation with the following chief complaint/s:    Chief Complaint: Follow Up    History of Present Illness:    -Is following up due to the fact that he has new insurance  -He needs new referrals due to the fact that he did not have insurance and they were not authorized.  -He states that he feels about the same.  There have been no interval changes since we last met.  -He is still taking gabapentin 1200 mg 3 times daily.  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    Review of Systems:  All other pertinent positive review of systems are noted above in HPI.   All other systems reviewed and are negative.    Past Medical History:  Past Medical History:   Diagnosis Date    Hx of CABG 09/2020    x 3    Diabetes (HCC)     oral meds only    Heart valve disease     High cholesterol     Hypertension     Kidney stones     MI, old     Pneumonia     Psychiatric problem     depression and anxiety    Stroke (HCC)       Past Surgical History:   Procedure Laterality Date    INSERTION, PERIPHERAL NERVE STIMULATOR, LOWER EXTREMITY Right 7/15/2022    Procedure: PERIPHERAL NERVE STIMULATOR IMPLANT, RIGHT   POSTERIOR TIBIAL PERIPHERAL NERVE STIMULATOR IMPLANT;  Surgeon: Live Miranda M.D.;  Location: SURGERY [facility];  Service: Pain Management    MULTIPLE CORONARY ARTERY BYPASS ENDO VEIN HARVEST  9/25/2020    Procedure: CABG, WITH ENDOSCOPIC VEIN PROCUREMENT- X3;  Surgeon: Michael Chicas M.D.;  Location: SURGERY [facility];  Service: Cardiothoracic    MARGOTH  9/25/2020    Procedure: ECHOCARDIOGRAM, TRANSESOPHAGEAL;  Surgeon: Michael Chicas M.D.;  Location: SURGERY [facility];  Service: Cardiothoracic    STENT PLACEMENT  2017    1 cardiac stent    OTHER ORTHOPEDIC SURGERY      rt wrist fracture with fixation 34 years ago        Current Outpatient Medications:     gabapentin (NEURONTIN) 400 MG Cap, TAKE 3 CAPSULES BY MOUTH THREE TIMES DAILY, Disp: 270 Capsule, Rfl: 2    DULoxetine (CYMBALTA) 60 MG Cap DR Particles delayed-release capsule, Take 1 Capsule by mouth every day., Disp: 90 Capsule, Rfl: 1    rosuvastatin (CRESTOR) 40 MG tablet, Take 1 Tablet by mouth every day., Disp: 90 Tablet, Rfl: 3    Naproxen Sodium 220 MG Cap, Take 220 mg by mouth every evening., Disp: , Rfl:     amLODIPine (NORVASC) 5 MG Tab, Take 1 Tablet by mouth every day., Disp: 90 Tablet, Rfl: 2    metoprolol tartrate (LOPRESSOR) 50 MG Tab, Take 1 Tablet by mouth 2 times a day., Disp: 180 Tablet, Rfl: 2    tadalafil (CIALIS) 20 MG tablet, TAKE ONE TABLET BY MOUTH APPROXIMATELY 2 HOURS PRIOR TO SEXUAL ACTIVITY, Disp: , Rfl:     clopidogrel (PLAVIX) 75 MG Tab, Take 1 tablet by mouth once daily, Disp: 30 Tablet, Rfl: 0    Semaglutide,0.25 or 0.5MG/DOS, (OZEMPIC, 0.25 OR 0.5 MG/DOSE,) 2 MG/1.5ML Solution Pen-injector, INJECT 0.25 MG SUBCUTANEOUSLY ONCE A WEEK, Disp: 2 mL, Rfl: 5    metformin (GLUCOPHAGE) 1000 MG tablet, Take 1 Tablet by mouth 2 times a day with meals., Disp: 180 Tablet, Rfl: 3    lisinopril (PRINIVIL) 40 MG tablet, Take 1 Tablet by mouth every evening., Disp: 90 Tablet, Rfl: 2    allopurinol (ZYLOPRIM) 100 MG Tab, Take 1 Tablet  by mouth every day., Disp: 90 Tablet, Rfl: 3    Coenzyme Q10 (COQ10) 100 MG Cap, Take 300 mg by mouth every day., Disp: , Rfl:     Blood Glucose Test Strips, Use one strip to test blood sugar once daily ., Disp: 100 Each, Rfl: 5    therapeutic multivitamin-minerals (THERAGRAN-M) Tab, Take 1 Tab by mouth every day., Disp: , Rfl:     Lancets, Lancets order: Lancets for True Metrix meter. Sig: use TID and prn ssx high or low sugar. #100 RF x 3, Disp: 100 Each, Rfl: 3    aspirin (ASA) 81 MG Chew Tab chewable tablet, Take 1 Tab by mouth every day., Disp: 30 Tab, Rfl: 2  Allergies   Allergen Reactions    Isosorbide Unspecified     Hallucinations        Past Social History:  Social History     Socioeconomic History    Marital status: Single     Spouse name: Not on file    Number of children: Not on file    Years of education: Not on file    Highest education level: Associate degree: occupational, technical, or vocational program   Occupational History    Not on file   Tobacco Use    Smoking status: Never    Smokeless tobacco: Former     Types: Chew     Quit date: 2022   Vaping Use    Vaping Use: Never used   Substance and Sexual Activity    Alcohol use: Yes     Alcohol/week: 2.4 oz     Types: 4 Cans of beer per week     Comment: 4/week     Drug use: No    Sexual activity: Not Currently     Partners: Female     Comment: adriana. wrk: Verónica [place].    Other Topics Concern    Not on file   Social History Narrative    Not on file     Social Determinants of Health     Financial Resource Strain: Not on file   Food Insecurity: Not on file   Transportation Needs: Not on file   Physical Activity: Not on file   Stress: Not on file   Social Connections: Not on file   Intimate Partner Violence: Not on file   Housing Stability: Not on file        Family History:  Family History   Problem Relation Age of Onset    Stroke Mother 47        had 3 devastating strokes,  54yo stroke complications    Heart Disease Mother      No Known Problems Father         does not know his father.    Stroke Maternal Grandmother 85    Heart Disease Maternal Grandmother     Stroke Maternal Grandfather 54    Heart Disease Maternal Grandfather        Depression and Opioid Screening  PHQ-9:      1/3/2022    11:30 AM 1/6/2022     3:40 PM 3/20/2023    11:45 AM   Depression Screen (PHQ-2/PHQ-9)   PHQ-2 Total Score  4    PHQ-2 Total Score 6  3   PHQ-9 Total Score  4    PHQ-9 Total Score 10  16     Interpretation of PHQ-9 Total Score   Score Severity   1-4 No Depression   5-9 Mild Depression   10-14 Moderate Depression   15-19 Moderately Severe Depression   20-27 Severe Depression     OBJECTIVE:   Vital Signs:  Vitals:    03/20/23 1145   BP: 120/82   Pulse: 81   Resp: 15   Temp: 36.7 °C (98 °F)   SpO2: 98%        Pertinent Labs:  Lab Results   Component Value Date/Time    SODIUM 142 11/21/2022 12:27 PM    POTASSIUM 4.3 11/21/2022 12:27 PM    CHLORIDE 104 11/21/2022 12:27 PM    CO2 24 11/21/2022 12:27 PM    GLUCOSE 115 (H) 11/21/2022 12:27 PM    BUN 12 11/21/2022 12:27 PM    CREATININE 1.10 11/21/2022 12:27 PM       Lab Results   Component Value Date/Time    HBA1C 8.1 (H) 07/12/2022 09:29 AM       Lab Results   Component Value Date/Time    WBC 7.7 07/12/2022 09:29 AM    RBC 4.53 (L) 07/12/2022 09:29 AM    HEMOGLOBIN 13.6 (L) 07/12/2022 09:29 AM    HEMATOCRIT 40.2 (L) 07/12/2022 09:29 AM    MCV 88.7 07/12/2022 09:29 AM    MCH 30.0 07/12/2022 09:29 AM    MCHC 33.8 07/12/2022 09:29 AM    MPV 11.2 07/12/2022 09:29 AM    NEUTSPOLYS 48.50 07/12/2022 09:29 AM    LYMPHOCYTES 31.40 07/12/2022 09:29 AM    MONOCYTES 9.70 07/12/2022 09:29 AM    EOSINOPHILS 8.90 (H) 07/12/2022 09:29 AM    BASOPHILS 1.40 07/12/2022 09:29 AM    HYPOCHROMIA 1+ 10/01/2020 05:48 AM    ANISOCYTOSIS 1+ 10/04/2020 05:08 AM       Lab Results   Component Value Date/Time    ASTSGOT 24 11/21/2022 12:27 PM    ALTSGPT 16 11/21/2022 12:27 PM        Physical Exam:   GEN: No apparent distress  HEENT: Head  normocephalic, atraumatic.  Sclera nonicteric bilaterally, no ocular discharge appreciated bilaterally.  CV: Extremities warm and well-perfused, no peripheral edema appreciated bilaterally.  PULMONARY: Breathing nonlabored on room air, no respiratory accessory muscle use.  Not requiring supplemental oxygen.  SKIN: No appreciable skin breakdown on exposed areas of skin.  PSYCH: Blunted affect.  NEURO: Awake alert.  Conversational.  Logical thought content.      ASSESSMENT/PLAN: Guerrero Ann  is a 52 y.o. male with rehabilitation history significant for bilateral cardioembolic ischemic CVA s/p CABG , here for evaluation. The following plan was discussed with the patient who is in agreement.     Visit Diagnoses     ICD-10-CM   1. Cardioembolic stroke (HCC)  I63.9   2. Neuropathic pain  M79.2   3. Depressed mood  R45.89        Rehab/Neuro:   Bilateral cardioembolic ischemic CVA s/p CABG   -Status:  neuro stable, severe chronic pain    Neuropathic Pain: Continued neuropathic pain in bilateral LE's. Numbness of LLE in L5 distribution affecting top of left foot/toes distally and plantar surface. Right LE with painful numbness affecting bilateral malleoli region and extends proximally laterally in a sural nerve distribution. Improved to some degree on Gabapentin and Cymbalta, still painful at night. EMG/NCS 4/2021 + for possible bilateral L5 radiculopathy. RLE symptoms not in distribution congruent with greater saphenous nerve damage 2/2 vein grafting. Continue to significantly affect QOL. Attempted to fix with orthotics if component of plantar fasciitis causing pain. Inefficacious. 12/2/21 now wearing right ankle stabilization brace which is helping gait.  Does not assist with pain. EMG/NCS 4/2021-polyperipheral neuropathy in addition to possible bilateral L5 radic.  Recommended MRI -moderate left foraminal stenosis L4-5, L5-S1  -Med management: Continue gabapentin 1200 mg 3 times daily.  This is  the maximum allowed dose of gabapentin per day.  -Med management: Continue Cymbalta 60 mg daily.    Mood:   Depression  - Med management: Continue Cymbalta 60 mg daily.  - Referral to psychology.    Back pain: Unilateral kidney.  - Urinalysis-negative  - CMP (follow kidney function and liver function) -last BUN/creatinine was within normal limits at 12/1.10 which is about at his baseline per record review of labs.  Additionally liver enzymes are within normal limits during the same draw in November 2022.    Referral for patient to establish with a primary care physician.    Follow up: I will refill patient's duloxetine when he next needs it and we will schedule appointment for future medication refills if needed.    Total time spent was 23 minutes.  Included in this time is the time spent preparing for the visit including record review, my exam and evaluation, counseling and education regarding that which is aforementioned in the assessment and plan.  Time was spent documenting into patient's electronic health record.  Time was spent ordering the appropriate labs, tests, procedures, referrals, medications.  Including this time was also the time spent in care coordination. Included this time as the time spent obtaining history from someone other than the patient.  Some of the time included occurred outside of charting time.  Discussion involved the patient.      Please note that this dictation was created using voice recognition software. I have made every reasonable attempt to correct obvious errors but there may be errors of grammar and content that I may have overlooked prior to finalization of this note.    Dr. Marilu Franco DO, MS  Department of Physical Medicine & Rehabilitation  Neuro Rehabilitation Clinic  [facility]

## 2023-04-10 ENCOUNTER — TELEPHONE (OUTPATIENT)
Dept: PHYSICAL MEDICINE AND REHAB | Facility: MEDICAL CENTER | Age: 53
End: 2023-04-10
Payer: COMMERCIAL

## 2023-04-10 NOTE — TELEPHONE ENCOUNTER
Patient called for Dr. Marilu Franco and is wondering if you can prescribe gabapentin tablets instead of capsules? He is having a lot of difficulty swallowing the capsules? Please advise.

## 2023-05-01 ENCOUNTER — HOSPITAL ENCOUNTER (OUTPATIENT)
Facility: MEDICAL CENTER | Age: 53
End: 2023-05-01
Payer: COMMERCIAL

## 2023-05-01 ENCOUNTER — OFFICE VISIT (OUTPATIENT)
Dept: MEDICAL GROUP | Facility: MEDICAL CENTER | Age: 53
End: 2023-05-01
Payer: COMMERCIAL

## 2023-05-01 VITALS
BODY MASS INDEX: 28.37 KG/M2 | OXYGEN SATURATION: 97 % | WEIGHT: 154.2 LBS | HEIGHT: 62 IN | TEMPERATURE: 98.8 F | HEART RATE: 89 BPM | DIASTOLIC BLOOD PRESSURE: 80 MMHG | SYSTOLIC BLOOD PRESSURE: 142 MMHG

## 2023-05-01 DIAGNOSIS — Z12.11 SCREENING FOR COLORECTAL CANCER: ICD-10-CM

## 2023-05-01 DIAGNOSIS — Z13.228 SCREENING FOR ENDOCRINE, METABOLIC AND IMMUNITY DISORDER: ICD-10-CM

## 2023-05-01 DIAGNOSIS — M10.9 GOUT, UNSPECIFIED CAUSE, UNSPECIFIED CHRONICITY, UNSPECIFIED SITE: ICD-10-CM

## 2023-05-01 DIAGNOSIS — E11.8 TYPE 2 DIABETES MELLITUS WITH COMPLICATION, WITHOUT LONG-TERM CURRENT USE OF INSULIN (HCC): ICD-10-CM

## 2023-05-01 DIAGNOSIS — I10 PRIMARY HYPERTENSION: ICD-10-CM

## 2023-05-01 DIAGNOSIS — Z11.59 NEED FOR HEPATITIS C SCREENING TEST: ICD-10-CM

## 2023-05-01 DIAGNOSIS — Z12.12 SCREENING FOR COLORECTAL CANCER: ICD-10-CM

## 2023-05-01 DIAGNOSIS — M79.2 NEUROPATHIC PAIN: ICD-10-CM

## 2023-05-01 DIAGNOSIS — F33.1 MODERATE EPISODE OF RECURRENT MAJOR DEPRESSIVE DISORDER (HCC): ICD-10-CM

## 2023-05-01 DIAGNOSIS — Z13.0 SCREENING FOR ENDOCRINE, METABOLIC AND IMMUNITY DISORDER: ICD-10-CM

## 2023-05-01 DIAGNOSIS — Z13.29 SCREENING FOR ENDOCRINE, METABOLIC AND IMMUNITY DISORDER: ICD-10-CM

## 2023-05-01 DIAGNOSIS — I25.10 CORONARY ARTERY DISEASE INVOLVING NATIVE HEART: ICD-10-CM

## 2023-05-01 DIAGNOSIS — I63.9 CARDIOEMBOLIC STROKE (HCC): ICD-10-CM

## 2023-05-01 PROBLEM — Z87.442 HISTORY OF NEPHROLITHIASIS: Status: RESOLVED | Noted: 2022-01-03 | Resolved: 2023-05-01

## 2023-05-01 LAB
HBA1C MFR BLD: 7.2 % (ref ?–5.8)
POCT INT CON NEG: NEGATIVE
POCT INT CON POS: POSITIVE

## 2023-05-01 PROCEDURE — 83036 HEMOGLOBIN GLYCOSYLATED A1C: CPT

## 2023-05-01 PROCEDURE — 82043 UR ALBUMIN QUANTITATIVE: CPT

## 2023-05-01 PROCEDURE — 99214 OFFICE O/P EST MOD 30 MIN: CPT

## 2023-05-01 PROCEDURE — 82570 ASSAY OF URINE CREATININE: CPT

## 2023-05-01 RX ORDER — DULOXETIN HYDROCHLORIDE 60 MG/1
60 CAPSULE, DELAYED RELEASE ORAL DAILY
Qty: 90 CAPSULE | Refills: 1 | Status: SHIPPED | OUTPATIENT
Start: 2023-05-01 | End: 2023-08-07 | Stop reason: SDUPTHER

## 2023-05-01 RX ORDER — METOPROLOL TARTRATE 50 MG/1
50 TABLET, FILM COATED ORAL 2 TIMES DAILY
Qty: 180 TABLET | Refills: 3 | Status: SHIPPED | OUTPATIENT
Start: 2023-05-01 | End: 2023-11-28 | Stop reason: SDUPTHER

## 2023-05-01 RX ORDER — LISINOPRIL 40 MG/1
40 TABLET ORAL EVERY EVENING
Qty: 90 TABLET | Refills: 3 | Status: SHIPPED | OUTPATIENT
Start: 2023-05-01

## 2023-05-01 RX ORDER — GABAPENTIN 300 MG/1
1200 CAPSULE ORAL 3 TIMES DAILY
Qty: 1080 CAPSULE | Refills: 3 | Status: SHIPPED | OUTPATIENT
Start: 2023-05-01 | End: 2024-04-30

## 2023-05-01 RX ORDER — AMLODIPINE BESYLATE 5 MG/1
5 TABLET ORAL DAILY
Qty: 90 TABLET | Refills: 3 | Status: SHIPPED | OUTPATIENT
Start: 2023-05-01

## 2023-05-01 RX ORDER — VITAMIN B COMPLEX
300 TABLET ORAL DAILY
Qty: 270 CAPSULE | Refills: 3 | Status: SHIPPED | OUTPATIENT
Start: 2023-05-01 | End: 2024-04-30

## 2023-05-01 RX ORDER — CLOPIDOGREL BISULFATE 75 MG/1
75 TABLET ORAL DAILY
Qty: 90 TABLET | Refills: 3 | Status: SHIPPED | OUTPATIENT
Start: 2023-05-01 | End: 2024-04-30

## 2023-05-01 RX ORDER — ROSUVASTATIN CALCIUM 40 MG/1
40 TABLET, COATED ORAL DAILY
Qty: 90 TABLET | Refills: 3 | Status: SHIPPED | OUTPATIENT
Start: 2023-05-01

## 2023-05-01 RX ORDER — ALLOPURINOL 100 MG/1
100 TABLET ORAL DAILY
Qty: 90 TABLET | Refills: 3 | Status: SHIPPED | OUTPATIENT
Start: 2023-05-01

## 2023-05-01 RX ORDER — SEMAGLUTIDE 1.34 MG/ML
INJECTION, SOLUTION SUBCUTANEOUS
Qty: 2 ML | Refills: 5 | Status: SHIPPED | OUTPATIENT
Start: 2023-05-01

## 2023-05-01 RX ORDER — CELECOXIB 100 MG/1
100 CAPSULE ORAL 2 TIMES DAILY
Qty: 60 CAPSULE | Refills: 6 | Status: SHIPPED | OUTPATIENT
Start: 2023-05-01 | End: 2023-05-05

## 2023-05-01 RX ORDER — GABAPENTIN 400 MG/1
1200 CAPSULE ORAL 3 TIMES DAILY
Qty: 270 CAPSULE | Refills: 2 | Status: SHIPPED | OUTPATIENT
Start: 2023-05-01 | End: 2023-05-01

## 2023-05-01 ASSESSMENT — ENCOUNTER SYMPTOMS
SHORTNESS OF BREATH: 0
COUGH: 0
ORTHOPNEA: 0
FEVER: 0
CHILLS: 0
PALPITATIONS: 0

## 2023-05-01 ASSESSMENT — PATIENT HEALTH QUESTIONNAIRE - PHQ9
SUM OF ALL RESPONSES TO PHQ QUESTIONS 1-9: 12
CLINICAL INTERPRETATION OF PHQ2 SCORE: 2
5. POOR APPETITE OR OVEREATING: 0 - NOT AT ALL

## 2023-05-01 ASSESSMENT — FIBROSIS 4 INDEX: FIB4 SCORE: 1.645161290322580645

## 2023-05-01 NOTE — LETTER
[facility]  BRAULIO Vigil  [address]  [place] [address]  Fax: [phone]   Authorization for Release/Disclosure of   Protected Health Information   Name: PETER TRONCOSO : 1970 SSN: [id number]   Address: [address] [facility]  [place] [address] Phone:    [phone] (home)    I authorize the entity listed below to release/disclose the PHI below to:   [facility]RAULIO Vigil and [facility]ULIO Vigil   Provider or Entity Name:     [facility]    Address   City, [facility]  [facility] Phone:      Fax:     Reason for request: continuity of care   Information to be released:    [  ] LAST COLONOSCOPY,  including any PATH REPORT and follow-up  [  ] LAST FIT/COLOGUARD RESULT [  ] LAST DEXA  [  ] LAST MAMMOGRAM  [  ] LAST PAP  [  ] LAST LABS [  ] RETINA EXAM REPORT  [  ] IMMUNIZATION RECORDS  [  ] Release all info      [  ] Check here and initial the line next to each item to release ALL health information INCLUDING  _____ Care and treatment for drug and / or alcohol abuse  _____ HIV testing, infection status, or AIDS  _____ Genetic Testing    DATES OF SERVICE OR TIME PERIOD TO BE DISCLOSED: _____________  I understand and acknowledge that:  * This Authorization may be revoked at any time by you in writing, except if your health information has already been used or disclosed.  * Your health information that will be used or disclosed as a result of you signing this authorization could be re-disclosed by the recipient. If this occurs, your re-disclosed health information may no longer be protected by State or Federal laws.  * You may refuse to sign this Authorization. Your refusal will not affect your ability to obtain treatment.  * This Authorization becomes effective upon signing and will  on (date) __________.      If no date is indicated, this Authorization will  one (1) year from the signature date.    Name:  Guerrero Ann  Signature: Date:   5/1/2023     PLEASE FAX REQUESTED RECORDS BACK TO: [phone]

## 2023-05-01 NOTE — PROGRESS NOTES
Subjective:     CC: Establish Care (Prior pcp : Tim Castillo ) and Medication Management (Pt would like pain medication for the pain in his foot )      HPI:   Guerrero is a 52 y.o. male who presents today for: Diabetes, depression, and neuropathy    Depression: Patient states he has had depression since he had his CABG, and had a nerve stimulator implanted into his leg due to nerve pain.  He states that he has not been able to feel like himself a good.  He is not duloxetine 60 mg daily.  His PHQ-9 score was 12 today.  He has not seen a therapist, but would like a referral to behavioral health.    Diabetes: Patient has been taking his medication as prescribed.  He states that he does not check his blood sugar on a regular basis.  His a1c today was 7.2.  He tries to monitor his diet, and avoid foods that are going to elevate his blood sugar.    Neuropathy: Patient states that he has neuropathy in his bilateral feet due to his diabetes, and since they did a vein harvest for his CABG from his leg.  He has been on gabapentin since the surgery.  He states that his physiatrist changed his prescription, and he would like to resume taking his gabapentin 1200 mg 3 times daily.    He is also requesting refills for his medications for his CAD, history of stroke, and hypertension.  Blood pressure was elevated today, he did not take his blood pressure medication this morning.  /80.    Allergies: Isosorbide     Medications:   Current Outpatient Medications:     celecoxib (CELEBREX) 100 MG Cap, Take 1 Capsule by mouth 2 times a day., Disp: 60 Capsule, Rfl: 6    DULoxetine (CYMBALTA) 60 MG Cap DR Particles delayed-release capsule, Take 1 Capsule by mouth every day., Disp: 90 Capsule, Rfl: 1    gabapentin (NEURONTIN) 300 MG Cap, Take 4 Capsules by mouth 3 times a day., Disp: 1080 Capsule, Rfl: 3    Semaglutide,0.25 or 0.5MG/DOS, (OZEMPIC, 0.25 OR 0.5 MG/DOSE,) 2 MG/1.5ML Solution Pen-injector, INJECT 0.25 MG SUBCUTANEOUSLY ONCE  "A WEEK, Disp: 2 mL, Rfl: 5    rosuvastatin (CRESTOR) 40 MG tablet, Take 1 Tablet by mouth every day., Disp: 90 Tablet, Rfl: 3    metoprolol tartrate (LOPRESSOR) 50 MG Tab, Take 1 Tablet by mouth 2 times a day., Disp: 180 Tablet, Rfl: 3    metformin (GLUCOPHAGE) 1000 MG tablet, Take 1 Tablet by mouth 2 times a day with meals., Disp: 180 Tablet, Rfl: 3    lisinopril (PRINIVIL) 40 MG tablet, Take 1 Tablet by mouth every evening., Disp: 90 Tablet, Rfl: 3    Coenzyme Q10 (COQ10) 100 MG Cap, Take 3 Capsules by mouth every day., Disp: 270 Capsule, Rfl: 3    clopidogrel (PLAVIX) 75 MG Tab, Take 1 Tablet by mouth every day., Disp: 90 Tablet, Rfl: 3    amLODIPine (NORVASC) 5 MG Tab, Take 1 Tablet by mouth every day., Disp: 90 Tablet, Rfl: 3    allopurinol (ZYLOPRIM) 100 MG Tab, Take 1 Tablet by mouth every day., Disp: 90 Tablet, Rfl: 3    therapeutic multivitamin-minerals (THERAGRAN-M) Tab, Take 1 Tab by mouth every day., Disp: , Rfl:     aspirin (ASA) 81 MG Chew Tab chewable tablet, Take 1 Tab by mouth every day., Disp: 30 Tab, Rfl: 2    Blood Glucose Test Strips, Use one strip to test blood sugar once daily . (Patient not taking: Reported on 5/1/2023), Disp: 100 Each, Rfl: 5    Lancets, Lancets order: Lancets for True Metrix meter. Sig: use TID and prn ssx high or low sugar. #100 RF x 3 (Patient not taking: Reported on 5/1/2023), Disp: 100 Each, Rfl: 3      ROS:  Review of Systems   Constitutional:  Negative for chills and fever.   Respiratory:  Negative for cough and shortness of breath.    Cardiovascular:  Negative for chest pain, palpitations, orthopnea and leg swelling.     Objective:     Exam:  BP (!) 142/80 (BP Location: Left arm, Patient Position: Sitting, BP Cuff Size: Adult)   Pulse 89   Temp 37.1 °C (98.8 °F) (Temporal)   Ht 1.575 m (5' 2\")   Wt 69.9 kg (154 lb 3.2 oz)   SpO2 97%   BMI 28.20 kg/m²  Body mass index is 28.2 kg/m².    Physical Exam  Constitutional:       Appearance: He is normal weight.   Eyes: "      Pupils: Pupils are equal, round, and reactive to light.   Cardiovascular:      Rate and Rhythm: Normal rate and regular rhythm.      Pulses: Normal pulses.      Heart sounds: Normal heart sounds.   Pulmonary:      Effort: Pulmonary effort is normal.      Breath sounds: Normal breath sounds.   Neurological:      Mental Status: He is alert and oriented to person, place, and time.   Psychiatric:         Mood and Affect: Mood normal.         Behavior: Behavior normal.         Assessment & Plan:     Guerrero mccarthy 52 y.o. male with the following -     1. Type 2 diabetes mellitus with complication, without long-term current use of insulin (HCC)  Chronic, stable  - Lipid Profile; Future  - Diabetic Monofilament LE Exam  - POCT  A1C  - celecoxib (CELEBREX) 100 MG Cap; Take 1 Capsule by mouth 2 times a day.  Dispense: 60 Capsule; Refill: 6  - Semaglutide,0.25 or 0.5MG/DOS, (OZEMPIC, 0.25 OR 0.5 MG/DOSE,) 2 MG/1.5ML Solution Pen-injector; INJECT 0.25 MG SUBCUTANEOUSLY ONCE A WEEK  Dispense: 2 mL; Refill: 5  - rosuvastatin (CRESTOR) 40 MG tablet; Take 1 Tablet by mouth every day.  Dispense: 90 Tablet; Refill: 3  - metformin (GLUCOPHAGE) 1000 MG tablet; Take 1 Tablet by mouth 2 times a day with meals.  Dispense: 180 Tablet; Refill: 3  - MICROALBUMIN CREAT RATIO URINE; Future    2. Neuropathic pain  Chronic, unstable  We will add Celebrex, to see if this helps give him some better pain control in addition to increasing his gabapentin.  - celecoxib (CELEBREX) 100 MG Cap; Take 1 Capsule by mouth 2 times a day.  Dispense: 60 Capsule; Refill: 6  - DULoxetine (CYMBALTA) 60 MG Cap DR Particles delayed-release capsule; Take 1 Capsule by mouth every day.  Dispense: 90 Capsule; Refill: 1  - gabapentin (NEURONTIN) 300 MG Cap; Take 4 Capsules by mouth 3 times a day.  Dispense: 1080 Capsule; Refill: 3    3. Coronary artery disease involving native heart  4. Cardioembolic stroke (HCC)  Chronic, stable  - clopidogrel (PLAVIX) 75 MG Tab; Take 1  Tablet by mouth every day.  Dispense: 90 Tablet; Refill: 3    5. Gout, unspecified cause, unspecified chronicity, unspecified site  Chronic, stable  - allopurinol (ZYLOPRIM) 100 MG Tab; Take 1 Tablet by mouth every day.  Dispense: 90 Tablet; Refill: 3    6. Primary hypertension  Chronic, unstable  We will continue medications, and have patient follow-up for blood pressure check on a day that he has taken his blood pressure medication.  - metoprolol tartrate (LOPRESSOR) 50 MG Tab; Take 1 Tablet by mouth 2 times a day.  Dispense: 180 Tablet; Refill: 3  - lisinopril (PRINIVIL) 40 MG tablet; Take 1 Tablet by mouth every evening.  Dispense: 90 Tablet; Refill: 3  - Coenzyme Q10 (COQ10) 100 MG Cap; Take 3 Capsules by mouth every day.  Dispense: 270 Capsule; Refill: 3  - amLODIPine (NORVASC) 5 MG Tab; Take 1 Tablet by mouth every day.  Dispense: 90 Tablet; Refill: 3    7. Moderate episode of recurrent major depressive disorder (HCC)  Chronic, stable  - DULoxetine (CYMBALTA) 60 MG Cap DR Particles delayed-release capsule; Take 1 Capsule by mouth every day.  Dispense: 90 Capsule; Refill: 1  - Referral to Behavioral Health    8. Screening for endocrine, metabolic and immunity disorder  - TSH WITH REFLEX TO FT4; Future  - Comp Metabolic Panel; Future  - CBC WITHOUT DIFFERENTIAL; Future    9. Need for hepatitis C screening test  - HEP C VIRUS ANTIBODY; Future    10. Screening for colorectal cancer  - Referral to GI for Colonoscopy        Anticipatory guidance included the following: Patient counseled about skin care, diet, supplements, smoking, drugs/alcohol use, safe sex and exercise.     Return in about 3 months (around 8/1/2023) for F/U Diabetes.    Please note that this dictation was created using voice recognition software. I have made every reasonable attempt to correct obvious errors, but I expect that there are errors of grammar and possibly content that I did not discover before finalizing the note.

## 2023-05-02 ENCOUNTER — TELEPHONE (OUTPATIENT)
Dept: MEDICAL GROUP | Facility: MEDICAL CENTER | Age: 53
End: 2023-05-02
Payer: COMMERCIAL

## 2023-05-02 LAB
CREAT UR-MCNC: 96.96 MG/DL
MICROALBUMIN UR-MCNC: 27.6 MG/DL
MICROALBUMIN/CREAT UR: 285 MG/G (ref 0–30)

## 2023-05-02 NOTE — TELEPHONE ENCOUNTER
DOCUMENTATION OF PAR STATUS:    1. Name of Medication & Dose: Celecoxib (Celebrex) 100mg Cap     2. Name of Prescription Coverage Company & phone #: [email or web address] ID: [id number]    3. Date Prior Auth Submitted: 5/2/2023    4. What information was given to obtain insurance decision? See Attached.    5. Prior Auth Status? Pending    6. Patient Notified: N\A

## 2023-05-03 ENCOUNTER — HOSPITAL ENCOUNTER (OUTPATIENT)
Dept: LAB | Facility: MEDICAL CENTER | Age: 53
End: 2023-05-03
Payer: COMMERCIAL

## 2023-05-03 DIAGNOSIS — Z13.29 SCREENING FOR ENDOCRINE, METABOLIC AND IMMUNITY DISORDER: ICD-10-CM

## 2023-05-03 DIAGNOSIS — Z11.59 NEED FOR HEPATITIS C SCREENING TEST: ICD-10-CM

## 2023-05-03 DIAGNOSIS — Z13.0 SCREENING FOR ENDOCRINE, METABOLIC AND IMMUNITY DISORDER: ICD-10-CM

## 2023-05-03 DIAGNOSIS — Z13.228 SCREENING FOR ENDOCRINE, METABOLIC AND IMMUNITY DISORDER: ICD-10-CM

## 2023-05-03 DIAGNOSIS — E11.8 TYPE 2 DIABETES MELLITUS WITH COMPLICATION, WITHOUT LONG-TERM CURRENT USE OF INSULIN (HCC): ICD-10-CM

## 2023-05-03 LAB
ALBUMIN SERPL BCP-MCNC: 4.8 G/DL (ref 3.2–4.9)
ALBUMIN/GLOB SERPL: 1.2 G/DL
ALP SERPL-CCNC: 119 U/L (ref 30–99)
ALT SERPL-CCNC: 17 U/L (ref 2–50)
ANION GAP SERPL CALC-SCNC: 13 MMOL/L (ref 7–16)
AST SERPL-CCNC: 21 U/L (ref 12–45)
BILIRUB SERPL-MCNC: 0.6 MG/DL (ref 0.1–1.5)
BUN SERPL-MCNC: 14 MG/DL (ref 8–22)
CALCIUM ALBUM COR SERPL-MCNC: 9.4 MG/DL (ref 8.5–10.5)
CALCIUM SERPL-MCNC: 10 MG/DL (ref 8.5–10.5)
CHLORIDE SERPL-SCNC: 100 MMOL/L (ref 96–112)
CHOLEST SERPL-MCNC: 202 MG/DL (ref 100–199)
CO2 SERPL-SCNC: 25 MMOL/L (ref 20–33)
CREAT SERPL-MCNC: 1.14 MG/DL (ref 0.5–1.4)
ERYTHROCYTE [DISTWIDTH] IN BLOOD BY AUTOMATED COUNT: 42.1 FL (ref 35.9–50)
GFR SERPLBLD CREATININE-BSD FMLA CKD-EPI: 78 ML/MIN/1.73 M 2
GLOBULIN SER CALC-MCNC: 4.1 G/DL (ref 1.9–3.5)
GLUCOSE SERPL-MCNC: 168 MG/DL (ref 65–99)
HCT VFR BLD AUTO: 47.2 % (ref 42–52)
HCV AB SER QL: NORMAL
HDLC SERPL-MCNC: 40 MG/DL
HGB BLD-MCNC: 15.5 G/DL (ref 14–18)
LDLC SERPL CALC-MCNC: 95 MG/DL
MCH RBC QN AUTO: 29.4 PG (ref 27–33)
MCHC RBC AUTO-ENTMCNC: 32.8 G/DL (ref 33.7–35.3)
MCV RBC AUTO: 89.4 FL (ref 81.4–97.8)
PLATELET # BLD AUTO: 242 K/UL (ref 164–446)
PMV BLD AUTO: 10.5 FL (ref 9–12.9)
POTASSIUM SERPL-SCNC: 4.5 MMOL/L (ref 3.6–5.5)
PROT SERPL-MCNC: 8.9 G/DL (ref 6–8.2)
RBC # BLD AUTO: 5.28 M/UL (ref 4.7–6.1)
SODIUM SERPL-SCNC: 138 MMOL/L (ref 135–145)
TRIGL SERPL-MCNC: 335 MG/DL (ref 0–149)
TSH SERPL DL<=0.005 MIU/L-ACNC: 1.71 UIU/ML (ref 0.38–5.33)
WBC # BLD AUTO: 7.1 K/UL (ref 4.8–10.8)

## 2023-05-03 PROCEDURE — 84443 ASSAY THYROID STIM HORMONE: CPT

## 2023-05-03 PROCEDURE — 80061 LIPID PANEL: CPT

## 2023-05-03 PROCEDURE — 86803 HEPATITIS C AB TEST: CPT

## 2023-05-03 PROCEDURE — 85027 COMPLETE CBC AUTOMATED: CPT

## 2023-05-03 PROCEDURE — 36415 COLL VENOUS BLD VENIPUNCTURE: CPT

## 2023-05-03 PROCEDURE — 80053 COMPREHEN METABOLIC PANEL: CPT

## 2023-05-05 ENCOUNTER — TELEPHONE (OUTPATIENT)
Dept: MEDICAL GROUP | Facility: MEDICAL CENTER | Age: 53
End: 2023-05-05
Payer: COMMERCIAL

## 2023-05-05 DIAGNOSIS — M79.2 NEUROPATHIC PAIN: ICD-10-CM

## 2023-05-05 RX ORDER — MELOXICAM 15 MG/1
15 TABLET ORAL DAILY
Qty: 90 TABLET | Refills: 0 | Status: SHIPPED | OUTPATIENT
Start: 2023-05-05 | End: 2024-05-04

## 2023-05-05 NOTE — TELEPHONE ENCOUNTER
"FINAL PRIOR AUTHORIZATION STATUS:    1.  Name of Medication & Dose: Celecoxib capsules 100 MG      2. Prior Auth Status: Denied.  Reason: \"Member must try and fail the following drug alternatives, (meloxicam AND ibuprofen, naproxen or diclofenac)     3. Action Taken: Pharmacy Notified: N\A Patient Notified: N\A  "

## 2023-05-23 ENCOUNTER — NON-PROVIDER VISIT (OUTPATIENT)
Dept: CARDIOLOGY | Facility: MEDICAL CENTER | Age: 53
End: 2023-05-23
Attending: NURSE PRACTITIONER
Payer: COMMERCIAL

## 2023-05-23 VITALS
HEART RATE: 69 BPM | RESPIRATION RATE: 20 BRPM | DIASTOLIC BLOOD PRESSURE: 74 MMHG | SYSTOLIC BLOOD PRESSURE: 132 MMHG | BODY MASS INDEX: 27.97 KG/M2 | HEIGHT: 62 IN | OXYGEN SATURATION: 97 % | WEIGHT: 152 LBS

## 2023-05-23 DIAGNOSIS — R00.2 PALPITATIONS: ICD-10-CM

## 2023-05-23 DIAGNOSIS — Z86.73 HISTORY OF CARDIOEMBOLIC STROKE: ICD-10-CM

## 2023-05-23 DIAGNOSIS — Z95.1 S/P CABG X 3: ICD-10-CM

## 2023-05-23 DIAGNOSIS — I25.10 CORONARY ARTERY DISEASE INVOLVING NATIVE CORONARY ARTERY OF NATIVE HEART WITHOUT ANGINA PECTORIS: ICD-10-CM

## 2023-05-23 DIAGNOSIS — I47.10 SVT (SUPRAVENTRICULAR TACHYCARDIA) (HCC): ICD-10-CM

## 2023-05-23 DIAGNOSIS — I49.3 PVC'S (PREMATURE VENTRICULAR CONTRACTIONS): ICD-10-CM

## 2023-05-23 DIAGNOSIS — I10 ESSENTIAL HYPERTENSION: ICD-10-CM

## 2023-05-23 DIAGNOSIS — I49.1 APC (ATRIAL PREMATURE CONTRACTIONS): ICD-10-CM

## 2023-05-23 DIAGNOSIS — E11.69 TYPE 2 DIABETES MELLITUS WITH OTHER SPECIFIED COMPLICATION, WITHOUT LONG-TERM CURRENT USE OF INSULIN (HCC): ICD-10-CM

## 2023-05-23 PROCEDURE — 99214 OFFICE O/P EST MOD 30 MIN: CPT | Performed by: NURSE PRACTITIONER

## 2023-05-23 PROCEDURE — 99213 OFFICE O/P EST LOW 20 MIN: CPT | Performed by: NURSE PRACTITIONER

## 2023-05-23 PROCEDURE — 93242 EXT ECG>48HR<7D RECORDING: CPT

## 2023-05-23 PROCEDURE — 3075F SYST BP GE 130 - 139MM HG: CPT | Performed by: NURSE PRACTITIONER

## 2023-05-23 PROCEDURE — 3078F DIAST BP <80 MM HG: CPT | Performed by: NURSE PRACTITIONER

## 2023-05-23 RX ORDER — SEMAGLUTIDE 0.68 MG/ML
INJECTION, SOLUTION SUBCUTANEOUS
COMMUNITY
Start: 2023-05-01 | End: 2023-05-23

## 2023-05-23 ASSESSMENT — FIBROSIS 4 INDEX: FIB4 SCORE: 1.07

## 2023-05-23 ASSESSMENT — ENCOUNTER SYMPTOMS
ORTHOPNEA: 0
PND: 0
SHORTNESS OF BREATH: 0
PALPITATIONS: 1
FALLS: 0
ABDOMINAL PAIN: 0
DIZZINESS: 0
DEPRESSION: 0
FEVER: 0
SENSORY CHANGE: 1
MYALGIAS: 0
NERVOUS/ANXIOUS: 0
CLAUDICATION: 0
COUGH: 0

## 2023-05-23 NOTE — PROGRESS NOTES
Chief Complaint   Patient presents with    Coronary Artery Disease     FV DX: Coronary artery disease involving native coronary artery of native heart without angina pectoris    Hypertension     FV DX: Essential hypertension, benign     Subjective     Guerrero Dixon Judah Ann is a 52 y.o. male who presents today for follow up.    He is a patient of Dr. Mead in our office. 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    He presents today alone. He just recently took a stumble and has abrasions to his upper lip and face. He states he fell due to his neuropathy in his foot.    He is working with [employer] now and a piece of plastic hit his chest and his chest is still sore. He didn't tell his work about this. No LOC or head injury.    He continues to work with the psychiatrist and pain specialist on his neuropathy and mobility.    He overall from a cardiac standpoint feels well except some palpitations that are new onset at night, short lived but occur weekly now.    No excessive ETOH or caffeine use.    He did have some chest pain at rest while laying down. Short lived, no radiation, pressure on left side of chest. Nothing with exertion.    He has no shortness of breath, edema, or dizziness/lightheadedness.    Past Medical History:   Diagnosis Date    Diabetes (HCC)     oral meds only    Heart valve disease     High cholesterol     Hx of CABG 09/2020    x 3    Hypertension     Kidney stones     MI, old     Pneumonia     Psychiatric problem     depression and anxiety    Stroke (HCC)      Past Surgical History:   Procedure Laterality Date    INSERTION, PERIPHERAL NERVE STIMULATOR, LOWER EXTREMITY Right 7/15/2022    Procedure: PERIPHERAL NERVE STIMULATOR IMPLANT, RIGHT  POSTERIOR TIBIAL PERIPHERAL NERVE STIMULATOR IMPLANT;  Surgeon: Live  PHILIP Miranda M.D.;  Location: SURGERY [facility];  Service: Pain Management    MULTIPLE CORONARY ARTERY BYPASS ENDO VEIN HARVEST  2020    Procedure: CABG, WITH ENDOSCOPIC VEIN PROCUREMENT- X3;  Surgeon: Michael Chicas M.D.;  Location: SURGERY [facility];  Service: Cardiothoracic    MARGOTH  2020    Procedure: ECHOCARDIOGRAM, TRANSESOPHAGEAL;  Surgeon: Michael Chicas M.D.;  Location: SURGERY [facility];  Service: Cardiothoracic    STENT PLACEMENT  2017    1 cardiac stent    OTHER ORTHOPEDIC SURGERY      rt wrist fracture with fixation 34 years ago     Family History   Problem Relation Age of Onset    Stroke Mother 47        had 3 devastating strokes,  54yo stroke complications    Heart Disease Mother     No Known Problems Father         does not know his father.    Stroke Maternal Grandmother 85    Heart Disease Maternal Grandmother     Stroke Maternal Grandfather 54    Heart Disease Maternal Grandfather     No Known Problems Daughter     No Known Problems Son      Social History     Socioeconomic History    Marital status: Single     Spouse name: Not on file    Number of children: Not on file    Years of education: Not on file    Highest education level: Associate degree: occupational, technical, or vocational program   Occupational History    Not on file   Tobacco Use    Smoking status: Never    Smokeless tobacco: Current     Types: Chew    Tobacco comments:     sometimes   Vaping Use    Vaping Use: Never used   Substance and Sexual Activity    Alcohol use: Yes     Alcohol/week: 2.4 oz     Types: 4 Cans of beer per week     Comment: twice a week    Drug use: No    Sexual activity: Not Currently     Partners: Female     Comment: fiance. wrk: Verónica Peterman Cutting.    Other Topics Concern    Not on file   Social History Narrative    Not on file     Social Determinants of Health     Financial Resource Strain: Low Risk  (2022)    Overall Financial Resource Strain (CARDIA)     Difficulty of Paying  Living Expenses: Not hard at all   Food Insecurity: No Food Insecurity (1/9/2022)    Hunger Vital Sign     Worried About Running Out of Food in the Last Year: Never true     Ran Out of Food in the Last Year: Never true   Transportation Needs: No Transportation Needs (1/9/2022)    PRAPARE - Transportation     Lack of Transportation (Medical): No     Lack of Transportation (Non-Medical): No   Physical Activity: Inactive (1/9/2022)    Exercise Vital Sign     Days of Exercise per Week: 0 days     Minutes of Exercise per Session: 0 min   Stress: No Stress Concern Present (1/9/2022)    [place] [employer] - Occupational Stress Questionnaire     Feeling of Stress : Not at all   Social Connections: Moderately Isolated (1/9/2022)    Social Connection and Isolation Panel [NHANES]     Frequency of Communication with Friends and Family: More than three times a week     Frequency of Social Gatherings with Friends and Family: Twice a week     Attends Yarsanism Services: Never     Active Member of Clubs or Organizations: No     Attends Club or Organization Meetings: Never     Marital Status: Living with partner   Intimate Partner Violence: Not on file   Housing Stability: Low Risk  (1/9/2022)    Housing Stability Vital Sign     Unable to Pay for Housing in the Last Year: No     Number of Places Lived in the Last Year: 1     Unstable Housing in the Last Year: No     Allergies   Allergen Reactions    Isosorbide Unspecified     Hallucinations     Outpatient Encounter Medications as of 5/23/2023   Medication Sig Dispense Refill    meloxicam (MOBIC) 15 MG tablet Take 1 Tablet by mouth every day. 90 Tablet 0    DULoxetine (CYMBALTA) 60 MG Cap DR Particles delayed-release capsule Take 1 Capsule by mouth every day. 90 Capsule 1    gabapentin (NEURONTIN) 300 MG Cap Take 4 Capsules by mouth 3 times a day. 1080 Capsule 3    Semaglutide,0.25 or 0.5MG/DOS, (OZEMPIC, 0.25 OR 0.5 MG/DOSE,) 2 MG/1.5ML Solution Pen-injector  INJECT 0.25 MG SUBCUTANEOUSLY ONCE A WEEK 2 mL 5    rosuvastatin (CRESTOR) 40 MG tablet Take 1 Tablet by mouth every day. 90 Tablet 3    metoprolol tartrate (LOPRESSOR) 50 MG Tab Take 1 Tablet by mouth 2 times a day. 180 Tablet 3    metformin (GLUCOPHAGE) 1000 MG tablet Take 1 Tablet by mouth 2 times a day with meals. 180 Tablet 3    lisinopril (PRINIVIL) 40 MG tablet Take 1 Tablet by mouth every evening. 90 Tablet 3    Coenzyme Q10 (COQ10) 100 MG Cap Take 3 Capsules by mouth every day. 270 Capsule 3    clopidogrel (PLAVIX) 75 MG Tab Take 1 Tablet by mouth every day. 90 Tablet 3    amLODIPine (NORVASC) 5 MG Tab Take 1 Tablet by mouth every day. 90 Tablet 3    allopurinol (ZYLOPRIM) 100 MG Tab Take 1 Tablet by mouth every day. 90 Tablet 3    Blood Glucose Test Strips Use one strip to test blood sugar once daily . 100 Each 5    therapeutic multivitamin-minerals (THERAGRAN-M) Tab Take 1 Tab by mouth every day.      Lancets Lancets order: Lancets for True Metrix meter. Sig: use TID and prn ssx high or low sugar. #100 RF x 3 100 Each 3    aspirin (ASA) 81 MG Chew Tab chewable tablet Take 1 Tab by mouth every day. 30 Tab 2    [DISCONTINUED] OZEMPIC, 0.25 OR 0.5 MG/DOSE, 2 MG/3ML Solution Pen-injector        No facility-administered encounter medications on file as of 5/23/2023.     Review of Systems   Constitutional:  Negative for fever and malaise/fatigue.   Respiratory:  Negative for cough and shortness of breath.    Cardiovascular:  Positive for chest pain and palpitations. Negative for orthopnea, claudication, leg swelling and PND.        Chest wall pain from recent injury at work, and intermittent at rest chest pain and palpitations, short lived   Gastrointestinal:  Negative for abdominal pain.   Musculoskeletal:  Negative for falls and myalgias.   Neurological:  Positive for sensory change. Negative for dizziness.        Neuropathy in right leg-now with neuropathic treatment trial   Psychiatric/Behavioral:  Negative  "for depression. The patient is not nervous/anxious.               Objective     /74 (BP Location: Left arm, Patient Position: Sitting, BP Cuff Size: Adult)   Pulse 69   Resp 20   Ht 1.575 m (5' 2\")   Wt 68.9 kg (152 lb)   SpO2 97%   BMI 27.80 kg/m²     Physical Exam  Vitals and nursing note reviewed.   Constitutional:       Appearance: Normal appearance. He is well-developed and normal weight.   HENT:      Head: Normocephalic and atraumatic.   Neck:      Vascular: No JVD.   Cardiovascular:      Rate and Rhythm: Normal rate and regular rhythm.      Pulses: Normal pulses.      Heart sounds: Normal heart sounds.   Pulmonary:      Effort: Pulmonary effort is normal.      Breath sounds: Normal breath sounds.   Musculoskeletal:         General: Normal range of motion.   Skin:     General: Skin is warm and dry.      Capillary Refill: Capillary refill takes less than 2 seconds.   Neurological:      General: No focal deficit present.      Mental Status: He is alert and oriented to person, place, and time. Mental status is at baseline.   Psychiatric:         Attention and Perception: Attention normal.         Mood and Affect: Mood normal. Affect is flat.         Speech: Speech normal.         Behavior: Behavior normal.         Thought Content: Thought content normal.         Cognition and Memory: Cognition and memory normal.         Judgment: Judgment normal.                Assessment & Plan     1. S/P CABG x 3  Lipid Profile    Comp Metabolic Panel      2. History of cardioembolic stroke        3. Essential hypertension        4. Type 2 diabetes mellitus with other specified complication, without long-term current use of insulin (HCC)        5. Coronary artery disease involving native coronary artery of native heart without angina pectoris        6. Palpitations  Holter Monitor Study          Medical Decision Making: Today's Assessment/Status/Plan:      1. HLD with CAD with CABG   -no angina or MIRANDA  -cont asa, " statin lifelong  -echo shows normal EF in '22  -post-op neuropathy in R foot, managed by pyshiatrist   -LDL not at goal <70 with high intensity  -recommend repeat lipid/cmp in 6 months  -consider add zetia 10 mg on top of statin if needed    2. HTN  -good control on lisinopril, metoprolol, and amlodipine  -BP goal <130/80 consistently  -managed by PCP    3. DM type II  -followed by PCP    4. CVA post CABG  -flat depressed affect  -cont asa, plavix, and statin    5. Depression/anxiety  -very flat affect today  -recommend PCP/psych involvement  -managed by PCP    Patient is to follow up with Marilu ERICKSON in 6 months with labs

## 2023-05-23 NOTE — PROGRESS NOTES
Patient enrolled in the 7 day ePatch Holter monitoring program, per GEORGINA Pappas. In clinic hook up, monitor S/N [id number]. Pending EOS.

## 2023-05-23 NOTE — PATIENT INSTRUCTIONS
Repeat your cholesterol panel blood work to be done this Fall, make sure to be consistent with your cholesterol medication called rosuvastatin (crestor).    If your chest discomfort still hurts in a week, I recommend reaching out to your work office to let them know you got injured and consider urgent care for chest xray imaging.    We will do a heart monitor to check your heart palpitations.

## 2023-05-31 NOTE — PROGRESS NOTES
Assumed care of pt. Bedside report received from JOEY Fernandez.  
IV removed. Discharge instructions provided and pt verbalizes understanding. Pt states that all question have been answered. Copy of discharge provided to pt and signed. Prescription: sent to pharmacy. Pt states that all personal items are in possess. Pt escorted off unit by Tech without incident via wheelchair.    
MD paged for elevated BP.   
Patient arrived to unit at 0450 via WC from ER. Pt ambulated steadily to stand up scale where weight was taken and to bed. VS taken and connected to monitor.    Initial assessment and admit profile completed. Patient is A&Ox4 and able to make needs known. Denies CP at this time. BP elevated; medicated per MAR and MD paged for new orders. Pt c/o 4/10 tooth pain; requests to have it pulled. Pt c/o unmanaged depression with a PHQ of 20; declined social service consult. SR on monitor; O2 sat 68% on RA.  POC discussed with pt: stress test, trop at 0830 . No further questions at this time. Fall precautions in place. Bed locked and in the lowest position, call light instructions provided, call light within reach.  
Pt back from stress test  
Greater than or equal to 4 cm

## 2023-06-05 ENCOUNTER — TELEPHONE (OUTPATIENT)
Dept: CARDIOLOGY | Facility: MEDICAL CENTER | Age: 53
End: 2023-06-05
Payer: COMMERCIAL

## 2023-06-05 PROCEDURE — 93228 REMOTE 30 DAY ECG REV/REPORT: CPT | Performed by: INTERNAL MEDICINE

## 2023-06-22 RX ORDER — GABAPENTIN 400 MG/1
CAPSULE ORAL
Qty: 270 CAPSULE | Refills: 0 | Status: SHIPPED | OUTPATIENT
Start: 2023-06-22 | End: 2023-08-07

## 2023-07-11 NOTE — PROGRESS NOTES
[facility]  [facility]  [facility] [place] [address]  Ph: [phone]    FOLLOW UP PATIENT EVALUATION      Patient Name: Guerrero Ann   Patient : 1970  Patient Age: 50 y.o.     Examining Physician: Dr. Marilu Franco, DO    PM&R History to Date - Background Information:  Dates of Admission/Discharge to ARU: 10/9/20-10/21/20    SUBJECTIVE:   Patient Identification: Guerrero Ann is a 50 y.o. male with PMH significant for coronary artery disease s/p stents, diabetes, hypertension, congenital absence of one kidney, type II diabetes and rehabilitation history significant for bilateral cardioembolic ischemic CVA s/p CABG 10/2020 and is presenting to PM&R clinic for a FOLLOW UP OUTPATIENT evaluation with the following chief complaint/s:    Chief Complaint: Continued nerve pain.    History of Present Illness:    - Patients pain is on both sides of the ankle on the right and extends up the lateral side of the right leg. The left toes and balls of his feet are numb and this is not improving. None of these symptoms had occurred prior to his surgery and strokes.   - Pain wise he has improved to some degree.   - Tolerating Gabapentin 1200mg TID.   - Tolerating Cymbalta 60mg daily. Emotionality improved.   - Notices swelling more often especially with change in weather.   - Saw urologist and increased Flomax to 0.8mg nightly. Had been working better on the increased dose.     Review of Systems:  Review of Systems   Constitutional: Negative for chills and fever.   Respiratory: Negative for cough and shortness of breath.    Cardiovascular: Positive for leg swelling ( Occasional leg swelling on the right.). Negative for palpitations.   Gastrointestinal: Negative for constipation and diarrhea.   Genitourinary: Positive for urgency.   Musculoskeletal: Negative for falls and joint pain.   Neurological: Positive for tingling. Negative for focal  Pt arrived with mom to receive 2nd Men B vaccine  Proper interval between first and second was checked  Vaccine previously ordered by provider  Vaccine administered without any adverse reaction  Pt released with parent weakness.        Numbness left toes and also feet.  Painful neuropathy medial and lateral ankle on the right extending proximally laterally and up the leg.   Endo/Heme/Allergies: Does not bruise/bleed easily.   Psychiatric/Behavioral:        Mood improved.  Less crying episodes.      All other pertinent positive review of systems are noted above in HPI.   All other systems reviewed and are negative.    Past Medical History:  Past Medical History:   Diagnosis Date   • Diabetes (HCC)     oral meds only   • Hypertension    • Kidney stones    • MI, old    • Pneumonia    • Psychiatric problem     depression and anxiety   • Stroke (HCC)       Past Surgical History:   Procedure Laterality Date   • MULTIPLE CORONARY ARTERY BYPASS ENDO VEIN HARVEST  9/25/2020    Procedure: CABG, WITH ENDOSCOPIC VEIN PROCUREMENT- X3;  Surgeon: Michael Chicas M.D.;  Location: SURGERY [facility];  Service: Cardiothoracic   • MARGOTH  9/25/2020    Procedure: ECHOCARDIOGRAM, TRANSESOPHAGEAL;  Surgeon: Michael Chicas M.D.;  Location: SURGERY [facility];  Service: Cardiothoracic   • STENT PLACEMENT  2017 1 cardiac stent   • OTHER ORTHOPEDIC SURGERY      rt wrist fracture with fixation 34 years ago        Current Outpatient Medications:   •  gabapentin (NEURONTIN) 800 MG tablet, Take 1 tablet by mouth 3 times a day., Disp: 90 tablet, Rfl: 2  •  gabapentin (NEURONTIN) 400 MG Cap, Take 1 capsule by mouth 3 times a day., Disp: 90 capsule, Rfl: 2  •  DULoxetine (CYMBALTA) 60 MG Cap DR Particles delayed-release capsule, Take 1 capsule by mouth every day., Disp: 30 capsule, Rfl: 2  •  tamsulosin (FLOMAX) 0.4 MG capsule, Take 1 capsule by mouth at bedtime., Disp: 30 capsule, Rfl: 2  •  TRUE METRIX BLOOD GLUCOSE TEST strip, USE 1 STRIP TO CHECK GLUCOSE ONCE DAILY, Disp: , Rfl:   •  Dapagliflozin Propanediol (FARXIGA) 10 MG Tab, Take 10 mg by mouth every day., Disp: 30 tablet, Rfl: 5  •  metFORMIN (GLUCOPHAGE) 1000 MG tablet, Take 1 tablet by mouth 2  times a day, with meals., Disp: 180 tablet, Rfl: 3  •  Coenzyme Q10 (COQ10) 100 MG Cap, Take 300 mg by mouth every day., Disp: , Rfl:   •  Blood Glucose Test Strips, Use one strip to test blood sugar once daily ., Disp: 100 Each, Rfl: 5  •  atorvastatin (LIPITOR) 80 MG tablet, Take 1 Tab by mouth every bedtime., Disp: 90 Tab, Rfl: 3  •  therapeutic multivitamin-minerals (THERAGRAN-M) Tab, Take 1 Tab by mouth every day., Disp: , Rfl:   •  lisinopril (PRINIVIL) 40 MG tablet, Take 1 Tab by mouth every day., Disp: 90 Tab, Rfl: 3  •  clopidogrel (PLAVIX) 75 MG Tab, Take 1 Tab by mouth every day., Disp: 90 Tab, Rfl: 3  •  allopurinol (ZYLOPRIM) 100 MG Tab, Take 1 Tab by mouth every day., Disp: 90 Tab, Rfl: 3  •  Lancets, Lancets order: Lancets for True Metrix meter. Sig: use TID and prn ssx high or low sugar. #100 RF x 3, Disp: 100 Each, Rfl: 3  •  metoprolol (LOPRESSOR) 25 MG Tab, Take 1 Tab by mouth 2 Times a Day., Disp: 180 Tab, Rfl: 3  •  acetaminophen (TYLENOL) 325 MG Tab, Take 2 Tabs by mouth every four hours as needed for Moderate Pain., Disp: 30 Tab, Rfl: 0  •  aspirin (ASA) 81 MG Chew Tab chewable tablet, Take 1 Tab by mouth every day., Disp: 30 Tab, Rfl: 2  Allergies   Allergen Reactions   • Isosorbide Unspecified     Hallucinations        Past Social History:  Social History     Socioeconomic History   • Marital status: Single     Spouse name: Not on file   • Number of children: Not on file   • Years of education: Not on file   • Highest education level: Not on file   Occupational History   • Not on file   Tobacco Use   • Smoking status: Never Smoker   • Smokeless tobacco: Former User     Types: Chew   Vaping Use   • Vaping Use: Never used   Substance and Sexual Activity   • Alcohol use: Not Currently     Comment: 2 per week   • Drug use: No   • Sexual activity: Yes     Partners: Female     Comment: fiance. wrk: Verónica Harmony Cutting.    Other Topics Concern   • Not on file   Social History Narrative   • Not  on file     Social Determinants of Health     Financial Resource Strain:    • Difficulty of Paying Living Expenses:    Food Insecurity:    • Worried About Running Out of Food in the Last Year:    • Ran Out of Food in the Last Year:    Transportation Needs:    • Lack of Transportation (Medical):    • Lack of Transportation (Non-Medical):    Physical Activity:    • Days of Exercise per Week:    • Minutes of Exercise per Session:    Stress:    • Feeling of Stress :    Social Connections:    • Frequency of Communication with Friends and Family:    • Frequency of Social Gatherings with Friends and Family:    • Attends Yarsanism Services:    • Active Member of Clubs or Organizations:    • Attends Club or Organization Meetings:    • Marital Status:    Intimate Partner Violence:    • Fear of Current or Ex-Partner:    • Emotionally Abused:    • Physically Abused:    • Sexually Abused:         Family History:  Family History   Problem Relation Age of Onset   • Stroke Mother 47        had 3 devastating strokes,  54yo stroke complications   • Heart Disease Mother    • No Known Problems Father         does not know his father.   • Stroke Maternal Grandmother 85   • Heart Disease Maternal Grandmother    • Stroke Maternal Grandfather 54   • Heart Disease Maternal Grandfather        Depression and Opioid Screening  PHQ-9:  Depression Screen (PHQ-2/PHQ-9) 10/21/2020 2020 2021   PHQ-2 Total Score 0 - -   PHQ-2 Total Score - 0 6   PHQ-9 Total Score - - -   PHQ-9 Total Score - - 15     Interpretation of PHQ-9 Total Score   Score Severity   1-4 No Depression   5-9 Mild Depression   10-14 Moderate Depression   15-19 Moderately Severe Depression   20-27 Severe Depression     OBJECTIVE:   Vital Signs:  Vitals:    21 1711   BP: 116/70   Pulse: 70   Resp: 18   Temp: 36.3 °C (97.4 °F)   SpO2: 98%        Pertinent Labs:  Lab Results   Component Value Date/Time    SODIUM 138 2020 08:43 AM    POTASSIUM 4.0 2020  08:43 AM    CHLORIDE 101 12/29/2020 08:43 AM    CO2 22 12/29/2020 08:43 AM    GLUCOSE 198 (H) 12/29/2020 08:43 AM    BUN 18 12/29/2020 08:43 AM    CREATININE 0.83 12/29/2020 08:43 AM       Lab Results   Component Value Date/Time    HBA1C 8.3 (H) 03/29/2021 10:20 AM       Lab Results   Component Value Date/Time    WBC 12.2 (H) 03/29/2021 10:20 AM    RBC 4.90 03/29/2021 10:20 AM    HEMOGLOBIN 14.7 03/29/2021 10:20 AM    HEMATOCRIT 45.3 03/29/2021 10:20 AM    MCV 92.4 03/29/2021 10:20 AM    MCH 30.0 03/29/2021 10:20 AM    MCHC 32.5 (L) 03/29/2021 10:20 AM    MPV 11.5 03/29/2021 10:20 AM    NEUTSPOLYS 70.80 10/12/2020 06:21 AM    LYMPHOCYTES 18.70 (L) 10/12/2020 06:21 AM    MONOCYTES 7.30 10/12/2020 06:21 AM    EOSINOPHILS 2.10 10/12/2020 06:21 AM    BASOPHILS 0.70 10/12/2020 06:21 AM    HYPOCHROMIA 1+ 10/01/2020 05:48 AM    ANISOCYTOSIS 1+ 10/04/2020 05:08 AM       Lab Results   Component Value Date/Time    ASTSGOT 12 12/29/2020 08:43 AM    ALTSGPT 19 12/29/2020 08:43 AM        Physical Exam:   GEN: No apparent distress  HEENT: Head normocephalic, atraumatic.  Sclera nonicteric bilaterally, no ocular discharge appreciated bilaterally.  CV: Extremities warm and well-perfused, no peripheral edema appreciated bilaterally.  PULMONARY: Breathing nonlabored on room air, no respiratory accessory muscle use.  Not requiring supplemental oxygen.  ABD: Soft, nontender.  SKIN: No appreciable skin breakdown on exposed areas of skin.  PSYCH: Mood and affect within normal limits.  NEURO: Awake alert.  Conversational.  Logical thought content.  Sensory abnormalities detected bilateral malleoli on the right extending proximally.  Affects dorsal surface and plantar surface of left toes distally.     Motor Exam Lower Extremities  ? Myotome R L   Hip flexion L2 5 5   Knee extension L3 5 5   Ankle dorsiflexion L4 5 5   Toe extension L5 5 5   Ankle plantarflexion S1 5 5     Procedures:  EMG/NCS  4/15/2021  Impression/Recommendations:  Abnormal study  1. Today's electrodiagnostic findings are suggestive of:  a. Right lumbar radiculopathy, primarily involving L5  b. Left lumbar radiculopathy, primarily involving L5  c. Large fiber generalized peripheral polyneuropathy(PPN)     2. Clinically, his symptoms are consistent with the above findings.  The PPN may be second to DM and/or history of heavy alcohol use.  Recommend imaging of the lumbar spine, patient could benefit from physical therapy and possibly injections.         ASSESSMENT/PLAN: Guerrero Ann  is a 50 y.o. male with rehabilitation history significant for bilateral cardioembolic ischemic CVA s/p CABG , here for evaluation. The following plan was discussed with the patient who is in agreement.     Visit Diagnoses     ICD-10-CM   1. Cardioembolic stroke (HCC)  I63.9   2. Neuropathic pain  M79.2   3. Lumbar radiculopathy  M54.16   4. Bilateral pain of leg and foot  M79.604    M79.605    M79.671    M79.672   5. Peripheral polyneuropathy  G62.9      Girlfriend assists with history.     Rehab/Neuro:   1. Bilateral cardioembolic ischemic CVA s/p CABG   -Therapy: Continue home exercise program  -Occupation: Has started a full-time job as a .    Neuropathic Pain: Continued neuropathic pain in bilateral LE's. Numbness of LLE in L5 distribution affecting top of left foot/toes distally and plantar surface. Right LE with painful numbness affecting bilateral malleoli region and extends proximally laterally. Improved to some degree on Gabapentin and Cymbalta, still painful at night. EMG/NCS 4/2021 + for possible bilateral L5 radiculopathy. RLE symptoms not in distribution congruent with greater saphenous nerve damage 2/2 vein grafting. Continue to significantly affect QOL. Attempted to fix with orthotics if component of plantar fasciitis causing pain. Inefficacious.   -Medication management: Continue gabapentin 1200 mg 3 times daily,  2 refills.  Could consider alternative agent such as Lyrica if pain persists but is having some relief on gabapentin.  -Medication management: Prescription for Cymbalta 60 mg daily, 2 refills  -Procedures: EMG/NCS 4/2021-polyperipheral neuropathy in addition to possible bilateral L5 radic.  Recommended MRI  -Imaging: MRI L-spine ordered, denied by insurance.  Important to rule out radiculopathy as cause of symptoms given quality of life is significantly affected.  -Labs: B12 PENDING    Mood: Emotional lability improved.   -Med management: Refill Duloxetine 60mg daily    Urinary Hesitancy:   -Med management: On Flomax 0.8mg nightly  - Consultants: Follows with Urology.     Follow up: 3 months    Please note that this dictation was created using voice recognition software. I have made every reasonable attempt to correct obvious errors but there may be errors of grammar and content that I may have overlooked prior to finalization of this note.    Dr. Marilu Franco DO, MS  Department of Physical Medicine & Rehabilitation  Neuro Rehabilitation Clinic  [facility]

## 2023-08-07 ENCOUNTER — OFFICE VISIT (OUTPATIENT)
Dept: MEDICAL GROUP | Facility: MEDICAL CENTER | Age: 53
End: 2023-08-07
Payer: COMMERCIAL

## 2023-08-07 VITALS
SYSTOLIC BLOOD PRESSURE: 150 MMHG | HEART RATE: 78 BPM | HEIGHT: 62 IN | DIASTOLIC BLOOD PRESSURE: 89 MMHG | TEMPERATURE: 98.5 F | BODY MASS INDEX: 28.16 KG/M2 | WEIGHT: 153 LBS | OXYGEN SATURATION: 97 %
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
## 2023-08-07 DIAGNOSIS — F33.1 MODERATE EPISODE OF RECURRENT MAJOR DEPRESSIVE DISORDER (HCC): ICD-10-CM

## 2023-08-07 DIAGNOSIS — E11.69 TYPE 2 DIABETES MELLITUS WITH OTHER SPECIFIED COMPLICATION, WITHOUT LONG-TERM CURRENT USE OF INSULIN (HCC): ICD-10-CM

## 2023-08-07 DIAGNOSIS — G62.9 NEUROPATHY: ICD-10-CM

## 2023-08-07 DIAGNOSIS — R53.1 WEAKNESS: ICD-10-CM

## 2023-08-07 PROBLEM — N40.1 LOWER URINARY TRACT SYMPTOMS DUE TO BENIGN PROSTATIC HYPERPLASIA: Status: ACTIVE | Noted: 2022-05-25

## 2023-08-07 PROCEDURE — 99214 OFFICE O/P EST MOD 30 MIN: CPT

## 2023-08-07 PROCEDURE — 3079F DIAST BP 80-89 MM HG: CPT

## 2023-08-07 PROCEDURE — 3077F SYST BP >= 140 MM HG: CPT

## 2023-08-07 RX ORDER — DULOXETIN HYDROCHLORIDE 20 MG/1
80 CAPSULE, DELAYED RELEASE ORAL DAILY
Qty: 120 CAPSULE | Refills: 3 | Status: SHIPPED | OUTPATIENT
Start: 2023-08-07 | End: 2023-12-05

## 2023-08-07 ASSESSMENT — ENCOUNTER SYMPTOMS
ORTHOPNEA: 0
SHORTNESS OF BREATH: 0
CHILLS: 0
COUGH: 0
FEVER: 0
PALPITATIONS: 0

## 2023-08-07 ASSESSMENT — FIBROSIS 4 INDEX: FIB4 SCORE: 1.07

## 2023-08-07 NOTE — PROGRESS NOTES
Subjective:     CC: Diabetes Follow-up (3 mth fv ) and Eye Injury (Injury to R eye due to fall that occurred on 8/3/2023, pain only in cheekbone)      HPI:   Guerrero is a 52 y.o. male who presents today for:    Ground level fall/ weakness/ neuropathy: he fell on 8/3/23  while walking down a hill at his house. He denies getting dizzy, light headed, or tripping on anything. He hit his head on the pavement resulting in a black eye on the right side. He states that he was alone. He endorses LOC, he is uncertain how long he was out for. He remembers waking up on the ground confused about what happens. He has not been having headaches since. He denies loss of control of bowel or bladder. He denies CP/ SOB during the event. He states he believes he fell because he has trouble with feeling his right foot and being able to do a heal toe walking motion. Today he is using a cane, which he finds helpful.     Diabetes: he has been taking his medication. He has not been checking his blood sugar.     HTN; he has been taking his medication, but did not take it this morning. Blood pressure elevated today 150/89.     Allergies: Isosorbide     Medications:   Current Outpatient Medications:     DULoxetine (CYMBALTA) 20 MG Cap DR Particles, Take 4 Capsules by mouth every day for 120 days., Disp: 120 Capsule, Rfl: 3    meloxicam (MOBIC) 15 MG tablet, Take 1 Tablet by mouth every day., Disp: 90 Tablet, Rfl: 0    gabapentin (NEURONTIN) 300 MG Cap, Take 4 Capsules by mouth 3 times a day., Disp: 1080 Capsule, Rfl: 3    Semaglutide,0.25 or 0.5MG/DOS, (OZEMPIC, 0.25 OR 0.5 MG/DOSE,) 2 MG/1.5ML Solution Pen-injector, INJECT 0.25 MG SUBCUTANEOUSLY ONCE A WEEK, Disp: 2 mL, Rfl: 5    rosuvastatin (CRESTOR) 40 MG tablet, Take 1 Tablet by mouth every day., Disp: 90 Tablet, Rfl: 3    metoprolol tartrate (LOPRESSOR) 50 MG Tab, Take 1 Tablet by mouth 2 times a day., Disp: 180 Tablet, Rfl: 3    metformin (GLUCOPHAGE) 1000 MG tablet, Take 1 Tablet by  "mouth 2 times a day with meals., Disp: 180 Tablet, Rfl: 3    lisinopril (PRINIVIL) 40 MG tablet, Take 1 Tablet by mouth every evening., Disp: 90 Tablet, Rfl: 3    Coenzyme Q10 (COQ10) 100 MG Cap, Take 3 Capsules by mouth every day., Disp: 270 Capsule, Rfl: 3    clopidogrel (PLAVIX) 75 MG Tab, Take 1 Tablet by mouth every day., Disp: 90 Tablet, Rfl: 3    amLODIPine (NORVASC) 5 MG Tab, Take 1 Tablet by mouth every day., Disp: 90 Tablet, Rfl: 3    allopurinol (ZYLOPRIM) 100 MG Tab, Take 1 Tablet by mouth every day., Disp: 90 Tablet, Rfl: 3    Blood Glucose Test Strips, Use one strip to test blood sugar once daily ., Disp: 100 Each, Rfl: 5    therapeutic multivitamin-minerals (THERAGRAN-M) Tab, Take 1 Tab by mouth every day., Disp: , Rfl:     Lancets, Lancets order: Lancets for True Metrix meter. Sig: use TID and prn ssx high or low sugar. #100 RF x 3, Disp: 100 Each, Rfl: 3    aspirin (ASA) 81 MG Chew Tab chewable tablet, Take 1 Tab by mouth every day., Disp: 30 Tab, Rfl: 2      ROS:  Review of Systems   Constitutional:  Negative for chills and fever.   Respiratory:  Negative for cough and shortness of breath.    Cardiovascular:  Negative for chest pain, palpitations, orthopnea and leg swelling.       Objective:     Exam:  BP (!) 150/89 (BP Location: Left arm, Patient Position: Sitting, BP Cuff Size: Adult)   Pulse 78   Temp 36.9 °C (98.5 °F) (Temporal)   Ht 1.575 m (5' 2\")   Wt 69.4 kg (153 lb)   SpO2 97%   BMI 27.98 kg/m²  Body mass index is 27.98 kg/m².    Physical Exam  Constitutional:       Appearance: He is normal weight.   Eyes:      Pupils: Pupils are equal, round, and reactive to light.   Cardiovascular:      Rate and Rhythm: Normal rate and regular rhythm.      Pulses: Normal pulses.      Heart sounds: Normal heart sounds.   Pulmonary:      Effort: Pulmonary effort is normal.      Breath sounds: Normal breath sounds.   Neurological:      Mental Status: He is alert and oriented to person, place, and " time.   Psychiatric:         Mood and Affect: Affect is flat and tearful.         Behavior: Behavior normal.           Assessment & Plan:     Guerrero a 52 y.o. male with the following -     1. Ground-level fall  2. Weakness  3. Neuropathy  Acute, uncomplicated  He reports that he fell and hit his head, he does not recall trying to protect his face during the fall. He believes this may have been due to his trouble feeling his right foot.  - Referral to Physical Therapy  - DULoxetine (CYMBALTA) 20 MG Cap DR Particles; Take 4 Capsules by mouth every day for 120 days.  Dispense: 120 Capsule; Refill: 3    4. Type 2 diabetes mellitus with other specified complication, without long-term current use of insulin (HCC)  Chronic, stable  He has been taking his medication. He is not monitoring his sugar at home.   - POCT Retinal Eye Exam  - continue Semaglutide,0.25 or 0.5MG/DOS, (OZEMPIC, 0.25 OR 0.5 MG/DOSE,) 2 MG/1.5ML Solution Pen-injector, INJECT 0.25 MG   - Continue metformin (GLUCOPHAGE) 1000 MG tablet, Take 1 Tablet by mouth 2 times a day with meals., Disp: 180 Tablet, Rfl: 3    5. Moderate episode of recurrent major depressive disorder (HCC)  Chronic, unstable  Patient is tearful today during the visit while discussing his recent all. He reports having increased weakness since his CABG x3 last year. He did complete cardiac rehab. He would like to follow up with a therapist to see if his mobility improves.   - DULoxetine (CYMBALTA) 20 MG Cap DR Particles; Take 4 Capsules by mouth every day for 120 days.  Dispense: 120 Capsule; Refill: 3        Anticipatory guidance included the following: Patient counseled about skin care, diet, supplements, smoking, drugs/alcohol use, safe sex and exercise.     Return in about 4 weeks (around 9/4/2023) for F/U depression.    Please note that this dictation was created using voice recognition software. I have made every reasonable attempt to correct obvious errors, but I expect that there  are errors of grammar and possibly content that I did not discover before finalizing the note.

## 2023-08-20 ENCOUNTER — APPOINTMENT (OUTPATIENT)
Dept: RADIOLOGY | Facility: MEDICAL CENTER | Age: 53
End: 2023-08-20
Attending: STUDENT IN AN ORGANIZED HEALTH CARE EDUCATION/TRAINING PROGRAM
Payer: COMMERCIAL

## 2023-08-20 ENCOUNTER — HOSPITAL ENCOUNTER (EMERGENCY)
Facility: MEDICAL CENTER | Age: 53
End: 2023-08-21
Attending: STUDENT IN AN ORGANIZED HEALTH CARE EDUCATION/TRAINING PROGRAM
Payer: COMMERCIAL

## 2023-08-20 DIAGNOSIS — S00.83XA FACIAL HEMATOMA, INITIAL ENCOUNTER: ICD-10-CM

## 2023-08-20 DIAGNOSIS — F10.929 ALCOHOLIC INTOXICATION WITH COMPLICATION (HCC): ICD-10-CM

## 2023-08-20 DIAGNOSIS — R41.82 ALTERED MENTAL STATUS, UNSPECIFIED ALTERED MENTAL STATUS TYPE: Primary | ICD-10-CM

## 2023-08-20 PROCEDURE — 99285 EMERGENCY DEPT VISIT HI MDM: CPT

## 2023-08-20 PROCEDURE — 70486 CT MAXILLOFACIAL W/O DYE: CPT

## 2023-08-20 PROCEDURE — 302970 POC BREATHALIZER: Performed by: STUDENT IN AN ORGANIZED HEALTH CARE EDUCATION/TRAINING PROGRAM

## 2023-08-20 PROCEDURE — 70450 CT HEAD/BRAIN W/O DYE: CPT

## 2023-08-20 PROCEDURE — 72125 CT NECK SPINE W/O DYE: CPT

## 2023-08-20 ASSESSMENT — PAIN DESCRIPTION - PAIN TYPE: TYPE: ACUTE PAIN

## 2023-08-20 ASSESSMENT — FIBROSIS 4 INDEX: FIB4 SCORE: 1.07

## 2023-08-21 VITALS
OXYGEN SATURATION: 91 % | DIASTOLIC BLOOD PRESSURE: 84 MMHG | HEART RATE: 92 BPM | WEIGHT: 150 LBS | BODY MASS INDEX: 27.6 KG/M2 | SYSTOLIC BLOOD PRESSURE: 138 MMHG | HEIGHT: 62 IN | RESPIRATION RATE: 15 BRPM | TEMPERATURE: 97.5 F

## 2023-08-21 LAB — POC BREATHALIZER: 0.09 PERCENT (ref 0–0.01)

## 2023-08-21 PROCEDURE — A9270 NON-COVERED ITEM OR SERVICE: HCPCS | Mod: UD | Performed by: EMERGENCY MEDICINE

## 2023-08-21 PROCEDURE — 700102 HCHG RX REV CODE 250 W/ 637 OVERRIDE(OP): Mod: UD | Performed by: EMERGENCY MEDICINE

## 2023-08-21 RX ORDER — IBUPROFEN 600 MG/1
600 TABLET ORAL ONCE
Status: COMPLETED | OUTPATIENT
Start: 2023-08-21 | End: 2023-08-21

## 2023-08-21 RX ORDER — HYDROCODONE BITARTRATE AND ACETAMINOPHEN 5; 325 MG/1; MG/1
1 TABLET ORAL ONCE
Status: COMPLETED | OUTPATIENT
Start: 2023-08-21 | End: 2023-08-21

## 2023-08-21 RX ADMIN — IBUPROFEN 600 MG: 600 TABLET, FILM COATED ORAL at 03:49

## 2023-08-21 RX ADMIN — HYDROCODONE BITARTRATE AND ACETAMINOPHEN 1 TABLET: 5; 325 TABLET ORAL at 03:50

## 2023-08-21 NOTE — ED NOTES
Patient resting comfortably in bed with steady and unlabored breathing on 3 L O2.  Gurney in lowest position and call light within reach.

## 2023-08-21 NOTE — ED NOTES
Pt ambulated to restroom with RN on standby. Pt ambulated with steady gait. Pt removed from O2 due to patient satting >90% on RA

## 2023-08-21 NOTE — ED NOTES
Patient resting comfortably in bed with steady and unlabored breathing on RAStacie Foss in lowest position and call light within reach.

## 2023-08-21 NOTE — DISCHARGE INSTRUCTIONS
You were seen in the ED today for alcohol intoxication and falling.  Thankfully your traumatic work-up was reassuring.  You will likely be sore over the next several days.. While here you were examined and allowed to sober up while we monitored you. Your physical exam was reassuring. If you continue to drink you will be placing your health at risk. At this time you are sober and can return home.    For pain you can take acetaminophen (Tylenol), 1000mg every 8 hours as needed for pain. Do not take more than 3000mg of acetaminophen in any 24 hour period. You can also take  ibuprofen (Motrin), 600mg every 6 hours as needed for pain (take with food to avoid GI upset).  Taking these medications regularly during the day can be very effective in controlling pain.    Please be alert for signs of alcohol withdrawal, including shaking hands, agitation, feeling pins and needles, nausea, vomiting, headache. Please seek medical care if you develop these. Alcohol withdrawal can be dangerous and even lead to seizures or death if untreated.

## 2023-08-21 NOTE — ED NOTES
Pt provided discharge instructions. Pt verbalized understanding of all instructions. IV removed, cathlon intact, site w/out s/s of infection. Pt ambulatory to lobby w/ steady gait.

## 2023-08-21 NOTE — ED NOTES
Patient resting comfortably in bed with steady and unlabored breathing on 3LO2.  Gurney in lowest position and call light within reach.

## 2023-08-21 NOTE — ED PROVIDER NOTES
"ED Provider Note    CHIEF COMPLAINT  Chief Complaint   Patient presents with    Fall     +head strike +ETOH +Thinner -LOC    Alcohol Intoxication     \"Drank a lot\"    Suicidal Ideation     No plan       EXTERNAL RECORDS REVIEWED      HPI/ROS  LIMITATION TO HISTORY     OUTSIDE HISTORIAN(S):  EMS      Guerrero Ann is a 52 y.o. male who presents after fall.  EMS reports that bystanders saw patient stepped out of his car and then fall to the ground.  Patient reported to EMS that he wanted to die.  Patient endorses alcohol use.  Patient has no specific complaints.  No reported loss of consciousness.    PAST MEDICAL HISTORY   has a past medical history of Diabetes ([facility]), Heart valve disease, High cholesterol, CABG (2020), Hypertension, Kidney stones, MI, old, Pneumonia, Psychiatric problem, and Stroke (HCC).    SURGICAL HISTORY   has a past surgical history that includes other orthopedic surgery; stent placement (); multiple coronary artery bypass endo vein harvest (2020); benjamin (2020); and insertion, peripheral nerve stimulator, lower extremity (Right, 7/15/2022).    FAMILY HISTORY  Family History   Problem Relation Age of Onset    Stroke Mother 47        had 3 devastating strokes,  56yo stroke complications    Heart Disease Mother     No Known Problems Father         does not know his father.    Stroke Maternal Grandmother 85    Heart Disease Maternal Grandmother     Stroke Maternal Grandfather 54    Heart Disease Maternal Grandfather     No Known Problems Daughter     No Known Problems Son        SOCIAL HISTORY  Social History     Tobacco Use    Smoking status: Never    Smokeless tobacco: Current     Types: Chew    Tobacco comments:     sometimes   Vaping Use    Vaping Use: Never used   Substance and Sexual Activity    Alcohol use: Yes     Alcohol/week: 2.4 oz     Types: 4 Cans of beer per week     Comment: twice a week    Drug use: No    Sexual activity: Not Currently     " "Partners: Female     Comment: fiance. wrk: [employer].        CURRENT MEDICATIONS  Home Medications       Reviewed by Basilio Rea R.N. (Registered Nurse) on 08/20/23 at 2015  Med List Status: <None>     Medication Last Dose Status   allopurinol (ZYLOPRIM) 100 MG Tab  Active   amLODIPine (NORVASC) 5 MG Tab  Active   aspirin (ASA) 81 MG Chew Tab chewable tablet  Active   Blood Glucose Test Strips  Active   clopidogrel (PLAVIX) 75 MG Tab  Active   Coenzyme Q10 (COQ10) 100 MG Cap  Active   DULoxetine (CYMBALTA) 20 MG Cap DR Particles  Active   gabapentin (NEURONTIN) 300 MG Cap  Active   Lancets  Active   lisinopril (PRINIVIL) 40 MG tablet  Active   meloxicam (MOBIC) 15 MG tablet  Active   metformin (GLUCOPHAGE) 1000 MG tablet  Active   metoprolol tartrate (LOPRESSOR) 50 MG Tab  Active   rosuvastatin (CRESTOR) 40 MG tablet  Active   Semaglutide,0.25 or 0.5MG/DOS, (OZEMPIC, 0.25 OR 0.5 MG/DOSE,) 2 MG/1.5ML Solution Pen-injector  Active   therapeutic multivitamin-minerals (THERAGRAN-M) Tab  Active                    ALLERGIES  Allergies   Allergen Reactions    Isosorbide Unspecified     Hallucinations       PHYSICAL EXAM  VITAL SIGNS: BP (!) 158/96   Pulse 87   Temp 36.4 °C (97.6 °F) (Oral)   Resp 18   Ht 1.575 m (5' 2\")   Wt 68 kg (150 lb)   SpO2 99%   BMI 27.44 kg/m²    Dysarthric, hematoma over left eye, normal extraocular movements, pupils equal and reactive, no respiratory distress, clear bowel breath sounds, normal heart sounds, no signs of thoracic or abdominal trauma, pelvis stable no signs of extremity injury, gross motor function sensation intact    DIAGNOSTIC STUDIES / PROCEDURES  EKG    LABS  Labs Reviewed   URINE DRUG SCREEN   POC BREATHALIZER         RADIOLOGY  I have independently interpreted the diagnostic imaging associated with this visit and am waiting the final reading from the radiologist.   My preliminary interpretation is as follows: No intracranial " hemorrhage  Radiologist interpretation:   CT-CSPINE WITHOUT PLUS RECONS   Final Result         1.  Multilevel degenerative changes of the cervical spine limit diagnostic sensitivity of this examination, otherwise no acute traumatic bony injury of the cervical spine is apparent.   2.  Atherosclerosis      CT-MAXILLOFACIAL W/O PLUS RECONS   Final Result         1.  No acute traumatic facial bony injuries identified.   2.  Sinusitis changes      CT-HEAD W/O   Final Result         1.  No acute intracranial abnormality is identified, there are nonspecific white matter changes, commonly associated with small vessel ischemic disease.  Associated mild cerebral atrophy is noted.   2.  Bilateral ethmoid and maxillary sinusitis changes   3.  Atherosclerosis.               COURSE & MEDICAL DECISION MAKING    ED Observation Status? Yes; I am placing the patient in to an observation status due to a diagnostic uncertainty as well as therapeutic intensity. Patient placed in observation status at 10:34 PM, 8/20/2023.     Observation plan is as follows: Reassess for clinical sobriety and suicidality once sober      INITIAL ASSESSMENT, COURSE AND PLAN  Care Narrative: Differential includes alcohol intoxication, traumatic brain injury, cervical spine injury, facial trauma, suicidal ideations.  Given patient's intoxication clear signs of head trauma will obtain CT head, face and cervical spine.  Will reassess patient suicidality after he is sober.    CT imaging negative for signs of injury.  On reassessment patient sleeping still clinically intoxicated.  We will continue to observe patient for clinical sobriety.  Signed out to oncoming ERP with plan for reassessment for clinical sobriety.        ADDITIONAL PROBLEM LIST    DISPOSITION AND     FINAL DIAGNOSIS  1. Alcoholic intoxication without complication (HCC)           Electronically signed by: Deny Franco D.O., 8/20/2023 10:27 PM

## 2023-08-21 NOTE — DISCHARGE SUMMARY
"  ED Observation Discharge Summary    Patient:Guerrero Ann  Patient : 1970  Patient MRN: [id number]  Patient PCP: BRAULIO Vigil    Admit Date: 2023  Discharge Date and Time: 23 3:24 AM  Discharge Diagnosis:   1. Altered mental status, unspecified altered mental status type        2. Facial hematoma, initial encounter        3. Alcoholic intoxication with complication (HCC)            Discharge Attending: Kelvin Jimenez M.D.  Discharge Service: ED Observation    ED Course  Guerrero is a 52 y.o. male who was evaluated at [facility] for a fall with evidence of trauma to the head with altered mental status.  Thankfully his traumatic work-up was reassuring.  He was found to be intoxicated with alcohol.  He was observed in the emergency department.  There was concern for passive SI, however on reexamination, patient denies any SI/HI.  He is ambulating independently.    Discharge Exam:  BP (!) 159/89   Pulse 96   Temp 36.4 °C (97.6 °F) (Oral)   Resp 18   Ht 1.575 m (5' 2\")   Wt 68 kg (150 lb)   SpO2 93%   BMI 27.44 kg/m² .    Constitutional: Awake and alert. Nontoxic  HENT:  Grossly normal.  Hematoma left face  Eyes: Grossly normal  Neck: Normal range of motion  Cardiovascular: Normal heart rate   Thorax & Lungs: No respiratory distress  Abdomen: Non distended  Skin:  No pathologic rash.   Extremities: Well perfused, moves all extremities  Psychiatric: Affect normal    Labs  Results for orders placed or performed during the hospital encounter of 23   TSH WITH REFLEX TO FT4   Result Value Ref Range    TSH 1.710 0.380 - 5.330 uIU/mL   Comp Metabolic Panel   Result Value Ref Range    Sodium 138 135 - 145 mmol/L    Potassium 4.5 3.6 - 5.5 mmol/L    Chloride 100 96 - 112 mmol/L    Co2 25 20 - 33 mmol/L    Anion Gap 13.0 7.0 - 16.0    Glucose 168 (H) 65 - 99 mg/dL    Bun 14 8 - 22 mg/dL    Creatinine 1.14 0.50 - 1.40 mg/dL    Calcium 10.0 8.5 - 10.5 mg/dL    " AST(SGOT) 21 12 - 45 U/L    ALT(SGPT) 17 2 - 50 U/L    Alkaline Phosphatase 119 (H) 30 - 99 U/L    Total Bilirubin 0.6 0.1 - 1.5 mg/dL    Albumin 4.8 3.2 - 4.9 g/dL    Total Protein 8.9 (H) 6.0 - 8.2 g/dL    Globulin 4.1 (H) 1.9 - 3.5 g/dL    A-G Ratio 1.2 g/dL   Lipid Profile   Result Value Ref Range    Cholesterol,Tot 202 (H) 100 - 199 mg/dL    Triglycerides 335 (H) 0 - 149 mg/dL    HDL 40 >=40 mg/dL    LDL 95 <100 mg/dL   CBC WITHOUT DIFFERENTIAL   Result Value Ref Range    WBC 7.1 4.8 - 10.8 K/uL    RBC 5.28 4.70 - 6.10 M/uL    Hemoglobin 15.5 14.0 - 18.0 g/dL    Hematocrit 47.2 42.0 - 52.0 %    MCV 89.4 81.4 - 97.8 fL    MCH 29.4 27.0 - 33.0 pg    MCHC 32.8 (L) 33.7 - 35.3 g/dL    RDW 42.1 35.9 - 50.0 fL    Platelet Count 242 164 - 446 K/uL    MPV 10.5 9.0 - 12.9 fL   HEP C VIRUS ANTIBODY   Result Value Ref Range    Hepatitis C Antibody Non-Reactive Non-Reactive   CORRECTED CALCIUM   Result Value Ref Range    Correct Calcium 9.4 8.5 - 10.5 mg/dL   ESTIMATED GFR   Result Value Ref Range    GFR (CKD-EPI) 78 >60 mL/min/1.73 m 2       Radiology  CT-CSPINE WITHOUT PLUS RECONS   Final Result         1.  Multilevel degenerative changes of the cervical spine limit diagnostic sensitivity of this examination, otherwise no acute traumatic bony injury of the cervical spine is apparent.   2.  Atherosclerosis      CT-MAXILLOFACIAL W/O PLUS RECONS   Final Result         1.  No acute traumatic facial bony injuries identified.   2.  Sinusitis changes      CT-HEAD W/O   Final Result         1.  No acute intracranial abnormality is identified, there are nonspecific white matter changes, commonly associated with small vessel ischemic disease.  Associated mild cerebral atrophy is noted.   2.  Bilateral ethmoid and maxillary sinusitis changes   3.  Atherosclerosis.               My final assessment includes   1. Altered mental status, unspecified altered mental status type        2. Facial hematoma, initial encounter        3.  Alcoholic intoxication with complication (HCC)            Upon Reevaluation, the patient's condition has: Improved; and will be discharged.    Patient discharged from ED Observation status at 3:25 AM (Time) 8/21/2023 (Date).     Total time spent on this ED Observation discharge encounter is < 30 Minutes    Electronically signed by: Kelvin Jimenez M.D., 8/21/2023 3:24 AM

## 2023-08-21 NOTE — ED TRIAGE NOTES
"Chief Complaint   Patient presents with    Fall     +head strike +ETOH +Thinner -LOC    Alcohol Intoxication     \"Drank a lot\"    Suicidal Ideation     No plan     Patient BIB [facility] from scene after a GLF. Patient was found on the ground outside his car grossly intoxicated with a hematoma to the left side of his forehead. Patient verbalizes suicidal thoughts with no plan and denies HI. Patient states that he \"always falls\" when he drinks and cannot explain how he fell today.    C Collar placed by EMS PTA.    TBI on arrival.  "

## 2023-08-22 ENCOUNTER — HOSPITAL ENCOUNTER (EMERGENCY)
Facility: MEDICAL CENTER | Age: 53
End: 2023-08-22
Attending: EMERGENCY MEDICINE
Payer: COMMERCIAL

## 2023-08-22 ENCOUNTER — APPOINTMENT (OUTPATIENT)
Dept: RADIOLOGY | Facility: MEDICAL CENTER | Age: 53
End: 2023-08-22
Attending: EMERGENCY MEDICINE
Payer: COMMERCIAL

## 2023-08-22 VITALS
RESPIRATION RATE: 18 BRPM | SYSTOLIC BLOOD PRESSURE: 182 MMHG | OXYGEN SATURATION: 97 % | TEMPERATURE: 98.7 F | HEIGHT: 62 IN | DIASTOLIC BLOOD PRESSURE: 99 MMHG | HEART RATE: 94 BPM | WEIGHT: 156.53 LBS | BODY MASS INDEX: 28.8 KG/M2

## 2023-08-22 DIAGNOSIS — G47.9 TROUBLE IN SLEEPING: ICD-10-CM

## 2023-08-22 DIAGNOSIS — S20.212A CONTUSION OF LEFT CHEST WALL, INITIAL ENCOUNTER: ICD-10-CM

## 2023-08-22 PROCEDURE — 71101 X-RAY EXAM UNILAT RIBS/CHEST: CPT | Mod: LT

## 2023-08-22 PROCEDURE — 99283 EMERGENCY DEPT VISIT LOW MDM: CPT

## 2023-08-22 PROCEDURE — 700101 HCHG RX REV CODE 250: Performed by: EMERGENCY MEDICINE

## 2023-08-22 RX ORDER — LIDOCAINE 50 MG/G
1 PATCH TOPICAL EVERY 24 HOURS
Status: DISCONTINUED | OUTPATIENT
Start: 2023-08-22 | End: 2023-08-22 | Stop reason: HOSPADM

## 2023-08-22 RX ORDER — LIDOCAINE 50 MG/G
1 PATCH TOPICAL EVERY 24 HOURS
Qty: 10 PATCH | Refills: 0 | Status: SHIPPED | OUTPATIENT
Start: 2023-08-22 | End: 2023-11-28

## 2023-08-22 RX ORDER — DOXYLAMINE SUCCINATE 25 MG/1
25 TABLET ORAL
Qty: 14 TABLET | Refills: 0 | Status: SHIPPED | OUTPATIENT
Start: 2023-08-22

## 2023-08-22 RX ADMIN — LIDOCAINE 1 PATCH: 50 PATCH TOPICAL at 18:32

## 2023-08-22 ASSESSMENT — FIBROSIS 4 INDEX: FIB4 SCORE: 1.07

## 2023-08-22 ASSESSMENT — LIFESTYLE VARIABLES: DO YOU DRINK ALCOHOL: NO

## 2023-08-23 NOTE — ED NOTES
"Pt discharged home. Pt in possession of belongings. Pt provided discharge education and information pertaining to medications and follow up appointments. Pt received copy of discharge instructions and verbalized understanding. BP (!) 182/99   Pulse 94   Temp 37.1 °C (98.7 °F) (Temporal)   Resp 18   Ht 1.575 m (5' 2\")   Wt 71 kg (156 lb 8.4 oz)   SpO2 97%   BMI 28.63 kg/m²     "

## 2023-08-23 NOTE — DISCHARGE INSTRUCTIONS
Your x-ray did not show any displaced rib fractures.  Hairline fractures can sometimes be present, but not seen on x-ray, but there is no specific treatment.  Use the lidocaine patches we have prescribed.  Times, over-the-counter lidocaine patches sold under the brand name Salonpas are cheaper than the prescription ones.  You can also take 1000 mg of Tylenol every 6 hours, but not more than that.  Please schedule follow-up with your primary care provider.  Your blood pressure is quite elevated, and should be rechecked and managed with your regular doctor.  They may also be able to help if you feel like you need assistance with heavy alcohol use.

## 2023-08-23 NOTE — ED NOTES
Brought up to room via w/c. Unable to ambulate far distances. Unable to transfer from w/c to [facility] right now because of pain. Chart up for ERP.

## 2023-08-23 NOTE — ED TRIAGE NOTES
"Chief Complaint   Patient presents with    T-5000     Pt fell Sunday night and was seen here but did not have xrays     Rib Pain     Pt has L sided rib pain        Pt walk in for above . Pt states the pain started this morning. Pt aox4, gcs 15        BP (!) 198/115   Pulse 92   Temp 36.7 °C (98.1 °F) (Temporal)   Resp 16   Ht 1.575 m (5' 2\")   Wt 71 kg (156 lb 8.4 oz)   SpO2 97%   BMI 28.63 kg/m²     "

## 2023-08-23 NOTE — ED PROVIDER NOTES
"ER Provider Note    Scribed for Byron Munguia M.d. by José Miguel Jean Baptiste. 8/22/2023  6:02 PM    Primary Care Provider: BRAULIO Vigil    CHIEF COMPLAINT  Chief Complaint   Patient presents with    T-5000     Pt fell Sunday night and was seen here but did not have xrays     Rib Pain     Pt has L sided rib pain      EXTERNAL RECORDS REVIEWED  Outpatient Notes Patient was seen here on 8/20/2023 for evaluation after a fall while intoxicated. The patient had imaging performed of the head and neck, but not the chest. Patient was discharged at that visit.     HPI/ROS  LIMITATION TO HISTORY   Select: : None    OUTSIDE HISTORIAN(S):  None.    Guerrero Ann is a 52 y.o. male who presents to the ED complaining of left rib pain onset this morning. Patient reports that he had fallen while drunk three days ago, and was evaluated here but discharged without X-rays of his rib cage. Patient has had some left sided rib pain and bruising to the left chest, prompting him to present again today. Guerrero describes his pain as \"it feels like Frandy Jose punched me in the ribs\". Patient has a hsitory of hypertension.     PAST MEDICAL HISTORY  Past Medical History:   Diagnosis Date    Diabetes (HCC)     oral meds only    Heart valve disease     High cholesterol     Hx of CABG 09/2020    x 3    Hypertension     Kidney stones     MI, old     Pneumonia     Psychiatric problem     depression and anxiety    Stroke (HCC)        SURGICAL HISTORY  Past Surgical History:   Procedure Laterality Date    INSERTION, PERIPHERAL NERVE STIMULATOR, LOWER EXTREMITY Right 7/15/2022    Procedure: PERIPHERAL NERVE STIMULATOR IMPLANT, RIGHT  POSTERIOR TIBIAL PERIPHERAL NERVE STIMULATOR IMPLANT;  Surgeon: Live Miranda M.D.;  Location: SURGERY [facility];  Service: Pain Management    MULTIPLE CORONARY ARTERY BYPASS ENDO VEIN HARVEST  9/25/2020    Procedure: CABG, WITH ENDOSCOPIC VEIN PROCUREMENT- X3;  Surgeon: Michael HURST" SUMI Chicas;  Location: SURGERY [facility];  Service: Cardiothoracic    MARGOTH  2020    Procedure: ECHOCARDIOGRAM, TRANSESOPHAGEAL;  Surgeon: Michael Chicas M.D.;  Location: SURGERY [facility];  Service: Cardiothoracic    STENT PLACEMENT      1 cardiac stent    OTHER ORTHOPEDIC SURGERY      rt wrist fracture with fixation 34 years ago       FAMILY HISTORY  Family History   Problem Relation Age of Onset    Stroke Mother 47        had 3 devastating strokes,  54yo stroke complications    Heart Disease Mother     No Known Problems Father         does not know his father.    Stroke Maternal Grandmother 85    Heart Disease Maternal Grandmother     Stroke Maternal Grandfather 54    Heart Disease Maternal Grandfather     No Known Problems Daughter     No Known Problems Son        SOCIAL HISTORY   reports that he has never smoked. His smokeless tobacco use includes chew. He reports current alcohol use of about 2.4 oz of alcohol per week. He reports that he does not use drugs.    CURRENT MEDICATIONS  Discharge Medication List as of 2023  6:50 PM        CONTINUE these medications which have NOT CHANGED    Details   DULoxetine (CYMBALTA) 20 MG Cap DR Particles Take 4 Capsules by mouth every day for 120 days., Disp-120 Capsule, R-3, Normal      meloxicam (MOBIC) 15 MG tablet Take 1 Tablet by mouth every day., Disp-90 Tablet, R-0, Normal      gabapentin (NEURONTIN) 300 MG Cap Take 4 Capsules by mouth 3 times a day., Disp-1080 Capsule, R-3, Normal      Semaglutide,0.25 or 0.5MG/DOS, (OZEMPIC, 0.25 OR 0.5 MG/DOSE,) 2 MG/1.5ML Solution Pen-injector INJECT 0.25 MG SUBCUTANEOUSLY ONCE A WEEK, Disp-2 mL, R-5, Normal      rosuvastatin (CRESTOR) 40 MG tablet Take 1 Tablet by mouth every day.Patient with a active cardiovascular history and risk cannot obtain atorvastatin through his health plan any furtherDisp-90 Tablet, R-3, Normal      metoprolol tartrate (LOPRESSOR) 50 MG Tab Take 1 Tablet by mouth 2 times a day.,  "Disp-180 Tablet, R-3, Normal      metformin (GLUCOPHAGE) 1000 MG tablet Take 1 Tablet by mouth 2 times a day with meals., Disp-180 Tablet, R-3, Normal      lisinopril (PRINIVIL) 40 MG tablet Take 1 Tablet by mouth every evening., Disp-90 Tablet, R-3, Normal      Coenzyme Q10 (COQ10) 100 MG Cap Take 3 Capsules by mouth every day., Disp-270 Capsule, R-3, Normal      clopidogrel (PLAVIX) 75 MG Tab Take 1 Tablet by mouth every day., Disp-90 Tablet, R-3, Normal      amLODIPine (NORVASC) 5 MG Tab Take 1 Tablet by mouth every day., Disp-90 Tablet, R-3, Normal      allopurinol (ZYLOPRIM) 100 MG Tab Take 1 Tablet by mouth every day., Disp-90 Tablet, R-3, Normal      Blood Glucose Test Strips Use one strip to test blood sugar once daily .Or per formulary preference. Pt has true metrix meterDisp-100 Each, R-5, Normal      therapeutic multivitamin-minerals (THERAGRAN-M) Tab Take 1 Tab by mouth every day., Historical Med      Lancets Lancets order: Lancets for True Metrix meter. Sig: use TID and prn ssx high or low sugar. #100 RF x 3, Disp-100 Each, R-3, Normal      aspirin (ASA) 81 MG Chew Tab chewable tablet Take 1 Tab by mouth every day., Disp-30 Tab, R-2, OTC             ALLERGIES  Isosorbide    PHYSICAL EXAM  VITAL SIGNS: BP (!) 198/115   Pulse 92   Temp 36.7 °C (98.1 °F) (Temporal)   Resp 16   Ht 1.575 m (5' 2\")   Wt 71 kg (156 lb 8.4 oz)   SpO2 97%   BMI 28.63 kg/m²   Pulse ox interpretation: I interpret this pulse ox as normal.  Constitutional: Alert in no apparent distress.  HENT: No signs of trauma, Bilateral external ears normal, Nose normal.   Eyes: Conjunctiva normal, Non-icteric.   Neck: Normal range of motion, Supple, No stridor.   Lymphatic: No lymphadenopathy noted.   Cardiovascular: Regular rate and rhythm, no murmurs.   Thorax & Lungs: Normal breath sounds, No respiratory distress, No wheezing. Poorly localized left chest wall tendernes without bruising or skin break down or crepitus. Lungs clear. "   Abdomen: Bowel sounds normal, Soft, No tenderness, No masses, No pulsatile masses. No peritoneal signs.  Skin: Warm, Dry, No erythema, No rash.   Back: No midline bony tenderness.   Extremities: Intact distal pulses, No edema, No cyanosis.  Musculoskeletal: Good range of motion in all major joints. No or major deformities noted.   Neurologic: Alert , Normal motor function, Normal sensory function, No focal deficits noted.   Psychiatric: Affect normal, Judgment normal, Mood normal.     DIAGNOSTIC STUDIES    Radiology:   The attending emergency physician has independently interpreted the diagnostic imaging associated with this visit and am waiting the final reading from the radiologist.   Preliminary interpretation is a follows: No pneumothorax or displaced rib fractures.  Radiologist interpretation:   [id number] (WITH 1-VIEW CXR) LEFT   Final Result      No acute cardiopulmonary abnormality.      No displaced left rib fracture.          COURSE & MEDICAL DECISION MAKING     ED Observation Status? No; Patient does not meet criteria for ED Observation.     INITIAL ASSESSMENT, COURSE AND PLAN  Care Narrative:       6:02 PM - Patient presents to the ED with left rib pain resulting from an intoxicated fall three days ago, for which the patient was seen and evaluated as a TBI. Patient evaluated at bedside and discussed plan of care, including imaging the chest to evaluate for fractures and other injury. Ordered for DX-Ribs unilateral left to evaluate his symptoms. Differential diagnoses include but not limited to: Rib fracture vs. Chest wall contusion, les likely pneumothorax.     6:52 PM - I reevaluated the patient at bedside. I discussed the patient's diagnostic study results which show no acute displaced fracture of the left ribs. I discussed plan for discharge and follow up as outlined below. The patient is stable for discharge at this time and will return for any new or worsening symptoms. Patient verbalizes  understanding and support with my plan for discharge.  He requested medication for sleep.  I prescribed doxylamine.    ADDITIONAL PROBLEM LIST  Insomnia.    DISPOSITION AND DISCUSSIONS  I have discussed management of the patient with the following physicians and NIRANJAN's:  None.    Discussion of management with other [facility] or appropriate source(s): None     Escalation of care considered, and ultimately not performed: acute inpatient care management, however at this time, the patient is most appropriate for outpatient management.  No evidence of an emergent medical condition was found to the patient's bedside evaluation or imaging studies    Barriers to care at this time, including but not limited to:  None known at this time. .     Decision tools and prescription drugs considered including, but not limited to: Medication modification was performed to prescribe an appropriate course of supportive medication for sleep .    The patient will return for new or worsening symptoms and is stable at the time of discharge.    DISPOSITION:  Patient will be discharged home in stable condition.    FOLLOW UP:  Karla Mejia A.P.R.NStacie  [address] [address]  [phone]    Schedule an appointment as soon as possible for a visit         OUTPATIENT MEDICATIONS:  Discharge Medication List as of 8/22/2023  6:50 PM        START taking these medications    Details   lidocaine (LIDODERM) 5 % Patch Place 1 Patch on the skin every 24 hours., Disp-10 Patch, R-0, Normal      doxylamine (UNISOM SLEEPTABS) 25 MG Tab tablet Take 1 Tablet by mouth at bedtime., Disp-14 Tablet, R-0, Normal            FINAL DIAGNOSIS  1. Contusion of left chest wall, initial encounter    2. Trouble in sleeping            José Miguel SLATER), am scribing for, and in the presence of, Byron Munguia M.D..    Electronically signed by: José Miguel Huerta), 8/22/2023    Byron SLATER M.D. personally performed the services  described in this documentation, as scribed by José Miguel Jean Baptiste in my presence, and it is both accurate and complete.

## 2023-10-26 NOTE — PROGRESS NOTES
Patient will continue on a diabetic, low-cholesterol diet and weight reduction. Exercise as tolerated. He will continue medications as prescribed. Follow-up in 6 month(s) otherwise as needed.      Patient is to return for fasting A1c, lipid panel, urine albumin, PSA, Hep C, HIV labs at their convenience prior to his next appointment. Fasting is nothing to eat or drink except water or black coffee for 8-12 hours. Will call patient with results when available.     Patient was given refill(s) on:   Ezetimibe 10 mg, TAKE 1 TAB BY MOUTH DAILY    Rx(s) sent to pharmacy.     Flu vaccine given in the office today.     Colonoscopy was ordered to screen for colorectal cancer.    Per Dr. Solo, remove NG tube when speech arrives to perform swallow evaluation.  Following evaluation, place cortrak.

## 2023-11-28 ENCOUNTER — HOSPITAL ENCOUNTER (OUTPATIENT)
Dept: LAB | Facility: MEDICAL CENTER | Age: 53
End: 2023-11-28
Attending: NURSE PRACTITIONER
Payer: COMMERCIAL

## 2023-11-28 ENCOUNTER — OFFICE VISIT (OUTPATIENT)
Dept: CARDIOLOGY | Facility: MEDICAL CENTER | Age: 53
End: 2023-11-28
Attending: NURSE PRACTITIONER
Payer: COMMERCIAL

## 2023-11-28 VITALS
WEIGHT: 151 LBS | RESPIRATION RATE: 16 BRPM | OXYGEN SATURATION: 98 % | SYSTOLIC BLOOD PRESSURE: 126 MMHG | DIASTOLIC BLOOD PRESSURE: 90 MMHG | BODY MASS INDEX: 27.79 KG/M2 | HEIGHT: 62 IN | HEART RATE: 79 BPM

## 2023-11-28 DIAGNOSIS — Z95.1 S/P CABG X 3: ICD-10-CM

## 2023-11-28 DIAGNOSIS — Z86.73 HISTORY OF CARDIOEMBOLIC STROKE: ICD-10-CM

## 2023-11-28 DIAGNOSIS — I10 PRIMARY HYPERTENSION: ICD-10-CM

## 2023-11-28 DIAGNOSIS — I10 ESSENTIAL HYPERTENSION: ICD-10-CM

## 2023-11-28 DIAGNOSIS — E11.69 TYPE 2 DIABETES MELLITUS WITH OTHER SPECIFIED COMPLICATION, WITHOUT LONG-TERM CURRENT USE OF INSULIN (HCC): ICD-10-CM

## 2023-11-28 DIAGNOSIS — I25.10 CORONARY ARTERY DISEASE INVOLVING NATIVE CORONARY ARTERY OF NATIVE HEART WITHOUT ANGINA PECTORIS: ICD-10-CM

## 2023-11-28 LAB
ALBUMIN SERPL BCP-MCNC: 5.2 G/DL (ref 3.2–4.9)
ALBUMIN/GLOB SERPL: 1.4 G/DL
ALP SERPL-CCNC: 164 U/L (ref 30–99)
ALT SERPL-CCNC: 18 U/L (ref 2–50)
ANION GAP SERPL CALC-SCNC: 14 MMOL/L (ref 7–16)
AST SERPL-CCNC: 22 U/L (ref 12–45)
BILIRUB SERPL-MCNC: 0.6 MG/DL (ref 0.1–1.5)
BUN SERPL-MCNC: 12 MG/DL (ref 8–22)
CALCIUM ALBUM COR SERPL-MCNC: 9 MG/DL (ref 8.5–10.5)
CALCIUM SERPL-MCNC: 10 MG/DL (ref 8.5–10.5)
CHLORIDE SERPL-SCNC: 101 MMOL/L (ref 96–112)
CHOLEST SERPL-MCNC: 221 MG/DL (ref 100–199)
CO2 SERPL-SCNC: 25 MMOL/L (ref 20–33)
CREAT SERPL-MCNC: 1.29 MG/DL (ref 0.5–1.4)
FASTING STATUS PATIENT QL REPORTED: NORMAL
GFR SERPLBLD CREATININE-BSD FMLA CKD-EPI: 67 ML/MIN/1.73 M 2
GLOBULIN SER CALC-MCNC: 3.8 G/DL (ref 1.9–3.5)
GLUCOSE SERPL-MCNC: 139 MG/DL (ref 65–99)
HDLC SERPL-MCNC: 43 MG/DL
LDLC SERPL CALC-MCNC: 137 MG/DL
POTASSIUM SERPL-SCNC: 4.6 MMOL/L (ref 3.6–5.5)
PROT SERPL-MCNC: 9 G/DL (ref 6–8.2)
SODIUM SERPL-SCNC: 140 MMOL/L (ref 135–145)
TRIGL SERPL-MCNC: 204 MG/DL (ref 0–149)

## 2023-11-28 PROCEDURE — 36415 COLL VENOUS BLD VENIPUNCTURE: CPT

## 2023-11-28 PROCEDURE — 99213 OFFICE O/P EST LOW 20 MIN: CPT | Performed by: NURSE PRACTITIONER

## 2023-11-28 PROCEDURE — 99214 OFFICE O/P EST MOD 30 MIN: CPT | Performed by: NURSE PRACTITIONER

## 2023-11-28 PROCEDURE — 80053 COMPREHEN METABOLIC PANEL: CPT

## 2023-11-28 PROCEDURE — 80061 LIPID PANEL: CPT

## 2023-11-28 PROCEDURE — 3080F DIAST BP >= 90 MM HG: CPT | Performed by: NURSE PRACTITIONER

## 2023-11-28 PROCEDURE — 3074F SYST BP LT 130 MM HG: CPT | Performed by: NURSE PRACTITIONER

## 2023-11-28 RX ORDER — METOPROLOL TARTRATE 100 MG/1
100 TABLET ORAL 2 TIMES DAILY
Qty: 200 TABLET | Refills: 3 | Status: SHIPPED | OUTPATIENT
Start: 2023-11-28

## 2023-11-28 RX ORDER — SEMAGLUTIDE 0.68 MG/ML
INJECTION, SOLUTION SUBCUTANEOUS
COMMUNITY
Start: 2023-11-24

## 2023-11-28 RX ORDER — DULOXETIN HYDROCHLORIDE 60 MG/1
60 CAPSULE, DELAYED RELEASE ORAL DAILY
COMMUNITY
Start: 2023-10-27

## 2023-11-28 ASSESSMENT — FIBROSIS 4 INDEX: FIB4 SCORE: 1.09

## 2023-11-28 ASSESSMENT — ENCOUNTER SYMPTOMS
FALLS: 0
FEVER: 0
CLAUDICATION: 0
NERVOUS/ANXIOUS: 0
PND: 0
MYALGIAS: 0
COUGH: 0
SENSORY CHANGE: 1
ORTHOPNEA: 0
DEPRESSION: 0
SHORTNESS OF BREATH: 0
DIZZINESS: 0
PALPITATIONS: 1
ABDOMINAL PAIN: 0

## 2023-11-28 NOTE — PROGRESS NOTES
Chief Complaint   Patient presents with    Other     S/P CABG x 3     Subjective     Guerrero Zack Ann is a 53 y.o. male who presents today for follow up.    He is a patient of Dr. Mead in our office. 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    He presents today alone. He continues to have falls due to neuropathy, he follows up with his PCP.    He continues to work with the psychiatrist and pain specialist on his neuropathy and mobility.    He overall from a cardiac standpoint feels well except some palpitations that are new onset at night, short lived but occur weekly now. His heart monitor showed small runs of PSVT.     No excessive ETOH or caffeine use.    He has no shortness of breath, edema, or dizziness/lightheadedness.    Past Medical History:   Diagnosis Date    Diabetes (HCC)     oral meds only    Heart valve disease     High cholesterol     Hx of CABG 09/2020    x 3    Hypertension     Kidney stones     MI, old     Pneumonia     Psychiatric problem     depression and anxiety    Stroke (HCC)      Past Surgical History:   Procedure Laterality Date    INSERTION, PERIPHERAL NERVE STIMULATOR, LOWER EXTREMITY Right 7/15/2022    Procedure: PERIPHERAL NERVE STIMULATOR IMPLANT, RIGHT  POSTERIOR TIBIAL PERIPHERAL NERVE STIMULATOR IMPLANT;  Surgeon: Live Miradna M.D.;  Location: [facility];  Service: Pain Management    MULTIPLE CORONARY ARTERY BYPASS ENDO VEIN HARVEST  9/25/2020    Procedure: CABG, WITH ENDOSCOPIC VEIN PROCUREMENT- X3;  Surgeon: Michael Chicas M.D.;  Location: [facility];  Service: Cardiothoracic    MARGOTH  9/25/2020    Procedure: ECHOCARDIOGRAM, TRANSESOPHAGEAL;  Surgeon: Michael Chicas M.D.;  Location: [facility];  Service: Cardiothoracic    STENT PLACEMENT  2017 1  cardiac stent    OTHER ORTHOPEDIC SURGERY      rt wrist fracture with fixation 34 years ago     Family History   Problem Relation Age of Onset    Stroke Mother 47        had 3 devastating strokes,  54yo stroke complications    Heart Disease Mother     No Known Problems Father         does not know his father.    Stroke Maternal Grandmother 85    Heart Disease Maternal Grandmother     Stroke Maternal Grandfather 54    Heart Disease Maternal Grandfather     No Known Problems Daughter     No Known Problems Son      Social History     Socioeconomic History    Marital status: Single     Spouse name: Not on file    Number of children: Not on file    Years of education: Not on file    Highest education level: Associate degree: occupational, technical, or vocational program   Occupational History    Not on file   Tobacco Use    Smoking status: Never    Smokeless tobacco: Current     Types: Chew    Tobacco comments:     sometimes   Vaping Use    Vaping Use: Never used   Substance and Sexual Activity    Alcohol use: Yes     Alcohol/week: 2.4 oz     Types: 4 Cans of beer per week     Comment: twice a week    Drug use: No    Sexual activity: Not Currently     Partners: Female     Comment: adriana. wrk: Verónica Salem Cutting.    Other Topics Concern    Not on file   Social History Narrative    Not on file     Social Determinants of Health     Financial Resource Strain: Low Risk  (2022)    Overall Financial Resource Strain (CARDIA)     Difficulty of Paying Living Expenses: Not hard at all   Food Insecurity: No Food Insecurity (2022)    Hunger Vital Sign     Worried About Running Out of Food in the Last Year: Never true     Ran Out of Food in the Last Year: Never true   Transportation Needs: No Transportation Needs (2022)    PRAPARE - Transportation     Lack of Transportation (Medical): No     Lack of Transportation (Non-Medical): No   Physical Activity: Inactive (2022)    Exercise Vital Sign     Days of  Exercise per Week: 0 days     Minutes of Exercise per Session: 0 min   Stress: No Stress Concern Present (1/9/2022)    [employer] - Occupational Stress Questionnaire     Feeling of Stress : Not at all   Social Connections: Moderately Isolated (1/9/2022)    Social Connection and Isolation Panel [NHANES]     Frequency of Communication with Friends and Family: More than three times a week     Frequency of Social Gatherings with Friends and Family: Twice a week     Attends Yarsani Services: Never     Active Member of Clubs or Organizations: No     Attends Club or Organization Meetings: Never     Marital Status: Living with partner   Intimate Partner Violence: Not on file   Housing Stability: Low Risk  (1/9/2022)    Housing Stability Vital Sign     Unable to Pay for Housing in the Last Year: No     Number of Places Lived in the Last Year: 1     Unstable Housing in the Last Year: No     Allergies   Allergen Reactions    Isosorbide Unspecified     Hallucinations     Outpatient Encounter Medications as of 11/28/2023   Medication Sig Dispense Refill    DULoxetine (CYMBALTA) 60 MG Cap DR Particles delayed-release capsule Take 60 mg by mouth every day.      OZEMPIC, 0.25 OR 0.5 MG/DOSE, 2 MG/3ML Solution Pen-injector INJECT 0.25MG SUBCUTANEOUSLY ONCE WEEKLY      metoprolol tartrate (LOPRESSOR) 100 MG Tab Take 1 Tablet by mouth 2 times a day. 200 Tablet 3    doxylamine (UNISOM SLEEPTABS) 25 MG Tab tablet Take 1 Tablet by mouth at bedtime. 14 Tablet 0    DULoxetine (CYMBALTA) 20 MG Cap DR Particles Take 4 Capsules by mouth every day for 120 days. 120 Capsule 3    meloxicam (MOBIC) 15 MG tablet Take 1 Tablet by mouth every day. 90 Tablet 0    gabapentin (NEURONTIN) 300 MG Cap Take 4 Capsules by mouth 3 times a day. 1080 Capsule 3    Semaglutide,0.25 or 0.5MG/DOS, (OZEMPIC, 0.25 OR 0.5 MG/DOSE,) 2 MG/1.5ML Solution Pen-injector INJECT 0.25 MG SUBCUTANEOUSLY ONCE A WEEK 2 mL 5    rosuvastatin  "(CRESTOR) 40 MG tablet Take 1 Tablet by mouth every day. 90 Tablet 3    metformin (GLUCOPHAGE) 1000 MG tablet Take 1 Tablet by mouth 2 times a day with meals. 180 Tablet 3    lisinopril (PRINIVIL) 40 MG tablet Take 1 Tablet by mouth every evening. 90 Tablet 3    Coenzyme Q10 (COQ10) 100 MG Cap Take 3 Capsules by mouth every day. 270 Capsule 3    clopidogrel (PLAVIX) 75 MG Tab Take 1 Tablet by mouth every day. 90 Tablet 3    amLODIPine (NORVASC) 5 MG Tab Take 1 Tablet by mouth every day. 90 Tablet 3    allopurinol (ZYLOPRIM) 100 MG Tab Take 1 Tablet by mouth every day. 90 Tablet 3    Blood Glucose Test Strips Use one strip to test blood sugar once daily . 100 Each 5    therapeutic multivitamin-minerals (THERAGRAN-M) Tab Take 1 Tab by mouth every day.      Lancets Lancets order: Lancets for True Metrix meter. Sig: use TID and prn ssx high or low sugar. #100 RF x 3 100 Each 3    aspirin (ASA) 81 MG Chew Tab chewable tablet Take 1 Tab by mouth every day. 30 Tab 2    [DISCONTINUED] lidocaine (LIDODERM) 5 % Patch Place 1 Patch on the skin every 24 hours. (Patient not taking: Reported on 11/28/2023) 10 Patch 0    [DISCONTINUED] metoprolol tartrate (LOPRESSOR) 50 MG Tab Take 1 Tablet by mouth 2 times a day. 180 Tablet 3     No facility-administered encounter medications on file as of 11/28/2023.     Review of Systems   Constitutional:  Negative for fever and malaise/fatigue.   Respiratory:  Negative for cough and shortness of breath.    Cardiovascular:  Positive for palpitations. Negative for chest pain, orthopnea, claudication, leg swelling and PND.   Gastrointestinal:  Negative for abdominal pain.   Musculoskeletal:  Negative for falls and myalgias.   Neurological:  Positive for sensory change. Negative for dizziness.        Neuropathy chronic   Psychiatric/Behavioral:  Negative for depression. The patient is not nervous/anxious.               Objective     BP (!) 126/90   Pulse 79   Resp 16   Ht 1.575 m (5' 2\")   Wt " 68.5 kg (151 lb)   SpO2 98%   BMI 27.62 kg/m²     Physical Exam  Vitals and nursing note reviewed.   Constitutional:       Appearance: Normal appearance. He is well-developed and normal weight.   HENT:      Head: Normocephalic and atraumatic.   Neck:      Vascular: No JVD.   Cardiovascular:      Rate and Rhythm: Normal rate and regular rhythm.      Pulses: Normal pulses.      Heart sounds: Normal heart sounds.   Pulmonary:      Effort: Pulmonary effort is normal.      Breath sounds: Normal breath sounds.   Musculoskeletal:         General: Normal range of motion.   Skin:     General: Skin is warm and dry.      Capillary Refill: Capillary refill takes less than 2 seconds.   Neurological:      General: No focal deficit present.      Mental Status: He is alert and oriented to person, place, and time. Mental status is at baseline.   Psychiatric:         Attention and Perception: Attention normal.         Mood and Affect: Mood normal. Affect is flat.         Speech: Speech normal.         Behavior: Behavior normal.         Thought Content: Thought content normal.         Cognition and Memory: Cognition and memory normal.         Judgment: Judgment normal.                Assessment & Plan     1. History of cardioembolic stroke        2. Type 2 diabetes mellitus with other specified complication, without long-term current use of insulin (HCC)        3. Essential hypertension        4. Coronary artery disease involving native coronary artery of native heart without angina pectoris        5. S/P CABG x 3        6. Primary hypertension  metoprolol tartrate (LOPRESSOR) 100 MG Tab          Medical Decision Making: Today's Assessment/Status/Plan:      1. HLD with CAD with CABG   -no angina or MIRANDA  -cont asa, statin lifelong  -echo shows normal EF in '22  -post-op neuropathy in R foot, managed by pyshiatrist   -LDL not at goal <70 with high intensity  -recommend repeat lipid/cmp soon  -consider add zetia 10 mg on top of statin  if needed*    2. HTN  -moderate control on lisinopril and amlodipine  -increase metoprolol to 100 mg BID for palpitations and HTN  -BP goal <130/80 consistently  -managed by PCP    3. DM type II  -followed by PCP    4. CVA post CABG  -flat depressed affect  -cont asa, plavix, and statin    5. Depression/anxiety  -very flat affect today  -recommend PCP/psych involvement  -managed by PCP    Patient is to follow up with Marilu ERICKSON in 3 months with labs and review of BP log.

## 2023-11-28 NOTE — PATIENT INSTRUCTIONS
INCREASE metoprolol to 100 mg twice a day.    You can take 50 mg tablets (2 in the morning and 2 at night until your new prescription).    Labs today to check cholesterol.    Follow up in 3 months to check BP and symptom review.

## 2023-11-29 ENCOUNTER — TELEPHONE (OUTPATIENT)
Dept: CARDIOLOGY | Facility: MEDICAL CENTER | Age: 53
End: 2023-11-29
Payer: COMMERCIAL

## 2023-11-29 NOTE — TELEPHONE ENCOUNTER
----- Message from BRAULIO Olivares sent at 11/29/2023  8:19 AM PST -----  LDL not at goal with CABG. Make sure he is taking increased dose since our last apt. If so, we need to start PCSK9 inhibitor with his increased risk of recurrence of CAD. Recommend referral to vascular medicine team to start PCSK9 inhibitors and follow up. Sugar high, alk phos and protein high, recommend follow up with PCP on this. SC

## 2024-01-08 NOTE — CARE PLAN
Problem: Safety  Goal: Will remain free from injury  Outcome: PROGRESSING AS EXPECTED  Goal: Will remain free from falls  Outcome: PROGRESSING AS EXPECTED   Pt fall precautions and fall prevention education AND pt safety education reinforced, pt has not attempted to self transfer, will continue to reinforce and continue to monitor.   English

## 2025-01-21 NOTE — TELEPHONE ENCOUNTER
RE: Plan of Care    Micky Pelaez MD    Thank you for referring Daphne Avila. The following information reflects my assessment and plan of care.    Please review and sign the attached form to indicate your approval of the plan of care. Insurance compliance requires your approval be on this plan of care. After your review, please fax back all pages received. Should you have any questions, feel free to contact me.    Paige Tan, PT  [facility]  [address] [place] [address] [address] [address]  Phone: [phone]  Fax: [phone]           Plan of Care 25   Effective from: 2025  Effective to: 3/4/2025    Plan ID: [id number]            Participants as of Finalize on 2025    Name Type Comments Contact Info    Paige Tan, PT Physical Therapist      Dragana Durdevic, MD PCP - General  [phone]           Daphne Avila MRN:[id number] (:1992 32 year old F)             Evaluation     Author: Paige Tan, PT Status: Signed Last edited: 2025  2:45 PM       Physical Therapy Evaluation    Visit Type: Daily Treatment Note- Initial Evaluation  Visit: 1  Referring Provider: Micky Pelaez MD  Medical Diagnosis (from order): M25.571, G89.29 - Chronic pain of right ankle   Treatment Diagnosis: right ankle - increased pain/symptoms, impaired posture, impaired range of motion, impaired muscle length/flexibility, impaired joint play/mobility, impaired activity tolerance, impaired gait, impaired balance and impaired strength.    SUBJECTIVE                                                                                                               Patient verbally consented to allow observer present during session. (Significant other)    Patient reports off and on ankle pain since . States she sprained her right ankle in which she never has seen anyone for her pain. Pain in her right ankle has increased in the last few weeks.    Pain / Symptoms  - Pain/symptom  MEDICATION PRIOR AUTHORIZATION NEEDED:    1. Name of Medication: DULoxetine HCL 30MG     2. Requested By (Name of Pharmacy): [facility]      3. Is insurance on file current? Yes    4. What is the name & phone number of the 3rd party payor? Theresa       DOCUMENTATION OF PAR STATUS:    1. Name of Medication & Dose: DULoxetine HCL 30MG     2. Name of Prescription Coverage Company & phone #: [employer]    3. Date Prior Auth Submitted: 2/17/2022    4. What information was given to obtain insurance decision? MyChart appointment notes     5. Prior Auth Status? Pending    6. Patient Notified: N\A            is: constant  - Pain rating (out of 10): Current: 8 ; Best: 6; Worst: 10  - Location: Lateral right ankle  - Quality / Description: sharp, shooting, popping / clicking, numbness  - Alleviating Factors: over-the-counter medication, ice  - Progression since onset: no change    Function:   Limitations / Exacerbation Factors:   - Patient reports pain and difficulty with function reported below.  - , standing tasks (10-15), walking and standing, stairs  Prior Level of Function: pain free ADLs and IADLs,    Patient Goals: decreased pain and improved balance.    Prior treatment  - no therapies  - Discharged from hospital, home health, or skilled nursing facility in last 30 days: no  Home Environment   - Patient lives with: significant other, young children and parent or legal guardian  - Type of home: single level home (steps to enter)  - Assistance available: as needed  - Denies 2 or more falls or an unexplained fall with injury in the last year.  - Feel safe at home / work / school: yes      OBJECTIVE                                                                                                                    Observation   Right ankle rest in excessive ankle inversion and plantarflexion    Range of Motion (ROM)   (degrees unless noted; active unless noted; norms in ( ); negative=lacking to 0, positive=beyond 0)  Ankle:   - Dorsiflexion (20):       Left:   WFL       Right:  -15  Pain    - Plantar Flexion (45-50):       Right:  65  Pain   - Inversion (30-35):      Left: WFL      Right: pain 40   - Eversion (15-20):      Left: WFL      Right: pain 18  First MTP (Hallux):    - Flexion (30-45):         Left:  WFL    Strength  (out of 5 unless noted, standard test position unless noted)   Ankle:    - Dorsiflexion:         Left: 5         Right: 3+, pain    - Plantar Flexion:         Left: 5         Right: 4-, pain    - Inversion:         Left: 5          Right: 3+, pain    - Eversion:         Left: 5          Right: 3+,  pain         Palpation  Right  - Peroneus Longus: tenderness  - Peroneus Brevis: tenderness  - Anterior Tibialis: no palpable tenderness  - Posterior Tibialis: no palpable tenderness  - Extensor Digitorum Longus and Brevis: no palpable tenderness  - Extensor Hallucis Longus: no palpable tenderness  Ankle: Anna/Tendon/Bone  - Peroneal Tendon: - Right: tenderness  - Posterior Tibialis Tendon: - Right: no tenderness  - Calcaneofibular Ligament: - Right: no tenderness  - Deltoid Ligament: - Right: no tenderness  - Medial Malleolus: - Right: no tenderness  - Lateral malleolus: - Right: tenderness  - Navicular: - Right: no tenderness  - Cuboid: - Right: no tenderness  Joint Play   Ankle / Foot / Distal Fibular Joint: Right   - Subtalar Joint: hypomobile  - Talocrural Joint: hypomobile  - Calcaneus Medial Glide: hypomobile  - Calcaneus Lateral Glide: hypomobile    Muscle Flexibility  - Knee Flexors:  Left: minimal limitation  Right: minimal limitation  - Gastroc Soleus:  Left: minimal limitation  Right: minimal limitation     Sensation/Dermatome Testing:    Intact: RLE light touch, LLE light touch      Ambulation / Gait  - Assistive device: none  - Assist Level: independent  - Surface: even  : bilateral pronation.            Outcome/Assessments  Outcome Measures:   Lower Extremity Functional Scale: LEFS Calculated Total: 30 (0=extreme difficulty; 80=no difficulty) see flowsheet for additional documentation    Treatment     Therapeutic Exercise  - Seated Heel Raise    - Ankle Eversion with Resistance    - Seated Ankle Plantar Flexion with Resistance Loop    - Ankle Dorsiflexion with Resistance      Patient education on frequency and duration of home exercise program   Patient education on ankle instability and laxity with functional tasks    Skilled input: verbal instruction/cues, tactile instruction/cues, demonstration, posture correction, as detailed above and facilitation    Writer verbally educated and received  verbal consent for hand placement, positioning of patient, and techniques to be performed today from patient   Home Exercise Program  Access Code: [email or web address]  URL: [email or web address]  Date: 01/21/2025  Prepared by: Paige Tan    Exercises  - Seated Heel Raise  - 1-2 x daily - 7 x weekly - 2 sets - 12 reps  - Ankle Eversion with Resistance  - 1-2 x daily - 7 x weekly - 2 sets - 12 reps  - Seated Ankle Plantar Flexion with Resistance Loop  - 1-2 x daily - 7 x weekly - 2 sets - 12 reps  - Ankle Dorsiflexion with Resistance  - 1-2 x daily - 7 x weekly - 2 sets - 12 reps      ASSESSMENT                                                                                                          32 year old patient has reported functional limitations listed above impacted by signs and symptoms consistent with treatment diagnosis below.  Treatment Diagnosis:   - Involved: right ankle.  - Symptoms/impairments: increased pain/symptoms, impaired posture, impaired range of motion, impaired muscle length/flexibility, impaired joint play/mobility, impaired activity tolerance, impaired gait, impaired balance and impaired strength.  Pain/symptoms after session (out of 10): 2    Prognosis: Patient will benefit from skilled therapy.  Rehabilitative potential is: good.  Predicted patient presentation: Low (stable) - Patient comorbidities and complexities, as defined above, will have little effect on progress for prescribed plan of care.  Education:   - Present and ready to learn: patient and patient's significant other  - Results of above outlined education: Verbalizes understanding and Demonstrates understanding    PLAN                                                                                                                         The following skilled interventions to be implemented to achieve goals listed below:  Neuromuscular Re-Education (25171)  Therapeutic Activity (42598)  Therapeutic  Exercise (25196)  Manual Therapy (81471)  Electrical Stimulation Attended (90798)  Ultrasound (48711)    Frequency / Duration  2 times per week tapering as patient progresses for 6 weeks for an estimated total of 12 visits    Patient involved in and agreed to plan of care and goals.  Patient given attendance policy at time of initial evaluation.    Suggestions for next session as indicated: Progress per plan of care    Goals  Long Term Goals: to be met by end of plan of care  1. Patient will report tolerating standing for 60 minutes without pain/symptoms for home/yard management.  2. Patient will ascend and descend 4 or more stairs using reciprocal pattern, independently without rail(s) to complete home access. (Improve right dorsiflexion range of motion to 10 degrees)  3. Lower Extremity Functional Scale: Patient will score 45 or higher on The Lower Extremity Functional Scale to indicate a decreased level of difficulty with walking and moving around. (minimal clinically important difference: 9 points change)  4. Patient will be independent with progressed and modified home exercise program.      Therapy procedure time and total treatment time can be found documented on the Time Entry flowsheet           Current Participants as of 1/21/2025    Name Type Comments Contact Info    Dragana Durdevic, MD PCP - General  [phone]    Signature pending    Paige Tan PT Physical Therapist      Electronically signed by Paige Tan PT at 1/21/2025 5200 CST          RE: Plan of Care for Daphne Avila, YOB: 1992     I certify the need for these services, furnished under this plan of treatment and while under my care.  I agree with the plan of care as stated and request that therapy proceed.        __________________________________________________________________________________  Provider Signature         Date

## 2025-01-29 NOTE — DISCHARGE SUMMARY
Discharge Summary    CHIEF COMPLAINT ON ADMISSION  Chief Complaint   Patient presents with   • Chest Pain       Reason for Admission  Shortness of breath     Admission Date  12/21/2018    CODE STATUS  Full Code    HPI & HOSPITAL COURSE  This is a 48 y.o. male here with chest pain.  Please see the dictated history of present illness 12/21/2018 for complete details.  In short, patient has history of CAD with recent PCI to the RCA with CHRISTINE in May 2018, hypertension, type 2 diabetes, and psychiatric disorder.  He has had chest pain for the last 2 weeks with shortness of breath there is been progressive and now present at rest.  He was compliant with his outpatient Effient and aspirin but began skipping doses due to epistaxis.  His last dose of Effient was 4 days prior to arrival.  Initial EKG was interpreted as nonacute and initial troponin was negative.  He was admitted to the observation unit for further testing.    Serial troponins remain negative.  BNP is normal.  Lipid panel shows inadequate control on 40 mg of atorvastatin.  He does have hyperglycemia, and hemoglobin A1c is currently pending.  He had reactive leukocytosis on admission with white blood cell count of 13.2 but this is normalized to 10.4 on subsequent draw.  Over the course of the evening his chest pain resolved spontaneously.  Following morning he underwent nuclear medicine stress testing which was negative for significant jeopardized viable myocardium.    Patient did have poorly controlled hypertension and was started on lisinopril as well as a BID dose of metoprolol.  He will need outpatient follow-up with his PCP to discuss refills of this medication as well as home blood pressure readings.  I have also increased his Lipitor from 40-80 mg nightly.  Therefore, he is discharged in good and stable condition to home with close outpatient follow-up.    Discharge Date  12/22/2018    FOLLOW UP ITEMS POST DISCHARGE  Hemoglobin A1c; home blood pressure  Form about oxygen use was  faxed with confirmation.  Copy sent to be scan.    readings    DISCHARGE DIAGNOSES  Active Problems:    Type 2 diabetes mellitus with complication, without long-term current use of insulin (HCC) POA: Yes    Essential hypertension POA: Yes    CAD (coronary artery disease) POA: Yes    Leukemoid reaction POA: Yes  Resolved Problems:    Chest pain POA: Yes      FOLLOW UP  No future appointments.  Lalo Proctor M.D.  [address] [facility] [address]  [facility] [id number] [phone]      PLEASE CALL TO ARRANGE A HOSPITAL FOLLOW UP ONCE YOU SWITCH YOUR [facility] TO [place] [employer]. THANK YOU    [address] [id number] [phone]    PLEASE CALL OR WALK IN TO ESTABLISH A NEW PCP. THANK YOU      MEDICATIONS ON DISCHARGE     Medication List      START taking these medications      Instructions   lisinopril 10 MG Tabs  Start taking on:  12/23/2018  Commonly known as:  PRINIVIL   Take 1 Tab by mouth every day.  Dose:  10 mg        CHANGE how you take these medications      Instructions   atorvastatin 40 MG Tabs  What changed:  · how much to take  · when to take this  Commonly known as:  LIPITOR   Take 2 Tabs by mouth every bedtime.  Dose:  80 mg     metoprolol 25 MG Tabs  What changed:  when to take this  Commonly known as:  LOPRESSOR   Take 1 Tab by mouth 2 times a day.  Dose:  25 mg        CONTINUE taking these medications      Instructions   allopurinol 100 MG Tabs  Commonly known as:  ZYLOPRIM   Take 100 mg by mouth every day.  Dose:  100 mg     aspirin EC 81 MG Tbec  Commonly known as:  ECOTRIN   Take 81 mg by mouth every day.  Dose:  81 mg     Aspirin-Acetaminophen-Caffeine 500-325-65 MG Pack   Take 3 Tabs by mouth every 8 hours as needed (Severe Toothache).  Dose:  3 Tab     HYDROcodone/acetaminophen  MG Tabs  Commonly known as:  NORCO   Take 1 Tab by mouth at bedtime as needed for Severe Pain (Severe Toothache).  Dose:  1 Tab     metFORMIN 500 MG Tabs  Commonly known as:  GLUCOPHAGE   Take 2,000 mg by mouth 2 Times a  Day.  Dose:  2000 mg     OLANZapine 2.5 MG Tabs  Commonly known as:  ZYPREXA   Take 2.5 mg by mouth every evening.  Dose:  2.5 mg     prasugrel 10 MG Tabs  Commonly known as:  EFFIENT   Take 10 mg by mouth every day.  Dose:  10 mg     WELLBUTRIN  MG XL tablet  Generic drug:  buPROPion   Take 300 mg by mouth every morning.  Dose:  300 mg            Allergies  No Known Allergies    DIET  Orders Placed This Encounter   Procedures   • Diet Order Cardiac, Diabetic     Standing Status:   Standing     Number of Occurrences:   1     Order Specific Question:   Diet:     Answer:   Cardiac [6]     Order Specific Question:   Diet:     Answer:   Diabetic [3]     Order Specific Question:   Miscellaneous modifications:     Answer:   No Decaf, No Caffeine(for test) [11]     Comments:   Protocol 1313 Patient to have no caffeine for 12 hours prior to exam (decaf, coffee, cola, tea, chocolate)       ACTIVITY  As tolerated.  Weight bearing as tolerated    CONSULTATIONS  None    PROCEDURES  None    LABORATORY  Lab Results   Component Value Date    SODIUM 138 12/22/2018    POTASSIUM 3.8 12/22/2018    CHLORIDE 108 12/22/2018    CO2 21 12/22/2018    GLUCOSE 167 (H) 12/22/2018    BUN 15 12/22/2018    CREATININE 1.02 12/22/2018        Lab Results   Component Value Date    WBC 10.4 12/22/2018    HEMOGLOBIN 14.4 12/22/2018    HEMATOCRIT 42.6 12/22/2018    PLATELETCT 255 12/22/2018        Total time of the discharge process exceeds 36 minutes.

## 2025-03-26 ENCOUNTER — TELEPHONE (OUTPATIENT)
Dept: CARDIOLOGY | Facility: MEDICAL CENTER | Age: 55
End: 2025-03-26

## 2025-03-26 NOTE — TELEPHONE ENCOUNTER
Received call from Dr. Tim Ibarra from [facility], where pt is currently admitted. Dr. Ibarra called to inquire more of pt's cardiac history, specifically if pt was on apixaban as of his last follow up visit with up, as he was unable to link chart in Epic. Informed that pt was last seen by [facility] on 11/28/23 and that pt was not on apixaban at that time, only asa and plavix. Noted that in discontinued medication list in Epic that apixaban was not present. Dr. Ibarra appreciative of the assistance.

## (undated) DEVICE — GLOVE, LITE (PAIR)

## (undated) DEVICE — NEEDLE SPINAL NON-SAFETY 22 GA X 3 (25EA/BX)"

## (undated) DEVICE — PACK VEIN - (19/CA)

## (undated) DEVICE — HEAD HOLDER JUNIOR/ADULT

## (undated) DEVICE — NEEDLE NON SAFETY HYPO 22 GA X 1 1/2 IN (100/BX)

## (undated) DEVICE — SUCTION INSTRUMENT YANKAUER BULBOUS TIP W/O VENT (50EA/CA)

## (undated) DEVICE — COVER PROBE STERILE CONE (12EA/CA)

## (undated) DEVICE — NEEDLE SPINAL NON-SAFETY 25GA X 3 1/2 (25/BX)

## (undated) DEVICE — ELECTRODE DUAL RETURN W/ CORD - (50/PK)

## (undated) DEVICE — SYRINGE 12 CC LUER TIP - (80/BX) OBSOLETE ITEM

## (undated) DEVICE — SUTURE 0 COATED VICRYL PLUS - CTX CR(12/BX)18 IN

## (undated) DEVICE — BONE WAX (12PK/BX)

## (undated) DEVICE — SUTURE 2-0 SILK SH 24 (36PK/BX)"

## (undated) DEVICE — STAPLER SKIN DISP - (6/BX 10BX/CA) VISISTAT

## (undated) DEVICE — DRAPE MAYO STAND - (30/CA)

## (undated) DEVICE — KIT ANESTHESIA W/CIRCUIT & 3/LT BAG W/FILTER (20EA/CA)

## (undated) DEVICE — MASK ANESTHESIA ADULT  - (100/CA)

## (undated) DEVICE — SYSTEM CHEST DRAIN ADULT/PEDS W/AUTO TRANSFUSION CAPABILITY SAHARA (6EA/CA)

## (undated) DEVICE — TRANSDUCER BIFURCATED MONITORING KIT (10EA/CA)

## (undated) DEVICE — Device

## (undated) DEVICE — SLEEVE, VASO, THIGH, MED

## (undated) DEVICE — LACTATED RINGERS INJ 1000 ML - (14EA/CA 60CA/PF)

## (undated) DEVICE — GLOVE SURGICAL PROTEXIS PI 8.0 LF - (50PR/BX)

## (undated) DEVICE — TRAY SURESTEP FOLEY TEMP SENSING 16FR (10EA/CA) ORDER  #18764 FOR TEMP FOLEY ONLY

## (undated) DEVICE — ARMBOARD  SMALL IV 9 INLONG - (25EA/CA)

## (undated) DEVICE — DRESSING TRANSPARENT FILM TEGADERM 4 X 4.75" (50EA/BX)"

## (undated) DEVICE — SYRINGE SAFETY 5 ML 18 GA X 1-1/2 BLUNT LL (100/BX 4BX/CA)

## (undated) DEVICE — BLADE STERNUM SAW SURGICAL 32.0 X 6.4 MM STERILE (1/EA)

## (undated) DEVICE — SUTURE 6-0 PROLENE RB-2 D/A 30 (36PK/BX)"

## (undated) DEVICE — DRESSING TRANSPARENT FILM TEGADERM 2.375 X 2.75"  (100EA/BX)"

## (undated) DEVICE — SYSTEM PEEL & PLACE 13CM INCISIONS

## (undated) DEVICE — CHLORAPREP 26 ML APPLICATOR - ORANGE TINT(25/CA)

## (undated) DEVICE — DRAPE LAPAROTOMY T SHEET - (12EA/CA)

## (undated) DEVICE — CATHETER SUCTION 14 FR. WITH CONTROL PORT (100/CS)

## (undated) DEVICE — SUTURE 4-0 30CM STRATAFIX SPIRAL PS-2 (12EA/BX)

## (undated) DEVICE — TUBE CHEST 32FR. RIGHT ANGLED (10EA/CA)

## (undated) DEVICE — KIT INTROPERCUTANEOUS SHEATH - 8.5 FR W/MAX BARRIER AND BIOPATCH  (5/CA)

## (undated) DEVICE — SET BIFURCATED BLOOD - (48EA/CS)

## (undated) DEVICE — GLOVE BIOGEL SZ 7 SURGICAL PF LTX - (50PR/BX 4BX/CA)

## (undated) DEVICE — SET EXTENSION WITH 2 PORTS (48EA/CA) ***PART #2C8610 IS A SUBSTITUTE*****

## (undated) DEVICE — GOWN SURGEONS X-LARGE - DISP. (30/CA)

## (undated) DEVICE — DRAPE C-ARM LARGE 41IN X 74 IN - (10/BX 2BX/CA)

## (undated) DEVICE — LEAD PACING TEMP MYO - (12/BX)

## (undated) DEVICE — PROTECTOR ULNA NERVE - (36PR/CA)

## (undated) DEVICE — SUTURE OHS

## (undated) DEVICE — SYS DLV COST CLS RM TEMP - INJECTATE (CO-SET II) (10EA/CA)

## (undated) DEVICE — SYRINGE 30 ML LL (56/BX)

## (undated) DEVICE — KIT ENDOHARVEST SYSTEM 8 - MUST ORDER 5 AT A TIME

## (undated) DEVICE — GLOVE BIOGEL PI INDICATOR SZ 7.5 SURGICAL PF LF -(50/BX 4BX/CA)

## (undated) DEVICE — SYRINGE SAFETY 3 ML 18 GA X 1 1/2 BLUNT LL (100/BX 8BX/CA)

## (undated) DEVICE — TUBE CHEST 32FR. STRAIGHT - (10EA/CA)

## (undated) DEVICE — SYRINGE 10 ML CONTROL LL (25EA/BX 4BX/CA)

## (undated) DEVICE — SUTURE GENERAL

## (undated) DEVICE — CANISTER SUCTION 3000ML MECHANICAL FILTER AUTO SHUTOFF MEDI-VAC NONSTERILE LF DISP  (40EA/CA)

## (undated) DEVICE — ELECTRODE 850 FOAM ADHESIVE - HYDROGEL RADIOTRNSPRNT (50/PK)

## (undated) DEVICE — PAD LAP STERILE 18 X 18 - (5/PK 40PK/CA)

## (undated) DEVICE — PROBE COVER

## (undated) DEVICE — RETRACTOR OFF PUMP OCTO ONLY - 10/BX

## (undated) DEVICE — DECANTER FLD BLS - (50/CA)

## (undated) DEVICE — PACK MINOR BASIN - (2EA/CA)

## (undated) DEVICE — SODIUM CHL IRRIGATION 0.9% 1000ML (12EA/CA)

## (undated) DEVICE — SPONGE GAUZESTER 4 X 4 4PLY - (128PK/CA)

## (undated) DEVICE — DRAPE STRLE REG TOWEL 18X24 - (10/BX 4BX/CA)"

## (undated) DEVICE — SOD. CHL. INJ. 0.9% 1000 ML - (14EA/CA 60CA/PF)

## (undated) DEVICE — GOWN WARMING X-LARGE FLEX - (20/CA)

## (undated) DEVICE — SENSOR CEREBRAL AND SOMATIC MONITORING (20/CA)

## (undated) DEVICE — CONNECTOR HUBLESS DRAINAGE - ONE WAY (20/BX)

## (undated) DEVICE — STOPCOCK MALE 4-WAY - (50/CA)

## (undated) DEVICE — SPRING BULLDOG 1/2 FORCE BLUE - (10/BX)

## (undated) DEVICE — TRAY MULTI-LUMEN 7FR PRESSURE W/MAX BARRIER AND BIOPATCH - (5/CA)

## (undated) DEVICE — SUTURE 4-0 PROLENE RB-1 D/A 36 (36PK/BX)"

## (undated) DEVICE — FIBRILLAR SURGICEL 4X4 - 10/CA

## (undated) DEVICE — KIT RADIAL ARTERY 20GA W/MAX BARRIER AND BIOPATCH  (5EA/CA) #10740 IS FOR THE SET RADIAL ARTERIAL

## (undated) DEVICE — TUBE E-T HI-LO CUFF 8.0MM (10EA/PK)

## (undated) DEVICE — TUBING CLEARLINK DUO-VENT - C-FLO (48EA/CA)

## (undated) DEVICE — STERI STRIP COMPOUND BENZOIN - TINCTURE 0.6ML WITH APPLICATOR (40EA/BX)

## (undated) DEVICE — INSERT STEALTH #5 - (10/BX)

## (undated) DEVICE — KIT TOURNIQUET DLP (40EA/PK)

## (undated) DEVICE — GLOVE SURGICAL LATEX POWDER FREE STIRLE SZ 8 ENCORE MICROPTIC (200PR/CA)

## (undated) DEVICE — NEEDLE SAFETY 18 GA X 1 1/2 IN (100EA/BX)

## (undated) DEVICE — SUTURE 7-0 PROLENE 24BV175-6 - EP8735H (36/BX)"

## (undated) DEVICE — CATHETER THERMALDILUTION SWAN - (5EA/CA)

## (undated) DEVICE — NEPTUNE 4 PORT MANIFOLD - (20/PK)

## (undated) DEVICE — SUTURE 5 SURGICAL STEEL V-40 - (12/BX) CCS CURRENT

## (undated) DEVICE — TOWEL STOP TIMEOUT SAFETY FLAG (40EA/CA)

## (undated) DEVICE — BAG RESUSCITATION DISPOSABLE - WITH MASK (10 EA/CA)

## (undated) DEVICE — SET LEADWIRE 5 LEAD BEDSIDE DISPOSABLE ECG (1SET OF 5/EA)

## (undated) DEVICE — BLADE SURGICAL #15 - (50/BX 3BX/CA)

## (undated) DEVICE — GOWN WARMING STANDARD FLEX - (30/CA)

## (undated) DEVICE — BLADE BEAVER 6900 MINI SHARP ALL AROUND (20/CA)

## (undated) DEVICE — TUBING INSUFFLATION - (10/BX)

## (undated) DEVICE — GLOVE BIOGEL INDICATOR SZ 7SURGICAL PF LTX - (50/BX 4BX/CA)

## (undated) DEVICE — NEEDLE NON SAFETY 25 GA X 1 1/2 IN HYPO (100EA/BX)

## (undated) DEVICE — SET FLUID WARMING STANDARD FLOW - (10/CA)

## (undated) DEVICE — SENSOR SPO2 NEO LNCS ADHESIVE (20/BX) SEE USER NOTES

## (undated) DEVICE — SUTURE 5-0 PROLENE C-1 D/A 24 (36PK/BX)"

## (undated) DEVICE — MICRODRIP PRIMARY VENTED 60 (48EA/CA) THIS WAS PART #2C8428 WHICH WAS DISCONTINUED